# Patient Record
Sex: FEMALE | Race: WHITE | NOT HISPANIC OR LATINO | Employment: OTHER | URBAN - METROPOLITAN AREA
[De-identification: names, ages, dates, MRNs, and addresses within clinical notes are randomized per-mention and may not be internally consistent; named-entity substitution may affect disease eponyms.]

---

## 2019-07-01 RX ORDER — ALPRAZOLAM 1 MG/1
TABLET ORAL
Qty: 90 TABLET | Refills: 1 | Status: SHIPPED | OUTPATIENT
Start: 2019-07-01 | End: 2019-08-29 | Stop reason: SDUPTHER

## 2019-08-11 RX ORDER — LAMOTRIGINE 100 MG/1
TABLET ORAL
Qty: 90 TABLET | Refills: 0 | Status: SHIPPED | OUTPATIENT
Start: 2019-08-11 | End: 2019-09-03 | Stop reason: SDUPTHER

## 2019-08-29 PROBLEM — G40.909 SEIZURE DISORDER: Status: ACTIVE | Noted: 2019-08-29

## 2019-08-29 PROBLEM — F32.A DEPRESSION: Status: ACTIVE | Noted: 2019-08-29

## 2019-08-29 RX ORDER — ALPRAZOLAM 1 MG/1
TABLET ORAL
Qty: 90 TABLET | Refills: 0 | Status: SHIPPED | OUTPATIENT
Start: 2019-08-29 | End: 2019-09-03 | Stop reason: SDUPTHER

## 2019-08-29 RX ORDER — AMOXICILLIN 875 MG/1
TABLET, COATED ORAL
COMMUNITY
Start: 2014-12-11 | End: 2019-09-03

## 2019-08-29 RX ORDER — CEFUROXIME AXETIL 500 MG/1
TABLET ORAL
COMMUNITY
Start: 2015-08-13 | End: 2019-09-03

## 2019-08-29 RX ORDER — ETODOLAC 400 MG/1
TABLET, FILM COATED ORAL
COMMUNITY
Start: 2016-01-08 | End: 2019-11-15

## 2019-08-29 RX ORDER — TRAZODONE HYDROCHLORIDE 150 MG/1
TABLET ORAL EVERY 24 HOURS
COMMUNITY
Start: 2018-05-16 | End: 2019-11-15

## 2019-08-29 RX ORDER — GABAPENTIN 800 MG/1
800 TABLET ORAL 3 TIMES DAILY
COMMUNITY
Start: 2019-02-08 | End: 2021-04-07 | Stop reason: SDUPTHER

## 2019-08-29 RX ORDER — PHENOBARBITAL 100 MG/1
100 TABLET ORAL NIGHTLY
COMMUNITY
Start: 2018-07-12 | End: 2020-08-23 | Stop reason: HOSPADM

## 2019-08-29 RX ORDER — LEVETIRACETAM 500 MG/1
1500 TABLET ORAL 2 TIMES DAILY
COMMUNITY
End: 2022-06-07 | Stop reason: SDUPTHER

## 2019-08-29 RX ORDER — QUETIAPINE 400 MG/1
TABLET, FILM COATED, EXTENDED RELEASE ORAL
COMMUNITY
Start: 2016-06-16 | End: 2019-12-11 | Stop reason: SDUPTHER

## 2019-08-29 RX ORDER — ALBUTEROL SULFATE 90 UG/1
AEROSOL, METERED RESPIRATORY (INHALATION)
COMMUNITY
Start: 2015-08-13 | End: 2020-01-22

## 2019-09-03 ENCOUNTER — OFFICE VISIT (OUTPATIENT)
Dept: PSYCHIATRY | Facility: CLINIC | Age: 49
End: 2019-09-03

## 2019-09-03 DIAGNOSIS — F31.31 BIPOLAR AFFECTIVE DISORDER, CURRENTLY DEPRESSED, MILD (HCC): Primary | ICD-10-CM

## 2019-09-03 DIAGNOSIS — F40.01 PANIC DISORDER WITH AGORAPHOBIA: ICD-10-CM

## 2019-09-03 PROCEDURE — 99213 OFFICE O/P EST LOW 20 MIN: CPT | Performed by: PHYSICIAN ASSISTANT

## 2019-09-03 RX ORDER — ALPRAZOLAM 1 MG/1
1 TABLET ORAL 4 TIMES DAILY PRN
Qty: 90 TABLET | Refills: 2 | Status: SHIPPED | OUTPATIENT
Start: 2019-09-30 | End: 2019-12-11 | Stop reason: DRUGHIGH

## 2019-09-03 RX ORDER — LEVETIRACETAM 750 MG/1
TABLET ORAL
COMMUNITY
Start: 2019-07-03 | End: 2019-11-15

## 2019-09-03 RX ORDER — PRAMIPEXOLE DIHYDROCHLORIDE 1 MG/1
1 TABLET ORAL
Refills: 11 | COMMUNITY
Start: 2019-08-29 | End: 2020-02-24 | Stop reason: HOSPADM

## 2019-09-03 RX ORDER — LURASIDONE HYDROCHLORIDE 40 MG/1
TABLET, FILM COATED ORAL DAILY
COMMUNITY
End: 2019-09-03

## 2019-09-03 RX ORDER — CLINDAMYCIN PHOSPHATE 20 MG/G
CREAM VAGINAL
COMMUNITY
Start: 2019-07-29 | End: 2019-11-15

## 2019-09-03 RX ORDER — LAMOTRIGINE 100 MG/1
TABLET ORAL
Qty: 90 TABLET | Refills: 3 | Status: SHIPPED | OUTPATIENT
Start: 2019-09-03 | End: 2019-12-11 | Stop reason: SDUPTHER

## 2019-09-03 RX ORDER — SUMATRIPTAN 100 MG/1
100 TABLET, FILM COATED ORAL ONCE AS NEEDED
COMMUNITY
End: 2021-08-06 | Stop reason: SDUPTHER

## 2019-09-03 RX ORDER — MODAFINIL 200 MG/1
200 TABLET ORAL EVERY MORNING
Refills: 5 | COMMUNITY
Start: 2019-08-13 | End: 2021-10-04

## 2019-09-03 RX ORDER — NITROFURANTOIN 25; 75 MG/1; MG/1
100 CAPSULE ORAL 2 TIMES DAILY
Refills: 0 | COMMUNITY
Start: 2019-07-26 | End: 2019-09-03

## 2019-09-03 RX ORDER — DIAZEPAM 5 MG/1
5 TABLET ORAL
COMMUNITY
End: 2019-09-03

## 2019-09-03 NOTE — PROGRESS NOTES
Subjective   Milka Espinoza is a 49 y.o.white female who presents today for follow up    Chief Complaint: Depression and anxiety    History of Present Illness:   She missed her last appt  Autistic son lives with her, living in a new apartment, two window units, no central air.   Anxiety and panic attacks on occasion  No pseudoseizures recently  No pain meds  Depression 4/10  Denies SI/HI  She is doing better on the Xanax than she was with the Valium, switched in June    The following portions of the patient's history were reviewed and updated as appropriate: allergies, current medications, past family history, past medical history, past social history, past surgical history and problem list.    PAST PSYCHIATRIC HISTORY  Axis I  Affective/Bipoloar Disorder, Anxiety/Panic Disorder, Substance Abuse Disorder  Axis II  None    PAST OUTPATIENT TREATMENT  Diagnosis treated:  Affective Disorder, Anxiety/Panic Disorder, Substance/Alcohol Abuse  Treatment Type:  Individual Therapy, Medication Management  Prior Psychiatric Medications:  Abilify  Risperidone  Vraylar  Valium  Xanax  Support Groups:  None  Sequelae Of Mental Disorder:  social isolation, family disruption, emotional distress      Interval History  Improved    Side Effects  None      Past Medical History:  Past Medical History:   Diagnosis Date   • Anxiety    • Bipolar disorder (CMS/HCC)    • Depression    • Panic disorder    • Seizures (CMS/HCC)        Social History:  Social History     Socioeconomic History   • Marital status: Single     Spouse name: Not on file   • Number of children: Not on file   • Years of education: Not on file   • Highest education level: Not on file   Tobacco Use   • Smoking status: Current Every Day Smoker     Packs/day: 0.25     Types: Cigarettes   Substance and Sexual Activity   • Alcohol use: No     Frequency: Never       Family History:  History reviewed. No pertinent family history.    Past Surgical History:  Past Surgical  History:   Procedure Laterality Date   • ABDOMINAL SURGERY     • HERNIA REPAIR     • HYSTERECTOMY         Problem List:  Patient Active Problem List   Diagnosis   • Anxiety disorder   • Anxiety state   • Bipolar affective disorder (CMS/HCC)   • Depression   • Mood disorder in conditions classified elsewhere   • Convulsions (CMS/HCC)   • Seizure disorder (CMS/HCC)   • Opioid withdrawal (CMS/HCC)       Allergy:   Allergies   Allergen Reactions   • Sulfa Antibiotics Hives   • Tramadol Hcl Mental Status Change        Discontinued Medications:  Medications Discontinued During This Encounter   Medication Reason   • diazePAM (VALIUM) 5 MG tablet *Therapy completed   • lurasidone (LATUDA) 40 MG tablet tablet *Therapy completed   • amoxicillin (AMOXIL) 875 MG tablet *Therapy completed   • nitrofurantoin, macrocrystal-monohydrate, (MACROBID) 100 MG capsule *Therapy completed   • cefuroxime (CEFTIN) 500 MG tablet *Therapy completed   • lamoTRIgine (LaMICtal) 100 MG tablet Reorder   • ALPRAZolam (XANAX) 1 MG tablet Reorder       Current Medications:   Current Outpatient Medications   Medication Sig Dispense Refill   • [START ON 9/30/2019] ALPRAZolam (XANAX) 1 MG tablet Take 1 tablet by mouth 4 (Four) Times a Day As Needed for Anxiety. for anxiety 90 tablet 2   • Cariprazine HCl (VRAYLAR) 3 MG capsule 1 capsule Daily.     • gabapentin (NEURONTIN) 800 MG tablet Every 8 (Eight) Hours.     • lamoTRIgine (LaMICtal) 100 MG tablet Take 1 tablet by mouth in the morning and 2 at bedtime. 90 tablet 3   • QUEtiapine XR (SEROQUEL XR) 400 MG 24 hr tablet SEROQUEL  MG XR24H-TAB     • traZODone (DESYREL) 150 MG tablet Daily.     • albuterol sulfate HFA (PROAIR HFA) 108 (90 Base) MCG/ACT inhaler PROAIR  (90 Base) MCG/ACT AERS     • clindamycin (CLEOCIN) 2 % vaginal cream      • etodolac (LODINE) 400 MG tablet ETODOLAC 400 MG TABS     • levETIRAcetam (KEPPRA) 500 MG tablet KEPPRA 500 MG TABS     • levETIRAcetam (KEPPRA) 750 MG  tablet      • modafinil (PROVIGIL) 200 MG tablet Take 200 mg by mouth Every Morning.  5   • PHENobarbital (LUMINAL) 100 MG tablet Daily.     • pramipexole (MIRAPEX) 1 MG tablet Take 1 mg by mouth every night at bedtime.  11   • SUMAtriptan (IMITREX) 100 MG tablet        No current facility-administered medications for this visit.          Review of Symptoms:    Psychiatric/Behavioral: Negative for agitation, behavioral problems, confusion, decreased concentration, dysphoric mood, hallucinations, self-injury, sleep disturbance and suicidal ideas. The patient is not nervous/anxious and is not hyperactive.        Physical Exam:   There were no vitals taken for this visit.    Mental Status Exam:   Hygiene:   good  Cooperation:  Cooperative  Eye Contact:  Good  Psychomotor Behavior:  Appropriate  Affect:  Appropriate  Mood: normal  Hopelessness: Denies  Speech:  Normal  Thought Process:  Goal directed  Thought Content:  Normal  Suicidal:  None  Homicidal:  None  Hallucinations:  None  Delusion:  None  Memory:  Intact  Orientation:  Person, Place, Time and Situation  Reliability:  good  Insight:  Good  Judgement:  Good  Impulse Control:  Good  Physical/Medical Issues:  No        PHQ-9 Depression Screening  Little interest or pleasure in doing things? 1   Feeling down, depressed, or hopeless? 1   Trouble falling or staying asleep, or sleeping too much? 1   Feeling tired or having little energy? 1   Poor appetite or overeating? 1   Feeling bad about yourself - or that you are a failure or have let yourself or your family down? 1   Trouble concentrating on things, such as reading the newspaper or watching television? 1   Moving or speaking so slowly that other people could have noticed? Or the opposite - being so fidgety or restless that you have been moving around a lot more than usual? 0   Thoughts that you would be better off dead, or of hurting yourself in some way? 0   PHQ-9 Total Score 7   If you checked off any  problems, how difficult have these problems made it for you to do your work, take care of things at home, or get along with other people? Somewhat difficult           Former smoker    I advised Milka of the risks of tobacco use.     Lab Results:   No visits with results within 3 Month(s) from this visit.   Latest known visit with results is:   Admission on 06/12/2018, Discharged on 06/12/2018   Component Date Value Ref Range Status   • Creatinine, Urine 06/12/2018 42.9Urine creatinines <20 mg/dL may express a dilution effect which can cause false negatives for the drug screen.  mg/dL Final   • Amphetamine, Urine Qual 06/12/2018 NEGATIVE  NEGATIVE Final   • Barbiturates Screen, Urine 06/12/2018 POSITIVE* NEGATIVE Final   • Cocaine Screen, Urine 06/12/2018 NEGATIVE  NEGATIVE Final   • Opiate Screen, Urine 06/12/2018 NEGATIVE  NEGATIVE Final   • Benzodiazepine Screen, Urine 06/12/2018 NEGATIVE  NEGATIVE Final   • Phencyclidine (PCP), Urine 06/12/2018 NEGATIVE  NEGATIVE Final   • THC, Screen 06/12/2018 NEGATIVE  NEGATIVE Final   • Methadone Screen, Urine 06/12/2018 NEGATIVE  NEGATIVE Final   • WBC 06/12/2018 4.8  4.5 - 11.5 10*3/uL Final   • RBC 06/12/2018 4.03  4.00 - 5.40 10*6/uL Final   • Hemoglobin 06/12/2018 11.9* 12.0 - 15.0 g/dL Final   • Hematocrit 06/12/2018 36.6  35 - 49 % Final   • MCV 06/12/2018 90.8  80 - 94 fL Final   • MCH 06/12/2018 29.6  26 - 32 pg Final   • MCHC 06/12/2018 32.6  32 - 36 g/dL Final   • RDW 06/12/2018 13.7  11.5 - 14.5 % Final   • Platelets 06/12/2018 223  150 - 450 10*3/uL Final   • MPV 06/12/2018 7.7  7.4 - 10.4 fL Final   • Differential Type 06/12/2018 AUTO   Final   • Neutrophils Absolute 06/12/2018 2.0* 2.3 - 8.6 10*3/uL Final   • Lymphocytes Absolute 06/12/2018 2.2  0.8 - 4.8 10*3/uL Final   • Monocytes Absolute 06/12/2018 0.5  0.1 - 1.3 10*3/uL Final   • Eosinophils Absolute 06/12/2018 0.1  0.0 - 0.3 10*3/uL Final   • Basophils Absolute 06/12/2018 0.0  0 - 0.2 10*3/uL Final    • Neutrophil Rel % 06/12/2018 42* 50 - 75 % Final   • Lymphocyte Rel % 06/12/2018 46* 18 - 42 % Final   • Monocyte Rel % 06/12/2018 10  2 - 11 % Final   • Eosinophil Rel % 06/12/2018 1  0 - 3 % Final   • Basophil Rel % 06/12/2018 1  0 - 2 % Final   • nRBC 06/12/2018 0  0 /100[WBCs] Final   • Absolute nRBC 06/12/2018 0  10*3/uL Final   • Sodium 06/12/2018 139  136 - 144 mmol/L Final   • Potassium 06/12/2018 3.9  3.6 - 5.1 mmol/L Final   • Chloride 06/12/2018 103  101 - 111 mmol/L Final   • CO2 06/12/2018 26  22 - 32 mmol/L Final   • Glucose 06/12/2018 91  65 - 99 mg/dL Final   • BUN 06/12/2018 3* 8 - 20 mg/dL Final   • Creatinine 06/12/2018 0.7  0.4 - 1.0 mg/dl Final   • Anion Gap 06/12/2018 13.9  10 - 20 Final   • BUN/Creatinine Ratio 06/12/2018 4.3* 5.4 - 26.2 Final   • GFR MDRD Non  06/12/2018 >60  >60 mL/min/1.73m2 Final   • GFR MDRD  06/12/2018 >60  >60 mL/min/1.73m2 Final   • Calcium 06/12/2018 8.9  8.9 - 10.3 mg/dL Final       Assessment/Plan   Problems Addressed this Visit        Other    Anxiety disorder    Relevant Medications    Cariprazine HCl (VRAYLAR) 3 MG capsule    PHENobarbital (LUMINAL) 100 MG tablet    QUEtiapine XR (SEROQUEL XR) 400 MG 24 hr tablet    traZODone (DESYREL) 150 MG tablet    modafinil (PROVIGIL) 200 MG tablet    ALPRAZolam (XANAX) 1 MG tablet (Start on 9/30/2019)    Bipolar affective disorder (CMS/HCC) - Primary    Relevant Medications    Cariprazine HCl (VRAYLAR) 3 MG capsule    PHENobarbital (LUMINAL) 100 MG tablet    QUEtiapine XR (SEROQUEL XR) 400 MG 24 hr tablet    traZODone (DESYREL) 150 MG tablet    modafinil (PROVIGIL) 200 MG tablet    lamoTRIgine (LaMICtal) 100 MG tablet    ALPRAZolam (XANAX) 1 MG tablet (Start on 9/30/2019)          Visit Diagnoses:    ICD-10-CM ICD-9-CM   1. Bipolar affective disorder, currently depressed, mild (CMS/[facility], located within [facility]) F31.31 296.51   2. Panic disorder with agoraphobia F40.01 300.21       TREATMENT PLAN/GOALS: Continue  supportive psychotherapy efforts and medications as indicated. Treatment and medication options discussed during today's visit. Patient ackowledged and verbally consented to continue with current treatment plan and was educated on the importance of compliance with treatment and follow-up appointments.    MEDICATION ISSUES:  INSPECT reviewed as expected  Discussed medication options and treatment plan of prescribed medication as well as the risks, benefits, and side effects including potential falls, possible impaired driving and metabolic adversities among others. Patient is agreeable to call the office with any worsening of symptoms or onset of side effects. Patient is agreeable to call 911 or go to the nearest ER should he/she begin having SI/HI. No medication side effects or related complaints today.     Patient is doing well on her current medications, except increased anxiety.  Change Xanax 1 mg from 3 times daily to 4 times daily #120 starting next month with her next fill  Continue Lamictal 100 mg, 1 in the morning and 2 at bedtime  Continue trazodone and Seroquel and Vraylar at current dosages with no changes but she did not think she needed refills of them today.    MEDS ORDERED DURING VISIT:  New Medications Ordered This Visit   Medications   • lamoTRIgine (LaMICtal) 100 MG tablet     Sig: Take 1 tablet by mouth in the morning and 2 at bedtime.     Dispense:  90 tablet     Refill:  3     Please consider 90 day supplies to promote better adherence   • ALPRAZolam (XANAX) 1 MG tablet     Sig: Take 1 tablet by mouth 4 (Four) Times a Day As Needed for Anxiety. for anxiety     Dispense:  90 tablet     Refill:  2     She just picked up Rx so this is for next month with new directions       Return in about 3 months (around 12/3/2019).         This document has been electronically signed by Love Bourgeois PA-C  September 3, 2019 4:34 PM

## 2019-09-10 ENCOUNTER — TELEPHONE (OUTPATIENT)
Dept: PSYCHIATRY | Facility: CLINIC | Age: 49
End: 2019-09-10

## 2019-09-10 DIAGNOSIS — F40.01 PANIC DISORDER WITH AGORAPHOBIA: Primary | ICD-10-CM

## 2019-09-10 NOTE — TELEPHONE ENCOUNTER
Patient said her anxiety is still really bad and she wants to try Buspar, She wants your recommendation

## 2019-09-11 RX ORDER — BUSPIRONE HYDROCHLORIDE 10 MG/1
10 TABLET ORAL 3 TIMES DAILY
Qty: 90 TABLET | Refills: 2 | Status: SHIPPED | OUTPATIENT
Start: 2019-09-11 | End: 2019-12-11 | Stop reason: SDUPTHER

## 2019-09-11 NOTE — TELEPHONE ENCOUNTER
Continue the Xanax and can add Buspar 10mg TID and can increase the strength of the tablet if needed at next visit.

## 2019-11-15 ENCOUNTER — OFFICE VISIT (OUTPATIENT)
Dept: FAMILY MEDICINE CLINIC | Facility: CLINIC | Age: 49
End: 2019-11-15

## 2019-11-15 ENCOUNTER — LAB (OUTPATIENT)
Dept: FAMILY MEDICINE CLINIC | Facility: CLINIC | Age: 49
End: 2019-11-15

## 2019-11-15 ENCOUNTER — HOSPITAL ENCOUNTER (OUTPATIENT)
Dept: CT IMAGING | Facility: HOSPITAL | Age: 49
Discharge: HOME | End: 2019-11-15
Admitting: NURSE PRACTITIONER

## 2019-11-15 VITALS
SYSTOLIC BLOOD PRESSURE: 122 MMHG | TEMPERATURE: 98.2 F | OXYGEN SATURATION: 98 % | DIASTOLIC BLOOD PRESSURE: 66 MMHG | BODY MASS INDEX: 30.16 KG/M2 | WEIGHT: 181 LBS | HEART RATE: 91 BPM | HEIGHT: 65 IN

## 2019-11-15 DIAGNOSIS — Z12.31 ENCOUNTER FOR SCREENING MAMMOGRAM FOR MALIGNANT NEOPLASM OF BREAST: ICD-10-CM

## 2019-11-15 DIAGNOSIS — R79.9 ABNORMAL FINDING OF BLOOD CHEMISTRY, UNSPECIFIED: ICD-10-CM

## 2019-11-15 DIAGNOSIS — R11.2 NAUSEA AND VOMITING, INTRACTABILITY OF VOMITING NOT SPECIFIED, UNSPECIFIED VOMITING TYPE: ICD-10-CM

## 2019-11-15 DIAGNOSIS — R19.7 DIARRHEA, UNSPECIFIED TYPE: ICD-10-CM

## 2019-11-15 DIAGNOSIS — F41.9 ANXIETY DISORDER, UNSPECIFIED TYPE: ICD-10-CM

## 2019-11-15 DIAGNOSIS — R10.84 GENERALIZED ABDOMINAL PAIN: Primary | ICD-10-CM

## 2019-11-15 DIAGNOSIS — Z00.00 PREVENTATIVE HEALTH CARE: ICD-10-CM

## 2019-11-15 DIAGNOSIS — R10.84 GENERALIZED ABDOMINAL PAIN: ICD-10-CM

## 2019-11-15 LAB
ALBUMIN SERPL-MCNC: 4.5 G/DL (ref 3.5–5.2)
ALBUMIN/GLOB SERPL: 1.3 G/DL
ALP SERPL-CCNC: 118 U/L (ref 39–117)
ALT SERPL W P-5'-P-CCNC: 28 U/L (ref 1–33)
ANION GAP SERPL CALCULATED.3IONS-SCNC: 10.5 MMOL/L (ref 5–15)
AST SERPL-CCNC: 18 U/L (ref 1–32)
BASOPHILS # BLD AUTO: 0.04 10*3/MM3 (ref 0–0.2)
BASOPHILS NFR BLD AUTO: 0.6 % (ref 0–1.5)
BILIRUB BLD-MCNC: NEGATIVE MG/DL
BILIRUB SERPL-MCNC: <0.2 MG/DL (ref 0.2–1.2)
BUN BLD-MCNC: 8 MG/DL (ref 6–20)
BUN/CREAT SERPL: 10.4 (ref 7–25)
CALCIUM SPEC-SCNC: 9.5 MG/DL (ref 8.6–10.5)
CHLORIDE SERPL-SCNC: 101 MMOL/L (ref 98–107)
CHOLEST SERPL-MCNC: 280 MG/DL (ref 0–200)
CLARITY, POC: CLEAR
CO2 SERPL-SCNC: 30.5 MMOL/L (ref 22–29)
COLOR UR: YELLOW
CREAT BLD-MCNC: 0.77 MG/DL (ref 0.57–1)
CREAT BLDA-MCNC: 0.7 MG/DL (ref 0.6–1.3)
DEPRECATED RDW RBC AUTO: 41.3 FL (ref 37–54)
EOSINOPHIL # BLD AUTO: 0.05 10*3/MM3 (ref 0–0.4)
EOSINOPHIL NFR BLD AUTO: 0.8 % (ref 0.3–6.2)
ERYTHROCYTE [DISTWIDTH] IN BLOOD BY AUTOMATED COUNT: 12.5 % (ref 12.3–15.4)
EXPIRATION DATE: NORMAL
FLUAV AG NPH QL: NEGATIVE
FLUBV AG NPH QL: NEGATIVE
GFR SERPL CREATININE-BSD FRML MDRD: 79 ML/MIN/1.73
GLOBULIN UR ELPH-MCNC: 3.5 GM/DL
GLUCOSE BLD-MCNC: 102 MG/DL (ref 65–99)
GLUCOSE UR STRIP-MCNC: NEGATIVE MG/DL
HBA1C MFR BLD: 4.9 % (ref 3.5–5.6)
HCT VFR BLD AUTO: 37.1 % (ref 34–46.6)
HDLC SERPL-MCNC: 74 MG/DL (ref 40–60)
HGB BLD-MCNC: 12.4 G/DL (ref 12–15.9)
IMM GRANULOCYTES # BLD AUTO: 0.03 10*3/MM3 (ref 0–0.05)
IMM GRANULOCYTES NFR BLD AUTO: 0.5 % (ref 0–0.5)
INTERNAL CONTROL: NORMAL
KETONES UR QL: NEGATIVE
LDLC SERPL CALC-MCNC: 136 MG/DL (ref 0–100)
LDLC/HDLC SERPL: 1.83 {RATIO}
LEUKOCYTE EST, POC: NEGATIVE
LYMPHOCYTES # BLD AUTO: 2.34 10*3/MM3 (ref 0.7–3.1)
LYMPHOCYTES NFR BLD AUTO: 36.6 % (ref 19.6–45.3)
Lab: NORMAL
MCH RBC QN AUTO: 30.3 PG (ref 26.6–33)
MCHC RBC AUTO-ENTMCNC: 33.4 G/DL (ref 31.5–35.7)
MCV RBC AUTO: 90.7 FL (ref 79–97)
MONOCYTES # BLD AUTO: 0.45 10*3/MM3 (ref 0.1–0.9)
MONOCYTES NFR BLD AUTO: 7 % (ref 5–12)
NEUTROPHILS # BLD AUTO: 3.48 10*3/MM3 (ref 1.7–7)
NEUTROPHILS NFR BLD AUTO: 54.5 % (ref 42.7–76)
NITRITE UR-MCNC: NEGATIVE MG/ML
NRBC BLD AUTO-RTO: 0 /100 WBC (ref 0–0.2)
PH UR: 8.5 [PH] (ref 5–8)
PHENOBARB SERPL-MCNC: 8.6 MCG/ML (ref 10–30)
PLATELET # BLD AUTO: 258 10*3/MM3 (ref 140–450)
PMV BLD AUTO: 10.6 FL (ref 6–12)
POTASSIUM BLD-SCNC: 5.1 MMOL/L (ref 3.5–5.2)
PROT SERPL-MCNC: 8 G/DL (ref 6–8.5)
PROT UR STRIP-MCNC: NEGATIVE MG/DL
RBC # BLD AUTO: 4.09 10*6/MM3 (ref 3.77–5.28)
RBC # UR STRIP: NEGATIVE /UL
SODIUM BLD-SCNC: 142 MMOL/L (ref 136–145)
SP GR UR: 1.02 (ref 1–1.03)
TRIGL SERPL-MCNC: 352 MG/DL (ref 0–150)
TSH SERPL DL<=0.05 MIU/L-ACNC: 5.03 UIU/ML (ref 0.27–4.2)
UROBILINOGEN UR QL: NORMAL
VLDLC SERPL-MCNC: 70.4 MG/DL (ref 5–40)
WBC NRBC COR # BLD: 6.39 10*3/MM3 (ref 3.4–10.8)

## 2019-11-15 PROCEDURE — 80053 COMPREHEN METABOLIC PANEL: CPT | Performed by: NURSE PRACTITIONER

## 2019-11-15 PROCEDURE — 81003 URINALYSIS AUTO W/O SCOPE: CPT | Performed by: NURSE PRACTITIONER

## 2019-11-15 PROCEDURE — 85025 COMPLETE CBC W/AUTO DIFF WBC: CPT | Performed by: NURSE PRACTITIONER

## 2019-11-15 PROCEDURE — 83036 HEMOGLOBIN GLYCOSYLATED A1C: CPT | Performed by: NURSE PRACTITIONER

## 2019-11-15 PROCEDURE — 80177 DRUG SCRN QUAN LEVETIRACETAM: CPT | Performed by: NURSE PRACTITIONER

## 2019-11-15 PROCEDURE — 36415 COLL VENOUS BLD VENIPUNCTURE: CPT

## 2019-11-15 PROCEDURE — 87804 INFLUENZA ASSAY W/OPTIC: CPT | Performed by: NURSE PRACTITIONER

## 2019-11-15 PROCEDURE — 80175 DRUG SCREEN QUAN LAMOTRIGINE: CPT | Performed by: NURSE PRACTITIONER

## 2019-11-15 PROCEDURE — 0 IOPAMIDOL PER 1 ML: Performed by: NURSE PRACTITIONER

## 2019-11-15 PROCEDURE — 80061 LIPID PANEL: CPT | Performed by: NURSE PRACTITIONER

## 2019-11-15 PROCEDURE — 74177 CT ABD & PELVIS W/CONTRAST: CPT

## 2019-11-15 PROCEDURE — 99204 OFFICE O/P NEW MOD 45 MIN: CPT | Performed by: NURSE PRACTITIONER

## 2019-11-15 PROCEDURE — 82565 ASSAY OF CREATININE: CPT

## 2019-11-15 PROCEDURE — 84443 ASSAY THYROID STIM HORMONE: CPT | Performed by: NURSE PRACTITIONER

## 2019-11-15 PROCEDURE — 80184 ASSAY OF PHENOBARBITAL: CPT | Performed by: NURSE PRACTITIONER

## 2019-11-15 RX ORDER — ONDANSETRON 4 MG/1
4 TABLET, FILM COATED ORAL EVERY 8 HOURS PRN
Qty: 30 TABLET | Refills: 0 | Status: SHIPPED | OUTPATIENT
Start: 2019-11-15 | End: 2020-01-22

## 2019-11-15 RX ADMIN — IOPAMIDOL 100 ML: 755 INJECTION, SOLUTION INTRAVENOUS at 13:30

## 2019-11-15 NOTE — PATIENT INSTRUCTIONS
Drink plenty of fluids  Referral to GI  Obtain labs  Go to the ER for worsening symptoms  See eye doctor  Complete CT scan  zofran as needed for nausea

## 2019-11-15 NOTE — PROGRESS NOTES
Subjective   Milka Espinoza is a 49 y.o. female.     Pt is here today to establish care and with c/o diarrhea and nausea.  She is from [place].  She saw Dr. Cosby previously.  She does not work at this time.  She was a  previously.  She is single with 3 children.  Does not get much physical activity in.  Does not consume a well balanced diet.    Depression and Bipolar- Sees Dr. Bourgeois every 3 mo. Is currently on Xanax 1mg QID, buspar 10g TID, Vraylar 3mg daily, seroquel 400mg daily. Has struggled with depression for most of her life.  She was in a coma in 2012 for 10 days due to seizures.  She started having seizures in 2002.    Seizures- sees Dr. Cooley with neuro.  Currently on gabapentin, lamictal, keppra, and luminal. Started having them in 2002.  States her last seizure was a couple of days ago. She reports that she has been having jerks lately.  She has seizures every months, but sometimes can have them daily.    Takes modafinil because she is tired all the time.    Migraines- Takes imitrex.  Controlled     Nausea, vomiting, diarrhea- been occurring for 2 weeks.  Reports that she is constantly nauseous.  She has been vomiting on a regular basis.  She has been having diarrhea about once every 3 days. She has not had any formed stools in the last 2 weeks.  She has been very gassy.  She has not been able to keep much fluids or food down.  She has been trying to eat bland foods.  She has not been able to take many of her meds because of her stomach, which has made her very emotional.  Denies any abdominal pain.  She has been having some flank pain off and on for a year and strong odor in her urin.         Labs-  Pap smear- n/a hysterectomy  Mammogram- due  DEXA- n/a  Colonoscopy- due    Vaccines:  Flu- due  PNA- n/a  Shingles-n/a  Tdap- due    Dental exam- due  Eye exam- due    The following portions of the patient's history were reviewed and updated as appropriate: allergies, current medications,  "past family history, past medical history, past social history, past surgical history and problem list.    Review of Systems   Constitutional: Positive for appetite change, chills and fatigue. Negative for fever.   HENT: Negative for congestion, ear pain, hearing loss, postnasal drip, rhinorrhea, sinus pressure, sore throat, swollen glands and trouble swallowing.    Eyes: Positive for blurred vision (hasnt had an eye exam in years). Negative for double vision, pain, discharge, itching and visual disturbance.   Respiratory: Negative for cough, chest tightness, shortness of breath and wheezing.    Cardiovascular: Negative for chest pain, palpitations and leg swelling.   Gastrointestinal: Positive for constipation (from her medications), diarrhea, nausea and vomiting. Negative for abdominal pain and blood in stool.   Endocrine: Positive for polydipsia. Negative for polyphagia and polyuria.   Genitourinary: Positive for flank pain. Negative for dysuria, frequency and urgency.   Musculoskeletal: Positive for arthralgias. Negative for back pain and myalgias.   Skin: Negative for rash and skin lesions.   Neurological: Positive for dizziness and headache (hx migraines). Negative for weakness and numbness.   Psychiatric/Behavioral: Positive for depressed mood. Negative for self-injury, sleep disturbance, suicidal ideas and stress. The patient is nervous/anxious.        Objective   /66 (BP Location: Right arm, Patient Position: Sitting, Cuff Size: Large Adult)   Pulse 91   Temp 98.2 °F (36.8 °C) (Core)   Ht 163.8 cm (64.5\")   Wt 82.1 kg (181 lb)   SpO2 98%   Breastfeeding? No   BMI 30.59 kg/m²   Physical Exam   Constitutional: She is oriented to person, place, and time. She appears well-developed and well-nourished. She appears distressed (mod discomfort/ nausea).   HENT:   Head: Normocephalic and atraumatic.   Right Ear: External ear normal.   Left Ear: External ear normal.   Mouth/Throat: Oropharynx is clear " and moist.   Eyes: Conjunctivae and EOM are normal. Pupils are equal, round, and reactive to light.   Neck: Normal range of motion. Neck supple.   Cardiovascular: Normal rate and regular rhythm.   Pulmonary/Chest: Effort normal and breath sounds normal. No respiratory distress.   Abdominal: Soft. Bowel sounds are normal. She exhibits no distension and no mass. There is tenderness (extreme tenderness upon palpation generalized- primarily left side). There is rebound.   Musculoskeletal: Normal range of motion.   Neurological: She is alert and oriented to person, place, and time.   Skin: Skin is warm and dry.   Psychiatric: Her behavior is normal. Judgment and thought content normal.   Depressed affect         Assessment/Plan     Diagnoses and all orders for this visit:    1. Generalized abdominal pain (Primary)  Comments:  upon palpation  Stat CT abdomen  R/O diverticulitis or colitis  zofran  labs  Orders:  -     CT Abdomen Pelvis With Contrast; Future    2. Nausea and vomiting, intractability of vomiting not specified, unspecified vomiting type  Comments:  unknown etiology  CT scan  check labs- check anticonvulsant med levels  Orders:  -     Ambulatory Referral to Gastroenterology  -     Levetiracetam Level (Keppra); Future  -     Phenobarbital level; Future  -     Lamotrigine level; Future  -     POCT Influenza A/B  -     POC Urinalysis Dipstick, Automated  -     CT Abdomen Pelvis With Contrast; Future  -     ondansetron (ZOFRAN) 4 MG tablet; Take 1 tablet by mouth Every 8 (Eight) Hours As Needed for Nausea or Vomiting.  Dispense: 30 tablet; Refill: 0    3. Diarrhea, unspecified type  Comments:  check labs  push fluids  Orders:  -     CBC & Differential  -     Ambulatory Referral to Gastroenterology  -     CT Abdomen Pelvis With Contrast; Future  -     CBC Auto Differential    4. Preventative health care  Comments:  educ to work on diet and exercise  Orders:  -     Comprehensive Metabolic Panel; Future  -     Lipid  Panel; Future    5. Abnormal finding of blood chemistry, unspecified   -     Hemoglobin A1c; Future  -     TSH; Future    6. Anxiety disorder, unspecified type   Comments:  sees psych  Orders:  -     TSH; Future    7. Encounter for screening mammogram for malignant neoplasm of breast   -     Mammo Screening Digital Tomosynthesis Bilateral With CAD; Future      Brief Urine Lab Results  (Last result in the past 365 days)      Color   Clarity   Blood   Leuk Est   Nitrite   Protein   CREAT   Urine HCG        11/15/19 1246 Yellow Clear Negative Negative Negative Negative

## 2019-11-17 LAB — LEVETIRACETAM SERPL-MCNC: <1 UG/ML (ref 10–40)

## 2019-11-19 DIAGNOSIS — R79.9 ABNORMAL FINDING OF BLOOD CHEMISTRY, UNSPECIFIED: Primary | ICD-10-CM

## 2019-11-19 DIAGNOSIS — R94.6 ABNORMAL RESULTS OF THYROID FUNCTION STUDIES: ICD-10-CM

## 2019-11-19 LAB — LAMOTRIGINE SERPL-MCNC: NORMAL UG/ML (ref 2–20)

## 2019-11-19 RX ORDER — ATORVASTATIN CALCIUM 40 MG/1
40 TABLET, FILM COATED ORAL DAILY
Qty: 90 TABLET | Refills: 1 | Status: SHIPPED | OUTPATIENT
Start: 2019-11-19 | End: 2020-02-13

## 2019-11-20 ENCOUNTER — OFFICE (AMBULATORY)
Dept: URBAN - METROPOLITAN AREA CLINIC 64 | Facility: CLINIC | Age: 49
End: 2019-11-20

## 2019-11-20 VITALS
HEART RATE: 112 BPM | DIASTOLIC BLOOD PRESSURE: 68 MMHG | SYSTOLIC BLOOD PRESSURE: 105 MMHG | WEIGHT: 188 LBS | HEIGHT: 64 IN

## 2019-11-20 DIAGNOSIS — R10.11 RIGHT UPPER QUADRANT PAIN: ICD-10-CM

## 2019-11-20 DIAGNOSIS — Z12.11 ENCOUNTER FOR SCREENING FOR MALIGNANT NEOPLASM OF COLON: ICD-10-CM

## 2019-11-20 DIAGNOSIS — D18.03 HEMANGIOMA OF INTRA-ABDOMINAL STRUCTURES: ICD-10-CM

## 2019-11-20 DIAGNOSIS — R13.10 DYSPHAGIA, UNSPECIFIED: ICD-10-CM

## 2019-11-20 DIAGNOSIS — R11.2 NAUSEA WITH VOMITING, UNSPECIFIED: ICD-10-CM

## 2019-11-20 PROCEDURE — 99204 OFFICE O/P NEW MOD 45 MIN: CPT | Performed by: INTERNAL MEDICINE

## 2019-11-21 ENCOUNTER — TRANSCRIBE ORDERS (OUTPATIENT)
Dept: ADMINISTRATIVE | Facility: HOSPITAL | Age: 49
End: 2019-11-21

## 2019-11-21 DIAGNOSIS — D18.03 HEMANGIOMA OF LIVER: ICD-10-CM

## 2019-11-21 DIAGNOSIS — R13.10 DYSPHAGIA, UNSPECIFIED TYPE: ICD-10-CM

## 2019-11-21 DIAGNOSIS — R10.0 ACUTE ABDOMINAL PAIN SYNDROME: Primary | ICD-10-CM

## 2019-11-21 DIAGNOSIS — R11.2 NAUSEA AND VOMITING, INTRACTABILITY OF VOMITING NOT SPECIFIED, UNSPECIFIED VOMITING TYPE: ICD-10-CM

## 2019-11-21 DIAGNOSIS — R10.11 ABDOMINAL PAIN, RIGHT UPPER QUADRANT: ICD-10-CM

## 2019-11-27 ENCOUNTER — HOSPITAL ENCOUNTER (OUTPATIENT)
Dept: MAMMOGRAPHY | Facility: HOSPITAL | Age: 49
Discharge: HOME | End: 2019-11-27
Admitting: NURSE PRACTITIONER

## 2019-11-27 DIAGNOSIS — Z12.31 ENCOUNTER FOR SCREENING MAMMOGRAM FOR MALIGNANT NEOPLASM OF BREAST: ICD-10-CM

## 2019-11-27 PROCEDURE — 77067 SCR MAMMO BI INCL CAD: CPT

## 2019-11-27 PROCEDURE — 77063 BREAST TOMOSYNTHESIS BI: CPT

## 2019-12-04 DIAGNOSIS — M54.50 LOW BACK PAIN, UNSPECIFIED BACK PAIN LATERALITY, UNSPECIFIED CHRONICITY, UNSPECIFIED WHETHER SCIATICA PRESENT: Primary | ICD-10-CM

## 2019-12-05 ENCOUNTER — HOSPITAL ENCOUNTER (OUTPATIENT)
Dept: MRI IMAGING | Facility: HOSPITAL | Age: 49
Discharge: HOME | End: 2019-12-05
Admitting: INTERNAL MEDICINE

## 2019-12-05 DIAGNOSIS — R10.11 ABDOMINAL PAIN, RIGHT UPPER QUADRANT: ICD-10-CM

## 2019-12-05 DIAGNOSIS — R11.2 NAUSEA AND VOMITING, INTRACTABILITY OF VOMITING NOT SPECIFIED, UNSPECIFIED VOMITING TYPE: ICD-10-CM

## 2019-12-05 DIAGNOSIS — D18.03 HEMANGIOMA OF LIVER: ICD-10-CM

## 2019-12-05 DIAGNOSIS — R13.10 DYSPHAGIA, UNSPECIFIED TYPE: ICD-10-CM

## 2019-12-05 PROCEDURE — 25010000002 GADOTERIDOL PER 1 ML: Performed by: INTERNAL MEDICINE

## 2019-12-05 PROCEDURE — A9576 INJ PROHANCE MULTIPACK: HCPCS | Performed by: INTERNAL MEDICINE

## 2019-12-05 PROCEDURE — 74183 MRI ABD W/O CNTR FLWD CNTR: CPT

## 2019-12-05 RX ADMIN — GADOTERIDOL 20 ML: 279.3 INJECTION, SOLUTION INTRAVENOUS at 14:30

## 2019-12-11 ENCOUNTER — OFFICE VISIT (OUTPATIENT)
Dept: PSYCHIATRY | Facility: CLINIC | Age: 49
End: 2019-12-11

## 2019-12-11 DIAGNOSIS — F40.01 PANIC DISORDER WITH AGORAPHOBIA: Primary | ICD-10-CM

## 2019-12-11 DIAGNOSIS — F31.31 BIPOLAR AFFECTIVE DISORDER, CURRENTLY DEPRESSED, MILD (HCC): ICD-10-CM

## 2019-12-11 PROCEDURE — 99213 OFFICE O/P EST LOW 20 MIN: CPT | Performed by: PHYSICIAN ASSISTANT

## 2019-12-11 RX ORDER — BUSPIRONE HYDROCHLORIDE 10 MG/1
10 TABLET ORAL 3 TIMES DAILY
Qty: 90 TABLET | Refills: 5 | Status: SHIPPED | OUTPATIENT
Start: 2019-12-11 | End: 2020-07-26

## 2019-12-11 RX ORDER — QUETIAPINE 400 MG/1
400 TABLET, FILM COATED, EXTENDED RELEASE ORAL NIGHTLY
Qty: 30 TABLET | Refills: 5 | Status: SHIPPED | OUTPATIENT
Start: 2019-12-11 | End: 2020-02-24 | Stop reason: HOSPADM

## 2019-12-11 RX ORDER — LAMOTRIGINE 100 MG/1
TABLET ORAL
Qty: 90 TABLET | Refills: 5 | Status: SHIPPED | OUTPATIENT
Start: 2019-12-11 | End: 2020-01-22

## 2019-12-11 RX ORDER — ALPRAZOLAM 2 MG/1
2 TABLET ORAL 3 TIMES DAILY PRN
Qty: 90 TABLET | Refills: 2 | Status: SHIPPED | OUTPATIENT
Start: 2019-12-18 | End: 2020-02-24 | Stop reason: HOSPADM

## 2019-12-11 NOTE — PROGRESS NOTES
"Subjective   Milka Espinoza is a 49 y.o.white female who presents today for follow up    Chief Complaint: Depression and anxiety    History of Present Illness:   She missed her last appt  New PCP, Safia Reyna, did CT and found \"a spot\" on the liver, have EGD and colonscopy later this week  Autistic son still living with her, new apartment last month, still unpacking  Anxiety and panic attacks on occasion  No major pseudoseizure, two small ones, felt coming on  Depression 7/10  Denies SI/HI  She is still doing better on the Xanax than she was with the Valium, switched in June    The following portions of the patient's history were reviewed and updated as appropriate: allergies, current medications, past family history, past medical history, past social history, past surgical history and problem list.    PAST PSYCHIATRIC HISTORY  Axis I  Affective/Bipoloar Disorder, Anxiety/Panic Disorder, Substance Abuse Disorder  Axis II  None    PAST OUTPATIENT TREATMENT  Diagnosis treated:  Affective Disorder, Anxiety/Panic Disorder, Substance/Alcohol Abuse  Treatment Type:  Individual Therapy, Medication Management  Prior Psychiatric Medications:  Abilify  Risperidone  Vraylar  Valium  Xanax  Lamictal  Trazodone  Seroquel  Support Groups:  None  Sequelae Of Mental Disorder:  social isolation, family disruption, emotional distress      Interval History  Improved    Side Effects  None     Mental status exam was reviewed and compared to her 9/3/19 visit and appropriate changes were made.    Past Medical History:  Past Medical History:   Diagnosis Date   • Anxiety    • Bipolar disorder (CMS/HCC)    • Depression    • Migraines    • Panic disorder    • Seizures (CMS/HCC)        Social History:  Social History     Socioeconomic History   • Marital status: Single     Spouse name: Not on file   • Number of children: Not on file   • Years of education: Not on file   • Highest education level: Not on file   Tobacco Use   • " Smoking status: Current Every Day Smoker     Packs/day: 0.25     Types: Cigarettes   • Smokeless tobacco: Never Used   Substance and Sexual Activity   • Alcohol use: No     Frequency: Never   • Drug use: No   • Sexual activity: Never       Family History:  Family History   Problem Relation Age of Onset   • No Known Problems Mother    • No Known Problems Father    • No Known Problems Sister    • No Known Problems Brother    • No Known Problems Daughter    • No Known Problems Son    • No Known Problems Maternal Grandmother    • No Known Problems Paternal Grandmother    • No Known Problems Maternal Aunt    • No Known Problems Paternal Aunt        Past Surgical History:  Past Surgical History:   Procedure Laterality Date   • ABDOMINAL SURGERY     •  SECTION      x3   • HERNIA REPAIR     • HYSTERECTOMY         Problem List:  Patient Active Problem List   Diagnosis   • Anxiety disorder   • Anxiety state   • Bipolar affective disorder (CMS/[facility])   • Depression   • Mood disorder in conditions classified elsewhere   • Convulsions (CMS/[facility])   • Seizure disorder (CMS/[facility])   • Opioid withdrawal (CMS/[facility])   • Altered level of consciousness   • Asthma   • Bronchitis, acute   • General medical exam   • Low back pain   • Syncope   • Unspecified sprain of unspecified wrist, initial encounter   • Upper respiratory tract infection       Allergy:   Allergies   Allergen Reactions   • Sulfa Antibiotics Hives   • Tramadol Hcl Mental Status Change        Discontinued Medications:  Medications Discontinued During This Encounter   Medication Reason   • ALPRAZolam (XANAX) 1 MG tablet Dose adjustment   • QUEtiapine XR (SEROQUEL XR) 400 MG 24 hr tablet Reorder   • busPIRone (BUSPAR) 10 MG tablet Reorder   • Cariprazine HCl (VRAYLAR) 3 MG capsule Reorder   • lamoTRIgine (LaMICtal) 100 MG tablet Reorder       Current Medications:   Current Outpatient Medications   Medication Sig Dispense Refill   • albuterol sulfate HFA (PROAIR HFA) 108  (90 Base) MCG/ACT inhaler PROAIR  (90 Base) MCG/ACT AERS     • [START ON 12/18/2019] ALPRAZolam (XANAX) 2 MG tablet Take 1 tablet by mouth 3 (Three) Times a Day As Needed for Anxiety. To fill 12/18/19 or later 90 tablet 2   • atorvastatin (LIPITOR) 40 MG tablet Take 1 tablet by mouth Daily. 90 tablet 1   • busPIRone (BUSPAR) 10 MG tablet Take 1 tablet by mouth 3 (Three) Times a Day. 90 tablet 5   • Cariprazine HCl (VRAYLAR) 3 MG capsule Take 1 capsule by mouth Daily. 30 capsule 5   • gabapentin (NEURONTIN) 800 MG tablet Every 8 (Eight) Hours.     • lamoTRIgine (LaMICtal) 100 MG tablet Take 1 tablet by mouth in the morning and 2 at bedtime. 90 tablet 5   • levETIRAcetam (KEPPRA) 500 MG tablet KEPPRA 500 MG TABS     • modafinil (PROVIGIL) 200 MG tablet Take 200 mg by mouth Every Morning.  5   • ondansetron (ZOFRAN) 4 MG tablet Take 1 tablet by mouth Every 8 (Eight) Hours As Needed for Nausea or Vomiting. 30 tablet 0   • PHENobarbital (LUMINAL) 100 MG tablet Daily.     • pramipexole (MIRAPEX) 1 MG tablet Take 1 mg by mouth every night at bedtime.  11   • QUEtiapine XR (SEROQUEL XR) 400 MG 24 hr tablet Take 1 tablet by mouth Every Night. 30 tablet 5   • SUMAtriptan (IMITREX) 100 MG tablet        No current facility-administered medications for this visit.          Review of Symptoms:    Psychiatric/Behavioral: Negative for agitation, behavioral problems, confusion, decreased concentration, dysphoric mood, hallucinations, self-injury, sleep disturbance and suicidal ideas. The patient is not nervous/anxious and is not hyperactive.        Physical Exam:   not currently breastfeeding.    Mental Status Exam:   Hygiene:   good  Cooperation:  Cooperative  Eye Contact:  Good  Psychomotor Behavior:  Appropriate  Affect:  Appropriate  Mood: normal  Hopelessness: Denies  Speech:  Normal  Thought Process:  Goal directed  Thought Content:  Normal  Suicidal:  None  Homicidal:  None  Hallucinations:  None  Delusion:   None  Memory:  Intact  Orientation:  Person, Place, Time and Situation  Reliability:  good  Insight:  Good  Judgement:  Good  Impulse Control:  Good  Physical/Medical Issues:  No      Mental status exam was reviewed and compared to 9/3/19 visit and no changes were necessary.    PHQ-9 Depression Screening  Little interest or pleasure in doing things? 1   Feeling down, depressed, or hopeless? 1   Trouble falling or staying asleep, or sleeping too much? 0   Feeling tired or having little energy? 1   Poor appetite or overeating? 1   Feeling bad about yourself - or that you are a failure or have let yourself or your family down? 1   Trouble concentrating on things, such as reading the newspaper or watching television? 1   Moving or speaking so slowly that other people could have noticed? Or the opposite - being so fidgety or restless that you have been moving around a lot more than usual? 0   Thoughts that you would be better off dead, or of hurting yourself in some way? 0   PHQ-9 Total Score 6   If you checked off any problems, how difficult have these problems made it for you to do your work, take care of things at home, or get along with other people? Somewhat difficult           Former smoker    I advised Milka of the risks of tobacco use.     Lab Results:   Hospital Outpatient Visit on 11/15/2019   Component Date Value Ref Range Status   • Creatinine 11/15/2019 0.70  0.60 - 1.30 mg/dL Final    Serial Number: [id number]:  [id number]   • GFR MDRD  11/15/2019    Final    Serial Number: [id number]:  [id number]   • GFR MDRD Non  11/15/2019    Final    Serial Number: [id number]:  [id number]   Lab on 11/15/2019   Component Date Value Ref Range Status   • Glucose 11/15/2019 102* 65 - 99 mg/dL Final   • BUN 11/15/2019 8  6 - 20 mg/dL Final   • Creatinine 11/15/2019 0.77  0.57 - 1.00 mg/dL Final   • Sodium 11/15/2019 142  136 - 145 mmol/L Final   • Potassium 11/15/2019 5.1  3.5 - 5.2 mmol/L  Final   • Chloride 11/15/2019 101  98 - 107 mmol/L Final   • CO2 11/15/2019 30.5* 22.0 - 29.0 mmol/L Final   • Calcium 11/15/2019 9.5  8.6 - 10.5 mg/dL Final   • Total Protein 11/15/2019 8.0  6.0 - 8.5 g/dL Final   • Albumin 11/15/2019 4.50  3.50 - 5.20 g/dL Final   • ALT (SGPT) 11/15/2019 28  1 - 33 U/L Final   • AST (SGOT) 11/15/2019 18  1 - 32 U/L Final   • Alkaline Phosphatase 11/15/2019 118* 39 - 117 U/L Final   • Total Bilirubin 11/15/2019 <0.2* 0.2 - 1.2 mg/dL Final   • eGFR Non African Amer 11/15/2019 79  >60 mL/min/1.73 Final   • Globulin 11/15/2019 3.5  gm/dL Final   • A/G Ratio 11/15/2019 1.3  g/dL Final   • BUN/Creatinine Ratio 11/15/2019 10.4  7.0 - 25.0 Final   • Anion Gap 11/15/2019 10.5  5.0 - 15.0 mmol/L Final   • Hemoglobin A1C 11/15/2019 4.9  3.5 - 5.6 % Final   • TSH 11/15/2019 5.030* 0.270 - 4.200 uIU/mL Final   • Total Cholesterol 11/15/2019 280* 0 - 200 mg/dL Final   • Triglycerides 11/15/2019 352* 0 - 150 mg/dL Final   • HDL Cholesterol 11/15/2019 74* 40 - 60 mg/dL Final   • LDL Cholesterol  11/15/2019 136* 0 - 100 mg/dL Final   • VLDL Cholesterol 11/15/2019 70.4* 5 - 40 mg/dL Final   • LDL/HDL Ratio 11/15/2019 1.83   Final   • Levetiracetam 11/15/2019 <1.0* 10.0 - 40.0 ug/mL Final    This test was developed and its performance characteristics  determined by [facility]. It has not been cleared or approved  by the Food and Drug Administration.   • Phenobarbital 11/15/2019 8.6* 10.0 - 30.0 mcg/mL Final   • Lamotrigine 11/15/2019 None Detected  2.0 - 20.0 ug/mL Final    This test was developed and its performance characteristics  determined by [facility]. It has not been cleared or approved  by the Food and Drug Administration.                                  Detection Limit = 1.0   Office Visit on 11/15/2019   Component Date Value Ref Range Status   • Rapid Influenza A Ag 11/15/2019 Negative  Negative Final   • Rapid Influenza B Ag 11/15/2019 Negative  Negative Final   • Internal Control 11/15/2019  Passed  Passed Final   • Lot Number 11/15/2019 [id number]   Final   • Expiration Date 11/15/2019 11/17/2021   Final   • Color 11/15/2019 Yellow  Yellow, Straw, Dark Yellow, Estelle Final   • Clarity, UA 11/15/2019 Clear  Clear Final   • Specific Gravity  11/15/2019 1.020  1.005 - 1.030 Final   • pH, Urine 11/15/2019 8.5* 5.0 - 8.0 Final   • Leukocytes 11/15/2019 Negative  Negative Final   • Nitrite, UA 11/15/2019 Negative  Negative Final   • Protein, POC 11/15/2019 Negative  Negative mg/dL Final   • Glucose, UA 11/15/2019 Negative  Negative, 1000 mg/dL (3+) mg/dL Final   • Ketones, UA 11/15/2019 Negative  Negative Final   • Urobilinogen, UA 11/15/2019 Normal  Normal Final   • Bilirubin 11/15/2019 Negative  Negative Final   • Blood, UA 11/15/2019 Negative  Negative Final   • WBC 11/15/2019 6.39  3.40 - 10.80 10*3/mm3 Final   • RBC 11/15/2019 4.09  3.77 - 5.28 10*6/mm3 Final   • Hemoglobin 11/15/2019 12.4  12.0 - 15.9 g/dL Final   • Hematocrit 11/15/2019 37.1  34.0 - 46.6 % Final   • MCV 11/15/2019 90.7  79.0 - 97.0 fL Final   • MCH 11/15/2019 30.3  26.6 - 33.0 pg Final   • MCHC 11/15/2019 33.4  31.5 - 35.7 g/dL Final   • RDW 11/15/2019 12.5  12.3 - 15.4 % Final   • RDW-SD 11/15/2019 41.3  37.0 - 54.0 fl Final   • MPV 11/15/2019 10.6  6.0 - 12.0 fL Final   • Platelets 11/15/2019 258  140 - 450 10*3/mm3 Final   • Neutrophil % 11/15/2019 54.5  42.7 - 76.0 % Final   • Lymphocyte % 11/15/2019 36.6  19.6 - 45.3 % Final   • Monocyte % 11/15/2019 7.0  5.0 - 12.0 % Final   • Eosinophil % 11/15/2019 0.8  0.3 - 6.2 % Final   • Basophil % 11/15/2019 0.6  0.0 - 1.5 % Final   • Immature Grans % 11/15/2019 0.5  0.0 - 0.5 % Final   • Neutrophils, Absolute 11/15/2019 3.48  1.70 - 7.00 10*3/mm3 Final   • Lymphocytes, Absolute 11/15/2019 2.34  0.70 - 3.10 10*3/mm3 Final   • Monocytes, Absolute 11/15/2019 0.45  0.10 - 0.90 10*3/mm3 Final   • Eosinophils, Absolute 11/15/2019 0.05  0.00 - 0.40 10*3/mm3 Final   • Basophils, Absolute  11/15/2019 0.04  0.00 - 0.20 10*3/mm3 Final   • Immature Grans, Absolute 11/15/2019 0.03  0.00 - 0.05 10*3/mm3 Final   • nRBC 11/15/2019 0.0  0.0 - 0.2 /100 WBC Final       Assessment/Plan   Problems Addressed this Visit        Other    Anxiety disorder - Primary    Relevant Medications    ALPRAZolam (XANAX) 2 MG tablet (Start on 12/18/2019)    QUEtiapine XR (SEROQUEL XR) 400 MG 24 hr tablet    busPIRone (BUSPAR) 10 MG tablet    Cariprazine HCl (VRAYLAR) 3 MG capsule    Bipolar affective disorder (CMS/HCC)    Relevant Medications    ALPRAZolam (XANAX) 2 MG tablet (Start on 12/18/2019)    QUEtiapine XR (SEROQUEL XR) 400 MG 24 hr tablet    busPIRone (BUSPAR) 10 MG tablet    Cariprazine HCl (VRAYLAR) 3 MG capsule    lamoTRIgine (LaMICtal) 100 MG tablet          Visit Diagnoses:    ICD-10-CM ICD-9-CM   1. Panic disorder with agoraphobia F40.01 300.21   2. Bipolar affective disorder, currently depressed, mild (CMS/HCC) F31.31 296.51       TREATMENT PLAN/GOALS: Continue supportive psychotherapy efforts and medications as indicated. Treatment and medication options discussed during today's visit. Patient ackowledged and verbally consented to continue with current treatment plan and was educated on the importance of compliance with treatment and follow-up appointments.    MEDICATION ISSUES:  INSPECT reviewed as expected  Discussed medication options and treatment plan of prescribed medication as well as the risks, benefits, and side effects including potential falls, possible impaired driving and metabolic adversities among others. Patient is agreeable to call the office with any worsening of symptoms or onset of side effects. Patient is agreeable to call 911 or go to the nearest ER should he/she begin having SI/HI. No medication side effects or related complaints today.     Patient is doing well on her medication, has anxiety but no major pseudoseizures.  The 1 mg Xanax does not always knock down the anxiety well  enough.  Continue Vraylar, Lamictal, BuSpar and Seroquel at current dosages with no changes  Change Xanax 1 mg 4 times daily to Xanax 2 mg 3 times daily    MEDS ORDERED DURING VISIT:  New Medications Ordered This Visit   Medications   • ALPRAZolam (XANAX) 2 MG tablet     Sig: Take 1 tablet by mouth 3 (Three) Times a Day As Needed for Anxiety. To fill 12/18/19 or later     Dispense:  90 tablet     Refill:  2   • QUEtiapine XR (SEROQUEL XR) 400 MG 24 hr tablet     Sig: Take 1 tablet by mouth Every Night.     Dispense:  30 tablet     Refill:  5   • busPIRone (BUSPAR) 10 MG tablet     Sig: Take 1 tablet by mouth 3 (Three) Times a Day.     Dispense:  90 tablet     Refill:  5   • Cariprazine HCl (VRAYLAR) 3 MG capsule     Sig: Take 1 capsule by mouth Daily.     Dispense:  30 capsule     Refill:  5   • lamoTRIgine (LaMICtal) 100 MG tablet     Sig: Take 1 tablet by mouth in the morning and 2 at bedtime.     Dispense:  90 tablet     Refill:  5     Please consider 90 day supplies to promote better adherence       Return in about 3 months (around 3/11/2020).         This document has been electronically signed by Love Bourgeois PA-C  December 12, 2019 8:19 AM

## 2019-12-12 NOTE — PROGRESS NOTES
I have reviewed the notes, assessments, and/or procedures performed byLove Bourgeois, I concur with her/his documentation of Milka Espinoza.

## 2019-12-13 ENCOUNTER — ON CAMPUS - OUTPATIENT (AMBULATORY)
Dept: URBAN - METROPOLITAN AREA HOSPITAL 2 | Facility: HOSPITAL | Age: 49
End: 2019-12-13

## 2019-12-13 ENCOUNTER — OFFICE (AMBULATORY)
Dept: URBAN - METROPOLITAN AREA PATHOLOGY 4 | Facility: PATHOLOGY | Age: 49
End: 2019-12-13

## 2019-12-13 VITALS
DIASTOLIC BLOOD PRESSURE: 59 MMHG | RESPIRATION RATE: 18 BRPM | DIASTOLIC BLOOD PRESSURE: 66 MMHG | SYSTOLIC BLOOD PRESSURE: 112 MMHG | SYSTOLIC BLOOD PRESSURE: 134 MMHG | HEART RATE: 79 BPM | SYSTOLIC BLOOD PRESSURE: 109 MMHG | HEART RATE: 77 BPM | SYSTOLIC BLOOD PRESSURE: 90 MMHG | OXYGEN SATURATION: 96 % | DIASTOLIC BLOOD PRESSURE: 50 MMHG | DIASTOLIC BLOOD PRESSURE: 90 MMHG | RESPIRATION RATE: 16 BRPM | TEMPERATURE: 98.7 F | DIASTOLIC BLOOD PRESSURE: 65 MMHG | DIASTOLIC BLOOD PRESSURE: 48 MMHG | HEIGHT: 64 IN | RESPIRATION RATE: 20 BRPM | OXYGEN SATURATION: 98 % | SYSTOLIC BLOOD PRESSURE: 114 MMHG | SYSTOLIC BLOOD PRESSURE: 103 MMHG | DIASTOLIC BLOOD PRESSURE: 52 MMHG | SYSTOLIC BLOOD PRESSURE: 124 MMHG | DIASTOLIC BLOOD PRESSURE: 60 MMHG | SYSTOLIC BLOOD PRESSURE: 132 MMHG | DIASTOLIC BLOOD PRESSURE: 83 MMHG | HEART RATE: 72 BPM | HEART RATE: 89 BPM | WEIGHT: 182 LBS | HEART RATE: 92 BPM | OXYGEN SATURATION: 100 % | SYSTOLIC BLOOD PRESSURE: 111 MMHG | DIASTOLIC BLOOD PRESSURE: 101 MMHG | HEART RATE: 83 BPM

## 2019-12-13 DIAGNOSIS — K62.1 RECTAL POLYP: ICD-10-CM

## 2019-12-13 DIAGNOSIS — Z12.11 ENCOUNTER FOR SCREENING FOR MALIGNANT NEOPLASM OF COLON: ICD-10-CM

## 2019-12-13 DIAGNOSIS — K26.9 DUODENAL ULCER, UNSPECIFIED AS ACUTE OR CHRONIC, WITHOUT HEM: ICD-10-CM

## 2019-12-13 DIAGNOSIS — D12.3 BENIGN NEOPLASM OF TRANSVERSE COLON: ICD-10-CM

## 2019-12-13 DIAGNOSIS — K21.0 GASTRO-ESOPHAGEAL REFLUX DISEASE WITH ESOPHAGITIS: ICD-10-CM

## 2019-12-13 DIAGNOSIS — K29.50 UNSPECIFIED CHRONIC GASTRITIS WITHOUT BLEEDING: ICD-10-CM

## 2019-12-13 DIAGNOSIS — R10.11 RIGHT UPPER QUADRANT PAIN: ICD-10-CM

## 2019-12-13 DIAGNOSIS — R13.10 DYSPHAGIA, UNSPECIFIED: ICD-10-CM

## 2019-12-13 LAB
GI HISTOLOGY: A. SELECT: (no result)
GI HISTOLOGY: B. UNSPECIFIED: (no result)
GI HISTOLOGY: C. UNSPECIFIED: (no result)
GI HISTOLOGY: PDF REPORT: (no result)

## 2019-12-13 PROCEDURE — 43239 EGD BIOPSY SINGLE/MULTIPLE: CPT | Performed by: INTERNAL MEDICINE

## 2019-12-13 PROCEDURE — 88305 TISSUE EXAM BY PATHOLOGIST: CPT | Performed by: INTERNAL MEDICINE

## 2019-12-13 PROCEDURE — 45385 COLONOSCOPY W/LESION REMOVAL: CPT | Mod: PT | Performed by: INTERNAL MEDICINE

## 2019-12-13 PROCEDURE — 43450 DILATE ESOPHAGUS 1/MULT PASS: CPT | Performed by: INTERNAL MEDICINE

## 2019-12-13 PROCEDURE — 88305 TISSUE EXAM BY PATHOLOGIST: CPT | Mod: 26 | Performed by: INTERNAL MEDICINE

## 2019-12-16 ENCOUNTER — LAB REQUISITION (OUTPATIENT)
Dept: LAB | Facility: HOSPITAL | Age: 49
End: 2019-12-16

## 2019-12-16 DIAGNOSIS — R13.10 DYSPHAGIA, UNSPECIFIED: ICD-10-CM

## 2019-12-16 DIAGNOSIS — K62.1 RECTAL POLYP: ICD-10-CM

## 2019-12-16 DIAGNOSIS — R10.11 RIGHT UPPER QUADRANT PAIN: ICD-10-CM

## 2019-12-16 DIAGNOSIS — K26.9 DUODENAL ULCER, UNSPECIFIED AS ACUTE OR CHRONIC, WITHOUT HEMORRHAGE OR PERFORATION: ICD-10-CM

## 2019-12-16 DIAGNOSIS — Z12.11 ENCOUNTER FOR SCREENING FOR MALIGNANT NEOPLASM OF COLON: ICD-10-CM

## 2019-12-16 DIAGNOSIS — K21.00 GASTRO-ESOPHAGEAL REFLUX DISEASE WITH ESOPHAGITIS: ICD-10-CM

## 2019-12-16 DIAGNOSIS — D12.3 BENIGN NEOPLASM OF TRANSVERSE COLON: ICD-10-CM

## 2019-12-17 LAB
LAB AP CASE REPORT: NORMAL
PATH REPORT.FINAL DX SPEC: NORMAL
PATH REPORT.GROSS SPEC: NORMAL

## 2020-01-22 ENCOUNTER — APPOINTMENT (OUTPATIENT)
Dept: CT IMAGING | Facility: HOSPITAL | Age: 50
End: 2020-01-22

## 2020-01-22 ENCOUNTER — HOSPITAL ENCOUNTER (EMERGENCY)
Facility: HOSPITAL | Age: 50
Discharge: HOME | End: 2020-01-22
Admitting: EMERGENCY MEDICINE

## 2020-01-22 VITALS
HEART RATE: 84 BPM | SYSTOLIC BLOOD PRESSURE: 121 MMHG | HEIGHT: 64 IN | WEIGHT: 160 LBS | RESPIRATION RATE: 16 BRPM | OXYGEN SATURATION: 99 % | BODY MASS INDEX: 27.31 KG/M2 | DIASTOLIC BLOOD PRESSURE: 60 MMHG | TEMPERATURE: 98.2 F

## 2020-01-22 DIAGNOSIS — R19.7 DIARRHEA, UNSPECIFIED TYPE: ICD-10-CM

## 2020-01-22 DIAGNOSIS — R11.2 NON-INTRACTABLE VOMITING WITH NAUSEA, UNSPECIFIED VOMITING TYPE: ICD-10-CM

## 2020-01-22 DIAGNOSIS — R56.9 SEIZURE (HCC): ICD-10-CM

## 2020-01-22 DIAGNOSIS — R10.9 ABDOMINAL PAIN, UNSPECIFIED ABDOMINAL LOCATION: Primary | ICD-10-CM

## 2020-01-22 LAB
ALBUMIN SERPL-MCNC: 4.2 G/DL (ref 3.5–5.2)
ALBUMIN/GLOB SERPL: 1.3 G/DL
ALP SERPL-CCNC: 110 U/L (ref 39–117)
ALT SERPL W P-5'-P-CCNC: 30 U/L (ref 1–33)
AMPHET+METHAMPHET UR QL: NEGATIVE
ANION GAP SERPL CALCULATED.3IONS-SCNC: 11 MMOL/L (ref 5–15)
AST SERPL-CCNC: 25 U/L (ref 1–32)
BARBITURATES UR QL SCN: POSITIVE
BASOPHILS # BLD AUTO: 0 10*3/MM3 (ref 0–0.2)
BASOPHILS NFR BLD AUTO: 0.4 % (ref 0–1.5)
BENZODIAZ UR QL SCN: POSITIVE
BILIRUB SERPL-MCNC: 0.2 MG/DL (ref 0.2–1.2)
BILIRUB UR QL STRIP: NEGATIVE
BUN BLD-MCNC: 6 MG/DL (ref 6–20)
BUN/CREAT SERPL: 9.2 (ref 7–25)
CALCIUM SPEC-SCNC: 9.3 MG/DL (ref 8.6–10.5)
CANNABINOIDS SERPL QL: NEGATIVE
CHLORIDE SERPL-SCNC: 101 MMOL/L (ref 98–107)
CLARITY UR: CLEAR
CO2 SERPL-SCNC: 26 MMOL/L (ref 22–29)
COCAINE UR QL: NEGATIVE
COLOR UR: YELLOW
CREAT BLD-MCNC: 0.65 MG/DL (ref 0.57–1)
DEPRECATED RDW RBC AUTO: 41.6 FL (ref 37–54)
EOSINOPHIL # BLD AUTO: 0 10*3/MM3 (ref 0–0.4)
EOSINOPHIL NFR BLD AUTO: 0.6 % (ref 0.3–6.2)
ERYTHROCYTE [DISTWIDTH] IN BLOOD BY AUTOMATED COUNT: 13.3 % (ref 12.3–15.4)
GFR SERPL CREATININE-BSD FRML MDRD: 96 ML/MIN/1.73
GLOBULIN UR ELPH-MCNC: 3.3 GM/DL
GLUCOSE BLD-MCNC: 97 MG/DL (ref 65–99)
GLUCOSE BLDC GLUCOMTR-MCNC: 89 MG/DL (ref 70–105)
GLUCOSE UR STRIP-MCNC: NEGATIVE MG/DL
HCT VFR BLD AUTO: 38.6 % (ref 34–46.6)
HGB BLD-MCNC: 13.5 G/DL (ref 12–15.9)
HGB UR QL STRIP.AUTO: NEGATIVE
HOLD SPECIMEN: NORMAL
HOLD SPECIMEN: NORMAL
KETONES UR QL STRIP: NEGATIVE
LEUKOCYTE ESTERASE UR QL STRIP.AUTO: NEGATIVE
LIPASE SERPL-CCNC: 45 U/L (ref 13–60)
LYMPHOCYTES # BLD AUTO: 2.1 10*3/MM3 (ref 0.7–3.1)
LYMPHOCYTES NFR BLD AUTO: 47.8 % (ref 19.6–45.3)
MCH RBC QN AUTO: 30.8 PG (ref 26.6–33)
MCHC RBC AUTO-ENTMCNC: 35 G/DL (ref 31.5–35.7)
MCV RBC AUTO: 88.1 FL (ref 79–97)
METHADONE UR QL SCN: NEGATIVE
MONOCYTES # BLD AUTO: 0.2 10*3/MM3 (ref 0.1–0.9)
MONOCYTES NFR BLD AUTO: 5.7 % (ref 5–12)
NEUTROPHILS # BLD AUTO: 2 10*3/MM3 (ref 1.7–7)
NEUTROPHILS NFR BLD AUTO: 45.5 % (ref 42.7–76)
NITRITE UR QL STRIP: NEGATIVE
NRBC BLD AUTO-RTO: 0.1 /100 WBC (ref 0–0.2)
OPIATES UR QL: NEGATIVE
OXYCODONE UR QL SCN: NEGATIVE
PH UR STRIP.AUTO: 8.5 [PH] (ref 5–8)
PLATELET # BLD AUTO: 234 10*3/MM3 (ref 140–450)
PMV BLD AUTO: 8.4 FL (ref 6–12)
POTASSIUM BLD-SCNC: 4.5 MMOL/L (ref 3.5–5.2)
PROT SERPL-MCNC: 7.5 G/DL (ref 6–8.5)
PROT UR QL STRIP: NEGATIVE
RBC # BLD AUTO: 4.38 10*6/MM3 (ref 3.77–5.28)
SODIUM BLD-SCNC: 138 MMOL/L (ref 136–145)
SP GR UR STRIP: 1.02 (ref 1–1.03)
UROBILINOGEN UR QL STRIP: ABNORMAL
WBC NRBC COR # BLD: 4.4 10*3/MM3 (ref 3.4–10.8)
WHOLE BLOOD HOLD SPECIMEN: NORMAL
WHOLE BLOOD HOLD SPECIMEN: NORMAL

## 2020-01-22 PROCEDURE — 80307 DRUG TEST PRSMV CHEM ANLYZR: CPT | Performed by: PHYSICIAN ASSISTANT

## 2020-01-22 PROCEDURE — 83690 ASSAY OF LIPASE: CPT | Performed by: PHYSICIAN ASSISTANT

## 2020-01-22 PROCEDURE — 85025 COMPLETE CBC W/AUTO DIFF WBC: CPT | Performed by: PHYSICIAN ASSISTANT

## 2020-01-22 PROCEDURE — 25010000002 LEVETIRACETAM IN NACL 0.82% 500 MG/100ML SOLUTION: Performed by: PHYSICIAN ASSISTANT

## 2020-01-22 PROCEDURE — 96365 THER/PROPH/DIAG IV INF INIT: CPT

## 2020-01-22 PROCEDURE — 25010000002 ONDANSETRON PER 1 MG: Performed by: PHYSICIAN ASSISTANT

## 2020-01-22 PROCEDURE — 80053 COMPREHEN METABOLIC PANEL: CPT | Performed by: PHYSICIAN ASSISTANT

## 2020-01-22 PROCEDURE — 74177 CT ABD & PELVIS W/CONTRAST: CPT

## 2020-01-22 PROCEDURE — 0 IOPAMIDOL PER 1 ML: Performed by: PHYSICIAN ASSISTANT

## 2020-01-22 PROCEDURE — 81003 URINALYSIS AUTO W/O SCOPE: CPT | Performed by: PHYSICIAN ASSISTANT

## 2020-01-22 PROCEDURE — 82962 GLUCOSE BLOOD TEST: CPT

## 2020-01-22 PROCEDURE — 99284 EMERGENCY DEPT VISIT MOD MDM: CPT

## 2020-01-22 PROCEDURE — 25010000002 LORAZEPAM PER 2 MG: Performed by: PHYSICIAN ASSISTANT

## 2020-01-22 PROCEDURE — 96375 TX/PRO/DX INJ NEW DRUG ADDON: CPT

## 2020-01-22 RX ORDER — LAMOTRIGINE 100 MG/1
100 TABLET ORAL EVERY MORNING
COMMUNITY
End: 2020-02-13

## 2020-01-22 RX ORDER — LORAZEPAM 2 MG/ML
INJECTION INTRAMUSCULAR
Status: DISCONTINUED
Start: 2020-01-22 | End: 2020-01-22 | Stop reason: HOSPADM

## 2020-01-22 RX ORDER — LAMOTRIGINE 100 MG/1
200 TABLET ORAL NIGHTLY
COMMUNITY
End: 2020-03-25

## 2020-01-22 RX ORDER — SODIUM CHLORIDE 0.9 % (FLUSH) 0.9 %
10 SYRINGE (ML) INJECTION AS NEEDED
Status: DISCONTINUED | OUTPATIENT
Start: 2020-01-22 | End: 2020-01-22 | Stop reason: HOSPADM

## 2020-01-22 RX ORDER — TRAZODONE HYDROCHLORIDE 150 MG/1
150 TABLET ORAL NIGHTLY PRN
COMMUNITY
End: 2020-01-22

## 2020-01-22 RX ORDER — ONDANSETRON 2 MG/ML
4 INJECTION INTRAMUSCULAR; INTRAVENOUS ONCE
Status: COMPLETED | OUTPATIENT
Start: 2020-01-22 | End: 2020-01-22

## 2020-01-22 RX ORDER — ONDANSETRON 4 MG/1
4 TABLET, ORALLY DISINTEGRATING ORAL EVERY 8 HOURS PRN
Qty: 20 TABLET | Refills: 0 | Status: SHIPPED | OUTPATIENT
Start: 2020-01-22 | End: 2020-08-17

## 2020-01-22 RX ORDER — LEVETIRACETAM 5 MG/ML
500 INJECTION INTRAVASCULAR ONCE
Status: COMPLETED | OUTPATIENT
Start: 2020-01-22 | End: 2020-01-22

## 2020-01-22 RX ORDER — LORAZEPAM 2 MG/ML
1 INJECTION INTRAMUSCULAR ONCE
Status: COMPLETED | OUTPATIENT
Start: 2020-01-22 | End: 2020-01-22

## 2020-01-22 RX ORDER — LORAZEPAM 2 MG/ML
1 INJECTION INTRAMUSCULAR ONCE
Status: DISCONTINUED | OUTPATIENT
Start: 2020-01-22 | End: 2020-01-22 | Stop reason: HOSPADM

## 2020-01-22 RX ADMIN — LORAZEPAM 1 MG: 2 INJECTION INTRAMUSCULAR; INTRAVENOUS at 14:01

## 2020-01-22 RX ADMIN — ONDANSETRON 4 MG: 2 INJECTION INTRAMUSCULAR; INTRAVENOUS at 14:01

## 2020-01-22 RX ADMIN — SODIUM CHLORIDE 1000 ML: 900 INJECTION, SOLUTION INTRAVENOUS at 14:05

## 2020-01-22 RX ADMIN — IOPAMIDOL 100 ML: 755 INJECTION, SOLUTION INTRAVENOUS at 15:35

## 2020-01-22 RX ADMIN — LEVETIRACETAM 500 MG: 500 INJECTION, SOLUTION INTRAVENOUS at 14:03

## 2020-01-22 NOTE — ED PROVIDER NOTES
Subjective   Patient is a 49-year-old  female with history of seizures, bipolar, migraines, depression presents the ER complaining of abdominal pain, nausea vomiting and diarrhea for 2 weeks.  Patient reports one episode of vomiting today, 3 episodes of diarrhea today.  Patient denies any hematemesis, hematochezia or melena.  Patient describes the abdominal pain is mostly in her right upper quadrant, constant stabbing pain and rates it a 10/10.  Patient also reports some lightheadedness and a headache.  Patient states that she has been having seizures, states that she has been unable to take her seizure medication due to her constant vomiting.  Patient takes Keppra for her seizures.  Patient denies any chest pain, shortness of breath, sore throat, cough or fever.      History provided by:  Patient      Review of Systems   Constitutional: Negative for chills and fever.   HENT: Negative for congestion, sinus pressure, sinus pain, sore throat and trouble swallowing.    Respiratory: Negative for shortness of breath and wheezing.    Cardiovascular: Negative for chest pain.   Gastrointestinal: Positive for abdominal pain, diarrhea, nausea and vomiting.   Genitourinary: Negative for decreased urine volume, dysuria and urgency.   Musculoskeletal: Negative for myalgias.   Skin: Negative for rash.   Neurological: Positive for weakness, light-headedness and headaches.   Psychiatric/Behavioral: Negative for behavioral problems.   All other systems reviewed and are negative.      Past Medical History:   Diagnosis Date   • Anxiety    • Bipolar disorder (CMS/HCC)    • Depression    • Migraines    • Panic disorder    • Seizures (CMS/HCC)        Allergies   Allergen Reactions   • Sulfa Antibiotics Hives   • Tramadol Hcl Mental Status Change       Past Surgical History:   Procedure Laterality Date   • ABDOMINAL SURGERY     •  SECTION      x3   • HERNIA REPAIR     • HYSTERECTOMY         Family History   Problem  Relation Age of Onset   • No Known Problems Mother    • No Known Problems Father    • No Known Problems Sister    • No Known Problems Brother    • No Known Problems Daughter    • No Known Problems Son    • No Known Problems Maternal Grandmother    • No Known Problems Paternal Grandmother    • No Known Problems Maternal Aunt    • No Known Problems Paternal Aunt        Social History     Socioeconomic History   • Marital status: Single     Spouse name: Not on file   • Number of children: Not on file   • Years of education: Not on file   • Highest education level: Not on file   Tobacco Use   • Smoking status: Current Every Day Smoker     Packs/day: 0.25     Types: Cigarettes   • Smokeless tobacco: Never Used   Substance and Sexual Activity   • Alcohol use: No     Frequency: Never   • Drug use: No   • Sexual activity: Never           Objective   Physical Exam   Constitutional: She is oriented to person, place, and time. She appears well-developed and well-nourished.  Non-toxic appearance. She does not appear ill.   HENT:   Head: Normocephalic and atraumatic.   Mouth/Throat: Oropharynx is clear and moist.   Eyes: Pupils are equal, round, and reactive to light. EOM are normal.   Cardiovascular: Normal rate, regular rhythm, normal heart sounds and intact distal pulses.   No murmur heard.  Pulmonary/Chest: Effort normal and breath sounds normal. No respiratory distress. She has no wheezes.   Abdominal: Soft. Normal appearance and bowel sounds are normal. She exhibits no distension. There is tenderness. There is no guarding and no CVA tenderness.   Right upper quadrant and epigastric tenderness   Neurological: She is alert and oriented to person, place, and time. No sensory deficit. She exhibits normal muscle tone.   Skin: Skin is warm and dry. Capillary refill takes less than 2 seconds. No rash noted.   Psychiatric: She has a normal mood and affect. Her behavior is normal.   Vitals reviewed.      Procedures           ED  "Course  ED Course as of Jan 24 1822   Wed Jan 22, 2020   1610 We will give patient Yobani and ginger ale for PO challenge    [MM]      ED Course User Index  [MM] Colette Sahni PA    /60   Pulse 84   Temp 98.2 °F (36.8 °C) (Oral)   Resp 16   Ht 162.6 cm (64\")   Wt 72.6 kg (160 lb)   SpO2 99%   BMI 27.46 kg/m²   Labs Reviewed   URINE DRUG SCREEN - Abnormal; Notable for the following components:       Result Value    Barbiturates Screen, Urine Positive (*)     Benzodiazepine Screen, Urine Positive (*)     All other components within normal limits    Narrative:     Negative Thresholds For Drugs Screened:     Amphetamines               500 ng/ml   Barbiturates               200 ng/ml   Benzodiazepines            100 ng/ml   Cocaine                    300 ng/ml   Methadone                  300 ng/ml   Opiates                    300 ng/ml   Oxycodone                  100 ng/ml   THC                        50 ng/ml    The Normal Value for all drugs tested is negative. This report includes final unconfirmed screening results to be used for medical treatment purposes only. Unconfirmed results must not be used for non-medical purposes such as employment or legal testing. Clinical consideration should be applied to any drug of abuse test, particulary when unconfirmed results are used.  All urine drugs of abuse requests without chain of custody are for medical screening purposes only.  False positives are possible.     URINALYSIS W/ MICROSCOPIC IF INDICATED (NO CULTURE) - Abnormal; Notable for the following components:    pH, UA 8.5 (*)     All other components within normal limits    Narrative:     Urine microscopic not indicated.   CBC WITH AUTO DIFFERENTIAL - Abnormal; Notable for the following components:    Lymphocyte % 47.8 (*)     All other components within normal limits   LIPASE - Normal   POCT GLUCOSE FINGERSTICK - Normal   RAINBOW DRAW    Narrative:     The following orders were created for panel " order [place] Draw.  Procedure                               Abnormality         Status                     ---------                               -----------         ------                     Light Blue Top[[id number]]                                   Final result               Green Top (Gel)[[id number]]                                  Final result               Lavender Top[[id number]]                                     Final result               Gold Top - SST[[id number]]                                   Final result                 Please view results for these tests on the individual orders.   COMPREHENSIVE METABOLIC PANEL    Narrative:     GFR Normal >60  Chronic Kidney Disease <60  Kidney Failure <15     POCT GLUCOSE FINGERSTICK   CBC AND DIFFERENTIAL    Narrative:     The following orders were created for panel order CBC & Differential.  Procedure                               Abnormality         Status                     ---------                               -----------         ------                     CBC Auto Differential[[id number]]        Abnormal            Final result                 Please view results for these tests on the individual orders.   LIGHT BLUE TOP   GREEN TOP   LAVENDER TOP   GOLD TOP - SST     Medications   levETIRAcetam in NaCl 0.82% (KEPPRA) IVPB 500 mg (0 mg Intravenous Stopped 1/22/20 1420)   sodium chloride 0.9 % bolus 1,000 mL (0 mL Intravenous Stopped 1/22/20 1620)   ondansetron (ZOFRAN) injection 4 mg (4 mg Intravenous Given 1/22/20 1401)   LORazepam (ATIVAN) injection 1 mg (1 mg Intravenous Given 1/22/20 1401)   iopamidol (ISOVUE-370) 76 % injection 100 mL (100 mL Intravenous Given 1/22/20 1535)     No radiology results for the last day                                             MDM  Number of Diagnoses or Management Options  Abdominal pain, unspecified abdominal location:   Diarrhea, unspecified type:   Non-intractable vomiting with nausea, unspecified vomiting type:    Seizure (CMS/HCC):   Diagnosis management comments: MEDICAL DECISION  Epic Chart Review: Patient has no recent hospital admissions  Comorbidities: Bipolar disorder, depression, anxiety, seizures, migraines  Differentials:  ; this list is not all inclusive and does not constitute the entirety of considered causes  Radiology interpretation:  Images reviewed by me and interpreted by radiologist,   CT Abd and Pelvis  Final result by Werner Astorga MD (01/22/20 15:52:47)             Impression:        1. There is an approximately 3 cm somewhat exophytic lesion arising in  the posterior medial right hepatic lobe. There is globular peripheral  enhancement suggesting this is due to hemangioma. This appears similar  to the prior study of November. This type of lesion, while benign, can  be symptomatic and cause pain when in a peripheral/subcapsular or  exophytic location, and can be associated with rupture/hemorrhage. This  lesion could be more definitively evaluated by MRI liver mass protocol  in nonemergent fashion. No evidence of hemorrhage/rupture identified on  the current study.  2. No evidence of appendicitis.  3. No evidence of bowel obstruction, free air or free fluid.           Electronically Signed By-Werner Astorga On:1/22/2020 3:52 PM  This report was finalized on [id number] by  Werner Astorga, .        Narrative:     CT ABDOMEN PELVIS W CONTRAST-     Date of Exam: 1/22/2020 3:14 PM     Indication: RUQ abd pain.     Comparison: 11/15/2019     Technique: Contiguous axial CT images were obtained from the lung bases  to the pubic symphysis following uneventful administration of 100  intravenous and oral contrast. Sagittal and coronal reconstructions were  performed.  Automated exposure control and iterative reconstruction  methods were used.     FINDINGS:     The spleen, pancreas, adrenals, kidneys have a grossly normal  appearance. The gallbladder has a grossly normal appearance. There is an  approximately 3 cm  low-density lesion in the medial periphery of the  posterior right hepatic lobe. This again demonstrates globular  peripheral enhancement suggesting a probable hemangioma. This could be  more definitively evaluated by liver mass protocol MRI. There is no  evidence of rupture/hemorrhage. The liver is otherwise grossly  unremarkable.     The stomach and small bowel have a grossly normal caliber and  appearance. The appendix is visualized and has a normal caliber and  appearance. There is no evidence of bowel obstruction, free air or free  fluid. No pneumatosis is identified. The patient is status post  hysterectomy. The urinary bladder is grossly unremarkable but not well  visualized due to decompressed state. No gross adenopathy is seen in the  abdomen or pelvis.     There is probable minor dependent atelectasis in the lung bases. There  is mild degenerative change in the spine.    Lab interpretation:  Labs viewed by me significant for, urinanlysis negative for UTI, UDS positive for barbituates and benzos. POC glucose 89.  CMP within normal limits. Lipase normal 45. CBC within normal limits.    Patient was seen and evaluated by myself in the ER. Patient had peripheral IV placed, lab work obtained. On initial evaluation and physical exam, patient started to seize. Notified nurse to immediately push 1 mg Ativan. Patient was observed during and immediately after seizure activity, appeared to be in post-ictal state. Keppra was ordered and initiated. Seizure precautions initiated. Patient was also given 4 mg zofran and 1000 mL NS IV fluid bolus.   Patient's lab work unremarkable. CT scan of abdomen shows approximately 3 cm exophytic lesion arising in the posterior medial right hepatic lobe, findings are suggestive of hemangioma, this study is similar to study done in November 2019. Patient was informed of lab work results as well as CT findings. Patient verbalized that she was already aware of the lesion on her liver.  "Patient states that she was planning to see Dr. Saavedra today, however she was not feeling well enough to go to his office today and opted to come to the ER to be evaluated.   On reevaluation, patient reports feeling much better, she asks \"can I have more of that seizure medication or some to take home?\", when asked to clarify she mentions the Ativan. Advised her that that medication is only used to help abort seizures in the ER and that I would not be able to give her a prescription for this from the ER. She verbalized understanding.  Patient has no focal neurological deficits at this time.  Patient states that she has more of her Keppra at home, but that she hasn't been able to take it since she has been vomiting and unable to keep the medication down. Will discharge her with prescription for zofran.    Patient was advised to follow up with PCP and reschedule her appt with Dr. Saavedra for further management and evaluation of her hepatic lesion.  Discharge plan and instructions were discussed with the patient who verbalized understanding and is in agreement with the plan, all questions were answered at this time.  Patient is aware of signs symptoms that would require immediate return to the emergency room.  Patient understands importance of following up with primary care provider for further evaluation and worsening concerns as well as blood pressure recheck in the next 4 weeks.    Patient remained afebrile, nontoxic-appearing, no acute respiratory distress throughout entire emergency room stay.  Patient was discharged in improved stable condition with an upright steady gait.         Amount and/or Complexity of Data Reviewed  Clinical lab tests: reviewed and ordered  Tests in the radiology section of CPT®: reviewed and ordered    Patient Progress  Patient progress: improved      Final diagnoses:   Abdominal pain, unspecified abdominal location   Non-intractable vomiting with nausea, unspecified vomiting type "   Diarrhea, unspecified type   Seizure (CMS/[facility])            Colette Sahni PA  01/24/20 182

## 2020-02-13 ENCOUNTER — HOSPITAL ENCOUNTER (INPATIENT)
Facility: HOSPITAL | Age: 50
LOS: 11 days | Discharge: HOME | End: 2020-02-24
Attending: INTERNAL MEDICINE | Admitting: INTERNAL MEDICINE

## 2020-02-13 ENCOUNTER — OFFICE VISIT (OUTPATIENT)
Dept: FAMILY MEDICINE CLINIC | Facility: CLINIC | Age: 50
End: 2020-02-13

## 2020-02-13 ENCOUNTER — APPOINTMENT (OUTPATIENT)
Dept: CT IMAGING | Facility: HOSPITAL | Age: 50
End: 2020-02-13

## 2020-02-13 VITALS
OXYGEN SATURATION: 96 % | WEIGHT: 176.6 LBS | DIASTOLIC BLOOD PRESSURE: 75 MMHG | SYSTOLIC BLOOD PRESSURE: 110 MMHG | BODY MASS INDEX: 30.15 KG/M2 | HEIGHT: 64 IN | TEMPERATURE: 98.1 F | RESPIRATION RATE: 16 BRPM | HEART RATE: 87 BPM

## 2020-02-13 DIAGNOSIS — R56.9 SEIZURES (HCC): Primary | ICD-10-CM

## 2020-02-13 DIAGNOSIS — F40.01 PANIC DISORDER WITH AGORAPHOBIA: Chronic | ICD-10-CM

## 2020-02-13 DIAGNOSIS — R11.2 NAUSEA AND VOMITING, INTRACTABILITY OF VOMITING NOT SPECIFIED, UNSPECIFIED VOMITING TYPE: ICD-10-CM

## 2020-02-13 DIAGNOSIS — G40.909 SEIZURE DISORDER (HCC): ICD-10-CM

## 2020-02-13 DIAGNOSIS — E86.0 DEHYDRATION: ICD-10-CM

## 2020-02-13 DIAGNOSIS — R11.2 INTRACTABLE VOMITING WITH NAUSEA, UNSPECIFIED VOMITING TYPE: Primary | ICD-10-CM

## 2020-02-13 PROBLEM — R11.10 INTRACTABLE VOMITING: Status: ACTIVE | Noted: 2020-02-13

## 2020-02-13 PROBLEM — K21.9 GERD (GASTROESOPHAGEAL REFLUX DISEASE): Chronic | Status: ACTIVE | Noted: 2020-02-13

## 2020-02-13 PROBLEM — Z72.0 TOBACCO USE: Chronic | Status: ACTIVE | Noted: 2020-02-13

## 2020-02-13 PROBLEM — E66.9 OBESITY: Chronic | Status: ACTIVE | Noted: 2020-02-13

## 2020-02-13 LAB
ALBUMIN SERPL-MCNC: 4.8 G/DL (ref 3.5–5.2)
ALBUMIN/GLOB SERPL: 1.4 G/DL
ALP SERPL-CCNC: 119 U/L (ref 39–117)
ALT SERPL W P-5'-P-CCNC: 15 U/L (ref 1–33)
AMPHET+METHAMPHET UR QL: NEGATIVE
ANION GAP SERPL CALCULATED.3IONS-SCNC: 18 MMOL/L (ref 5–15)
AST SERPL-CCNC: 16 U/L (ref 1–32)
BACTERIA UR QL AUTO: ABNORMAL /HPF
BARBITURATES UR QL SCN: POSITIVE
BASOPHILS # BLD AUTO: 0 10*3/MM3 (ref 0–0.2)
BASOPHILS NFR BLD AUTO: 0.6 % (ref 0–1.5)
BENZODIAZ UR QL SCN: POSITIVE
BILIRUB SERPL-MCNC: 0.3 MG/DL (ref 0.2–1.2)
BILIRUB UR QL STRIP: ABNORMAL
BUN BLD-MCNC: 12 MG/DL (ref 6–20)
BUN/CREAT SERPL: 14.6 (ref 7–25)
CALCIUM SPEC-SCNC: 9.9 MG/DL (ref 8.6–10.5)
CANNABINOIDS SERPL QL: NEGATIVE
CHLORIDE SERPL-SCNC: 99 MMOL/L (ref 98–107)
CLARITY UR: ABNORMAL
CO2 SERPL-SCNC: 23 MMOL/L (ref 22–29)
COCAINE UR QL: NEGATIVE
COLOR UR: YELLOW
CREAT BLD-MCNC: 0.82 MG/DL (ref 0.57–1)
DEPRECATED RDW RBC AUTO: 43.3 FL (ref 37–54)
EOSINOPHIL # BLD AUTO: 0 10*3/MM3 (ref 0–0.4)
EOSINOPHIL NFR BLD AUTO: 0.1 % (ref 0.3–6.2)
ERYTHROCYTE [DISTWIDTH] IN BLOOD BY AUTOMATED COUNT: 13.8 % (ref 12.3–15.4)
GFR SERPL CREATININE-BSD FRML MDRD: 73 ML/MIN/1.73
GLOBULIN UR ELPH-MCNC: 3.5 GM/DL
GLUCOSE BLD-MCNC: 98 MG/DL (ref 65–99)
GLUCOSE UR STRIP-MCNC: NEGATIVE MG/DL
HCT VFR BLD AUTO: 39.9 % (ref 34–46.6)
HGB BLD-MCNC: 13.5 G/DL (ref 12–15.9)
HGB UR QL STRIP.AUTO: NEGATIVE
HYALINE CASTS UR QL AUTO: ABNORMAL /LPF
KETONES UR QL STRIP: ABNORMAL
LEUKOCYTE ESTERASE UR QL STRIP.AUTO: NEGATIVE
LIPASE SERPL-CCNC: 30 U/L (ref 13–60)
LYMPHOCYTES # BLD AUTO: 1.3 10*3/MM3 (ref 0.7–3.1)
LYMPHOCYTES NFR BLD AUTO: 24.9 % (ref 19.6–45.3)
MCH RBC QN AUTO: 29.8 PG (ref 26.6–33)
MCHC RBC AUTO-ENTMCNC: 33.7 G/DL (ref 31.5–35.7)
MCV RBC AUTO: 88.3 FL (ref 79–97)
METHADONE UR QL SCN: NEGATIVE
MONOCYTES # BLD AUTO: 0.3 10*3/MM3 (ref 0.1–0.9)
MONOCYTES NFR BLD AUTO: 5.1 % (ref 5–12)
MUCOUS THREADS URNS QL MICRO: ABNORMAL /HPF
NEUTROPHILS # BLD AUTO: 3.6 10*3/MM3 (ref 1.7–7)
NEUTROPHILS NFR BLD AUTO: 69.3 % (ref 42.7–76)
NITRITE UR QL STRIP: NEGATIVE
NRBC BLD AUTO-RTO: 0.1 /100 WBC (ref 0–0.2)
OPIATES UR QL: NEGATIVE
OXYCODONE UR QL SCN: NEGATIVE
PH UR STRIP.AUTO: 7.5 [PH] (ref 5–8)
PHENOBARB SERPL-MCNC: 22.9 MCG/ML (ref 10–30)
PLATELET # BLD AUTO: 252 10*3/MM3 (ref 140–450)
PMV BLD AUTO: 8.6 FL (ref 6–12)
POTASSIUM BLD-SCNC: 4.3 MMOL/L (ref 3.5–5.2)
PROT SERPL-MCNC: 8.3 G/DL (ref 6–8.5)
PROT UR QL STRIP: ABNORMAL
RBC # BLD AUTO: 4.52 10*6/MM3 (ref 3.77–5.28)
RBC # UR: ABNORMAL /HPF
REF LAB TEST METHOD: ABNORMAL
SODIUM BLD-SCNC: 140 MMOL/L (ref 136–145)
SP GR UR STRIP: 1.03 (ref 1–1.03)
SQUAMOUS #/AREA URNS HPF: ABNORMAL /HPF
UROBILINOGEN UR QL STRIP: ABNORMAL
WBC NRBC COR # BLD: 5.1 10*3/MM3 (ref 3.4–10.8)
WBC UR QL AUTO: ABNORMAL /HPF

## 2020-02-13 PROCEDURE — 83690 ASSAY OF LIPASE: CPT | Performed by: NURSE PRACTITIONER

## 2020-02-13 PROCEDURE — 99284 EMERGENCY DEPT VISIT MOD MDM: CPT

## 2020-02-13 PROCEDURE — 80307 DRUG TEST PRSMV CHEM ANLYZR: CPT | Performed by: NURSE PRACTITIONER

## 2020-02-13 PROCEDURE — 99222 1ST HOSP IP/OBS MODERATE 55: CPT | Performed by: PHYSICIAN ASSISTANT

## 2020-02-13 PROCEDURE — 81001 URINALYSIS AUTO W/SCOPE: CPT | Performed by: NURSE PRACTITIONER

## 2020-02-13 PROCEDURE — 25010000002 ONDANSETRON PER 1 MG: Performed by: NURSE PRACTITIONER

## 2020-02-13 PROCEDURE — G0378 HOSPITAL OBSERVATION PER HR: HCPCS

## 2020-02-13 PROCEDURE — 80184 ASSAY OF PHENOBARBITAL: CPT | Performed by: INTERNAL MEDICINE

## 2020-02-13 PROCEDURE — 70450 CT HEAD/BRAIN W/O DYE: CPT

## 2020-02-13 PROCEDURE — 25010000003 LEVETIRACETAM IN NACL 0.75% 1000 MG/100ML SOLUTION: Performed by: NURSE PRACTITIONER

## 2020-02-13 PROCEDURE — 84146 ASSAY OF PROLACTIN: CPT | Performed by: INTERNAL MEDICINE

## 2020-02-13 PROCEDURE — 80053 COMPREHEN METABOLIC PANEL: CPT | Performed by: NURSE PRACTITIONER

## 2020-02-13 PROCEDURE — 85025 COMPLETE CBC W/AUTO DIFF WBC: CPT | Performed by: NURSE PRACTITIONER

## 2020-02-13 PROCEDURE — 99214 OFFICE O/P EST MOD 30 MIN: CPT | Performed by: NURSE PRACTITIONER

## 2020-02-13 PROCEDURE — 25010000002 KETOROLAC TROMETHAMINE PER 15 MG: Performed by: NURSE PRACTITIONER

## 2020-02-13 RX ORDER — SODIUM CHLORIDE 9 MG/ML
100 INJECTION, SOLUTION INTRAVENOUS CONTINUOUS
Status: DISPENSED | OUTPATIENT
Start: 2020-02-13 | End: 2020-02-14

## 2020-02-13 RX ORDER — ONDANSETRON 2 MG/ML
4 INJECTION INTRAMUSCULAR; INTRAVENOUS ONCE
Status: COMPLETED | OUTPATIENT
Start: 2020-02-13 | End: 2020-02-13

## 2020-02-13 RX ORDER — ONDANSETRON 4 MG/1
4 TABLET, FILM COATED ORAL EVERY 6 HOURS PRN
Status: DISCONTINUED | OUTPATIENT
Start: 2020-02-13 | End: 2020-02-24 | Stop reason: HOSPADM

## 2020-02-13 RX ORDER — KETOROLAC TROMETHAMINE 15 MG/ML
15 INJECTION, SOLUTION INTRAMUSCULAR; INTRAVENOUS ONCE
Status: COMPLETED | OUTPATIENT
Start: 2020-02-13 | End: 2020-02-13

## 2020-02-13 RX ORDER — LEVETIRACETAM 500 MG/1
1500 TABLET ORAL 2 TIMES DAILY
Status: DISCONTINUED | OUTPATIENT
Start: 2020-02-13 | End: 2020-02-14

## 2020-02-13 RX ORDER — SODIUM CHLORIDE 0.9 % (FLUSH) 0.9 %
10 SYRINGE (ML) INJECTION AS NEEDED
Status: DISCONTINUED | OUTPATIENT
Start: 2020-02-13 | End: 2020-02-24 | Stop reason: HOSPADM

## 2020-02-13 RX ORDER — CHOLECALCIFEROL (VITAMIN D3) 125 MCG
5 CAPSULE ORAL NIGHTLY PRN
Status: DISCONTINUED | OUTPATIENT
Start: 2020-02-13 | End: 2020-02-24 | Stop reason: HOSPADM

## 2020-02-13 RX ORDER — BISMUTH SUBCITRATE POTASSIUM, METRONIDAZOLE, TETRACYCLINE HYDROCHLORIDE 140; 125; 125 MG/1; MG/1; MG/1
3 CAPSULE ORAL
COMMUNITY
Start: 2019-12-24 | End: 2020-02-13

## 2020-02-13 RX ORDER — ACETAMINOPHEN 160 MG/5ML
650 SOLUTION ORAL EVERY 4 HOURS PRN
Status: DISCONTINUED | OUTPATIENT
Start: 2020-02-13 | End: 2020-02-24 | Stop reason: HOSPADM

## 2020-02-13 RX ORDER — PHENOBARBITAL 32.4 MG/1
97.2 TABLET ORAL NIGHTLY
Status: DISCONTINUED | OUTPATIENT
Start: 2020-02-13 | End: 2020-02-24 | Stop reason: HOSPADM

## 2020-02-13 RX ORDER — CALCIUM CARBONATE 200(500)MG
2 TABLET,CHEWABLE ORAL 2 TIMES DAILY PRN
Status: DISCONTINUED | OUTPATIENT
Start: 2020-02-13 | End: 2020-02-24 | Stop reason: HOSPADM

## 2020-02-13 RX ORDER — PRAMIPEXOLE DIHYDROCHLORIDE 1 MG/1
1 TABLET ORAL NIGHTLY
Status: DISCONTINUED | OUTPATIENT
Start: 2020-02-13 | End: 2020-02-16

## 2020-02-13 RX ORDER — LAMOTRIGINE 100 MG/1
200 TABLET ORAL NIGHTLY
Status: DISCONTINUED | OUTPATIENT
Start: 2020-02-13 | End: 2020-02-24 | Stop reason: HOSPADM

## 2020-02-13 RX ORDER — ALUMINA, MAGNESIA, AND SIMETHICONE 2400; 2400; 240 MG/30ML; MG/30ML; MG/30ML
15 SUSPENSION ORAL EVERY 6 HOURS PRN
Status: DISCONTINUED | OUTPATIENT
Start: 2020-02-13 | End: 2020-02-24 | Stop reason: HOSPADM

## 2020-02-13 RX ORDER — ALPRAZOLAM 1 MG/1
2 TABLET ORAL 3 TIMES DAILY PRN
Status: DISCONTINUED | OUTPATIENT
Start: 2020-02-13 | End: 2020-02-14

## 2020-02-13 RX ORDER — BISACODYL 10 MG
10 SUPPOSITORY, RECTAL RECTAL DAILY PRN
Status: DISCONTINUED | OUTPATIENT
Start: 2020-02-13 | End: 2020-02-24 | Stop reason: HOSPADM

## 2020-02-13 RX ORDER — ACETAMINOPHEN 325 MG/1
650 TABLET ORAL EVERY 4 HOURS PRN
Status: DISCONTINUED | OUTPATIENT
Start: 2020-02-13 | End: 2020-02-24 | Stop reason: HOSPADM

## 2020-02-13 RX ORDER — ONDANSETRON 2 MG/ML
4 INJECTION INTRAMUSCULAR; INTRAVENOUS EVERY 6 HOURS PRN
Status: DISCONTINUED | OUTPATIENT
Start: 2020-02-13 | End: 2020-02-24 | Stop reason: HOSPADM

## 2020-02-13 RX ORDER — LEVETIRACETAM 10 MG/ML
1000 INJECTION INTRAVASCULAR ONCE
Status: COMPLETED | OUTPATIENT
Start: 2020-02-13 | End: 2020-02-13

## 2020-02-13 RX ORDER — SODIUM CHLORIDE 0.9 % (FLUSH) 0.9 %
10 SYRINGE (ML) INJECTION EVERY 12 HOURS SCHEDULED
Status: DISCONTINUED | OUTPATIENT
Start: 2020-02-13 | End: 2020-02-24 | Stop reason: HOSPADM

## 2020-02-13 RX ORDER — BUSPIRONE HYDROCHLORIDE 10 MG/1
10 TABLET ORAL 3 TIMES DAILY
Status: DISCONTINUED | OUTPATIENT
Start: 2020-02-13 | End: 2020-02-24 | Stop reason: HOSPADM

## 2020-02-13 RX ORDER — ACETAMINOPHEN 650 MG/1
650 SUPPOSITORY RECTAL EVERY 4 HOURS PRN
Status: DISCONTINUED | OUTPATIENT
Start: 2020-02-13 | End: 2020-02-24 | Stop reason: HOSPADM

## 2020-02-13 RX ORDER — GABAPENTIN 400 MG/1
800 CAPSULE ORAL EVERY 8 HOURS SCHEDULED
Status: DISCONTINUED | OUTPATIENT
Start: 2020-02-13 | End: 2020-02-24 | Stop reason: HOSPADM

## 2020-02-13 RX ORDER — MODAFINIL 100 MG/1
200 TABLET ORAL EVERY MORNING
Status: DISCONTINUED | OUTPATIENT
Start: 2020-02-14 | End: 2020-02-24 | Stop reason: HOSPADM

## 2020-02-13 RX ORDER — BISACODYL 5 MG/1
5 TABLET, DELAYED RELEASE ORAL DAILY PRN
Status: DISCONTINUED | OUTPATIENT
Start: 2020-02-13 | End: 2020-02-24 | Stop reason: HOSPADM

## 2020-02-13 RX ADMIN — MELATONIN TAB 5 MG 5 MG: 5 TAB at 21:17

## 2020-02-13 RX ADMIN — SODIUM CHLORIDE 1000 ML: 0.9 INJECTION, SOLUTION INTRAVENOUS at 13:40

## 2020-02-13 RX ADMIN — LEVETIRACETAM 1500 MG: 500 TABLET ORAL at 20:55

## 2020-02-13 RX ADMIN — ONDANSETRON 4 MG: 2 INJECTION INTRAMUSCULAR; INTRAVENOUS at 13:41

## 2020-02-13 RX ADMIN — SODIUM CHLORIDE 100 ML/HR: 900 INJECTION, SOLUTION INTRAVENOUS at 21:17

## 2020-02-13 RX ADMIN — PHENOBARBITAL 97.2 MG: 32.4 TABLET ORAL at 20:55

## 2020-02-13 RX ADMIN — LAMOTRIGINE 200 MG: 100 TABLET ORAL at 20:55

## 2020-02-13 RX ADMIN — ONDANSETRON 4 MG: 2 INJECTION INTRAMUSCULAR; INTRAVENOUS at 17:19

## 2020-02-13 RX ADMIN — PRAMIPEXOLE DIHYDROCHLORIDE 1 MG: 1 TABLET ORAL at 20:55

## 2020-02-13 RX ADMIN — BUSPIRONE HYDROCHLORIDE 10 MG: 10 TABLET ORAL at 20:55

## 2020-02-13 RX ADMIN — Medication 10 ML: at 21:37

## 2020-02-13 RX ADMIN — KETOROLAC TROMETHAMINE 15 MG: 15 INJECTION, SOLUTION INTRAMUSCULAR; INTRAVENOUS at 17:20

## 2020-02-13 RX ADMIN — LEVETIRACETAM 1000 MG: 10 INJECTION INTRAVENOUS at 13:40

## 2020-02-13 RX ADMIN — GABAPENTIN 800 MG: 400 CAPSULE ORAL at 20:55

## 2020-02-13 NOTE — PROGRESS NOTES
Subjective   Milka Espinoza is a 49 y.o. female.     Patient is here today with complaints of seizures and vomiting.  She was originally scheduled for a Medicare wellness exam but wanted to cancel that and change to an acute visit.    Seizures- patient has a longstanding history of seizures.  She sees Dr. Cooley.  Patient reports that she has not been able to keep medications down for the last couple of weeks due to frequent vomiting.  She reports having 3 seizures a day.  Patient reports that she had a seizure on the way to the doctor office today. She is here with her mother.  Patient is requesting a new neurologist because she is unhappy with her current neurologist.  Patient has increased anxiety.  She reports that she has been to the ER recently for abdominal pain and seizures.  At her previous visit in November her seizure med blood levels were below recommended range.    Vomiting-patient reports that she has had vomiting intermittently for the last week.  She has been having abdominal discomfort since I last saw her in November.  She has a gastroenterologist and completed an EGD and colonoscopy.  She had 2 CT scans of her abdomen and an MRI of the abdomen that were normal except for a hemangioma on her liver, which is thought to be benign.  Patient reports that she cannot even keep water down at this time.  She cannot tolerate her meds.       The following portions of the patient's history were reviewed and updated as appropriate: allergies, current medications, past family history, past medical history, past social history, past surgical history and problem list.    Review of Systems   Constitutional: Positive for fatigue. Negative for fever.   Respiratory: Negative for cough, chest tightness and shortness of breath.    Cardiovascular: Negative for chest pain.   Gastrointestinal: Positive for abdominal pain, nausea and vomiting.   Neurological: Positive for dizziness, seizures, weakness, numbness and  "headache.   Psychiatric/Behavioral: Positive for depressed mood. The patient is nervous/anxious.        Objective   /75 (BP Location: Left arm, Patient Position: Sitting, Cuff Size: Large Adult)   Pulse 87   Temp 98.1 °F (36.7 °C) (Oral)   Resp 16   Ht 162.6 cm (64\")   Wt 80.1 kg (176 lb 9.6 oz)   SpO2 96%   Breastfeeding No   BMI 30.31 kg/m²   Physical Exam   Constitutional: She appears distressed.   Patient appears very uncomfortable and tearful   Cardiovascular: Normal rate, regular rhythm and normal heart sounds.   No murmur heard.  Pulmonary/Chest: Effort normal and breath sounds normal. No respiratory distress. She has no wheezes.   Abdominal: Soft. She exhibits no distension. There is tenderness.   Neurological:   Patient was alert and oriented initially during visit.  She became very anxious and reported that she felt like she could possibly have a seizure.  Patient was placed in the laying position on the bed.  Patient began to move mouth frequently, mimicking tardive dyskinesia movement. patient began to stiffen at that time but was still able to follow commands.  For approximately 1 minute patient was unresponsive and had stiff muscle tone.  Complete episode lasted approximately 10 minutes before ambulance arrived.  HR and oxygen stayed stable during visit   Psychiatric:   Patient very tearful and anxious during visit         Assessment/Plan     Diagnoses and all orders for this visit:    1. Seizures (CMS/HCC) (Primary)  Assessment & Plan:  Patient appears to be having possible seizures in the office today.  She reports that she has been unable to take her medicine due to vomiting for the last 3 weeks.  Reported approximately 3 seizures today.  requesting new neurologist-not happy with current MD  Recently been to ER-reviewed the note  Advised patient that we needed to transport her to the emergency room via ambulance due to her seizures and unable to tolerate medication or food or water for " further evaluation  ER was notified of patient    Orders:  -     Ambulatory Referral to Neurology    2. Nausea and vomiting, intractability of vomiting not specified, unspecified vomiting type  Assessment & Plan:  Unknown etiology  Patient has been experiencing this off and on for a couple of months  Has had 2 CT scans of the abdomen all of which were normal except for a benign hepatic hemangioma  Has seen GI and completed an EGD and colonoscopy  Has had an MRI of the abdomen to evaluate liver-no acute issues  We will send to ER for further evaluation

## 2020-02-14 ENCOUNTER — APPOINTMENT (OUTPATIENT)
Dept: NEUROLOGY | Facility: HOSPITAL | Age: 50
End: 2020-02-14

## 2020-02-14 LAB
ALBUMIN SERPL-MCNC: 4 G/DL (ref 3.5–5.2)
ALBUMIN/GLOB SERPL: 1.5 G/DL
ALP SERPL-CCNC: 94 U/L (ref 39–117)
ALT SERPL W P-5'-P-CCNC: 11 U/L (ref 1–33)
ANION GAP SERPL CALCULATED.3IONS-SCNC: 12 MMOL/L (ref 5–15)
ANION GAP SERPL CALCULATED.3IONS-SCNC: 13 MMOL/L (ref 5–15)
AST SERPL-CCNC: 12 U/L (ref 1–32)
BASOPHILS # BLD AUTO: 0 10*3/MM3 (ref 0–0.2)
BASOPHILS NFR BLD AUTO: 0.5 % (ref 0–1.5)
BILIRUB SERPL-MCNC: 0.2 MG/DL (ref 0.2–1.2)
BUN BLD-MCNC: 12 MG/DL (ref 6–20)
BUN BLD-MCNC: 15 MG/DL (ref 6–20)
BUN/CREAT SERPL: 16.4 (ref 7–25)
BUN/CREAT SERPL: 20 (ref 7–25)
CALCIUM SPEC-SCNC: 8.6 MG/DL (ref 8.6–10.5)
CALCIUM SPEC-SCNC: 8.9 MG/DL (ref 8.6–10.5)
CHLORIDE SERPL-SCNC: 102 MMOL/L (ref 98–107)
CHLORIDE SERPL-SCNC: 103 MMOL/L (ref 98–107)
CK SERPL-CCNC: 48 U/L (ref 20–180)
CO2 SERPL-SCNC: 24 MMOL/L (ref 22–29)
CO2 SERPL-SCNC: 25 MMOL/L (ref 22–29)
CREAT BLD-MCNC: 0.73 MG/DL (ref 0.57–1)
CREAT BLD-MCNC: 0.75 MG/DL (ref 0.57–1)
DEPRECATED RDW RBC AUTO: 43.8 FL (ref 37–54)
EOSINOPHIL # BLD AUTO: 0 10*3/MM3 (ref 0–0.4)
EOSINOPHIL NFR BLD AUTO: 0.7 % (ref 0.3–6.2)
ERYTHROCYTE [DISTWIDTH] IN BLOOD BY AUTOMATED COUNT: 13.8 % (ref 12.3–15.4)
GFR SERPL CREATININE-BSD FRML MDRD: 81 ML/MIN/1.73
GFR SERPL CREATININE-BSD FRML MDRD: 84 ML/MIN/1.73
GLOBULIN UR ELPH-MCNC: 2.6 GM/DL
GLUCOSE BLD-MCNC: 68 MG/DL (ref 65–99)
GLUCOSE BLD-MCNC: 93 MG/DL (ref 65–99)
GLUCOSE BLDC GLUCOMTR-MCNC: 62 MG/DL (ref 70–105)
GLUCOSE BLDC GLUCOMTR-MCNC: 86 MG/DL (ref 70–105)
HCT VFR BLD AUTO: 34.3 % (ref 34–46.6)
HGB BLD-MCNC: 11.6 G/DL (ref 12–15.9)
LYMPHOCYTES # BLD AUTO: 1.9 10*3/MM3 (ref 0.7–3.1)
LYMPHOCYTES NFR BLD AUTO: 40.9 % (ref 19.6–45.3)
MAGNESIUM SERPL-MCNC: 1.6 MG/DL (ref 1.6–2.6)
MAGNESIUM SERPL-MCNC: 1.8 MG/DL (ref 1.6–2.6)
MCH RBC QN AUTO: 29.9 PG (ref 26.6–33)
MCHC RBC AUTO-ENTMCNC: 33.7 G/DL (ref 31.5–35.7)
MCV RBC AUTO: 88.8 FL (ref 79–97)
MONOCYTES # BLD AUTO: 0.4 10*3/MM3 (ref 0.1–0.9)
MONOCYTES NFR BLD AUTO: 8.4 % (ref 5–12)
NEUTROPHILS # BLD AUTO: 2.3 10*3/MM3 (ref 1.7–7)
NEUTROPHILS NFR BLD AUTO: 49.5 % (ref 42.7–76)
NRBC BLD AUTO-RTO: 0 /100 WBC (ref 0–0.2)
PHOSPHATE SERPL-MCNC: 2.8 MG/DL (ref 2.5–4.5)
PHOSPHATE SERPL-MCNC: 4 MG/DL (ref 2.5–4.5)
PLATELET # BLD AUTO: 211 10*3/MM3 (ref 140–450)
PMV BLD AUTO: 8.1 FL (ref 6–12)
POTASSIUM BLD-SCNC: 3.6 MMOL/L (ref 3.5–5.2)
POTASSIUM BLD-SCNC: 4.4 MMOL/L (ref 3.5–5.2)
PROLACTIN SERPL-MCNC: 4.72 NG/ML (ref 4.79–23.3)
PROT SERPL-MCNC: 6.6 G/DL (ref 6–8.5)
RBC # BLD AUTO: 3.86 10*6/MM3 (ref 3.77–5.28)
SODIUM BLD-SCNC: 139 MMOL/L (ref 136–145)
SODIUM BLD-SCNC: 140 MMOL/L (ref 136–145)
TSH SERPL DL<=0.05 MIU/L-ACNC: 1.56 UIU/ML (ref 0.27–4.2)
WBC NRBC COR # BLD: 4.6 10*3/MM3 (ref 3.4–10.8)

## 2020-02-14 PROCEDURE — 95816 EEG AWAKE AND DROWSY: CPT

## 2020-02-14 PROCEDURE — 25010000003 LEVETIRACETAM IN NACL 0.54% 1500 MG/100ML SOLUTION: Performed by: NURSE PRACTITIONER

## 2020-02-14 PROCEDURE — 99222 1ST HOSP IP/OBS MODERATE 55: CPT | Performed by: NURSE PRACTITIONER

## 2020-02-14 PROCEDURE — 84100 ASSAY OF PHOSPHORUS: CPT | Performed by: NURSE PRACTITIONER

## 2020-02-14 PROCEDURE — 25010000002 LORAZEPAM PER 2 MG: Performed by: NURSE PRACTITIONER

## 2020-02-14 PROCEDURE — 83735 ASSAY OF MAGNESIUM: CPT | Performed by: NURSE PRACTITIONER

## 2020-02-14 PROCEDURE — G0378 HOSPITAL OBSERVATION PER HR: HCPCS

## 2020-02-14 PROCEDURE — 80177 DRUG SCRN QUAN LEVETIRACETAM: CPT | Performed by: NURSE PRACTITIONER

## 2020-02-14 PROCEDURE — 25010000002 KETOROLAC TROMETHAMINE PER 15 MG: Performed by: NURSE PRACTITIONER

## 2020-02-14 PROCEDURE — 82550 ASSAY OF CK (CPK): CPT | Performed by: NURSE PRACTITIONER

## 2020-02-14 PROCEDURE — 84443 ASSAY THYROID STIM HORMONE: CPT | Performed by: NURSE PRACTITIONER

## 2020-02-14 PROCEDURE — 82962 GLUCOSE BLOOD TEST: CPT

## 2020-02-14 PROCEDURE — 25010000002 KETOROLAC TROMETHAMINE PER 15 MG: Performed by: PHYSICIAN ASSISTANT

## 2020-02-14 PROCEDURE — 80053 COMPREHEN METABOLIC PANEL: CPT | Performed by: INTERNAL MEDICINE

## 2020-02-14 PROCEDURE — 95816 EEG AWAKE AND DROWSY: CPT | Performed by: PSYCHIATRY & NEUROLOGY

## 2020-02-14 PROCEDURE — 84146 ASSAY OF PROLACTIN: CPT | Performed by: NURSE PRACTITIONER

## 2020-02-14 PROCEDURE — 25010000002 ONDANSETRON PER 1 MG: Performed by: INTERNAL MEDICINE

## 2020-02-14 PROCEDURE — 99232 SBSQ HOSP IP/OBS MODERATE 35: CPT | Performed by: INTERNAL MEDICINE

## 2020-02-14 PROCEDURE — 25010000002 ONDANSETRON PER 1 MG: Performed by: NURSE PRACTITIONER

## 2020-02-14 PROCEDURE — 25010000002 FOSPHENYTOIN 500 MG PE/10ML SOLUTION 10 ML VIAL: Performed by: NURSE PRACTITIONER

## 2020-02-14 PROCEDURE — 85025 COMPLETE CBC W/AUTO DIFF WBC: CPT | Performed by: INTERNAL MEDICINE

## 2020-02-14 PROCEDURE — 25010000002 LORAZEPAM PER 2 MG: Performed by: INTERNAL MEDICINE

## 2020-02-14 RX ORDER — POTASSIUM CHLORIDE 7.45 MG/ML
10 INJECTION INTRAVENOUS
Status: DISCONTINUED | OUTPATIENT
Start: 2020-02-14 | End: 2020-02-24 | Stop reason: HOSPADM

## 2020-02-14 RX ORDER — MAGNESIUM SULFATE HEPTAHYDRATE 40 MG/ML
2 INJECTION, SOLUTION INTRAVENOUS AS NEEDED
Status: DISCONTINUED | OUTPATIENT
Start: 2020-02-14 | End: 2020-02-24 | Stop reason: HOSPADM

## 2020-02-14 RX ORDER — POTASSIUM CHLORIDE 20 MEQ/1
40 TABLET, EXTENDED RELEASE ORAL AS NEEDED
Status: DISCONTINUED | OUTPATIENT
Start: 2020-02-14 | End: 2020-02-24 | Stop reason: HOSPADM

## 2020-02-14 RX ORDER — LORAZEPAM 2 MG/ML
0.5 INJECTION INTRAMUSCULAR ONCE
Status: COMPLETED | OUTPATIENT
Start: 2020-02-14 | End: 2020-02-14

## 2020-02-14 RX ORDER — LORAZEPAM 2 MG/ML
1.5 INJECTION INTRAMUSCULAR ONCE
Status: COMPLETED | OUTPATIENT
Start: 2020-02-14 | End: 2020-02-14

## 2020-02-14 RX ORDER — LEVETIRACETAM 15 MG/ML
1500 INJECTION INTRAVASCULAR EVERY 12 HOURS SCHEDULED
Status: DISCONTINUED | OUTPATIENT
Start: 2020-02-14 | End: 2020-02-24 | Stop reason: HOSPADM

## 2020-02-14 RX ORDER — ALPRAZOLAM 1 MG/1
1 TABLET ORAL 4 TIMES DAILY PRN
Status: DISCONTINUED | OUTPATIENT
Start: 2020-02-14 | End: 2020-02-24 | Stop reason: HOSPADM

## 2020-02-14 RX ORDER — LORAZEPAM 2 MG/ML
1 INJECTION INTRAMUSCULAR EVERY 4 HOURS PRN
Status: DISCONTINUED | OUTPATIENT
Start: 2020-02-14 | End: 2020-02-14

## 2020-02-14 RX ORDER — FAMOTIDINE 10 MG/ML
20 INJECTION, SOLUTION INTRAVENOUS EVERY 12 HOURS SCHEDULED
Status: DISCONTINUED | OUTPATIENT
Start: 2020-02-14 | End: 2020-02-15

## 2020-02-14 RX ORDER — TOPIRAMATE 25 MG/1
50 TABLET ORAL EVERY 12 HOURS SCHEDULED
Status: DISCONTINUED | OUTPATIENT
Start: 2020-02-28 | End: 2020-02-24 | Stop reason: HOSPADM

## 2020-02-14 RX ORDER — BUTALBITAL, ACETAMINOPHEN AND CAFFEINE 50; 325; 40 MG/1; MG/1; MG/1
2 TABLET ORAL EVERY 8 HOURS PRN
Status: DISCONTINUED | OUTPATIENT
Start: 2020-02-14 | End: 2020-02-21

## 2020-02-14 RX ORDER — TOPIRAMATE 25 MG/1
25 TABLET ORAL EVERY 12 HOURS SCHEDULED
Status: DISPENSED | OUTPATIENT
Start: 2020-02-14 | End: 2020-02-21

## 2020-02-14 RX ORDER — POTASSIUM CHLORIDE 1.5 G/1.77G
40 POWDER, FOR SOLUTION ORAL AS NEEDED
Status: DISCONTINUED | OUTPATIENT
Start: 2020-02-14 | End: 2020-02-24 | Stop reason: HOSPADM

## 2020-02-14 RX ORDER — KETOROLAC TROMETHAMINE 15 MG/ML
15 INJECTION, SOLUTION INTRAMUSCULAR; INTRAVENOUS EVERY 6 HOURS PRN
Status: DISCONTINUED | OUTPATIENT
Start: 2020-02-14 | End: 2020-02-17

## 2020-02-14 RX ORDER — MAGNESIUM SULFATE 1 G/100ML
1 INJECTION INTRAVENOUS AS NEEDED
Status: DISCONTINUED | OUTPATIENT
Start: 2020-02-14 | End: 2020-02-24 | Stop reason: HOSPADM

## 2020-02-14 RX ORDER — TOPIRAMATE 25 MG/1
25 TABLET ORAL EVERY MORNING
Status: DISCONTINUED | OUTPATIENT
Start: 2020-02-21 | End: 2020-02-24 | Stop reason: HOSPADM

## 2020-02-14 RX ORDER — LORAZEPAM 2 MG/ML
2 INJECTION INTRAMUSCULAR EVERY 4 HOURS PRN
Status: DISCONTINUED | OUTPATIENT
Start: 2020-02-14 | End: 2020-02-19

## 2020-02-14 RX ORDER — TOPIRAMATE 25 MG/1
50 TABLET ORAL NIGHTLY
Status: DISCONTINUED | OUTPATIENT
Start: 2020-02-21 | End: 2020-02-24 | Stop reason: HOSPADM

## 2020-02-14 RX ADMIN — GABAPENTIN 800 MG: 400 CAPSULE ORAL at 13:28

## 2020-02-14 RX ADMIN — GABAPENTIN 800 MG: 400 CAPSULE ORAL at 06:11

## 2020-02-14 RX ADMIN — BUSPIRONE HYDROCHLORIDE 10 MG: 10 TABLET ORAL at 17:03

## 2020-02-14 RX ADMIN — ACETAMINOPHEN 650 MG: 325 TABLET, FILM COATED ORAL at 04:24

## 2020-02-14 RX ADMIN — KETOROLAC TROMETHAMINE 15 MG: 15 INJECTION, SOLUTION INTRAMUSCULAR; INTRAVENOUS at 13:28

## 2020-02-14 RX ADMIN — Medication 10 ML: at 21:53

## 2020-02-14 RX ADMIN — LEVETIRACETAM 1500 MG: 500 TABLET ORAL at 10:18

## 2020-02-14 RX ADMIN — ALPRAZOLAM 2 MG: 1 TABLET ORAL at 10:22

## 2020-02-14 RX ADMIN — LORAZEPAM 1.5 MG: 2 INJECTION, SOLUTION INTRAMUSCULAR; INTRAVENOUS at 20:15

## 2020-02-14 RX ADMIN — BUTALBITAL, ACETAMINOPHEN AND CAFFEINE 2 TABLET: 50; 325; 40 TABLET ORAL at 17:04

## 2020-02-14 RX ADMIN — ONDANSETRON 4 MG: 2 INJECTION INTRAMUSCULAR; INTRAVENOUS at 04:24

## 2020-02-14 RX ADMIN — FOSPHENYTOIN SODIUM 1000 MG PE: 50 INJECTION, SOLUTION INTRAMUSCULAR; INTRAVENOUS at 23:31

## 2020-02-14 RX ADMIN — Medication 10 ML: at 10:22

## 2020-02-14 RX ADMIN — ONDANSETRON 4 MG: 2 INJECTION INTRAMUSCULAR; INTRAVENOUS at 13:28

## 2020-02-14 RX ADMIN — KETOROLAC TROMETHAMINE 15 MG: 15 INJECTION, SOLUTION INTRAMUSCULAR; INTRAVENOUS at 07:22

## 2020-02-14 RX ADMIN — TOPIRAMATE 25 MG: 25 TABLET, FILM COATED ORAL at 13:28

## 2020-02-14 RX ADMIN — FAMOTIDINE 20 MG: 10 INJECTION INTRAVENOUS at 23:31

## 2020-02-14 RX ADMIN — LEVETIRACETAM 1500 MG: 15 INJECTION INTRAVENOUS at 21:52

## 2020-02-14 RX ADMIN — MODAFINIL 200 MG: 100 TABLET ORAL at 06:11

## 2020-02-14 RX ADMIN — BUSPIRONE HYDROCHLORIDE 10 MG: 10 TABLET ORAL at 10:18

## 2020-02-14 RX ADMIN — LORAZEPAM 0.5 MG: 2 INJECTION INTRAMUSCULAR; INTRAVENOUS at 20:08

## 2020-02-14 NOTE — ASSESSMENT & PLAN NOTE
Unknown etiology  Patient has been experiencing this off and on for a couple of months  Has had 2 CT scans of the abdomen all of which were normal except for a benign hepatic hemangioma  Has seen GI and completed an EGD and colonoscopy  Has had an MRI of the abdomen to evaluate liver-no acute issues  We will send to ER for further evaluation

## 2020-02-14 NOTE — ASSESSMENT & PLAN NOTE
Patient appears to be having possible seizures in the office today.  She reports that she has been unable to take her medicine due to vomiting for the last 3 weeks.  Reported approximately 3 seizures today.  requesting new neurologist-not happy with current MD  Recently been to ER-reviewed the note  Advised patient that we needed to transport her to the emergency room via ambulance due to her seizures and unable to tolerate medication or food or water for further evaluation  ER was notified of patient

## 2020-02-15 ENCOUNTER — INPATIENT HOSPITAL (AMBULATORY)
Dept: URBAN - METROPOLITAN AREA HOSPITAL 84 | Facility: HOSPITAL | Age: 50
End: 2020-02-15
Payer: MEDICAID

## 2020-02-15 DIAGNOSIS — K21.9 GASTRO-ESOPHAGEAL REFLUX DISEASE WITHOUT ESOPHAGITIS: ICD-10-CM

## 2020-02-15 DIAGNOSIS — R11.2 NAUSEA WITH VOMITING, UNSPECIFIED: ICD-10-CM

## 2020-02-15 DIAGNOSIS — R56.9 UNSPECIFIED CONVULSIONS: ICD-10-CM

## 2020-02-15 DIAGNOSIS — F41.9 ANXIETY DISORDER, UNSPECIFIED: ICD-10-CM

## 2020-02-15 PROBLEM — G40.909 SEIZURE DISORDER (HCC): Status: ACTIVE | Noted: 2020-02-13

## 2020-02-15 PROBLEM — G43.909 MIGRAINE: Status: ACTIVE | Noted: 2020-02-13

## 2020-02-15 PROBLEM — E16.2 HYPOGLYCEMIA: Status: ACTIVE | Noted: 2020-02-14

## 2020-02-15 LAB
ANION GAP SERPL CALCULATED.3IONS-SCNC: 12 MMOL/L (ref 5–15)
BASOPHILS # BLD AUTO: 0 10*3/MM3 (ref 0–0.2)
BASOPHILS NFR BLD AUTO: 0.8 % (ref 0–1.5)
BUN BLD-MCNC: 11 MG/DL (ref 6–20)
BUN/CREAT SERPL: 15.7 (ref 7–25)
CALCIUM SPEC-SCNC: 8.6 MG/DL (ref 8.6–10.5)
CHLORIDE SERPL-SCNC: 102 MMOL/L (ref 98–107)
CO2 SERPL-SCNC: 26 MMOL/L (ref 22–29)
CREAT BLD-MCNC: 0.7 MG/DL (ref 0.57–1)
DEPRECATED RDW RBC AUTO: 43.8 FL (ref 37–54)
EOSINOPHIL # BLD AUTO: 0 10*3/MM3 (ref 0–0.4)
EOSINOPHIL NFR BLD AUTO: 0.9 % (ref 0.3–6.2)
ERYTHROCYTE [DISTWIDTH] IN BLOOD BY AUTOMATED COUNT: 13.8 % (ref 12.3–15.4)
FOLATE SERPL-MCNC: 14.4 NG/ML (ref 4.78–24.2)
GFR SERPL CREATININE-BSD FRML MDRD: 88 ML/MIN/1.73
GLUCOSE BLD-MCNC: 85 MG/DL (ref 65–99)
GLUCOSE BLDC GLUCOMTR-MCNC: 145 MG/DL (ref 70–105)
GLUCOSE BLDC GLUCOMTR-MCNC: 66 MG/DL (ref 70–105)
GLUCOSE BLDC GLUCOMTR-MCNC: 84 MG/DL (ref 70–105)
GLUCOSE BLDC GLUCOMTR-MCNC: 92 MG/DL (ref 70–105)
HCT VFR BLD AUTO: 35 % (ref 34–46.6)
HGB BLD-MCNC: 11.4 G/DL (ref 12–15.9)
LYMPHOCYTES # BLD AUTO: 1.8 10*3/MM3 (ref 0.7–3.1)
LYMPHOCYTES NFR BLD AUTO: 52.7 % (ref 19.6–45.3)
MCH RBC QN AUTO: 29.2 PG (ref 26.6–33)
MCHC RBC AUTO-ENTMCNC: 32.7 G/DL (ref 31.5–35.7)
MCV RBC AUTO: 89.2 FL (ref 79–97)
MONOCYTES # BLD AUTO: 0.3 10*3/MM3 (ref 0.1–0.9)
MONOCYTES NFR BLD AUTO: 8.1 % (ref 5–12)
NEUTROPHILS # BLD AUTO: 1.3 10*3/MM3 (ref 1.7–7)
NEUTROPHILS NFR BLD AUTO: 37.5 % (ref 42.7–76)
NRBC BLD AUTO-RTO: 0.1 /100 WBC (ref 0–0.2)
PLATELET # BLD AUTO: 198 10*3/MM3 (ref 140–450)
PMV BLD AUTO: 8 FL (ref 6–12)
POTASSIUM BLD-SCNC: 3.7 MMOL/L (ref 3.5–5.2)
PROLACTIN SERPL-MCNC: 6.32 NG/ML (ref 4.79–23.3)
RBC # BLD AUTO: 3.92 10*6/MM3 (ref 3.77–5.28)
SODIUM BLD-SCNC: 140 MMOL/L (ref 136–145)
VIT B12 BLD-MCNC: 269 PG/ML (ref 211–946)
WBC NRBC COR # BLD: 3.4 10*3/MM3 (ref 3.4–10.8)

## 2020-02-15 PROCEDURE — 82607 VITAMIN B-12: CPT | Performed by: NURSE PRACTITIONER

## 2020-02-15 PROCEDURE — 36410 VNPNXR 3YR/> PHY/QHP DX/THER: CPT

## 2020-02-15 PROCEDURE — 05HY33Z INSERTION OF INFUSION DEVICE INTO UPPER VEIN, PERCUTANEOUS APPROACH: ICD-10-PCS | Performed by: INTERNAL MEDICINE

## 2020-02-15 PROCEDURE — 25010000002 FOSPHENYTOIN 100 MG PE/2ML SOLUTION 2 ML VIAL: Performed by: NURSE PRACTITIONER

## 2020-02-15 PROCEDURE — 85025 COMPLETE CBC W/AUTO DIFF WBC: CPT | Performed by: NURSE PRACTITIONER

## 2020-02-15 PROCEDURE — 99222 1ST HOSP IP/OBS MODERATE 55: CPT | Performed by: PHYSICIAN ASSISTANT

## 2020-02-15 PROCEDURE — G0378 HOSPITAL OBSERVATION PER HR: HCPCS

## 2020-02-15 PROCEDURE — 25010000003 LEVETIRACETAM IN NACL 0.54% 1500 MG/100ML SOLUTION: Performed by: NURSE PRACTITIONER

## 2020-02-15 PROCEDURE — 80048 BASIC METABOLIC PNL TOTAL CA: CPT | Performed by: NURSE PRACTITIONER

## 2020-02-15 PROCEDURE — 25010000002 ONDANSETRON PER 1 MG: Performed by: NURSE PRACTITIONER

## 2020-02-15 PROCEDURE — 82962 GLUCOSE BLOOD TEST: CPT

## 2020-02-15 PROCEDURE — C1751 CATH, INF, PER/CENT/MIDLINE: HCPCS

## 2020-02-15 PROCEDURE — 99231 SBSQ HOSP IP/OBS SF/LOW 25: CPT | Performed by: PSYCHIATRY & NEUROLOGY

## 2020-02-15 PROCEDURE — 82746 ASSAY OF FOLIC ACID SERUM: CPT | Performed by: NURSE PRACTITIONER

## 2020-02-15 PROCEDURE — 99222 1ST HOSP IP/OBS MODERATE 55: CPT | Performed by: NURSE PRACTITIONER

## 2020-02-15 RX ORDER — DEXTROSE AND SODIUM CHLORIDE 5; .45 G/100ML; G/100ML
75 INJECTION, SOLUTION INTRAVENOUS CONTINUOUS
Status: DISCONTINUED | OUTPATIENT
Start: 2020-02-15 | End: 2020-02-24 | Stop reason: HOSPADM

## 2020-02-15 RX ORDER — AMOXICILLIN 250 MG/1
1000 CAPSULE ORAL EVERY 12 HOURS SCHEDULED
Status: DISCONTINUED | OUTPATIENT
Start: 2020-02-15 | End: 2020-02-19

## 2020-02-15 RX ORDER — SIMETHICONE 80 MG
120 TABLET,CHEWABLE ORAL 3 TIMES DAILY PRN
Status: DISCONTINUED | OUTPATIENT
Start: 2020-02-15 | End: 2020-02-24 | Stop reason: HOSPADM

## 2020-02-15 RX ORDER — PANTOPRAZOLE SODIUM 40 MG/1
40 TABLET, DELAYED RELEASE ORAL
Status: DISCONTINUED | OUTPATIENT
Start: 2020-02-15 | End: 2020-02-24 | Stop reason: HOSPADM

## 2020-02-15 RX ORDER — LEVOFLOXACIN 250 MG/1
500 TABLET ORAL DAILY
Status: DISCONTINUED | OUTPATIENT
Start: 2020-02-15 | End: 2020-02-19

## 2020-02-15 RX ORDER — SCOLOPAMINE TRANSDERMAL SYSTEM 1 MG/1
1 PATCH, EXTENDED RELEASE TRANSDERMAL
Status: DISCONTINUED | OUTPATIENT
Start: 2020-02-15 | End: 2020-02-24 | Stop reason: HOSPADM

## 2020-02-15 RX ADMIN — FAMOTIDINE 20 MG: 10 INJECTION INTRAVENOUS at 09:04

## 2020-02-15 RX ADMIN — MODAFINIL 200 MG: 100 TABLET ORAL at 06:31

## 2020-02-15 RX ADMIN — DEXTROSE AND SODIUM CHLORIDE 75 ML/HR: 5; 450 INJECTION, SOLUTION INTRAVENOUS at 05:30

## 2020-02-15 RX ADMIN — ONDANSETRON 4 MG: 2 INJECTION INTRAMUSCULAR; INTRAVENOUS at 03:05

## 2020-02-15 RX ADMIN — TOPIRAMATE 25 MG: 25 TABLET, FILM COATED ORAL at 21:15

## 2020-02-15 RX ADMIN — ONDANSETRON 4 MG: 2 INJECTION INTRAMUSCULAR; INTRAVENOUS at 09:05

## 2020-02-15 RX ADMIN — LEVETIRACETAM 1500 MG: 15 INJECTION INTRAVENOUS at 21:05

## 2020-02-15 RX ADMIN — BUTALBITAL, ACETAMINOPHEN AND CAFFEINE 2 TABLET: 50; 325; 40 TABLET ORAL at 01:48

## 2020-02-15 RX ADMIN — TOPIRAMATE 25 MG: 25 TABLET, FILM COATED ORAL at 09:04

## 2020-02-15 RX ADMIN — AMOXICILLIN 1000 MG: 250 CAPSULE ORAL at 21:16

## 2020-02-15 RX ADMIN — ALPRAZOLAM 1 MG: 1 TABLET ORAL at 21:14

## 2020-02-15 RX ADMIN — LEVETIRACETAM 1500 MG: 15 INJECTION INTRAVENOUS at 09:04

## 2020-02-15 RX ADMIN — BUSPIRONE HYDROCHLORIDE 10 MG: 10 TABLET ORAL at 09:03

## 2020-02-15 RX ADMIN — SCOPALAMINE 1 PATCH: 1 PATCH, EXTENDED RELEASE TRANSDERMAL at 16:01

## 2020-02-15 RX ADMIN — BUSPIRONE HYDROCHLORIDE 10 MG: 10 TABLET ORAL at 17:10

## 2020-02-15 RX ADMIN — DEXTROSE AND SODIUM CHLORIDE 75 ML/HR: 5; 450 INJECTION, SOLUTION INTRAVENOUS at 22:58

## 2020-02-15 RX ADMIN — PHENOBARBITAL 97.2 MG: 32.4 TABLET ORAL at 21:16

## 2020-02-15 RX ADMIN — FOSPHENYTOIN SODIUM 150 MG PE: 50 INJECTION, SOLUTION INTRAMUSCULAR; INTRAVENOUS at 07:12

## 2020-02-15 RX ADMIN — GABAPENTIN 800 MG: 400 CAPSULE ORAL at 17:09

## 2020-02-15 RX ADMIN — PANTOPRAZOLE SODIUM 40 MG: 40 TABLET, DELAYED RELEASE ORAL at 17:09

## 2020-02-15 RX ADMIN — LAMOTRIGINE 200 MG: 100 TABLET ORAL at 21:15

## 2020-02-15 RX ADMIN — ONDANSETRON 4 MG: 2 INJECTION INTRAMUSCULAR; INTRAVENOUS at 17:10

## 2020-02-15 RX ADMIN — PRAMIPEXOLE DIHYDROCHLORIDE 1 MG: 1 TABLET ORAL at 21:14

## 2020-02-15 RX ADMIN — Medication 10 ML: at 21:15

## 2020-02-15 RX ADMIN — BUSPIRONE HYDROCHLORIDE 10 MG: 10 TABLET ORAL at 21:14

## 2020-02-15 RX ADMIN — LEVOFLOXACIN 500 MG: 250 TABLET, FILM COATED ORAL at 17:09

## 2020-02-15 RX ADMIN — Medication 10 ML: at 09:10

## 2020-02-15 RX ADMIN — GABAPENTIN 800 MG: 400 CAPSULE ORAL at 21:14

## 2020-02-15 RX ADMIN — GABAPENTIN 800 MG: 400 CAPSULE ORAL at 06:31

## 2020-02-16 ENCOUNTER — APPOINTMENT (OUTPATIENT)
Dept: NUCLEAR MEDICINE | Facility: HOSPITAL | Age: 50
End: 2020-02-16

## 2020-02-16 PROBLEM — F44.5 PSEUDOSEIZURE: Status: ACTIVE | Noted: 2020-02-16

## 2020-02-16 LAB
ALBUMIN SERPL-MCNC: 4 G/DL (ref 3.5–5.2)
ALBUMIN/GLOB SERPL: 1.4 G/DL
ALP SERPL-CCNC: 95 U/L (ref 39–117)
ALT SERPL W P-5'-P-CCNC: 11 U/L (ref 1–33)
ANION GAP SERPL CALCULATED.3IONS-SCNC: 12 MMOL/L (ref 5–15)
AST SERPL-CCNC: 12 U/L (ref 1–32)
BASOPHILS # BLD AUTO: 0 10*3/MM3 (ref 0–0.2)
BASOPHILS NFR BLD AUTO: 0.9 % (ref 0–1.5)
BILIRUB SERPL-MCNC: 0.2 MG/DL (ref 0.2–1.2)
BUN BLD-MCNC: 6 MG/DL (ref 6–20)
BUN/CREAT SERPL: 7.6 (ref 7–25)
CALCIUM SPEC-SCNC: 8.8 MG/DL (ref 8.6–10.5)
CHLORIDE SERPL-SCNC: 103 MMOL/L (ref 98–107)
CO2 SERPL-SCNC: 24 MMOL/L (ref 22–29)
CREAT BLD-MCNC: 0.79 MG/DL (ref 0.57–1)
DEPRECATED RDW RBC AUTO: 41.6 FL (ref 37–54)
EOSINOPHIL # BLD AUTO: 0 10*3/MM3 (ref 0–0.4)
EOSINOPHIL NFR BLD AUTO: 0.6 % (ref 0.3–6.2)
ERYTHROCYTE [DISTWIDTH] IN BLOOD BY AUTOMATED COUNT: 13.3 % (ref 12.3–15.4)
GFR SERPL CREATININE-BSD FRML MDRD: 77 ML/MIN/1.73
GLOBULIN UR ELPH-MCNC: 2.8 GM/DL
GLUCOSE BLD-MCNC: 97 MG/DL (ref 65–99)
HCT VFR BLD AUTO: 33.4 % (ref 34–46.6)
HGB BLD-MCNC: 11.6 G/DL (ref 12–15.9)
LIPASE SERPL-CCNC: 29 U/L (ref 13–60)
LYMPHOCYTES # BLD AUTO: 1.7 10*3/MM3 (ref 0.7–3.1)
LYMPHOCYTES NFR BLD AUTO: 45.6 % (ref 19.6–45.3)
MCH RBC QN AUTO: 31.3 PG (ref 26.6–33)
MCHC RBC AUTO-ENTMCNC: 34.8 G/DL (ref 31.5–35.7)
MCV RBC AUTO: 89.8 FL (ref 79–97)
MONOCYTES # BLD AUTO: 0.4 10*3/MM3 (ref 0.1–0.9)
MONOCYTES NFR BLD AUTO: 9.3 % (ref 5–12)
NEUTROPHILS # BLD AUTO: 1.7 10*3/MM3 (ref 1.7–7)
NEUTROPHILS NFR BLD AUTO: 43.6 % (ref 42.7–76)
NRBC BLD AUTO-RTO: 0 /100 WBC (ref 0–0.2)
PLATELET # BLD AUTO: 210 10*3/MM3 (ref 140–450)
PMV BLD AUTO: 8.5 FL (ref 6–12)
POTASSIUM BLD-SCNC: 3.7 MMOL/L (ref 3.5–5.2)
PROT SERPL-MCNC: 6.8 G/DL (ref 6–8.5)
RBC # BLD AUTO: 3.72 10*6/MM3 (ref 3.77–5.28)
SODIUM BLD-SCNC: 139 MMOL/L (ref 136–145)
WBC NRBC COR # BLD: 3.8 10*3/MM3 (ref 3.4–10.8)

## 2020-02-16 PROCEDURE — 80053 COMPREHEN METABOLIC PANEL: CPT | Performed by: NURSE PRACTITIONER

## 2020-02-16 PROCEDURE — A9537 TC99M MEBROFENIN: HCPCS | Performed by: INTERNAL MEDICINE

## 2020-02-16 PROCEDURE — 0 TECHNETIUM TC 99M MEBROFENIN KIT: Performed by: INTERNAL MEDICINE

## 2020-02-16 PROCEDURE — 25010000003 LEVETIRACETAM IN NACL 0.54% 1500 MG/100ML SOLUTION: Performed by: NURSE PRACTITIONER

## 2020-02-16 PROCEDURE — 85025 COMPLETE CBC W/AUTO DIFF WBC: CPT | Performed by: NURSE PRACTITIONER

## 2020-02-16 PROCEDURE — 25010000002 KETOROLAC TROMETHAMINE PER 15 MG: Performed by: NURSE PRACTITIONER

## 2020-02-16 PROCEDURE — 99232 SBSQ HOSP IP/OBS MODERATE 35: CPT | Performed by: PHYSICIAN ASSISTANT

## 2020-02-16 PROCEDURE — 78227 HEPATOBIL SYST IMAGE W/DRUG: CPT

## 2020-02-16 PROCEDURE — 99231 SBSQ HOSP IP/OBS SF/LOW 25: CPT | Performed by: PSYCHIATRY & NEUROLOGY

## 2020-02-16 PROCEDURE — G0378 HOSPITAL OBSERVATION PER HR: HCPCS

## 2020-02-16 PROCEDURE — 83690 ASSAY OF LIPASE: CPT | Performed by: NURSE PRACTITIONER

## 2020-02-16 PROCEDURE — 99233 SBSQ HOSP IP/OBS HIGH 50: CPT | Performed by: INTERNAL MEDICINE

## 2020-02-16 PROCEDURE — 25010000002 ONDANSETRON PER 1 MG: Performed by: NURSE PRACTITIONER

## 2020-02-16 PROCEDURE — 25010000002 LORAZEPAM PER 2 MG: Performed by: NURSE PRACTITIONER

## 2020-02-16 RX ORDER — KIT FOR THE PREPARATION OF TECHNETIUM TC 99M MEBROFENIN 45 MG/10ML
1 INJECTION, POWDER, LYOPHILIZED, FOR SOLUTION INTRAVENOUS
Status: COMPLETED | OUTPATIENT
Start: 2020-02-16 | End: 2020-02-16

## 2020-02-16 RX ORDER — QUETIAPINE FUMARATE 100 MG/1
200 TABLET, FILM COATED ORAL NIGHTLY
Status: DISCONTINUED | OUTPATIENT
Start: 2020-02-16 | End: 2020-02-24 | Stop reason: HOSPADM

## 2020-02-16 RX ORDER — AMMONIA INHALANTS 0.04 G/.3ML
INHALANT RESPIRATORY (INHALATION)
Status: COMPLETED
Start: 2020-02-16 | End: 2020-02-16

## 2020-02-16 RX ADMIN — LEVETIRACETAM 1500 MG: 15 INJECTION INTRAVENOUS at 21:22

## 2020-02-16 RX ADMIN — Medication 10 ML: at 10:15

## 2020-02-16 RX ADMIN — ALPRAZOLAM 1 MG: 1 TABLET ORAL at 21:22

## 2020-02-16 RX ADMIN — AMOXICILLIN 1000 MG: 250 CAPSULE ORAL at 21:22

## 2020-02-16 RX ADMIN — LORAZEPAM 2 MG: 2 INJECTION INTRAMUSCULAR; INTRAVENOUS at 03:19

## 2020-02-16 RX ADMIN — GABAPENTIN 800 MG: 400 CAPSULE ORAL at 21:22

## 2020-02-16 RX ADMIN — PANTOPRAZOLE SODIUM 40 MG: 40 TABLET, DELAYED RELEASE ORAL at 10:14

## 2020-02-16 RX ADMIN — BUTALBITAL, ACETAMINOPHEN AND CAFFEINE 2 TABLET: 50; 325; 40 TABLET ORAL at 21:21

## 2020-02-16 RX ADMIN — GABAPENTIN 800 MG: 400 CAPSULE ORAL at 05:32

## 2020-02-16 RX ADMIN — LEVOFLOXACIN 500 MG: 250 TABLET, FILM COATED ORAL at 10:14

## 2020-02-16 RX ADMIN — PHENOBARBITAL 97.2 MG: 32.4 TABLET ORAL at 21:24

## 2020-02-16 RX ADMIN — KETOROLAC TROMETHAMINE 15 MG: 15 INJECTION, SOLUTION INTRAMUSCULAR; INTRAVENOUS at 10:20

## 2020-02-16 RX ADMIN — BUSPIRONE HYDROCHLORIDE 10 MG: 10 TABLET ORAL at 16:30

## 2020-02-16 RX ADMIN — TOPIRAMATE 25 MG: 25 TABLET, FILM COATED ORAL at 21:28

## 2020-02-16 RX ADMIN — BUSPIRONE HYDROCHLORIDE 10 MG: 10 TABLET ORAL at 10:14

## 2020-02-16 RX ADMIN — MODAFINIL 200 MG: 100 TABLET ORAL at 10:14

## 2020-02-16 RX ADMIN — KETOROLAC TROMETHAMINE 15 MG: 15 INJECTION, SOLUTION INTRAMUSCULAR; INTRAVENOUS at 03:19

## 2020-02-16 RX ADMIN — GABAPENTIN 800 MG: 400 CAPSULE ORAL at 16:30

## 2020-02-16 RX ADMIN — MEBROFENIN 1 DOSE: 45 INJECTION, POWDER, LYOPHILIZED, FOR SOLUTION INTRAVENOUS at 07:45

## 2020-02-16 RX ADMIN — PANTOPRAZOLE SODIUM 40 MG: 40 TABLET, DELAYED RELEASE ORAL at 16:30

## 2020-02-16 RX ADMIN — ONDANSETRON 4 MG: 2 INJECTION INTRAMUSCULAR; INTRAVENOUS at 16:30

## 2020-02-16 RX ADMIN — QUETIAPINE FUMARATE 200 MG: 100 TABLET ORAL at 21:21

## 2020-02-16 RX ADMIN — BUSPIRONE HYDROCHLORIDE 10 MG: 10 TABLET ORAL at 21:22

## 2020-02-16 RX ADMIN — TOPIRAMATE 25 MG: 25 TABLET, FILM COATED ORAL at 10:14

## 2020-02-16 RX ADMIN — Medication 10 ML: at 21:23

## 2020-02-16 RX ADMIN — AMOXICILLIN 1000 MG: 250 CAPSULE ORAL at 10:15

## 2020-02-16 RX ADMIN — AMMONIA INHALANTS 10 EACH: 0.04 INHALANT RESPIRATORY (INHALATION) at 18:32

## 2020-02-16 RX ADMIN — ONDANSETRON 4 MG: 2 INJECTION INTRAMUSCULAR; INTRAVENOUS at 10:14

## 2020-02-16 RX ADMIN — LAMOTRIGINE 200 MG: 100 TABLET ORAL at 21:28

## 2020-02-16 RX ADMIN — LEVETIRACETAM 1500 MG: 15 INJECTION INTRAVENOUS at 11:47

## 2020-02-16 RX ADMIN — ALPRAZOLAM 1 MG: 1 TABLET ORAL at 16:30

## 2020-02-17 ENCOUNTER — INPATIENT HOSPITAL (AMBULATORY)
Dept: URBAN - METROPOLITAN AREA HOSPITAL 84 | Facility: HOSPITAL | Age: 50
End: 2020-02-17

## 2020-02-17 ENCOUNTER — APPOINTMENT (OUTPATIENT)
Dept: GENERAL RADIOLOGY | Facility: HOSPITAL | Age: 50
End: 2020-02-17

## 2020-02-17 DIAGNOSIS — R11.2 NAUSEA WITH VOMITING, UNSPECIFIED: ICD-10-CM

## 2020-02-17 DIAGNOSIS — K21.9 GASTRO-ESOPHAGEAL REFLUX DISEASE WITHOUT ESOPHAGITIS: ICD-10-CM

## 2020-02-17 DIAGNOSIS — R56.9 UNSPECIFIED CONVULSIONS: ICD-10-CM

## 2020-02-17 DIAGNOSIS — F41.9 ANXIETY DISORDER, UNSPECIFIED: ICD-10-CM

## 2020-02-17 LAB
ANION GAP SERPL CALCULATED.3IONS-SCNC: 12 MMOL/L (ref 5–15)
BASOPHILS # BLD AUTO: 0 10*3/MM3 (ref 0–0.2)
BASOPHILS NFR BLD AUTO: 1.1 % (ref 0–1.5)
BUN BLD-MCNC: 7 MG/DL (ref 6–20)
BUN/CREAT SERPL: 8.5 (ref 7–25)
CALCIUM SPEC-SCNC: 8.8 MG/DL (ref 8.6–10.5)
CHLORIDE SERPL-SCNC: 105 MMOL/L (ref 98–107)
CO2 SERPL-SCNC: 20 MMOL/L (ref 22–29)
CREAT BLD-MCNC: 0.82 MG/DL (ref 0.57–1)
DEPRECATED RDW RBC AUTO: 42.4 FL (ref 37–54)
EOSINOPHIL # BLD AUTO: 0.1 10*3/MM3 (ref 0–0.4)
EOSINOPHIL NFR BLD AUTO: 1.4 % (ref 0.3–6.2)
ERYTHROCYTE [DISTWIDTH] IN BLOOD BY AUTOMATED COUNT: 13.7 % (ref 12.3–15.4)
GFR SERPL CREATININE-BSD FRML MDRD: 73 ML/MIN/1.73
GLUCOSE BLD-MCNC: 94 MG/DL (ref 65–99)
HCT VFR BLD AUTO: 34.7 % (ref 34–46.6)
HGB BLD-MCNC: 12 G/DL (ref 12–15.9)
LYMPHOCYTES # BLD AUTO: 2.3 10*3/MM3 (ref 0.7–3.1)
LYMPHOCYTES NFR BLD AUTO: 59.4 % (ref 19.6–45.3)
MCH RBC QN AUTO: 30.6 PG (ref 26.6–33)
MCHC RBC AUTO-ENTMCNC: 34.6 G/DL (ref 31.5–35.7)
MCV RBC AUTO: 88.4 FL (ref 79–97)
MONOCYTES # BLD AUTO: 0.3 10*3/MM3 (ref 0.1–0.9)
MONOCYTES NFR BLD AUTO: 8.5 % (ref 5–12)
NEUTROPHILS # BLD AUTO: 1.1 10*3/MM3 (ref 1.7–7)
NEUTROPHILS NFR BLD AUTO: 29.6 % (ref 42.7–76)
NRBC BLD AUTO-RTO: 0.1 /100 WBC (ref 0–0.2)
PLATELET # BLD AUTO: 201 10*3/MM3 (ref 140–450)
PMV BLD AUTO: 8.7 FL (ref 6–12)
POTASSIUM BLD-SCNC: 3.7 MMOL/L (ref 3.5–5.2)
RBC # BLD AUTO: 3.92 10*6/MM3 (ref 3.77–5.28)
SODIUM BLD-SCNC: 137 MMOL/L (ref 136–145)
WBC NRBC COR # BLD: 3.8 10*3/MM3 (ref 3.4–10.8)

## 2020-02-17 PROCEDURE — 99232 SBSQ HOSP IP/OBS MODERATE 35: CPT | Performed by: NURSE PRACTITIONER

## 2020-02-17 PROCEDURE — 63710000001 ONDANSETRON PER 8 MG: Performed by: NURSE PRACTITIONER

## 2020-02-17 PROCEDURE — 99232 SBSQ HOSP IP/OBS MODERATE 35: CPT | Performed by: INTERNAL MEDICINE

## 2020-02-17 PROCEDURE — 25010000002 ONDANSETRON PER 1 MG: Performed by: NURSE PRACTITIONER

## 2020-02-17 PROCEDURE — 80048 BASIC METABOLIC PNL TOTAL CA: CPT | Performed by: NURSE PRACTITIONER

## 2020-02-17 PROCEDURE — 85025 COMPLETE CBC W/AUTO DIFF WBC: CPT | Performed by: NURSE PRACTITIONER

## 2020-02-17 PROCEDURE — 25010000003 LEVETIRACETAM IN NACL 0.54% 1500 MG/100ML SOLUTION: Performed by: NURSE PRACTITIONER

## 2020-02-17 PROCEDURE — 99232 SBSQ HOSP IP/OBS MODERATE 35: CPT | Performed by: PHYSICIAN ASSISTANT

## 2020-02-17 PROCEDURE — 25010000002 LORAZEPAM PER 2 MG: Performed by: NURSE PRACTITIONER

## 2020-02-17 PROCEDURE — 74220 X-RAY XM ESOPHAGUS 1CNTRST: CPT

## 2020-02-17 RX ADMIN — TOPIRAMATE 25 MG: 25 TABLET, FILM COATED ORAL at 21:16

## 2020-02-17 RX ADMIN — LEVETIRACETAM 1500 MG: 15 INJECTION INTRAVENOUS at 21:17

## 2020-02-17 RX ADMIN — ONDANSETRON 4 MG: 2 INJECTION INTRAMUSCULAR; INTRAVENOUS at 10:03

## 2020-02-17 RX ADMIN — LAMOTRIGINE 200 MG: 100 TABLET ORAL at 21:16

## 2020-02-17 RX ADMIN — MODAFINIL 200 MG: 100 TABLET ORAL at 09:56

## 2020-02-17 RX ADMIN — LEVETIRACETAM 1500 MG: 15 INJECTION INTRAVENOUS at 09:56

## 2020-02-17 RX ADMIN — BUSPIRONE HYDROCHLORIDE 10 MG: 10 TABLET ORAL at 09:57

## 2020-02-17 RX ADMIN — AMOXICILLIN 1000 MG: 250 CAPSULE ORAL at 21:16

## 2020-02-17 RX ADMIN — BARIUM SULFATE 183 ML: 960 POWDER, FOR SUSPENSION ORAL at 17:20

## 2020-02-17 RX ADMIN — ONDANSETRON HYDROCHLORIDE 4 MG: 4 TABLET, FILM COATED ORAL at 21:20

## 2020-02-17 RX ADMIN — BUTALBITAL, ACETAMINOPHEN AND CAFFEINE 2 TABLET: 50; 325; 40 TABLET ORAL at 10:03

## 2020-02-17 RX ADMIN — GABAPENTIN 800 MG: 400 CAPSULE ORAL at 05:07

## 2020-02-17 RX ADMIN — BUSPIRONE HYDROCHLORIDE 10 MG: 10 TABLET ORAL at 17:44

## 2020-02-17 RX ADMIN — BUSPIRONE HYDROCHLORIDE 10 MG: 10 TABLET ORAL at 21:16

## 2020-02-17 RX ADMIN — Medication 10 ML: at 21:20

## 2020-02-17 RX ADMIN — TOPIRAMATE 25 MG: 25 TABLET, FILM COATED ORAL at 09:57

## 2020-02-17 RX ADMIN — BUTALBITAL, ACETAMINOPHEN AND CAFFEINE 2 TABLET: 50; 325; 40 TABLET ORAL at 21:20

## 2020-02-17 RX ADMIN — PHENOBARBITAL 97.2 MG: 32.4 TABLET ORAL at 22:00

## 2020-02-17 RX ADMIN — PANTOPRAZOLE SODIUM 40 MG: 40 TABLET, DELAYED RELEASE ORAL at 09:57

## 2020-02-17 RX ADMIN — LEVOFLOXACIN 500 MG: 250 TABLET, FILM COATED ORAL at 09:57

## 2020-02-17 RX ADMIN — ALPRAZOLAM 1 MG: 1 TABLET ORAL at 19:58

## 2020-02-17 RX ADMIN — LORAZEPAM 2 MG: 2 INJECTION INTRAMUSCULAR; INTRAVENOUS at 04:32

## 2020-02-17 RX ADMIN — BARIUM SULFATE 135 ML: 980 POWDER, FOR SUSPENSION ORAL at 17:20

## 2020-02-17 RX ADMIN — GABAPENTIN 800 MG: 400 CAPSULE ORAL at 17:43

## 2020-02-17 RX ADMIN — PANTOPRAZOLE SODIUM 40 MG: 40 TABLET, DELAYED RELEASE ORAL at 17:44

## 2020-02-17 RX ADMIN — QUETIAPINE FUMARATE 200 MG: 100 TABLET ORAL at 21:16

## 2020-02-17 RX ADMIN — AMOXICILLIN 1000 MG: 250 CAPSULE ORAL at 09:57

## 2020-02-17 RX ADMIN — GABAPENTIN 800 MG: 400 CAPSULE ORAL at 21:16

## 2020-02-17 RX ADMIN — Medication 10 ML: at 09:56

## 2020-02-18 ENCOUNTER — APPOINTMENT (OUTPATIENT)
Dept: GENERAL RADIOLOGY | Facility: HOSPITAL | Age: 50
End: 2020-02-18

## 2020-02-18 LAB
ANION GAP SERPL CALCULATED.3IONS-SCNC: 12 MMOL/L (ref 5–15)
BASOPHILS # BLD AUTO: 0 10*3/MM3 (ref 0–0.2)
BASOPHILS NFR BLD AUTO: 0.5 % (ref 0–1.5)
BUN BLD-MCNC: 7 MG/DL (ref 6–20)
BUN/CREAT SERPL: 7.3 (ref 7–25)
CALCIUM SPEC-SCNC: 9 MG/DL (ref 8.6–10.5)
CHLORIDE SERPL-SCNC: 105 MMOL/L (ref 98–107)
CO2 SERPL-SCNC: 25 MMOL/L (ref 22–29)
CREAT BLD-MCNC: 0.96 MG/DL (ref 0.57–1)
DEPRECATED RDW RBC AUTO: 44.6 FL (ref 37–54)
EOSINOPHIL # BLD AUTO: 0.1 10*3/MM3 (ref 0–0.4)
EOSINOPHIL NFR BLD AUTO: 1.8 % (ref 0.3–6.2)
ERYTHROCYTE [DISTWIDTH] IN BLOOD BY AUTOMATED COUNT: 13.9 % (ref 12.3–15.4)
GFR SERPL CREATININE-BSD FRML MDRD: 61 ML/MIN/1.73
GLUCOSE BLD-MCNC: 118 MG/DL (ref 65–99)
HCT VFR BLD AUTO: 35.1 % (ref 34–46.6)
HGB BLD-MCNC: 11.8 G/DL (ref 12–15.9)
LEVETIRACETAM SERPL-MCNC: 29.4 UG/ML (ref 10–40)
LYMPHOCYTES # BLD AUTO: 2.2 10*3/MM3 (ref 0.7–3.1)
LYMPHOCYTES NFR BLD AUTO: 58.1 % (ref 19.6–45.3)
MCH RBC QN AUTO: 30.5 PG (ref 26.6–33)
MCHC RBC AUTO-ENTMCNC: 33.5 G/DL (ref 31.5–35.7)
MCV RBC AUTO: 91.2 FL (ref 79–97)
MONOCYTES # BLD AUTO: 0.2 10*3/MM3 (ref 0.1–0.9)
MONOCYTES NFR BLD AUTO: 6.1 % (ref 5–12)
NEUTROPHILS # BLD AUTO: 1.3 10*3/MM3 (ref 1.7–7)
NEUTROPHILS NFR BLD AUTO: 33.5 % (ref 42.7–76)
NRBC BLD AUTO-RTO: 0.3 /100 WBC (ref 0–0.2)
PLAT MORPH BLD: NORMAL
PLATELET # BLD AUTO: 238 10*3/MM3 (ref 140–450)
PMV BLD AUTO: 9 FL (ref 6–12)
POTASSIUM BLD-SCNC: 3.5 MMOL/L (ref 3.5–5.2)
RBC # BLD AUTO: 3.85 10*6/MM3 (ref 3.77–5.28)
RBC MORPH BLD: NORMAL
SODIUM BLD-SCNC: 142 MMOL/L (ref 136–145)
WBC MORPH BLD: NORMAL
WBC NRBC COR # BLD: 3.8 10*3/MM3 (ref 3.4–10.8)

## 2020-02-18 PROCEDURE — 99232 SBSQ HOSP IP/OBS MODERATE 35: CPT | Performed by: NURSE PRACTITIONER

## 2020-02-18 PROCEDURE — 99232 SBSQ HOSP IP/OBS MODERATE 35: CPT | Performed by: INTERNAL MEDICINE

## 2020-02-18 PROCEDURE — 25010000002 ONDANSETRON PER 1 MG: Performed by: NURSE PRACTITIONER

## 2020-02-18 PROCEDURE — 25010000003 LEVETIRACETAM IN NACL 0.54% 1500 MG/100ML SOLUTION: Performed by: NURSE PRACTITIONER

## 2020-02-18 PROCEDURE — 85025 COMPLETE CBC W/AUTO DIFF WBC: CPT | Performed by: NURSE PRACTITIONER

## 2020-02-18 PROCEDURE — 85007 BL SMEAR W/DIFF WBC COUNT: CPT | Performed by: NURSE PRACTITIONER

## 2020-02-18 PROCEDURE — 99231 SBSQ HOSP IP/OBS SF/LOW 25: CPT | Performed by: PSYCHIATRY & NEUROLOGY

## 2020-02-18 PROCEDURE — 80048 BASIC METABOLIC PNL TOTAL CA: CPT | Performed by: NURSE PRACTITIONER

## 2020-02-18 PROCEDURE — 74018 RADEX ABDOMEN 1 VIEW: CPT

## 2020-02-18 RX ORDER — DIAZEPAM 10 MG/1
TABLET ORAL
Qty: 30 TABLET | Refills: 0 | OUTPATIENT
Start: 2020-02-18

## 2020-02-18 RX ORDER — ERYTHROMYCIN ETHYLSUCCINATE 200 MG/5ML
200 SUSPENSION ORAL
Status: DISCONTINUED | OUTPATIENT
Start: 2020-02-18 | End: 2020-02-24 | Stop reason: HOSPADM

## 2020-02-18 RX ADMIN — AMOXICILLIN 1000 MG: 250 CAPSULE ORAL at 09:59

## 2020-02-18 RX ADMIN — ALPRAZOLAM 1 MG: 1 TABLET ORAL at 23:30

## 2020-02-18 RX ADMIN — LAMOTRIGINE 200 MG: 100 TABLET ORAL at 22:11

## 2020-02-18 RX ADMIN — LEVOFLOXACIN 500 MG: 250 TABLET, FILM COATED ORAL at 10:00

## 2020-02-18 RX ADMIN — GABAPENTIN 800 MG: 400 CAPSULE ORAL at 16:57

## 2020-02-18 RX ADMIN — ONDANSETRON 4 MG: 2 INJECTION INTRAMUSCULAR; INTRAVENOUS at 09:53

## 2020-02-18 RX ADMIN — QUETIAPINE FUMARATE 200 MG: 100 TABLET ORAL at 22:11

## 2020-02-18 RX ADMIN — Medication 10 ML: at 22:12

## 2020-02-18 RX ADMIN — ERYTHROMYCIN ETHYLSUCCINATE 200 MG: 200 GRANULE, FOR SUSPENSION ORAL at 23:28

## 2020-02-18 RX ADMIN — AMOXICILLIN 1000 MG: 250 CAPSULE ORAL at 22:11

## 2020-02-18 RX ADMIN — GABAPENTIN 800 MG: 400 CAPSULE ORAL at 05:18

## 2020-02-18 RX ADMIN — SCOPALAMINE 1 PATCH: 1 PATCH, EXTENDED RELEASE TRANSDERMAL at 16:58

## 2020-02-18 RX ADMIN — BUTALBITAL, ACETAMINOPHEN AND CAFFEINE 2 TABLET: 50; 325; 40 TABLET ORAL at 23:30

## 2020-02-18 RX ADMIN — ERYTHROMYCIN ETHYLSUCCINATE 200 MG: 200 GRANULE, FOR SUSPENSION ORAL at 17:01

## 2020-02-18 RX ADMIN — TOPIRAMATE 25 MG: 25 TABLET, FILM COATED ORAL at 10:01

## 2020-02-18 RX ADMIN — BUSPIRONE HYDROCHLORIDE 10 MG: 10 TABLET ORAL at 10:00

## 2020-02-18 RX ADMIN — GABAPENTIN 800 MG: 400 CAPSULE ORAL at 22:12

## 2020-02-18 RX ADMIN — BUTALBITAL, ACETAMINOPHEN AND CAFFEINE 2 TABLET: 50; 325; 40 TABLET ORAL at 04:28

## 2020-02-18 RX ADMIN — PANTOPRAZOLE SODIUM 40 MG: 40 TABLET, DELAYED RELEASE ORAL at 16:57

## 2020-02-18 RX ADMIN — LEVETIRACETAM 1500 MG: 15 INJECTION INTRAVENOUS at 22:10

## 2020-02-18 RX ADMIN — BUSPIRONE HYDROCHLORIDE 10 MG: 10 TABLET ORAL at 22:11

## 2020-02-18 RX ADMIN — MODAFINIL 200 MG: 100 TABLET ORAL at 09:59

## 2020-02-18 RX ADMIN — ALPRAZOLAM 1 MG: 1 TABLET ORAL at 04:28

## 2020-02-18 RX ADMIN — ONDANSETRON 4 MG: 2 INJECTION INTRAMUSCULAR; INTRAVENOUS at 04:28

## 2020-02-18 RX ADMIN — TOPIRAMATE 25 MG: 25 TABLET, FILM COATED ORAL at 22:12

## 2020-02-18 RX ADMIN — PHENOBARBITAL 97.2 MG: 32.4 TABLET ORAL at 23:57

## 2020-02-18 RX ADMIN — LEVETIRACETAM 1500 MG: 15 INJECTION INTRAVENOUS at 09:59

## 2020-02-18 RX ADMIN — BUSPIRONE HYDROCHLORIDE 10 MG: 10 TABLET ORAL at 16:57

## 2020-02-18 RX ADMIN — PANTOPRAZOLE SODIUM 40 MG: 40 TABLET, DELAYED RELEASE ORAL at 10:01

## 2020-02-18 RX ADMIN — Medication 10 ML: at 10:01

## 2020-02-19 ENCOUNTER — APPOINTMENT (OUTPATIENT)
Dept: GENERAL RADIOLOGY | Facility: HOSPITAL | Age: 50
End: 2020-02-19

## 2020-02-19 LAB
GLUCOSE BLDC GLUCOMTR-MCNC: 107 MG/DL (ref 70–105)
GLUCOSE BLDC GLUCOMTR-MCNC: 131 MG/DL (ref 70–105)

## 2020-02-19 PROCEDURE — 82962 GLUCOSE BLOOD TEST: CPT

## 2020-02-19 PROCEDURE — 99232 SBSQ HOSP IP/OBS MODERATE 35: CPT | Performed by: PSYCHIATRY & NEUROLOGY

## 2020-02-19 PROCEDURE — 74018 RADEX ABDOMEN 1 VIEW: CPT

## 2020-02-19 PROCEDURE — 99232 SBSQ HOSP IP/OBS MODERATE 35: CPT | Performed by: INTERNAL MEDICINE

## 2020-02-19 PROCEDURE — 25010000003 LEVETIRACETAM IN NACL 0.54% 1500 MG/100ML SOLUTION: Performed by: NURSE PRACTITIONER

## 2020-02-19 PROCEDURE — 25010000002 ONDANSETRON PER 1 MG: Performed by: NURSE PRACTITIONER

## 2020-02-19 PROCEDURE — 99232 SBSQ HOSP IP/OBS MODERATE 35: CPT | Performed by: NURSE PRACTITIONER

## 2020-02-19 RX ORDER — LIDOCAINE HYDROCHLORIDE 40 MG/ML
SOLUTION TOPICAL EVERY 4 HOURS PRN
Status: DISCONTINUED | OUTPATIENT
Start: 2020-02-19 | End: 2020-02-19

## 2020-02-19 RX ADMIN — AMOXICILLIN 1000 MG: 250 CAPSULE ORAL at 09:06

## 2020-02-19 RX ADMIN — Medication 10 ML: at 09:07

## 2020-02-19 RX ADMIN — PHENOBARBITAL 97.2 MG: 32.4 TABLET ORAL at 22:30

## 2020-02-19 RX ADMIN — LAMOTRIGINE 200 MG: 100 TABLET ORAL at 21:37

## 2020-02-19 RX ADMIN — BUSPIRONE HYDROCHLORIDE 10 MG: 10 TABLET ORAL at 09:06

## 2020-02-19 RX ADMIN — GABAPENTIN 800 MG: 400 CAPSULE ORAL at 21:37

## 2020-02-19 RX ADMIN — ERYTHROMYCIN ETHYLSUCCINATE 200 MG: 200 GRANULE, FOR SUSPENSION ORAL at 09:05

## 2020-02-19 RX ADMIN — BENZOCAINE, BUTAMBEN, AND TETRACAINE HYDROCHLORIDE 1 SPRAY: .028; .004; .004 AEROSOL, SPRAY TOPICAL at 21:59

## 2020-02-19 RX ADMIN — GABAPENTIN 800 MG: 400 CAPSULE ORAL at 13:27

## 2020-02-19 RX ADMIN — LEVETIRACETAM 1500 MG: 15 INJECTION INTRAVENOUS at 09:07

## 2020-02-19 RX ADMIN — MODAFINIL 200 MG: 100 TABLET ORAL at 09:05

## 2020-02-19 RX ADMIN — QUETIAPINE FUMARATE 200 MG: 100 TABLET ORAL at 21:37

## 2020-02-19 RX ADMIN — Medication 10 ML: at 21:37

## 2020-02-19 RX ADMIN — BUTALBITAL, ACETAMINOPHEN AND CAFFEINE 2 TABLET: 50; 325; 40 TABLET ORAL at 17:56

## 2020-02-19 RX ADMIN — BUSPIRONE HYDROCHLORIDE 10 MG: 10 TABLET ORAL at 16:35

## 2020-02-19 RX ADMIN — ONDANSETRON 4 MG: 2 INJECTION INTRAMUSCULAR; INTRAVENOUS at 13:00

## 2020-02-19 RX ADMIN — TOPIRAMATE 25 MG: 25 TABLET, FILM COATED ORAL at 09:05

## 2020-02-19 RX ADMIN — ERYTHROMYCIN ETHYLSUCCINATE 200 MG: 200 GRANULE, FOR SUSPENSION ORAL at 13:27

## 2020-02-19 RX ADMIN — LEVETIRACETAM 1500 MG: 15 INJECTION INTRAVENOUS at 21:51

## 2020-02-19 RX ADMIN — PANTOPRAZOLE SODIUM 40 MG: 40 TABLET, DELAYED RELEASE ORAL at 09:06

## 2020-02-19 RX ADMIN — TOPIRAMATE 25 MG: 25 TABLET, FILM COATED ORAL at 21:37

## 2020-02-19 RX ADMIN — BUSPIRONE HYDROCHLORIDE 10 MG: 10 TABLET ORAL at 21:37

## 2020-02-19 RX ADMIN — SODIUM CHLORIDE 500 ML: 0.9 INJECTION, SOLUTION INTRAVENOUS at 04:00

## 2020-02-19 RX ADMIN — ALPRAZOLAM 1 MG: 1 TABLET ORAL at 11:11

## 2020-02-19 RX ADMIN — ERYTHROMYCIN ETHYLSUCCINATE 200 MG: 200 GRANULE, FOR SUSPENSION ORAL at 21:50

## 2020-02-19 RX ADMIN — LEVOFLOXACIN 500 MG: 250 TABLET, FILM COATED ORAL at 09:05

## 2020-02-19 RX ADMIN — PANTOPRAZOLE SODIUM 40 MG: 40 TABLET, DELAYED RELEASE ORAL at 16:35

## 2020-02-19 RX ADMIN — ERYTHROMYCIN ETHYLSUCCINATE 200 MG: 200 GRANULE, FOR SUSPENSION ORAL at 17:57

## 2020-02-20 LAB
ALBUMIN SERPL-MCNC: 4 G/DL (ref 3.5–5.2)
ALBUMIN/GLOB SERPL: 1.5 G/DL
ALP SERPL-CCNC: 95 U/L (ref 39–117)
ALT SERPL W P-5'-P-CCNC: 10 U/L (ref 1–33)
ANION GAP SERPL CALCULATED.3IONS-SCNC: 8 MMOL/L (ref 5–15)
AST SERPL-CCNC: 11 U/L (ref 1–32)
BASOPHILS # BLD AUTO: 0 10*3/MM3 (ref 0–0.2)
BASOPHILS NFR BLD AUTO: 0.5 % (ref 0–1.5)
BILIRUB SERPL-MCNC: 0.2 MG/DL (ref 0.2–1.2)
BUN BLD-MCNC: 9 MG/DL (ref 6–20)
BUN/CREAT SERPL: 9.7 (ref 7–25)
CALCIUM SPEC-SCNC: 9.1 MG/DL (ref 8.6–10.5)
CHLORIDE SERPL-SCNC: 108 MMOL/L (ref 98–107)
CO2 SERPL-SCNC: 27 MMOL/L (ref 22–29)
CREAT BLD-MCNC: 0.93 MG/DL (ref 0.57–1)
DEPRECATED RDW RBC AUTO: 44.2 FL (ref 37–54)
EOSINOPHIL # BLD AUTO: 0 10*3/MM3 (ref 0–0.4)
EOSINOPHIL NFR BLD AUTO: 1.3 % (ref 0.3–6.2)
ERYTHROCYTE [DISTWIDTH] IN BLOOD BY AUTOMATED COUNT: 14.3 % (ref 12.3–15.4)
GFR SERPL CREATININE-BSD FRML MDRD: 64 ML/MIN/1.73
GLOBULIN UR ELPH-MCNC: 2.7 GM/DL
GLUCOSE BLD-MCNC: 88 MG/DL (ref 65–99)
GLUCOSE BLDC GLUCOMTR-MCNC: 89 MG/DL (ref 70–105)
GLUCOSE BLDC GLUCOMTR-MCNC: 97 MG/DL (ref 70–105)
HCT VFR BLD AUTO: 34.5 % (ref 34–46.6)
HGB BLD-MCNC: 11.3 G/DL (ref 12–15.9)
LYMPHOCYTES # BLD AUTO: 1.9 10*3/MM3 (ref 0.7–3.1)
LYMPHOCYTES NFR BLD AUTO: 57.7 % (ref 19.6–45.3)
MAGNESIUM SERPL-MCNC: 2.1 MG/DL (ref 1.6–2.6)
MCH RBC QN AUTO: 29.3 PG (ref 26.6–33)
MCHC RBC AUTO-ENTMCNC: 32.7 G/DL (ref 31.5–35.7)
MCV RBC AUTO: 89.5 FL (ref 79–97)
MONOCYTES # BLD AUTO: 0.3 10*3/MM3 (ref 0.1–0.9)
MONOCYTES NFR BLD AUTO: 7.9 % (ref 5–12)
NEUTROPHILS # BLD AUTO: 1.1 10*3/MM3 (ref 1.7–7)
NEUTROPHILS NFR BLD AUTO: 32.6 % (ref 42.7–76)
NRBC BLD AUTO-RTO: 0.1 /100 WBC (ref 0–0.2)
PHOSPHATE SERPL-MCNC: 4.8 MG/DL (ref 2.5–4.5)
PLATELET # BLD AUTO: 240 10*3/MM3 (ref 140–450)
PMV BLD AUTO: 8.3 FL (ref 6–12)
POTASSIUM BLD-SCNC: 4.2 MMOL/L (ref 3.5–5.2)
PROT SERPL-MCNC: 6.7 G/DL (ref 6–8.5)
RBC # BLD AUTO: 3.86 10*6/MM3 (ref 3.77–5.28)
SODIUM BLD-SCNC: 143 MMOL/L (ref 136–145)
WBC NRBC COR # BLD: 3.3 10*3/MM3 (ref 3.4–10.8)

## 2020-02-20 PROCEDURE — 83735 ASSAY OF MAGNESIUM: CPT

## 2020-02-20 PROCEDURE — 99232 SBSQ HOSP IP/OBS MODERATE 35: CPT | Performed by: NURSE PRACTITIONER

## 2020-02-20 PROCEDURE — 80053 COMPREHEN METABOLIC PANEL: CPT | Performed by: NURSE PRACTITIONER

## 2020-02-20 PROCEDURE — 99232 SBSQ HOSP IP/OBS MODERATE 35: CPT | Performed by: INTERNAL MEDICINE

## 2020-02-20 PROCEDURE — 25010000003 LEVETIRACETAM IN NACL 0.54% 1500 MG/100ML SOLUTION: Performed by: NURSE PRACTITIONER

## 2020-02-20 PROCEDURE — 84100 ASSAY OF PHOSPHORUS: CPT

## 2020-02-20 PROCEDURE — 99231 SBSQ HOSP IP/OBS SF/LOW 25: CPT | Performed by: PSYCHIATRY & NEUROLOGY

## 2020-02-20 PROCEDURE — 85025 COMPLETE CBC W/AUTO DIFF WBC: CPT | Performed by: NURSE PRACTITIONER

## 2020-02-20 PROCEDURE — 82962 GLUCOSE BLOOD TEST: CPT

## 2020-02-20 RX ADMIN — TOPIRAMATE 25 MG: 25 TABLET, FILM COATED ORAL at 21:55

## 2020-02-20 RX ADMIN — BUSPIRONE HYDROCHLORIDE 10 MG: 10 TABLET ORAL at 21:55

## 2020-02-20 RX ADMIN — DEXTROSE AND SODIUM CHLORIDE 75 ML/HR: 5; 450 INJECTION, SOLUTION INTRAVENOUS at 05:04

## 2020-02-20 RX ADMIN — SODIUM CHLORIDE 1000 ML: 900 INJECTION, SOLUTION INTRAVENOUS at 04:59

## 2020-02-20 RX ADMIN — ERYTHROMYCIN ETHYLSUCCINATE 200 MG: 200 GRANULE, FOR SUSPENSION ORAL at 13:42

## 2020-02-20 RX ADMIN — PANTOPRAZOLE SODIUM 40 MG: 40 TABLET, DELAYED RELEASE ORAL at 09:12

## 2020-02-20 RX ADMIN — LAMOTRIGINE 200 MG: 100 TABLET ORAL at 21:54

## 2020-02-20 RX ADMIN — ERYTHROMYCIN ETHYLSUCCINATE 200 MG: 200 GRANULE, FOR SUSPENSION ORAL at 21:54

## 2020-02-20 RX ADMIN — PHENOBARBITAL 97.2 MG: 32.4 TABLET ORAL at 21:56

## 2020-02-20 RX ADMIN — ALPRAZOLAM 1 MG: 1 TABLET ORAL at 17:11

## 2020-02-20 RX ADMIN — BUSPIRONE HYDROCHLORIDE 10 MG: 10 TABLET ORAL at 17:07

## 2020-02-20 RX ADMIN — BENZOCAINE, BUTAMBEN, AND TETRACAINE HYDROCHLORIDE 1 SPRAY: .028; .004; .004 AEROSOL, SPRAY TOPICAL at 05:20

## 2020-02-20 RX ADMIN — ALPRAZOLAM 1 MG: 1 TABLET ORAL at 09:40

## 2020-02-20 RX ADMIN — LEVETIRACETAM 1500 MG: 15 INJECTION INTRAVENOUS at 09:15

## 2020-02-20 RX ADMIN — BUSPIRONE HYDROCHLORIDE 10 MG: 10 TABLET ORAL at 09:12

## 2020-02-20 RX ADMIN — BENZOCAINE, BUTAMBEN, AND TETRACAINE HYDROCHLORIDE: .028; .004; .004 AEROSOL, SPRAY TOPICAL at 13:42

## 2020-02-20 RX ADMIN — QUETIAPINE FUMARATE 200 MG: 100 TABLET ORAL at 21:55

## 2020-02-20 RX ADMIN — ERYTHROMYCIN ETHYLSUCCINATE 200 MG: 200 GRANULE, FOR SUSPENSION ORAL at 09:12

## 2020-02-20 RX ADMIN — TOPIRAMATE 25 MG: 25 TABLET, FILM COATED ORAL at 09:13

## 2020-02-20 RX ADMIN — LEVETIRACETAM 1500 MG: 15 INJECTION INTRAVENOUS at 21:54

## 2020-02-20 RX ADMIN — Medication 10 ML: at 09:17

## 2020-02-20 RX ADMIN — PANTOPRAZOLE SODIUM 40 MG: 40 TABLET, DELAYED RELEASE ORAL at 17:08

## 2020-02-20 RX ADMIN — GABAPENTIN 800 MG: 400 CAPSULE ORAL at 21:55

## 2020-02-20 RX ADMIN — GABAPENTIN 800 MG: 400 CAPSULE ORAL at 13:42

## 2020-02-20 RX ADMIN — MODAFINIL 200 MG: 100 TABLET ORAL at 09:17

## 2020-02-21 ENCOUNTER — INPATIENT HOSPITAL (AMBULATORY)
Dept: URBAN - METROPOLITAN AREA HOSPITAL 84 | Facility: HOSPITAL | Age: 50
End: 2020-02-21

## 2020-02-21 DIAGNOSIS — R56.9 UNSPECIFIED CONVULSIONS: ICD-10-CM

## 2020-02-21 DIAGNOSIS — F41.9 ANXIETY DISORDER, UNSPECIFIED: ICD-10-CM

## 2020-02-21 DIAGNOSIS — K21.0 GASTRO-ESOPHAGEAL REFLUX DISEASE WITH ESOPHAGITIS: ICD-10-CM

## 2020-02-21 DIAGNOSIS — B96.81 HELICOBACTER PYLORI [H. PYLORI] AS THE CAUSE OF DISEASES CLA: ICD-10-CM

## 2020-02-21 DIAGNOSIS — R11.2 NAUSEA WITH VOMITING, UNSPECIFIED: ICD-10-CM

## 2020-02-21 LAB
ALBUMIN SERPL-MCNC: 3.7 G/DL (ref 3.5–5.2)
ALBUMIN/GLOB SERPL: 1.6 G/DL
ALP SERPL-CCNC: 86 U/L (ref 39–117)
ALT SERPL W P-5'-P-CCNC: 11 U/L (ref 1–33)
ANION GAP SERPL CALCULATED.3IONS-SCNC: 8 MMOL/L (ref 5–15)
AST SERPL-CCNC: 13 U/L (ref 1–32)
BASOPHILS # BLD AUTO: 0 10*3/MM3 (ref 0–0.2)
BASOPHILS NFR BLD AUTO: 0.5 % (ref 0–1.5)
BILIRUB SERPL-MCNC: <0.2 MG/DL (ref 0.2–1.2)
BUN BLD-MCNC: 11 MG/DL (ref 6–20)
BUN/CREAT SERPL: 15.7 (ref 7–25)
CALCIUM SPEC-SCNC: 8.5 MG/DL (ref 8.6–10.5)
CHLORIDE SERPL-SCNC: 107 MMOL/L (ref 98–107)
CO2 SERPL-SCNC: 23 MMOL/L (ref 22–29)
CREAT BLD-MCNC: 0.7 MG/DL (ref 0.57–1)
DEPRECATED RDW RBC AUTO: 44.2 FL (ref 37–54)
EOSINOPHIL # BLD AUTO: 0.1 10*3/MM3 (ref 0–0.4)
EOSINOPHIL NFR BLD AUTO: 2.1 % (ref 0.3–6.2)
ERYTHROCYTE [DISTWIDTH] IN BLOOD BY AUTOMATED COUNT: 14 % (ref 12.3–15.4)
GFR SERPL CREATININE-BSD FRML MDRD: 88 ML/MIN/1.73
GLOBULIN UR ELPH-MCNC: 2.3 GM/DL
GLUCOSE BLD-MCNC: 105 MG/DL (ref 65–99)
GLUCOSE BLDC GLUCOMTR-MCNC: 120 MG/DL (ref 70–105)
GLUCOSE BLDC GLUCOMTR-MCNC: 92 MG/DL (ref 70–105)
GLUCOSE BLDC GLUCOMTR-MCNC: 96 MG/DL (ref 70–105)
HCT VFR BLD AUTO: 34.2 % (ref 34–46.6)
HGB BLD-MCNC: 11.5 G/DL (ref 12–15.9)
LYMPHOCYTES # BLD AUTO: 1.7 10*3/MM3 (ref 0.7–3.1)
LYMPHOCYTES NFR BLD AUTO: 42.7 % (ref 19.6–45.3)
MAGNESIUM SERPL-MCNC: 2.1 MG/DL (ref 1.6–2.6)
MCH RBC QN AUTO: 30.4 PG (ref 26.6–33)
MCHC RBC AUTO-ENTMCNC: 33.8 G/DL (ref 31.5–35.7)
MCV RBC AUTO: 90.1 FL (ref 79–97)
MONOCYTES # BLD AUTO: 0.3 10*3/MM3 (ref 0.1–0.9)
MONOCYTES NFR BLD AUTO: 8.5 % (ref 5–12)
NEUTROPHILS # BLD AUTO: 1.8 10*3/MM3 (ref 1.7–7)
NEUTROPHILS NFR BLD AUTO: 46.2 % (ref 42.7–76)
NRBC BLD AUTO-RTO: 0.1 /100 WBC (ref 0–0.2)
PHOSPHATE SERPL-MCNC: 4.2 MG/DL (ref 2.5–4.5)
PLATELET # BLD AUTO: 222 10*3/MM3 (ref 140–450)
PMV BLD AUTO: 8.6 FL (ref 6–12)
POTASSIUM BLD-SCNC: 3.8 MMOL/L (ref 3.5–5.2)
PROT SERPL-MCNC: 6 G/DL (ref 6–8.5)
RBC # BLD AUTO: 3.8 10*6/MM3 (ref 3.77–5.28)
SODIUM BLD-SCNC: 138 MMOL/L (ref 136–145)
WBC NRBC COR # BLD: 3.9 10*3/MM3 (ref 3.4–10.8)

## 2020-02-21 PROCEDURE — 80053 COMPREHEN METABOLIC PANEL: CPT | Performed by: NURSE PRACTITIONER

## 2020-02-21 PROCEDURE — 99232 SBSQ HOSP IP/OBS MODERATE 35: CPT | Performed by: NURSE PRACTITIONER

## 2020-02-21 PROCEDURE — 85025 COMPLETE CBC W/AUTO DIFF WBC: CPT | Performed by: NURSE PRACTITIONER

## 2020-02-21 PROCEDURE — 25010000003 LEVETIRACETAM IN NACL 0.54% 1500 MG/100ML SOLUTION: Performed by: NURSE PRACTITIONER

## 2020-02-21 PROCEDURE — 84100 ASSAY OF PHOSPHORUS: CPT

## 2020-02-21 PROCEDURE — 99232 SBSQ HOSP IP/OBS MODERATE 35: CPT | Performed by: INTERNAL MEDICINE

## 2020-02-21 PROCEDURE — 82962 GLUCOSE BLOOD TEST: CPT

## 2020-02-21 PROCEDURE — 83735 ASSAY OF MAGNESIUM: CPT

## 2020-02-21 RX ADMIN — LAMOTRIGINE 200 MG: 100 TABLET ORAL at 21:09

## 2020-02-21 RX ADMIN — TOPIRAMATE 50 MG: 25 TABLET, FILM COATED ORAL at 21:09

## 2020-02-21 RX ADMIN — TOPIRAMATE 25 MG: 25 TABLET, FILM COATED ORAL at 08:53

## 2020-02-21 RX ADMIN — LEVETIRACETAM 1500 MG: 15 INJECTION INTRAVENOUS at 08:52

## 2020-02-21 RX ADMIN — BUSPIRONE HYDROCHLORIDE 10 MG: 10 TABLET ORAL at 08:52

## 2020-02-21 RX ADMIN — GABAPENTIN 800 MG: 400 CAPSULE ORAL at 05:58

## 2020-02-21 RX ADMIN — PANTOPRAZOLE SODIUM 40 MG: 40 TABLET, DELAYED RELEASE ORAL at 16:59

## 2020-02-21 RX ADMIN — GABAPENTIN 800 MG: 400 CAPSULE ORAL at 15:00

## 2020-02-21 RX ADMIN — ERYTHROMYCIN ETHYLSUCCINATE 200 MG: 200 GRANULE, FOR SUSPENSION ORAL at 11:43

## 2020-02-21 RX ADMIN — ERYTHROMYCIN ETHYLSUCCINATE 200 MG: 200 GRANULE, FOR SUSPENSION ORAL at 00:00

## 2020-02-21 RX ADMIN — BENZOCAINE, BUTAMBEN, AND TETRACAINE HYDROCHLORIDE: .028; .004; .004 AEROSOL, SPRAY TOPICAL at 08:54

## 2020-02-21 RX ADMIN — Medication 10 ML: at 08:53

## 2020-02-21 RX ADMIN — BUTALBITAL, ACETAMINOPHEN AND CAFFEINE 2 TABLET: 50; 325; 40 TABLET ORAL at 11:43

## 2020-02-21 RX ADMIN — QUETIAPINE FUMARATE 200 MG: 100 TABLET ORAL at 21:09

## 2020-02-21 RX ADMIN — Medication 10 ML: at 21:10

## 2020-02-21 RX ADMIN — PANTOPRAZOLE SODIUM 40 MG: 40 TABLET, DELAYED RELEASE ORAL at 08:52

## 2020-02-21 RX ADMIN — PHENOBARBITAL 97.2 MG: 32.4 TABLET ORAL at 21:08

## 2020-02-21 RX ADMIN — DEXTROSE AND SODIUM CHLORIDE 75 ML/HR: 5; 450 INJECTION, SOLUTION INTRAVENOUS at 16:59

## 2020-02-21 RX ADMIN — GABAPENTIN 800 MG: 400 CAPSULE ORAL at 21:09

## 2020-02-21 RX ADMIN — LEVETIRACETAM 1500 MG: 15 INJECTION INTRAVENOUS at 21:09

## 2020-02-21 RX ADMIN — DEXTROSE AND SODIUM CHLORIDE 75 ML/HR: 5; 450 INJECTION, SOLUTION INTRAVENOUS at 00:32

## 2020-02-21 RX ADMIN — ALPRAZOLAM 1 MG: 1 TABLET ORAL at 21:08

## 2020-02-21 RX ADMIN — MODAFINIL 200 MG: 100 TABLET ORAL at 08:52

## 2020-02-21 RX ADMIN — ERYTHROMYCIN ETHYLSUCCINATE 200 MG: 200 GRANULE, FOR SUSPENSION ORAL at 21:09

## 2020-02-21 RX ADMIN — SCOPALAMINE 1 PATCH: 1 PATCH, EXTENDED RELEASE TRANSDERMAL at 17:01

## 2020-02-21 RX ADMIN — BUSPIRONE HYDROCHLORIDE 10 MG: 10 TABLET ORAL at 15:00

## 2020-02-21 RX ADMIN — ERYTHROMYCIN ETHYLSUCCINATE 200 MG: 200 GRANULE, FOR SUSPENSION ORAL at 17:07

## 2020-02-21 RX ADMIN — BUSPIRONE HYDROCHLORIDE 10 MG: 10 TABLET ORAL at 21:09

## 2020-02-22 ENCOUNTER — INPATIENT HOSPITAL (AMBULATORY)
Dept: URBAN - METROPOLITAN AREA HOSPITAL 84 | Facility: HOSPITAL | Age: 50
End: 2020-02-22

## 2020-02-22 DIAGNOSIS — K21.9 GASTRO-ESOPHAGEAL REFLUX DISEASE WITHOUT ESOPHAGITIS: ICD-10-CM

## 2020-02-22 DIAGNOSIS — R11.2 NAUSEA WITH VOMITING, UNSPECIFIED: ICD-10-CM

## 2020-02-22 DIAGNOSIS — R56.9 UNSPECIFIED CONVULSIONS: ICD-10-CM

## 2020-02-22 DIAGNOSIS — F41.9 ANXIETY DISORDER, UNSPECIFIED: ICD-10-CM

## 2020-02-22 LAB
ALBUMIN SERPL-MCNC: 3.9 G/DL (ref 3.5–5.2)
ALBUMIN/GLOB SERPL: 1.4 G/DL
ALP SERPL-CCNC: 93 U/L (ref 39–117)
ALT SERPL W P-5'-P-CCNC: 15 U/L (ref 1–33)
ANION GAP SERPL CALCULATED.3IONS-SCNC: 8 MMOL/L (ref 5–15)
AST SERPL-CCNC: 16 U/L (ref 1–32)
BASOPHILS # BLD AUTO: 0 10*3/MM3 (ref 0–0.2)
BASOPHILS NFR BLD AUTO: 0.5 % (ref 0–1.5)
BILIRUB SERPL-MCNC: <0.2 MG/DL (ref 0.2–1.2)
BUN BLD-MCNC: 10 MG/DL (ref 6–20)
BUN/CREAT SERPL: 12.8 (ref 7–25)
CALCIUM SPEC-SCNC: 8.7 MG/DL (ref 8.6–10.5)
CHLORIDE SERPL-SCNC: 106 MMOL/L (ref 98–107)
CO2 SERPL-SCNC: 24 MMOL/L (ref 22–29)
CREAT BLD-MCNC: 0.78 MG/DL (ref 0.57–1)
DEPRECATED RDW RBC AUTO: 43.8 FL (ref 37–54)
EOSINOPHIL # BLD AUTO: 0.1 10*3/MM3 (ref 0–0.4)
EOSINOPHIL NFR BLD AUTO: 2.3 % (ref 0.3–6.2)
ERYTHROCYTE [DISTWIDTH] IN BLOOD BY AUTOMATED COUNT: 14 % (ref 12.3–15.4)
GFR SERPL CREATININE-BSD FRML MDRD: 78 ML/MIN/1.73
GLOBULIN UR ELPH-MCNC: 2.7 GM/DL
GLUCOSE BLD-MCNC: 79 MG/DL (ref 65–99)
GLUCOSE BLDC GLUCOMTR-MCNC: 114 MG/DL (ref 70–105)
GLUCOSE BLDC GLUCOMTR-MCNC: 84 MG/DL (ref 70–105)
GLUCOSE BLDC GLUCOMTR-MCNC: 97 MG/DL (ref 70–105)
HCT VFR BLD AUTO: 32.8 % (ref 34–46.6)
HGB BLD-MCNC: 11.5 G/DL (ref 12–15.9)
LYMPHOCYTES # BLD AUTO: 1.8 10*3/MM3 (ref 0.7–3.1)
LYMPHOCYTES NFR BLD AUTO: 42.8 % (ref 19.6–45.3)
MAGNESIUM SERPL-MCNC: 2.2 MG/DL (ref 1.6–2.6)
MCH RBC QN AUTO: 31.1 PG (ref 26.6–33)
MCHC RBC AUTO-ENTMCNC: 35.2 G/DL (ref 31.5–35.7)
MCV RBC AUTO: 88.4 FL (ref 79–97)
MONOCYTES # BLD AUTO: 0.3 10*3/MM3 (ref 0.1–0.9)
MONOCYTES NFR BLD AUTO: 8.3 % (ref 5–12)
NEUTROPHILS # BLD AUTO: 1.9 10*3/MM3 (ref 1.7–7)
NEUTROPHILS NFR BLD AUTO: 46.1 % (ref 42.7–76)
NRBC BLD AUTO-RTO: 0.1 /100 WBC (ref 0–0.2)
PHOSPHATE SERPL-MCNC: 4.3 MG/DL (ref 2.5–4.5)
PLATELET # BLD AUTO: 234 10*3/MM3 (ref 140–450)
PMV BLD AUTO: 8.8 FL (ref 6–12)
POTASSIUM BLD-SCNC: 4.2 MMOL/L (ref 3.5–5.2)
PROT SERPL-MCNC: 6.6 G/DL (ref 6–8.5)
RBC # BLD AUTO: 3.71 10*6/MM3 (ref 3.77–5.28)
SODIUM BLD-SCNC: 138 MMOL/L (ref 136–145)
WBC NRBC COR # BLD: 4.2 10*3/MM3 (ref 3.4–10.8)

## 2020-02-22 PROCEDURE — 83735 ASSAY OF MAGNESIUM: CPT

## 2020-02-22 PROCEDURE — 82962 GLUCOSE BLOOD TEST: CPT

## 2020-02-22 PROCEDURE — 25010000003 LEVETIRACETAM IN NACL 0.54% 1500 MG/100ML SOLUTION: Performed by: NURSE PRACTITIONER

## 2020-02-22 PROCEDURE — 80053 COMPREHEN METABOLIC PANEL: CPT | Performed by: NURSE PRACTITIONER

## 2020-02-22 PROCEDURE — 85025 COMPLETE CBC W/AUTO DIFF WBC: CPT | Performed by: NURSE PRACTITIONER

## 2020-02-22 PROCEDURE — 99232 SBSQ HOSP IP/OBS MODERATE 35: CPT | Performed by: INTERNAL MEDICINE

## 2020-02-22 PROCEDURE — 84100 ASSAY OF PHOSPHORUS: CPT

## 2020-02-22 PROCEDURE — 25010000002 ONDANSETRON PER 1 MG: Performed by: NURSE PRACTITIONER

## 2020-02-22 RX ADMIN — ALPRAZOLAM 1 MG: 1 TABLET ORAL at 21:10

## 2020-02-22 RX ADMIN — SIMETHICONE CHEW TAB 80 MG 120 MG: 80 TABLET ORAL at 10:13

## 2020-02-22 RX ADMIN — GABAPENTIN 800 MG: 400 CAPSULE ORAL at 21:10

## 2020-02-22 RX ADMIN — DEXTROSE AND SODIUM CHLORIDE 75 ML/HR: 5; 450 INJECTION, SOLUTION INTRAVENOUS at 05:11

## 2020-02-22 RX ADMIN — BUSPIRONE HYDROCHLORIDE 10 MG: 10 TABLET ORAL at 21:11

## 2020-02-22 RX ADMIN — LEVETIRACETAM 1500 MG: 15 INJECTION INTRAVENOUS at 10:07

## 2020-02-22 RX ADMIN — QUETIAPINE FUMARATE 200 MG: 100 TABLET ORAL at 21:10

## 2020-02-22 RX ADMIN — PHENOBARBITAL 97.2 MG: 32.4 TABLET ORAL at 21:11

## 2020-02-22 RX ADMIN — MODAFINIL 200 MG: 100 TABLET ORAL at 05:11

## 2020-02-22 RX ADMIN — BUSPIRONE HYDROCHLORIDE 10 MG: 10 TABLET ORAL at 10:14

## 2020-02-22 RX ADMIN — GABAPENTIN 800 MG: 400 CAPSULE ORAL at 13:37

## 2020-02-22 RX ADMIN — ERYTHROMYCIN ETHYLSUCCINATE 200 MG: 200 GRANULE, FOR SUSPENSION ORAL at 10:31

## 2020-02-22 RX ADMIN — LAMOTRIGINE 200 MG: 100 TABLET ORAL at 21:10

## 2020-02-22 RX ADMIN — ALPRAZOLAM 1 MG: 1 TABLET ORAL at 13:43

## 2020-02-22 RX ADMIN — LEVETIRACETAM 1500 MG: 15 INJECTION INTRAVENOUS at 21:12

## 2020-02-22 RX ADMIN — ERYTHROMYCIN ETHYLSUCCINATE 200 MG: 200 GRANULE, FOR SUSPENSION ORAL at 13:36

## 2020-02-22 RX ADMIN — PANTOPRAZOLE SODIUM 40 MG: 40 TABLET, DELAYED RELEASE ORAL at 05:11

## 2020-02-22 RX ADMIN — TOPIRAMATE 25 MG: 25 TABLET, FILM COATED ORAL at 05:11

## 2020-02-22 RX ADMIN — GABAPENTIN 800 MG: 400 CAPSULE ORAL at 05:11

## 2020-02-22 RX ADMIN — ERYTHROMYCIN ETHYLSUCCINATE 200 MG: 200 GRANULE, FOR SUSPENSION ORAL at 21:11

## 2020-02-22 RX ADMIN — Medication 10 ML: at 10:14

## 2020-02-22 RX ADMIN — ONDANSETRON 4 MG: 2 INJECTION INTRAMUSCULAR; INTRAVENOUS at 10:13

## 2020-02-22 RX ADMIN — PANTOPRAZOLE SODIUM 40 MG: 40 TABLET, DELAYED RELEASE ORAL at 16:26

## 2020-02-22 RX ADMIN — Medication 10 ML: at 21:11

## 2020-02-22 RX ADMIN — BUSPIRONE HYDROCHLORIDE 10 MG: 10 TABLET ORAL at 16:25

## 2020-02-22 RX ADMIN — TOPIRAMATE 50 MG: 25 TABLET, FILM COATED ORAL at 21:11

## 2020-02-23 LAB
GLUCOSE BLDC GLUCOMTR-MCNC: 75 MG/DL (ref 70–105)
GLUCOSE BLDC GLUCOMTR-MCNC: 90 MG/DL (ref 70–105)
GLUCOSE BLDC GLUCOMTR-MCNC: 90 MG/DL (ref 70–105)
GLUCOSE BLDC GLUCOMTR-MCNC: 95 MG/DL (ref 70–105)
MAGNESIUM SERPL-MCNC: 2.2 MG/DL (ref 1.6–2.6)
PHOSPHATE SERPL-MCNC: 4.3 MG/DL (ref 2.5–4.5)

## 2020-02-23 PROCEDURE — 25010000003 LEVETIRACETAM IN NACL 0.54% 1500 MG/100ML SOLUTION: Performed by: NURSE PRACTITIONER

## 2020-02-23 PROCEDURE — 99232 SBSQ HOSP IP/OBS MODERATE 35: CPT | Performed by: INTERNAL MEDICINE

## 2020-02-23 PROCEDURE — 25010000002 ONDANSETRON PER 1 MG: Performed by: NURSE PRACTITIONER

## 2020-02-23 PROCEDURE — 82962 GLUCOSE BLOOD TEST: CPT

## 2020-02-23 PROCEDURE — 83735 ASSAY OF MAGNESIUM: CPT

## 2020-02-23 PROCEDURE — 84100 ASSAY OF PHOSPHORUS: CPT

## 2020-02-23 RX ORDER — BUTALBITAL, ACETAMINOPHEN AND CAFFEINE 50; 325; 40 MG/1; MG/1; MG/1
1 TABLET ORAL EVERY 6 HOURS PRN
Status: DISCONTINUED | OUTPATIENT
Start: 2020-02-23 | End: 2020-02-24 | Stop reason: HOSPADM

## 2020-02-23 RX ADMIN — TOPIRAMATE 50 MG: 25 TABLET, FILM COATED ORAL at 22:00

## 2020-02-23 RX ADMIN — GABAPENTIN 800 MG: 400 CAPSULE ORAL at 21:49

## 2020-02-23 RX ADMIN — ERYTHROMYCIN ETHYLSUCCINATE 200 MG: 200 GRANULE, FOR SUSPENSION ORAL at 18:20

## 2020-02-23 RX ADMIN — BUSPIRONE HYDROCHLORIDE 10 MG: 10 TABLET ORAL at 21:49

## 2020-02-23 RX ADMIN — LEVETIRACETAM 1500 MG: 15 INJECTION INTRAVENOUS at 22:46

## 2020-02-23 RX ADMIN — BUTALBITAL, ACETAMINOPHEN AND CAFFEINE 1 TABLET: 50; 325; 40 TABLET ORAL at 16:04

## 2020-02-23 RX ADMIN — BUSPIRONE HYDROCHLORIDE 10 MG: 10 TABLET ORAL at 07:52

## 2020-02-23 RX ADMIN — DEXTROSE AND SODIUM CHLORIDE 75 ML/HR: 5; 450 INJECTION, SOLUTION INTRAVENOUS at 10:30

## 2020-02-23 RX ADMIN — BUSPIRONE HYDROCHLORIDE 10 MG: 10 TABLET ORAL at 16:04

## 2020-02-23 RX ADMIN — LEVETIRACETAM 1500 MG: 15 INJECTION INTRAVENOUS at 07:52

## 2020-02-23 RX ADMIN — TOPIRAMATE 25 MG: 25 TABLET, FILM COATED ORAL at 06:29

## 2020-02-23 RX ADMIN — GABAPENTIN 800 MG: 400 CAPSULE ORAL at 06:28

## 2020-02-23 RX ADMIN — ALPRAZOLAM 1 MG: 1 TABLET ORAL at 21:48

## 2020-02-23 RX ADMIN — ACETAMINOPHEN 650 MG: 325 TABLET, FILM COATED ORAL at 06:29

## 2020-02-23 RX ADMIN — Medication 10 ML: at 07:55

## 2020-02-23 RX ADMIN — GABAPENTIN 800 MG: 400 CAPSULE ORAL at 14:10

## 2020-02-23 RX ADMIN — ERYTHROMYCIN ETHYLSUCCINATE 200 MG: 200 GRANULE, FOR SUSPENSION ORAL at 11:07

## 2020-02-23 RX ADMIN — PHENOBARBITAL 97.2 MG: 32.4 TABLET ORAL at 21:49

## 2020-02-23 RX ADMIN — Medication 10 ML: at 21:49

## 2020-02-23 RX ADMIN — ONDANSETRON 4 MG: 2 INJECTION INTRAMUSCULAR; INTRAVENOUS at 16:39

## 2020-02-23 RX ADMIN — QUETIAPINE FUMARATE 200 MG: 100 TABLET ORAL at 21:49

## 2020-02-23 RX ADMIN — ERYTHROMYCIN ETHYLSUCCINATE 200 MG: 200 GRANULE, FOR SUSPENSION ORAL at 14:10

## 2020-02-23 RX ADMIN — PANTOPRAZOLE SODIUM 40 MG: 40 TABLET, DELAYED RELEASE ORAL at 06:28

## 2020-02-23 RX ADMIN — PANTOPRAZOLE SODIUM 40 MG: 40 TABLET, DELAYED RELEASE ORAL at 16:48

## 2020-02-23 RX ADMIN — ALPRAZOLAM 1 MG: 1 TABLET ORAL at 14:24

## 2020-02-23 RX ADMIN — LAMOTRIGINE 200 MG: 100 TABLET ORAL at 21:49

## 2020-02-23 RX ADMIN — ERYTHROMYCIN ETHYLSUCCINATE 200 MG: 200 GRANULE, FOR SUSPENSION ORAL at 21:49

## 2020-02-23 RX ADMIN — MODAFINIL 200 MG: 100 TABLET ORAL at 06:29

## 2020-02-24 VITALS
TEMPERATURE: 98.4 F | BODY MASS INDEX: 31.39 KG/M2 | DIASTOLIC BLOOD PRESSURE: 62 MMHG | RESPIRATION RATE: 15 BRPM | HEIGHT: 64 IN | OXYGEN SATURATION: 97 % | HEART RATE: 76 BPM | WEIGHT: 183.86 LBS | SYSTOLIC BLOOD PRESSURE: 96 MMHG

## 2020-02-24 PROBLEM — G43.909 MIGRAINE: Status: RESOLVED | Noted: 2020-02-13 | Resolved: 2020-02-24

## 2020-02-24 PROBLEM — G40.909 SEIZURE DISORDER: Status: RESOLVED | Noted: 2020-02-13 | Resolved: 2020-02-24

## 2020-02-24 LAB
MAGNESIUM SERPL-MCNC: 2.2 MG/DL (ref 1.6–2.6)
PHOSPHATE SERPL-MCNC: 4.5 MG/DL (ref 2.5–4.5)

## 2020-02-24 PROCEDURE — 99239 HOSP IP/OBS DSCHRG MGMT >30: CPT | Performed by: INTERNAL MEDICINE

## 2020-02-24 PROCEDURE — 83735 ASSAY OF MAGNESIUM: CPT

## 2020-02-24 PROCEDURE — 84100 ASSAY OF PHOSPHORUS: CPT

## 2020-02-24 PROCEDURE — 25010000003 LEVETIRACETAM IN NACL 0.54% 1500 MG/100ML SOLUTION: Performed by: NURSE PRACTITIONER

## 2020-02-24 RX ORDER — PANTOPRAZOLE SODIUM 40 MG/1
40 TABLET, DELAYED RELEASE ORAL
Qty: 30 TABLET | Refills: 0 | Status: SHIPPED | OUTPATIENT
Start: 2020-02-24 | End: 2020-03-10

## 2020-02-24 RX ORDER — TOPIRAMATE 25 MG/1
25 TABLET ORAL EVERY MORNING
Qty: 3 TABLET | Refills: 0 | Status: SHIPPED | OUTPATIENT
Start: 2020-02-24 | End: 2020-02-27

## 2020-02-24 RX ORDER — SCOLOPAMINE TRANSDERMAL SYSTEM 1 MG/1
1 PATCH, EXTENDED RELEASE TRANSDERMAL
Qty: 10 PATCH | Refills: 0 | Status: SHIPPED | OUTPATIENT
Start: 2020-02-24 | End: 2020-03-25

## 2020-02-24 RX ORDER — SIMETHICONE 80 MG
120 TABLET,CHEWABLE ORAL 3 TIMES DAILY PRN
Qty: 30 TABLET | Refills: 3 | Status: SHIPPED | OUTPATIENT
Start: 2020-02-24 | End: 2020-03-05

## 2020-02-24 RX ORDER — TOPIRAMATE 50 MG/1
50 TABLET, FILM COATED ORAL EVERY 12 HOURS SCHEDULED
Qty: 60 TABLET | Refills: 0 | Status: SHIPPED | OUTPATIENT
Start: 2020-02-28 | End: 2020-03-29

## 2020-02-24 RX ORDER — QUETIAPINE FUMARATE 200 MG/1
200 TABLET, FILM COATED ORAL NIGHTLY
Qty: 30 TABLET | Refills: 1 | Status: SHIPPED | OUTPATIENT
Start: 2020-02-24 | End: 2020-03-25

## 2020-02-24 RX ORDER — ERYTHROMYCIN ETHYLSUCCINATE 200 MG/5ML
200 SUSPENSION ORAL
Qty: 45 ML | Refills: 0 | Status: SHIPPED | OUTPATIENT
Start: 2020-02-24 | End: 2020-02-27

## 2020-02-24 RX ORDER — TOPIRAMATE 50 MG/1
50 TABLET, FILM COATED ORAL NIGHTLY
Qty: 4 TABLET | Refills: 0 | Status: SHIPPED | OUTPATIENT
Start: 2020-02-24 | End: 2020-02-28

## 2020-02-24 RX ORDER — ALPRAZOLAM 1 MG/1
1 TABLET ORAL 4 TIMES DAILY PRN
Start: 2020-02-24 | End: 2020-03-11 | Stop reason: SDUPTHER

## 2020-02-24 RX ADMIN — MODAFINIL 200 MG: 100 TABLET ORAL at 05:54

## 2020-02-24 RX ADMIN — LEVETIRACETAM 1500 MG: 15 INJECTION INTRAVENOUS at 09:27

## 2020-02-24 RX ADMIN — TOPIRAMATE 25 MG: 25 TABLET, FILM COATED ORAL at 05:55

## 2020-02-24 RX ADMIN — GABAPENTIN 800 MG: 400 CAPSULE ORAL at 15:02

## 2020-02-24 RX ADMIN — ALPRAZOLAM 1 MG: 1 TABLET ORAL at 09:00

## 2020-02-24 RX ADMIN — PANTOPRAZOLE SODIUM 40 MG: 40 TABLET, DELAYED RELEASE ORAL at 09:00

## 2020-02-24 RX ADMIN — ERYTHROMYCIN ETHYLSUCCINATE 200 MG: 200 GRANULE, FOR SUSPENSION ORAL at 09:27

## 2020-02-24 RX ADMIN — BUTALBITAL, ACETAMINOPHEN AND CAFFEINE 1 TABLET: 50; 325; 40 TABLET ORAL at 15:02

## 2020-02-24 RX ADMIN — DEXTROSE AND SODIUM CHLORIDE 75 ML/HR: 5; 450 INJECTION, SOLUTION INTRAVENOUS at 01:05

## 2020-02-24 RX ADMIN — BUSPIRONE HYDROCHLORIDE 10 MG: 10 TABLET ORAL at 09:00

## 2020-02-24 RX ADMIN — Medication 10 ML: at 09:00

## 2020-02-24 RX ADMIN — GABAPENTIN 800 MG: 400 CAPSULE ORAL at 05:55

## 2020-02-24 RX ADMIN — BUTALBITAL, ACETAMINOPHEN AND CAFFEINE 1 TABLET: 50; 325; 40 TABLET ORAL at 09:00

## 2020-02-24 RX ADMIN — ERYTHROMYCIN ETHYLSUCCINATE 200 MG: 200 GRANULE, FOR SUSPENSION ORAL at 15:02

## 2020-02-25 ENCOUNTER — READMISSION MANAGEMENT (OUTPATIENT)
Dept: CALL CENTER | Facility: HOSPITAL | Age: 50
End: 2020-02-25

## 2020-02-25 NOTE — OUTREACH NOTE
Prep Survey      Responses   Facility patient discharged from?  [facility]   Is patient eligible?  Yes   Discharge diagnosis  Acute on chronic seizure disorder    Does the patient have one of the following disease processes/diagnoses(primary or secondary)?  Other   Does the patient have Home health ordered?  No   Is there a DME ordered?  No   Prep survey completed?  Yes          Flakita Timmons RN

## 2020-02-26 ENCOUNTER — READMISSION MANAGEMENT (OUTPATIENT)
Dept: CALL CENTER | Facility: HOSPITAL | Age: 50
End: 2020-02-26

## 2020-02-26 NOTE — OUTREACH NOTE
Medical Week 1 Survey      Responses   Facility patient discharged from?  [facility]   Does the patient have one of the following disease processes/diagnoses(primary or secondary)?  Other   Is there a successful TCM telephone encounter documented?  No   Week 1 attempt successful?  Yes   Call start time  1011   Call end time  1017   Discharge diagnosis  Acute on chronic seizure disorder    Meds reviewed with patient/caregiver?  Yes   Is the patient having any side effects they believe may be caused by any medication additions or changes?  No   Does the patient have all medications ordered at discharge?  Yes   Is the patient taking all medications as directed (includes completed medication regime)?  Yes   Does the patient have a primary care provider?   Yes   Does the patient have an appointment with their PCP within 7 days of discharge?  No   What is preventing the patient from scheduling follow up appointments within 7 days of discharge?  Haven't had time   Nursing Interventions  Educated patient on importance of making appointment, Advised patient to make appointment   Has the patient kept scheduled appointments due by today?  N/A   Comments  Patient has appt with nurse practioner at [facility] for Friday.   Has home health visited the patient within 72 hours of discharge?  N/A   What DME was ordered?  tube Feeds   Has all DME been delivered?  Yes   Did the patient receive a copy of their discharge instructions?  Yes   Nursing interventions  Reviewed instructions with patient   What is the patient's perception of their health status since discharge?  Same   Is the patient/caregiver able to teach back signs and symptoms related to disease process for when to call PCP?  Yes   Is the patient/caregiver able to teach back signs and symptoms related to disease process for when to call 911?  Yes   Is the patient/caregiver able to teach back the hierarchy of who to call/visit for symptoms/problems? PCP, Specialist, Home health nurse,  Urgent Care, ED, 911  Yes   Additional teach back comments  Patient states she is feeling the same.  Still having nausea.  Is doing her tube feeds herself.  States she doesn't understand how in the last 2 months since has went to a size 4 too a size 12 when she has not been eating.  Has an appt with the nurse practioner Friday and [facility].  She will call her PCP and get an appt.   Week 1 call completed?  Yes   Wrap up additional comments  No questions or needs at this time          Saba Alcantar LPN

## 2020-02-28 ENCOUNTER — TRANSCRIBE ORDERS (OUTPATIENT)
Dept: ADMINISTRATIVE | Facility: HOSPITAL | Age: 50
End: 2020-02-28

## 2020-02-28 ENCOUNTER — OFFICE (AMBULATORY)
Dept: URBAN - METROPOLITAN AREA CLINIC 64 | Facility: CLINIC | Age: 50
End: 2020-02-28
Payer: MEDICAID

## 2020-02-28 VITALS
WEIGHT: 176 LBS | HEIGHT: 64 IN | SYSTOLIC BLOOD PRESSURE: 126 MMHG | DIASTOLIC BLOOD PRESSURE: 82 MMHG | HEART RATE: 83 BPM

## 2020-02-28 DIAGNOSIS — R11.2 NAUSEA WITH VOMITING, UNSPECIFIED: ICD-10-CM

## 2020-02-28 DIAGNOSIS — R11.2 NAUSEA AND VOMITING, INTRACTABILITY OF VOMITING NOT SPECIFIED, UNSPECIFIED VOMITING TYPE: Primary | ICD-10-CM

## 2020-02-28 DIAGNOSIS — K29.60 REFLUX GASTRITIS: ICD-10-CM

## 2020-02-28 DIAGNOSIS — B96.81 HELICOBACTER PYLORI [H. PYLORI] AS THE CAUSE OF DISEASES CLA: ICD-10-CM

## 2020-02-28 DIAGNOSIS — K21.0 GASTRO-ESOPHAGEAL REFLUX DISEASE WITH ESOPHAGITIS: ICD-10-CM

## 2020-02-28 PROCEDURE — 99213 OFFICE O/P EST LOW 20 MIN: CPT | Performed by: NURSE PRACTITIONER

## 2020-02-28 RX ORDER — PROMETHAZINE HYDROCHLORIDE 25 MG/1
100 SUPPOSITORY RECTAL
Qty: 45 | Refills: 3 | Status: COMPLETED
Start: 2020-02-28 | End: 2021-06-02

## 2020-03-04 ENCOUNTER — READMISSION MANAGEMENT (OUTPATIENT)
Dept: CALL CENTER | Facility: HOSPITAL | Age: 50
End: 2020-03-04

## 2020-03-04 NOTE — OUTREACH NOTE
Medical Week 2 Survey      Responses   [facility] patient discharged from?  [facility]   Does the patient have one of the following disease processes/diagnoses(primary or secondary)?  Other   Week 2 attempt successful?  No   Revoke  Decline to participate          Saba Alcantar LPN

## 2020-03-11 ENCOUNTER — OFFICE VISIT (OUTPATIENT)
Dept: PSYCHIATRY | Facility: CLINIC | Age: 50
End: 2020-03-11

## 2020-03-11 DIAGNOSIS — F40.01 PANIC DISORDER WITH AGORAPHOBIA: Chronic | ICD-10-CM

## 2020-03-11 PROCEDURE — 99213 OFFICE O/P EST LOW 20 MIN: CPT | Performed by: PHYSICIAN ASSISTANT

## 2020-03-11 RX ORDER — ALPRAZOLAM 2 MG/1
2 TABLET ORAL 3 TIMES DAILY PRN
Qty: 90 TABLET | Refills: 2 | Status: SHIPPED | OUTPATIENT
Start: 2020-03-11 | End: 2020-06-07

## 2020-03-11 NOTE — PROGRESS NOTES
Subjective   Milka Espinoza is a 49 y.o.white female who presents today for follow up    Chief Complaint: Depression and anxiety    History of Present Illness:   She missed her last appt  Still throwing up a lot, ended up in the hospital for 11 days (2/13 to 2/24/2020) and sent home with NG tube, having gastric emptying study next week  Autistic son still living with her, finally settle into her new apartment and unpacked  Anxiety and panic attacks on occasion  Depression 6/10, down to long lasting stomach issues  Denies SI/HI  She is still doing better on the Xanax than she was with the Valium, switched in June    The following portions of the patient's history were reviewed and updated as appropriate: allergies, current medications, past family history, past medical history, past social history, past surgical history and problem list.    PAST PSYCHIATRIC HISTORY  Axis I  Affective/Bipoloar Disorder, Anxiety/Panic Disorder, Substance Abuse Disorder  Axis II  None    PAST OUTPATIENT TREATMENT  Diagnosis treated:  Affective Disorder, Anxiety/Panic Disorder, Substance/Alcohol Abuse  Treatment Type:  Individual Therapy, Medication Management  Prior Psychiatric Medications:  Abilify  Risperidone  Vraylar  Valium  Xanax  Lamictal  Trazodone  Seroquel  Support Groups:  None  Sequelae Of Mental Disorder:  social isolation, family disruption, emotional distress      Interval History  Improved    Side Effects  None     Mental status exam was reviewed and compared to her 12/11/19 visit and appropriate changes were made.    Past Medical History:  Past Medical History:   Diagnosis Date   • Anxiety    • Bipolar disorder (CMS/HCC)    • Depression    • Migraines    • Panic disorder    • Seizures (CMS/HCC)        Social History:  Social History     Socioeconomic History   • Marital status: Single     Spouse name: Not on file   • Number of children: Not on file   • Years of education: Not on file   • Highest education level:  Not on file   Tobacco Use   • Smoking status: Current Every Day Smoker     Packs/day: 0.25     Types: Cigarettes   • Smokeless tobacco: Never Used   Substance and Sexual Activity   • Alcohol use: No     Frequency: Never   • Drug use: No   • Sexual activity: Never       Family History:  Family History   Problem Relation Age of Onset   • No Known Problems Mother    • No Known Problems Father    • No Known Problems Sister    • No Known Problems Brother    • No Known Problems Daughter    • No Known Problems Son    • No Known Problems Maternal Grandmother    • No Known Problems Paternal Grandmother    • No Known Problems Maternal Aunt    • No Known Problems Paternal Aunt        Past Surgical History:  Past Surgical History:   Procedure Laterality Date   • ABDOMINAL SURGERY     •  SECTION      x3   • HERNIA REPAIR     • HYSTERECTOMY         Problem List:  Patient Active Problem List   Diagnosis   • Anxiety state   • Bipolar affective disorder (CMS/[facility])   • Depression   • Mood disorder in conditions classified elsewhere   • Seizures (CMS/[facility])   • Opioid withdrawal (CMS/[facility])   • Altered level of consciousness   • Asthma   • Bronchitis, acute   • General medical exam   • Low back pain   • Syncope   • Unspecified sprain of unspecified wrist, initial encounter   • Upper respiratory tract infection   • Intractable nausea with vomiting   • Obesity   • GERD (gastroesophageal reflux disease)   • Tobacco use   • Nausea and vomiting   • Borderline hypoglycemic episodes   • Pseudoseizure       Allergy:   Allergies   Allergen Reactions   • Sulfa Antibiotics Hives   • Tramadol Hcl Mental Status Change        Discontinued Medications:  Medications Discontinued During This Encounter   Medication Reason   • ALPRAZolam (XANAX) 1 MG tablet Reorder       Current Medications:   Current Outpatient Medications   Medication Sig Dispense Refill   • ALPRAZolam (XANAX) 2 MG tablet Take 1 tablet by mouth 3 (Three) Times a Day As Needed for  Anxiety. 90 tablet 2   • busPIRone (BUSPAR) 10 MG tablet Take 1 tablet by mouth 3 (Three) Times a Day. 90 tablet 5   • gabapentin (NEURONTIN) 800 MG tablet Take 800 mg by mouth 3 (Three) Times a Day.     • lamoTRIgine (LaMICtal) 100 MG tablet Take 200 mg by mouth Every Night.     • levETIRAcetam (KEPPRA) 500 MG tablet Take 1,500 mg by mouth 2 (Two) Times a Day.     • modafinil (PROVIGIL) 200 MG tablet Take 200 mg by mouth Every Morning.  5   • ondansetron ODT (ZOFRAN ODT) 4 MG disintegrating tablet Take 1 tablet by mouth Every 8 (Eight) Hours As Needed for Nausea or Vomiting. 20 tablet 0   • PHENobarbital (LUMINAL) 100 MG tablet Take 100 mg by mouth Every Night.     • QUEtiapine (SEROquel) 200 MG tablet Take 1 tablet by mouth Every Night for 30 days. 30 tablet 1   • Scopolamine (TRANSDERM-SCOP) 1.5 MG/3DAYS patch Place 1 patch on the skin as directed by provider Every 72 (Seventy-Two) Hours for 30 days. 10 patch 0   • SUMAtriptan (IMITREX) 100 MG tablet Take 50 mg by mouth 1 (One) Time As Needed for Migraine.     • topiramate (TOPAMAX) 50 MG tablet Take 1 tablet by mouth Every 12 (Twelve) Hours for 30 days. 60 tablet 0     No current facility-administered medications for this visit.          Review of Symptoms:    Psychiatric/Behavioral: Negative for agitation, behavioral problems, confusion, decreased concentration, dysphoric mood, hallucinations, self-injury, sleep disturbance and suicidal ideas. The patient is not nervous/anxious and is not hyperactive.        Physical Exam:   not currently breastfeeding.    Mental Status Exam:   Hygiene:   good  Cooperation:  Cooperative  Eye Contact:  Good  Psychomotor Behavior:  Appropriate  Affect:  Appropriate  Mood: normal  Hopelessness: Denies  Speech:  Normal  Thought Process:  Goal directed  Thought Content:  Normal  Suicidal:  None  Homicidal:  None  Hallucinations:  None  Delusion:  None  Memory:  Intact  Orientation:  Person, Place, Time and Situation  Reliability:   good  Insight:  Good  Judgement:  Good  Impulse Control:  Good  Physical/Medical Issues:  No      Mental status exam was reviewed and compared to 12/11/19 visit and no changes were necessary.    PHQ-9 Depression Screening  Little interest or pleasure in doing things? 1   Feeling down, depressed, or hopeless? 2   Trouble falling or staying asleep, or sleeping too much? 1   Feeling tired or having little energy? 2   Poor appetite or overeating? 3   Feeling bad about yourself - or that you are a failure or have let yourself or your family down? 1   Trouble concentrating on things, such as reading the newspaper or watching television? 1   Moving or speaking so slowly that other people could have noticed? Or the opposite - being so fidgety or restless that you have been moving around a lot more than usual? 0   Thoughts that you would be better off dead, or of hurting yourself in some way? 0   PHQ-9 Total Score 11   If you checked off any problems, how difficult have these problems made it for you to do your work, take care of things at home, or get along with other people? Very difficult           Former smoker    I advised Milka of the risks of tobacco use.     Lab Results:   No results displayed because visit has over 200 results.      Admission on 01/22/2020, Discharged on 01/22/2020   Component Date Value Ref Range Status   • Glucose 01/22/2020 97  65 - 99 mg/dL Final   • BUN 01/22/2020 6  6 - 20 mg/dL Final   • Creatinine 01/22/2020 0.65  0.57 - 1.00 mg/dL Final   • Sodium 01/22/2020 138  136 - 145 mmol/L Final   • Potassium 01/22/2020 4.5  3.5 - 5.2 mmol/L Final   • Chloride 01/22/2020 101  98 - 107 mmol/L Final   • CO2 01/22/2020 26.0  22.0 - 29.0 mmol/L Final   • Calcium 01/22/2020 9.3  8.6 - 10.5 mg/dL Final   • Total Protein 01/22/2020 7.5  6.0 - 8.5 g/dL Final   • Albumin 01/22/2020 4.20  3.50 - 5.20 g/dL Final   • ALT (SGPT) 01/22/2020 30  1 - 33 U/L Final   • AST (SGOT) 01/22/2020 25  1 - 32 U/L Final     Specimen hemolyzed.  Results may be affected.   • Alkaline Phosphatase 01/22/2020 110  39 - 117 U/L Final   • Total Bilirubin 01/22/2020 0.2  0.2 - 1.2 mg/dL Final   • eGFR Non African Amer 01/22/2020 96  >60 mL/min/1.73 Final   • Globulin 01/22/2020 3.3  gm/dL Final   • A/G Ratio 01/22/2020 1.3  g/dL Final   • BUN/Creatinine Ratio 01/22/2020 9.2  7.0 - 25.0 Final   • Anion Gap 01/22/2020 11.0  5.0 - 15.0 mmol/L Final   • Amphet/Methamphet, Screen 01/22/2020 Negative  Negative Final   • Barbiturates Screen, Urine 01/22/2020 Positive* Negative Final   • Benzodiazepine Screen, Urine 01/22/2020 Positive* Negative Final   • Cocaine Screen, Urine 01/22/2020 Negative  Negative Final   • Opiate Screen 01/22/2020 Negative  Negative Final   • THC, Screen, Urine 01/22/2020 Negative  Negative Final   • Methadone Screen, Urine 01/22/2020 Negative  Negative Final   • Oxycodone Screen, Urine 01/22/2020 Negative  Negative Final   • Lipase 01/22/2020 45  13 - 60 U/L Final   • Color, UA 01/22/2020 Yellow  Yellow, Straw Final   • Appearance, UA 01/22/2020 Clear  Clear Final   • pH, UA 01/22/2020 8.5* 5.0 - 8.0 Final   • Specific Gravity, UA 01/22/2020 1.020  1.005 - 1.030 Final   • Glucose, UA 01/22/2020 Negative  Negative Final   • Ketones, UA 01/22/2020 Negative  Negative Final   • Bilirubin, UA 01/22/2020 Negative  Negative Final   • Blood, UA 01/22/2020 Negative  Negative Final   • Protein, UA 01/22/2020 Negative  Negative Final   • Leuk Esterase, UA 01/22/2020 Negative  Negative Final   • Nitrite, UA 01/22/2020 Negative  Negative Final   • Urobilinogen, UA 01/22/2020 0.2 E.U./dL  0.2 - 1.0 E.U./dL Final   • Extra Tube 01/22/2020 hold for add-on   Final    Auto resulted   • Extra Tube 01/22/2020 Hold for add-ons.   Final    Auto resulted.   • Extra Tube 01/22/2020 hold for add-on   Final    Auto resulted   • Extra Tube 01/22/2020 Hold for add-ons.   Final    Auto resulted.   • WBC 01/22/2020 4.40  3.40 - 10.80 10*3/mm3 Final   •  RBC 01/22/2020 4.38  3.77 - 5.28 10*6/mm3 Final   • Hemoglobin 01/22/2020 13.5  12.0 - 15.9 g/dL Final   • Hematocrit 01/22/2020 38.6  34.0 - 46.6 % Final   • MCV 01/22/2020 88.1  79.0 - 97.0 fL Final   • MCH 01/22/2020 30.8  26.6 - 33.0 pg Final   • MCHC 01/22/2020 35.0  31.5 - 35.7 g/dL Final   • RDW 01/22/2020 13.3  12.3 - 15.4 % Final   • RDW-SD 01/22/2020 41.6  37.0 - 54.0 fl Final   • MPV 01/22/2020 8.4  6.0 - 12.0 fL Final   • Platelets 01/22/2020 234  140 - 450 10*3/mm3 Final   • Neutrophil % 01/22/2020 45.5  42.7 - 76.0 % Final   • Lymphocyte % 01/22/2020 47.8* 19.6 - 45.3 % Final   • Monocyte % 01/22/2020 5.7  5.0 - 12.0 % Final   • Eosinophil % 01/22/2020 0.6  0.3 - 6.2 % Final   • Basophil % 01/22/2020 0.4  0.0 - 1.5 % Final   • Neutrophils, Absolute 01/22/2020 2.00  1.70 - 7.00 10*3/mm3 Final   • Lymphocytes, Absolute 01/22/2020 2.10  0.70 - 3.10 10*3/mm3 Final   • Monocytes, Absolute 01/22/2020 0.20  0.10 - 0.90 10*3/mm3 Final   • Eosinophils, Absolute 01/22/2020 0.00  0.00 - 0.40 10*3/mm3 Final   • Basophils, Absolute 01/22/2020 0.00  0.00 - 0.20 10*3/mm3 Final   • nRBC 01/22/2020 0.1  0.0 - 0.2 /100 WBC Final   • Glucose 01/22/2020 89  70 - 105 mg/dL Final    Serial Number: [id number]:  [id number]   Lab Requisition on 12/13/2019   Component Date Value Ref Range Status   • Case Report 12/13/2019    Final                    Value:Surgical Pathology Report                         Case: [id number]                                  Authorizing Provider:  Lamont Saavedra MD        Collected:           12/13/2019 03:15 PM          Ordering Location:     [facility]       Received:            12/16/2019 09:26 AM                                 LABORATORY                                                                   Pathologist:           Tim Paz MD                                                            Specimens:   1) - Gastric, biopsy in the stomach antrum and stomach body                                          2) - Large Intestine, Transverse Colon, polypectomy                                                 3) - Large Intestine, Rectum, polypectomy                                                 • Final Diagnosis 12/13/2019    Final                    Value:This result contains rich text formatting which cannot be displayed here.   • Gross Description 12/13/2019    Final                    Value:This result contains rich text formatting which cannot be displayed here.       Assessment/Plan   Problems Addressed this Visit     None      Visit Diagnoses     Panic disorder with agoraphobia  (Chronic)       Relevant Medications    ALPRAZolam (XANAX) 2 MG tablet          Visit Diagnoses:    ICD-10-CM ICD-9-CM   1. Panic disorder with agoraphobia F40.01 300.21       TREATMENT PLAN/GOALS: Continue supportive psychotherapy efforts and medications as indicated. Treatment and medication options discussed during today's visit. Patient ackowledged and verbally consented to continue with current treatment plan and was educated on the importance of compliance with treatment and follow-up appointments.    MEDICATION ISSUES:  INSPECT reviewed as expected  Discussed medication options and treatment plan of prescribed medication as well as the risks, benefits, and side effects including potential falls, possible impaired driving and metabolic adversities among others. Patient is agreeable to call the office with any worsening of symptoms or onset of side effects. Patient is agreeable to call 911 or go to the nearest ER should he/she begin having SI/HI. No medication side effects or related complaints today.     Patient is doing well on her medication, has anxiety but no major pseudoseizures.    Continue Vraylar, Lamictal, BuSpar and Seroquel at current dosages with no changes, no refills needed today  Continue Xanax 2 mg 3 times daily #90 and refills given.    MEDS ORDERED DURING VISIT:  New Medications  Ordered This Visit   Medications   • ALPRAZolam (XANAX) 2 MG tablet     Sig: Take 1 tablet by mouth 3 (Three) Times a Day As Needed for Anxiety.     Dispense:  90 tablet     Refill:  2       Return in about 4 months (around 7/11/2020).         This document has been electronically signed by Love Bourgeois PA-C  March 11, 2020 15:50

## 2020-03-13 ENCOUNTER — OFFICE (AMBULATORY)
Dept: URBAN - METROPOLITAN AREA CLINIC 64 | Facility: CLINIC | Age: 50
End: 2020-03-13

## 2020-03-13 VITALS
WEIGHT: 176 LBS | HEART RATE: 114 BPM | HEIGHT: 64 IN | DIASTOLIC BLOOD PRESSURE: 92 MMHG | SYSTOLIC BLOOD PRESSURE: 153 MMHG

## 2020-03-13 DIAGNOSIS — R10.10 UPPER ABDOMINAL PAIN, UNSPECIFIED: ICD-10-CM

## 2020-03-13 DIAGNOSIS — R11.2 NAUSEA WITH VOMITING, UNSPECIFIED: ICD-10-CM

## 2020-03-13 PROCEDURE — 99213 OFFICE O/P EST LOW 20 MIN: CPT | Performed by: NURSE PRACTITIONER

## 2020-03-16 ENCOUNTER — TELEPHONE (OUTPATIENT)
Dept: FAMILY MEDICINE CLINIC | Facility: CLINIC | Age: 50
End: 2020-03-16

## 2020-03-17 ENCOUNTER — TELEPHONE (OUTPATIENT)
Dept: FAMILY MEDICINE CLINIC | Facility: CLINIC | Age: 50
End: 2020-03-17

## 2020-03-17 NOTE — TELEPHONE ENCOUNTER
Attempted to return message without answer.  Left voicemail to return call if she still has questions.

## 2020-03-18 ENCOUNTER — HOSPITAL ENCOUNTER (OUTPATIENT)
Dept: NUCLEAR MEDICINE | Facility: HOSPITAL | Age: 50
Discharge: HOME | End: 2020-03-18

## 2020-03-18 DIAGNOSIS — R11.2 NAUSEA AND VOMITING, INTRACTABILITY OF VOMITING NOT SPECIFIED, UNSPECIFIED VOMITING TYPE: ICD-10-CM

## 2020-03-18 DIAGNOSIS — K29.60 REFLUX GASTRITIS: ICD-10-CM

## 2020-03-18 PROCEDURE — 0 TECHNETIUM SULFUR COLLOID: Performed by: NURSE PRACTITIONER

## 2020-03-18 PROCEDURE — A9541 TC99M SULFUR COLLOID: HCPCS | Performed by: NURSE PRACTITIONER

## 2020-03-18 PROCEDURE — 78264 GASTRIC EMPTYING IMG STUDY: CPT

## 2020-03-18 RX ADMIN — TECHNETIUM TC 99M SULFUR COLLOID 1 DOSE: KIT at 07:25

## 2020-03-18 NOTE — TELEPHONE ENCOUNTER
Attempted to call patient again without success.  I left a voicemail stating to send me a message on my chart if she has questions

## 2020-03-23 ENCOUNTER — OFFICE (AMBULATORY)
Dept: URBAN - METROPOLITAN AREA TELEHEALTH 4 | Facility: TELEHEALTH | Age: 50
End: 2020-03-23
Payer: COMMERCIAL

## 2020-03-23 VITALS — HEIGHT: 64 IN

## 2020-03-23 DIAGNOSIS — K21.0 GASTRO-ESOPHAGEAL REFLUX DISEASE WITH ESOPHAGITIS: ICD-10-CM

## 2020-03-23 DIAGNOSIS — D18.03 HEMANGIOMA OF INTRA-ABDOMINAL STRUCTURES: ICD-10-CM

## 2020-03-23 DIAGNOSIS — R10.11 RIGHT UPPER QUADRANT PAIN: ICD-10-CM

## 2020-03-23 DIAGNOSIS — R13.10 DYSPHAGIA, UNSPECIFIED: ICD-10-CM

## 2020-03-23 DIAGNOSIS — R11.2 NAUSEA WITH VOMITING, UNSPECIFIED: ICD-10-CM

## 2020-03-23 PROCEDURE — G2012 BRIEF CHECK IN BY MD/QHP: HCPCS | Performed by: INTERNAL MEDICINE

## 2020-03-23 RX ORDER — PRUCALOPRIDE 2 MG/1
TABLET, FILM COATED ORAL
Qty: 30 | Refills: 0 | Status: ACTIVE

## 2020-03-23 RX ORDER — TRAZODONE HYDROCHLORIDE 150 MG/1
TABLET ORAL
Qty: 90 TABLET | Refills: 1 | Status: ON HOLD | OUTPATIENT
Start: 2020-03-23 | End: 2020-09-11 | Stop reason: SDUPTHER

## 2020-03-23 RX ORDER — QUETIAPINE 400 MG/1
TABLET, FILM COATED, EXTENDED RELEASE ORAL
Qty: 90 TABLET | Refills: 1 | OUTPATIENT
Start: 2020-03-23

## 2020-03-23 RX ORDER — CARIPRAZINE 1.5 MG/1
CAPSULE, GELATIN COATED ORAL
Qty: 30 CAPSULE | Refills: 5 | Status: ON HOLD | OUTPATIENT
Start: 2020-03-23 | End: 2020-08-17 | Stop reason: SINTOL

## 2020-03-25 RX ORDER — LAMOTRIGINE 100 MG/1
TABLET ORAL
Qty: 270 TABLET | Refills: 1 | Status: SHIPPED | OUTPATIENT
Start: 2020-03-25 | End: 2020-09-23

## 2020-04-13 ENCOUNTER — OFFICE (AMBULATORY)
Dept: URBAN - METROPOLITAN AREA CLINIC 64 | Facility: CLINIC | Age: 50
End: 2020-04-13
Payer: COMMERCIAL

## 2020-04-13 VITALS — HEIGHT: 64 IN

## 2020-04-13 DIAGNOSIS — K26.9 DUODENAL ULCER, UNSPECIFIED AS ACUTE OR CHRONIC, WITHOUT HEM: ICD-10-CM

## 2020-04-13 DIAGNOSIS — R11.2 NAUSEA WITH VOMITING, UNSPECIFIED: ICD-10-CM

## 2020-04-13 DIAGNOSIS — K21.0 GASTRO-ESOPHAGEAL REFLUX DISEASE WITH ESOPHAGITIS: ICD-10-CM

## 2020-04-13 PROCEDURE — 99213 OFFICE O/P EST LOW 20 MIN: CPT | Mod: 95 | Performed by: INTERNAL MEDICINE

## 2020-04-13 RX ORDER — PANTOPRAZOLE SODIUM 40 MG/1
TABLET, DELAYED RELEASE ORAL
Qty: 180 | Refills: 3 | Status: ACTIVE

## 2020-04-13 RX ORDER — ONDANSETRON 8 MG/1
24 TABLET, ORALLY DISINTEGRATING ORAL
Qty: 60 | Refills: 11 | Status: COMPLETED
Start: 2020-04-13 | End: 2022-05-04

## 2020-05-06 ENCOUNTER — OFFICE (AMBULATORY)
Dept: URBAN - METROPOLITAN AREA CLINIC 64 | Facility: CLINIC | Age: 50
End: 2020-05-06
Payer: COMMERCIAL

## 2020-05-06 VITALS — HEIGHT: 64 IN

## 2020-05-06 DIAGNOSIS — R11.2 NAUSEA WITH VOMITING, UNSPECIFIED: ICD-10-CM

## 2020-05-06 DIAGNOSIS — R10.10 UPPER ABDOMINAL PAIN, UNSPECIFIED: ICD-10-CM

## 2020-05-06 DIAGNOSIS — B96.81 HELICOBACTER PYLORI [H. PYLORI] AS THE CAUSE OF DISEASES CLA: ICD-10-CM

## 2020-05-06 DIAGNOSIS — R13.10 DYSPHAGIA, UNSPECIFIED: ICD-10-CM

## 2020-05-06 PROCEDURE — 99214 OFFICE O/P EST MOD 30 MIN: CPT | Mod: 95 | Performed by: INTERNAL MEDICINE

## 2020-05-08 ENCOUNTER — ON CAMPUS - OUTPATIENT (AMBULATORY)
Dept: URBAN - METROPOLITAN AREA HOSPITAL 2 | Facility: HOSPITAL | Age: 50
End: 2020-05-08

## 2020-05-08 VITALS
DIASTOLIC BLOOD PRESSURE: 65 MMHG | HEART RATE: 82 BPM | SYSTOLIC BLOOD PRESSURE: 117 MMHG | SYSTOLIC BLOOD PRESSURE: 109 MMHG | HEART RATE: 93 BPM | HEART RATE: 87 BPM | HEIGHT: 64 IN | SYSTOLIC BLOOD PRESSURE: 127 MMHG | DIASTOLIC BLOOD PRESSURE: 64 MMHG | RESPIRATION RATE: 16 BRPM | DIASTOLIC BLOOD PRESSURE: 57 MMHG | HEART RATE: 73 BPM | HEART RATE: 77 BPM | SYSTOLIC BLOOD PRESSURE: 105 MMHG | OXYGEN SATURATION: 98 % | SYSTOLIC BLOOD PRESSURE: 140 MMHG | WEIGHT: 173 LBS | DIASTOLIC BLOOD PRESSURE: 73 MMHG | TEMPERATURE: 97.6 F | SYSTOLIC BLOOD PRESSURE: 139 MMHG | OXYGEN SATURATION: 100 % | DIASTOLIC BLOOD PRESSURE: 66 MMHG | RESPIRATION RATE: 18 BRPM | HEART RATE: 78 BPM

## 2020-05-08 DIAGNOSIS — K22.2 ESOPHAGEAL OBSTRUCTION: ICD-10-CM

## 2020-05-08 DIAGNOSIS — R11.2 NAUSEA WITH VOMITING, UNSPECIFIED: ICD-10-CM

## 2020-05-08 DIAGNOSIS — R13.10 DYSPHAGIA, UNSPECIFIED: ICD-10-CM

## 2020-05-08 DIAGNOSIS — R10.11 RIGHT UPPER QUADRANT PAIN: ICD-10-CM

## 2020-05-08 PROCEDURE — 43235 EGD DIAGNOSTIC BRUSH WASH: CPT | Performed by: INTERNAL MEDICINE

## 2020-05-08 PROCEDURE — 43450 DILATE ESOPHAGUS 1/MULT PASS: CPT | Performed by: INTERNAL MEDICINE

## 2020-05-14 ENCOUNTER — HOSPITAL ENCOUNTER (EMERGENCY)
Facility: HOSPITAL | Age: 50
Discharge: HOME | End: 2020-05-14
Attending: EMERGENCY MEDICINE | Admitting: EMERGENCY MEDICINE

## 2020-05-14 ENCOUNTER — TELEPHONE (OUTPATIENT)
Dept: FAMILY MEDICINE CLINIC | Facility: CLINIC | Age: 50
End: 2020-05-14

## 2020-05-14 VITALS
OXYGEN SATURATION: 100 % | SYSTOLIC BLOOD PRESSURE: 107 MMHG | RESPIRATION RATE: 14 BRPM | DIASTOLIC BLOOD PRESSURE: 84 MMHG | TEMPERATURE: 98.1 F | BODY MASS INDEX: 32.44 KG/M2 | HEIGHT: 64 IN | WEIGHT: 190 LBS | HEART RATE: 86 BPM

## 2020-05-14 DIAGNOSIS — R11.2 NAUSEA AND VOMITING, INTRACTABILITY OF VOMITING NOT SPECIFIED, UNSPECIFIED VOMITING TYPE: ICD-10-CM

## 2020-05-14 DIAGNOSIS — R56.9 SEIZURE (HCC): Primary | ICD-10-CM

## 2020-05-14 LAB
ANION GAP SERPL CALCULATED.3IONS-SCNC: 12 MMOL/L (ref 5–15)
BASOPHILS # BLD AUTO: 0.1 10*3/MM3 (ref 0–0.2)
BASOPHILS NFR BLD AUTO: 0.7 % (ref 0–1.5)
BUN BLD-MCNC: 12 MG/DL (ref 6–20)
BUN/CREAT SERPL: 12.6 (ref 7–25)
CALCIUM SPEC-SCNC: 9.4 MG/DL (ref 8.6–10.5)
CHLORIDE SERPL-SCNC: 100 MMOL/L (ref 98–107)
CO2 SERPL-SCNC: 27 MMOL/L (ref 22–29)
CREAT BLD-MCNC: 0.95 MG/DL (ref 0.57–1)
DEPRECATED RDW RBC AUTO: 44.2 FL (ref 37–54)
EOSINOPHIL # BLD AUTO: 0.1 10*3/MM3 (ref 0–0.4)
EOSINOPHIL NFR BLD AUTO: 0.8 % (ref 0.3–6.2)
ERYTHROCYTE [DISTWIDTH] IN BLOOD BY AUTOMATED COUNT: 13.9 % (ref 12.3–15.4)
GFR SERPL CREATININE-BSD FRML MDRD: 62 ML/MIN/1.73
GLUCOSE BLD-MCNC: 124 MG/DL (ref 65–99)
HCT VFR BLD AUTO: 37 % (ref 34–46.6)
HGB BLD-MCNC: 12.6 G/DL (ref 12–15.9)
LYMPHOCYTES # BLD AUTO: 3.3 10*3/MM3 (ref 0.7–3.1)
LYMPHOCYTES NFR BLD AUTO: 46.8 % (ref 19.6–45.3)
MCH RBC QN AUTO: 30.2 PG (ref 26.6–33)
MCHC RBC AUTO-ENTMCNC: 33.9 G/DL (ref 31.5–35.7)
MCV RBC AUTO: 89.1 FL (ref 79–97)
MONOCYTES # BLD AUTO: 0.7 10*3/MM3 (ref 0.1–0.9)
MONOCYTES NFR BLD AUTO: 9.4 % (ref 5–12)
NEUTROPHILS # BLD AUTO: 3 10*3/MM3 (ref 1.7–7)
NEUTROPHILS NFR BLD AUTO: 42.3 % (ref 42.7–76)
NRBC BLD AUTO-RTO: 0 /100 WBC (ref 0–0.2)
PHENOBARB SERPL-MCNC: 4.9 MCG/ML (ref 10–30)
PLATELET # BLD AUTO: 237 10*3/MM3 (ref 140–450)
PMV BLD AUTO: 9.2 FL (ref 6–12)
POTASSIUM BLD-SCNC: 3.7 MMOL/L (ref 3.5–5.2)
RBC # BLD AUTO: 4.16 10*6/MM3 (ref 3.77–5.28)
SODIUM BLD-SCNC: 139 MMOL/L (ref 136–145)
WBC NRBC COR # BLD: 7.1 10*3/MM3 (ref 3.4–10.8)
WHOLE BLOOD HOLD SPECIMEN: NORMAL

## 2020-05-14 PROCEDURE — 80048 BASIC METABOLIC PNL TOTAL CA: CPT | Performed by: EMERGENCY MEDICINE

## 2020-05-14 PROCEDURE — 99283 EMERGENCY DEPT VISIT LOW MDM: CPT

## 2020-05-14 PROCEDURE — 96374 THER/PROPH/DIAG INJ IV PUSH: CPT

## 2020-05-14 PROCEDURE — 80184 ASSAY OF PHENOBARBITAL: CPT | Performed by: EMERGENCY MEDICINE

## 2020-05-14 PROCEDURE — 85025 COMPLETE CBC W/AUTO DIFF WBC: CPT | Performed by: EMERGENCY MEDICINE

## 2020-05-14 PROCEDURE — 96375 TX/PRO/DX INJ NEW DRUG ADDON: CPT

## 2020-05-14 PROCEDURE — 25010000002 LEVETIRACETAM IN NACL 0.82% 500 MG/100ML SOLUTION: Performed by: EMERGENCY MEDICINE

## 2020-05-14 PROCEDURE — 25010000002 LORAZEPAM PER 2 MG: Performed by: EMERGENCY MEDICINE

## 2020-05-14 RX ORDER — ONDANSETRON 4 MG/1
4 TABLET, ORALLY DISINTEGRATING ORAL EVERY 8 HOURS PRN
Qty: 15 TABLET | Refills: 0 | Status: ON HOLD | OUTPATIENT
Start: 2020-05-14 | End: 2020-08-17

## 2020-05-14 RX ORDER — LORAZEPAM 2 MG/ML
1 INJECTION INTRAMUSCULAR ONCE
Status: COMPLETED | OUTPATIENT
Start: 2020-05-14 | End: 2020-05-14

## 2020-05-14 RX ORDER — LEVETIRACETAM 5 MG/ML
500 INJECTION INTRAVASCULAR ONCE
Status: COMPLETED | OUTPATIENT
Start: 2020-05-14 | End: 2020-05-14

## 2020-05-14 RX ORDER — SODIUM CHLORIDE 0.9 % (FLUSH) 0.9 %
10 SYRINGE (ML) INJECTION AS NEEDED
Status: DISCONTINUED | OUTPATIENT
Start: 2020-05-14 | End: 2020-05-14 | Stop reason: HOSPADM

## 2020-05-14 RX ADMIN — LORAZEPAM 1 MG: 2 INJECTION INTRAMUSCULAR; INTRAVENOUS at 10:10

## 2020-05-14 RX ADMIN — LEVETIRACETAM 500 MG: 5 INJECTION INTRAVENOUS at 10:14

## 2020-05-14 NOTE — TELEPHONE ENCOUNTER
Pt called. Dr. Le sent pt to ER due to nausea, vomiting, and seizures.   She says that the ER recommended home health. She says that she needs referral for it and that insurance should cover it.  She is unable to hold down food or her meds and says that she has tired several nausea medications and they do not help.

## 2020-05-14 NOTE — ED PROVIDER NOTES
Subjective   Patient is a 50-year-old female who had a seizure apparently in the parking lot.  Upon arrival to the ED the patient's aunt unable offer complaints as she was actively seizing.          Review of Systems  Unable to be obtained due to the patient's condition  Past Medical History:   Diagnosis Date   • Anxiety    • Bipolar disorder (CMS/HCC)    • Depression    • Migraines    • Panic disorder    • Seizures (CMS/HCC)        Allergies   Allergen Reactions   • Sulfa Antibiotics Hives   • Tramadol Hcl Mental Status Change       Past Surgical History:   Procedure Laterality Date   • ABDOMINAL SURGERY     •  SECTION      x3   • HERNIA REPAIR     • HYSTERECTOMY         Family History   Problem Relation Age of Onset   • No Known Problems Mother    • No Known Problems Father    • No Known Problems Sister    • No Known Problems Brother    • No Known Problems Daughter    • No Known Problems Son    • No Known Problems Maternal Grandmother    • No Known Problems Paternal Grandmother    • No Known Problems Maternal Aunt    • No Known Problems Paternal Aunt        Social History     Socioeconomic History   • Marital status: Single     Spouse name: Not on file   • Number of children: Not on file   • Years of education: Not on file   • Highest education level: Not on file   Tobacco Use   • Smoking status: Current Every Day Smoker     Packs/day: 0.25     Types: Cigarettes   • Smokeless tobacco: Never Used   Substance and Sexual Activity   • Alcohol use: No     Frequency: Never   • Drug use: No   • Sexual activity: Never           Objective   Physical Exam  Neurologic exam initially showed the patient to be seizing.  After this had ceased she had what appeared to be a postictal phase.  She has no focal deficits.  HEENT exam shows TMs to be clear.  Oropharynx, spine sclerae nonicteric.  Neck has no adenopathy JVD or bruits.  Lungs are clear.  Heart is regular rate and rhythm without murmur or gallop.  Chest is  nontender.  Abdomen is soft nontender extremity exam is no cyanosis or edema.  Procedures           ED Course      Results for orders placed or performed during the hospital encounter of 05/14/20   Phenobarbital Level   Result Value Ref Range    Phenobarbital 4.9 (L) 10.0 - 30.0 mcg/mL   Basic Metabolic Panel   Result Value Ref Range    Glucose 124 (H) 65 - 99 mg/dL    BUN 12 6 - 20 mg/dL    Creatinine 0.95 0.57 - 1.00 mg/dL    Sodium 139 136 - 145 mmol/L    Potassium 3.7 3.5 - 5.2 mmol/L    Chloride 100 98 - 107 mmol/L    CO2 27.0 22.0 - 29.0 mmol/L    Calcium 9.4 8.6 - 10.5 mg/dL    eGFR Non African Amer 62 >60 mL/min/1.73    BUN/Creatinine Ratio 12.6 7.0 - 25.0    Anion Gap 12.0 5.0 - 15.0 mmol/L   CBC Auto Differential   Result Value Ref Range    WBC 7.10 3.40 - 10.80 10*3/mm3    RBC 4.16 3.77 - 5.28 10*6/mm3    Hemoglobin 12.6 12.0 - 15.9 g/dL    Hematocrit 37.0 34.0 - 46.6 %    MCV 89.1 79.0 - 97.0 fL    MCH 30.2 26.6 - 33.0 pg    MCHC 33.9 31.5 - 35.7 g/dL    RDW 13.9 12.3 - 15.4 %    RDW-SD 44.2 37.0 - 54.0 fl    MPV 9.2 6.0 - 12.0 fL    Platelets 237 140 - 450 10*3/mm3    Neutrophil % 42.3 (L) 42.7 - 76.0 %    Lymphocyte % 46.8 (H) 19.6 - 45.3 %    Monocyte % 9.4 5.0 - 12.0 %    Eosinophil % 0.8 0.3 - 6.2 %    Basophil % 0.7 0.0 - 1.5 %    Neutrophils, Absolute 3.00 1.70 - 7.00 10*3/mm3    Lymphocytes, Absolute 3.30 (H) 0.70 - 3.10 10*3/mm3    Monocytes, Absolute 0.70 0.10 - 0.90 10*3/mm3    Eosinophils, Absolute 0.10 0.00 - 0.40 10*3/mm3    Basophils, Absolute 0.10 0.00 - 0.20 10*3/mm3    nRBC 0.0 0.0 - 0.2 /100 WBC   Light Blue Top   Result Value Ref Range    Extra Tube hold for add-on                                           MDM  Number of Diagnoses or Management Options  Diagnosis management comments: Patient was given IV Keppra and Ativan.  Over the course of her ED visit the patient returned to normal baseline mental status.  She states she has had persistent vomiting over the past several weeks  related to gastroparesis.  She has a benign physical exam and no evidence of dehydration.  She has had no vomiting while being observed in the ED.  She will be discharged and will be placed on Zofran.  She will follow with MD for recheck.    Risk of Complications, Morbidity, and/or Mortality  Presenting problems: high  Diagnostic procedures: high  Management options: high    Patient Progress  Patient progress: stable      Final diagnoses:   Seizure (CMS/HCC)   Nausea and vomiting, intractability of vomiting not specified, unspecified vomiting type            Werner Lujan MD  05/14/20 [id number]

## 2020-05-14 NOTE — TELEPHONE ENCOUNTER
I do not know what she is requesting home health for.  There is nothing noted in the ER note.  Home health is for people who are unable to leave the home due to immobility, etc.  In regards to the nausea it appears that she is seeing [facility] for this.  She will need to follow up with them.  I know that they sent her home on zofran to help with the nausea. If she continues to not be able to hold anything down she will unfortunately have to return to the ER.  I know that she had previously had to have a tube placed in her nose to help give her nutrients.

## 2020-06-07 DIAGNOSIS — F40.01 PANIC DISORDER WITH AGORAPHOBIA: Chronic | ICD-10-CM

## 2020-06-07 RX ORDER — ALPRAZOLAM 2 MG/1
TABLET ORAL
Qty: 90 TABLET | Refills: 1 | Status: SHIPPED | OUTPATIENT
Start: 2020-06-07 | End: 2020-08-02

## 2020-06-25 ENCOUNTER — TRANSCRIBE ORDERS (OUTPATIENT)
Dept: ADMINISTRATIVE | Facility: HOSPITAL | Age: 50
End: 2020-06-25

## 2020-06-25 ENCOUNTER — OFFICE (AMBULATORY)
Dept: URBAN - METROPOLITAN AREA CLINIC 64 | Facility: CLINIC | Age: 50
End: 2020-06-25

## 2020-06-25 VITALS
DIASTOLIC BLOOD PRESSURE: 78 MMHG | HEIGHT: 64 IN | SYSTOLIC BLOOD PRESSURE: 110 MMHG | HEART RATE: 88 BPM | WEIGHT: 150 LBS

## 2020-06-25 DIAGNOSIS — R11.2 NAUSEA WITH VOMITING, UNSPECIFIED: ICD-10-CM

## 2020-06-25 DIAGNOSIS — K31.84 GASTROPARESIS: ICD-10-CM

## 2020-06-25 DIAGNOSIS — R11.2 NAUSEA AND VOMITING, INTRACTABILITY OF VOMITING NOT SPECIFIED, UNSPECIFIED VOMITING TYPE: Primary | ICD-10-CM

## 2020-06-25 DIAGNOSIS — R10.11 RIGHT UPPER QUADRANT PAIN: ICD-10-CM

## 2020-06-25 DIAGNOSIS — R63.4 ABNORMAL WEIGHT LOSS: ICD-10-CM

## 2020-06-25 PROCEDURE — 99214 OFFICE O/P EST MOD 30 MIN: CPT | Performed by: INTERNAL MEDICINE

## 2020-06-25 RX ORDER — GRANISETRON 3.1 MG/24H
PATCH TRANSDERMAL
Qty: 4 | Refills: 11 | Status: COMPLETED
Start: 2020-06-25 | End: 2021-07-14

## 2020-07-01 ENCOUNTER — HOSPITAL ENCOUNTER (OUTPATIENT)
Dept: CT IMAGING | Facility: HOSPITAL | Age: 50
End: 2020-07-01

## 2020-07-02 ENCOUNTER — HOSPITAL ENCOUNTER (OUTPATIENT)
Dept: CT IMAGING | Facility: HOSPITAL | Age: 50
Discharge: HOME | End: 2020-07-02
Admitting: INTERNAL MEDICINE

## 2020-07-02 DIAGNOSIS — R11.2 NAUSEA AND VOMITING, INTRACTABILITY OF VOMITING NOT SPECIFIED, UNSPECIFIED VOMITING TYPE: ICD-10-CM

## 2020-07-02 LAB — CREAT BLDA-MCNC: 0.8 MG/DL (ref 0.6–1.3)

## 2020-07-02 PROCEDURE — 82565 ASSAY OF CREATININE: CPT

## 2020-07-02 PROCEDURE — 0 IOPAMIDOL PER 1 ML: Performed by: INTERNAL MEDICINE

## 2020-07-02 PROCEDURE — 74177 CT ABD & PELVIS W/CONTRAST: CPT

## 2020-07-02 PROCEDURE — 70470 CT HEAD/BRAIN W/O & W/DYE: CPT

## 2020-07-02 RX ADMIN — IOPAMIDOL 100 ML: 755 INJECTION, SOLUTION INTRAVENOUS at 13:15

## 2020-07-09 ENCOUNTER — TRANSCRIBE ORDERS (OUTPATIENT)
Dept: ADMINISTRATIVE | Facility: HOSPITAL | Age: 50
End: 2020-07-09

## 2020-07-09 ENCOUNTER — OFFICE (AMBULATORY)
Dept: URBAN - METROPOLITAN AREA CLINIC 64 | Facility: CLINIC | Age: 50
End: 2020-07-09

## 2020-07-09 VITALS
SYSTOLIC BLOOD PRESSURE: 125 MMHG | HEIGHT: 64 IN | HEART RATE: 103 BPM | WEIGHT: 180 LBS | DIASTOLIC BLOOD PRESSURE: 90 MMHG

## 2020-07-09 DIAGNOSIS — K31.84 GASTROPARESIS: ICD-10-CM

## 2020-07-09 DIAGNOSIS — R11.0 NAUSEA: ICD-10-CM

## 2020-07-09 DIAGNOSIS — R13.10 DYSPHAGIA, UNSPECIFIED TYPE: Primary | ICD-10-CM

## 2020-07-09 DIAGNOSIS — R13.10 DYSPHAGIA, UNSPECIFIED: ICD-10-CM

## 2020-07-09 DIAGNOSIS — R11.2 NAUSEA WITH VOMITING, UNSPECIFIED: ICD-10-CM

## 2020-07-09 DIAGNOSIS — R63.4 ABNORMAL WEIGHT LOSS: ICD-10-CM

## 2020-07-09 PROCEDURE — 99214 OFFICE O/P EST MOD 30 MIN: CPT | Performed by: INTERNAL MEDICINE

## 2020-07-14 ENCOUNTER — APPOINTMENT (OUTPATIENT)
Dept: GENERAL RADIOLOGY | Facility: HOSPITAL | Age: 50
End: 2020-07-14

## 2020-07-15 ENCOUNTER — OFFICE VISIT (OUTPATIENT)
Dept: PSYCHIATRY | Facility: CLINIC | Age: 50
End: 2020-07-15

## 2020-07-15 DIAGNOSIS — F40.01 PANIC DISORDER WITH AGORAPHOBIA: Primary | ICD-10-CM

## 2020-07-15 DIAGNOSIS — F31.31 BIPOLAR AFFECTIVE DISORDER, CURRENTLY DEPRESSED, MILD (HCC): ICD-10-CM

## 2020-07-15 PROCEDURE — 99442 PR PHYS/QHP TELEPHONE EVALUATION 11-20 MIN: CPT | Performed by: PHYSICIAN ASSISTANT

## 2020-07-15 NOTE — PROGRESS NOTES
Subjective   Milka Espinoza is a 50 y.o.white female who presents today for follow up    You have chosen to receive care through a telephone visit. Do you consent to use a telephone visit for your medical care today? Yes    Chief Complaint: Depression and anxiety    History of Present Illness:   She is still having stomach issues, having swallow study tomorrow  Anxiety and panic attacks on occasion  Depression 6/10, due to long lasting stomach issues  Denies SI/HI  She is still doing better on the Xanax than she was with the Valium, switched in June    The following portions of the patient's history were reviewed and updated as appropriate: allergies, current medications, past family history, past medical history, past social history, past surgical history and problem list.    PAST PSYCHIATRIC HISTORY  Axis I  Affective/Bipoloar Disorder, Anxiety/Panic Disorder, Substance Abuse Disorder  Axis II  None    PAST OUTPATIENT TREATMENT  Diagnosis treated:  Affective Disorder, Anxiety/Panic Disorder, Substance/Alcohol Abuse  Treatment Type:  Individual Therapy, Medication Management  Prior Psychiatric Medications:  Abilify  Risperidone  Vraylar  Valium  Xanax  Lamictal  Trazodone  Seroquel  Support Groups:  None  Sequelae Of Mental Disorder:  social isolation, family disruption, emotional distress      Interval History  Improved    Side Effects  None     Mental status exam was reviewed and compared to her 3/11/20 visit and no updates were needed.    Past Medical History:  Past Medical History:   Diagnosis Date   • Anxiety    • Bipolar disorder (CMS/HCC)    • Depression    • Migraines    • Panic disorder    • Seizures (CMS/HCC)        Social History:  Social History     Socioeconomic History   • Marital status:      Spouse name: Not on file   • Number of children: Not on file   • Years of education: Not on file   • Highest education level: Not on file   Tobacco Use   • Smoking status: Current Every Day Smoker      Packs/day: 0.25     Types: Cigarettes   • Smokeless tobacco: Never Used   Substance and Sexual Activity   • Alcohol use: No     Frequency: Never   • Drug use: No   • Sexual activity: Never       Family History:  Family History   Problem Relation Age of Onset   • No Known Problems Mother    • No Known Problems Father    • No Known Problems Sister    • No Known Problems Brother    • No Known Problems Daughter    • No Known Problems Son    • No Known Problems Maternal Grandmother    • No Known Problems Paternal Grandmother    • No Known Problems Maternal Aunt    • No Known Problems Paternal Aunt        Past Surgical History:  Past Surgical History:   Procedure Laterality Date   • ABDOMINAL SURGERY     •  SECTION      x3   • HERNIA REPAIR     • HYSTERECTOMY         Problem List:  Patient Active Problem List   Diagnosis   • Anxiety state   • Bipolar affective disorder (CMS/HCC)   • Depression   • Mood disorder in conditions classified elsewhere   • Seizures (CMS/HCC)   • Opioid withdrawal (CMS/HCC)   • Altered level of consciousness   • Asthma   • Bronchitis, acute   • General medical exam   • Low back pain   • Syncope   • Unspecified sprain of unspecified wrist, initial encounter   • Upper respiratory tract infection   • Intractable nausea with vomiting   • Obesity   • GERD (gastroesophageal reflux disease)   • Tobacco use   • Nausea and vomiting   • Borderline hypoglycemic episodes   • Pseudoseizure       Allergy:   Allergies   Allergen Reactions   • Sulfa Antibiotics Hives   • Tramadol Hcl Mental Status Change        Discontinued Medications:  There are no discontinued medications.    Current Medications:   Current Outpatient Medications   Medication Sig Dispense Refill   • ALPRAZolam (XANAX) 2 MG tablet TAKE 1 TABLET BY MOUTH THREE TIMES DAILY AS NEEDED FOR ANXIETY 90 tablet 1   • busPIRone (BUSPAR) 10 MG tablet Take 1 tablet by mouth 3 (Three) Times a Day. 90 tablet 5   • gabapentin (NEURONTIN) 800 MG  tablet Take 800 mg by mouth 3 (Three) Times a Day.     • lamoTRIgine (LaMICtal) 100 MG tablet TAKE 1 TABLET BY MOUTH EVERY MORNING AND 2 TABLETS BY MOUTH EVERY NIGHT AT BEDTIME 270 tablet 1   • levETIRAcetam (KEPPRA) 500 MG tablet Take 1,500 mg by mouth 2 (Two) Times a Day.     • modafinil (PROVIGIL) 200 MG tablet Take 200 mg by mouth Every Morning.  5   • ondansetron ODT (ZOFRAN ODT) 4 MG disintegrating tablet Take 1 tablet by mouth Every 8 (Eight) Hours As Needed for Nausea or Vomiting. 20 tablet 0   • ondansetron ODT (ZOFRAN-ODT) 4 MG disintegrating tablet Place 1 tablet on the tongue Every 8 (Eight) Hours As Needed for Nausea or Vomiting. 15 tablet 0   • PHENobarbital (LUMINAL) 100 MG tablet Take 100 mg by mouth Every Night.     • SUMAtriptan (IMITREX) 100 MG tablet Take 50 mg by mouth 1 (One) Time As Needed for Migraine.     • traZODone (DESYREL) 150 MG tablet TAKE 1 TABLET BY MOUTH AT BEDTIME 90 tablet 1   • VRAYLAR 1.5 MG capsule capsule TAKE ONE CAPSULE BY MOUTH EVERY DAY 30 capsule 5     No current facility-administered medications for this visit.          Review of Symptoms:    Psychiatric/Behavioral: Negative for agitation, behavioral problems, confusion, decreased concentration, dysphoric mood, hallucinations, self-injury, sleep disturbance and suicidal ideas. The patient is not nervous/anxious and is not hyperactive.        Physical Exam:   not currently breastfeeding.    Mental Status Exam:   Hygiene:   Unable to assess via telephone  Cooperation:  Cooperative  Eye Contact:  NO eye contact via telephone  Psychomotor Behavior:  Appropriate  Affect:  Appropriate  Mood: normal  Hopelessness: Denies  Speech:  Normal  Thought Process:  Goal directed  Thought Content:  Normal  Suicidal:  None  Homicidal:  None  Hallucinations:  None  Delusion:  None  Memory:  Intact  Orientation:  Person, Place, Time and Situation  Reliability:  good  Insight:  Good  Judgement:  Good  Impulse Control:  Good  Physical/Medical  Issues:  No      Mental status exam was reviewed and compared to 3/11/20 visit and appropriate updates were made.    PHQ-9 Depression Screening  Little interest or pleasure in doing things? 1   Feeling down, depressed, or hopeless? 2   Trouble falling or staying asleep, or sleeping too much? 1   Feeling tired or having little energy? 2   Poor appetite or overeating? 3   Feeling bad about yourself - or that you are a failure or have let yourself or your family down? 1   Trouble concentrating on things, such as reading the newspaper or watching television? 1   Moving or speaking so slowly that other people could have noticed? Or the opposite - being so fidgety or restless that you have been moving around a lot more than usual? 0   Thoughts that you would be better off dead, or of hurting yourself in some way? 0   PHQ-9 Total Score 11   If you checked off any problems, how difficult have these problems made it for you to do your work, take care of things at home, or get along with other people? Very difficult           Former smoker    I advised Milka of the risks of tobacco use.     Lab Results:   Hospital Outpatient Visit on 07/02/2020   Component Date Value Ref Range Status   • Creatinine 07/02/2020 0.80  0.60 - 1.30 mg/dL Final    Serial Number: [id number]:  [id number]   • GFR MDRD  07/02/2020    Final    Serial Number: [id number]:  [id number]   • GFR MDRD Non  07/02/2020    Final    Serial Number: [id number]:  [id number]   Admission on 05/14/2020, Discharged on 05/14/2020   Component Date Value Ref Range Status   • Phenobarbital 05/14/2020 4.9* 10.0 - 30.0 mcg/mL Final   • Glucose 05/14/2020 124* 65 - 99 mg/dL Final   • BUN 05/14/2020 12  6 - 20 mg/dL Final   • Creatinine 05/14/2020 0.95  0.57 - 1.00 mg/dL Final   • Sodium 05/14/2020 139  136 - 145 mmol/L Final   • Potassium 05/14/2020 3.7  3.5 - 5.2 mmol/L Final   • Chloride 05/14/2020 100  98 - 107 mmol/L Final   • CO2  05/14/2020 27.0  22.0 - 29.0 mmol/L Final   • Calcium 05/14/2020 9.4  8.6 - 10.5 mg/dL Final   • eGFR Non African Amer 05/14/2020 62  >60 mL/min/1.73 Final   • BUN/Creatinine Ratio 05/14/2020 12.6  7.0 - 25.0 Final   • Anion Gap 05/14/2020 12.0  5.0 - 15.0 mmol/L Final   • WBC 05/14/2020 7.10  3.40 - 10.80 10*3/mm3 Final   • RBC 05/14/2020 4.16  3.77 - 5.28 10*6/mm3 Final   • Hemoglobin 05/14/2020 12.6  12.0 - 15.9 g/dL Final   • Hematocrit 05/14/2020 37.0  34.0 - 46.6 % Final   • MCV 05/14/2020 89.1  79.0 - 97.0 fL Final   • MCH 05/14/2020 30.2  26.6 - 33.0 pg Final   • MCHC 05/14/2020 33.9  31.5 - 35.7 g/dL Final   • RDW 05/14/2020 13.9  12.3 - 15.4 % Final   • RDW-SD 05/14/2020 44.2  37.0 - 54.0 fl Final   • MPV 05/14/2020 9.2  6.0 - 12.0 fL Final   • Platelets 05/14/2020 237  140 - 450 10*3/mm3 Final   • Neutrophil % 05/14/2020 42.3* 42.7 - 76.0 % Final   • Lymphocyte % 05/14/2020 46.8* 19.6 - 45.3 % Final   • Monocyte % 05/14/2020 9.4  5.0 - 12.0 % Final   • Eosinophil % 05/14/2020 0.8  0.3 - 6.2 % Final   • Basophil % 05/14/2020 0.7  0.0 - 1.5 % Final   • Neutrophils, Absolute 05/14/2020 3.00  1.70 - 7.00 10*3/mm3 Final   • Lymphocytes, Absolute 05/14/2020 3.30* 0.70 - 3.10 10*3/mm3 Final   • Monocytes, Absolute 05/14/2020 0.70  0.10 - 0.90 10*3/mm3 Final   • Eosinophils, Absolute 05/14/2020 0.10  0.00 - 0.40 10*3/mm3 Final   • Basophils, Absolute 05/14/2020 0.10  0.00 - 0.20 10*3/mm3 Final   • nRBC 05/14/2020 0.0  0.0 - 0.2 /100 WBC Final   • Extra Tube 05/14/2020 hold for add-on   Final    Auto resulted       Assessment/Plan   Problems Addressed this Visit        Other    Bipolar affective disorder (CMS/[facility]) (Chronic)      Other Visit Diagnoses     Panic disorder with agoraphobia    -  Primary          Visit Diagnoses:    ICD-10-CM ICD-9-CM   1. Panic disorder with agoraphobia F40.01 300.21   2. Bipolar affective disorder, currently depressed, mild (CMS/[facility]) F31.31 296.51       TREATMENT PLAN/GOALS: Continue  supportive psychotherapy efforts and medications as indicated. Treatment and medication options discussed during today's visit. Patient ackowledged and verbally consented to continue with current treatment plan and was educated on the importance of compliance with treatment and follow-up appointments.    MEDICATION ISSUES:  INSPECT reviewed as expected  Discussed medication options and treatment plan of prescribed medication as well as the risks, benefits, and side effects including potential falls, possible impaired driving and metabolic adversities among others. Patient is agreeable to call the office with any worsening of symptoms or onset of side effects. Patient is agreeable to call 911 or go to the nearest ER should he/she begin having SI/HI. No medication side effects or related complaints today.     Patient is doing well on her medication, has anxiety but no major pseudoseizures.    Continue Xanax, Vraylar, Lamictal, BuSpar and Seroquel at current dosages with no changes, no refills needed today      MEDS ORDERED DURING VISIT:  No orders of the defined types were placed in this encounter.      Return in about 4 months (around 11/15/2020).    This visit has been rescheduled as a phone visit to comply with patient safety concerns in accordance with CDC recommendations. Total time of discussion was 15 minutes.      This document has been electronically signed by Love Bourgeois PA-C  July 15, 2020 14:50

## 2020-07-16 ENCOUNTER — HOSPITAL ENCOUNTER (OUTPATIENT)
Dept: GENERAL RADIOLOGY | Facility: HOSPITAL | Age: 50
Discharge: HOME | End: 2020-07-16
Admitting: INTERNAL MEDICINE

## 2020-07-16 DIAGNOSIS — R11.0 NAUSEA: ICD-10-CM

## 2020-07-16 DIAGNOSIS — R13.10 DYSPHAGIA, UNSPECIFIED TYPE: ICD-10-CM

## 2020-07-16 PROCEDURE — A9270 NON-COVERED ITEM OR SERVICE: HCPCS | Performed by: INTERNAL MEDICINE

## 2020-07-16 PROCEDURE — 63710000001 BARIUM SULFATE 40 % SUSPENSION: Performed by: INTERNAL MEDICINE

## 2020-07-16 PROCEDURE — 63710000001 BARIUM SULFATE 700 MG TABLET: Performed by: INTERNAL MEDICINE

## 2020-07-16 PROCEDURE — 63710000001 BARIUM SULFATE 98 % RECONSTITUTED SUSPENSION: Performed by: INTERNAL MEDICINE

## 2020-07-16 PROCEDURE — 74220 X-RAY XM ESOPHAGUS 1CNTRST: CPT

## 2020-07-16 RX ADMIN — BARIUM SULFATE 135 ML: 980 POWDER, FOR SUSPENSION ORAL at 12:05

## 2020-07-16 RX ADMIN — BARIUM SULFATE 700 MG: 700 TABLET ORAL at 12:05

## 2020-07-16 RX ADMIN — BARIUM SULFATE 50 ML: 400 SUSPENSION ORAL at 12:05

## 2020-07-20 RX ORDER — QUETIAPINE 400 MG/1
TABLET, FILM COATED, EXTENDED RELEASE ORAL
Qty: 30 TABLET | Refills: 5 | Status: SHIPPED | OUTPATIENT
Start: 2020-07-20 | End: 2020-12-23

## 2020-07-24 DIAGNOSIS — F40.01 PANIC DISORDER WITH AGORAPHOBIA: ICD-10-CM

## 2020-07-26 RX ORDER — BUSPIRONE HYDROCHLORIDE 10 MG/1
TABLET ORAL
Qty: 90 TABLET | Refills: 5 | Status: SHIPPED | OUTPATIENT
Start: 2020-07-26 | End: 2020-11-18 | Stop reason: DRUGHIGH

## 2020-07-30 DIAGNOSIS — F40.01 PANIC DISORDER WITH AGORAPHOBIA: Chronic | ICD-10-CM

## 2020-08-02 RX ORDER — ALPRAZOLAM 2 MG/1
TABLET ORAL
Qty: 90 TABLET | Refills: 2 | Status: SHIPPED | OUTPATIENT
Start: 2020-08-02 | End: 2020-10-25

## 2020-08-13 ENCOUNTER — ON CAMPUS - OUTPATIENT (AMBULATORY)
Dept: URBAN - METROPOLITAN AREA HOSPITAL 2 | Facility: HOSPITAL | Age: 50
End: 2020-08-13
Payer: MEDICAID

## 2020-08-13 ENCOUNTER — OFFICE (AMBULATORY)
Dept: URBAN - METROPOLITAN AREA PATHOLOGY 4 | Facility: PATHOLOGY | Age: 50
End: 2020-08-13

## 2020-08-13 ENCOUNTER — OFFICE (AMBULATORY)
Dept: URBAN - METROPOLITAN AREA PATHOLOGY 4 | Facility: PATHOLOGY | Age: 50
End: 2020-08-13
Payer: MEDICAID

## 2020-08-13 VITALS
OXYGEN SATURATION: 100 % | RESPIRATION RATE: 17 BRPM | DIASTOLIC BLOOD PRESSURE: 71 MMHG | DIASTOLIC BLOOD PRESSURE: 61 MMHG | SYSTOLIC BLOOD PRESSURE: 110 MMHG | SYSTOLIC BLOOD PRESSURE: 122 MMHG | TEMPERATURE: 97.1 F | OXYGEN SATURATION: 99 % | HEIGHT: 64 IN | RESPIRATION RATE: 16 BRPM | HEART RATE: 87 BPM | DIASTOLIC BLOOD PRESSURE: 81 MMHG | HEART RATE: 78 BPM | SYSTOLIC BLOOD PRESSURE: 119 MMHG | DIASTOLIC BLOOD PRESSURE: 69 MMHG | SYSTOLIC BLOOD PRESSURE: 115 MMHG | SYSTOLIC BLOOD PRESSURE: 108 MMHG | DIASTOLIC BLOOD PRESSURE: 67 MMHG | HEART RATE: 91 BPM | RESPIRATION RATE: 19 BRPM | WEIGHT: 173 LBS | SYSTOLIC BLOOD PRESSURE: 136 MMHG | RESPIRATION RATE: 18 BRPM | HEART RATE: 76 BPM | DIASTOLIC BLOOD PRESSURE: 73 MMHG

## 2020-08-13 DIAGNOSIS — K20.0 EOSINOPHILIC ESOPHAGITIS: ICD-10-CM

## 2020-08-13 DIAGNOSIS — K31.89 OTHER DISEASES OF STOMACH AND DUODENUM: ICD-10-CM

## 2020-08-13 DIAGNOSIS — K22.2 ESOPHAGEAL OBSTRUCTION: ICD-10-CM

## 2020-08-13 DIAGNOSIS — R13.10 DYSPHAGIA, UNSPECIFIED: ICD-10-CM

## 2020-08-13 DIAGNOSIS — K29.50 UNSPECIFIED CHRONIC GASTRITIS WITHOUT BLEEDING: ICD-10-CM

## 2020-08-13 DIAGNOSIS — R11.2 NAUSEA WITH VOMITING, UNSPECIFIED: ICD-10-CM

## 2020-08-13 DIAGNOSIS — B96.81 HELICOBACTER PYLORI [H. PYLORI] AS THE CAUSE OF DISEASES CLA: ICD-10-CM

## 2020-08-13 DIAGNOSIS — R93.3 ABNORMAL FINDINGS ON DIAGNOSTIC IMAGING OF OTHER PARTS OF DI: ICD-10-CM

## 2020-08-13 PROBLEM — K22.8 OTHER SPECIFIED DISEASES OF ESOPHAGUS: Status: ACTIVE | Noted: 2020-08-13

## 2020-08-13 PROBLEM — K20.8 OTHER ESOPHAGITIS: Status: ACTIVE | Noted: 2020-08-13

## 2020-08-13 LAB
GI HISTOLOGY: A. SELECT: (no result)
GI HISTOLOGY: B. SELECT: (no result)
GI HISTOLOGY: PDF REPORT: (no result)

## 2020-08-13 PROCEDURE — 43450 DILATE ESOPHAGUS 1/MULT PASS: CPT | Performed by: INTERNAL MEDICINE

## 2020-08-13 PROCEDURE — 43239 EGD BIOPSY SINGLE/MULTIPLE: CPT | Performed by: INTERNAL MEDICINE

## 2020-08-13 PROCEDURE — 88305 TISSUE EXAM BY PATHOLOGIST: CPT | Mod: 26 | Performed by: INTERNAL MEDICINE

## 2020-08-13 NOTE — SERVICEHPINOTES
GABBY JOHNSON  is a  50  female   who presents today for a  EGD   for   the indications listed below. The updated Patient Profile was reviewed prior to the procedure, in conjunction with the Physical Exam, including medical conditions, surgical procedures, medications, allergies, family history and social history. See Physical Exam time stamp below for date and time of HPI completion.Pre-operatively, I reviewed the indication(s) for the procedure, the risks of the procedure [including but not limited to: unexpected bleeding possibly requiring hospitalization and/or unplanned repeat procedures, perforation possibly requiring surgical treatment, missed lesions and complications of sedation/MAC (also explained by anesthesia staff)]. I have evaluated the patient for risks associated with the planned anesthesia and the procedure to be performed and find the patient an acceptable candidate for IV sedation.Multiple opportunities were provided for any questions or concerns, and all questions were answered satisfactorily before any anesthesia was administered. We will proceed with the planned procedure.BR

## 2020-08-17 ENCOUNTER — HOSPITAL ENCOUNTER (INPATIENT)
Facility: HOSPITAL | Age: 50
LOS: 6 days | Discharge: HOME HEALTH | End: 2020-08-23
Attending: EMERGENCY MEDICINE | Admitting: INTERNAL MEDICINE

## 2020-08-17 DIAGNOSIS — R56.9 SEIZURE (HCC): Primary | ICD-10-CM

## 2020-08-17 DIAGNOSIS — N39.0 URINARY TRACT INFECTION WITHOUT HEMATURIA, SITE UNSPECIFIED: ICD-10-CM

## 2020-08-17 DIAGNOSIS — R11.2 INTRACTABLE VOMITING WITH NAUSEA, UNSPECIFIED VOMITING TYPE: ICD-10-CM

## 2020-08-17 DIAGNOSIS — R11.10 INTRACTABLE VOMITING, PRESENCE OF NAUSEA NOT SPECIFIED, UNSPECIFIED VOMITING TYPE: ICD-10-CM

## 2020-08-17 LAB
ALBUMIN SERPL-MCNC: 4.1 G/DL (ref 3.5–5.2)
ALBUMIN/GLOB SERPL: 1.4 G/DL
ALP SERPL-CCNC: 128 U/L (ref 39–117)
ALT SERPL W P-5'-P-CCNC: 22 U/L (ref 1–33)
AMPHET+METHAMPHET UR QL: NEGATIVE
ANION GAP SERPL CALCULATED.3IONS-SCNC: 10 MMOL/L (ref 5–15)
APAP SERPL-MCNC: <5 MCG/ML (ref 10–30)
APTT PPP: 26.4 SECONDS (ref 24–31)
AST SERPL-CCNC: 15 U/L (ref 1–32)
BACTERIA UR QL AUTO: ABNORMAL /HPF
BARBITURATES UR QL SCN: POSITIVE
BASOPHILS # BLD AUTO: 0.1 10*3/MM3 (ref 0–0.2)
BASOPHILS NFR BLD AUTO: 1.2 % (ref 0–1.5)
BENZODIAZ UR QL SCN: POSITIVE
BILIRUB SERPL-MCNC: 0.2 MG/DL (ref 0–1.2)
BILIRUB UR QL STRIP: ABNORMAL
BUN SERPL-MCNC: 6 MG/DL (ref 6–20)
BUN SERPL-MCNC: ABNORMAL MG/DL
BUN/CREAT SERPL: ABNORMAL
CALCIUM SPEC-SCNC: 9.2 MG/DL (ref 8.6–10.5)
CANNABINOIDS SERPL QL: POSITIVE
CHLORIDE SERPL-SCNC: 103 MMOL/L (ref 98–107)
CLARITY UR: CLEAR
CO2 SERPL-SCNC: 26 MMOL/L (ref 22–29)
COCAINE UR QL: NEGATIVE
COLOR UR: ABNORMAL
CREAT SERPL-MCNC: 0.69 MG/DL (ref 0.57–1)
DEPRECATED RDW RBC AUTO: 44.2 FL (ref 37–54)
EOSINOPHIL # BLD AUTO: 0 10*3/MM3 (ref 0–0.4)
EOSINOPHIL NFR BLD AUTO: 0.4 % (ref 0.3–6.2)
ERYTHROCYTE [DISTWIDTH] IN BLOOD BY AUTOMATED COUNT: 14.4 % (ref 12.3–15.4)
ETHANOL UR QL: <0.01 %
GFR SERPL CREATININE-BSD FRML MDRD: 89 ML/MIN/1.73
GLOBULIN UR ELPH-MCNC: 2.9 GM/DL
GLUCOSE SERPL-MCNC: 108 MG/DL (ref 65–99)
GLUCOSE UR STRIP-MCNC: NEGATIVE MG/DL
HCT VFR BLD AUTO: 39.7 % (ref 34–46.6)
HGB BLD-MCNC: 13.3 G/DL (ref 12–15.9)
HGB UR QL STRIP.AUTO: NEGATIVE
HOLD SPECIMEN: NORMAL
HYALINE CASTS UR QL AUTO: ABNORMAL /LPF
INR PPP: 1.06 (ref 0.93–1.1)
KETONES UR QL STRIP: ABNORMAL
LEUKOCYTE ESTERASE UR QL STRIP.AUTO: NEGATIVE
LYMPHOCYTES # BLD AUTO: 2 10*3/MM3 (ref 0.7–3.1)
LYMPHOCYTES NFR BLD AUTO: 40.5 % (ref 19.6–45.3)
MCH RBC QN AUTO: 29 PG (ref 26.6–33)
MCHC RBC AUTO-ENTMCNC: 33.4 G/DL (ref 31.5–35.7)
MCV RBC AUTO: 86.9 FL (ref 79–97)
METHADONE UR QL SCN: NEGATIVE
MONOCYTES # BLD AUTO: 0.3 10*3/MM3 (ref 0.1–0.9)
MONOCYTES NFR BLD AUTO: 6.1 % (ref 5–12)
MUCOUS THREADS URNS QL MICRO: ABNORMAL /HPF
NEUTROPHILS NFR BLD AUTO: 2.6 10*3/MM3 (ref 1.7–7)
NEUTROPHILS NFR BLD AUTO: 51.8 % (ref 42.7–76)
NITRITE UR QL STRIP: POSITIVE
NRBC BLD AUTO-RTO: 0.2 /100 WBC (ref 0–0.2)
OPIATES UR QL: NEGATIVE
OXYCODONE UR QL SCN: NEGATIVE
PH UR STRIP.AUTO: 6 [PH] (ref 5–8)
PHENOBARB SERPL-MCNC: 47.6 MCG/ML (ref 10–30)
PHENOBARB SERPL-MCNC: 52.9 MCG/ML (ref 10–30)
PLATELET # BLD AUTO: 217 10*3/MM3 (ref 140–450)
PMV BLD AUTO: 8.4 FL (ref 6–12)
POTASSIUM SERPL-SCNC: 3.7 MMOL/L (ref 3.5–5.2)
PROT SERPL-MCNC: 7 G/DL (ref 6–8.5)
PROT UR QL STRIP: ABNORMAL
PROTHROMBIN TIME: 11.4 SECONDS (ref 9.6–11.7)
RBC # BLD AUTO: 4.57 10*6/MM3 (ref 3.77–5.28)
RBC # UR: ABNORMAL /HPF
REF LAB TEST METHOD: ABNORMAL
SALICYLATES SERPL-MCNC: <0.3 MG/DL
SODIUM SERPL-SCNC: 139 MMOL/L (ref 136–145)
SP GR UR STRIP: 1.02 (ref 1–1.03)
SQUAMOUS #/AREA URNS HPF: ABNORMAL /HPF
TROPONIN T SERPL-MCNC: <0.01 NG/ML (ref 0–0.03)
UROBILINOGEN UR QL STRIP: ABNORMAL
WBC # BLD AUTO: 5 10*3/MM3 (ref 3.4–10.8)
WBC UR QL AUTO: ABNORMAL /HPF

## 2020-08-17 PROCEDURE — 81001 URINALYSIS AUTO W/SCOPE: CPT | Performed by: EMERGENCY MEDICINE

## 2020-08-17 PROCEDURE — 25010000002 HEPARIN (PORCINE) PER 1000 UNITS: Performed by: INTERNAL MEDICINE

## 2020-08-17 PROCEDURE — 80307 DRUG TEST PRSMV CHEM ANLYZR: CPT | Performed by: EMERGENCY MEDICINE

## 2020-08-17 PROCEDURE — 85025 COMPLETE CBC W/AUTO DIFF WBC: CPT | Performed by: EMERGENCY MEDICINE

## 2020-08-17 PROCEDURE — 99284 EMERGENCY DEPT VISIT MOD MDM: CPT

## 2020-08-17 PROCEDURE — G0378 HOSPITAL OBSERVATION PER HR: HCPCS

## 2020-08-17 PROCEDURE — 25010000002 ONDANSETRON PER 1 MG: Performed by: EMERGENCY MEDICINE

## 2020-08-17 PROCEDURE — 80184 ASSAY OF PHENOBARBITAL: CPT | Performed by: INTERNAL MEDICINE

## 2020-08-17 PROCEDURE — 99219 PR INITIAL OBSERVATION CARE/DAY 50 MINUTES: CPT | Performed by: INTERNAL MEDICINE

## 2020-08-17 PROCEDURE — 80053 COMPREHEN METABOLIC PANEL: CPT | Performed by: EMERGENCY MEDICINE

## 2020-08-17 PROCEDURE — 25010000002 CEFTRIAXONE PER 250 MG: Performed by: EMERGENCY MEDICINE

## 2020-08-17 PROCEDURE — 84484 ASSAY OF TROPONIN QUANT: CPT | Performed by: EMERGENCY MEDICINE

## 2020-08-17 PROCEDURE — 36415 COLL VENOUS BLD VENIPUNCTURE: CPT

## 2020-08-17 PROCEDURE — 85730 THROMBOPLASTIN TIME PARTIAL: CPT | Performed by: EMERGENCY MEDICINE

## 2020-08-17 PROCEDURE — 85610 PROTHROMBIN TIME: CPT | Performed by: EMERGENCY MEDICINE

## 2020-08-17 PROCEDURE — 25010000002 PROMETHAZINE PER 50 MG: Performed by: EMERGENCY MEDICINE

## 2020-08-17 PROCEDURE — 25010000003 LEVETIRACETAM IN NACL 0.75% 1000 MG/100ML SOLUTION: Performed by: EMERGENCY MEDICINE

## 2020-08-17 PROCEDURE — 80184 ASSAY OF PHENOBARBITAL: CPT | Performed by: EMERGENCY MEDICINE

## 2020-08-17 RX ORDER — LEVETIRACETAM 10 MG/ML
1000 INJECTION INTRAVASCULAR ONCE
Status: COMPLETED | OUTPATIENT
Start: 2020-08-17 | End: 2020-08-17

## 2020-08-17 RX ORDER — ONDANSETRON 2 MG/ML
4 INJECTION INTRAMUSCULAR; INTRAVENOUS EVERY 6 HOURS PRN
Status: DISCONTINUED | OUTPATIENT
Start: 2020-08-17 | End: 2020-08-23 | Stop reason: HOSPADM

## 2020-08-17 RX ORDER — ACETAMINOPHEN 325 MG/1
325 TABLET ORAL EVERY 4 HOURS PRN
Status: DISCONTINUED | OUTPATIENT
Start: 2020-08-17 | End: 2020-08-23 | Stop reason: HOSPADM

## 2020-08-17 RX ORDER — HEPARIN SODIUM 5000 [USP'U]/ML
5000 INJECTION, SOLUTION INTRAVENOUS; SUBCUTANEOUS EVERY 12 HOURS SCHEDULED
Status: DISCONTINUED | OUTPATIENT
Start: 2020-08-17 | End: 2020-08-23 | Stop reason: HOSPADM

## 2020-08-17 RX ORDER — PRAMIPEXOLE DIHYDROCHLORIDE 1 MG/1
1 TABLET ORAL NIGHTLY
COMMUNITY
Start: 2020-07-13 | End: 2021-08-06

## 2020-08-17 RX ORDER — PRUCALOPRIDE 2 MG/1
1 TABLET, FILM COATED ORAL DAILY
COMMUNITY
Start: 2020-07-13 | End: 2020-08-23 | Stop reason: HOSPADM

## 2020-08-17 RX ORDER — NITROGLYCERIN 0.4 MG/1
0.4 TABLET SUBLINGUAL
Status: DISCONTINUED | OUTPATIENT
Start: 2020-08-17 | End: 2020-08-23 | Stop reason: HOSPADM

## 2020-08-17 RX ORDER — ACETAMINOPHEN 160 MG/5ML
325 SOLUTION ORAL EVERY 4 HOURS PRN
Status: DISCONTINUED | OUTPATIENT
Start: 2020-08-17 | End: 2020-08-23 | Stop reason: HOSPADM

## 2020-08-17 RX ORDER — NICOTINE 21 MG/24HR
1 PATCH, TRANSDERMAL 24 HOURS TRANSDERMAL EVERY 24 HOURS
Status: DISCONTINUED | OUTPATIENT
Start: 2020-08-17 | End: 2020-08-23 | Stop reason: HOSPADM

## 2020-08-17 RX ORDER — ONDANSETRON 2 MG/ML
4 INJECTION INTRAMUSCULAR; INTRAVENOUS ONCE
Status: COMPLETED | OUTPATIENT
Start: 2020-08-17 | End: 2020-08-17

## 2020-08-17 RX ORDER — SODIUM CHLORIDE 0.9 % (FLUSH) 0.9 %
10 SYRINGE (ML) INJECTION AS NEEDED
Status: DISCONTINUED | OUTPATIENT
Start: 2020-08-17 | End: 2020-08-23 | Stop reason: HOSPADM

## 2020-08-17 RX ORDER — ACETAMINOPHEN 650 MG/1
650 SUPPOSITORY RECTAL EVERY 4 HOURS PRN
Status: DISCONTINUED | OUTPATIENT
Start: 2020-08-17 | End: 2020-08-23 | Stop reason: HOSPADM

## 2020-08-17 RX ORDER — SODIUM CHLORIDE 9 MG/ML
100 INJECTION, SOLUTION INTRAVENOUS CONTINUOUS
Status: DISCONTINUED | OUTPATIENT
Start: 2020-08-17 | End: 2020-08-23 | Stop reason: HOSPADM

## 2020-08-17 RX ORDER — ACETAMINOPHEN 500 MG
1000 TABLET ORAL ONCE
Status: COMPLETED | OUTPATIENT
Start: 2020-08-17 | End: 2020-08-17

## 2020-08-17 RX ADMIN — SODIUM CHLORIDE 500 ML: 900 INJECTION, SOLUTION INTRAVENOUS at 13:56

## 2020-08-17 RX ADMIN — ONDANSETRON 4 MG: 2 INJECTION INTRAMUSCULAR; INTRAVENOUS at 13:56

## 2020-08-17 RX ADMIN — HEPARIN SODIUM 5000 UNITS: 5000 INJECTION INTRAVENOUS; SUBCUTANEOUS at 20:50

## 2020-08-17 RX ADMIN — SODIUM CHLORIDE 12.5 MG: 900 INJECTION, SOLUTION INTRAVENOUS at 16:21

## 2020-08-17 RX ADMIN — Medication 10 ML: at 19:38

## 2020-08-17 RX ADMIN — ACETAMINOPHEN 650 MG: 650 SUPPOSITORY RECTAL at 23:29

## 2020-08-17 RX ADMIN — LEVETIRACETAM 1000 MG: 10 INJECTION INTRAVENOUS at 13:57

## 2020-08-17 RX ADMIN — NICOTINE 1 PATCH: 21 PATCH TRANSDERMAL at 19:38

## 2020-08-17 RX ADMIN — WATER 1 G: 1000 INJECTION, SOLUTION INTRAVENOUS at 15:40

## 2020-08-17 RX ADMIN — SODIUM CHLORIDE 100 ML/HR: 900 INJECTION, SOLUTION INTRAVENOUS at 19:44

## 2020-08-17 RX ADMIN — ACETAMINOPHEN 1000 MG: 500 TABLET, FILM COATED ORAL at 15:40

## 2020-08-18 LAB
ALBUMIN SERPL-MCNC: 3.6 G/DL (ref 3.5–5.2)
ALBUMIN/GLOB SERPL: 1.6 G/DL
ALP SERPL-CCNC: 97 U/L (ref 39–117)
ALT SERPL W P-5'-P-CCNC: 17 U/L (ref 1–33)
ANION GAP SERPL CALCULATED.3IONS-SCNC: 9 MMOL/L (ref 5–15)
AST SERPL-CCNC: 15 U/L (ref 1–32)
BASOPHILS # BLD AUTO: 0 10*3/MM3 (ref 0–0.2)
BASOPHILS NFR BLD AUTO: 0.8 % (ref 0–1.5)
BILIRUB SERPL-MCNC: 0.2 MG/DL (ref 0–1.2)
BUN SERPL-MCNC: 7 MG/DL (ref 6–20)
BUN SERPL-MCNC: ABNORMAL MG/DL
BUN/CREAT SERPL: ABNORMAL
CALCIUM SPEC-SCNC: 8 MG/DL (ref 8.6–10.5)
CHLORIDE SERPL-SCNC: 107 MMOL/L (ref 98–107)
CO2 SERPL-SCNC: 25 MMOL/L (ref 22–29)
CREAT SERPL-MCNC: 0.71 MG/DL (ref 0.57–1)
DEPRECATED RDW RBC AUTO: 43.3 FL (ref 37–54)
EOSINOPHIL # BLD AUTO: 0 10*3/MM3 (ref 0–0.4)
EOSINOPHIL NFR BLD AUTO: 0.8 % (ref 0.3–6.2)
ERYTHROCYTE [DISTWIDTH] IN BLOOD BY AUTOMATED COUNT: 14.1 % (ref 12.3–15.4)
GFR SERPL CREATININE-BSD FRML MDRD: 86 ML/MIN/1.73
GLOBULIN UR ELPH-MCNC: 2.2 GM/DL
GLUCOSE BLDC GLUCOMTR-MCNC: 61 MG/DL (ref 70–105)
GLUCOSE BLDC GLUCOMTR-MCNC: 78 MG/DL (ref 70–105)
GLUCOSE BLDC GLUCOMTR-MCNC: 85 MG/DL (ref 70–105)
GLUCOSE SERPL-MCNC: 74 MG/DL (ref 65–99)
HCT VFR BLD AUTO: 32.8 % (ref 34–46.6)
HGB BLD-MCNC: 10.8 G/DL (ref 12–15.9)
LYMPHOCYTES # BLD AUTO: 1.8 10*3/MM3 (ref 0.7–3.1)
LYMPHOCYTES NFR BLD AUTO: 52.9 % (ref 19.6–45.3)
MCH RBC QN AUTO: 28.7 PG (ref 26.6–33)
MCHC RBC AUTO-ENTMCNC: 33 G/DL (ref 31.5–35.7)
MCV RBC AUTO: 86.9 FL (ref 79–97)
MONOCYTES # BLD AUTO: 0.2 10*3/MM3 (ref 0.1–0.9)
MONOCYTES NFR BLD AUTO: 7 % (ref 5–12)
NEUTROPHILS NFR BLD AUTO: 1.3 10*3/MM3 (ref 1.7–7)
NEUTROPHILS NFR BLD AUTO: 38.5 % (ref 42.7–76)
NRBC BLD AUTO-RTO: 0.1 /100 WBC (ref 0–0.2)
PHENOBARB SERPL-MCNC: 42.8 MCG/ML (ref 10–30)
PLATELET # BLD AUTO: 176 10*3/MM3 (ref 140–450)
PMV BLD AUTO: 8.2 FL (ref 6–12)
POTASSIUM SERPL-SCNC: 4 MMOL/L (ref 3.5–5.2)
PROT SERPL-MCNC: 5.8 G/DL (ref 6–8.5)
RBC # BLD AUTO: 3.78 10*6/MM3 (ref 3.77–5.28)
SODIUM SERPL-SCNC: 141 MMOL/L (ref 136–145)
WBC # BLD AUTO: 3.4 10*3/MM3 (ref 3.4–10.8)

## 2020-08-18 PROCEDURE — 80053 COMPREHEN METABOLIC PANEL: CPT | Performed by: INTERNAL MEDICINE

## 2020-08-18 PROCEDURE — 85025 COMPLETE CBC W/AUTO DIFF WBC: CPT | Performed by: INTERNAL MEDICINE

## 2020-08-18 PROCEDURE — 80177 DRUG SCRN QUAN LEVETIRACETAM: CPT | Performed by: INTERNAL MEDICINE

## 2020-08-18 PROCEDURE — 80184 ASSAY OF PHENOBARBITAL: CPT | Performed by: INTERNAL MEDICINE

## 2020-08-18 PROCEDURE — 99221 1ST HOSP IP/OBS SF/LOW 40: CPT | Performed by: PSYCHIATRY & NEUROLOGY

## 2020-08-18 PROCEDURE — G0378 HOSPITAL OBSERVATION PER HR: HCPCS

## 2020-08-18 PROCEDURE — 25010000002 ONDANSETRON PER 1 MG: Performed by: INTERNAL MEDICINE

## 2020-08-18 PROCEDURE — 25010000002 HEPARIN (PORCINE) PER 1000 UNITS: Performed by: INTERNAL MEDICINE

## 2020-08-18 PROCEDURE — 99225 PR SBSQ OBSERVATION CARE/DAY 25 MINUTES: CPT | Performed by: INTERNAL MEDICINE

## 2020-08-18 PROCEDURE — 82962 GLUCOSE BLOOD TEST: CPT

## 2020-08-18 RX ORDER — LEVETIRACETAM 500 MG/1
1500 TABLET ORAL 2 TIMES DAILY
Status: DISCONTINUED | OUTPATIENT
Start: 2020-08-18 | End: 2020-08-21

## 2020-08-18 RX ORDER — METOCLOPRAMIDE 5 MG/1
5 TABLET ORAL
Status: DISCONTINUED | OUTPATIENT
Start: 2020-08-18 | End: 2020-08-23 | Stop reason: HOSPADM

## 2020-08-18 RX ORDER — MODAFINIL 100 MG/1
200 TABLET ORAL EVERY MORNING
Status: DISCONTINUED | OUTPATIENT
Start: 2020-08-18 | End: 2020-08-23 | Stop reason: HOSPADM

## 2020-08-18 RX ORDER — PRAMIPEXOLE DIHYDROCHLORIDE 1 MG/1
1 TABLET ORAL NIGHTLY
Status: DISCONTINUED | OUTPATIENT
Start: 2020-08-18 | End: 2020-08-23 | Stop reason: HOSPADM

## 2020-08-18 RX ORDER — ALPRAZOLAM 1 MG/1
2 TABLET ORAL 3 TIMES DAILY PRN
Status: DISCONTINUED | OUTPATIENT
Start: 2020-08-18 | End: 2020-08-23 | Stop reason: HOSPADM

## 2020-08-18 RX ORDER — BUSPIRONE HYDROCHLORIDE 10 MG/1
10 TABLET ORAL 3 TIMES DAILY
Status: DISCONTINUED | OUTPATIENT
Start: 2020-08-18 | End: 2020-08-23 | Stop reason: HOSPADM

## 2020-08-18 RX ORDER — LACOSAMIDE 50 MG/1
50 TABLET ORAL EVERY 12 HOURS SCHEDULED
Status: DISCONTINUED | OUTPATIENT
Start: 2020-08-18 | End: 2020-08-23 | Stop reason: HOSPADM

## 2020-08-18 RX ORDER — LAMOTRIGINE 100 MG/1
100 TABLET ORAL DAILY
Status: DISCONTINUED | OUTPATIENT
Start: 2020-08-18 | End: 2020-08-23 | Stop reason: HOSPADM

## 2020-08-18 RX ORDER — DEXTROSE MONOHYDRATE 25 G/50ML
INJECTION, SOLUTION INTRAVENOUS
Status: COMPLETED
Start: 2020-08-18 | End: 2020-08-18

## 2020-08-18 RX ORDER — MIDODRINE HYDROCHLORIDE 5 MG/1
5 TABLET ORAL ONCE
Status: COMPLETED | OUTPATIENT
Start: 2020-08-18 | End: 2020-08-18

## 2020-08-18 RX ORDER — GABAPENTIN 400 MG/1
800 CAPSULE ORAL EVERY 8 HOURS SCHEDULED
Status: DISCONTINUED | OUTPATIENT
Start: 2020-08-18 | End: 2020-08-21

## 2020-08-18 RX ORDER — LACOSAMIDE 100 MG/1
100 TABLET ORAL EVERY 12 HOURS SCHEDULED
Status: DISCONTINUED | OUTPATIENT
Start: 2020-08-25 | End: 2020-08-23 | Stop reason: HOSPADM

## 2020-08-18 RX ORDER — SUMATRIPTAN 50 MG/1
100 TABLET, FILM COATED ORAL
Status: DISCONTINUED | OUTPATIENT
Start: 2020-08-18 | End: 2020-08-23 | Stop reason: HOSPADM

## 2020-08-18 RX ORDER — QUETIAPINE 400 MG/1
400 TABLET, FILM COATED, EXTENDED RELEASE ORAL NIGHTLY
Status: DISCONTINUED | OUTPATIENT
Start: 2020-08-18 | End: 2020-08-23 | Stop reason: HOSPADM

## 2020-08-18 RX ORDER — LAMOTRIGINE 100 MG/1
100 TABLET ORAL SEE ADMIN INSTRUCTIONS
Status: DISCONTINUED | OUTPATIENT
Start: 2020-08-18 | End: 2020-08-18

## 2020-08-18 RX ORDER — LACOSAMIDE 50 MG/1
50 TABLET ORAL EVERY 12 HOURS SCHEDULED
Status: DISCONTINUED | OUTPATIENT
Start: 2020-08-18 | End: 2020-08-18

## 2020-08-18 RX ORDER — LAMOTRIGINE 100 MG/1
200 TABLET ORAL NIGHTLY
Status: DISCONTINUED | OUTPATIENT
Start: 2020-08-18 | End: 2020-08-23 | Stop reason: HOSPADM

## 2020-08-18 RX ADMIN — NICOTINE 1 PATCH: 21 PATCH TRANSDERMAL at 16:31

## 2020-08-18 RX ADMIN — ONDANSETRON 4 MG: 2 INJECTION INTRAMUSCULAR; INTRAVENOUS at 05:02

## 2020-08-18 RX ADMIN — LAMOTRIGINE 100 MG: 100 TABLET ORAL at 10:59

## 2020-08-18 RX ADMIN — QUETIAPINE FUMARATE 400 MG: 300 TABLET, EXTENDED RELEASE ORAL at 21:07

## 2020-08-18 RX ADMIN — PRAMIPEXOLE DIHYDROCHLORIDE 1 MG: 1 TABLET ORAL at 21:09

## 2020-08-18 RX ADMIN — LEVETIRACETAM 1500 MG: 500 TABLET, FILM COATED ORAL at 21:08

## 2020-08-18 RX ADMIN — LACOSAMIDE 50 MG: 50 TABLET, FILM COATED ORAL at 21:09

## 2020-08-18 RX ADMIN — DEXTROSE MONOHYDRATE 50 ML: 25 INJECTION, SOLUTION INTRAVENOUS at 23:54

## 2020-08-18 RX ADMIN — GABAPENTIN 800 MG: 400 CAPSULE ORAL at 21:08

## 2020-08-18 RX ADMIN — GABAPENTIN 800 MG: 400 CAPSULE ORAL at 16:29

## 2020-08-18 RX ADMIN — LEVETIRACETAM 1500 MG: 500 TABLET, FILM COATED ORAL at 10:59

## 2020-08-18 RX ADMIN — SODIUM CHLORIDE 100 ML/HR: 900 INJECTION, SOLUTION INTRAVENOUS at 16:35

## 2020-08-18 RX ADMIN — SODIUM CHLORIDE 1000 ML: 900 INJECTION, SOLUTION INTRAVENOUS at 03:46

## 2020-08-18 RX ADMIN — MODAFINIL 200 MG: 100 TABLET ORAL at 10:59

## 2020-08-18 RX ADMIN — BUSPIRONE HYDROCHLORIDE 10 MG: 10 TABLET ORAL at 21:09

## 2020-08-18 RX ADMIN — BUSPIRONE HYDROCHLORIDE 10 MG: 10 TABLET ORAL at 16:29

## 2020-08-18 RX ADMIN — LAMOTRIGINE 200 MG: 100 TABLET ORAL at 21:08

## 2020-08-18 RX ADMIN — HEPARIN SODIUM 5000 UNITS: 5000 INJECTION INTRAVENOUS; SUBCUTANEOUS at 21:09

## 2020-08-18 RX ADMIN — ALPRAZOLAM 2 MG: 1 TABLET ORAL at 21:23

## 2020-08-18 RX ADMIN — HEPARIN SODIUM 5000 UNITS: 5000 INJECTION INTRAVENOUS; SUBCUTANEOUS at 08:29

## 2020-08-18 RX ADMIN — BUSPIRONE HYDROCHLORIDE 10 MG: 10 TABLET ORAL at 10:59

## 2020-08-18 RX ADMIN — METOCLOPRAMIDE 5 MG: 5 TABLET ORAL at 16:29

## 2020-08-18 RX ADMIN — ONDANSETRON 4 MG: 2 INJECTION INTRAMUSCULAR; INTRAVENOUS at 12:42

## 2020-08-18 RX ADMIN — MIDODRINE HYDROCHLORIDE 5 MG: 5 TABLET ORAL at 06:07

## 2020-08-18 RX ADMIN — SODIUM CHLORIDE 100 ML/HR: 900 INJECTION, SOLUTION INTRAVENOUS at 06:51

## 2020-08-19 ENCOUNTER — INPATIENT HOSPITAL (AMBULATORY)
Dept: URBAN - METROPOLITAN AREA HOSPITAL 84 | Facility: HOSPITAL | Age: 50
End: 2020-08-19

## 2020-08-19 ENCOUNTER — TELEPHONE (OUTPATIENT)
Dept: PSYCHIATRY | Facility: CLINIC | Age: 50
End: 2020-08-19

## 2020-08-19 ENCOUNTER — TELEPHONE (OUTPATIENT)
Dept: FAMILY MEDICINE CLINIC | Facility: CLINIC | Age: 50
End: 2020-08-19

## 2020-08-19 ENCOUNTER — APPOINTMENT (OUTPATIENT)
Dept: ULTRASOUND IMAGING | Facility: HOSPITAL | Age: 50
End: 2020-08-19

## 2020-08-19 DIAGNOSIS — K21.9 GASTRO-ESOPHAGEAL REFLUX DISEASE WITHOUT ESOPHAGITIS: ICD-10-CM

## 2020-08-19 DIAGNOSIS — R13.10 DYSPHAGIA, UNSPECIFIED: ICD-10-CM

## 2020-08-19 DIAGNOSIS — R11.2 NAUSEA WITH VOMITING, UNSPECIFIED: ICD-10-CM

## 2020-08-19 DIAGNOSIS — R10.11 RIGHT UPPER QUADRANT PAIN: ICD-10-CM

## 2020-08-19 DIAGNOSIS — R41.0 DISORIENTATION, UNSPECIFIED: ICD-10-CM

## 2020-08-19 LAB
ALBUMIN SERPL-MCNC: 3.6 G/DL (ref 3.5–5.2)
ALBUMIN/GLOB SERPL: 1.4 G/DL
ALP SERPL-CCNC: 108 U/L (ref 39–117)
ALT SERPL W P-5'-P-CCNC: 16 U/L (ref 1–33)
ANION GAP SERPL CALCULATED.3IONS-SCNC: 8 MMOL/L (ref 5–15)
AST SERPL-CCNC: 12 U/L (ref 1–32)
BASOPHILS # BLD AUTO: 0 10*3/MM3 (ref 0–0.2)
BASOPHILS NFR BLD AUTO: 0.8 % (ref 0–1.5)
BILIRUB SERPL-MCNC: 0.2 MG/DL (ref 0–1.2)
BUN SERPL-MCNC: 3 MG/DL (ref 6–20)
BUN SERPL-MCNC: ABNORMAL MG/DL
BUN/CREAT SERPL: ABNORMAL
CALCIUM SPEC-SCNC: 8.6 MG/DL (ref 8.6–10.5)
CHLORIDE SERPL-SCNC: 105 MMOL/L (ref 98–107)
CO2 SERPL-SCNC: 27 MMOL/L (ref 22–29)
CREAT SERPL-MCNC: 0.76 MG/DL (ref 0.57–1)
DEPRECATED RDW RBC AUTO: 42 FL (ref 37–54)
EOSINOPHIL # BLD AUTO: 0 10*3/MM3 (ref 0–0.4)
EOSINOPHIL NFR BLD AUTO: 1.1 % (ref 0.3–6.2)
ERYTHROCYTE [DISTWIDTH] IN BLOOD BY AUTOMATED COUNT: 13.8 % (ref 12.3–15.4)
GFR SERPL CREATININE-BSD FRML MDRD: 80 ML/MIN/1.73
GLOBULIN UR ELPH-MCNC: 2.5 GM/DL
GLUCOSE BLDC GLUCOMTR-MCNC: 194 MG/DL (ref 70–105)
GLUCOSE BLDC GLUCOMTR-MCNC: 54 MG/DL (ref 70–105)
GLUCOSE BLDC GLUCOMTR-MCNC: 95 MG/DL (ref 70–105)
GLUCOSE SERPL-MCNC: 111 MG/DL (ref 65–99)
HCT VFR BLD AUTO: 32.8 % (ref 34–46.6)
HGB BLD-MCNC: 11.1 G/DL (ref 12–15.9)
LYMPHOCYTES # BLD AUTO: 1.6 10*3/MM3 (ref 0.7–3.1)
LYMPHOCYTES NFR BLD AUTO: 41.1 % (ref 19.6–45.3)
MCH RBC QN AUTO: 29.3 PG (ref 26.6–33)
MCHC RBC AUTO-ENTMCNC: 33.8 G/DL (ref 31.5–35.7)
MCV RBC AUTO: 86.8 FL (ref 79–97)
MONOCYTES # BLD AUTO: 0.3 10*3/MM3 (ref 0.1–0.9)
MONOCYTES NFR BLD AUTO: 6.3 % (ref 5–12)
NEUTROPHILS NFR BLD AUTO: 2 10*3/MM3 (ref 1.7–7)
NEUTROPHILS NFR BLD AUTO: 50.7 % (ref 42.7–76)
NRBC BLD AUTO-RTO: 0.1 /100 WBC (ref 0–0.2)
PLATELET # BLD AUTO: 184 10*3/MM3 (ref 140–450)
PMV BLD AUTO: 8.8 FL (ref 6–12)
POTASSIUM SERPL-SCNC: 3.8 MMOL/L (ref 3.5–5.2)
PROT SERPL-MCNC: 6.1 G/DL (ref 6–8.5)
RBC # BLD AUTO: 3.78 10*6/MM3 (ref 3.77–5.28)
SODIUM SERPL-SCNC: 140 MMOL/L (ref 136–145)
WBC # BLD AUTO: 4 10*3/MM3 (ref 3.4–10.8)

## 2020-08-19 PROCEDURE — 99222 1ST HOSP IP/OBS MODERATE 55: CPT | Performed by: INTERNAL MEDICINE

## 2020-08-19 PROCEDURE — 25010000002 HEPARIN (PORCINE) PER 1000 UNITS: Performed by: INTERNAL MEDICINE

## 2020-08-19 PROCEDURE — G0378 HOSPITAL OBSERVATION PER HR: HCPCS

## 2020-08-19 PROCEDURE — 99225 PR SBSQ OBSERVATION CARE/DAY 25 MINUTES: CPT | Performed by: INTERNAL MEDICINE

## 2020-08-19 PROCEDURE — 25010000002 ONDANSETRON PER 1 MG: Performed by: INTERNAL MEDICINE

## 2020-08-19 PROCEDURE — 80053 COMPREHEN METABOLIC PANEL: CPT | Performed by: INTERNAL MEDICINE

## 2020-08-19 PROCEDURE — 82962 GLUCOSE BLOOD TEST: CPT

## 2020-08-19 PROCEDURE — 85025 COMPLETE CBC W/AUTO DIFF WBC: CPT | Performed by: INTERNAL MEDICINE

## 2020-08-19 PROCEDURE — 76705 ECHO EXAM OF ABDOMEN: CPT

## 2020-08-19 RX ORDER — PANTOPRAZOLE SODIUM 40 MG/10ML
40 INJECTION, POWDER, LYOPHILIZED, FOR SOLUTION INTRAVENOUS 2 TIMES DAILY
Status: DISCONTINUED | OUTPATIENT
Start: 2020-08-19 | End: 2020-08-23 | Stop reason: HOSPADM

## 2020-08-19 RX ORDER — SODIUM CHLORIDE 0.9 % (FLUSH) 0.9 %
3 SYRINGE (ML) INJECTION EVERY 12 HOURS SCHEDULED
Status: CANCELLED | OUTPATIENT
Start: 2020-08-19

## 2020-08-19 RX ORDER — TRAZODONE HYDROCHLORIDE 50 MG/1
50 TABLET ORAL ONCE
Status: COMPLETED | OUTPATIENT
Start: 2020-08-19 | End: 2020-08-19

## 2020-08-19 RX ORDER — SODIUM CHLORIDE 0.9 % (FLUSH) 0.9 %
10 SYRINGE (ML) INJECTION AS NEEDED
Status: CANCELLED | OUTPATIENT
Start: 2020-08-19

## 2020-08-19 RX ADMIN — LACOSAMIDE 50 MG: 50 TABLET, FILM COATED ORAL at 21:22

## 2020-08-19 RX ADMIN — GABAPENTIN 800 MG: 400 CAPSULE ORAL at 21:22

## 2020-08-19 RX ADMIN — HEPARIN SODIUM 5000 UNITS: 5000 INJECTION INTRAVENOUS; SUBCUTANEOUS at 21:22

## 2020-08-19 RX ADMIN — PRAMIPEXOLE DIHYDROCHLORIDE 1 MG: 1 TABLET ORAL at 21:22

## 2020-08-19 RX ADMIN — QUETIAPINE FUMARATE 400 MG: 300 TABLET, EXTENDED RELEASE ORAL at 21:22

## 2020-08-19 RX ADMIN — BUSPIRONE HYDROCHLORIDE 10 MG: 10 TABLET ORAL at 17:53

## 2020-08-19 RX ADMIN — LEVETIRACETAM 1500 MG: 500 TABLET, FILM COATED ORAL at 21:22

## 2020-08-19 RX ADMIN — PANTOPRAZOLE SODIUM 40 MG: 40 INJECTION, POWDER, FOR SOLUTION INTRAVENOUS at 21:23

## 2020-08-19 RX ADMIN — METOCLOPRAMIDE 5 MG: 5 TABLET ORAL at 13:39

## 2020-08-19 RX ADMIN — METOCLOPRAMIDE 5 MG: 5 TABLET ORAL at 17:53

## 2020-08-19 RX ADMIN — PANTOPRAZOLE SODIUM 40 MG: 40 INJECTION, POWDER, FOR SOLUTION INTRAVENOUS at 10:09

## 2020-08-19 RX ADMIN — TRAZODONE HYDROCHLORIDE 50 MG: 50 TABLET ORAL at 19:30

## 2020-08-19 RX ADMIN — BUSPIRONE HYDROCHLORIDE 10 MG: 10 TABLET ORAL at 21:42

## 2020-08-19 RX ADMIN — ALPRAZOLAM 2 MG: 1 TABLET ORAL at 21:22

## 2020-08-19 RX ADMIN — ONDANSETRON 4 MG: 2 INJECTION INTRAMUSCULAR; INTRAVENOUS at 21:42

## 2020-08-19 RX ADMIN — METOCLOPRAMIDE 5 MG: 5 TABLET ORAL at 10:00

## 2020-08-19 RX ADMIN — ONDANSETRON 4 MG: 2 INJECTION INTRAMUSCULAR; INTRAVENOUS at 10:23

## 2020-08-19 RX ADMIN — LAMOTRIGINE 100 MG: 100 TABLET ORAL at 10:08

## 2020-08-19 RX ADMIN — GABAPENTIN 800 MG: 400 CAPSULE ORAL at 13:38

## 2020-08-19 RX ADMIN — LEVETIRACETAM 1500 MG: 500 TABLET, FILM COATED ORAL at 10:07

## 2020-08-19 RX ADMIN — MODAFINIL 200 MG: 100 TABLET ORAL at 07:50

## 2020-08-19 RX ADMIN — BUSPIRONE HYDROCHLORIDE 10 MG: 10 TABLET ORAL at 10:00

## 2020-08-19 RX ADMIN — HEPARIN SODIUM 5000 UNITS: 5000 INJECTION INTRAVENOUS; SUBCUTANEOUS at 10:01

## 2020-08-19 RX ADMIN — MODAFINIL 200 MG: 100 TABLET ORAL at 10:01

## 2020-08-19 RX ADMIN — LAMOTRIGINE 200 MG: 100 TABLET ORAL at 21:22

## 2020-08-19 RX ADMIN — Medication 10 ML: at 21:23

## 2020-08-19 RX ADMIN — NICOTINE 1 PATCH: 21 PATCH TRANSDERMAL at 19:30

## 2020-08-19 RX ADMIN — LACOSAMIDE 50 MG: 50 TABLET, FILM COATED ORAL at 10:01

## 2020-08-19 RX ADMIN — GABAPENTIN 800 MG: 400 CAPSULE ORAL at 05:48

## 2020-08-19 RX ADMIN — ACETAMINOPHEN 325 MG: 325 TABLET, FILM COATED ORAL at 05:48

## 2020-08-19 RX ADMIN — ONDANSETRON 4 MG: 2 INJECTION INTRAMUSCULAR; INTRAVENOUS at 03:00

## 2020-08-19 NOTE — TELEPHONE ENCOUNTER
Milka is currently inpatient at [facility] [place] due to having a seizure. She is stating the hospital is not giving her medications in her IV. She stated they are giving her pills that are getting stuck in her throat. When asked which medicines she claims they are not giving her - she responds with everything. Asked the same question  worded a different way and she responds with everything. Unable to determine which medication she is not getting that she is wanting via IV.  She is in Room 231 and can be reached at [phone] ( cell). Please advise

## 2020-08-19 NOTE — TELEPHONE ENCOUNTER
If she calls back she will need to follow up with the doctor on her case in the hospital.  That is not something I can do outpatient.

## 2020-08-19 NOTE — TELEPHONE ENCOUNTER
Patient called and left a vm that was transferred to me. The patient said in her vm that she is admitted at [facility] and that they are making her take her meds by mouth and not through her IV. She states she is throwing her meds up. She was very difficult to understand and said she needs to be transferred to [facility] because this has been going on since February? Tried calling the number back but did not get an answer.

## 2020-08-20 ENCOUNTER — ANESTHESIA (OUTPATIENT)
Dept: GASTROENTEROLOGY | Facility: HOSPITAL | Age: 50
End: 2020-08-20

## 2020-08-20 ENCOUNTER — ANESTHESIA EVENT (OUTPATIENT)
Dept: GASTROENTEROLOGY | Facility: HOSPITAL | Age: 50
End: 2020-08-20

## 2020-08-20 ENCOUNTER — INPATIENT HOSPITAL (AMBULATORY)
Dept: URBAN - METROPOLITAN AREA HOSPITAL 84 | Facility: HOSPITAL | Age: 50
End: 2020-08-20

## 2020-08-20 DIAGNOSIS — R13.10 DYSPHAGIA, UNSPECIFIED: ICD-10-CM

## 2020-08-20 DIAGNOSIS — R11.2 NAUSEA WITH VOMITING, UNSPECIFIED: ICD-10-CM

## 2020-08-20 LAB
ALBUMIN SERPL-MCNC: 4 G/DL (ref 3.5–5.2)
ALBUMIN/GLOB SERPL: 1.6 G/DL
ALP SERPL-CCNC: 111 U/L (ref 39–117)
ALT SERPL W P-5'-P-CCNC: 13 U/L (ref 1–33)
ANION GAP SERPL CALCULATED.3IONS-SCNC: 13 MMOL/L (ref 5–15)
AST SERPL-CCNC: 12 U/L (ref 1–32)
BASOPHILS # BLD AUTO: 0 10*3/MM3 (ref 0–0.2)
BASOPHILS NFR BLD AUTO: 0.4 % (ref 0–1.5)
BILIRUB SERPL-MCNC: 0.2 MG/DL (ref 0–1.2)
BUN SERPL-MCNC: 3 MG/DL (ref 6–20)
BUN SERPL-MCNC: ABNORMAL MG/DL
BUN/CREAT SERPL: ABNORMAL
CALCIUM SPEC-SCNC: 8.5 MG/DL (ref 8.6–10.5)
CHLORIDE SERPL-SCNC: 101 MMOL/L (ref 98–107)
CO2 SERPL-SCNC: 26 MMOL/L (ref 22–29)
CREAT SERPL-MCNC: 0.77 MG/DL (ref 0.57–1)
DEPRECATED RDW RBC AUTO: 42.9 FL (ref 37–54)
EOSINOPHIL # BLD AUTO: 0 10*3/MM3 (ref 0–0.4)
EOSINOPHIL NFR BLD AUTO: 0.8 % (ref 0.3–6.2)
ERYTHROCYTE [DISTWIDTH] IN BLOOD BY AUTOMATED COUNT: 14.2 % (ref 12.3–15.4)
GFR SERPL CREATININE-BSD FRML MDRD: 79 ML/MIN/1.73
GLOBULIN UR ELPH-MCNC: 2.5 GM/DL
GLUCOSE BLDC GLUCOMTR-MCNC: 127 MG/DL (ref 70–105)
GLUCOSE BLDC GLUCOMTR-MCNC: 74 MG/DL (ref 70–105)
GLUCOSE SERPL-MCNC: 72 MG/DL (ref 65–99)
HCT VFR BLD AUTO: 34.7 % (ref 34–46.6)
HGB BLD-MCNC: 11.6 G/DL (ref 12–15.9)
LYMPHOCYTES # BLD AUTO: 1.9 10*3/MM3 (ref 0.7–3.1)
LYMPHOCYTES NFR BLD AUTO: 44.1 % (ref 19.6–45.3)
MCH RBC QN AUTO: 28.8 PG (ref 26.6–33)
MCHC RBC AUTO-ENTMCNC: 33.5 G/DL (ref 31.5–35.7)
MCV RBC AUTO: 85.9 FL (ref 79–97)
MONOCYTES # BLD AUTO: 0.3 10*3/MM3 (ref 0.1–0.9)
MONOCYTES NFR BLD AUTO: 6 % (ref 5–12)
NEUTROPHILS NFR BLD AUTO: 2.1 10*3/MM3 (ref 1.7–7)
NEUTROPHILS NFR BLD AUTO: 48.7 % (ref 42.7–76)
NRBC BLD AUTO-RTO: 0.1 /100 WBC (ref 0–0.2)
PHENOBARB SERPL-MCNC: 39.4 MCG/ML (ref 10–30)
PLATELET # BLD AUTO: 211 10*3/MM3 (ref 140–450)
PMV BLD AUTO: 8.5 FL (ref 6–12)
POTASSIUM SERPL-SCNC: 3.3 MMOL/L (ref 3.5–5.2)
PROT SERPL-MCNC: 6.5 G/DL (ref 6–8.5)
RBC # BLD AUTO: 4.04 10*6/MM3 (ref 3.77–5.28)
SODIUM SERPL-SCNC: 140 MMOL/L (ref 136–145)
WBC # BLD AUTO: 4.2 10*3/MM3 (ref 3.4–10.8)

## 2020-08-20 PROCEDURE — 85025 COMPLETE CBC W/AUTO DIFF WBC: CPT | Performed by: INTERNAL MEDICINE

## 2020-08-20 PROCEDURE — 3E0G76Z INTRODUCTION OF NUTRITIONAL SUBSTANCE INTO UPPER GI, VIA NATURAL OR ARTIFICIAL OPENING: ICD-10-PCS | Performed by: INTERNAL MEDICINE

## 2020-08-20 PROCEDURE — 82962 GLUCOSE BLOOD TEST: CPT

## 2020-08-20 PROCEDURE — 99232 SBSQ HOSP IP/OBS MODERATE 35: CPT | Performed by: INTERNAL MEDICINE

## 2020-08-20 PROCEDURE — 80184 ASSAY OF PHENOBARBITAL: CPT | Performed by: INTERNAL MEDICINE

## 2020-08-20 PROCEDURE — 25010000002 PROPOFOL 10 MG/ML EMULSION: Performed by: ANESTHESIOLOGY

## 2020-08-20 PROCEDURE — 25010000002 HEPARIN (PORCINE) PER 1000 UNITS: Performed by: INTERNAL MEDICINE

## 2020-08-20 PROCEDURE — 25010000002 ONDANSETRON PER 1 MG: Performed by: INTERNAL MEDICINE

## 2020-08-20 PROCEDURE — C1751 CATH, INF, PER/CENT/MIDLINE: HCPCS

## 2020-08-20 PROCEDURE — 36410 VNPNXR 3YR/> PHY/QHP DX/THER: CPT

## 2020-08-20 PROCEDURE — 80053 COMPREHEN METABOLIC PANEL: CPT | Performed by: INTERNAL MEDICINE

## 2020-08-20 PROCEDURE — 0DH63UZ INSERTION OF FEEDING DEVICE INTO STOMACH, PERCUTANEOUS APPROACH: ICD-10-PCS | Performed by: INTERNAL MEDICINE

## 2020-08-20 PROCEDURE — 43246 EGD PLACE GASTROSTOMY TUBE: CPT | Performed by: INTERNAL MEDICINE

## 2020-08-20 RX ORDER — POTASSIUM CHLORIDE 1.5 G/1.77G
40 POWDER, FOR SOLUTION ORAL AS NEEDED
Status: DISCONTINUED | OUTPATIENT
Start: 2020-08-20 | End: 2020-08-23 | Stop reason: HOSPADM

## 2020-08-20 RX ORDER — MAGNESIUM SULFATE HEPTAHYDRATE 40 MG/ML
4 INJECTION, SOLUTION INTRAVENOUS AS NEEDED
Status: DISCONTINUED | OUTPATIENT
Start: 2020-08-20 | End: 2020-08-23 | Stop reason: HOSPADM

## 2020-08-20 RX ORDER — SODIUM CHLORIDE 0.9 % (FLUSH) 0.9 %
10 SYRINGE (ML) INJECTION AS NEEDED
Status: DISCONTINUED | OUTPATIENT
Start: 2020-08-20 | End: 2020-08-23 | Stop reason: HOSPADM

## 2020-08-20 RX ORDER — POTASSIUM CHLORIDE 20 MEQ/1
40 TABLET, EXTENDED RELEASE ORAL AS NEEDED
Status: DISCONTINUED | OUTPATIENT
Start: 2020-08-20 | End: 2020-08-23 | Stop reason: HOSPADM

## 2020-08-20 RX ORDER — OXYCODONE HYDROCHLORIDE 5 MG/1
5 TABLET ORAL ONCE
Status: COMPLETED | OUTPATIENT
Start: 2020-08-20 | End: 2020-08-20

## 2020-08-20 RX ORDER — POTASSIUM CHLORIDE 7.45 MG/ML
10 INJECTION INTRAVENOUS
Status: DISCONTINUED | OUTPATIENT
Start: 2020-08-20 | End: 2020-08-23 | Stop reason: HOSPADM

## 2020-08-20 RX ORDER — SODIUM CHLORIDE 0.9 % (FLUSH) 0.9 %
10 SYRINGE (ML) INJECTION EVERY 12 HOURS SCHEDULED
Status: DISCONTINUED | OUTPATIENT
Start: 2020-08-20 | End: 2020-08-23 | Stop reason: HOSPADM

## 2020-08-20 RX ORDER — CALCIUM GLUCONATE 20 MG/ML
2 INJECTION, SOLUTION INTRAVENOUS AS NEEDED
Status: DISCONTINUED | OUTPATIENT
Start: 2020-08-20 | End: 2020-08-23 | Stop reason: HOSPADM

## 2020-08-20 RX ORDER — MAGNESIUM SULFATE HEPTAHYDRATE 40 MG/ML
2 INJECTION, SOLUTION INTRAVENOUS AS NEEDED
Status: DISCONTINUED | OUTPATIENT
Start: 2020-08-20 | End: 2020-08-23 | Stop reason: HOSPADM

## 2020-08-20 RX ORDER — NEOMYCIN SULFATE, POLYMYXIN B SULFATE AND BACITRACIN ZINC 3.5; 10000; 4 MG/G; [USP'U]/G; [USP'U]/G
OINTMENT OPHTHALMIC AS NEEDED
Status: DISCONTINUED | OUTPATIENT
Start: 2020-08-20 | End: 2020-08-20 | Stop reason: HOSPADM

## 2020-08-20 RX ORDER — ONDANSETRON 2 MG/ML
4 INJECTION INTRAMUSCULAR; INTRAVENOUS EVERY 6 HOURS PRN
Status: DISCONTINUED | OUTPATIENT
Start: 2020-08-20 | End: 2020-08-23 | Stop reason: HOSPADM

## 2020-08-20 RX ORDER — PROPOFOL 10 MG/ML
VIAL (ML) INTRAVENOUS AS NEEDED
Status: DISCONTINUED | OUTPATIENT
Start: 2020-08-20 | End: 2020-08-20 | Stop reason: SURG

## 2020-08-20 RX ORDER — ONDANSETRON 4 MG/1
4 TABLET, FILM COATED ORAL EVERY 6 HOURS PRN
Status: DISCONTINUED | OUTPATIENT
Start: 2020-08-20 | End: 2020-08-23 | Stop reason: HOSPADM

## 2020-08-20 RX ORDER — CALCIUM GLUCONATE 20 MG/ML
1 INJECTION, SOLUTION INTRAVENOUS AS NEEDED
Status: DISCONTINUED | OUTPATIENT
Start: 2020-08-20 | End: 2020-08-23 | Stop reason: HOSPADM

## 2020-08-20 RX ADMIN — PROPOFOL 50 MG: 10 INJECTION, EMULSION INTRAVENOUS at 12:42

## 2020-08-20 RX ADMIN — PANTOPRAZOLE SODIUM 40 MG: 40 INJECTION, POWDER, FOR SOLUTION INTRAVENOUS at 20:43

## 2020-08-20 RX ADMIN — PROPOFOL 100 MG: 10 INJECTION, EMULSION INTRAVENOUS at 12:57

## 2020-08-20 RX ADMIN — Medication 10 ML: at 20:43

## 2020-08-20 RX ADMIN — ACETAMINOPHEN 325 MG: 325 TABLET, FILM COATED ORAL at 06:19

## 2020-08-20 RX ADMIN — ALPRAZOLAM 2 MG: 1 TABLET ORAL at 20:43

## 2020-08-20 RX ADMIN — LEVETIRACETAM 1500 MG: 500 TABLET, FILM COATED ORAL at 08:22

## 2020-08-20 RX ADMIN — PROPOFOL 50 MG: 10 INJECTION, EMULSION INTRAVENOUS at 12:39

## 2020-08-20 RX ADMIN — ONDANSETRON 4 MG: 2 INJECTION INTRAMUSCULAR; INTRAVENOUS at 21:00

## 2020-08-20 RX ADMIN — SODIUM CHLORIDE 100 ML/HR: 900 INJECTION, SOLUTION INTRAVENOUS at 00:00

## 2020-08-20 RX ADMIN — BUSPIRONE HYDROCHLORIDE 10 MG: 10 TABLET ORAL at 16:33

## 2020-08-20 RX ADMIN — PROPOFOL 150 MG: 10 INJECTION, EMULSION INTRAVENOUS at 12:33

## 2020-08-20 RX ADMIN — QUETIAPINE FUMARATE 400 MG: 300 TABLET, EXTENDED RELEASE ORAL at 20:42

## 2020-08-20 RX ADMIN — BUSPIRONE HYDROCHLORIDE 10 MG: 10 TABLET ORAL at 20:42

## 2020-08-20 RX ADMIN — LAMOTRIGINE 100 MG: 100 TABLET ORAL at 08:23

## 2020-08-20 RX ADMIN — PROPOFOL 100 MG: 10 INJECTION, EMULSION INTRAVENOUS at 12:49

## 2020-08-20 RX ADMIN — LACOSAMIDE 50 MG: 50 TABLET, FILM COATED ORAL at 20:43

## 2020-08-20 RX ADMIN — MODAFINIL 200 MG: 100 TABLET ORAL at 06:18

## 2020-08-20 RX ADMIN — GLYCOPYRROLATE 0.1 MG: 0.2 INJECTION, SOLUTION INTRAMUSCULAR; INTRAVITREAL at 12:00

## 2020-08-20 RX ADMIN — Medication 10 ML: at 08:23

## 2020-08-20 RX ADMIN — GABAPENTIN 800 MG: 400 CAPSULE ORAL at 06:18

## 2020-08-20 RX ADMIN — LACOSAMIDE 50 MG: 50 TABLET, FILM COATED ORAL at 08:23

## 2020-08-20 RX ADMIN — METOCLOPRAMIDE 5 MG: 5 TABLET ORAL at 08:22

## 2020-08-20 RX ADMIN — LEVETIRACETAM 1500 MG: 500 TABLET, FILM COATED ORAL at 20:42

## 2020-08-20 RX ADMIN — CEFAZOLIN SODIUM: 1 INJECTION, POWDER, FOR SOLUTION INTRAMUSCULAR; INTRAVENOUS at 20:40

## 2020-08-20 RX ADMIN — GABAPENTIN 800 MG: 400 CAPSULE ORAL at 21:00

## 2020-08-20 RX ADMIN — PRAMIPEXOLE DIHYDROCHLORIDE 1 MG: 1 TABLET ORAL at 20:42

## 2020-08-20 RX ADMIN — HEPARIN SODIUM 5000 UNITS: 5000 INJECTION INTRAVENOUS; SUBCUTANEOUS at 20:43

## 2020-08-20 RX ADMIN — ACETAMINOPHEN 325 MG: 325 TABLET, FILM COATED ORAL at 16:34

## 2020-08-20 RX ADMIN — PANTOPRAZOLE SODIUM 40 MG: 40 INJECTION, POWDER, FOR SOLUTION INTRAVENOUS at 08:22

## 2020-08-20 RX ADMIN — SODIUM CHLORIDE 100 ML/HR: 900 INJECTION, SOLUTION INTRAVENOUS at 08:28

## 2020-08-20 RX ADMIN — NICOTINE 1 PATCH: 21 PATCH TRANSDERMAL at 19:22

## 2020-08-20 RX ADMIN — OXYCODONE HYDROCHLORIDE 5 MG: 5 TABLET ORAL at 17:22

## 2020-08-20 RX ADMIN — BUSPIRONE HYDROCHLORIDE 10 MG: 10 TABLET ORAL at 08:22

## 2020-08-20 RX ADMIN — METOCLOPRAMIDE 5 MG: 5 TABLET ORAL at 16:34

## 2020-08-20 RX ADMIN — LAMOTRIGINE 200 MG: 100 TABLET ORAL at 21:00

## 2020-08-20 RX ADMIN — GABAPENTIN 800 MG: 400 CAPSULE ORAL at 14:11

## 2020-08-20 NOTE — TELEPHONE ENCOUNTER
Pt notified. Pt stated she is going to get a feeding tube put in today- so her medications should not be an issues anymore.

## 2020-08-20 NOTE — TELEPHONE ENCOUNTER
Please call her and let her know that I am not covering the hospital this week, not sure which medication she is talking about.  Tell her to speak to the hospitalist when he/she makes rounds and explain to her what they are doing with her medications

## 2020-08-20 NOTE — ANESTHESIA PREPROCEDURE EVALUATION
Anesthesia Evaluation     Patient summary reviewed and Nursing notes reviewed   NPO Solid Status: Waived due to emergency  NPO Liquid Status: > 2 hours           Airway   Mallampati: I  TM distance: >3 FB  Neck ROM: full  No difficulty expected  Dental - normal exam     Comment: Veneers 7,8,9,10    Pulmonary - normal exam   (+) asthma,recent URI,   Cardiovascular - normal exam        Neuro/Psych  (+) seizures well controlled, syncope, psychiatric history Bipolar and Anxiety,     GI/Hepatic/Renal/Endo    (+) obesity,  GERD,      Musculoskeletal     Abdominal   (+) obese,    Substance History      OB/GYN          Other   blood dyscrasia anemia,                   Anesthesia Plan    ASA 3     MAC     intravenous induction     Anesthetic plan, all risks, benefits, and alternatives have been provided, discussed and informed consent has been obtained with: patient.

## 2020-08-20 NOTE — ANESTHESIA POSTPROCEDURE EVALUATION
Patient: Milka Espinoza    Procedure Summary     Date:  08/20/20 Room / Location:  [facility] / [facility]    Anesthesia Start:  1208 Anesthesia Stop:  1308    Procedure:  ESOPHAGOGASTRODUODENOSCOPY WITH PERCUTANEOUS ENDOSCOPIC GASTROSTOMY TUBE INSERTION (N/A ) Diagnosis:       Intractable vomiting with nausea, unspecified vomiting type      (Intractable vomiting with nausea, unspecified vomiting type [R11.2])    Surgeon:  Rito Le MD Provider:  Patricia May DO    Anesthesia Type:  MAC ASA Status:  3          Anesthesia Type: MAC    Vitals  No vitals data found for the desired time range.          Post Anesthesia Care and Evaluation    Patient location during evaluation: PACU  Patient participation: complete - patient participated  Level of consciousness: awake  Pain score: 0  Pain management: adequate  Airway patency: patent  Anesthetic complications: No anesthetic complications  PONV Status: none  Cardiovascular status: acceptable  Respiratory status: acceptable  Hydration status: acceptable

## 2020-08-21 ENCOUNTER — INPATIENT HOSPITAL (AMBULATORY)
Dept: URBAN - METROPOLITAN AREA HOSPITAL 84 | Facility: HOSPITAL | Age: 50
End: 2020-08-21

## 2020-08-21 DIAGNOSIS — R10.11 RIGHT UPPER QUADRANT PAIN: ICD-10-CM

## 2020-08-21 DIAGNOSIS — R13.10 DYSPHAGIA, UNSPECIFIED: ICD-10-CM

## 2020-08-21 DIAGNOSIS — G40.909 EPILEPSY, UNSPECIFIED, NOT INTRACTABLE, WITHOUT STATUS EPILE: ICD-10-CM

## 2020-08-21 DIAGNOSIS — R11.2 NAUSEA WITH VOMITING, UNSPECIFIED: ICD-10-CM

## 2020-08-21 DIAGNOSIS — Z93.1 GASTROSTOMY STATUS: ICD-10-CM

## 2020-08-21 LAB
ALBUMIN SERPL-MCNC: 3.5 G/DL (ref 3.5–5.2)
ALBUMIN/GLOB SERPL: 1.5 G/DL
ALP SERPL-CCNC: 105 U/L (ref 39–117)
ALT SERPL W P-5'-P-CCNC: 11 U/L (ref 1–33)
ANION GAP SERPL CALCULATED.3IONS-SCNC: 10 MMOL/L (ref 5–15)
AST SERPL-CCNC: 13 U/L (ref 1–32)
BASOPHILS # BLD AUTO: 0 10*3/MM3 (ref 0–0.2)
BASOPHILS NFR BLD AUTO: 0.3 % (ref 0–1.5)
BILIRUB SERPL-MCNC: 0.2 MG/DL (ref 0–1.2)
BUN SERPL-MCNC: 4 MG/DL (ref 6–20)
BUN SERPL-MCNC: ABNORMAL MG/DL
BUN/CREAT SERPL: ABNORMAL
CALCIUM SPEC-SCNC: 8 MG/DL (ref 8.6–10.5)
CHLORIDE SERPL-SCNC: 104 MMOL/L (ref 98–107)
CO2 SERPL-SCNC: 25 MMOL/L (ref 22–29)
CREAT SERPL-MCNC: 0.78 MG/DL (ref 0.57–1)
DEPRECATED RDW RBC AUTO: 42 FL (ref 37–54)
EOSINOPHIL # BLD AUTO: 0 10*3/MM3 (ref 0–0.4)
EOSINOPHIL NFR BLD AUTO: 0.3 % (ref 0.3–6.2)
ERYTHROCYTE [DISTWIDTH] IN BLOOD BY AUTOMATED COUNT: 13.9 % (ref 12.3–15.4)
GFR SERPL CREATININE-BSD FRML MDRD: 78 ML/MIN/1.73
GLOBULIN UR ELPH-MCNC: 2.4 GM/DL
GLUCOSE BLDC GLUCOMTR-MCNC: 102 MG/DL (ref 70–105)
GLUCOSE BLDC GLUCOMTR-MCNC: 108 MG/DL (ref 70–105)
GLUCOSE BLDC GLUCOMTR-MCNC: 93 MG/DL (ref 70–105)
GLUCOSE BLDC GLUCOMTR-MCNC: 95 MG/DL (ref 70–105)
GLUCOSE SERPL-MCNC: 134 MG/DL (ref 65–99)
HCT VFR BLD AUTO: 32.5 % (ref 34–46.6)
HGB BLD-MCNC: 10.8 G/DL (ref 12–15.9)
LEVETIRACETAM SERPL-MCNC: 11.3 UG/ML (ref 10–40)
LEVETIRACETAM SERPL-MCNC: 48.6 UG/ML (ref 10–40)
LYMPHOCYTES # BLD AUTO: 1.2 10*3/MM3 (ref 0.7–3.1)
LYMPHOCYTES NFR BLD AUTO: 22.7 % (ref 19.6–45.3)
MCH RBC QN AUTO: 29 PG (ref 26.6–33)
MCHC RBC AUTO-ENTMCNC: 33.2 G/DL (ref 31.5–35.7)
MCV RBC AUTO: 87.2 FL (ref 79–97)
MONOCYTES # BLD AUTO: 0.4 10*3/MM3 (ref 0.1–0.9)
MONOCYTES NFR BLD AUTO: 6.8 % (ref 5–12)
NEUTROPHILS NFR BLD AUTO: 3.6 10*3/MM3 (ref 1.7–7)
NEUTROPHILS NFR BLD AUTO: 69.9 % (ref 42.7–76)
NRBC BLD AUTO-RTO: 0 /100 WBC (ref 0–0.2)
PHOSPHATE SERPL-MCNC: 3.5 MG/DL (ref 2.5–4.5)
PLATELET # BLD AUTO: 185 10*3/MM3 (ref 140–450)
PMV BLD AUTO: 9.2 FL (ref 6–12)
POTASSIUM SERPL-SCNC: 3.4 MMOL/L (ref 3.5–5.2)
PROT SERPL-MCNC: 5.9 G/DL (ref 6–8.5)
RBC # BLD AUTO: 3.73 10*6/MM3 (ref 3.77–5.28)
SODIUM SERPL-SCNC: 139 MMOL/L (ref 136–145)
WBC # BLD AUTO: 5.2 10*3/MM3 (ref 3.4–10.8)

## 2020-08-21 PROCEDURE — 99232 SBSQ HOSP IP/OBS MODERATE 35: CPT | Performed by: INTERNAL MEDICINE

## 2020-08-21 PROCEDURE — 25010000002 HEPARIN (PORCINE) PER 1000 UNITS: Performed by: INTERNAL MEDICINE

## 2020-08-21 PROCEDURE — 84100 ASSAY OF PHOSPHORUS: CPT

## 2020-08-21 PROCEDURE — 25010000002 ONDANSETRON PER 1 MG: Performed by: INTERNAL MEDICINE

## 2020-08-21 PROCEDURE — 82962 GLUCOSE BLOOD TEST: CPT

## 2020-08-21 PROCEDURE — 80053 COMPREHEN METABOLIC PANEL: CPT | Performed by: INTERNAL MEDICINE

## 2020-08-21 PROCEDURE — 85025 COMPLETE CBC W/AUTO DIFF WBC: CPT | Performed by: INTERNAL MEDICINE

## 2020-08-21 RX ORDER — GABAPENTIN 400 MG/1
800 CAPSULE ORAL EVERY 8 HOURS SCHEDULED
Status: DISCONTINUED | OUTPATIENT
Start: 2020-08-21 | End: 2020-08-23 | Stop reason: HOSPADM

## 2020-08-21 RX ORDER — HYDROCODONE BITARTRATE AND ACETAMINOPHEN 5; 325 MG/1; MG/1
1 TABLET ORAL ONCE
Status: COMPLETED | OUTPATIENT
Start: 2020-08-21 | End: 2020-08-21

## 2020-08-21 RX ORDER — LEVETIRACETAM 100 MG/ML
1500 SOLUTION ORAL EVERY 12 HOURS SCHEDULED
Status: DISCONTINUED | OUTPATIENT
Start: 2020-08-21 | End: 2020-08-23 | Stop reason: HOSPADM

## 2020-08-21 RX ADMIN — LACOSAMIDE 50 MG: 50 TABLET, FILM COATED ORAL at 08:42

## 2020-08-21 RX ADMIN — MODAFINIL 200 MG: 100 TABLET ORAL at 06:14

## 2020-08-21 RX ADMIN — ALPRAZOLAM 2 MG: 1 TABLET ORAL at 20:51

## 2020-08-21 RX ADMIN — PRAMIPEXOLE DIHYDROCHLORIDE 1 MG: 1 TABLET ORAL at 20:52

## 2020-08-21 RX ADMIN — METOCLOPRAMIDE 5 MG: 5 TABLET ORAL at 17:10

## 2020-08-21 RX ADMIN — SODIUM CHLORIDE 100 ML/HR: 900 INJECTION, SOLUTION INTRAVENOUS at 06:22

## 2020-08-21 RX ADMIN — SODIUM CHLORIDE 100 ML/HR: 900 INJECTION, SOLUTION INTRAVENOUS at 17:11

## 2020-08-21 RX ADMIN — LAMOTRIGINE 200 MG: 100 TABLET ORAL at 20:52

## 2020-08-21 RX ADMIN — HEPARIN SODIUM 5000 UNITS: 5000 INJECTION INTRAVENOUS; SUBCUTANEOUS at 08:42

## 2020-08-21 RX ADMIN — METOCLOPRAMIDE 5 MG: 5 TABLET ORAL at 11:31

## 2020-08-21 RX ADMIN — HYDROCODONE BITARTRATE AND ACETAMINOPHEN 1 TABLET: 5; 325 TABLET ORAL at 01:26

## 2020-08-21 RX ADMIN — LACOSAMIDE 50 MG: 50 TABLET, FILM COATED ORAL at 20:52

## 2020-08-21 RX ADMIN — PANTOPRAZOLE SODIUM 40 MG: 40 INJECTION, POWDER, FOR SOLUTION INTRAVENOUS at 08:42

## 2020-08-21 RX ADMIN — LEVETIRACETAM 1500 MG: 500 TABLET, FILM COATED ORAL at 08:42

## 2020-08-21 RX ADMIN — GABAPENTIN 800 MG: 400 CAPSULE ORAL at 06:14

## 2020-08-21 RX ADMIN — BUSPIRONE HYDROCHLORIDE 10 MG: 10 TABLET ORAL at 08:42

## 2020-08-21 RX ADMIN — GABAPENTIN 800 MG: 400 CAPSULE ORAL at 14:56

## 2020-08-21 RX ADMIN — LAMOTRIGINE 100 MG: 100 TABLET ORAL at 08:42

## 2020-08-21 RX ADMIN — ALPRAZOLAM 2 MG: 1 TABLET ORAL at 08:51

## 2020-08-21 RX ADMIN — ONDANSETRON 4 MG: 2 INJECTION INTRAMUSCULAR; INTRAVENOUS at 07:38

## 2020-08-21 RX ADMIN — LEVETIRACETAM 1500 MG: 500 SOLUTION ORAL at 20:53

## 2020-08-21 RX ADMIN — ONDANSETRON 4 MG: 2 INJECTION INTRAMUSCULAR; INTRAVENOUS at 14:56

## 2020-08-21 RX ADMIN — PANTOPRAZOLE SODIUM 40 MG: 40 INJECTION, POWDER, FOR SOLUTION INTRAVENOUS at 20:52

## 2020-08-21 RX ADMIN — Medication 10 ML: at 20:54

## 2020-08-21 RX ADMIN — BUSPIRONE HYDROCHLORIDE 10 MG: 10 TABLET ORAL at 15:00

## 2020-08-21 RX ADMIN — NICOTINE 1 PATCH: 21 PATCH TRANSDERMAL at 20:55

## 2020-08-21 RX ADMIN — GABAPENTIN 800 MG: 400 CAPSULE ORAL at 21:26

## 2020-08-21 RX ADMIN — HYDROCODONE BITARTRATE AND ACETAMINOPHEN 1 TABLET: 5; 325 TABLET ORAL at 11:30

## 2020-08-21 RX ADMIN — BUSPIRONE HYDROCHLORIDE 10 MG: 10 TABLET ORAL at 20:52

## 2020-08-21 RX ADMIN — METOCLOPRAMIDE 5 MG: 5 TABLET ORAL at 08:42

## 2020-08-21 RX ADMIN — ACETAMINOPHEN 325 MG: 325 TABLET, FILM COATED ORAL at 20:52

## 2020-08-21 RX ADMIN — QUETIAPINE FUMARATE 400 MG: 300 TABLET, EXTENDED RELEASE ORAL at 20:53

## 2020-08-21 RX ADMIN — Medication 10 ML: at 07:38

## 2020-08-21 RX ADMIN — HEPARIN SODIUM 5000 UNITS: 5000 INJECTION INTRAVENOUS; SUBCUTANEOUS at 20:53

## 2020-08-22 ENCOUNTER — INPATIENT HOSPITAL (AMBULATORY)
Dept: URBAN - METROPOLITAN AREA HOSPITAL 84 | Facility: HOSPITAL | Age: 50
End: 2020-08-22

## 2020-08-22 DIAGNOSIS — R56.9 UNSPECIFIED CONVULSIONS: ICD-10-CM

## 2020-08-22 DIAGNOSIS — R13.10 DYSPHAGIA, UNSPECIFIED: ICD-10-CM

## 2020-08-22 DIAGNOSIS — Z93.1 GASTROSTOMY STATUS: ICD-10-CM

## 2020-08-22 DIAGNOSIS — R10.11 RIGHT UPPER QUADRANT PAIN: ICD-10-CM

## 2020-08-22 LAB
ALBUMIN SERPL-MCNC: 3.3 G/DL (ref 3.5–5.2)
ALBUMIN/GLOB SERPL: 1.4 G/DL
ALP SERPL-CCNC: 103 U/L (ref 39–117)
ALT SERPL W P-5'-P-CCNC: 11 U/L (ref 1–33)
ANION GAP SERPL CALCULATED.3IONS-SCNC: 9 MMOL/L (ref 5–15)
AST SERPL-CCNC: 16 U/L (ref 1–32)
BASOPHILS # BLD AUTO: 0 10*3/MM3 (ref 0–0.2)
BASOPHILS NFR BLD AUTO: 1 % (ref 0–1.5)
BILIRUB SERPL-MCNC: <0.2 MG/DL (ref 0–1.2)
BUN SERPL-MCNC: 6 MG/DL (ref 6–20)
BUN SERPL-MCNC: ABNORMAL MG/DL
BUN/CREAT SERPL: ABNORMAL
CALCIUM SPEC-SCNC: 8 MG/DL (ref 8.6–10.5)
CHLORIDE SERPL-SCNC: 108 MMOL/L (ref 98–107)
CO2 SERPL-SCNC: 26 MMOL/L (ref 22–29)
CREAT SERPL-MCNC: 0.72 MG/DL (ref 0.57–1)
DEPRECATED RDW RBC AUTO: 44.6 FL (ref 37–54)
EOSINOPHIL # BLD AUTO: 0.1 10*3/MM3 (ref 0–0.4)
EOSINOPHIL NFR BLD AUTO: 2.3 % (ref 0.3–6.2)
ERYTHROCYTE [DISTWIDTH] IN BLOOD BY AUTOMATED COUNT: 14.7 % (ref 12.3–15.4)
GFR SERPL CREATININE-BSD FRML MDRD: 85 ML/MIN/1.73
GLOBULIN UR ELPH-MCNC: 2.4 GM/DL
GLUCOSE BLDC GLUCOMTR-MCNC: 111 MG/DL (ref 70–105)
GLUCOSE BLDC GLUCOMTR-MCNC: 116 MG/DL (ref 70–105)
GLUCOSE BLDC GLUCOMTR-MCNC: 137 MG/DL (ref 70–105)
GLUCOSE BLDC GLUCOMTR-MCNC: 75 MG/DL (ref 70–105)
GLUCOSE BLDC GLUCOMTR-MCNC: 91 MG/DL (ref 70–105)
GLUCOSE SERPL-MCNC: 96 MG/DL (ref 65–99)
HCT VFR BLD AUTO: 31.9 % (ref 34–46.6)
HGB BLD-MCNC: 10.5 G/DL (ref 12–15.9)
LYMPHOCYTES # BLD AUTO: 1.6 10*3/MM3 (ref 0.7–3.1)
LYMPHOCYTES NFR BLD AUTO: 45.3 % (ref 19.6–45.3)
MCH RBC QN AUTO: 29.1 PG (ref 26.6–33)
MCHC RBC AUTO-ENTMCNC: 32.9 G/DL (ref 31.5–35.7)
MCV RBC AUTO: 88.2 FL (ref 79–97)
MONOCYTES # BLD AUTO: 0.2 10*3/MM3 (ref 0.1–0.9)
MONOCYTES NFR BLD AUTO: 6.4 % (ref 5–12)
NEUTROPHILS NFR BLD AUTO: 1.6 10*3/MM3 (ref 1.7–7)
NEUTROPHILS NFR BLD AUTO: 45 % (ref 42.7–76)
NRBC BLD AUTO-RTO: 0.1 /100 WBC (ref 0–0.2)
PHENOBARB SERPL-MCNC: 28.9 MCG/ML (ref 10–30)
PLATELET # BLD AUTO: 148 10*3/MM3 (ref 140–450)
PMV BLD AUTO: 9.6 FL (ref 6–12)
POTASSIUM SERPL-SCNC: 3.9 MMOL/L (ref 3.5–5.2)
PROT SERPL-MCNC: 5.7 G/DL (ref 6–8.5)
RBC # BLD AUTO: 3.62 10*6/MM3 (ref 3.77–5.28)
SODIUM SERPL-SCNC: 143 MMOL/L (ref 136–145)
WBC # BLD AUTO: 3.5 10*3/MM3 (ref 3.4–10.8)

## 2020-08-22 PROCEDURE — 80053 COMPREHEN METABOLIC PANEL: CPT | Performed by: INTERNAL MEDICINE

## 2020-08-22 PROCEDURE — 80184 ASSAY OF PHENOBARBITAL: CPT | Performed by: INTERNAL MEDICINE

## 2020-08-22 PROCEDURE — 82962 GLUCOSE BLOOD TEST: CPT

## 2020-08-22 PROCEDURE — 63710000001 ONDANSETRON PER 8 MG: Performed by: INTERNAL MEDICINE

## 2020-08-22 PROCEDURE — 25010000002 HEPARIN (PORCINE) PER 1000 UNITS: Performed by: INTERNAL MEDICINE

## 2020-08-22 PROCEDURE — 25010000002 ONDANSETRON PER 1 MG: Performed by: INTERNAL MEDICINE

## 2020-08-22 PROCEDURE — 85025 COMPLETE CBC W/AUTO DIFF WBC: CPT | Performed by: INTERNAL MEDICINE

## 2020-08-22 PROCEDURE — 99232 SBSQ HOSP IP/OBS MODERATE 35: CPT | Performed by: NURSE PRACTITIONER

## 2020-08-22 RX ADMIN — LAMOTRIGINE 100 MG: 100 TABLET ORAL at 09:35

## 2020-08-22 RX ADMIN — MODAFINIL 200 MG: 100 TABLET ORAL at 06:00

## 2020-08-22 RX ADMIN — ONDANSETRON 4 MG: 2 INJECTION INTRAMUSCULAR; INTRAVENOUS at 09:34

## 2020-08-22 RX ADMIN — NICOTINE 1 PATCH: 21 PATCH TRANSDERMAL at 21:14

## 2020-08-22 RX ADMIN — ONDANSETRON HYDROCHLORIDE 4 MG: 4 TABLET, FILM COATED ORAL at 21:39

## 2020-08-22 RX ADMIN — QUETIAPINE FUMARATE 400 MG: 300 TABLET, EXTENDED RELEASE ORAL at 21:10

## 2020-08-22 RX ADMIN — LACOSAMIDE 50 MG: 50 TABLET, FILM COATED ORAL at 09:35

## 2020-08-22 RX ADMIN — SODIUM CHLORIDE 100 ML/HR: 900 INJECTION, SOLUTION INTRAVENOUS at 02:09

## 2020-08-22 RX ADMIN — METOCLOPRAMIDE 5 MG: 5 TABLET ORAL at 14:11

## 2020-08-22 RX ADMIN — LEVETIRACETAM 1500 MG: 500 SOLUTION ORAL at 09:35

## 2020-08-22 RX ADMIN — SODIUM CHLORIDE 100 ML/HR: 900 INJECTION, SOLUTION INTRAVENOUS at 22:42

## 2020-08-22 RX ADMIN — BUSPIRONE HYDROCHLORIDE 10 MG: 10 TABLET ORAL at 21:14

## 2020-08-22 RX ADMIN — BUSPIRONE HYDROCHLORIDE 10 MG: 10 TABLET ORAL at 09:35

## 2020-08-22 RX ADMIN — Medication 10 ML: at 09:36

## 2020-08-22 RX ADMIN — LACOSAMIDE 50 MG: 50 TABLET, FILM COATED ORAL at 21:14

## 2020-08-22 RX ADMIN — GABAPENTIN 800 MG: 400 CAPSULE ORAL at 06:00

## 2020-08-22 RX ADMIN — PANTOPRAZOLE SODIUM 40 MG: 40 INJECTION, POWDER, FOR SOLUTION INTRAVENOUS at 21:10

## 2020-08-22 RX ADMIN — ACETAMINOPHEN 325 MG: 325 TABLET, FILM COATED ORAL at 09:35

## 2020-08-22 RX ADMIN — METOCLOPRAMIDE 5 MG: 5 TABLET ORAL at 09:35

## 2020-08-22 RX ADMIN — Medication 10 ML: at 21:11

## 2020-08-22 RX ADMIN — HEPARIN SODIUM 5000 UNITS: 5000 INJECTION INTRAVENOUS; SUBCUTANEOUS at 21:10

## 2020-08-22 RX ADMIN — PRAMIPEXOLE DIHYDROCHLORIDE 1 MG: 1 TABLET ORAL at 21:09

## 2020-08-22 RX ADMIN — LAMOTRIGINE 200 MG: 100 TABLET ORAL at 21:14

## 2020-08-22 RX ADMIN — METOCLOPRAMIDE 5 MG: 5 TABLET ORAL at 17:45

## 2020-08-22 RX ADMIN — BUSPIRONE HYDROCHLORIDE 10 MG: 10 TABLET ORAL at 17:45

## 2020-08-22 RX ADMIN — HEPARIN SODIUM 5000 UNITS: 5000 INJECTION INTRAVENOUS; SUBCUTANEOUS at 09:34

## 2020-08-22 RX ADMIN — ALPRAZOLAM 2 MG: 1 TABLET ORAL at 09:42

## 2020-08-22 RX ADMIN — GABAPENTIN 800 MG: 400 CAPSULE ORAL at 21:08

## 2020-08-22 RX ADMIN — LEVETIRACETAM 1500 MG: 500 SOLUTION ORAL at 21:10

## 2020-08-22 RX ADMIN — PANTOPRAZOLE SODIUM 40 MG: 40 INJECTION, POWDER, FOR SOLUTION INTRAVENOUS at 09:35

## 2020-08-22 RX ADMIN — GABAPENTIN 800 MG: 400 CAPSULE ORAL at 14:11

## 2020-08-22 RX ADMIN — ALPRAZOLAM 2 MG: 1 TABLET ORAL at 21:39

## 2020-08-23 ENCOUNTER — READMISSION MANAGEMENT (OUTPATIENT)
Dept: CALL CENTER | Facility: HOSPITAL | Age: 50
End: 2020-08-23

## 2020-08-23 VITALS
HEIGHT: 64 IN | RESPIRATION RATE: 18 BRPM | WEIGHT: 192 LBS | BODY MASS INDEX: 32.78 KG/M2 | HEART RATE: 97 BPM | OXYGEN SATURATION: 95 % | TEMPERATURE: 98.2 F | SYSTOLIC BLOOD PRESSURE: 119 MMHG | DIASTOLIC BLOOD PRESSURE: 70 MMHG

## 2020-08-23 LAB
ALBUMIN SERPL-MCNC: 3 G/DL (ref 3.5–5.2)
ALBUMIN/GLOB SERPL: 1.4 G/DL
ALP SERPL-CCNC: 87 U/L (ref 39–117)
ALT SERPL W P-5'-P-CCNC: 9 U/L (ref 1–33)
ANION GAP SERPL CALCULATED.3IONS-SCNC: 6 MMOL/L (ref 5–15)
AST SERPL-CCNC: 15 U/L (ref 1–32)
BASOPHILS # BLD AUTO: 0 10*3/MM3 (ref 0–0.2)
BASOPHILS NFR BLD AUTO: 0.5 % (ref 0–1.5)
BILIRUB SERPL-MCNC: <0.2 MG/DL (ref 0–1.2)
BUN SERPL-MCNC: 6 MG/DL (ref 6–20)
BUN SERPL-MCNC: ABNORMAL MG/DL
BUN/CREAT SERPL: ABNORMAL
CALCIUM SPEC-SCNC: 7.6 MG/DL (ref 8.6–10.5)
CHLORIDE SERPL-SCNC: 109 MMOL/L (ref 98–107)
CO2 SERPL-SCNC: 28 MMOL/L (ref 22–29)
CREAT SERPL-MCNC: 0.72 MG/DL (ref 0.57–1)
DEPRECATED RDW RBC AUTO: 44.6 FL (ref 37–54)
EOSINOPHIL # BLD AUTO: 0.1 10*3/MM3 (ref 0–0.4)
EOSINOPHIL NFR BLD AUTO: 2.6 % (ref 0.3–6.2)
ERYTHROCYTE [DISTWIDTH] IN BLOOD BY AUTOMATED COUNT: 14.6 % (ref 12.3–15.4)
GFR SERPL CREATININE-BSD FRML MDRD: 85 ML/MIN/1.73
GLOBULIN UR ELPH-MCNC: 2.2 GM/DL
GLUCOSE BLDC GLUCOMTR-MCNC: 118 MG/DL (ref 70–105)
GLUCOSE BLDC GLUCOMTR-MCNC: 126 MG/DL (ref 70–105)
GLUCOSE SERPL-MCNC: 113 MG/DL (ref 65–99)
HCT VFR BLD AUTO: 28.9 % (ref 34–46.6)
HGB BLD-MCNC: 9.6 G/DL (ref 12–15.9)
LYMPHOCYTES # BLD AUTO: 1.6 10*3/MM3 (ref 0.7–3.1)
LYMPHOCYTES NFR BLD AUTO: 42.8 % (ref 19.6–45.3)
MCH RBC QN AUTO: 29.1 PG (ref 26.6–33)
MCHC RBC AUTO-ENTMCNC: 33.3 G/DL (ref 31.5–35.7)
MCV RBC AUTO: 87.4 FL (ref 79–97)
MONOCYTES # BLD AUTO: 0.3 10*3/MM3 (ref 0.1–0.9)
MONOCYTES NFR BLD AUTO: 8.5 % (ref 5–12)
NEUTROPHILS NFR BLD AUTO: 1.7 10*3/MM3 (ref 1.7–7)
NEUTROPHILS NFR BLD AUTO: 45.6 % (ref 42.7–76)
NRBC BLD AUTO-RTO: 0.2 /100 WBC (ref 0–0.2)
PLATELET # BLD AUTO: 170 10*3/MM3 (ref 140–450)
PMV BLD AUTO: 8.6 FL (ref 6–12)
POTASSIUM SERPL-SCNC: 3.8 MMOL/L (ref 3.5–5.2)
PROT SERPL-MCNC: 5.2 G/DL (ref 6–8.5)
RBC # BLD AUTO: 3.31 10*6/MM3 (ref 3.77–5.28)
SODIUM SERPL-SCNC: 143 MMOL/L (ref 136–145)
WBC # BLD AUTO: 3.7 10*3/MM3 (ref 3.4–10.8)

## 2020-08-23 PROCEDURE — 99239 HOSP IP/OBS DSCHRG MGMT >30: CPT | Performed by: INTERNAL MEDICINE

## 2020-08-23 PROCEDURE — 85025 COMPLETE CBC W/AUTO DIFF WBC: CPT | Performed by: INTERNAL MEDICINE

## 2020-08-23 PROCEDURE — 82962 GLUCOSE BLOOD TEST: CPT

## 2020-08-23 PROCEDURE — 25010000002 HEPARIN (PORCINE) PER 1000 UNITS: Performed by: INTERNAL MEDICINE

## 2020-08-23 PROCEDURE — 80053 COMPREHEN METABOLIC PANEL: CPT | Performed by: INTERNAL MEDICINE

## 2020-08-23 PROCEDURE — 25010000002 ONDANSETRON PER 1 MG: Performed by: INTERNAL MEDICINE

## 2020-08-23 RX ORDER — NUTRITIONAL SUPPLEMENT 0.07 G-1.5
LIQUID (ML) ORAL
Qty: 20 CAN | Refills: 0 | Status: SHIPPED | OUTPATIENT
Start: 2020-08-23 | End: 2021-10-04

## 2020-08-23 RX ORDER — METOCLOPRAMIDE 5 MG/1
5 TABLET ORAL
Qty: 90 TABLET | Refills: 0 | Status: SHIPPED | OUTPATIENT
Start: 2020-08-23 | End: 2020-09-22

## 2020-08-23 RX ORDER — LACOSAMIDE 100 MG/1
100 TABLET ORAL EVERY 12 HOURS SCHEDULED
Qty: 60 TABLET | Refills: 0 | Status: SHIPPED | OUTPATIENT
Start: 2020-08-25 | End: 2022-06-07 | Stop reason: SDUPTHER

## 2020-08-23 RX ORDER — LACOSAMIDE 50 MG/1
50 TABLET ORAL EVERY 12 HOURS SCHEDULED
Qty: 4 TABLET | Refills: 0 | Status: SHIPPED | OUTPATIENT
Start: 2020-08-23 | End: 2021-08-06

## 2020-08-23 RX ORDER — PANTOPRAZOLE SODIUM 40 MG/1
40 TABLET, DELAYED RELEASE ORAL DAILY
Qty: 30 TABLET | Refills: 0 | Status: SHIPPED | OUTPATIENT
Start: 2020-08-23 | End: 2020-09-22

## 2020-08-23 RX ADMIN — SODIUM CHLORIDE 100 ML/HR: 900 INJECTION, SOLUTION INTRAVENOUS at 07:30

## 2020-08-23 RX ADMIN — LAMOTRIGINE 100 MG: 100 TABLET ORAL at 09:47

## 2020-08-23 RX ADMIN — PANTOPRAZOLE SODIUM 40 MG: 40 INJECTION, POWDER, FOR SOLUTION INTRAVENOUS at 09:47

## 2020-08-23 RX ADMIN — Medication 10 ML: at 09:47

## 2020-08-23 RX ADMIN — GABAPENTIN 800 MG: 400 CAPSULE ORAL at 05:19

## 2020-08-23 RX ADMIN — LACOSAMIDE 50 MG: 50 TABLET, FILM COATED ORAL at 09:47

## 2020-08-23 RX ADMIN — ALPRAZOLAM 2 MG: 1 TABLET ORAL at 05:20

## 2020-08-23 RX ADMIN — BUSPIRONE HYDROCHLORIDE 10 MG: 10 TABLET ORAL at 09:47

## 2020-08-23 RX ADMIN — HEPARIN SODIUM 5000 UNITS: 5000 INJECTION INTRAVENOUS; SUBCUTANEOUS at 09:47

## 2020-08-23 RX ADMIN — ONDANSETRON 4 MG: 2 INJECTION INTRAMUSCULAR; INTRAVENOUS at 09:47

## 2020-08-23 RX ADMIN — LEVETIRACETAM 1500 MG: 500 SOLUTION ORAL at 09:47

## 2020-08-23 RX ADMIN — MODAFINIL 200 MG: 100 TABLET ORAL at 06:10

## 2020-08-23 RX ADMIN — ACETAMINOPHEN 325 MG: 325 TABLET, FILM COATED ORAL at 09:46

## 2020-08-23 RX ADMIN — METOCLOPRAMIDE 5 MG: 5 TABLET ORAL at 09:47

## 2020-08-23 NOTE — OUTREACH NOTE
Prep Survey      Responses   [facility] patient discharged from?  [facility]   Is LACE score < 7 ?  No   Eligibility  [facility]   Date of Admission  08/17/20   Date of Discharge  08/23/20   Discharge Disposition  Home or Self Care   Discharge diagnosis  Intractable nausea with vomiting,    Seizure   COVID-19 Test Status  Not tested   Does the patient have one of the following disease processes/diagnoses(primary or secondary)?  Other   Does the patient have Home health ordered?  Yes   What is the Home health agency?   [facility] for tube feedings   Is there a DME ordered?  No   General alerts for this patient  Bipolar    Prep survey completed?  Yes          Flakita Timmons RN

## 2020-08-24 ENCOUNTER — TELEPHONE (OUTPATIENT)
Dept: FAMILY MEDICINE CLINIC | Facility: CLINIC | Age: 50
End: 2020-08-24

## 2020-08-24 ENCOUNTER — TRANSITIONAL CARE MANAGEMENT TELEPHONE ENCOUNTER (OUTPATIENT)
Dept: CALL CENTER | Facility: HOSPITAL | Age: 50
End: 2020-08-24

## 2020-08-24 NOTE — OUTREACH NOTE
Call Center TCM Note      Responses   [facility] patient discharged from?  [facility]   COVID-19 Test Status  Not tested   Does the patient have one of the following disease processes/diagnoses(primary or secondary)?  Other   TCM attempt successful?  Yes   Call start time  1424   Call end time  1441   General alerts for this patient  Bipolar    Discharge diagnosis  Intractable nausea with vomiting,    Seizure   Meds reviewed with patient/caregiver?  Yes   Is the patient having any side effects they believe may be caused by any medication additions or changes?  No   Does the patient have all medications ordered at discharge?  Yes   Is the patient taking all medications as directed (includes completed medication regime)?  Yes   Does the patient have a primary care provider?   Yes   Does the patient have an appointment with their PCP within 7 days of discharge?  No   Comments regarding PCP  PCP:  Safia Reyna APRN-patient declines an appt at this time. - I encouraged patient to make an appt   Nursing Interventions  Educated patient on importance of making appointment, Advised patient to make appointment   Has the patient kept scheduled appointments due by today?  N/A   What is the Home health agency?   [facility] for tube feedings   Has home health visited the patient within 72 hours of discharge?  Yes   Psychosocial issues?  No   Comments  Patient reports she is still in alot of pain from feeding tube being placed.    Did the patient receive a copy of their discharge instructions?  Yes   Nursing interventions  Reviewed instructions with patient   What is the patient's perception of their health status since discharge?  Same   Is the patient/caregiver able to teach back signs and symptoms related to disease process for when to call PCP?  Yes   Is the patient/caregiver able to teach back signs and symptoms related to disease process for when to call 911?  Yes   Is the patient/caregiver able to  teach back the hierarchy of who to call/visit for symptoms/problems? PCP, Specialist, Home health nurse, Urgent Care, ED, 911  Yes   Additional teach back comments  Patient report she is alittle better than she was but still in pain.    TCM call completed?  Yes          Danilo Jennings RN    8/24/2020, 14:41

## 2020-08-24 NOTE — OUTREACH NOTE
Call Center TCM Note      Responses   [facility] patient discharged from?  [facility]   COVID-19 Test Status  Not tested   Does the patient have one of the following disease processes/diagnoses(primary or secondary)?  Other   TCM attempt successful?  No   Unsuccessful attempts  Attempt 1          Danilo Jennings RN    8/24/2020, 13:52

## 2020-08-24 NOTE — TELEPHONE ENCOUNTER
Mi with [facility] shara called to get the ok from you to follow orders for home health?   Mi [phone]

## 2020-08-31 ENCOUNTER — OFFICE (AMBULATORY)
Dept: URBAN - METROPOLITAN AREA CLINIC 64 | Facility: CLINIC | Age: 50
End: 2020-08-31

## 2020-08-31 VITALS
SYSTOLIC BLOOD PRESSURE: 111 MMHG | WEIGHT: 182 LBS | HEART RATE: 116 BPM | DIASTOLIC BLOOD PRESSURE: 72 MMHG | HEIGHT: 64 IN

## 2020-08-31 DIAGNOSIS — R11.2 NAUSEA WITH VOMITING, UNSPECIFIED: ICD-10-CM

## 2020-08-31 DIAGNOSIS — K29.40 CHRONIC ATROPHIC GASTRITIS WITHOUT BLEEDING: ICD-10-CM

## 2020-08-31 DIAGNOSIS — R13.10 DYSPHAGIA, UNSPECIFIED: ICD-10-CM

## 2020-08-31 DIAGNOSIS — K94.29 OTHER COMPLICATIONS OF GASTROSTOMY: ICD-10-CM

## 2020-08-31 DIAGNOSIS — B96.81 HELICOBACTER PYLORI [H. PYLORI] AS THE CAUSE OF DISEASES CLA: ICD-10-CM

## 2020-08-31 DIAGNOSIS — R63.4 ABNORMAL WEIGHT LOSS: ICD-10-CM

## 2020-08-31 PROCEDURE — 99214 OFFICE O/P EST MOD 30 MIN: CPT | Performed by: INTERNAL MEDICINE

## 2020-08-31 RX ORDER — OMEPRAZOLE MAGNESIUM, AMOXICILLIN AND RIFABUTIN 10; 250; 12.5 MG/1; MG/1; MG/1
CAPSULE, DELAYED RELEASE ORAL
Qty: 168 | Refills: 0 | Status: COMPLETED
Start: 2020-08-31 | End: 2021-07-15

## 2020-08-31 RX ORDER — AMITRIPTYLINE HYDROCHLORIDE 25 MG/1
TABLET, FILM COATED ORAL
Qty: 0 | Refills: 0 | Status: ACTIVE

## 2020-09-01 ENCOUNTER — READMISSION MANAGEMENT (OUTPATIENT)
Dept: CALL CENTER | Facility: HOSPITAL | Age: 50
End: 2020-09-01

## 2020-09-01 NOTE — OUTREACH NOTE
Medical Week 2 Survey      Responses   [facility] patient discharged from?  [facility]   COVID-19 Test Status  Not tested   Does the patient have one of the following disease processes/diagnoses(primary or secondary)?  Other   Week 2 attempt successful?  No   Unsuccessful attempts  Attempt 1          Saba Alcantar LPN

## 2020-09-03 ENCOUNTER — READMISSION MANAGEMENT (OUTPATIENT)
Dept: CALL CENTER | Facility: HOSPITAL | Age: 50
End: 2020-09-03

## 2020-09-03 NOTE — OUTREACH NOTE
Medical Week 2 Survey      Responses   [facility] patient discharged from?  [facility]   COVID-19 Test Status  Not tested   Does the patient have one of the following disease processes/diagnoses(primary or secondary)?  Other   Week 2 attempt successful?  Yes   Call start time  0948   Call end time  0951   Meds reviewed with patient/caregiver?  Yes   Is the patient having any side effects they believe may be caused by any medication additions or changes?  No   Does the patient have all medications ordered at discharge?  Yes   Is the patient taking all medications as directed (includes completed medication regime)?  Yes   Does the patient have a primary care provider?   Yes   Does the patient have an appointment with their PCP within 7 days of discharge?  No   What is preventing the patient from scheduling follow up appointments within 7 days of discharge?  Haven't had time   Nursing Interventions  Educated patient on importance of making appointment, Advised patient to make appointment   Has the patient kept scheduled appointments due by today?  Yes   Comments  PATIENT HAS HAD AN APPOINTMENT AT [facility]   What is the Home health agency?   [facility] for tube feedings   Has home health visited the patient within 72 hours of discharge?  Yes   Pulse Ox monitoring  None   Did the patient receive a copy of their discharge instructions?  Yes   Nursing interventions  Reviewed instructions with patient   What is the patient's perception of their health status since discharge?  Improving   Is the patient/caregiver able to teach back signs and symptoms related to disease process for when to call PCP?  Yes   Is the patient/caregiver able to teach back signs and symptoms related to disease process for when to call 911?  Yes   Is the patient/caregiver able to teach back the hierarchy of who to call/visit for symptoms/problems? PCP, Specialist, Home health nurse, Urgent Care, ED, 911  Yes   Week 2 Call Completed?  Yes           Melissa Rangel LPN

## 2020-09-11 ENCOUNTER — READMISSION MANAGEMENT (OUTPATIENT)
Dept: CALL CENTER | Facility: HOSPITAL | Age: 50
End: 2020-09-11

## 2020-09-11 RX ORDER — TRAZODONE HYDROCHLORIDE 150 MG/1
TABLET ORAL
Qty: 90 TABLET | Refills: 1 | Status: SHIPPED | OUTPATIENT
Start: 2020-09-11 | End: 2020-11-18

## 2020-09-11 NOTE — OUTREACH NOTE
Medical Week 3 Survey      Responses   [facility] patient discharged from?  [facility]   COVID-19 Test Status  Not tested   Does the patient have one of the following disease processes/diagnoses(primary or secondary)?  Other   Week 3 attempt successful?  No   Unsuccessful attempts  Attempt 1          Petty Peres RN

## 2020-09-17 ENCOUNTER — TELEPHONE (OUTPATIENT)
Dept: FAMILY MEDICINE CLINIC | Facility: CLINIC | Age: 50
End: 2020-09-17

## 2020-09-17 NOTE — TELEPHONE ENCOUNTER
Pt would like a new gastro referral to [facility] for a second opinion. Dr Munoz can't find anything. She can't eat or anything and is passing out. She wants to see someone else.

## 2020-09-18 DIAGNOSIS — R11.2 NAUSEA AND VOMITING, INTRACTABILITY OF VOMITING NOT SPECIFIED, UNSPECIFIED VOMITING TYPE: Primary | ICD-10-CM

## 2020-09-23 RX ORDER — CARIPRAZINE 1.5 MG/1
CAPSULE, GELATIN COATED ORAL
Qty: 30 CAPSULE | Refills: 1 | Status: SHIPPED | OUTPATIENT
Start: 2020-09-23 | End: 2021-04-07 | Stop reason: SDUPTHER

## 2020-09-23 RX ORDER — LAMOTRIGINE 100 MG/1
TABLET ORAL
Qty: 270 TABLET | Refills: 0 | Status: SHIPPED | OUTPATIENT
Start: 2020-09-23 | End: 2020-12-23 | Stop reason: SDUPTHER

## 2020-09-24 ENCOUNTER — ON CAMPUS - OUTPATIENT (AMBULATORY)
Dept: URBAN - METROPOLITAN AREA HOSPITAL 2 | Facility: HOSPITAL | Age: 50
End: 2020-09-24

## 2020-09-24 VITALS
HEART RATE: 109 BPM | DIASTOLIC BLOOD PRESSURE: 60 MMHG | WEIGHT: 185 LBS | SYSTOLIC BLOOD PRESSURE: 140 MMHG | OXYGEN SATURATION: 95 % | SYSTOLIC BLOOD PRESSURE: 147 MMHG | OXYGEN SATURATION: 97 % | HEART RATE: 101 BPM | DIASTOLIC BLOOD PRESSURE: 78 MMHG | SYSTOLIC BLOOD PRESSURE: 121 MMHG | HEIGHT: 64 IN | RESPIRATION RATE: 18 BRPM | SYSTOLIC BLOOD PRESSURE: 125 MMHG | HEART RATE: 108 BPM | OXYGEN SATURATION: 98 % | DIASTOLIC BLOOD PRESSURE: 75 MMHG | RESPIRATION RATE: 17 BRPM | TEMPERATURE: 98 F | SYSTOLIC BLOOD PRESSURE: 149 MMHG | DIASTOLIC BLOOD PRESSURE: 86 MMHG | HEART RATE: 112 BPM | DIASTOLIC BLOOD PRESSURE: 89 MMHG

## 2020-09-24 DIAGNOSIS — K22.2 ESOPHAGEAL OBSTRUCTION: ICD-10-CM

## 2020-09-24 DIAGNOSIS — R13.10 DYSPHAGIA, UNSPECIFIED: ICD-10-CM

## 2020-09-24 DIAGNOSIS — T18.2XXA FOREIGN BODY IN STOMACH, INITIAL ENCOUNTER: ICD-10-CM

## 2020-09-24 PROCEDURE — 43247 EGD REMOVE FOREIGN BODY: CPT | Performed by: INTERNAL MEDICINE

## 2020-09-24 PROCEDURE — 43450 DILATE ESOPHAGUS 1/MULT PASS: CPT | Performed by: INTERNAL MEDICINE

## 2020-10-25 DIAGNOSIS — F40.01 PANIC DISORDER WITH AGORAPHOBIA: Chronic | ICD-10-CM

## 2020-10-25 RX ORDER — ALPRAZOLAM 2 MG/1
TABLET ORAL
Qty: 90 TABLET | Refills: 0 | Status: SHIPPED | OUTPATIENT
Start: 2020-10-25 | End: 2020-11-18 | Stop reason: SDUPTHER

## 2020-11-04 ENCOUNTER — APPOINTMENT (OUTPATIENT)
Dept: CT IMAGING | Facility: HOSPITAL | Age: 50
End: 2020-11-04

## 2020-11-04 ENCOUNTER — HOSPITAL ENCOUNTER (EMERGENCY)
Facility: HOSPITAL | Age: 50
Discharge: HOME | End: 2020-11-04
Attending: EMERGENCY MEDICINE | Admitting: EMERGENCY MEDICINE

## 2020-11-04 VITALS
TEMPERATURE: 98.1 F | WEIGHT: 150 LBS | HEIGHT: 64 IN | SYSTOLIC BLOOD PRESSURE: 117 MMHG | RESPIRATION RATE: 15 BRPM | DIASTOLIC BLOOD PRESSURE: 70 MMHG | OXYGEN SATURATION: 96 % | BODY MASS INDEX: 25.61 KG/M2 | HEART RATE: 86 BPM

## 2020-11-04 DIAGNOSIS — R10.9 ABDOMINAL PAIN, UNSPECIFIED ABDOMINAL LOCATION: Primary | ICD-10-CM

## 2020-11-04 DIAGNOSIS — K59.00 CONSTIPATION, UNSPECIFIED CONSTIPATION TYPE: ICD-10-CM

## 2020-11-04 LAB
ALBUMIN SERPL-MCNC: 4.2 G/DL (ref 3.5–5.2)
ALBUMIN/GLOB SERPL: 1.8 G/DL
ALP SERPL-CCNC: 109 U/L (ref 39–117)
ALT SERPL W P-5'-P-CCNC: 12 U/L (ref 1–33)
ANION GAP SERPL CALCULATED.3IONS-SCNC: 11 MMOL/L (ref 5–15)
AST SERPL-CCNC: 12 U/L (ref 1–32)
BACTERIA UR QL AUTO: ABNORMAL /HPF
BASOPHILS # BLD AUTO: 0 10*3/MM3 (ref 0–0.2)
BASOPHILS NFR BLD AUTO: 0.4 % (ref 0–1.5)
BILIRUB SERPL-MCNC: <0.2 MG/DL (ref 0–1.2)
BILIRUB UR QL STRIP: NEGATIVE
BUN SERPL-MCNC: 12 MG/DL (ref 6–20)
BUN/CREAT SERPL: 14.6 (ref 7–25)
CALCIUM SPEC-SCNC: 8.7 MG/DL (ref 8.6–10.5)
CHLORIDE SERPL-SCNC: 103 MMOL/L (ref 98–107)
CLARITY UR: CLEAR
CO2 SERPL-SCNC: 24 MMOL/L (ref 22–29)
COLOR UR: YELLOW
CREAT SERPL-MCNC: 0.82 MG/DL (ref 0.57–1)
DEPRECATED RDW RBC AUTO: 42.4 FL (ref 37–54)
EOSINOPHIL # BLD AUTO: 0 10*3/MM3 (ref 0–0.4)
EOSINOPHIL NFR BLD AUTO: 0.8 % (ref 0.3–6.2)
ERYTHROCYTE [DISTWIDTH] IN BLOOD BY AUTOMATED COUNT: 13.8 % (ref 12.3–15.4)
GFR SERPL CREATININE-BSD FRML MDRD: 73 ML/MIN/1.73
GLOBULIN UR ELPH-MCNC: 2.3 GM/DL
GLUCOSE SERPL-MCNC: 111 MG/DL (ref 65–99)
GLUCOSE UR STRIP-MCNC: NEGATIVE MG/DL
HCT VFR BLD AUTO: 32.8 % (ref 34–46.6)
HGB BLD-MCNC: 10.9 G/DL (ref 12–15.9)
HGB UR QL STRIP.AUTO: NEGATIVE
HOLD SPECIMEN: NORMAL
HYALINE CASTS UR QL AUTO: ABNORMAL /LPF
KETONES UR QL STRIP: NEGATIVE
LEUKOCYTE ESTERASE UR QL STRIP.AUTO: ABNORMAL
LIPASE SERPL-CCNC: 48 U/L (ref 13–60)
LYMPHOCYTES # BLD AUTO: 1.6 10*3/MM3 (ref 0.7–3.1)
LYMPHOCYTES NFR BLD AUTO: 34.2 % (ref 19.6–45.3)
MCH RBC QN AUTO: 29.2 PG (ref 26.6–33)
MCHC RBC AUTO-ENTMCNC: 33.2 G/DL (ref 31.5–35.7)
MCV RBC AUTO: 87.8 FL (ref 79–97)
MONOCYTES # BLD AUTO: 0.3 10*3/MM3 (ref 0.1–0.9)
MONOCYTES NFR BLD AUTO: 7.5 % (ref 5–12)
NEUTROPHILS NFR BLD AUTO: 2.6 10*3/MM3 (ref 1.7–7)
NEUTROPHILS NFR BLD AUTO: 57.1 % (ref 42.7–76)
NITRITE UR QL STRIP: NEGATIVE
NRBC BLD AUTO-RTO: 0 /100 WBC (ref 0–0.2)
PH UR STRIP.AUTO: 6 [PH] (ref 5–8)
PLATELET # BLD AUTO: 191 10*3/MM3 (ref 140–450)
PMV BLD AUTO: 8.3 FL (ref 6–12)
POTASSIUM SERPL-SCNC: 3.7 MMOL/L (ref 3.5–5.2)
PROT SERPL-MCNC: 6.5 G/DL (ref 6–8.5)
PROT UR QL STRIP: NEGATIVE
RBC # BLD AUTO: 3.74 10*6/MM3 (ref 3.77–5.28)
RBC # UR: ABNORMAL /HPF
REF LAB TEST METHOD: ABNORMAL
SODIUM SERPL-SCNC: 138 MMOL/L (ref 136–145)
SP GR UR STRIP: >=1.03 (ref 1–1.03)
SQUAMOUS #/AREA URNS HPF: ABNORMAL /HPF
UROBILINOGEN UR QL STRIP: ABNORMAL
WBC # BLD AUTO: 4.6 10*3/MM3 (ref 3.4–10.8)
WBC UR QL AUTO: ABNORMAL /HPF
WHOLE BLOOD HOLD SPECIMEN: NORMAL

## 2020-11-04 PROCEDURE — 25010000002 ONDANSETRON PER 1 MG: Performed by: EMERGENCY MEDICINE

## 2020-11-04 PROCEDURE — 81001 URINALYSIS AUTO W/SCOPE: CPT | Performed by: EMERGENCY MEDICINE

## 2020-11-04 PROCEDURE — 85025 COMPLETE CBC W/AUTO DIFF WBC: CPT | Performed by: EMERGENCY MEDICINE

## 2020-11-04 PROCEDURE — 25010000002 HYDROMORPHONE PER 4 MG: Performed by: EMERGENCY MEDICINE

## 2020-11-04 PROCEDURE — 74176 CT ABD & PELVIS W/O CONTRAST: CPT

## 2020-11-04 PROCEDURE — 96374 THER/PROPH/DIAG INJ IV PUSH: CPT

## 2020-11-04 PROCEDURE — 83690 ASSAY OF LIPASE: CPT | Performed by: EMERGENCY MEDICINE

## 2020-11-04 PROCEDURE — 96375 TX/PRO/DX INJ NEW DRUG ADDON: CPT

## 2020-11-04 PROCEDURE — 99283 EMERGENCY DEPT VISIT LOW MDM: CPT

## 2020-11-04 PROCEDURE — 80053 COMPREHEN METABOLIC PANEL: CPT | Performed by: EMERGENCY MEDICINE

## 2020-11-04 RX ORDER — HYDROMORPHONE HCL 110MG/55ML
0.5 PATIENT CONTROLLED ANALGESIA SYRINGE INTRAVENOUS ONCE
Status: COMPLETED | OUTPATIENT
Start: 2020-11-04 | End: 2020-11-04

## 2020-11-04 RX ORDER — SODIUM CHLORIDE 0.9 % (FLUSH) 0.9 %
10 SYRINGE (ML) INJECTION AS NEEDED
Status: DISCONTINUED | OUTPATIENT
Start: 2020-11-04 | End: 2020-11-04 | Stop reason: HOSPADM

## 2020-11-04 RX ORDER — DOCUSATE SODIUM 100 MG/1
100 CAPSULE, LIQUID FILLED ORAL 2 TIMES DAILY PRN
Qty: 10 CAPSULE | Refills: 0 | Status: SHIPPED | OUTPATIENT
Start: 2020-11-04 | End: 2021-08-06

## 2020-11-04 RX ORDER — ONDANSETRON 2 MG/ML
4 INJECTION INTRAMUSCULAR; INTRAVENOUS ONCE
Status: COMPLETED | OUTPATIENT
Start: 2020-11-04 | End: 2020-11-04

## 2020-11-04 RX ADMIN — SODIUM CHLORIDE 500 ML: 9 INJECTION, SOLUTION INTRAVENOUS at 03:25

## 2020-11-04 RX ADMIN — HYDROMORPHONE HYDROCHLORIDE 0.5 MG: 2 INJECTION, SOLUTION INTRAMUSCULAR; INTRAVENOUS; SUBCUTANEOUS at 03:25

## 2020-11-04 RX ADMIN — ONDANSETRON 4 MG: 2 INJECTION INTRAMUSCULAR; INTRAVENOUS at 03:25

## 2020-11-04 NOTE — DISCHARGE INSTRUCTIONS
Return for increased pain fever vomiting, shortness of air or any other concerns.  Drink plenty of fluids.  Increase dietary fiber

## 2020-11-04 NOTE — ED NOTES
"Pt came by EMS. Patient stated she woke up having abdominal on her RUQ and RLQ. Patient stated \"I just threw up once this morning.\" Patient stated she was here at [facility] for abdominal pain \"about two months ago and they put a feeding tube in me.\" Patient stated the RLQ & RUQ pain began Monday morning. Patient describes the pain as \"feels like somebodys stabbing me.\"     Gilmar Dunham, RN  11/04/20 0306       Gilmar Dunham RN  11/04/20 0307    "

## 2020-11-04 NOTE — ED PROVIDER NOTES
Subjective   History of Present Illness  Abdominal pain  50-year-old female complains of right lower quadrant abdominal pain for last 2 days that is sharp and stabbing, moderate intensity, she reports some nausea and vomiting, no diarrhea or constipation.  Review of Systems   Constitutional: Negative.    HENT: Negative.    Eyes: Negative.    Respiratory: Negative.    Cardiovascular: Negative.    Gastrointestinal: Positive for abdominal pain and nausea.   Genitourinary: Negative.    Musculoskeletal: Negative.    Skin: Negative.    Neurological: Negative.    Psychiatric/Behavioral: Negative.        Past Medical History:   Diagnosis Date   • Anxiety    • Bipolar disorder (CMS/HCC)    • Depression    • Migraines    • Panic disorder    • Seizures (CMS/HCC)        Allergies   Allergen Reactions   • Sulfa Antibiotics Hives   • Tramadol Hcl Mental Status Change       Past Surgical History:   Procedure Laterality Date   • ABDOMINAL SURGERY     •  SECTION      x3   • ENDOSCOPY W/ PEG TUBE PLACEMENT N/A 2020    Procedure: ESOPHAGOGASTRODUODENOSCOPY WITH PERCUTANEOUS ENDOSCOPIC GASTROSTOMY TUBE INSERTION;  Surgeon: Rito Le MD;  Location: [facility] ENDOSCOPY;  Service: Gastroenterology;  Laterality: N/A;   • HERNIA REPAIR     • HYSTERECTOMY         Family History   Problem Relation Age of Onset   • No Known Problems Mother    • No Known Problems Father    • No Known Problems Sister    • No Known Problems Brother    • No Known Problems Daughter    • No Known Problems Son    • No Known Problems Maternal Grandmother    • No Known Problems Paternal Grandmother    • No Known Problems Maternal Aunt    • No Known Problems Paternal Aunt        Social History     Socioeconomic History   • Marital status:      Spouse name: Not on file   • Number of children: Not on file   • Years of education: Not on file   • Highest education level: Not on file   Tobacco Use   • Smoking status: Current Every Day Smoker      Packs/day: 0.25     Types: Cigarettes   • Smokeless tobacco: Never Used   Substance and Sexual Activity   • Alcohol use: No     Frequency: Never   • Drug use: No   • Sexual activity: Never       Prior to Admission medications    Medication Sig Start Date End Date Taking? Authorizing Provider   ALPRAZolam (XANAX) 2 MG tablet TAKE 1 TABLET BY MOUTH THREE TIMES DAILY AS NEEDED FOR ANXIETY 10/25/20   Love Bourgeois PA-C   busPIRone (BUSPAR) 10 MG tablet TAKE 1 TABLET BY MOUTH THREE TIMES DAILY 7/26/20   Love Bourgeois PA-C   gabapentin (NEURONTIN) 800 MG tablet Take 800 mg by mouth 3 (Three) Times a Day. 2/8/19   Ritesh Funes MD   lacosamide (VIMPAT) 100 MG tablet tablet Take 1 tablet by mouth Every 12 (Twelve) Hours for 30 days. 8/25/20 9/24/20  Kelley Cage MD   lacosamide (VIMPAT) 50 MG tablet tablet Take 1 tablet by mouth Every 12 (Twelve) Hours for 4 doses. 8/23/20 8/25/20  Kelley Cage MD   lamoTRIgine (LaMICtal) 100 MG tablet TAKE 1 TABLET BY MOUTH EVERY MORNING AND 2 TABLETS EVERY NIGHT AT BEDTIME 9/23/20   Love Bourgeois PA-C   levETIRAcetam (KEPPRA) 500 MG tablet Take 1,500 mg by mouth 2 (Two) Times a Day.    Ritesh Funes MD   modafinil (PROVIGIL) 200 MG tablet Take 200 mg by mouth Every Morning. 8/13/19   Ritesh Funes MD   Nutritional Supplements (NUTREN 1.5) liquid Nutren 1.5 at 55 ml/hr, flush with 20 cc/hr free water 8/23/20   Kelley Cage MD   pramipexole (MIRAPEX) 1 MG tablet Take 1 tablet by mouth Every Night. 7/13/20   Ritesh Funes MD   QUEtiapine XR (SEROquel XR) 400 MG 24 hr tablet TAKE 1 TABLET BY MOUTH EVERY NIGHT AT BEDTIME 7/20/20   Love Bourgeois PA-C   SUMAtriptan (IMITREX) 100 MG tablet Take 100 mg by mouth 1 (One) Time As Needed for Migraine.    Ritesh Funes MD   traZODone (DESYREL) 150 MG tablet TAKE 1 TABLET BY MOUTH AT BEDTIME 9/11/20   Love Bourgeois PA-C   Vraylar 1.5 MG capsule capsule TAKE 1 CAPSULE BY MOUTH EVERY  "DAY 9/23/20   Bk Lovemarissa THRASHER PA-C     /63   Pulse 73   Temp 97.9 °F (36.6 °C) (Oral)   Resp 20   Ht 162.6 cm (64\")   Wt 68 kg (150 lb)   SpO2 95%   BMI 25.75 kg/m²   I examined the patient using the appropriate personal protective equipment.        Objective   Physical Exam  General: Well-developed well-appearing, no acute distress, alert and appropriate  Eyes: Pupils round and equal, sclera nonicteric  HEENT: Mucous membranes moist, no mucosal swelling  Neck: Supple, no nuchal rigidity, no lymphadenopathy  Respirations: Respirations nonlabored, equal breath sounds bilaterally, clear lungs  Heart regular rate and rhythm, no murmurs rubs or gallops,   Abdomen soft, mildly tender ovation right lower abdomen, no rebound or guarding, nondistended, no hepatosplenomegaly, no hernia, no mass, normal bowel sounds, no CVA tenderness  Extremities no clubbing cyanosis or edema, calves are symmetric and nontender  Neuro cranial nerves grossly intact, no focal limb deficits  Psych oriented, anxious  Skin no rash, brisk cap refill  Procedures           ED Course            Results for orders placed or performed during the hospital encounter of 11/04/20   Comprehensive Metabolic Panel    Specimen: Blood   Result Value Ref Range    Glucose 111 (H) 65 - 99 mg/dL    BUN 12 6 - 20 mg/dL    Creatinine 0.82 0.57 - 1.00 mg/dL    Sodium 138 136 - 145 mmol/L    Potassium 3.7 3.5 - 5.2 mmol/L    Chloride 103 98 - 107 mmol/L    CO2 24.0 22.0 - 29.0 mmol/L    Calcium 8.7 8.6 - 10.5 mg/dL    Total Protein 6.5 6.0 - 8.5 g/dL    Albumin 4.20 3.50 - 5.20 g/dL    ALT (SGPT) 12 1 - 33 U/L    AST (SGOT) 12 1 - 32 U/L    Alkaline Phosphatase 109 39 - 117 U/L    Total Bilirubin <0.2 0.0 - 1.2 mg/dL    eGFR Non African Amer 73 >60 mL/min/1.73    Globulin 2.3 gm/dL    A/G Ratio 1.8 g/dL    BUN/Creatinine Ratio 14.6 7.0 - 25.0    Anion Gap 11.0 5.0 - 15.0 mmol/L   Lipase    Specimen: Blood   Result Value Ref Range    Lipase 48 13 - 60 U/L "   Urinalysis With Culture If Indicated - Urine, Clean Catch    Specimen: Urine, Clean Catch   Result Value Ref Range    Color, UA Yellow Yellow, Straw    Appearance, UA Clear Clear    pH, UA 6.0 5.0 - 8.0    Specific Gravity, UA >=1.030 1.005 - 1.030    Glucose, UA Negative Negative    Ketones, UA Negative Negative    Bilirubin, UA Negative Negative    Blood, UA Negative Negative    Protein, UA Negative Negative    Leuk Esterase, UA Trace (A) Negative    Nitrite, UA Negative Negative    Urobilinogen, UA 0.2 E.U./dL 0.2 - 1.0 E.U./dL   CBC Auto Differential    Specimen: Blood   Result Value Ref Range    WBC 4.60 3.40 - 10.80 10*3/mm3    RBC 3.74 (L) 3.77 - 5.28 10*6/mm3    Hemoglobin 10.9 (L) 12.0 - 15.9 g/dL    Hematocrit 32.8 (L) 34.0 - 46.6 %    MCV 87.8 79.0 - 97.0 fL    MCH 29.2 26.6 - 33.0 pg    MCHC 33.2 31.5 - 35.7 g/dL    RDW 13.8 12.3 - 15.4 %    RDW-SD 42.4 37.0 - 54.0 fl    MPV 8.3 6.0 - 12.0 fL    Platelets 191 140 - 450 10*3/mm3    Neutrophil % 57.1 42.7 - 76.0 %    Lymphocyte % 34.2 19.6 - 45.3 %    Monocyte % 7.5 5.0 - 12.0 %    Eosinophil % 0.8 0.3 - 6.2 %    Basophil % 0.4 0.0 - 1.5 %    Neutrophils, Absolute 2.60 1.70 - 7.00 10*3/mm3    Lymphocytes, Absolute 1.60 0.70 - 3.10 10*3/mm3    Monocytes, Absolute 0.30 0.10 - 0.90 10*3/mm3    Eosinophils, Absolute 0.00 0.00 - 0.40 10*3/mm3    Basophils, Absolute 0.00 0.00 - 0.20 10*3/mm3    nRBC 0.0 0.0 - 0.2 /100 WBC   Urinalysis, Microscopic Only - Urine, Clean Catch    Specimen: Urine, Clean Catch   Result Value Ref Range    RBC, UA 0-2 (A) None Seen /HPF    WBC, UA 3-5 (A) None Seen /HPF    Bacteria, UA None Seen None Seen /HPF    Squamous Epithelial Cells, UA 3-6 (A) None Seen, 0-2 /HPF    Hyaline Casts, UA 3-6 None Seen /LPF    Methodology Automated Microscopy      Ct Abdomen Pelvis Without Contrast    Result Date: 11/4/2020  1. No evidence of acute disease in the abdomen or pelvis. 2. Large colonic stool burden. 3. Status post hysterectomy. 4.  Chronic findings as above. Electronically signed by:  Adrien Chen M.D.  11/4/2020 2:01 AM                                      MDM  Patient was advised of the findings.  CT scan of the abdomen shows no acute abnormalities there is some constipation.  She has a benign abdominal examination no signs of peritonitis or acute abdomen.  She is prescribed Colace she is discharged good condition she is given warning signs for return  Final diagnoses:   Abdominal pain, unspecified abdominal location   Constipation, unspecified constipation type            Yared Klein MD  11/04/20 0427

## 2020-11-18 ENCOUNTER — OFFICE VISIT (OUTPATIENT)
Dept: PSYCHIATRY | Facility: CLINIC | Age: 50
End: 2020-11-18

## 2020-11-18 DIAGNOSIS — F31.31 BIPOLAR AFFECTIVE DISORDER, CURRENTLY DEPRESSED, MILD (HCC): Primary | ICD-10-CM

## 2020-11-18 DIAGNOSIS — F40.01 PANIC DISORDER WITH AGORAPHOBIA: ICD-10-CM

## 2020-11-18 PROCEDURE — 99213 OFFICE O/P EST LOW 20 MIN: CPT | Performed by: PHYSICIAN ASSISTANT

## 2020-11-18 RX ORDER — ALPRAZOLAM 2 MG/1
2 TABLET ORAL 3 TIMES DAILY PRN
Qty: 90 TABLET | Refills: 2 | Status: SHIPPED | OUTPATIENT
Start: 2020-11-18 | End: 2020-12-11

## 2020-11-18 RX ORDER — BUSPIRONE HYDROCHLORIDE 15 MG/1
15 TABLET ORAL 3 TIMES DAILY
Qty: 90 TABLET | Refills: 5 | Status: SHIPPED | OUTPATIENT
Start: 2020-11-18 | End: 2021-03-26 | Stop reason: SDUPTHER

## 2020-11-18 RX ORDER — HYDROXYZINE HYDROCHLORIDE 25 MG/1
25 TABLET, FILM COATED ORAL 3 TIMES DAILY PRN
Qty: 90 TABLET | Refills: 2 | Status: SHIPPED | OUTPATIENT
Start: 2020-11-18 | End: 2021-01-28 | Stop reason: SDUPTHER

## 2020-11-18 NOTE — PROGRESS NOTES
Subjective   Milka Espinoza is a 50 y.o.white female who presents today for follow up in the office x 15 minutes    Chief Complaint: Depression and anxiety    History of Present Illness:   She is still having stomach issues, having swallow study tomorrow  Has had 5 scopes this past year to evaluate persistent vomiting, admitted again in August  Taken off phenobarbital and now on Vimpat  Her 21 yr old daughter is struggling with alcohol and mental health issues due to abuse  Her 25 yr old son is autistic  Panic attacks happening more often  Depression 7/10, due to long lasting stomach issues  Denies SI/HI  Anxiety 6/10  She is still doing better on the Xanax than she was with the Valium, switched in June    The following portions of the patient's history were reviewed and updated as appropriate: allergies, current medications, past family history, past medical history, past social history, past surgical history and problem list.    PAST PSYCHIATRIC HISTORY  Axis I  Affective/Bipoloar Disorder, Anxiety/Panic Disorder, Substance Abuse Disorder  Axis II  None    PAST OUTPATIENT TREATMENT  Diagnosis treated:  Affective Disorder, Anxiety/Panic Disorder, Substance/Alcohol Abuse  Treatment Type:  Individual Therapy, Medication Management  Prior Psychiatric Medications:  Abilify  Risperidone  Vraylar  Valium  Xanax  Lamictal  Trazodone, feels 'prickly all over'  Buspar  Seroquel  Support Groups:  None  Sequelae Of Mental Disorder:  social isolation, family disruption, emotional distress      Interval History  No change    Side Effects  None     Mental status exam was reviewed and compared to her 7/15/20 visit and appropriate updates were made.    Past Medical History:  Past Medical History:   Diagnosis Date   • Anxiety    • Bipolar disorder (CMS/HCC)    • Depression    • Migraines    • Panic disorder    • Seizures (CMS/HCC)        Social History:  Social History     Socioeconomic History   • Marital status:       Spouse name: Not on file   • Number of children: Not on file   • Years of education: Not on file   • Highest education level: Not on file   Tobacco Use   • Smoking status: Former Smoker     Packs/day: 0.25     Types: Cigarettes     Quit date: 2020     Years since quittin.0   • Smokeless tobacco: Never Used   Substance and Sexual Activity   • Alcohol use: No     Frequency: Never   • Drug use: No   • Sexual activity: Never       Family History:  Family History   Problem Relation Age of Onset   • No Known Problems Mother    • No Known Problems Father    • No Known Problems Sister    • No Known Problems Brother    • Alcohol abuse Daughter    • No Known Problems Son    • No Known Problems Maternal Grandmother    • No Known Problems Paternal Grandmother    • No Known Problems Maternal Aunt    • No Known Problems Paternal Aunt        Past Surgical History:  Past Surgical History:   Procedure Laterality Date   • ABDOMINAL SURGERY     •  SECTION      x3   • ENDOSCOPY W/ PEG TUBE PLACEMENT N/A 2020    Procedure: ESOPHAGOGASTRODUODENOSCOPY WITH PERCUTANEOUS ENDOSCOPIC GASTROSTOMY TUBE INSERTION;  Surgeon: Rito Le MD;  Location: [facility] ENDOSCOPY;  Service: Gastroenterology;  Laterality: N/A;   • HERNIA REPAIR     • HYSTERECTOMY         Problem List:  Patient Active Problem List   Diagnosis   • Anxiety state   • Bipolar affective disorder (CMS/HCC)   • Depression   • Mood disorder in conditions classified elsewhere   • Seizures (CMS/HCC)   • Opioid withdrawal (CMS/HCC)   • Altered level of consciousness   • Asthma   • Bronchitis, acute   • General medical exam   • Low back pain   • Syncope   • Unspecified sprain of unspecified wrist, initial encounter   • Upper respiratory tract infection   • Intractable nausea with vomiting   • Obesity   • GERD (gastroesophageal reflux disease)   • Tobacco use   • Nausea and vomiting   • Borderline hypoglycemic episodes   • Pseudoseizure   • Seizure (CMS/HCC)        Allergy:   Allergies   Allergen Reactions   • Sulfa Antibiotics Hives   • Tramadol Hcl Mental Status Change        Discontinued Medications:  Medications Discontinued During This Encounter   Medication Reason   • traZODone (DESYREL) 150 MG tablet *Therapy completed   • ALPRAZolam (XANAX) 2 MG tablet Reorder   • busPIRone (BUSPAR) 10 MG tablet Dose adjustment       Current Medications:   Current Outpatient Medications   Medication Sig Dispense Refill   • ALPRAZolam (XANAX) 2 MG tablet Take 1 tablet by mouth 3 (Three) Times a Day As Needed for Anxiety. for anxiety 90 tablet 2   • busPIRone (BUSPAR) 15 MG tablet Take 1 tablet by mouth 3 (Three) Times a Day. 90 tablet 5   • docusate sodium (COLACE) 100 MG capsule Take 1 capsule by mouth 2 (Two) Times a Day As Needed for Constipation. 10 capsule 0   • gabapentin (NEURONTIN) 800 MG tablet Take 800 mg by mouth 3 (Three) Times a Day.     • hydrOXYzine (ATARAX) 25 MG tablet Take 1 tablet by mouth 3 (Three) Times a Day As Needed for Anxiety. 90 tablet 2   • lacosamide (VIMPAT) 100 MG tablet tablet Take 1 tablet by mouth Every 12 (Twelve) Hours for 30 days. 60 tablet 0   • lacosamide (VIMPAT) 50 MG tablet tablet Take 1 tablet by mouth Every 12 (Twelve) Hours for 4 doses. 4 tablet 0   • lamoTRIgine (LaMICtal) 100 MG tablet TAKE 1 TABLET BY MOUTH EVERY MORNING AND 2 TABLETS EVERY NIGHT AT BEDTIME 270 tablet 0   • levETIRAcetam (KEPPRA) 500 MG tablet Take 1,500 mg by mouth 2 (Two) Times a Day.     • modafinil (PROVIGIL) 200 MG tablet Take 200 mg by mouth Every Morning.  5   • Nutritional Supplements (NUTREN 1.5) liquid Nutren 1.5 at 55 ml/hr, flush with 20 cc/hr free water 20 can 0   • pramipexole (MIRAPEX) 1 MG tablet Take 1 tablet by mouth Every Night.     • QUEtiapine XR (SEROquel XR) 400 MG 24 hr tablet TAKE 1 TABLET BY MOUTH EVERY NIGHT AT BEDTIME 30 tablet 5   • SUMAtriptan (IMITREX) 100 MG tablet Take 100 mg by mouth 1 (One) Time As Needed for Migraine.     • Vraylar  1.5 MG capsule capsule TAKE 1 CAPSULE BY MOUTH EVERY DAY 30 capsule 1     No current facility-administered medications for this visit.          Review of Symptoms:    Psychiatric/Behavioral: Negative for agitation, behavioral problems, confusion, decreased concentration, dysphoric mood, hallucinations, self-injury, sleep disturbance and suicidal ideas. The patient is not nervous/anxious and is not hyperactive.        Physical Exam:   not currently breastfeeding.    Mental Status Exam:   Hygiene:   Good  Cooperation:  Cooperative  Eye Contact:  Good  Psychomotor Behavior:  Appropriate  Affect:  Appropriate  Mood: normal  Hopelessness: Denies  Speech:  Normal  Thought Process:  Goal directed  Thought Content:  Normal  Suicidal:  None  Homicidal:  None  Hallucinations:  None  Delusion:  None  Memory:  Intact  Orientation:  Person, Place, Time and Situation  Reliability:  good  Insight:  Good  Judgement:  Good  Impulse Control:  Good  Physical/Medical Issues:  No      Mental status exam was reviewed and compared to 7/15/20 visit and appropriate updates were made.    PHQ-9 Depression Screening  Little interest or pleasure in doing things? 2   Feeling down, depressed, or hopeless? 2   Trouble falling or staying asleep, or sleeping too much? 2   Feeling tired or having little energy? 2   Poor appetite or overeating? 2   Feeling bad about yourself - or that you are a failure or have let yourself or your family down? 1   Trouble concentrating on things, such as reading the newspaper or watching television? 1   Moving or speaking so slowly that other people could have noticed? Or the opposite - being so fidgety or restless that you have been moving around a lot more than usual? 0   Thoughts that you would be better off dead, or of hurting yourself in some way? 0   PHQ-9 Total Score 12   If you checked off any problems, how difficult have these problems made it for you to do your work, take care of things at home, or get along  with other people? Very difficult           Former smoker    I advised Milka of the risks of tobacco use.     Lab Results:   Admission on 11/04/2020, Discharged on 11/04/2020   Component Date Value Ref Range Status   • Glucose 11/04/2020 111* 65 - 99 mg/dL Final   • BUN 11/04/2020 12  6 - 20 mg/dL Final   • Creatinine 11/04/2020 0.82  0.57 - 1.00 mg/dL Final   • Sodium 11/04/2020 138  136 - 145 mmol/L Final   • Potassium 11/04/2020 3.7  3.5 - 5.2 mmol/L Final   • Chloride 11/04/2020 103  98 - 107 mmol/L Final   • CO2 11/04/2020 24.0  22.0 - 29.0 mmol/L Final   • Calcium 11/04/2020 8.7  8.6 - 10.5 mg/dL Final   • Total Protein 11/04/2020 6.5  6.0 - 8.5 g/dL Final   • Albumin 11/04/2020 4.20  3.50 - 5.20 g/dL Final   • ALT (SGPT) 11/04/2020 12  1 - 33 U/L Final   • AST (SGOT) 11/04/2020 12  1 - 32 U/L Final   • Alkaline Phosphatase 11/04/2020 109  39 - 117 U/L Final   • Total Bilirubin 11/04/2020 <0.2  0.0 - 1.2 mg/dL Final   • eGFR Non African Amer 11/04/2020 73  >60 mL/min/1.73 Final   • Globulin 11/04/2020 2.3  gm/dL Final   • A/G Ratio 11/04/2020 1.8  g/dL Final   • BUN/Creatinine Ratio 11/04/2020 14.6  7.0 - 25.0 Final   • Anion Gap 11/04/2020 11.0  5.0 - 15.0 mmol/L Final   • Lipase 11/04/2020 48  13 - 60 U/L Final   • Color, UA 11/04/2020 Yellow  Yellow, Straw Final   • Appearance, UA 11/04/2020 Clear  Clear Final   • pH, UA 11/04/2020 6.0  5.0 - 8.0 Final   • Specific Gravity, UA 11/04/2020 >=1.030  1.005 - 1.030 Final   • Glucose, UA 11/04/2020 Negative  Negative Final   • Ketones, UA 11/04/2020 Negative  Negative Final   • Bilirubin, UA 11/04/2020 Negative  Negative Final   • Blood, UA 11/04/2020 Negative  Negative Final   • Protein, UA 11/04/2020 Negative  Negative Final   • Leuk Esterase, UA 11/04/2020 Trace* Negative Final   • Nitrite, UA 11/04/2020 Negative  Negative Final   • Urobilinogen, UA 11/04/2020 0.2 E.U./dL  0.2 - 1.0 E.U./dL Final   • WBC 11/04/2020 4.60  3.40 - 10.80 10*3/mm3 Final   • RBC  11/04/2020 3.74* 3.77 - 5.28 10*6/mm3 Final   • Hemoglobin 11/04/2020 10.9* 12.0 - 15.9 g/dL Final   • Hematocrit 11/04/2020 32.8* 34.0 - 46.6 % Final   • MCV 11/04/2020 87.8  79.0 - 97.0 fL Final   • MCH 11/04/2020 29.2  26.6 - 33.0 pg Final   • MCHC 11/04/2020 33.2  31.5 - 35.7 g/dL Final   • RDW 11/04/2020 13.8  12.3 - 15.4 % Final   • RDW-SD 11/04/2020 42.4  37.0 - 54.0 fl Final   • MPV 11/04/2020 8.3  6.0 - 12.0 fL Final   • Platelets 11/04/2020 191  140 - 450 10*3/mm3 Final   • Neutrophil % 11/04/2020 57.1  42.7 - 76.0 % Final   • Lymphocyte % 11/04/2020 34.2  19.6 - 45.3 % Final   • Monocyte % 11/04/2020 7.5  5.0 - 12.0 % Final   • Eosinophil % 11/04/2020 0.8  0.3 - 6.2 % Final   • Basophil % 11/04/2020 0.4  0.0 - 1.5 % Final   • Neutrophils, Absolute 11/04/2020 2.60  1.70 - 7.00 10*3/mm3 Final   • Lymphocytes, Absolute 11/04/2020 1.60  0.70 - 3.10 10*3/mm3 Final   • Monocytes, Absolute 11/04/2020 0.30  0.10 - 0.90 10*3/mm3 Final   • Eosinophils, Absolute 11/04/2020 0.00  0.00 - 0.40 10*3/mm3 Final   • Basophils, Absolute 11/04/2020 0.00  0.00 - 0.20 10*3/mm3 Final   • nRBC 11/04/2020 0.0  0.0 - 0.2 /100 WBC Final   • Extra Tube 11/04/2020 hold for add-on   Final    Auto resulted   • Extra Tube 11/04/2020 Hold for add-ons.   Final    Auto resulted.   • RBC, UA 11/04/2020 0-2* None Seen /HPF Final   • WBC, UA 11/04/2020 3-5* None Seen /HPF Final   • Bacteria, UA 11/04/2020 None Seen  None Seen /HPF Final   • Squamous Epithelial Cells, UA 11/04/2020 3-6* None Seen, 0-2 /HPF Final   • Hyaline Casts, UA 11/04/2020 3-6  None Seen /LPF Final   • Methodology 11/04/2020 Automated Microscopy   Final   No results displayed because visit has over 200 results.          Assessment/Plan   Problems Addressed this Visit        Other    Bipolar affective disorder (CMS/HCC) - Primary (Chronic)    Relevant Medications    hydrOXYzine (ATARAX) 25 MG tablet    ALPRAZolam (XANAX) 2 MG tablet    busPIRone (BUSPAR) 15 MG tablet       Other Visit Diagnoses     Panic disorder with agoraphobia        Relevant Medications    hydrOXYzine (ATARAX) 25 MG tablet    ALPRAZolam (XANAX) 2 MG tablet    busPIRone (BUSPAR) 15 MG tablet      Diagnoses       Codes Comments    Bipolar affective disorder, currently depressed, mild (CMS/HCC)    -  Primary ICD-10-CM: F31.31  ICD-9-CM: 296.51     Panic disorder with agoraphobia     ICD-10-CM: F40.01  ICD-9-CM: 300.21           Visit Diagnoses:    ICD-10-CM ICD-9-CM   1. Bipolar affective disorder, currently depressed, mild (CMS/HCC)  F31.31 296.51   2. Panic disorder with agoraphobia  F40.01 300.21       TREATMENT PLAN/GOALS: Continue supportive psychotherapy efforts and medications as indicated. Treatment and medication options discussed during today's visit. Patient ackowledged and verbally consented to continue with current treatment plan and was educated on the importance of compliance with treatment and follow-up appointments.    MEDICATION ISSUES:  INSPECT reviewed as expected  Discussed medication options and treatment plan of prescribed medication as well as the risks, benefits, and side effects including potential falls, possible impaired driving and metabolic adversities among others. Patient is agreeable to call the office with any worsening of symptoms or onset of side effects. Patient is agreeable to call 911 or go to the nearest ER should he/she begin having SI/HI. No medication side effects or related complaints today.     Patient is with increased anxiety, health related issues  Continue Xanax, Vraylar, Lamictal,  and Seroquel at current dosages with no changes, refill Xanax  Urine drug screen done today  Increase Buspar to 15mg TID  Add hydroxyzine 25 mg 3 times daily as needed for anxiety, can take 2 at bedtime for sleep      MEDS ORDERED DURING VISIT:  New Medications Ordered This Visit   Medications   • hydrOXYzine (ATARAX) 25 MG tablet     Sig: Take 1 tablet by mouth 3 (Three) Times a Day As  Needed for Anxiety.     Dispense:  90 tablet     Refill:  2   • ALPRAZolam (XANAX) 2 MG tablet     Sig: Take 1 tablet by mouth 3 (Three) Times a Day As Needed for Anxiety. for anxiety     Dispense:  90 tablet     Refill:  2   • busPIRone (BUSPAR) 15 MG tablet     Sig: Take 1 tablet by mouth 3 (Three) Times a Day.     Dispense:  90 tablet     Refill:  5       Return in about 4 months (around 3/18/2021).        This document has been electronically signed by Love Bourgeois PA-C  November 18, 2020 09:19 EST

## 2020-11-18 NOTE — PATIENT INSTRUCTIONS
Panic Attack    A panic attack is when you suddenly feel very afraid, uncomfortable, or nervous (anxious). A panic attack can happen when you are scared or for no reason.  A panic attack can feel like a serious problem. It can even feel like a heart attack or stroke. See your doctor when you have a panic attack to make sure you do not have a serious problem.  Follow these instructions at home:  · Take medicines only as told by your doctor.  · If you feel worried or nervous, try not to have caffeine.  · Take good care of your health. To do this:  ? Eat healthy. Make sure to eat fresh fruits and vegetables, whole grains, lean meats, and low-fat dairy.  ? Get enough sleep. Try to sleep for 7-8 hours each night.  ? Exercise. Try to be active for 30 minutes 5 or more days a week.  ? Do not smoke. Talk to your doctor if you need help quitting.  ? Limit how much alcohol you drink:  § If you are a woman who is not pregnant: try not to have more than 1 drink a day.  § If you are a man: try not to have more than 2 drinks a day.  § One drink equals 12 oz of beer, 5 oz of wine, or 1½ oz of hard liquor.  · Keep all follow-up visits as told by your doctor. This is important.  Contact a doctor if:  · Your symptoms do not get better.  · Your symptoms get worse.  · You are not able to take your medicines as told.  Get help right away if:  · You have thoughts of hurting yourself or others.  · You have symptoms of a panic attack. Do not drive yourself to the hospital. Have someone else drive you or call an ambulance.  If you feel like you may hurt yourself or others, or have thoughts about taking your own life, get help right away. You can go to your nearest emergency department or call:  · Your local emergency services (911 in the [place]  · A suicide crisis helpline, such as the National Suicide Prevention Lifeline at 1-828.736.1301. This is open 24 hours a day.  Summary  · A panic attack is when you suddenly feel very afraid,  uncomfortable, or nervous (anxious).  · See your doctor when you have a panic attack to make sure that you do not have another serious problem.  · If you feel like you may hurt yourself or others, get help right away by calling 911.  This information is not intended to replace advice given to you by your health care provider. Make sure you discuss any questions you have with your health care provider.  Document Released: 01/20/2012 Document Revised: 11/30/2018 Document Reviewed: 01/31/2018  Elsevier Patient Education © 2020 [employer]

## 2020-12-10 DIAGNOSIS — F40.01 PANIC DISORDER WITH AGORAPHOBIA: ICD-10-CM

## 2020-12-11 RX ORDER — ALPRAZOLAM 2 MG/1
TABLET ORAL
Qty: 90 TABLET | Refills: 2 | Status: SHIPPED | OUTPATIENT
Start: 2020-12-11 | End: 2021-05-10

## 2020-12-23 RX ORDER — LAMOTRIGINE 100 MG/1
TABLET ORAL
Qty: 270 TABLET | Refills: 0 | Status: SHIPPED | OUTPATIENT
Start: 2020-12-23 | End: 2021-03-13

## 2020-12-23 RX ORDER — QUETIAPINE 400 MG/1
TABLET, FILM COATED, EXTENDED RELEASE ORAL
Qty: 30 TABLET | Refills: 5 | Status: SHIPPED | OUTPATIENT
Start: 2020-12-23 | End: 2021-05-10 | Stop reason: SDUPTHER

## 2021-01-28 DIAGNOSIS — F40.01 PANIC DISORDER WITH AGORAPHOBIA: ICD-10-CM

## 2021-01-28 RX ORDER — HYDROXYZINE HYDROCHLORIDE 25 MG/1
25 TABLET, FILM COATED ORAL 3 TIMES DAILY PRN
Qty: 90 TABLET | Refills: 2 | Status: SHIPPED | OUTPATIENT
Start: 2021-01-28 | End: 2021-04-19

## 2021-02-01 ENCOUNTER — TELEPHONE (OUTPATIENT)
Dept: FAMILY MEDICINE CLINIC | Facility: CLINIC | Age: 51
End: 2021-02-01

## 2021-02-01 DIAGNOSIS — R21 RASH: Primary | ICD-10-CM

## 2021-02-01 RX ORDER — NYSTATIN 100000 [USP'U]/G
POWDER TOPICAL 3 TIMES DAILY PRN
Qty: 30 G | Refills: 0 | Status: SHIPPED | OUTPATIENT
Start: 2021-02-01 | End: 2021-04-26
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
## 2021-03-04 RX ORDER — TRAZODONE HYDROCHLORIDE 150 MG/1
TABLET ORAL
Qty: 90 TABLET | Refills: 1 | OUTPATIENT
Start: 2021-03-04

## 2021-03-13 RX ORDER — LAMOTRIGINE 100 MG/1
TABLET ORAL
Qty: 270 TABLET | Refills: 0 | Status: SHIPPED | OUTPATIENT
Start: 2021-03-13 | End: 2021-07-05

## 2021-03-26 DIAGNOSIS — F40.01 PANIC DISORDER WITH AGORAPHOBIA: ICD-10-CM

## 2021-03-29 RX ORDER — BUSPIRONE HYDROCHLORIDE 15 MG/1
15 TABLET ORAL 3 TIMES DAILY
Qty: 90 TABLET | Refills: 0 | Status: SHIPPED | OUTPATIENT
Start: 2021-03-29 | End: 2021-08-03 | Stop reason: SDUPTHER

## 2021-04-07 ENCOUNTER — OFFICE VISIT (OUTPATIENT)
Dept: PSYCHIATRY | Facility: CLINIC | Age: 51
End: 2021-04-07

## 2021-04-07 DIAGNOSIS — F40.01 PANIC DISORDER WITH AGORAPHOBIA: Primary | Chronic | ICD-10-CM

## 2021-04-07 DIAGNOSIS — F31.31 BIPOLAR AFFECTIVE DISORDER, CURRENTLY DEPRESSED, MILD (HCC): Chronic | ICD-10-CM

## 2021-04-07 PROCEDURE — 99214 OFFICE O/P EST MOD 30 MIN: CPT | Performed by: PHYSICIAN ASSISTANT

## 2021-04-07 RX ORDER — GABAPENTIN 800 MG/1
800 TABLET ORAL 3 TIMES DAILY
Qty: 270 TABLET | Refills: 1 | Status: SHIPPED | OUTPATIENT
Start: 2021-04-07 | End: 2021-09-07
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
## 2021-04-19 DIAGNOSIS — F40.01 PANIC DISORDER WITH AGORAPHOBIA: ICD-10-CM

## 2021-04-19 RX ORDER — HYDROXYZINE HYDROCHLORIDE 25 MG/1
TABLET, FILM COATED ORAL
Qty: 90 TABLET | Refills: 2 | Status: SHIPPED | OUTPATIENT
Start: 2021-04-19 | End: 2021-07-03

## 2021-04-26 DIAGNOSIS — R21 RASH: ICD-10-CM

## 2021-04-26 RX ORDER — NYSTATIN 100000 [USP'U]/G
POWDER TOPICAL
Qty: 30 G | Refills: 0 | Status: SHIPPED | OUTPATIENT
Start: 2021-04-26 | End: 2021-08-06 | Stop reason: SDUPTHER

## 2021-05-10 DIAGNOSIS — F40.01 PANIC DISORDER WITH AGORAPHOBIA: ICD-10-CM

## 2021-05-10 RX ORDER — QUETIAPINE 400 MG/1
400 TABLET, FILM COATED, EXTENDED RELEASE ORAL
Qty: 30 TABLET | Refills: 5 | Status: SHIPPED | OUTPATIENT
Start: 2021-05-10 | End: 2021-09-07

## 2021-05-10 RX ORDER — ALPRAZOLAM 2 MG/1
TABLET ORAL
Qty: 90 TABLET | Refills: 2 | Status: SHIPPED | OUTPATIENT
Start: 2021-05-10 | End: 2021-08-02

## 2021-05-12 ENCOUNTER — OFFICE (AMBULATORY)
Dept: URBAN - METROPOLITAN AREA CLINIC 51 | Facility: CLINIC | Age: 51
End: 2021-05-12

## 2021-05-12 DIAGNOSIS — R11.2 NAUSEA WITH VOMITING, UNSPECIFIED: ICD-10-CM

## 2021-05-12 DIAGNOSIS — R13.10 DYSPHAGIA, UNSPECIFIED: ICD-10-CM

## 2021-05-12 PROCEDURE — 91037 ESOPH IMPED FUNCTION TEST: CPT | Mod: 59 | Performed by: INTERNAL MEDICINE

## 2021-05-12 PROCEDURE — 91010 ESOPHAGUS MOTILITY STUDY: CPT | Performed by: INTERNAL MEDICINE

## 2021-05-26 ENCOUNTER — OFFICE (AMBULATORY)
Dept: URBAN - METROPOLITAN AREA CLINIC 64 | Facility: CLINIC | Age: 51
End: 2021-05-26
Payer: COMMERCIAL

## 2021-05-26 VITALS — HEIGHT: 64 IN

## 2021-05-26 DIAGNOSIS — R11.2 NAUSEA WITH VOMITING, UNSPECIFIED: ICD-10-CM

## 2021-05-26 DIAGNOSIS — R13.10 DYSPHAGIA, UNSPECIFIED: ICD-10-CM

## 2021-05-26 PROCEDURE — 99212 OFFICE O/P EST SF 10 MIN: CPT | Mod: 95 | Performed by: NURSE PRACTITIONER

## 2021-06-02 ENCOUNTER — ON CAMPUS - OUTPATIENT (AMBULATORY)
Dept: URBAN - METROPOLITAN AREA HOSPITAL 2 | Facility: HOSPITAL | Age: 51
End: 2021-06-02

## 2021-06-02 VITALS
HEART RATE: 96 BPM | RESPIRATION RATE: 16 BRPM | HEIGHT: 64 IN | OXYGEN SATURATION: 100 % | RESPIRATION RATE: 12 BRPM | SYSTOLIC BLOOD PRESSURE: 137 MMHG | RESPIRATION RATE: 18 BRPM | DIASTOLIC BLOOD PRESSURE: 73 MMHG | HEART RATE: 93 BPM | DIASTOLIC BLOOD PRESSURE: 85 MMHG | SYSTOLIC BLOOD PRESSURE: 89 MMHG | SYSTOLIC BLOOD PRESSURE: 124 MMHG | DIASTOLIC BLOOD PRESSURE: 74 MMHG | SYSTOLIC BLOOD PRESSURE: 132 MMHG | OXYGEN SATURATION: 98 % | HEART RATE: 88 BPM | SYSTOLIC BLOOD PRESSURE: 139 MMHG | HEART RATE: 92 BPM | DIASTOLIC BLOOD PRESSURE: 78 MMHG | HEART RATE: 86 BPM | HEART RATE: 104 BPM | OXYGEN SATURATION: 99 % | SYSTOLIC BLOOD PRESSURE: 111 MMHG | WEIGHT: 200 LBS | TEMPERATURE: 97.3 F | DIASTOLIC BLOOD PRESSURE: 42 MMHG | DIASTOLIC BLOOD PRESSURE: 86 MMHG

## 2021-06-02 DIAGNOSIS — R13.10 DYSPHAGIA, UNSPECIFIED: ICD-10-CM

## 2021-06-02 DIAGNOSIS — K22.2 ESOPHAGEAL OBSTRUCTION: ICD-10-CM

## 2021-06-02 PROCEDURE — 43235 EGD DIAGNOSTIC BRUSH WASH: CPT | Performed by: INTERNAL MEDICINE

## 2021-06-02 PROCEDURE — 43450 DILATE ESOPHAGUS 1/MULT PASS: CPT | Performed by: INTERNAL MEDICINE

## 2021-06-02 NOTE — SERVICEHPINOTES
GABBY JOHNSON  is a  51  female   who presents today for a  EGD   for   the indications listed below. The updated Patient Profile was reviewed prior to the procedure, in conjunction with the Physical Exam, including medical conditions, surgical procedures, medications, allergies, family history and social history. See Physical Exam time stamp below for date and time of HPI completion.Pre-operatively, I reviewed the indication(s) for the procedure, the risks of the procedure [including but not limited to: unexpected bleeding possibly requiring hospitalization and/or unplanned repeat procedures, perforation possibly requiring surgical treatment, missed lesions and complications of sedation/MAC (also explained by anesthesia staff)]. I have evaluated the patient for risks associated with the planned anesthesia and the procedure to be performed and find the patient an acceptable candidate for IV sedation.Multiple opportunities were provided for any questions or concerns, and all questions were answered satisfactorily before any anesthesia was administered. We will proceed with the planned procedure.BR

## 2021-06-30 ENCOUNTER — OFFICE (AMBULATORY)
Dept: URBAN - METROPOLITAN AREA CLINIC 64 | Facility: CLINIC | Age: 51
End: 2021-06-30

## 2021-06-30 VITALS
SYSTOLIC BLOOD PRESSURE: 125 MMHG | HEIGHT: 64 IN | WEIGHT: 206 LBS | HEART RATE: 99 BPM | DIASTOLIC BLOOD PRESSURE: 82 MMHG

## 2021-06-30 DIAGNOSIS — R13.10 DYSPHAGIA, UNSPECIFIED: ICD-10-CM

## 2021-06-30 PROCEDURE — 99214 OFFICE O/P EST MOD 30 MIN: CPT | Performed by: INTERNAL MEDICINE

## 2021-06-30 NOTE — SERVICEHPINOTES
Patient is a 50 y/o female with a history of GERD, Hpylori gastritis, seizure disorder, migraines and bipolar who presents today for follow-up of dysphagia.  The patient recently underwent esophageal manometry and which did show EGJ outflow obstruction. She had egd c dilation. Botox was not approved by her insurance. She continues to have dysphagia and frequent n/v despite recent dilation.  She continues to struggle with dysphagia both to solids and liquids. Also reports frequent nausea/vomiting, but denies any hematemesis or coffee-ground emesis. She is on multiple antiemetics and reports that none of them help to control her symptoms. States that she has been having heartburn. Denies any abdominal pain. Reports regular bowel movements without bright red blood per rectum or melena.  She is having n/v immediately after eating despite sancuso patch.BRJune 2021 EGD cricopharyngeal stricture dilated 56FBRMay 2021 esophageal manometry - EGJ outflow obstructionBRAug 2020 EGD c PEG, gastritis + h pyloriBRAug 2020 US stable 2.5cm liver hemangiomaBRJuly 2020 CT head negBRJuly 2020 CT a/p 3cm liver hemangiomaBRJune 2020 cbc, lipase, tsh nl, alk phos 136BRMay 2020 EGD cricopharyngeal stricture dilated 52FBR3/2020 GET 2hr 69% 4hr 85% c/w mild gastroparesis at 4 hrsBR12/2019 EGD/colonoscopy (Dr. Rivera) - grade A esophagitis, gastric bx Hpylori positive, distal bulb ulcer, empiric esophageal dilation, polyps (TA/HP)

## 2021-06-30 NOTE — SERVICENOTES
Per [employer]:  EGJ outflow obstruction does not meet diagnostic criteria of achalasia because some peristalsis is preserved. However, impaired LES deglutitive relaxation is observed in both conditions, and the treatment can be similar (provided that EGJ outflow obstruction is confirmed with complimentary studies). Therapy is typically directed towards lowering LES pressure with botulinum toxin, dilation, surgical myotomy, or per oral endoscopic myotomy.

## 2021-07-02 DIAGNOSIS — F40.01 PANIC DISORDER WITH AGORAPHOBIA: ICD-10-CM

## 2021-07-03 RX ORDER — HYDROXYZINE HYDROCHLORIDE 25 MG/1
TABLET, FILM COATED ORAL
Qty: 90 TABLET | Refills: 2 | Status: SHIPPED | OUTPATIENT
Start: 2021-07-03 | End: 2021-09-07

## 2021-07-05 RX ORDER — LAMOTRIGINE 100 MG/1
TABLET ORAL
Qty: 270 TABLET | Refills: 0 | Status: SHIPPED | OUTPATIENT
Start: 2021-07-05 | End: 2021-10-13

## 2021-07-15 ENCOUNTER — ON CAMPUS - OUTPATIENT (AMBULATORY)
Dept: URBAN - METROPOLITAN AREA HOSPITAL 2 | Facility: HOSPITAL | Age: 51
End: 2021-07-15
Payer: MEDICAID

## 2021-07-15 VITALS
RESPIRATION RATE: 18 BRPM | RESPIRATION RATE: 20 BRPM | RESPIRATION RATE: 16 BRPM | SYSTOLIC BLOOD PRESSURE: 127 MMHG | SYSTOLIC BLOOD PRESSURE: 139 MMHG | WEIGHT: 209 LBS | SYSTOLIC BLOOD PRESSURE: 126 MMHG | HEART RATE: 91 BPM | DIASTOLIC BLOOD PRESSURE: 72 MMHG | DIASTOLIC BLOOD PRESSURE: 89 MMHG | SYSTOLIC BLOOD PRESSURE: 145 MMHG | OXYGEN SATURATION: 96 % | SYSTOLIC BLOOD PRESSURE: 141 MMHG | OXYGEN SATURATION: 99 % | DIASTOLIC BLOOD PRESSURE: 86 MMHG | SYSTOLIC BLOOD PRESSURE: 142 MMHG | DIASTOLIC BLOOD PRESSURE: 87 MMHG | HEART RATE: 103 BPM | DIASTOLIC BLOOD PRESSURE: 79 MMHG | HEIGHT: 64 IN | HEART RATE: 104 BPM | TEMPERATURE: 97.7 F | DIASTOLIC BLOOD PRESSURE: 117 MMHG | HEART RATE: 96 BPM | HEART RATE: 94 BPM | OXYGEN SATURATION: 97 % | SYSTOLIC BLOOD PRESSURE: 147 MMHG

## 2021-07-15 DIAGNOSIS — R13.10 DYSPHAGIA, UNSPECIFIED: ICD-10-CM

## 2021-07-15 DIAGNOSIS — K22.2 ESOPHAGEAL OBSTRUCTION: ICD-10-CM

## 2021-07-15 PROCEDURE — 43450 DILATE ESOPHAGUS 1/MULT PASS: CPT | Performed by: INTERNAL MEDICINE

## 2021-07-15 PROCEDURE — 43236 UPPR GI SCOPE W/SUBMUC INJ: CPT | Performed by: INTERNAL MEDICINE

## 2021-07-23 ENCOUNTER — ON CAMPUS - OUTPATIENT (AMBULATORY)
Dept: URBAN - METROPOLITAN AREA HOSPITAL 2 | Facility: HOSPITAL | Age: 51
End: 2021-07-23

## 2021-07-23 VITALS
TEMPERATURE: 96.6 F | HEART RATE: 92 BPM | SYSTOLIC BLOOD PRESSURE: 134 MMHG | OXYGEN SATURATION: 96 % | RESPIRATION RATE: 16 BRPM | SYSTOLIC BLOOD PRESSURE: 91 MMHG | HEART RATE: 85 BPM | HEIGHT: 64 IN | OXYGEN SATURATION: 100 % | SYSTOLIC BLOOD PRESSURE: 132 MMHG | HEART RATE: 86 BPM | WEIGHT: 200 LBS | DIASTOLIC BLOOD PRESSURE: 83 MMHG | SYSTOLIC BLOOD PRESSURE: 97 MMHG | DIASTOLIC BLOOD PRESSURE: 58 MMHG | DIASTOLIC BLOOD PRESSURE: 76 MMHG | DIASTOLIC BLOOD PRESSURE: 51 MMHG

## 2021-07-23 DIAGNOSIS — R13.10 DYSPHAGIA, UNSPECIFIED: ICD-10-CM

## 2021-07-23 DIAGNOSIS — K22.4 DYSKINESIA OF ESOPHAGUS: ICD-10-CM

## 2021-07-23 PROBLEM — K22.8 OTHER SPECIFIED DISEASES OF ESOPHAGUS: Status: ACTIVE | Noted: 2021-07-23

## 2021-07-23 PROCEDURE — 43236 UPPR GI SCOPE W/SUBMUC INJ: CPT | Performed by: INTERNAL MEDICINE

## 2021-08-02 DIAGNOSIS — F40.01 PANIC DISORDER WITH AGORAPHOBIA: ICD-10-CM

## 2021-08-02 RX ORDER — ALPRAZOLAM 2 MG/1
TABLET ORAL
Qty: 90 TABLET | Refills: 2 | Status: SHIPPED | OUTPATIENT
Start: 2021-08-02 | End: 2021-10-19

## 2021-08-03 ENCOUNTER — OFFICE VISIT (OUTPATIENT)
Dept: PSYCHIATRY | Facility: CLINIC | Age: 51
End: 2021-08-03

## 2021-08-03 DIAGNOSIS — F40.01 PANIC DISORDER WITH AGORAPHOBIA: Chronic | ICD-10-CM

## 2021-08-03 DIAGNOSIS — F31.32 BIPOLAR AFFECTIVE DISORDER, CURRENTLY DEPRESSED, MODERATE (HCC): Primary | Chronic | ICD-10-CM

## 2021-08-03 PROCEDURE — 99214 OFFICE O/P EST MOD 30 MIN: CPT | Performed by: PHYSICIAN ASSISTANT

## 2021-08-03 RX ORDER — BUSPIRONE HYDROCHLORIDE 15 MG/1
15 TABLET ORAL 3 TIMES DAILY
Qty: 270 TABLET | Refills: 1 | Status: SHIPPED | OUTPATIENT
Start: 2021-08-03 | End: 2022-03-07 | Stop reason: SDUPTHER
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
## 2021-08-04 NOTE — PATIENT INSTRUCTIONS
Bipolar 1 Disorder  Bipolar 1 disorder is a mental health disorder in which a person has episodes of emotional highs, or fred, and may also have episodes of lows, or depression. Bipolar 1 disorder is different from other bipolar disorders in that it involves extreme episodes of fred (manic episodes). These episodes last at least one week or involve symptoms that are so severe that hospitalization is needed to keep the person safe.  What are the causes?  The cause of this condition is not known.  What increases the risk?  The following factors may make you more likely to develop this condition:  · Having a family member with the disorder.  · Having an imbalance of certain chemicals in the brain (neurotransmitters).  · Experiencing stress, such as illness, financial problems, or a death.  · Having certain conditions that affect the brain or spinal cord (neurologic conditions).  · Having had a brain injury (trauma).  What are the signs or symptoms?  Symptoms of fred include:  · Very high self-esteem or self-confidence.  · Decreased need for sleep.  · Unusual talkativeness. Speech may be very fast.  · Racing thoughts with quick shifts between topics that may or may not be related (flight of ideas).  · Ability to concentrate either greatly improved or decreased.  · Increased purposeful activity, such as work, studies, or social activity.  · Increased agitation. This could be pacing, squirming, fidgeting, or finger and toe tapping.  · Impulsive behavior and poor judgment. This may result in high-risk activities that are sexual, financial, or physical.  Symptoms of depression include:  · Extreme degrees of sadness, uncontrollable crying, hopelessness, worthlessness, or numbness.  · Sleep problems, such as insomnia, waking early, or sleeping too much.  · No longer enjoying things you used to enjoy.  · Isolation. You may often spend time alone.  · Lack of energy or motivation, and moving more slowly than  normal.  · Trouble making decisions.  · Increased appetite or loss of appetite.  · Thoughts of death, or wanting to harm yourself.  Sometimes, you may have a mixed mood. This means having symptoms of fred and depression at the same time. Stress can often trigger these symptoms.  How is this diagnosed?  This condition may be diagnosed based on:  · Emotional episodes.  · Medical history.  · Use of alcohol, drugs, and prescription medicines. Certain medical conditions and substances can cause symptoms that seem like bipolar disorder. This is called secondary bipolar disorder.  Your health care provider may ask you to take a short test. This helps to understand your symptoms. You may also be asked to see a mental health specialist to follow up on this diagnosis and start treatment.  How is this treated?         This condition is a long-term (chronic) illness. It is often managed with ongoing treatment rather than treatment only when symptoms occur. A combination of treatments is the main approach. Treatment may include:  · Medicine. Medicine can be prescribed by a health care provider who specializes in treating mental health disorders (psychiatrist). Medicines called mood stabilizers are usually prescribed. If symptoms occur during treatment with a mood stabilizer, other medicines may be added.  · Psychotherapy. Some forms of talk therapy, such as cognitive behavioral therapy (CBT) and family therapy, can help with learning to manage bipolar disorder.  · Psychoeducation. This helps you and others understand how this disorder is managed. Include friends and family in educational sessions so they learn how best to support you.  · Methods of managing your condition, such as journaling or relaxation exercises. Relaxation exercises include:  ? Yoga.  ? Meditation.  ? Deep breathing.  · Lifestyle changes, such as:  ? Limiting alcohol and drug use.  ? Exercising regularly.  ? Structuring when you go to bed and get  up.  ? Eating a healthy diet.  · Electroconvulsive therapy (ECT). This is a procedure in which electricity is applied to the brain through the scalp. It may be used in cases of severe bipolar disorder when medicine and psychotherapy work too slowly or do not work.  Follow these instructions at home:  Activity  · Return to your normal activities as told by your health care provider.  · Find activities that you enjoy, and make time to do them.  · Exercise regularly as told by your health care provider.  Lifestyle    · Follow a set schedule for eating and sleeping.  · Eat a healthy diet that includes fresh fruits and vegetables, whole grains, low-fat dairy, and lean meat.  · Get at least 7-8 hours of sleep each night.  · Avoid using products that contain nicotine or tobacco. If you want help quitting, ask your health care provider.  · Do not use drugs.  Alcohol use  · Do not drink alcohol if:  ? Your health care provider tells you not to drink.  ? You are pregnant, may be pregnant, or are planning to become pregnant.  · If you drink alcohol:  ? Limit how much you use to:  § 0-1 drink a day for women.  § 0-2 drinks a day for men.  ? Be aware of how much alcohol is in your drink. In the [place] one drink equals one 12 oz bottle of beer (355 mL), one 5 oz glass of wine (148 mL), or one 1½ oz glass of hard liquor (44 mL).  General instructions  · Take over-the-counter and prescription medicines only as told by your health care provider. You may think about stopping your medicine, but it is very important to take all your medicine as prescribed.  · Consider joining a support group. Your health care provider may be able to recommend one.  · Talk with your family and friends about your treatment goals and how they can help.  · Keep all follow-up visits as told by your health care provider. This is important.  Where to find more information  · National [place] on Mental Illness: www.dianna.org  · National [place] of Mental  Health: www.[facility].gov  Contact a health care provider if:  · Your symptoms get worse, or your loved ones tell you that your symptoms are getting worse.  · You have uncomfortable side effects from your medicine.  · You have trouble sleeping.  · You have trouble doing daily activities.  · You feel unsafe in your surroundings.  · You are self-medicating with alcohol or drugs.  Get help right away if:  · You have new symptoms.  · You have thoughts about harming yourself or others.  · You are considering suicide.  If you ever feel like you may hurt yourself or others, or have thoughts about taking your own life, get help right away. You can go to your nearest emergency department or call:  · Your local emergency services (911 in the [place]  · A suicide crisis helpline, such as the National Suicide Prevention Lifeline at 1-481.268.9433. This is open 24 hours a day.  Summary  · Bipolar 1 disorder is a lifelong mental health disorder in which a person has episodes of fred and depression.  · This disorder is mainly treated with a combination of medicines, talk and behavioral therapies, and, often, electroconvulsive therapy (ECT).  · Include friends and family in educational sessions so they know how best to support you.  · Get help right away if you are considering suicide.  This information is not intended to replace advice given to you by your health care provider. Make sure you discuss any questions you have with your health care provider.  Document Revised: 06/02/2020 Document Reviewed: 06/02/2020  [employer] Patient Education © 2021 [employer]

## 2021-08-06 ENCOUNTER — LAB (OUTPATIENT)
Dept: FAMILY MEDICINE CLINIC | Facility: CLINIC | Age: 51
End: 2021-08-06

## 2021-08-06 ENCOUNTER — OFFICE VISIT (OUTPATIENT)
Dept: FAMILY MEDICINE CLINIC | Facility: CLINIC | Age: 51
End: 2021-08-06

## 2021-08-06 VITALS
SYSTOLIC BLOOD PRESSURE: 114 MMHG | OXYGEN SATURATION: 99 % | TEMPERATURE: 98.2 F | DIASTOLIC BLOOD PRESSURE: 79 MMHG | BODY MASS INDEX: 36.56 KG/M2 | HEART RATE: 100 BPM | WEIGHT: 213 LBS

## 2021-08-06 DIAGNOSIS — R11.2 NAUSEA AND VOMITING, INTRACTABILITY OF VOMITING NOT SPECIFIED, UNSPECIFIED VOMITING TYPE: ICD-10-CM

## 2021-08-06 DIAGNOSIS — Z00.00 PREVENTATIVE HEALTH CARE: ICD-10-CM

## 2021-08-06 DIAGNOSIS — G43.809 OTHER MIGRAINE WITHOUT STATUS MIGRAINOSUS, NOT INTRACTABLE: Primary | ICD-10-CM

## 2021-08-06 DIAGNOSIS — L91.8 SKIN TAG: ICD-10-CM

## 2021-08-06 DIAGNOSIS — R56.9 SEIZURES (HCC): ICD-10-CM

## 2021-08-06 DIAGNOSIS — R21 RASH: ICD-10-CM

## 2021-08-06 LAB
ALBUMIN SERPL-MCNC: 4.2 G/DL (ref 3.5–5.2)
ALBUMIN/GLOB SERPL: 1.2 G/DL
ALP SERPL-CCNC: 131 U/L (ref 39–117)
ALT SERPL W P-5'-P-CCNC: 21 U/L (ref 1–33)
ANION GAP SERPL CALCULATED.3IONS-SCNC: 10.9 MMOL/L (ref 5–15)
AST SERPL-CCNC: 15 U/L (ref 1–32)
BILIRUB SERPL-MCNC: <0.2 MG/DL (ref 0–1.2)
BUN SERPL-MCNC: 13 MG/DL (ref 6–20)
BUN/CREAT SERPL: 13.1 (ref 7–25)
CALCIUM SPEC-SCNC: 9.1 MG/DL (ref 8.6–10.5)
CHLORIDE SERPL-SCNC: 100 MMOL/L (ref 98–107)
CHOLEST SERPL-MCNC: 269 MG/DL (ref 0–200)
CO2 SERPL-SCNC: 28.1 MMOL/L (ref 22–29)
CREAT SERPL-MCNC: 0.99 MG/DL (ref 0.57–1)
GFR SERPL CREATININE-BSD FRML MDRD: 59 ML/MIN/1.73
GLOBULIN UR ELPH-MCNC: 3.6 GM/DL
GLUCOSE SERPL-MCNC: 95 MG/DL (ref 65–99)
HCV AB SER DONR QL: NORMAL
HDLC SERPL-MCNC: 86 MG/DL (ref 40–60)
LDLC SERPL CALC-MCNC: 165 MG/DL (ref 0–100)
LDLC/HDLC SERPL: 1.89 {RATIO}
POTASSIUM SERPL-SCNC: 4.4 MMOL/L (ref 3.5–5.2)
PROT SERPL-MCNC: 7.8 G/DL (ref 6–8.5)
SODIUM SERPL-SCNC: 139 MMOL/L (ref 136–145)
TRIGL SERPL-MCNC: 104 MG/DL (ref 0–150)
VLDLC SERPL-MCNC: 18 MG/DL (ref 5–40)

## 2021-08-06 PROCEDURE — 80061 LIPID PANEL: CPT | Performed by: NURSE PRACTITIONER

## 2021-08-06 PROCEDURE — 36415 COLL VENOUS BLD VENIPUNCTURE: CPT

## 2021-08-06 PROCEDURE — 86803 HEPATITIS C AB TEST: CPT | Performed by: NURSE PRACTITIONER

## 2021-08-06 PROCEDURE — 80053 COMPREHEN METABOLIC PANEL: CPT | Performed by: NURSE PRACTITIONER

## 2021-08-06 PROCEDURE — 99214 OFFICE O/P EST MOD 30 MIN: CPT | Performed by: NURSE PRACTITIONER

## 2021-08-06 RX ORDER — PRUCALOPRIDE 2 MG/1
1 TABLET, FILM COATED ORAL DAILY
COMMUNITY
Start: 2021-05-17

## 2021-08-06 RX ORDER — AMITRIPTYLINE HYDROCHLORIDE 25 MG/1
25 TABLET, FILM COATED ORAL
Qty: 90 TABLET | Refills: 1 | Status: SHIPPED | OUTPATIENT
Start: 2021-08-06 | End: 2022-09-09

## 2021-08-06 RX ORDER — LEVETIRACETAM 750 MG/1
1500 TABLET ORAL 2 TIMES DAILY
COMMUNITY
Start: 2021-07-05 | End: 2021-08-06 | Stop reason: SDUPTHER

## 2021-08-06 RX ORDER — NYSTATIN 100000 [USP'U]/G
POWDER TOPICAL 2 TIMES DAILY PRN
Qty: 30 G | Refills: 0 | Status: SHIPPED | OUTPATIENT
Start: 2021-08-06 | End: 2021-12-07 | Stop reason: SDUPTHER

## 2021-08-06 RX ORDER — PANTOPRAZOLE SODIUM 40 MG/1
40 TABLET, DELAYED RELEASE ORAL 2 TIMES DAILY
COMMUNITY
Start: 2021-07-24 | End: 2021-12-07 | Stop reason: SDUPTHER

## 2021-08-06 RX ORDER — SUMATRIPTAN 100 MG/1
100 TABLET, FILM COATED ORAL ONCE AS NEEDED
Qty: 8 TABLET | Refills: 5 | Status: SHIPPED | OUTPATIENT
Start: 2021-08-06 | End: 2021-12-07 | Stop reason: SDUPTHER

## 2021-08-06 RX ORDER — GRANISETRON 3.1 MG/24H
PATCH TRANSDERMAL
COMMUNITY
Start: 2021-05-18 | End: 2021-08-06

## 2021-08-06 RX ORDER — AMITRIPTYLINE HYDROCHLORIDE 25 MG/1
25 TABLET, FILM COATED ORAL
COMMUNITY
Start: 2021-07-24 | End: 2021-08-06 | Stop reason: SDUPTHER

## 2021-08-06 NOTE — PATIENT INSTRUCTIONS
Resent HA medication  Bring list of medicine  Make appt with neuro, gastro, and dermatology  Call for issues or ocncerns  Follow up 1 mo

## 2021-08-06 NOTE — PROGRESS NOTES
Subjective   Milka Espinoza is a 51 y.o. female.     Patient is here today with complaints of nausea and vomiting and headaches.    Nausea/vomiting- this is a chronic issue for the pt. It has been occurring for 2 yrs. She sees GI for this- Dr. Le. She has underwent numerous EGD and colonoscopies. She has had her esophagus stretched. She has slow peristalsis- mild gastroparesis.  Patient often experiences dysphagia.  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  She has had this stretched numerous times.  They are treating her with Botox and surgical myotomy. Botox was 2 weeks ago.  She called the office and was given a suppository and it is not helping with the vomiting. She would like a second opinion. Denies hematemesis or coffee grounds. Has occasional RUQ pain. Reports indigestion. Denies diarrhea or constipation.     HA- pt is current with neuro- Dr. Cooley- but she was discharged due to missing too many appointments. She hasnt seen them for 6 months.  She has been out of her migraines meds for 1 mo. Elavil and imitrex.  She has been having migraines weekly since being out- 1 weekly. She is currently on Keppra 750mg TID, but she is not sure on dosing. Last seizure was on Tuesday. She is having 2-3 a month.     Skin tag- on her upper right thigh. Progressively getting bigger. Wants to get it removed.            The following portions of the patient's history were reviewed and updated as appropriate: allergies, current medications, past family history, past medical history, past social history, past surgical history and problem list.    Review of Systems   Constitutional: Positive for unexpected weight gain. Negative for chills, fatigue and fever.   Eyes: Negative for blurred vision and double vision.   Respiratory: Negative for chest tightness and shortness of breath.    Cardiovascular: Positive for chest pain (occasionally). Negative for palpitations.   Gastrointestinal: Positive for  abdominal pain, nausea and vomiting. Negative for blood in stool, constipation and diarrhea.   Musculoskeletal: Negative for arthralgias.   Neurological: Positive for seizures and headache. Negative for dizziness.   Psychiatric/Behavioral: Positive for depressed mood. Negative for self-injury, suicidal ideas and stress. The patient is nervous/anxious.        Objective   /79 (BP Location: Left arm, Patient Position: Sitting, Cuff Size: Adult)   Pulse 100   Temp 98.2 °F (36.8 °C) (Tympanic)   Wt 96.6 kg (213 lb)   SpO2 99%   BMI 36.56 kg/m²   Physical Exam  Constitutional:       Appearance: Normal appearance. She is not ill-appearing.   HENT:      Head: Normocephalic and atraumatic.   Cardiovascular:      Rate and Rhythm: Normal rate and regular rhythm.      Heart sounds: No murmur heard.     Pulmonary:      Effort: Pulmonary effort is normal. No respiratory distress.      Breath sounds: Normal breath sounds.   Abdominal:      Tenderness: There is abdominal tenderness (RUQ- mild).   Musculoskeletal:         General: Normal range of motion.   Skin:     General: Skin is warm and dry.      Comments: Large skin tag right thigh   Neurological:      General: No focal deficit present.      Mental Status: She is alert and oriented to person, place, and time.   Psychiatric:         Mood and Affect: Mood normal.         Behavior: Behavior normal.         Thought Content: Thought content normal.         Judgment: Judgment normal.           Assessment/Plan     Diagnoses and all orders for this visit:    1. Other migraine without status migrainosus, not intractable (Primary)  Comments:  out of meds  renew elavil and imitrex  establish with neuro  Orders:  -     Cancel: Ambulatory Referral to Neurology  -     Ambulatory Referral to Neurology    2. Seizures (CMS/[facility])  Comments:  needs a new neurologist- was dismissed from last  stat referral  has some meds left but is not sure what she is on  bring list of  meds  Orders:  -     Cancel: Ambulatory Referral to Neurology  -     Ambulatory Referral to Neurology    3. Nausea and vomiting, intractability of vomiting not specified, unspecified vomiting type  Comments:  chronic issue  sees GI  wants second opinion  refer to GI [place]  Orders:  -     Ambulatory Referral to Gastroenterology    4. Skin tag  Comments:  appears benign  large skin tag on right thigh  refer to derm for possible removal  Orders:  -     Ambulatory Referral to Dermatology    5. Rash  Comments:  wants nystatin referred for prn  Orders:  -     nystatin (nystatin) 379161 UNIT/GM powder; Apply  topically to the appropriate area as directed 2 (Two) Times a Day As Needed (rash).  Dispense: 30 g; Refill: 0    6. Preventative health care  Comments:  check labs    Orders:  -     Comprehensive Metabolic Panel; Future  -     Lipid Panel; Future  -     Hepatitis C Antibody; Future    Other orders  -     amitriptyline (ELAVIL) 25 MG tablet; Take 1 tablet by mouth every night at bedtime.  Dispense: 90 tablet; Refill: 1  -     SUMAtriptan (IMITREX) 100 MG tablet; Take 1 tablet by mouth 1 (One) Time As Needed for Migraine.  Dispense: 8 tablet; Refill: 5

## 2021-08-08 DIAGNOSIS — E78.5 HYPERLIPIDEMIA, UNSPECIFIED HYPERLIPIDEMIA TYPE: Primary | ICD-10-CM

## 2021-08-08 RX ORDER — ATORVASTATIN CALCIUM 10 MG/1
10 TABLET, FILM COATED ORAL DAILY
Qty: 30 TABLET | Refills: 2 | Status: SHIPPED | OUTPATIENT
Start: 2021-08-08 | End: 2021-12-07 | Stop reason: SDUPTHER

## 2021-08-23 ENCOUNTER — TELEPHONE (OUTPATIENT)
Dept: FAMILY MEDICINE CLINIC | Facility: CLINIC | Age: 51
End: 2021-08-23

## 2021-08-23 RX ORDER — BENZONATATE 100 MG/1
100 CAPSULE ORAL 3 TIMES DAILY PRN
Qty: 20 CAPSULE | Refills: 0 | Status: SHIPPED | OUTPATIENT
Start: 2021-08-23 | End: 2021-09-13 | Stop reason: SDUPTHER

## 2021-08-23 NOTE — TELEPHONE ENCOUNTER
Caller: Milka Espinoza    Relationship: Self    Best call back number: [phone]     What medication are you requesting: COUGH MEDICINE    What are your current symptoms: IN THE HOSPITAL RECENTLY FOR PNEUMONIA    How long have you been experiencing symptoms: SEVERAL DAYS    Have you had these symptoms before:    [x] Yes  [] No    Have you been treated for these symptoms before:   [x] Yes  [] No    If a prescription is needed, what is your preferred pharmacy and phone number:    [id number] - [address] AT [facility] [address] & [facility] [address] - [phone]  - [phone] FX

## 2021-08-30 ENCOUNTER — TELEPHONE (OUTPATIENT)
Dept: FAMILY MEDICINE CLINIC | Facility: CLINIC | Age: 51
End: 2021-08-30

## 2021-08-30 NOTE — TELEPHONE ENCOUNTER
Caller: Milka Espinoza    Relationship: Self    Best call back number: [phone]    What medication are you requesting: SOMETHING TO HELP LOSE WEIGHT    What are your current symptoms: NEEDING TO LOSE WEIGHT      Have you had these symptoms before:    [x] Yes  [] No    Have you been treated for these symptoms before:   [] Yes  [x] No    If a prescription is needed, what is your preferred pharmacy and phone number:      Additional notes:PATIENT STATES THAT SHE SPOKE TO MALLORY PACE BEFORE REGARDING A STIMULANT TO HELP HER LOSE WEIGHT AND WOULD LIKE TO START IT. PLEASE CALL AND ADVISE.    CONFIRMED PHARMACY:  [facility] #[id number] - [address] AT [facility] [address] & [facility] - [phone] [facility] [phone]   [phone]

## 2021-08-31 ENCOUNTER — TELEPHONE (OUTPATIENT)
Dept: PSYCHIATRY | Facility: CLINIC | Age: 51
End: 2021-08-31

## 2021-08-31 NOTE — TELEPHONE ENCOUNTER
I am not comfortable starting a stimulant until her seizures are under control. I explained to her at this visit that once we get her established with neuro then we can discuss it or our other options. I see that she has an appt in November.

## 2021-09-06 DIAGNOSIS — F40.01 PANIC DISORDER WITH AGORAPHOBIA: ICD-10-CM

## 2021-09-07 RX ORDER — CARIPRAZINE 1.5 MG/1
CAPSULE, GELATIN COATED ORAL
Qty: 30 CAPSULE | Refills: 5 | Status: SHIPPED | OUTPATIENT
Start: 2021-09-07 | End: 2022-03-07 | Stop reason: SDUPTHER

## 2021-09-07 RX ORDER — HYDROXYZINE HYDROCHLORIDE 25 MG/1
TABLET, FILM COATED ORAL
Qty: 270 TABLET | Refills: 1 | Status: SHIPPED | OUTPATIENT
Start: 2021-09-07 | End: 2021-12-07 | Stop reason: SDUPTHER

## 2021-09-07 RX ORDER — QUETIAPINE 400 MG/1
400 TABLET, FILM COATED, EXTENDED RELEASE ORAL
Qty: 90 TABLET | Refills: 1 | Status: SHIPPED | OUTPATIENT
Start: 2021-09-07 | End: 2022-02-14

## 2021-09-07 RX ORDER — GABAPENTIN 800 MG/1
TABLET ORAL
Qty: 270 TABLET | Refills: 1 | Status: SHIPPED | OUTPATIENT
Start: 2021-09-07 | End: 2021-12-07 | Stop reason: SDUPTHER

## 2021-09-13 ENCOUNTER — TELEPHONE (OUTPATIENT)
Dept: FAMILY MEDICINE CLINIC | Facility: CLINIC | Age: 51
End: 2021-09-13

## 2021-09-13 RX ORDER — BENZONATATE 100 MG/1
100 CAPSULE ORAL 3 TIMES DAILY PRN
Qty: 20 CAPSULE | Refills: 0 | Status: SHIPPED | OUTPATIENT
Start: 2021-09-13 | End: 2021-10-04

## 2021-09-13 NOTE — TELEPHONE ENCOUNTER
Caller: Milka Espinoza    Relationship: Self    Best call back number: [phone]    Medication needed:   Requested Prescriptions     Pending Prescriptions Disp Refills   • benzonatate (Tessalon Perles) 100 MG capsule 20 capsule 0     Sig: Take 1 capsule by mouth 3 (Three) Times a Day As Needed for Cough.       When do you need the refill by: 09/13    What additional details did the patient provide when requesting the medication: PATIENT COMPLETELY OUT    Does the patient have less than a 3 day supply:  [x] Yes  [] No    What is the patient's preferred pharmacy: [facility] #[id number] - [address] AT [facility] [address] & [address] [phone] [place] [phone] FX

## 2021-09-13 NOTE — TELEPHONE ENCOUNTER
Caller: Milka Espinoza    Relationship to patient: Self    Best call back number: [phone]    Chief complaint: PNEUMONIA    Type of visit: OFFICE    Requested date:     If rescheduling, when is the original appointment:     Additional notes:PATIENT CALLED STATED SHE DOES NOT KNOW IF SHE NEEDS AN APPT OR NOT.  PATIENT STATED THAT SHE HAS NEVER HAD PNEUMONIA AND DID KNOW HOW LONG IT WOULD TAKE TO GET OVER IT.  PATIENT STATED THAT THE COUGH AT NIGHT IS REALLY BAD.  PATIENT REQUESTING MED REFILL.  WILL SEND IN SEPARATE REQUEST.    PATIENT STATED THAT SHE WAS UNABLE TO FIGURE OUT MY CHART VISIT AND GOT FRUSTRATED.

## 2021-09-14 NOTE — TELEPHONE ENCOUNTER
Patient said she is over the pneumonia part. She didn't realize it could take a little before pneumonia clears up.

## 2021-09-14 NOTE — TELEPHONE ENCOUNTER
This was filled yesterday. Pt has a video visit today. If she thinks she has pneumonia she needs to be seen in office so I can listen to her lungs

## 2021-09-17 RX ORDER — MODAFINIL 200 MG/1
200 TABLET ORAL EVERY MORNING
Refills: 5 | OUTPATIENT
Start: 2021-09-17

## 2021-09-17 NOTE — TELEPHONE ENCOUNTER
Caller: Milka Espinoza    Relationship: Self    Best call back number: [phone]    Medication needed:   Requested Prescriptions     Pending Prescriptions Disp Refills   • modafinil (PROVIGIL) 200 MG tablet  5     Sig: Take 1 tablet by mouth Every Morning.       When do you need the refill by: 09/17/2021    What additional details did the patient provide when requesting the medication: THE PATIENT IS CURRENTLY OUT OF THIS MEDICATION.    Does the patient have less than a 3 day supply:  [x] Yes  [] No    What is the patient's preferred pharmacy: [facility] #[id number] - [address] AT [facility] OF [facility] [address] & [address] [phone] [place] [phone] FX

## 2021-10-04 ENCOUNTER — OFFICE VISIT (OUTPATIENT)
Dept: FAMILY MEDICINE CLINIC | Facility: CLINIC | Age: 51
End: 2021-10-04

## 2021-10-04 VITALS
DIASTOLIC BLOOD PRESSURE: 77 MMHG | SYSTOLIC BLOOD PRESSURE: 117 MMHG | HEART RATE: 98 BPM | WEIGHT: 213 LBS | OXYGEN SATURATION: 96 % | TEMPERATURE: 96 F | BODY MASS INDEX: 36.56 KG/M2

## 2021-10-04 DIAGNOSIS — J18.9 PNEUMONIA DUE TO INFECTIOUS ORGANISM, UNSPECIFIED LATERALITY, UNSPECIFIED PART OF LUNG: Primary | ICD-10-CM

## 2021-10-04 DIAGNOSIS — R63.5 WEIGHT GAIN: ICD-10-CM

## 2021-10-04 PROCEDURE — 99213 OFFICE O/P EST LOW 20 MIN: CPT | Performed by: NURSE PRACTITIONER

## 2021-10-04 RX ORDER — IPRATROPIUM BROMIDE AND ALBUTEROL SULFATE 2.5; .5 MG/3ML; MG/3ML
SOLUTION RESPIRATORY (INHALATION)
COMMUNITY
Start: 2021-08-18 | End: 2021-12-07 | Stop reason: SDUPTHER

## 2021-10-04 NOTE — PROGRESS NOTES
Subjective     Milka Espinoza is a 51 y.o. female.     Pt is here today for a [facility] follow up.  She was admitted from 8/15-8/18.  She was admitted with sepsis and SOA due to PNA.  She was covid negative.  She was sent home with 2 antibiotics which she has completed.  She still coughs some at bedtime.  She has been taking mucinex DM at night.  She has been using the nebulizer at night,  She denies SOA.  Denies fever or chills.   She received a botox injection in the esophagus which has fixed the vomiting issue.  She has been trying to walk every day.  She is trying to not eat after 6pm.  She has been trying to eat salad, vegetables, and fruit.  She is drinking plenty of water.          The following portions of the patient's history were reviewed and updated as appropriate: allergies, current medications, past family history, past medical history, past social history, past surgical history and problem list.    Review of Systems   Constitutional: Negative for chills, fatigue and fever.   Respiratory: Positive for cough. Negative for chest tightness and shortness of breath.    Cardiovascular: Negative for chest pain and palpitations.   Gastrointestinal: Negative for abdominal pain, constipation, diarrhea, nausea and vomiting.   Neurological: Negative for dizziness, seizures (seizures have improved) and headache.   Psychiatric/Behavioral: Negative for depressed mood and stress. The patient is not nervous/anxious.        Objective     /77 (BP Location: Right arm, Patient Position: Sitting, Cuff Size: Large Adult)   Pulse 98   Temp 96 °F (35.6 °C) (Tympanic)   Wt 96.6 kg (213 lb)   SpO2 96%   BMI 36.56 kg/m²     Current Outpatient Medications on File Prior to Visit   Medication Sig Dispense Refill   • ipratropium-albuterol (DUO-NEB) 0.5-2.5 mg/3 ml nebulizer USE 1 VIAL PER NEBULIZER FOUR TIMES DAILY     • ALPRAZolam (XANAX) 2 MG tablet TAKE 1 TABLET BY MOUTH THREE TIMES DAILY AS NEEDED FOR  ANXIETY 90 tablet 2   • amitriptyline (ELAVIL) 25 MG tablet Take 1 tablet by mouth every night at bedtime. 90 tablet 1   • atorvastatin (Lipitor) 10 MG tablet Take 1 tablet by mouth Daily. 30 tablet 2   • busPIRone (BUSPAR) 15 MG tablet Take 1 tablet by mouth 3 (Three) Times a Day. 270 tablet 1   • gabapentin (NEURONTIN) 800 MG tablet TAKE 1 TABLET BY MOUTH THREE TIMES DAILY 270 tablet 1   • hydrOXYzine (ATARAX) 25 MG tablet TAKE 1 TABLET BY MOUTH THREE TIMES DAILY AS NEEDED FOR ANXIETY 270 tablet 1   • lacosamide (VIMPAT) 100 MG tablet tablet Take 1 tablet by mouth Every 12 (Twelve) Hours for 30 days. 60 tablet 0   • lamoTRIgine (LaMICtal) 100 MG tablet TAKE 1 TABLET BY MOUTH EVERY MORNING AND 2 AT BEDTIME 270 tablet 0   • levETIRAcetam (KEPPRA) 500 MG tablet Take 1,500 mg by mouth 2 (Two) Times a Day.     • Motegrity 2 MG tablet Take 1 tablet by mouth Daily.     • nystatin (nystatin) 692004 UNIT/GM powder Apply  topically to the appropriate area as directed 2 (Two) Times a Day As Needed (rash). 30 g 0   • pantoprazole (PROTONIX) 40 MG EC tablet Take 40 mg by mouth 2 (Two) Times a Day.     • QUEtiapine XR (SEROquel XR) 400 MG 24 hr tablet TAKE 1 TABLET BY MOUTH EVERY NIGHT AT BEDTIME 90 tablet 1   • SUMAtriptan (IMITREX) 100 MG tablet Take 1 tablet by mouth 1 (One) Time As Needed for Migraine. 8 tablet 5   • Vraylar 1.5 MG capsule capsule TAKE 1 CAPSULE BY MOUTH DAILY 30 capsule 5   • [DISCONTINUED] benzonatate (Tessalon Perles) 100 MG capsule Take 1 capsule by mouth 3 (Three) Times a Day As Needed for Cough. 20 capsule 0   • [DISCONTINUED] modafinil (PROVIGIL) 200 MG tablet Take 200 mg by mouth Every Morning.  5   • [DISCONTINUED] Nutritional Supplements (NUTREN 1.5) liquid Nutren 1.5 at 55 ml/hr, flush with 20 cc/hr free water 20 can 0     No current facility-administered medications on file prior to visit.        Physical Exam  Constitutional:       Appearance: Normal appearance. She is obese. She is not  ill-appearing.   HENT:      Head: Normocephalic and atraumatic.   Cardiovascular:      Rate and Rhythm: Normal rate and regular rhythm.      Heart sounds: No murmur heard.     Pulmonary:      Effort: Pulmonary effort is normal. No respiratory distress.      Breath sounds: Normal breath sounds.   Skin:     General: Skin is warm and dry.   Neurological:      General: No focal deficit present.      Mental Status: She is alert and oriented to person, place, and time.   Psychiatric:         Mood and Affect: Mood normal.         Behavior: Behavior normal.         Thought Content: Thought content normal.         Judgment: Judgment normal.           Assessment/Plan     Diagnoses and all orders for this visit:    1. Pneumonia due to infectious organism, unspecified laterality, unspecified part of lung (Primary)  Comments:  resolved  has completed abx  symptoms improved  exam normal    2. Weight gain  Comments:  possibly due to psych meds  inc exercise  push water intake  check on saxenda with insurance  follow up prn

## 2021-10-05 ENCOUNTER — TELEPHONE (OUTPATIENT)
Dept: FAMILY MEDICINE CLINIC | Facility: CLINIC | Age: 51
End: 2021-10-05

## 2021-10-05 DIAGNOSIS — E66.01 MORBID (SEVERE) OBESITY DUE TO EXCESS CALORIES (HCC): Primary | ICD-10-CM

## 2021-10-05 NOTE — TELEPHONE ENCOUNTER
Caller: Milka Espinoza    Relationship: Self    Best call back number:[phone]    What is the best time to reach you: ANY TIME    Who are you requesting to speak with (clinical staff, provider,  specific staff member): CLINICAL STAFF    What was the call regarding: PATIENT CALLED STATING MALLORY SETHI HAD HER CHECK WITH INSURANCE AND SEE IF THEY WOULD HELP WITH THE COPAY ON SAXENDA. PATIENT'S INSURANCE REQUIRES A PRIOR AUTHORIZATION OR AN ALTERNATIVE DRUG THAT THEY WOULD COVER.    PLEASE ADVISE PATIENT OF NEXT STEPS    Do you require a callback: YES

## 2021-10-13 ENCOUNTER — TELEPHONE (OUTPATIENT)
Dept: FAMILY MEDICINE CLINIC | Facility: CLINIC | Age: 51
End: 2021-10-13

## 2021-10-13 RX ORDER — LAMOTRIGINE 100 MG/1
TABLET ORAL
Qty: 270 TABLET | Refills: 0 | Status: SHIPPED | OUTPATIENT
Start: 2021-10-13 | End: 2022-01-17

## 2021-10-19 DIAGNOSIS — F40.01 PANIC DISORDER WITH AGORAPHOBIA: ICD-10-CM

## 2021-10-19 RX ORDER — ALPRAZOLAM 2 MG/1
TABLET ORAL
Qty: 90 TABLET | Refills: 0 | Status: SHIPPED | OUTPATIENT
Start: 2021-10-19 | End: 2021-11-16

## 2021-10-20 ENCOUNTER — TRANSITIONAL CARE MANAGEMENT TELEPHONE ENCOUNTER (OUTPATIENT)
Dept: CALL CENTER | Facility: HOSPITAL | Age: 51
End: 2021-10-20

## 2021-10-20 ENCOUNTER — READMISSION MANAGEMENT (OUTPATIENT)
Dept: CALL CENTER | Facility: HOSPITAL | Age: 51
End: 2021-10-20

## 2021-10-20 ENCOUNTER — TELEPHONE (OUTPATIENT)
Dept: FAMILY MEDICINE CLINIC | Facility: CLINIC | Age: 51
End: 2021-10-20

## 2021-10-20 NOTE — TELEPHONE ENCOUNTER
Caller: Milka Espinoza    Relationship to patient: Self    Best call back number: [phone]    New or established patient?  [] New  [x] Established    Date of discharge: 10/18/21    Facility discharged from: [facility]    Diagnosis/Symptoms: PNEUMONIA

## 2021-10-20 NOTE — OUTREACH NOTE
Call Center TCM Note      Responses   [facility] patient discharged from? Non-  [[facility] ]   Does the patient have one of the following disease processes/diagnoses(primary or secondary)? Non- Discharge   TCM attempt successful? Yes   Call start time 1355   Call end time 1407   Discharge diagnosis Pneumonia   Is patient permission given to speak with other caregiver? No   Meds reviewed with patient/caregiver? Yes  [prednisone, tessalon perles, patient unsure if she is on antibiotic]   Does the patient have all medications ordered at discharge? Yes   Is the patient taking all medications as directed (includes completed medication regime)? Yes   Does the patient have a primary care provider?  Yes   Does the patient have an appointment with their PCP within 7 days of discharge? Yes   Comments regarding PCP PCP Safia PACE. Hospital follow up scheduled for Friday 10/22/21  9am   Has the patient kept scheduled appointments due by today? N/A   Has home health visited the patient within 72 hours of discharge? N/A   What DME was ordered? Home O23L   Has all DME been delivered? Yes   DME comments O2 3L continous, nebulizer as needed.    Psychosocial issues? No   Notified Case Management Psychosocial (Non Urgent)  [Patient afraid to use home O2 concentrator because she is on fixed income and afraid her electric bill will go up. She is using portable tanks at this time. Patient asking if any assistance available to help. ]   Did the patient receive a copy of their discharge instructions? Yes   Nursing interventions Reviewed instructions with patient   What is the patient's perception of their health status since discharge? Improving   Is the patient/caregiver able to teach back signs and symptoms related to disease process for when to call PCP? Yes   Is the patient/caregiver able to teach back signs and symptoms related to disease process for when to call 911? Yes   Is the patient/caregiver able  to teach back the hierarchy of who to call/visit for symptoms/problems? PCP, Specialist, Home health nurse, Urgent Care, ED, 911 Yes   If the patient is a current smoker, are they able to teach back resources for cessation? Not a smoker   TCM call completed? Yes          Colette Coleman RN    10/20/2021, 14:08 EDT

## 2021-10-20 NOTE — OUTREACH NOTE
Prep Survey      Responses   [facility] facility patient discharged from? Non-BH   Is LACE score < 7 ? Non-BH Discharge   Emergency Room discharge w/ pulse ox? No   Eligibility [facility]   Date of Discharge 10/18/21   Discharge diagnosis Pneumonia   Does the patient have one of the following disease processes/diagnoses(primary or secondary)? Non-BH Discharge   Prep survey completed? Yes          Nishi Chisholm RN

## 2021-10-22 ENCOUNTER — OFFICE VISIT (OUTPATIENT)
Dept: FAMILY MEDICINE CLINIC | Facility: CLINIC | Age: 51
End: 2021-10-22

## 2021-10-22 ENCOUNTER — LAB (OUTPATIENT)
Dept: FAMILY MEDICINE CLINIC | Facility: CLINIC | Age: 51
End: 2021-10-22

## 2021-10-22 VITALS
BODY MASS INDEX: 38.45 KG/M2 | HEART RATE: 93 BPM | SYSTOLIC BLOOD PRESSURE: 119 MMHG | WEIGHT: 224 LBS | DIASTOLIC BLOOD PRESSURE: 83 MMHG | TEMPERATURE: 97.3 F | OXYGEN SATURATION: 99 %

## 2021-10-22 DIAGNOSIS — R63.5 WEIGHT GAIN: ICD-10-CM

## 2021-10-22 DIAGNOSIS — Z09 HOSPITAL DISCHARGE FOLLOW-UP: Primary | ICD-10-CM

## 2021-10-22 LAB — TSH SERPL DL<=0.05 MIU/L-ACNC: 8.14 UIU/ML (ref 0.27–4.2)

## 2021-10-22 PROCEDURE — 84443 ASSAY THYROID STIM HORMONE: CPT | Performed by: NURSE PRACTITIONER

## 2021-10-22 PROCEDURE — 99214 OFFICE O/P EST MOD 30 MIN: CPT | Performed by: NURSE PRACTITIONER

## 2021-10-22 PROCEDURE — 36415 COLL VENOUS BLD VENIPUNCTURE: CPT

## 2021-10-22 RX ORDER — BENZONATATE 100 MG/1
100 CAPSULE ORAL 3 TIMES DAILY PRN
COMMUNITY
Start: 2021-10-16 | End: 2022-11-03 | Stop reason: SDUPTHER

## 2021-10-22 NOTE — PROGRESS NOTES
Subjective     Milka Espinoza is a 51 y.o. female.     Pt is here today for a hospital follow up from [facility].  She was admitted from 10/12-10/16.  She was hospitalized the month before for PNA.  CXR showed some possible recurrent PNA/bronchitis.  She was given rocephin and azith in the hospital.   Lactic acid was mildly elevated.   She was sent home with steroids, antibiotics, and home O2.  She states that she has not had to use the home O2 much.   Denies SOA or fever.  She had one episode of chest pain at home that was similar to one she had in the hospital.  She had an EKG at that time and it was normal.   She states that she is doing well at this time.        The following portions of the patient's history were reviewed and updated as appropriate: allergies, current medications, past family history, past medical history, past social history, past surgical history and problem list.    Review of Systems   Constitutional: Negative for chills, fatigue and fever.   Respiratory: Negative for cough, chest tightness and shortness of breath.    Cardiovascular: Negative for chest pain and palpitations.   Gastrointestinal: Negative for abdominal pain, diarrhea, nausea and vomiting.   Neurological: Negative for dizziness and headache.       Objective     /83 (BP Location: Right arm, Patient Position: Sitting, Cuff Size: Large Adult)   Pulse 93   Temp 97.3 °F (36.3 °C) (Tympanic)   Wt 102 kg (224 lb)   SpO2 99%   BMI 38.45 kg/m²     Current Outpatient Medications on File Prior to Visit   Medication Sig Dispense Refill   • benzonatate (TESSALON) 100 MG capsule Take 100 mg by mouth 3 (Three) Times a Day As Needed.     • ALPRAZolam (XANAX) 2 MG tablet TAKE 1 TABLET BY MOUTH THREE TIMES DAILY AS NEEDED FOR ANXIETY 90 tablet 0   • amitriptyline (ELAVIL) 25 MG tablet Take 1 tablet by mouth every night at bedtime. 90 tablet 1   • atorvastatin (Lipitor) 10 MG tablet Take 1 tablet by mouth Daily. 30  tablet 2   • busPIRone (BUSPAR) 15 MG tablet Take 1 tablet by mouth 3 (Three) Times a Day. 270 tablet 1   • gabapentin (NEURONTIN) 800 MG tablet TAKE 1 TABLET BY MOUTH THREE TIMES DAILY 270 tablet 1   • hydrOXYzine (ATARAX) 25 MG tablet TAKE 1 TABLET BY MOUTH THREE TIMES DAILY AS NEEDED FOR ANXIETY 270 tablet 1   • ipratropium-albuterol (DUO-NEB) 0.5-2.5 mg/3 ml nebulizer USE 1 VIAL PER NEBULIZER FOUR TIMES DAILY     • lacosamide (VIMPAT) 100 MG tablet tablet Take 1 tablet by mouth Every 12 (Twelve) Hours for 30 days. 60 tablet 0   • lamoTRIgine (LaMICtal) 100 MG tablet TAKE 1 TABLET BY MOUTH EVERY MORNING AND 2 AT BEDTIME 270 tablet 0   • levETIRAcetam (KEPPRA) 500 MG tablet Take 1,500 mg by mouth 2 (Two) Times a Day.     • Liraglutide (SAXENDA) 18 MG/3ML injection pen Inject 0.6mg under skin daily for week one, THEN 1.2mg daily for week two, THEN 1.8mg daily for week three, then 2.4mg daily for week four. 3 pen 0   • Motegrity 2 MG tablet Take 1 tablet by mouth Daily.     • nystatin (nystatin) 485508 UNIT/GM powder Apply  topically to the appropriate area as directed 2 (Two) Times a Day As Needed (rash). 30 g 0   • pantoprazole (PROTONIX) 40 MG EC tablet Take 40 mg by mouth 2 (Two) Times a Day.     • QUEtiapine XR (SEROquel XR) 400 MG 24 hr tablet TAKE 1 TABLET BY MOUTH EVERY NIGHT AT BEDTIME 90 tablet 1   • SUMAtriptan (IMITREX) 100 MG tablet Take 1 tablet by mouth 1 (One) Time As Needed for Migraine. 8 tablet 5   • Vraylar 1.5 MG capsule capsule TAKE 1 CAPSULE BY MOUTH DAILY 30 capsule 5     No current facility-administered medications on file prior to visit.        Physical Exam  Constitutional:       Appearance: Normal appearance. She is obese. She is not ill-appearing.   HENT:      Head: Normocephalic and atraumatic.   Eyes:      General:         Right eye: No discharge.         Left eye: No discharge.      Conjunctiva/sclera: Conjunctivae normal.   Cardiovascular:      Rate and Rhythm: Normal rate and  regular rhythm.      Heart sounds: No murmur heard.      Pulmonary:      Effort: Pulmonary effort is normal. No respiratory distress.      Breath sounds: Normal breath sounds.   Skin:     General: Skin is warm and dry.   Neurological:      General: No focal deficit present.      Mental Status: She is alert and oriented to person, place, and time.   Psychiatric:         Mood and Affect: Mood normal.         Behavior: Behavior normal.         Thought Content: Thought content normal.         Judgment: Judgment normal.           Assessment/Plan     Diagnoses and all orders for this visit:    1. Hospital discharge follow-up (Primary)  Comments:  Tino Co- reviewed notes  completed abx and steroids  PNA vs bronchitis  pt improving  f/u prn    2. Weight gain  Comments:  needs to work on diet and exercise  possibly due to psych meds  pt to discuss with provider  check TSH  Orders:  -     TSH; Future

## 2021-10-22 NOTE — PATIENT INSTRUCTIONS
Work on diet and exercise  Discuss weight gain with psych  Check thyroid  Call for worsening symptoms

## 2021-10-24 DIAGNOSIS — E03.9 HYPOTHYROIDISM, UNSPECIFIED TYPE: Primary | ICD-10-CM

## 2021-10-24 RX ORDER — LEVOTHYROXINE SODIUM 0.03 MG/1
25 TABLET ORAL DAILY
Qty: 30 TABLET | Refills: 0 | Status: SHIPPED | OUTPATIENT
Start: 2021-10-24 | End: 2021-12-07 | Stop reason: SDUPTHER

## 2021-11-12 ENCOUNTER — TELEPHONE (OUTPATIENT)
Dept: FAMILY MEDICINE CLINIC | Facility: CLINIC | Age: 51
End: 2021-11-12

## 2021-11-12 NOTE — TELEPHONE ENCOUNTER
Caller: Milka Espinoza    Relationship: Self    Best call back number: [phone] (H)    What is the best time to reach you: ANY TIME     Who are you requesting to speak with (clinical staff, provider,  specific staff member): CLINICAL STAFF     What was the call regarding: PATIENT WAS RETURNING A MISSED CALL FROM OUR OFFICE.   PLEASE REACH BACK OUT TO THE PATIENT WHEN WE GET A FREE CHANCE.    Do you require a callback: YES PLEASE

## 2021-11-16 DIAGNOSIS — F40.01 PANIC DISORDER WITH AGORAPHOBIA: ICD-10-CM

## 2021-11-16 RX ORDER — ALPRAZOLAM 2 MG/1
TABLET ORAL
Qty: 90 TABLET | Refills: 0 | Status: SHIPPED | OUTPATIENT
Start: 2021-11-16 | End: 2021-12-06 | Stop reason: SDUPTHER

## 2021-11-22 ENCOUNTER — TELEPHONE (OUTPATIENT)
Dept: FAMILY MEDICINE CLINIC | Facility: CLINIC | Age: 51
End: 2021-11-22

## 2021-11-22 NOTE — TELEPHONE ENCOUNTER
THE PATIENT STATES THAT [facility] DOES NOT HAVE THE MEDICATION AVAILABLE IN GENERIC FORM.    MEDICATION: Liraglutide (SAXENDA) 18 MG/3ML injection pen    THE MEDICATION COST $1,000    IS THERE ANOTHER MEDICATION?    PHARMACY [id number] - [address] [facility] [address] [facility] [address] - [phone]  - [phone] FX  [phone]    PATIENT CALL BACK: [phone]

## 2021-11-22 NOTE — TELEPHONE ENCOUNTER
PATIENT STATES:THAT SHE NEEDS THE GENERIC OF Liraglutide (SAXENDA) 18 MG/3ML injection pen   SO IT CAN BE COVED BY HER INS PLEASE ADVISE     PATIENT CAN BE REACHED ON: [phone]    PHARMACY [facility][id number] - [place], [place] - [address] [facility] [address] [facility] [address] - [phone]  - [phone] FX  [phone]

## 2021-11-30 ENCOUNTER — TELEPHONE (OUTPATIENT)
Dept: FAMILY MEDICINE CLINIC | Facility: CLINIC | Age: 51
End: 2021-11-30

## 2021-11-30 NOTE — TELEPHONE ENCOUNTER
Caller: Milka Espinoza    Relationship to patient: Self    Best call back number: [phone]    Patient is needing: PT STATED SHE WAS RETURNING A MISSED CALL BUT SHE WAS UNSURE AS TO WHO CONTACTED HER. SHE STATED IT WAS ABOUT HOW SHE WAS WANTING TO TRY A NEW PHARMACY TO FILL HER SAXENDA SINCE HER LOCAL [facility] COULDN'T     [id number] [place], [place] - [address] - [phone]  - [phone] FX

## 2021-11-30 NOTE — TELEPHONE ENCOUNTER
THE PATIENT STATES THAT THE [id number] - [place], [place] - [address] - [phone]  - [phone] FX HAS ONE PRESCRIPTION OF SAXENDA LEFT . THE PATIENT WILL NEED HER PCP TO CALL THIS IN FOR HER ASAP. PLEASE ADVISE.

## 2021-11-30 NOTE — TELEPHONE ENCOUNTER
I will resend script but did this every get approved? I thought you said insurance didn't cover it?

## 2021-11-30 NOTE — TELEPHONE ENCOUNTER
Caller: Milka Espinoza    Relationship: Self    Best call back number: [phone]  What is the best time to reach you: ANYTIME    Who are you requesting to speak with (clinical staff, provider,  specific staff member): CLINICAL STAFF    Do you know the name of the person who called: N/A    What was the call regarding: MEDICATION    Do you require a callback: YES

## 2021-12-01 ENCOUNTER — TELEPHONE (OUTPATIENT)
Dept: FAMILY MEDICINE CLINIC | Facility: CLINIC | Age: 51
End: 2021-12-01

## 2021-12-01 NOTE — TELEPHONE ENCOUNTER
Caller: Milka Espinoza    Relationship: Self    Best call back number: [phone]    What is the best time to reach you: ANYTIME    Who are you requesting to speak with (clinical staff, provider,  specific staff member): CLINICAL STAFF    Do you know the name of the person who called: N/A    What was the call regarding: PATIENT IS CALLING IN REGARDING THE REFILL ON Liraglutide (SAXENDA) 18 MG/3ML injection pen. [facility] [place] [facility] BUT THEY CANNOT OPEN THE PACKAGE TO JUST TAKE OUT 3 INJECTION PENS. THEY COME IN A BOX OF 15 AND THE PRESCRIPTION NEEDS TO BE FOR THE WHOLE BOX.     PLEASE CALL TO ADVISE PATIENT ON NEXT STEPS.

## 2021-12-06 ENCOUNTER — OFFICE VISIT (OUTPATIENT)
Dept: PSYCHIATRY | Facility: CLINIC | Age: 51
End: 2021-12-06

## 2021-12-06 VITALS — BODY MASS INDEX: 37.76 KG/M2 | WEIGHT: 220 LBS

## 2021-12-06 DIAGNOSIS — G43.009 MIGRAINE WITHOUT AURA AND WITHOUT STATUS MIGRAINOSUS, NOT INTRACTABLE: ICD-10-CM

## 2021-12-06 DIAGNOSIS — R56.9 SEIZURES (HCC): ICD-10-CM

## 2021-12-06 DIAGNOSIS — F31.32 BIPOLAR AFFECTIVE DISORDER, CURRENTLY DEPRESSED, MODERATE (HCC): Chronic | ICD-10-CM

## 2021-12-06 DIAGNOSIS — F40.01 PANIC DISORDER WITH AGORAPHOBIA: Primary | Chronic | ICD-10-CM

## 2021-12-06 PROCEDURE — 99214 OFFICE O/P EST MOD 30 MIN: CPT | Performed by: PHYSICIAN ASSISTANT

## 2021-12-06 RX ORDER — ALPRAZOLAM 2 MG/1
2 TABLET ORAL 3 TIMES DAILY PRN
Qty: 90 TABLET | Refills: 2 | Status: SHIPPED | OUTPATIENT
Start: 2021-12-06 | End: 2022-03-07 | Stop reason: SDUPTHER
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
## 2021-12-07 DIAGNOSIS — F40.01 PANIC DISORDER WITH AGORAPHOBIA: ICD-10-CM

## 2021-12-07 DIAGNOSIS — R21 RASH: ICD-10-CM

## 2021-12-07 DIAGNOSIS — E78.5 HYPERLIPIDEMIA, UNSPECIFIED HYPERLIPIDEMIA TYPE: ICD-10-CM

## 2021-12-07 DIAGNOSIS — R56.9 SEIZURE (HCC): ICD-10-CM

## 2021-12-07 RX ORDER — GABAPENTIN 800 MG/1
800 TABLET ORAL 3 TIMES DAILY
Qty: 270 TABLET | Refills: 1 | Status: SHIPPED | OUTPATIENT
Start: 2021-12-07 | End: 2022-05-16

## 2021-12-07 RX ORDER — ATORVASTATIN CALCIUM 10 MG/1
10 TABLET, FILM COATED ORAL DAILY
Qty: 30 TABLET | Refills: 2 | Status: SHIPPED | OUTPATIENT
Start: 2021-12-07 | End: 2022-03-22

## 2021-12-07 RX ORDER — QUETIAPINE 400 MG/1
400 TABLET, FILM COATED, EXTENDED RELEASE ORAL
Qty: 90 TABLET | Refills: 1 | Status: CANCELLED | OUTPATIENT
Start: 2021-12-07

## 2021-12-07 RX ORDER — NYSTATIN 100000 [USP'U]/G
POWDER TOPICAL 2 TIMES DAILY PRN
Qty: 30 G | Refills: 0 | Status: SHIPPED | OUTPATIENT
Start: 2021-12-07 | End: 2022-01-13

## 2021-12-07 RX ORDER — BENZONATATE 100 MG/1
100 CAPSULE ORAL 3 TIMES DAILY PRN
Status: CANCELLED | OUTPATIENT
Start: 2021-12-07

## 2021-12-07 RX ORDER — LACOSAMIDE 100 MG/1
100 TABLET ORAL EVERY 12 HOURS SCHEDULED
Qty: 60 TABLET | Refills: 0 | Status: CANCELLED | OUTPATIENT
Start: 2021-12-07 | End: 2022-01-06

## 2021-12-07 RX ORDER — IPRATROPIUM BROMIDE AND ALBUTEROL SULFATE 2.5; .5 MG/3ML; MG/3ML
3 SOLUTION RESPIRATORY (INHALATION) 4 TIMES DAILY PRN
Qty: 360 ML | Refills: 1 | Status: SHIPPED | OUTPATIENT
Start: 2021-12-07

## 2021-12-07 RX ORDER — LAMOTRIGINE 100 MG/1
TABLET ORAL
Qty: 270 TABLET | Refills: 0 | Status: CANCELLED | OUTPATIENT
Start: 2021-12-07

## 2021-12-07 RX ORDER — HYDROXYZINE HYDROCHLORIDE 25 MG/1
25 TABLET, FILM COATED ORAL 3 TIMES DAILY PRN
Qty: 270 TABLET | Refills: 1 | Status: SHIPPED | OUTPATIENT
Start: 2021-12-07 | End: 2022-02-25 | Stop reason: SDUPTHER

## 2021-12-07 RX ORDER — LEVOTHYROXINE SODIUM 0.03 MG/1
25 TABLET ORAL DAILY
Qty: 30 TABLET | Refills: 0 | Status: SHIPPED | OUTPATIENT
Start: 2021-12-07 | End: 2022-01-13

## 2021-12-07 RX ORDER — PANTOPRAZOLE SODIUM 40 MG/1
40 TABLET, DELAYED RELEASE ORAL DAILY
Qty: 90 TABLET | Refills: 1 | Status: SHIPPED | OUTPATIENT
Start: 2021-12-07 | End: 2022-10-19

## 2021-12-07 RX ORDER — ALPRAZOLAM 2 MG/1
2 TABLET ORAL 3 TIMES DAILY PRN
Qty: 90 TABLET | Refills: 2 | Status: CANCELLED | OUTPATIENT
Start: 2021-12-07

## 2021-12-07 RX ORDER — PRUCALOPRIDE 2 MG/1
1 TABLET, FILM COATED ORAL DAILY
Qty: 30 TABLET | Status: CANCELLED | OUTPATIENT
Start: 2021-12-07

## 2021-12-07 RX ORDER — SUMATRIPTAN 100 MG/1
100 TABLET, FILM COATED ORAL ONCE AS NEEDED
Qty: 8 TABLET | Refills: 5 | Status: SHIPPED | OUTPATIENT
Start: 2021-12-07 | End: 2022-04-21

## 2021-12-07 RX ORDER — AMITRIPTYLINE HYDROCHLORIDE 25 MG/1
25 TABLET, FILM COATED ORAL
Qty: 90 TABLET | Refills: 1 | Status: CANCELLED | OUTPATIENT
Start: 2021-12-07

## 2021-12-07 RX ORDER — BUSPIRONE HYDROCHLORIDE 15 MG/1
15 TABLET ORAL 3 TIMES DAILY
Qty: 270 TABLET | Refills: 1 | Status: CANCELLED | OUTPATIENT
Start: 2021-12-07

## 2021-12-07 RX ORDER — LEVETIRACETAM 500 MG/1
1500 TABLET ORAL 2 TIMES DAILY
Status: CANCELLED | OUTPATIENT
Start: 2021-12-07

## 2021-12-07 NOTE — TELEPHONE ENCOUNTER
Caller: Olga Milka SANDRA    Relationship: Self    Best call back number: [phone]     Requested Prescriptions:   Requested Prescriptions     Pending Prescriptions Disp Refills   • nystatin (nystatin) 585083 UNIT/GM powder 30 g 0     Sig: Apply  topically to the appropriate area as directed 2 (Two) Times a Day As Needed (rash).   • levothyroxine (SYNTHROID, LEVOTHROID) 25 MCG tablet 30 tablet 0     Sig: Take 1 tablet by mouth Daily.   • lamoTRIgine (LaMICtal) 100 MG tablet 270 tablet 0   • lacosamide (VIMPAT) 100 MG tablet tablet 60 tablet 0     Sig: Take 1 tablet by mouth Every 12 (Twelve) Hours for 30 days.   • QUEtiapine XR (SEROquel XR) 400 MG 24 hr tablet 90 tablet 1     Sig: Take 1 tablet by mouth every night at bedtime.   • hydrOXYzine (ATARAX) 25 MG tablet 270 tablet 1     Sig: Take 1 tablet by mouth 3 (Three) Times a Day As Needed. for anxiety   • gabapentin (NEURONTIN) 800 MG tablet 270 tablet 1     Sig: Take 1 tablet by mouth 3 (Three) Times a Day.   • busPIRone (BUSPAR) 15 MG tablet 270 tablet 1     Sig: Take 1 tablet by mouth 3 (Three) Times a Day.   • amitriptyline (ELAVIL) 25 MG tablet 90 tablet 1     Sig: Take 1 tablet by mouth every night at bedtime.   • atorvastatin (Lipitor) 10 MG tablet 30 tablet 2     Sig: Take 1 tablet by mouth Daily.   • ALPRAZolam (XANAX) 2 MG tablet 90 tablet 2     Sig: Take 1 tablet by mouth 3 (Three) Times a Day As Needed for Anxiety. for anxiety   • Cariprazine HCl (Vraylar) 1.5 MG capsule capsule 30 capsule 5     Sig: Take 1 capsule by mouth Daily.   • SUMAtriptan (IMITREX) 100 MG tablet 8 tablet 5     Sig: Take 1 tablet by mouth 1 (One) Time As Needed for Migraine.   • pantoprazole (PROTONIX) 40 MG EC tablet       Sig: Take 1 tablet by mouth 2 (Two) Times a Day.   • Motegrity 2 MG tablet 30 tablet      Sig: Take 1 tablet by mouth Daily.   • levETIRAcetam (Keppra) 500 MG tablet       Sig: Take 3 tablets by mouth 2 (Two) Times a Day.   • ipratropium-albuterol  (DUO-NEB) 0.5-2.5 mg/3 ml nebulizer 360 mL    • benzonatate (TESSALON) 100 MG capsule       Sig: Take 1 capsule by mouth 3 (Three) Times a Day As Needed.        Pharmacy where request should be sent: [facility]. - [address] - [phone]  - [phone]      Additional details provided by patient: CHANGE OF PHARMACY ALL HER MEDICATIONS SENT TO [facility].    Does the patient have less than a 3 day supply:  [x] Yes  [] No    Wesley Cruz   12/07/21 10:51 EST

## 2021-12-09 ENCOUNTER — OFFICE (AMBULATORY)
Dept: URBAN - METROPOLITAN AREA CLINIC 64 | Facility: CLINIC | Age: 51
End: 2021-12-09

## 2021-12-09 VITALS
DIASTOLIC BLOOD PRESSURE: 82 MMHG | SYSTOLIC BLOOD PRESSURE: 131 MMHG | HEART RATE: 91 BPM | HEIGHT: 64 IN | WEIGHT: 217 LBS

## 2021-12-09 DIAGNOSIS — R11.2 NAUSEA WITH VOMITING, UNSPECIFIED: ICD-10-CM

## 2021-12-09 DIAGNOSIS — K21.9 GASTRO-ESOPHAGEAL REFLUX DISEASE WITHOUT ESOPHAGITIS: ICD-10-CM

## 2021-12-09 PROBLEM — K21.0 GASTRO-ESOPHAGEAL REFLUX DISEASE WITH ESOPHAGITIS: Status: ACTIVE | Noted: 2019-12-13

## 2021-12-09 PROBLEM — K22.2 ESOPHAGEAL OBSTRUCTION: Status: ACTIVE | Noted: 2020-05-08

## 2021-12-09 PROCEDURE — 99214 OFFICE O/P EST MOD 30 MIN: CPT | Performed by: NURSE PRACTITIONER

## 2022-01-13 DIAGNOSIS — R21 RASH: ICD-10-CM

## 2022-01-13 RX ORDER — LEVOTHYROXINE SODIUM 0.03 MG/1
TABLET ORAL
Qty: 30 TABLET | Refills: 0 | Status: SHIPPED | OUTPATIENT
Start: 2022-01-13 | End: 2022-03-22

## 2022-01-13 RX ORDER — NYSTATIN 100000 [USP'U]/G
POWDER TOPICAL
Qty: 30 G | Refills: 0 | Status: SHIPPED | OUTPATIENT
Start: 2022-01-13 | End: 2022-06-08 | Stop reason: SDUPTHER

## 2022-01-17 RX ORDER — LAMOTRIGINE 100 MG/1
TABLET ORAL
Qty: 270 TABLET | Refills: 0 | Status: SHIPPED | OUTPATIENT
Start: 2022-01-17 | End: 2022-04-11

## 2022-01-17 RX ORDER — LAMOTRIGINE 100 MG/1
TABLET ORAL
Qty: 270 TABLET | Refills: 0 | OUTPATIENT
Start: 2022-01-17

## 2022-01-27 RX ORDER — SUCRALFATE 1 G/1
1 TABLET ORAL 3 TIMES DAILY
COMMUNITY
Start: 2021-11-02

## 2022-02-05 ENCOUNTER — LAB (OUTPATIENT)
Dept: LAB | Facility: HOSPITAL | Age: 52
End: 2022-02-05

## 2022-02-05 LAB — SARS-COV-2 ORF1AB RESP QL NAA+PROBE: NOT DETECTED

## 2022-02-05 PROCEDURE — C9803 HOPD COVID-19 SPEC COLLECT: HCPCS

## 2022-02-05 PROCEDURE — U0004 COV-19 TEST NON-CDC HGH THRU: HCPCS

## 2022-02-07 ENCOUNTER — ANESTHESIA EVENT (OUTPATIENT)
Dept: GASTROENTEROLOGY | Facility: HOSPITAL | Age: 52
End: 2022-02-07

## 2022-02-08 ENCOUNTER — ON CAMPUS - OUTPATIENT (AMBULATORY)
Dept: URBAN - METROPOLITAN AREA HOSPITAL 85 | Facility: HOSPITAL | Age: 52
End: 2022-02-08

## 2022-02-08 ENCOUNTER — HOSPITAL ENCOUNTER (OUTPATIENT)
Facility: HOSPITAL | Age: 52
Setting detail: HOSPITAL OUTPATIENT SURGERY
Discharge: HOME | End: 2022-02-08
Attending: INTERNAL MEDICINE | Admitting: INTERNAL MEDICINE

## 2022-02-08 ENCOUNTER — ANESTHESIA (OUTPATIENT)
Dept: GASTROENTEROLOGY | Facility: HOSPITAL | Age: 52
End: 2022-02-08

## 2022-02-08 VITALS
TEMPERATURE: 97.7 F | HEART RATE: 90 BPM | BODY MASS INDEX: 37.83 KG/M2 | SYSTOLIC BLOOD PRESSURE: 106 MMHG | OXYGEN SATURATION: 97 % | DIASTOLIC BLOOD PRESSURE: 69 MMHG | HEIGHT: 64 IN | RESPIRATION RATE: 12 BRPM | WEIGHT: 221.56 LBS

## 2022-02-08 DIAGNOSIS — K21.00 GASTRO-ESOPHAGEAL REFLUX DISEASE WITH ESOPHAGITIS, WITHOUT B: ICD-10-CM

## 2022-02-08 DIAGNOSIS — R13.10 DYSPHAGIA, UNSPECIFIED: ICD-10-CM

## 2022-02-08 DIAGNOSIS — K22.2 ESOPHAGEAL OBSTRUCTION: ICD-10-CM

## 2022-02-08 DIAGNOSIS — R11.2 NAUSEA WITH VOMITING, UNSPECIFIED: ICD-10-CM

## 2022-02-08 DIAGNOSIS — K22.4 DYSKINESIA OF ESOPHAGUS: ICD-10-CM

## 2022-02-08 PROCEDURE — 25010000002 PROPOFOL 10 MG/ML EMULSION: Performed by: ANESTHESIOLOGY

## 2022-02-08 PROCEDURE — 43450 DILATE ESOPHAGUS 1/MULT PASS: CPT | Performed by: INTERNAL MEDICINE

## 2022-02-08 PROCEDURE — 43236 UPPR GI SCOPE W/SUBMUC INJ: CPT | Performed by: INTERNAL MEDICINE

## 2022-02-08 PROCEDURE — 25010000002 ONABOTULINUMTOXINA 100 UNITS RECONSTITUTED SOLUTION 1 EACH VIAL: Performed by: INTERNAL MEDICINE

## 2022-02-08 RX ORDER — SODIUM CHLORIDE 0.9 % (FLUSH) 0.9 %
3 SYRINGE (ML) INJECTION EVERY 12 HOURS SCHEDULED
Status: DISCONTINUED | OUTPATIENT
Start: 2022-02-08 | End: 2022-02-08 | Stop reason: HOSPADM

## 2022-02-08 RX ORDER — SODIUM CHLORIDE 9 MG/ML
9 INJECTION, SOLUTION INTRAVENOUS CONTINUOUS PRN
Status: DISCONTINUED | OUTPATIENT
Start: 2022-02-08 | End: 2022-02-08 | Stop reason: HOSPADM

## 2022-02-08 RX ORDER — LIDOCAINE HYDROCHLORIDE 20 MG/ML
INJECTION, SOLUTION EPIDURAL; INFILTRATION; INTRACAUDAL; PERINEURAL AS NEEDED
Status: DISCONTINUED | OUTPATIENT
Start: 2022-02-08 | End: 2022-02-08 | Stop reason: SURG

## 2022-02-08 RX ORDER — SODIUM CHLORIDE 0.9 % (FLUSH) 0.9 %
10 SYRINGE (ML) INJECTION EVERY 12 HOURS SCHEDULED
Status: DISCONTINUED | OUTPATIENT
Start: 2022-02-08 | End: 2022-02-08 | Stop reason: HOSPADM

## 2022-02-08 RX ORDER — SODIUM CHLORIDE 0.9 % (FLUSH) 0.9 %
10 SYRINGE (ML) INJECTION AS NEEDED
Status: DISCONTINUED | OUTPATIENT
Start: 2022-02-08 | End: 2022-02-08 | Stop reason: HOSPADM

## 2022-02-08 RX ORDER — LIDOCAINE HYDROCHLORIDE 10 MG/ML
0.5 INJECTION, SOLUTION EPIDURAL; INFILTRATION; INTRACAUDAL; PERINEURAL ONCE AS NEEDED
Status: DISCONTINUED | OUTPATIENT
Start: 2022-02-08 | End: 2022-02-08 | Stop reason: HOSPADM

## 2022-02-08 RX ORDER — SODIUM CHLORIDE 0.9 % (FLUSH) 0.9 %
3-10 SYRINGE (ML) INJECTION AS NEEDED
Status: DISCONTINUED | OUTPATIENT
Start: 2022-02-08 | End: 2022-02-08 | Stop reason: HOSPADM

## 2022-02-08 RX ORDER — ONDANSETRON 2 MG/ML
4 INJECTION INTRAMUSCULAR; INTRAVENOUS ONCE AS NEEDED
Status: DISCONTINUED | OUTPATIENT
Start: 2022-02-08 | End: 2022-02-08 | Stop reason: HOSPADM

## 2022-02-08 RX ADMIN — PROPOFOL 100 MCG/KG/MIN: 10 INJECTION, EMULSION INTRAVENOUS at 10:44

## 2022-02-08 RX ADMIN — SODIUM CHLORIDE 9 ML/HR: 9 INJECTION, SOLUTION INTRAVENOUS at 09:25

## 2022-02-08 RX ADMIN — LIDOCAINE HYDROCHLORIDE 30 MG: 20 INJECTION, SOLUTION EPIDURAL; INFILTRATION; INTRACAUDAL; PERINEURAL at 10:46

## 2022-02-08 NOTE — ANESTHESIA POSTPROCEDURE EVALUATION
Patient: Milka Espinoza    Procedure Summary     Date: 02/08/22 Room / Location: [facility] ENDOSCOPY 4 / [facility] ENDOSCOPY    Anesthesia Start: 1041 Anesthesia Stop: 1058    Procedure: EGD WITH DILATATION (BOUGIE #56) AND INJECTION OF BOTOX (N/A ) Diagnosis:       Nausea with vomiting      Dysphagia      GERD (gastroesophageal reflux disease)      Esophagitis      (Nausea with vomiting [R11.2])      (Dysphagia [R13.10])      (GERD (gastroesophageal reflux disease) [K21.9])      (Esophagitis [K20.90])    Surgeons: Rito Le MD Provider: Gallo Herrera MD    Anesthesia Type: MAC ASA Status: 3          Anesthesia Type: MAC    Vitals  Vitals Value Taken Time   /69 02/08/22 1119   Temp     Pulse 90 02/08/22 1119   Resp 12 02/08/22 1119   SpO2 97 % 02/08/22 1119           Post Anesthesia Care and Evaluation    Patient location during evaluation: PACU  Patient participation: complete - patient participated  Level of consciousness: awake  Pain scale: See nurse's notes for pain score.  Pain management: adequate  Airway patency: patent  Anesthetic complications: No anesthetic complications  PONV Status: none  Cardiovascular status: acceptable  Respiratory status: acceptable  Hydration status: acceptable    Comments: Patient seen and examined postoperatively; vital signs stable; SpO2 greater than or equal to 90%; cardiopulmonary status stable; nausea/vomiting adequately controlled; pain adequately controlled; no apparent anesthesia complications; patient discharged from anesthesia care when discharge criteria were met

## 2022-02-08 NOTE — H&P
GI CONSULT  NOTE:    Referring Provider:    Safia Reyna APRN  [unfilled]    Chief complaint: <principal problem not specified>  Dysphagia  Subjective .     History of present illness:      Milka Espinoza is a 51 y.o. female who presents today for Procedure(s):  EGD WITH BOTOX for the indications listed below.     The updated Patient Profile was reviewed prior to the procedure, in conjunction with the Physical Exam, including medical conditions, surgical procedures, medications, allergies, family history and social history.     Pre-operatively, I reviewed the indication(s) for the procedure, the risks of the procedure [including but not limited to: unexpected bleeding possibly requiring hospitalization and/or unplanned repeat procedures, perforation possibly requiring surgical treatment, missed lesions and complications of sedation/MAC (also explained by anesthesia staff)].     I have evaluated the patient for risks associated with the planned anesthesia and the procedure to be performed and find the patient an acceptable candidate for IV sedation.    Multiple opportunities were provided for any questions or concerns, and all questions were answered satisfactorily before any anesthesia was administered. We will proceed with the planned procedure.    Past Medical History:  Past Medical History:   Diagnosis Date   • Depression    • Disease of thyroid gland    • Hyperlipidemia    • Migraines    • On home O2 2021    3 lmp   • Seizures (HCC)        Past Surgical History:  Past Surgical History:   Procedure Laterality Date   • ABDOMINAL SURGERY     •  SECTION      x3   • ENDOSCOPY W/ PEG TUBE PLACEMENT N/A 2020    Procedure: ESOPHAGOGASTRODUODENOSCOPY WITH PERCUTANEOUS ENDOSCOPIC GASTROSTOMY TUBE INSERTION;  Surgeon: Rito Le MD;  Location: [facility] ENDOSCOPY;  Service: Gastroenterology;  Laterality: N/A;   • HERNIA REPAIR     • HYSTERECTOMY     • PEG TUBE REMOVAL         Social  History:  Social History     Tobacco Use   • Smoking status: Former Smoker     Packs/day: 0.25     Types: Cigarettes     Quit date: 2020     Years since quittin.2   • Smokeless tobacco: Never Used   Vaping Use   • Vaping Use: Never used   Substance Use Topics   • Alcohol use: No   • Drug use: No       Family History:  Family History   Problem Relation Age of Onset   • No Known Problems Mother    • No Known Problems Father    • No Known Problems Sister    • No Known Problems Brother    • Alcohol abuse Daughter    • No Known Problems Son    • No Known Problems Maternal Grandmother    • No Known Problems Paternal Grandmother    • No Known Problems Maternal Aunt    • No Known Problems Paternal Aunt        Medications:  Medications Prior to Admission   Medication Sig Dispense Refill Last Dose   • ALPRAZolam (XANAX) 2 MG tablet Take 1 tablet by mouth 3 (Three) Times a Day As Needed for Anxiety. for anxiety 90 tablet 2 2022 at Unknown time   • amitriptyline (ELAVIL) 25 MG tablet Take 1 tablet by mouth every night at bedtime. 90 tablet 1 2022 at Unknown time   • atorvastatin (Lipitor) 10 MG tablet Take 1 tablet by mouth Daily. 30 tablet 2 2022 at Unknown time   • busPIRone (BUSPAR) 15 MG tablet Take 1 tablet by mouth 3 (Three) Times a Day. 270 tablet 1 2022 at Unknown time   • diclofenac (VOLTAREN) 50 MG EC tablet Take 50 mg by mouth 3 (Three) Times a Day As Needed. for pain   2022 at Unknown time   • gabapentin (NEURONTIN) 800 MG tablet Take 1 tablet by mouth 3 (Three) Times a Day. 270 tablet 1 2022 at Unknown time   • hydrOXYzine (ATARAX) 25 MG tablet Take 1 tablet by mouth 3 (Three) Times a Day As Needed for Anxiety. for anxiety 270 tablet 1 2022 at Unknown time   • ipratropium-albuterol (DUO-NEB) 0.5-2.5 mg/3 ml nebulizer Take 3 mL by nebulization 4 (Four) Times a Day As Needed for Wheezing. 360 mL 1 2022 at Unknown time   • lamoTRIgine (LaMICtal) 100 MG tablet TAKE 1 TABLET BY  MOUTH EVERY MORNING AND 2 TABLETS AT BEDTIME 270 tablet 0 2/7/2022 at Unknown time   • levETIRAcetam (KEPPRA) 500 MG tablet Take 1,500 mg by mouth 2 (Two) Times a Day.   2/7/2022 at Unknown time   • levothyroxine (SYNTHROID, LEVOTHROID) 25 MCG tablet TAKE ONE TABLET BY MOUTH ONCE DAILY 30 tablet 0 2/7/2022 at Unknown time   • Liraglutide (SAXENDA) 18 MG/3ML injection pen Inject 0.6mg under skin daily for week one, THEN 1.2mg daily for week two, THEN 1.8mg daily for week three, then 2.4mg daily for week four. 15 pen 0 2/7/2022 at Unknown time   • Motegrity 2 MG tablet Take 1 tablet by mouth Daily.   2/7/2022 at Unknown time   • pantoprazole (PROTONIX) 40 MG EC tablet Take 1 tablet by mouth Daily. 90 tablet 1 2/7/2022 at Unknown time   • QUEtiapine XR (SEROquel XR) 400 MG 24 hr tablet TAKE 1 TABLET BY MOUTH EVERY NIGHT AT BEDTIME 90 tablet 1 2/7/2022 at Unknown time   • sucralfate (CARAFATE) 1 g tablet Take 1 g by mouth 3 (Three) Times a Day.   2/7/2022 at Unknown time   • SUMAtriptan (IMITREX) 100 MG tablet Take 1 tablet by mouth 1 (One) Time As Needed for Migraine. 8 tablet 5 2/7/2022 at Unknown time   • Vraylar 1.5 MG capsule capsule TAKE 1 CAPSULE BY MOUTH DAILY 30 capsule 5 2/7/2022 at Unknown time   • benzonatate (TESSALON) 100 MG capsule Take 100 mg by mouth 3 (Three) Times a Day As Needed.      • lacosamide (VIMPAT) 100 MG tablet tablet Take 1 tablet by mouth Every 12 (Twelve) Hours for 30 days. 60 tablet 0    • nystatin (MYCOSTATIN) 790244 UNIT/GM powder APPLY TO AFFECTED AREA(S) TWO TIMES A DAY AS NEEDED FOR RASH 30 g 0        Scheduled Meds:onabotulinumtoxina, , ,   sodium chloride, 10 mL, Intravenous, Q12H      Continuous Infusions:sodium chloride, 9 mL/hr, Last Rate: 9 mL/hr (02/08/22 0925)      PRN Meds:.•  lidocaine PF 1%  •  sodium chloride  •  sodium chloride    ALLERGIES:  Sulfa antibiotics and Tramadol hcl    ROS:  The following systems were reviewed and negative;   Constitution:  No fevers, chills,  "no unintentional weight loss  Skin: no rash, no jaundice  Eyes:  No blurry vision, no eye pain  HENT:  No change in hearing or smell  Resp:  No dyspnea or cough  CV:  No chest pain or palpitations  :  No dysuria, hematuria  Musculoskeletal:  No leg cramps or arthralgias  Neuro:  No tremor, no numbness  Psych:  No depression or confsuion    Objective     Vital Signs:   Vitals:    01/27/22 1413 02/08/22 0920   BP:  122/66   BP Location:  Left arm   Patient Position:  Lying   Pulse:  94   Resp:  18   Temp:  97.7 °F (36.5 °C)   TempSrc:  Oral   SpO2:  98%   Weight: 95.3 kg (210 lb) 101 kg (221 lb 9 oz)   Height: 162.6 cm (64\") 162.6 cm (64\")       Physical Exam:       General Appearance:    Awake and alert, in no acute distress   Head:    Normocephalic, without obvious abnormality, atraumatic   Throat:   No oral lesions, no thrush, oral mucosa moist   Lungs:     respirations regular, even and unlabored   Skin:   No rash, no jaundice       Results Review:  Lab Results (last 24 hours)     ** No results found for the last 24 hours. **          Imaging Results (Last 24 Hours)     ** No results found for the last 24 hours. **           I reviewed the patient's labs and imaging.    ASSESSMENT AND PLAN:  Dysphagia history of EGJ outflow obstruction    Active Problems:    * No active hospital problems. *       Procedure(s):  EGD WITH BOTOX      I discussed the patients findings and my recommendations with the patient.    Rito Le MD  02/08/22  10:02 EST  "

## 2022-02-08 NOTE — ANESTHESIA PREPROCEDURE EVALUATION
Anesthesia Evaluation     Patient summary reviewed   NPO Solid Status: > 8 hours  NPO Liquid Status: > 8 hours           Airway   Mallampati: II  TM distance: >3 FB  Neck ROM: full  No difficulty expected  Dental - normal exam     Pulmonary - normal exam   (+) asthma,recent URI,   Cardiovascular - normal exam    ECG reviewed    (+) hyperlipidemia,       Neuro/Psych  (+) seizures, headaches, syncope,    GI/Hepatic/Renal/Endo    (+) obesity,  GERD,  thyroid problem     Musculoskeletal     Abdominal  - normal exam    Bowel sounds: normal.   Substance History      OB/GYN          Other                        Anesthesia Plan    ASA 3     MAC     intravenous induction     Anesthetic plan, all risks, benefits, and alternatives have been provided, discussed and informed consent has been obtained with: patient.        CODE STATUS:

## 2022-02-08 NOTE — OP NOTE
ESOPHAGOGASTRODUODENOSCOPY Procedure Report    Patient Name:  Milka Espinoza  YOB: 1970    Date of Surgery:  2/8/2022     Pre-Op Diagnosis:  Nausea with vomiting [R11.2]  Dysphagia [R13.10]  GERD (gastroesophageal reflux disease) [K21.9]  Esophagitis [K20.90]       Post-Op Diagnosis Codes:     * Nausea with vomiting [R11.2]     * Dysphagia [R13.10]     * GERD (gastroesophageal reflux disease) [K21.9]     * Esophagitis [K20.90]  Cricopharyngeal stricture dilated 56 [place] Dowd  Hypertonic LES injected with Botox    Procedure/CPT® Codes:      Procedure(s):  EGD WITH DILATATION (BOUGIE #56) AND INJECTION OF BOTOX    Staff:  Surgeon(s):  Rito Le MD      Anesthesia: Monitored Anesthesia Care    Description of Procedure:  A description of the procedure as well as risks, benefits and alternative methods were explained to the patient who voiced understanding and signed the corresponding consent form. A physical exam was performed and vital signs were monitored throughout the procedure.    An upper GI endoscope was placed into the mouth and proceeded through the esophagus, stomach and second portion of the duodenum without difficulty. The scope was then retroflexed and the fundus was visualized. The procedure was not difficult and there were no immediate complications.    Specimen:        See Below    Estimated blood loss: none    Complications:  None    Findings:  Cricopharyngeal stricture dilated 56 [place] Dowd  Hypertonic LES injected with Botox in 4 quadrants 1 cm above the GE junction    Impression:  Dysphagia likely combination of a GJ outflow obstruction as well as cricopharyngeal stricture status post Botox injection and dilation    Recommendations:  Monitor for symptom improvement following dilation      Rito Le MD     Date: 2/8/2022    Time: 10:57 EST

## 2022-02-08 NOTE — DISCHARGE INSTRUCTIONS
A responsible adult should stay with you and you should rest quietly for the rest of the day.    Do not drink alcohol, drive operate any heavy machinery or power tools or make any legal/important decisions for the next 24 hours.    Progress your diet as tolerated.  If you begin to experience severe pain, increased shortness of breath, racing heartbeat or a fever above 101F, seek immediate medical attention.     Follow up with MD as instructed.  Call office for results in 3 to 5 days if needed.    Recommendations:  Monitor for symptom improvement following dilation

## 2022-02-14 RX ORDER — QUETIAPINE 400 MG/1
400 TABLET, FILM COATED, EXTENDED RELEASE ORAL
Qty: 90 TABLET | Refills: 1 | Status: SHIPPED | OUTPATIENT
Start: 2022-02-14 | End: 2022-05-23

## 2022-02-25 DIAGNOSIS — F40.01 PANIC DISORDER WITH AGORAPHOBIA: ICD-10-CM

## 2022-02-25 RX ORDER — HYDROXYZINE HYDROCHLORIDE 25 MG/1
TABLET, FILM COATED ORAL
Qty: 270 TABLET | Refills: 1 | Status: SHIPPED | OUTPATIENT
Start: 2022-02-25 | End: 2022-08-17

## 2022-03-01 NOTE — PROGRESS NOTES
Subjective     Milka Espinoza is a 51 y.o. female.     Patient is here today to discuss weight loss.  She cannot tell me what meds she is on.  She was having memory issues she states.  She has had numerous bouts of PNA.  She sees [facility] with psych.  She reports her mood is stable on her meds.  She is lacking energy.  Still has some vomiting but its getting better- sees GI- Dr. Le.  She will be seeing Dr. Seipels office for seizures.  She is not aware of when her last seizure.  She is taking her meds.  She is concerned about weight gain.  She wanted to get saxenda but insurance wouldn't cover it.  She states that she is walking a mile a day.  She is unable to go to the gym bc she doesn't drive.  She has been trying to drink more water.  She has been eating more salad with minimal dressing.  She states that sometimes she doesn't eat very much.  Her weight gain is worsening depression- it causes anxiety.        The following portions of the patient's history were reviewed and updated as appropriate: allergies, current medications, past family history, past medical history, past social history, past surgical history and problem list.    Review of Systems   Constitutional: Positive for unexpected weight gain. Negative for chills, fatigue and fever.   Respiratory: Negative for chest tightness and shortness of breath.    Cardiovascular: Negative for chest pain and palpitations.   Gastrointestinal: Positive for nausea.   Neurological: Negative for dizziness.       Objective     /84 (BP Location: Right arm, Patient Position: Sitting, Cuff Size: Large Adult)   Pulse 96   Temp 98.2 °F (36.8 °C) (Tympanic)   Wt 101 kg (223 lb)   SpO2 99%   BMI 38.28 kg/m²     Current Outpatient Medications on File Prior to Visit   Medication Sig Dispense Refill   • ALPRAZolam (XANAX) 2 MG tablet Take 1 tablet by mouth 3 (Three) Times a Day As Needed for Anxiety. for anxiety 90 tablet 2   • amitriptyline (ELAVIL) 25  MG tablet Take 1 tablet by mouth every night at bedtime. 90 tablet 1   • atorvastatin (Lipitor) 10 MG tablet Take 1 tablet by mouth Daily. 30 tablet 2   • benzonatate (TESSALON) 100 MG capsule Take 100 mg by mouth 3 (Three) Times a Day As Needed.     • busPIRone (BUSPAR) 15 MG tablet Take 1 tablet by mouth 3 (Three) Times a Day. 270 tablet 1   • diclofenac (VOLTAREN) 50 MG EC tablet Take 50 mg by mouth 3 (Three) Times a Day As Needed. for pain     • gabapentin (NEURONTIN) 800 MG tablet Take 1 tablet by mouth 3 (Three) Times a Day. 270 tablet 1   • hydrOXYzine (ATARAX) 25 MG tablet TAKE 1 TABLET BY MOUTH THREE TIMES DAILY AS NEEDED FOR ANXIETY 270 tablet 1   • ipratropium-albuterol (DUO-NEB) 0.5-2.5 mg/3 ml nebulizer Take 3 mL by nebulization 4 (Four) Times a Day As Needed for Wheezing. 360 mL 1   • lacosamide (VIMPAT) 100 MG tablet tablet Take 1 tablet by mouth Every 12 (Twelve) Hours for 30 days. 60 tablet 0   • lamoTRIgine (LaMICtal) 100 MG tablet TAKE 1 TABLET BY MOUTH EVERY MORNING AND 2 TABLETS AT BEDTIME 270 tablet 0   • levETIRAcetam (KEPPRA) 500 MG tablet Take 1,500 mg by mouth 2 (Two) Times a Day.     • levothyroxine (SYNTHROID, LEVOTHROID) 25 MCG tablet TAKE ONE TABLET BY MOUTH ONCE DAILY 30 tablet 0   • Motegrity 2 MG tablet Take 1 tablet by mouth Daily.     • nystatin (MYCOSTATIN) 536536 UNIT/GM powder APPLY TO AFFECTED AREA(S) TWO TIMES A DAY AS NEEDED FOR RASH 30 g 0   • pantoprazole (PROTONIX) 40 MG EC tablet Take 1 tablet by mouth Daily. 90 tablet 1   • QUEtiapine XR (SEROquel XR) 400 MG 24 hr tablet TAKE 1 TABLET BY MOUTH EVERY NIGHT AT BEDTIME 90 tablet 1   • sucralfate (CARAFATE) 1 g tablet Take 1 g by mouth 3 (Three) Times a Day.     • SUMAtriptan (IMITREX) 100 MG tablet Take 1 tablet by mouth 1 (One) Time As Needed for Migraine. 8 tablet 5   • Vraylar 1.5 MG capsule capsule TAKE 1 CAPSULE BY MOUTH DAILY 30 capsule 5   • [DISCONTINUED] Liraglutide (SAXENDA) 18 MG/3ML injection pen Inject 0.6mg  under skin daily for week one, THEN 1.2mg daily for week two, THEN 1.8mg daily for week three, then 2.4mg daily for week four. 15 pen 0     No current facility-administered medications on file prior to visit.        Physical Exam  Vitals reviewed.   Constitutional:       General: She is not in acute distress.     Appearance: Normal appearance. She is well-developed. She is obese. She is not diaphoretic.   HENT:      Head: Normocephalic and atraumatic.   Eyes:      General:         Right eye: No discharge.         Left eye: No discharge.      Conjunctiva/sclera: Conjunctivae normal.   Cardiovascular:      Rate and Rhythm: Normal rate and regular rhythm.   Pulmonary:      Effort: Pulmonary effort is normal. No respiratory distress.      Breath sounds: Normal breath sounds. No wheezing or rales.   Abdominal:      General: Bowel sounds are normal.      Palpations: Abdomen is soft.   Musculoskeletal:         General: Normal range of motion.      Cervical back: Normal range of motion.   Skin:     General: Skin is warm and dry.   Neurological:      General: No focal deficit present.      Mental Status: She is alert and oriented to person, place, and time.   Psychiatric:         Mood and Affect: Mood normal.         Behavior: Behavior normal.         Thought Content: Thought content normal.         Judgment: Judgment normal.           Assessment/Plan     Diagnoses and all orders for this visit:    1. Weight gain (Primary)  Comments:  discussed importance of drink water  eat small frequent meals  increase activity to 30mins 4-5 days/wk  discussed positive self talk  Orders:  -     Ambulatory Referral to Nutrition Services

## 2022-03-02 ENCOUNTER — TELEPHONE (OUTPATIENT)
Dept: FAMILY MEDICINE CLINIC | Facility: CLINIC | Age: 52
End: 2022-03-02

## 2022-03-02 ENCOUNTER — OFFICE VISIT (OUTPATIENT)
Dept: FAMILY MEDICINE CLINIC | Facility: CLINIC | Age: 52
End: 2022-03-02

## 2022-03-02 VITALS
WEIGHT: 223 LBS | TEMPERATURE: 98.2 F | HEART RATE: 96 BPM | SYSTOLIC BLOOD PRESSURE: 131 MMHG | OXYGEN SATURATION: 99 % | BODY MASS INDEX: 38.28 KG/M2 | DIASTOLIC BLOOD PRESSURE: 84 MMHG

## 2022-03-02 DIAGNOSIS — R63.5 WEIGHT GAIN: Primary | ICD-10-CM

## 2022-03-02 PROCEDURE — 99213 OFFICE O/P EST LOW 20 MIN: CPT | Performed by: NURSE PRACTITIONER

## 2022-03-02 NOTE — TELEPHONE ENCOUNTER
CC Pt presents today for a comprehensive medical ocular evaluation. Pt does not want dilation. Pt states no concerns    PHARMACY VERIFIED    POH myopia    PMH NEG    FOH paternal hypertension    DROPS NONE    Medications and Allergies reviewed  Denies known Latex allergy or symptoms of latex sensitivity  Tobacco use reviewed    Leslee Da Silva MD   [place]

## 2022-03-02 NOTE — TELEPHONE ENCOUNTER
PATIENT CALLED NEEDING THE NAME OF THE DIET PILL/SHOT THAT WAS DISCUSSED THIS MORNING. SHE IS WANTING TO TALK TO HER INSURANCE ABOUT THIS MEDICATION    CALL BACK NUMBER [phone]

## 2022-03-07 ENCOUNTER — OFFICE VISIT (OUTPATIENT)
Dept: PSYCHIATRY | Facility: CLINIC | Age: 52
End: 2022-03-07

## 2022-03-07 DIAGNOSIS — F31.32 BIPOLAR AFFECTIVE DISORDER, CURRENTLY DEPRESSED, MODERATE: Primary | Chronic | ICD-10-CM

## 2022-03-07 DIAGNOSIS — F40.01 PANIC DISORDER WITH AGORAPHOBIA: Chronic | ICD-10-CM

## 2022-03-07 DIAGNOSIS — F44.5 PSEUDOSEIZURE: ICD-10-CM

## 2022-03-07 PROCEDURE — 99214 OFFICE O/P EST MOD 30 MIN: CPT | Performed by: PHYSICIAN ASSISTANT

## 2022-03-07 RX ORDER — ALPRAZOLAM 2 MG/1
2 TABLET ORAL 3 TIMES DAILY PRN
Qty: 90 TABLET | Refills: 2 | Status: SHIPPED | OUTPATIENT
Start: 2022-03-07 | End: 2022-05-24

## 2022-03-07 RX ORDER — BUSPIRONE HYDROCHLORIDE 15 MG/1
15 TABLET ORAL 3 TIMES DAILY
Qty: 270 TABLET | Refills: 1 | Status: SHIPPED | OUTPATIENT
Start: 2022-03-07 | End: 2022-08-17
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
## 2022-03-16 ENCOUNTER — TELEPHONE (OUTPATIENT)
Dept: FAMILY MEDICINE CLINIC | Facility: CLINIC | Age: 52
End: 2022-03-16

## 2022-03-21 ENCOUNTER — TELEPHONE (OUTPATIENT)
Dept: FAMILY MEDICINE CLINIC | Facility: CLINIC | Age: 52
End: 2022-03-21

## 2022-03-21 NOTE — TELEPHONE ENCOUNTER
Caller: [facility]    Best call back number: [phone]    What was the call regarding: [facility]  STATED THAT THE PATIENT HAS INITIATED HER ON PHYSICIANS AUTHORIZATION ON THE MEDICATION SAXENDA 6MG PEN.  [facility] SAYS THAT THE LAST PHARMACY CLAIM WAS ON 12/2/21 WITH [facility].  THIS IS A NON FORMULARY  MEDICATION.  PLEASE ADVISE    Do you require a callback: YES

## 2022-03-22 DIAGNOSIS — E78.5 HYPERLIPIDEMIA, UNSPECIFIED HYPERLIPIDEMIA TYPE: ICD-10-CM

## 2022-03-22 RX ORDER — ATORVASTATIN CALCIUM 10 MG/1
10 TABLET, FILM COATED ORAL DAILY
Qty: 30 TABLET | Refills: 2 | Status: SHIPPED | OUTPATIENT
Start: 2022-03-22 | End: 2022-06-09

## 2022-03-22 RX ORDER — LEVOTHYROXINE SODIUM 0.03 MG/1
TABLET ORAL
Qty: 30 TABLET | Refills: 0 | Status: SHIPPED | OUTPATIENT
Start: 2022-03-22 | End: 2022-04-21

## 2022-03-22 NOTE — TELEPHONE ENCOUNTER
Do you know anything about this? I believe you have tried numerous times to get this approved. If you have been unable to please advise the patient again. We discussed this in office at her last appointment

## 2022-03-31 ENCOUNTER — TELEPHONE (OUTPATIENT)
Dept: FAMILY MEDICINE CLINIC | Facility: CLINIC | Age: 52
End: 2022-03-31

## 2022-03-31 NOTE — TELEPHONE ENCOUNTER
1 a day for 7 days, pt finished the antibiotic a few days ago, she's unsure of the name.  She's going to find someone to bring her in and then will call to make the appt.  She said she didn't want to go back to the ER.

## 2022-03-31 NOTE — TELEPHONE ENCOUNTER
I am very concerned if she is still having symptoms.  What antibiotic did the ER put her on and when did she complete it? She really needs to be seen here or the ER if she is not getting better

## 2022-03-31 NOTE — TELEPHONE ENCOUNTER
DELETE AFTER REVIEWING: Telephone encounter to be sent to the clinical pool     Caller: AmihelioMilka    Relationship: Self    Best call back number: [phone]    What medication are you requesting: ANTIBIOTICS    What are your current symptoms: COUGHING/CHEST PAIN/CONGESTION    How long have you been experiencing symptoms: COUPLE OF WEEKS    Have you had these symptoms before:    [x] Yes  [] No    Have you been treated for these symptoms before:   [x] Yes  [] No    If a prescription is needed, what is your preferred pharmacy and phone number: [id number] - [place] [address] [facility] [address] [facility] [address] [facility] [address] - [phone] [facility] [phone]      Additional notes: PATIENT STATED SHE IS STILL SUFFERING FROM SEVERE SYMPTOMS OF PNEUMONIA. SHE IS REQUESTING AN ANTIBIOTIC. SHE HAS A LOT OF COUGHING AND TIGHTNESS IN HER CHEST.

## 2022-04-11 RX ORDER — LAMOTRIGINE 100 MG/1
TABLET ORAL
Qty: 270 TABLET | Refills: 1 | Status: SHIPPED | OUTPATIENT
Start: 2022-04-11 | End: 2022-06-07 | Stop reason: SDUPTHER

## 2022-04-19 ENCOUNTER — TELEPHONE (OUTPATIENT)
Dept: FAMILY MEDICINE CLINIC | Facility: CLINIC | Age: 52
End: 2022-04-19

## 2022-04-19 NOTE — TELEPHONE ENCOUNTER
Caller: [facility]    Relationship to patient: Other    Best call back number: [phone]     Patient is needing: [facility] STATES THAT THE PATIENT HAS NOT PICKED UP HER   atorvastatin (LIPITOR) 10 MG tablet SINCE 2/8/2022.

## 2022-04-21 RX ORDER — SUMATRIPTAN 100 MG/1
TABLET, FILM COATED ORAL
Qty: 8 TABLET | Refills: 5 | Status: SHIPPED | OUTPATIENT
Start: 2022-04-21 | End: 2022-10-01

## 2022-04-21 RX ORDER — LEVOTHYROXINE SODIUM 0.03 MG/1
TABLET ORAL
Qty: 30 TABLET | Refills: 0 | Status: SHIPPED | OUTPATIENT
Start: 2022-04-21 | End: 2022-05-22

## 2022-04-22 ENCOUNTER — TELEPHONE (OUTPATIENT)
Dept: FAMILY MEDICINE CLINIC | Facility: CLINIC | Age: 52
End: 2022-04-22

## 2022-04-22 NOTE — TELEPHONE ENCOUNTER
Caller: Milka Espinoza    Relationship: Self    Best call back number: [phone] (H)    PATIENT CALLED IN STATING THAT HER SAXENDA, 18 MG/ 3ML PEN NEEDS A PRIOR AUTHORIZATION.      THIS IS A NEW PRESCRIPTION SHE HAS NOT STARTED YET    [facility] #[id number] - [facility] [address] [place] [address] [address] [facility] [address] - [phone]  - [phone]   [phone]    PLEASE ADVISE

## 2022-05-04 ENCOUNTER — OFFICE (AMBULATORY)
Dept: URBAN - METROPOLITAN AREA CLINIC 64 | Facility: CLINIC | Age: 52
End: 2022-05-04

## 2022-05-04 VITALS
HEART RATE: 115 BPM | DIASTOLIC BLOOD PRESSURE: 78 MMHG | WEIGHT: 215 LBS | SYSTOLIC BLOOD PRESSURE: 124 MMHG | HEIGHT: 64 IN

## 2022-05-04 DIAGNOSIS — K21.9 GASTRO-ESOPHAGEAL REFLUX DISEASE WITHOUT ESOPHAGITIS: ICD-10-CM

## 2022-05-04 DIAGNOSIS — R11.2 NAUSEA WITH VOMITING, UNSPECIFIED: ICD-10-CM

## 2022-05-04 DIAGNOSIS — R13.10 DYSPHAGIA, UNSPECIFIED: ICD-10-CM

## 2022-05-04 PROCEDURE — 99214 OFFICE O/P EST MOD 30 MIN: CPT | Performed by: INTERNAL MEDICINE

## 2022-05-16 RX ORDER — GABAPENTIN 800 MG/1
TABLET ORAL
Qty: 270 TABLET | Refills: 1 | Status: SHIPPED | OUTPATIENT
Start: 2022-05-16 | End: 2022-10-20

## 2022-05-20 ENCOUNTER — TELEPHONE (OUTPATIENT)
Dept: FAMILY MEDICINE CLINIC | Facility: CLINIC | Age: 52
End: 2022-05-20

## 2022-05-20 NOTE — TELEPHONE ENCOUNTER
Caller: [facility] [employer] ON BEHALF OF [facility]ANI    Best call back number: [phone]    What was the call regarding: REBECCA ALMANZA [employer] ON BEHALF OF [facility] IS REGARDING PATIENT'S MEDICATION NONADHERENCE. PATIENT HAS BEEN IDENTIFIED AS A PATIENT WHOSE HEALTH MAY BE AT RISK DUE TO THEIR NONADHERENCE.     REFERENCE NUMBER: [id number]    Do you require a callback: YES

## 2022-05-22 RX ORDER — LEVOTHYROXINE SODIUM 0.03 MG/1
TABLET ORAL
Qty: 30 TABLET | Refills: 0 | Status: SHIPPED | OUTPATIENT
Start: 2022-05-22 | End: 2022-06-20

## 2022-05-23 RX ORDER — QUETIAPINE 400 MG/1
400 TABLET, FILM COATED, EXTENDED RELEASE ORAL
Qty: 90 TABLET | Refills: 1 | Status: SHIPPED | OUTPATIENT
Start: 2022-05-23 | End: 2022-07-14

## 2022-05-24 DIAGNOSIS — F40.01 PANIC DISORDER WITH AGORAPHOBIA: Chronic | ICD-10-CM

## 2022-05-24 RX ORDER — ALPRAZOLAM 2 MG/1
TABLET ORAL
Qty: 90 TABLET | Refills: 2 | Status: SHIPPED | OUTPATIENT
Start: 2022-05-24 | End: 2022-08-09

## 2022-06-01 ENCOUNTER — OFFICE (AMBULATORY)
Dept: URBAN - METROPOLITAN AREA CLINIC 51 | Facility: CLINIC | Age: 52
End: 2022-06-01

## 2022-06-01 DIAGNOSIS — R13.10 DYSPHAGIA, UNSPECIFIED: ICD-10-CM

## 2022-06-01 DIAGNOSIS — K22.4 DYSKINESIA OF ESOPHAGUS: ICD-10-CM

## 2022-06-01 DIAGNOSIS — R11.2 NAUSEA WITH VOMITING, UNSPECIFIED: ICD-10-CM

## 2022-06-01 PROCEDURE — 91010 ESOPHAGUS MOTILITY STUDY: CPT | Performed by: INTERNAL MEDICINE

## 2022-06-01 PROCEDURE — 91037 ESOPH IMPED FUNCTION TEST: CPT | Mod: 59 | Performed by: INTERNAL MEDICINE

## 2022-06-07 ENCOUNTER — OFFICE VISIT (OUTPATIENT)
Dept: NEUROLOGY | Facility: CLINIC | Age: 52
End: 2022-06-07

## 2022-06-07 VITALS — BODY MASS INDEX: 38.07 KG/M2 | HEIGHT: 64 IN | TEMPERATURE: 98.2 F | WEIGHT: 223 LBS

## 2022-06-07 DIAGNOSIS — R56.9 SEIZURE: Primary | ICD-10-CM

## 2022-06-07 PROCEDURE — 99215 OFFICE O/P EST HI 40 MIN: CPT | Performed by: NURSE PRACTITIONER

## 2022-06-07 RX ORDER — LEVOFLOXACIN 750 MG/1
TABLET ORAL
COMMUNITY
Start: 2022-03-17 | End: 2022-07-14

## 2022-06-07 RX ORDER — LACOSAMIDE 100 MG/1
100 TABLET ORAL EVERY 12 HOURS SCHEDULED
Qty: 60 TABLET | Refills: 0 | Status: SHIPPED | OUTPATIENT
Start: 2022-06-07 | End: 2022-07-07

## 2022-06-07 RX ORDER — LAMOTRIGINE 200 MG/1
TABLET ORAL
Qty: 60 TABLET | Refills: 12 | Status: SHIPPED | OUTPATIENT
Start: 2022-06-07 | End: 2022-11-03 | Stop reason: SDUPTHER

## 2022-06-07 RX ORDER — LEVETIRACETAM 500 MG/1
1500 TABLET ORAL 2 TIMES DAILY
Qty: 180 TABLET | Refills: 3 | Status: SHIPPED | OUTPATIENT
Start: 2022-06-07 | End: 2022-11-03 | Stop reason: SDUPTHER

## 2022-06-07 NOTE — PROGRESS NOTES
Subjective: headache    Patient ID: Mlika Espinoza is a 52 y.o. female.    CHIEF COMPLAINT:headache    History of Present Illness Ms. Espinoza is a 52-year-old  female who was referred for migraine and seizures by Safia AGUILAR as a new patient.  The patient reports she has had migraines almost all her life that started in elementary school.  She states they are controlled very well with Imitrex and needs no further treatment.  She reports that she started having seizures after being in a coma.  She reports in the past she was followed by Dr. Cherri Helms but was released due to too many missed appointments.  The patient was notified that she will need to be seen at least once per year for us to continue writing seizure medications and she agreed.  She reports she has approximately 2-3 seizures per month.  She states she is aware of [place] seizure law and states she does not drive or operate any heavy machinery.  She is currently on: Lamictal, Keppra and Vimpat and is still having breakthrough seizures.  The patient states she has not had an MRI or an EEG for many years.        Chronic Migriane  Onset:since school  Aura:no  Location:behind right eye  Quality:throbbing/sharp  Severity: severe  Duration:if she takes imitrex gone in minutes/ with out few days  Frequency: couple times a week  Symptoms: vomiting/nausea/ sensitive to light and sound  Worsening factors: light  Alleviating factors: Rest/cold rag  Radiation: Right frontal  Current Meds:imitrex    Epilepsy  Triggers: stress  Duration: 3 to 5 minutes  Frequency: Usually during sleep  Timing (awake/asleep/both): Evening  Aura: none  Unresponsiveness (none/partial/complete): partial  Loss of awareness (none/partial/complete): partial  Eyes (closed/staring/rolled back/left/right): all  head deviation (straight/left/right): unknown  Automatisms (none/lip smacking/fumbling): lip smacking/stuttering  Stiffening/posturing  (none/gen/left/right/upper/lower): all  jerking (none/gen/left/right/upper/lower/asynchronous/myoclonic): all  Tongue biting: yes  Speech (retained/arrested): arrested  Incontinence (none/bowel/bladder): urinary incontinence  Postictal duration/symptoms: minutes to hours, confusion  Injuries: fallen    Lamictal 100 mg 1 am 2 pm  Keppra 500 mg take 1,500 2 tablets a day  Vimpat         CT HEAD  IMPRESSION:  1.  No acute intracranial abnormality.  2.  No pathologic intracranial contrast enhancement.  3.  Normal appearance of the globes and orbits.   Electronically Signed ByTiffanie Beach On:2020 1:22 PM    The following portions of the patient's history were reviewed and updated as appropriate: allergies, current medications, past family history, past medical history, past social history, past surgical history and problem list.      Family History   Problem Relation Age of Onset   • No Known Problems Mother    • No Known Problems Father    • No Known Problems Sister    • No Known Problems Brother    • Alcohol abuse Daughter    • No Known Problems Son    • No Known Problems Maternal Grandmother    • No Known Problems Paternal Grandmother    • No Known Problems Maternal Aunt    • No Known Problems Paternal Aunt        Past Medical History:   Diagnosis Date   • Depression    • Disease of thyroid gland    • Hyperlipidemia    • Migraines    • On home O2 2021    3 lmp   • Seizures (HCC)        Social History     Socioeconomic History   • Marital status:    Tobacco Use   • Smoking status: Former Smoker     Packs/day: 0.25     Types: Cigarettes     Quit date: 2020     Years since quittin.5   • Smokeless tobacco: Never Used   Vaping Use   • Vaping Use: Never used   Substance and Sexual Activity   • Alcohol use: No   • Drug use: No   • Sexual activity: Never         Current Outpatient Medications:   •  ALPRAZolam (XANAX) 2 MG tablet, TAKE 1 TABLET BY MOUTH THREE TIMES DAILY AS NEEDED FOR ANXIETY, Disp:  90 tablet, Rfl: 2  •  amitriptyline (ELAVIL) 25 MG tablet, Take 1 tablet by mouth every night at bedtime., Disp: 90 tablet, Rfl: 1  •  atorvastatin (LIPITOR) 10 MG tablet, TAKE 1 TABLET BY MOUTH DAILY, Disp: 30 tablet, Rfl: 2  •  benzonatate (TESSALON) 100 MG capsule, Take 100 mg by mouth 3 (Three) Times a Day As Needed., Disp: , Rfl:   •  busPIRone (BUSPAR) 15 MG tablet, Take 1 tablet by mouth 3 (Three) Times a Day., Disp: 270 tablet, Rfl: 1  •  Cariprazine HCl (Vraylar) 1.5 MG capsule capsule, Take 1 capsule by mouth Daily., Disp: 30 capsule, Rfl: 5  •  diclofenac (VOLTAREN) 50 MG EC tablet, Take 50 mg by mouth 3 (Three) Times a Day As Needed. for pain, Disp: , Rfl:   •  gabapentin (NEURONTIN) 800 MG tablet, TAKE 1 TABLET BY MOUTH THREE TIMES DAILY, Disp: 270 tablet, Rfl: 1  •  hydrOXYzine (ATARAX) 25 MG tablet, TAKE 1 TABLET BY MOUTH THREE TIMES DAILY AS NEEDED FOR ANXIETY, Disp: 270 tablet, Rfl: 1  •  ipratropium-albuterol (DUO-NEB) 0.5-2.5 mg/3 ml nebulizer, Take 3 mL by nebulization 4 (Four) Times a Day As Needed for Wheezing., Disp: 360 mL, Rfl: 1  •  lacosamide (VIMPAT) 100 MG tablet tablet, Take 1 tablet by mouth Every 12 (Twelve) Hours for 30 days., Disp: 60 tablet, Rfl: 0  •  lamoTRIgine (LaMICtal) 200 MG tablet, Take 1 pill twice a day, Disp: 60 tablet, Rfl: 12  •  levETIRAcetam (KEPPRA) 500 MG tablet, Take 3 tablets by mouth 2 (Two) Times a Day., Disp: 180 tablet, Rfl: 3  •  levoFLOXacin (LEVAQUIN) 750 MG tablet, TAKE 1 TABLET BY MOUTH EVERY 24 HOURS FOR 7 DAYS, Disp: , Rfl:   •  levothyroxine (SYNTHROID, LEVOTHROID) 25 MCG tablet, TAKE 1 TABLET BY MOUTH DAILY FOR THYROID, Disp: 30 tablet, Rfl: 0  •  Motegrity 2 MG tablet, Take 1 tablet by mouth Daily., Disp: , Rfl:   •  nystatin (MYCOSTATIN) 363014 UNIT/GM powder, APPLY TO AFFECTED AREA(S) TWO TIMES A DAY AS NEEDED FOR RASH, Disp: 30 g, Rfl: 0  •  pantoprazole (PROTONIX) 40 MG EC tablet, Take 1 tablet by mouth Daily., Disp: 90 tablet, Rfl: 1  •   QUEtiapine XR (SEROquel XR) 400 MG 24 hr tablet, TAKE 1 TABLET BY MOUTH EVERY NIGHT AT BEDTIME., Disp: 90 tablet, Rfl: 1  •  sucralfate (CARAFATE) 1 g tablet, Take 1 g by mouth 3 (Three) Times a Day., Disp: , Rfl:   •  SUMAtriptan (IMITREX) 100 MG tablet, TAKE 1 TABLET BY MOUTH 1 TIME AS NEEDED FOR MIGRAINE, Disp: 8 tablet, Rfl: 5    Review of Systems   Constitutional: Positive for unexpected weight change.   HENT: Positive for drooling, trouble swallowing and voice change.    Eyes: Negative.    Respiratory: Positive for apnea, shortness of breath and wheezing.    Gastrointestinal: Positive for nausea and vomiting.   Endocrine: Negative.    Genitourinary: Negative.    Musculoskeletal: Negative.    Neurological: Positive for tremors, seizures, speech difficulty and headaches.   Psychiatric/Behavioral: Positive for confusion.        I have reviewed ROS completed by medical assistant.     Objective:    Neurologic Exam     Mental Status   Oriented to person, place, and time.   Speech: speech is normal     Cranial Nerves     CN III, IV, VI   Pupils are equal, round, and reactive to light.    Gait, Coordination, and Reflexes     Gait  Gait: normal    Coordination   Finger to nose coordination: normal  Tandem walking coordination: normal    Reflexes   Right brachioradialis: 2+  Left brachioradialis: 2+  Right biceps: 2+  Left biceps: 2+  Right triceps: 2+  Left triceps: 2+  Right patellar: 1+  Left patellar: 1+  Right achilles: 2+  Left achilles: 2+      Physical Exam  Vitals and nursing note reviewed.   Constitutional:       Appearance: Normal appearance. She is well-developed and well-groomed.   HENT:      Head: Normocephalic and atraumatic.      Right Ear: Hearing normal.      Left Ear: Hearing normal.      Nose: Nose normal.      Mouth/Throat:      Lips: Pink.      Mouth: Mucous membranes are moist.   Eyes:      General: Lids are normal. No visual field deficit.     Extraocular Movements: Extraocular movements intact.       Pupils: Pupils are equal, round, and reactive to light.   Cardiovascular:      Rate and Rhythm: Normal rate and regular rhythm.      Heart sounds: Normal heart sounds, S1 normal and S2 normal.   Pulmonary:      Effort: Pulmonary effort is normal.      Breath sounds: Normal breath sounds.   Musculoskeletal:         General: Normal range of motion.      Cervical back: Full passive range of motion without pain, normal range of motion and neck supple.      Comments: 5/5 all extreities including bilateral , plamtar and dorsiflexion of bilateral feet    Skin:     General: Skin is warm and dry.   Neurological:      General: No focal deficit present.      Mental Status: She is alert and oriented to person, place, and time.      Cranial Nerves: Cranial nerves are intact. No cranial nerve deficit, dysarthria or facial asymmetry.      Sensory: Sensation is intact. No sensory deficit.      Motor: Motor function is intact. No weakness, tremor, atrophy, abnormal muscle tone, seizure activity or pronator drift.      Coordination: Coordination is intact. Romberg sign negative. Coordination normal. Finger-Nose-Finger Test and Heel to Shin Test normal. Rapid alternating movements normal.      Gait: Gait is intact. Gait and tandem walk normal.      Deep Tendon Reflexes: Babinski sign absent on the right side. Babinski sign absent on the left side.      Reflex Scores:       Tricep reflexes are 2+ on the right side and 2+ on the left side.       Bicep reflexes are 2+ on the right side and 2+ on the left side.       Brachioradialis reflexes are 2+ on the right side and 2+ on the left side.       Patellar reflexes are 1+ on the right side and 1+ on the left side.       Achilles reflexes are 2+ on the right side and 2+ on the left side.  Psychiatric:         Attention and Perception: Attention normal.         Mood and Affect: Mood normal. Affect is flat.         Speech: Speech normal.         Behavior: Behavior normal. Behavior is  cooperative.         Assessment/Plan:    Discussion: The patient is still having 2-3 breakthrough seizures per month.  She will be increased on Lamictal to 200 mg twice daily in addition to Keppra 1500 mg twice daily and Vimpat 100 mg twice daily.  She also be sent for an MRI of the brain with and without contrast and EEG to be established with our practice.  She reports she has refills on sumatriptan and states it does control her migraine.    She was notified of [place] seizure Precautions:   -The patient was told to observe all seizure precautions, including, but not limited to:   -No driving until seizure free for more than 3 months- as per State driving regulation / law;   -Avoid all high-risk activity, Take showers instead of baths, avoid swimming without observation, Avoid open heat sources, Avoid working at heights, and Avoid engaging in all potentially hazardous activities.   -Patient expressed clear understanding.    Diagnoses and all orders for this visit:    1. Seizure (HCC) (Primary)  -     lamoTRIgine (LaMICtal) 200 MG tablet; Take 1 pill twice a day  Dispense: 60 tablet; Refill: 12  -     levETIRAcetam (KEPPRA) 500 MG tablet; Take 3 tablets by mouth 2 (Two) Times a Day.  Dispense: 180 tablet; Refill: 3  -     lacosamide (VIMPAT) 100 MG tablet tablet; Take 1 tablet by mouth Every 12 (Twelve) Hours for 30 days.  Dispense: 60 tablet; Refill: 0  -     MRI Brain With & Without Contrast; Future  -     EEG Awake or Asleep Routine; Future        Return in about 6 months (around 12/7/2022), or Dr Seipel.    I spent 40 minutes caring for Milka on this date of service. This time includes time spent by me in the following activities: reviewing tests, obtaining and/or reviewing a separately obtained history, performing a medically appropriate examination and/or evaluation, counseling and educating the patient/family/caregiver, ordering medications, tests, or procedures and documenting information in the  medical record.      This document has been electronically signed by Ana Rosa AGUILAR on June 7, 2022 10:47 EDT

## 2022-06-08 ENCOUNTER — TELEPHONE (OUTPATIENT)
Dept: FAMILY MEDICINE CLINIC | Facility: CLINIC | Age: 52
End: 2022-06-08

## 2022-06-08 ENCOUNTER — OFFICE VISIT (OUTPATIENT)
Dept: FAMILY MEDICINE CLINIC | Facility: CLINIC | Age: 52
End: 2022-06-08

## 2022-06-08 ENCOUNTER — LAB (OUTPATIENT)
Dept: FAMILY MEDICINE CLINIC | Facility: CLINIC | Age: 52
End: 2022-06-08

## 2022-06-08 ENCOUNTER — TELEPHONE (OUTPATIENT)
Dept: PSYCHIATRY | Facility: CLINIC | Age: 52
End: 2022-06-08

## 2022-06-08 VITALS
HEART RATE: 106 BPM | DIASTOLIC BLOOD PRESSURE: 87 MMHG | OXYGEN SATURATION: 99 % | WEIGHT: 221 LBS | BODY MASS INDEX: 37.73 KG/M2 | RESPIRATION RATE: 16 BRPM | HEIGHT: 64 IN | TEMPERATURE: 97.8 F | SYSTOLIC BLOOD PRESSURE: 126 MMHG

## 2022-06-08 DIAGNOSIS — Z00.00 MEDICARE ANNUAL WELLNESS VISIT, SUBSEQUENT: ICD-10-CM

## 2022-06-08 DIAGNOSIS — E03.9 HYPOTHYROIDISM, UNSPECIFIED TYPE: ICD-10-CM

## 2022-06-08 DIAGNOSIS — F31.32 BIPOLAR AFFECTIVE DISORDER, CURRENTLY DEPRESSED, MODERATE: ICD-10-CM

## 2022-06-08 DIAGNOSIS — R21 RASH: ICD-10-CM

## 2022-06-08 DIAGNOSIS — R56.9 SEIZURES: ICD-10-CM

## 2022-06-08 DIAGNOSIS — E78.5 HYPERLIPIDEMIA, UNSPECIFIED HYPERLIPIDEMIA TYPE: ICD-10-CM

## 2022-06-08 DIAGNOSIS — Z12.31 BREAST CANCER SCREENING BY MAMMOGRAM: ICD-10-CM

## 2022-06-08 DIAGNOSIS — Z00.00 MEDICARE ANNUAL WELLNESS VISIT, SUBSEQUENT: Primary | ICD-10-CM

## 2022-06-08 LAB
ALBUMIN SERPL-MCNC: 4.4 G/DL (ref 3.5–5.2)
ALBUMIN/GLOB SERPL: 1.2 G/DL
ALP SERPL-CCNC: 130 U/L (ref 39–117)
ALT SERPL W P-5'-P-CCNC: 18 U/L (ref 1–33)
ANION GAP SERPL CALCULATED.3IONS-SCNC: 9.2 MMOL/L (ref 5–15)
AST SERPL-CCNC: 14 U/L (ref 1–32)
BILIRUB SERPL-MCNC: 0.2 MG/DL (ref 0–1.2)
BUN SERPL-MCNC: 9 MG/DL (ref 6–20)
BUN/CREAT SERPL: 8.3 (ref 7–25)
CALCIUM SPEC-SCNC: 9.6 MG/DL (ref 8.6–10.5)
CHLORIDE SERPL-SCNC: 106 MMOL/L (ref 98–107)
CHOLEST SERPL-MCNC: 243 MG/DL (ref 0–200)
CO2 SERPL-SCNC: 25.8 MMOL/L (ref 22–29)
CREAT SERPL-MCNC: 1.08 MG/DL (ref 0.57–1)
EGFRCR SERPLBLD CKD-EPI 2021: 61.2 ML/MIN/1.73
GLOBULIN UR ELPH-MCNC: 3.7 GM/DL
GLUCOSE SERPL-MCNC: 94 MG/DL (ref 65–99)
HDLC SERPL-MCNC: 83 MG/DL (ref 40–60)
LDLC SERPL CALC-MCNC: 138 MG/DL (ref 0–100)
LDLC/HDLC SERPL: 1.62 {RATIO}
POTASSIUM SERPL-SCNC: 4.7 MMOL/L (ref 3.5–5.2)
PROT SERPL-MCNC: 8.1 G/DL (ref 6–8.5)
SODIUM SERPL-SCNC: 141 MMOL/L (ref 136–145)
TRIGL SERPL-MCNC: 129 MG/DL (ref 0–150)
TSH SERPL DL<=0.05 MIU/L-ACNC: 1.99 UIU/ML (ref 0.27–4.2)
VLDLC SERPL-MCNC: 22 MG/DL (ref 5–40)

## 2022-06-08 PROCEDURE — 80053 COMPREHEN METABOLIC PANEL: CPT | Performed by: NURSE PRACTITIONER

## 2022-06-08 PROCEDURE — 1170F FXNL STATUS ASSESSED: CPT | Performed by: NURSE PRACTITIONER

## 2022-06-08 PROCEDURE — 80061 LIPID PANEL: CPT | Performed by: NURSE PRACTITIONER

## 2022-06-08 PROCEDURE — 36415 COLL VENOUS BLD VENIPUNCTURE: CPT

## 2022-06-08 PROCEDURE — 84443 ASSAY THYROID STIM HORMONE: CPT | Performed by: NURSE PRACTITIONER

## 2022-06-08 PROCEDURE — 1160F RVW MEDS BY RX/DR IN RCRD: CPT | Performed by: NURSE PRACTITIONER

## 2022-06-08 PROCEDURE — G0439 PPPS, SUBSEQ VISIT: HCPCS | Performed by: NURSE PRACTITIONER

## 2022-06-08 RX ORDER — NYSTATIN 100000 [USP'U]/G
POWDER TOPICAL 3 TIMES DAILY PRN
Qty: 30 G | Refills: 1 | Status: SHIPPED | OUTPATIENT
Start: 2022-06-08 | End: 2022-12-05

## 2022-06-08 NOTE — TELEPHONE ENCOUNTER
Patient was at check out saying she needed refill for nystatin ( mycostatin) 119147 unit/ GM powder sent to her pharmacy.

## 2022-06-08 NOTE — PROGRESS NOTES
The ABCs of the Annual Wellness Visit  Subsequent Medicare Wellness Visit    Chief Complaint   Patient presents with   • Medicare Wellness-subsequent      Subjective    History of Present Illness:  Milka Espinoza is a 52 y.o. female who presents for a Subsequent Medicare Wellness Visit.    Seizures-patient sees neurology.  She is currently on Vimpat 100 mg twice daily, Keppra 1500 mg twice daily, and Lamictal 200 mg twice daily.  She had been having breakthrough seizures so her Lamictal was increased yesterday.  She is to get an MRI and EEG with neurology.    Hyperlipidemia-patient is currently on Lipitor 10 mg daily.    Hypothyroid-patient is currently on levothyroxine 25 mcg daily    Mood disorder-patient sees psychiatry for this.  She is on Vraylar 1.5 mg daily, BuSpar 15 mg 3 times daily, Xanax 2 mg 3 times daily as needed, gabapentin 800 mg 3 times daily    Insomnia-patient is currently on Seroquel  mg nightly.  This is prescribed through psych.    Constipation-patient sees GI for this.  She is on Motegrity 2 mg daily.  She is also on Protonix for reflux.      Migraines-patient sees neurology for this.  She takes Imitrex as needed.    Labs- due  Pap smear- had hysterectomy  Mammogram-  due  DEXA-  Colonoscopy- see GI    Vaccines:  Flu- N/A  PNA- due  Shingles-  Tdap-  Covid-19- due booster    Dental exam-  Eye exam-      The following portions of the patient's history were reviewed and   updated as appropriate: allergies, current medications, past family history, past medical history, past social history, past surgical history and problem list.    Compared to one year ago, the patient feels her physical   health is the same.    Compared to one year ago, the patient feels her mental   health is the same.    Recent Hospitalizations:  She was admitted within the past 365 days at [facility] and [facility].       Current Medical Providers:  Patient Care Team:  Safia Reyna APRN as PCP -  General (Nurse Practitioner)    Outpatient Medications Prior to Visit   Medication Sig Dispense Refill   • ALPRAZolam (XANAX) 2 MG tablet TAKE 1 TABLET BY MOUTH THREE TIMES DAILY AS NEEDED FOR ANXIETY 90 tablet 2   • amitriptyline (ELAVIL) 25 MG tablet Take 1 tablet by mouth every night at bedtime. 90 tablet 1   • atorvastatin (LIPITOR) 10 MG tablet TAKE 1 TABLET BY MOUTH DAILY 30 tablet 2   • busPIRone (BUSPAR) 15 MG tablet Take 1 tablet by mouth 3 (Three) Times a Day. 270 tablet 1   • Cariprazine HCl (Vraylar) 1.5 MG capsule capsule Take 1 capsule by mouth Daily. 30 capsule 5   • gabapentin (NEURONTIN) 800 MG tablet TAKE 1 TABLET BY MOUTH THREE TIMES DAILY 270 tablet 1   • hydrOXYzine (ATARAX) 25 MG tablet TAKE 1 TABLET BY MOUTH THREE TIMES DAILY AS NEEDED FOR ANXIETY 270 tablet 1   • ipratropium-albuterol (DUO-NEB) 0.5-2.5 mg/3 ml nebulizer Take 3 mL by nebulization 4 (Four) Times a Day As Needed for Wheezing. 360 mL 1   • lamoTRIgine (LaMICtal) 200 MG tablet Take 1 pill twice a day 60 tablet 12   • levETIRAcetam (KEPPRA) 500 MG tablet Take 3 tablets by mouth 2 (Two) Times a Day. 180 tablet 3   • levoFLOXacin (LEVAQUIN) 750 MG tablet TAKE 1 TABLET BY MOUTH EVERY 24 HOURS FOR 7 DAYS     • levothyroxine (SYNTHROID, LEVOTHROID) 25 MCG tablet TAKE 1 TABLET BY MOUTH DAILY FOR THYROID 30 tablet 0   • nystatin (MYCOSTATIN) 042040 UNIT/GM powder APPLY TO AFFECTED AREA(S) TWO TIMES A DAY AS NEEDED FOR RASH 30 g 0   • pantoprazole (PROTONIX) 40 MG EC tablet Take 1 tablet by mouth Daily. 90 tablet 1   • QUEtiapine XR (SEROquel XR) 400 MG 24 hr tablet TAKE 1 TABLET BY MOUTH EVERY NIGHT AT BEDTIME. 90 tablet 1   • sucralfate (CARAFATE) 1 g tablet Take 1 g by mouth 3 (Three) Times a Day.     • SUMAtriptan (IMITREX) 100 MG tablet TAKE 1 TABLET BY MOUTH 1 TIME AS NEEDED FOR MIGRAINE 8 tablet 5   • benzonatate (TESSALON) 100 MG capsule Take 100 mg by mouth 3 (Three) Times a Day As Needed.     • diclofenac (VOLTAREN) 50 MG EC  tablet Take 50 mg by mouth 3 (Three) Times a Day As Needed. for pain     • lacosamide (VIMPAT) 100 MG tablet tablet Take 1 tablet by mouth Every 12 (Twelve) Hours for 30 days. 60 tablet 0   • Motegrity 2 MG tablet Take 1 tablet by mouth Daily.       No facility-administered medications prior to visit.       No opioid medication identified on active medication list. I have reviewed chart for other potential  high risk medication/s and harmful drug interactions in the elderly.          Aspirin is not on active medication list.  Aspirin use is not indicated based on review of current medical condition/s. Risk of harm outweighs potential benefits.  .    Patient Active Problem List   Diagnosis   • Bipolar affective disorder (HCC)   • Depression   • Mood disorder in conditions classified elsewhere   • Seizures (HCC)   • Opioid withdrawal (HCC)   • Altered level of consciousness   • Asthma   • Bronchitis, acute   • General medical exam   • Low back pain   • Syncope   • Unspecified sprain of unspecified wrist, initial encounter   • Upper respiratory tract infection   • Intractable nausea with vomiting   • Obesity   • GERD (gastroesophageal reflux disease)   • Tobacco use   • Nausea and vomiting   • Borderline hypoglycemic episodes   • Pseudoseizure   • Seizure (HCC)   • Panic disorder with agoraphobia     Advance Care Planning  Advance Directive is not on file.  ACP discussion was declined by the patient. Patient does not have an advance directive, declines further assistance.    Review of Systems   Constitutional: Negative for chills, fatigue and fever.   Respiratory: Positive for shortness of breath (uses nebulizer). Negative for chest tightness.    Cardiovascular: Negative for chest pain and palpitations.   Gastrointestinal: Positive for nausea and vomiting. Negative for abdominal pain.   Genitourinary: Negative for frequency and urgency.   Neurological: Negative for dizziness.   Psychiatric/Behavioral: Negative for  "behavioral problems and suicidal ideas. The patient is not nervous/anxious.         Objective    Vitals:    06/08/22 1354   BP: 126/87   BP Location: Left arm   Patient Position: Sitting   Cuff Size: Large Adult   Pulse: 106   Resp: 16   Temp: 97.8 °F (36.6 °C)   TempSrc: Temporal   SpO2: 99%   Weight: 100 kg (221 lb)   Height: 162.6 cm (64\")     Estimated body mass index is 37.93 kg/m² as calculated from the following:    Height as of this encounter: 162.6 cm (64\").    Weight as of this encounter: 100 kg (221 lb).    Class 2 Severe Obesity (BMI >=35 and <=39.9). Obesity-related health conditions include the following: GERD. Obesity is improving with lifestyle modifications. BMI is is above average; BMI management plan is completed. We discussed portion control and increasing exercise.      Does the patient have evidence of cognitive impairment? No    Physical Exam  Constitutional:       Appearance: Normal appearance. She is not ill-appearing.   HENT:      Head: Normocephalic and atraumatic.   Eyes:      General:         Right eye: No discharge.         Left eye: No discharge.      Extraocular Movements: Extraocular movements intact.   Cardiovascular:      Rate and Rhythm: Normal rate and regular rhythm.      Heart sounds: No murmur heard.  Pulmonary:      Effort: Pulmonary effort is normal. No respiratory distress.      Breath sounds: Normal breath sounds.   Musculoskeletal:         General: Normal range of motion.   Skin:     General: Skin is warm and dry.   Neurological:      General: No focal deficit present.      Mental Status: She is alert.   Psychiatric:         Mood and Affect: Mood normal.         Behavior: Behavior normal.         Thought Content: Thought content normal.         Judgment: Judgment normal.                 HEALTH RISK ASSESSMENT    Smoking Status:  Social History     Tobacco Use   Smoking Status Former Smoker   • Packs/day: 0.25   • Types: Cigarettes   • Quit date: 11/1/2020   • Years since " quittin.6   Smokeless Tobacco Never Used     Alcohol Consumption:  Social History     Substance and Sexual Activity   Alcohol Use No     Fall Risk Screen:    PERLA Fall Risk Assessment has not been completed.    Depression Screening:  PHQ-2/PHQ-9 Depression Screening 2022   Retired PHQ-9 Total Score -   Retired Total Score -   Little Interest or Pleasure in Doing Things 1-->several days   Feeling Down, Depressed or Hopeless 0-->not at all   Trouble Falling or Staying Asleep, or Sleeping Too Much -   Feeling Tired or Having Little Energy -   Poor Appetite or Overeating -   Feeling Bad about Yourself - or that You are a Failure or Have Let Yourself or Your Family Down -   Trouble Concentrating on Things, Such as Reading the Newspaper or Watching Television -   Moving or Speaking So Slowly that Other People Could Have Noticed? Or the Opposite - Being So Fidgety -   Thoughts that You Would be Better Off Dead or of Hurting Yourself in Some Way -   PHQ-9: Brief Depression Severity Measure Score 1   If You Checked Off Any Problems, How Difficult Have These Problems Made It For You to Do Your Work, Take Care of Things at Home, or Get Along with Other People? -       Health Habits and Functional and Cognitive Screening:  Functional & Cognitive Status 2022   Do you have difficulty preparing food and eating? No   Do you have difficulty bathing yourself, getting dressed or grooming yourself? No   Do you have difficulty using the toilet? No   Do you have difficulty moving around from place to place? No   Do you have trouble with steps or getting out of a bed or a chair? No   Current Diet Well Balanced Diet   Dental Exam Up to date   Eye Exam Up to date   Exercise (times per week) 3 times per week   Current Exercises Include Walking   Do you need help using the phone?  No   Are you deaf or do you have serious difficulty hearing?  No   Do you need help with transportation? No   Do you need help shopping? No   Do you  need help preparing meals?  No   Do you need help with housework?  No   Do you need help with laundry? No   Do you need help taking your medications? No   Do you need help managing money? No   Do you ever drive or ride in a car without wearing a seat belt? No       Age-appropriate Screening Schedule:  Refer to the list below for future screening recommendations based on patient's age, sex and/or medical conditions. Orders for these recommended tests are listed in the plan section. The patient has been provided with a written plan.    Health Maintenance   Topic Date Due   • MAMMOGRAM  11/27/2021   • INFLUENZA VACCINE  08/01/2022   • LIPID PANEL  08/06/2022   • TDAP/TD VACCINES (3 - Td or Tdap) 04/14/2032   • ZOSTER VACCINE  Completed   • PAP SMEAR  Discontinued              Assessment & Plan   CMS Preventative Services Quick Reference  Risk Factors Identified During Encounter  Immunizations Discussed/Encouraged (specific Immunizations; Prevnar 20 (Pneumococcal 20-valent conjugate)  Obesity/Overweight   Polypharmacy  The above risks/problems have been discussed with the patient.  Follow up actions/plans if indicated are seen below in the Assessment/Plan Section.  Pertinent information has been shared with the patient in the After Visit Summary.    Diagnoses and all orders for this visit:    1. Medicare annual wellness visit, subsequent (Primary)  Comments:  doing well  declined AD  work on diet and exercise  check labs  mammo ordered  Orders:  -     Comprehensive Metabolic Panel; Future  -     Lipid Panel; Future  -     Mammo Screening Digital Tomosynthesis Bilateral With CAD; Future    2. Breast cancer screening by mammogram  -     Mammo Screening Digital Tomosynthesis Bilateral With CAD; Future    3. Seizures (HCC)  Comments:  stable  sees neuro    4. Hypothyroidism, unspecified type  Comments:  stable  check level  cont current meds  Orders:  -     TSH; Future    5. Hyperlipidemia, unspecified hyperlipidemia  type  Comments:  stable  work on diet and exercise  check labs  Orders:  -     Comprehensive Metabolic Panel; Future  -     Lipid Panel; Future    6. Bipolar affective disorder, currently depressed, moderate (HCC)  Comments:  stable  sees psych  denies SI or HI        Follow Up:   Return in about 6 months (around 12/8/2022).     An After Visit Summary and PPPS were made available to the patient.

## 2022-06-08 NOTE — TELEPHONE ENCOUNTER
Patient called left message stating she cannot take the Trazodone any more, it makes her too tingly and she cannot sleep.  She is asking if she can increase her Seroquel.

## 2022-06-09 RX ORDER — ATORVASTATIN CALCIUM 20 MG/1
20 TABLET, FILM COATED ORAL DAILY
Qty: 90 TABLET | Refills: 1 | Status: SHIPPED | OUTPATIENT
Start: 2022-06-09 | End: 2022-12-05

## 2022-06-20 DIAGNOSIS — E78.5 HYPERLIPIDEMIA, UNSPECIFIED HYPERLIPIDEMIA TYPE: ICD-10-CM

## 2022-06-20 RX ORDER — ATORVASTATIN CALCIUM 10 MG/1
10 TABLET, FILM COATED ORAL DAILY
Qty: 30 TABLET | Refills: 2 | OUTPATIENT
Start: 2022-06-20

## 2022-06-20 RX ORDER — LEVOTHYROXINE SODIUM 0.03 MG/1
TABLET ORAL
Qty: 30 TABLET | Refills: 0 | Status: SHIPPED | OUTPATIENT
Start: 2022-06-20 | End: 2022-07-20

## 2022-07-12 ENCOUNTER — APPOINTMENT (OUTPATIENT)
Dept: MAMMOGRAPHY | Facility: HOSPITAL | Age: 52
End: 2022-07-12

## 2022-07-12 ENCOUNTER — APPOINTMENT (OUTPATIENT)
Dept: MRI IMAGING | Facility: HOSPITAL | Age: 52
End: 2022-07-12

## 2022-07-12 ENCOUNTER — APPOINTMENT (OUTPATIENT)
Dept: NEUROLOGY | Facility: HOSPITAL | Age: 52
End: 2022-07-12

## 2022-07-14 ENCOUNTER — TELEMEDICINE (OUTPATIENT)
Dept: PSYCHIATRY | Facility: CLINIC | Age: 52
End: 2022-07-14

## 2022-07-14 DIAGNOSIS — F40.01 PANIC DISORDER WITH AGORAPHOBIA: Primary | Chronic | ICD-10-CM

## 2022-07-14 DIAGNOSIS — F44.5 PSEUDOSEIZURE: Chronic | ICD-10-CM

## 2022-07-14 DIAGNOSIS — F31.76 BIPOLAR DISORDER, IN FULL REMISSION, MOST RECENT EPISODE DEPRESSED: Chronic | ICD-10-CM

## 2022-07-14 PROCEDURE — 99214 OFFICE O/P EST MOD 30 MIN: CPT | Performed by: PHYSICIAN ASSISTANT

## 2022-07-14 RX ORDER — QUETIAPINE 400 MG/1
800 TABLET, FILM COATED, EXTENDED RELEASE ORAL
Qty: 180 TABLET | Refills: 1 | Status: SHIPPED | OUTPATIENT
Start: 2022-07-14 | End: 2022-11-27

## 2022-07-14 NOTE — PROGRESS NOTES
Subjective   Milka Espinoza is a 52 y.o.white female who presents today for follow up     This provider is located at [facility], [address] [place] [address] [place],using a secure EGThart Video Visit through [employer]. Patient is being seen remotely via telehealth at their home address in [place] , and stated they are in a secure environment for this session. The patient's condition being diagnosed/treated is appropriate for telemedicine. The provider identified herself as well as her credentials. The patient, and/or patients guardian, consent to be seen remotely, and when consent is given they understand that the consent allows for patient identifiable information to be sent to a third party as needed. They may refuse to be seen remotely at any time. The electronic data is encrypted and password protected, and the patient and/or guardian has been advised of the potential risks to privacy not withstanding such measures.    Chief Complaint: Depression and anxiety, pseudoseizures    History of Present Illness:    Feb 2022 had another EGD with botox   Her first granddaughter Rubina Skinner born in April, one month early, and when Mom goes back to work next week, Milka and the ZOË will alternate days watching her.   Everything is going well except for difficulty sleeping.  She reports that she took 2 Seroquel 1 night and slept great, would like an increase.  She has been walking daily and trying to lose weight.  She has been crocheting a lot, making clothes for the baby and dog collars  Taken off phenobarbital and now on Vimpat  Her 21 yr old daughter is struggling with alcohol and mental health issues due to abuse  Her 25 yr old son is autistic  Panic attacks have improved   Depression 0/10, doing well  Denies SI/HI  Anxiety 8/10 at its worst  She is still doing better on the Xanax than she was with the Valium, switched in June  She was discharged from her neurologist office  due to missing appts while hospitalized, needs a new provider  She has 3 kids and they all have May 22 birthdays    The following portions of the patient's history were reviewed and updated as appropriate: allergies, current medications, past family history, past medical history, past social history, past surgical history and problem list.    PAST PSYCHIATRIC HISTORY  Axis I  Affective/Bipoloar Disorder, Anxiety/Panic Disorder, Substance Abuse Disorder  Axis II  None    PAST OUTPATIENT TREATMENT  Diagnosis treated:  Affective Disorder, Anxiety/Panic Disorder, Substance/Alcohol Abuse  Treatment Type:  Individual Therapy, Medication Management  Prior Psychiatric Medications:  Abilify  Risperidone  Vraylar  Valium  Xanax  Lamictal  Trazodone, feels 'prickly all over'  Buspar  Seroquel  Support Groups:  None  Sequelae Of Mental Disorder:  social isolation, family disruption, emotional distress      Interval History  No change    Side Effects  None     Mental status exam was reviewed and compared to her 3/7/22 visit and appropriate updates were made.    Past Medical History:  Past Medical History:   Diagnosis Date   • Depression    • Disease of thyroid gland    • Hyperlipidemia    • Migraines    • On home O2 2021    3 lmp   • Seizures (HCC)        Social History:  Social History     Socioeconomic History   • Marital status:    Tobacco Use   • Smoking status: Former Smoker     Packs/day: 0.25     Types: Cigarettes     Quit date: 2020     Years since quittin.6   • Smokeless tobacco: Never Used   Vaping Use   • Vaping Use: Never used   Substance and Sexual Activity   • Alcohol use: No   • Drug use: No   • Sexual activity: Never       Family History:  Family History   Problem Relation Age of Onset   • No Known Problems Mother    • No Known Problems Father    • No Known Problems Sister    • No Known Problems Brother    • Alcohol abuse Daughter    • No Known Problems Son    • No Known Problems Maternal  Grandmother    • No Known Problems Paternal Grandmother    • No Known Problems Maternal Aunt    • No Known Problems Paternal Aunt        Past Surgical History:  Past Surgical History:   Procedure Laterality Date   • ABDOMINAL SURGERY     •  SECTION      x3   • ENDOSCOPY N/A 2022    Procedure: EGD WITH DILATATION (BOUGIE #56) AND INJECTION OF BOTOX;  Surgeon: Rito Le MD;  Location: [facility] END[facility]COPY;  Service: Gastroenterology;  Laterality: N/A;  POST: LOWER ESOPHAGEAL STRICTURE AND CRICOPHARYNGEAL STRICUTRE   • ENDOSCOPY W/ PEG TUBE PLACEMENT N/A 2020    Procedure: ESOPHAGOGASTRODUODENOSCOPY WITH PERCUTANEOUS ENDOSCOPIC GASTROSTOMY TUBE INSERTION;  Surgeon: Rito Le MD;  Location: [facility] ENDOSCOPY;  Service: Gastroenterology;  Laterality: N/A;   • HERNIA REPAIR     • HYSTERECTOMY     • PEG TUBE REMOVAL         Problem List:  Patient Active Problem List   Diagnosis   • Bipolar disorder, in full remission, most recent episode depressed (HCC)   • Depression   • Mood disorder in conditions classified elsewhere   • Seizures ([facility])   • Opioid withdrawal ([facility])   • Altered level of consciousness   • Asthma   • Bronchitis, acute   • General medical exam   • Low back pain   • Syncope   • Unspecified sprain of unspecified wrist, initial encounter   • Upper respiratory tract infection   • Intractable nausea with vomiting   • Obesity   • GERD (gastroesophageal reflux disease)   • Tobacco use   • Nausea and vomiting   • Borderline hypoglycemic episodes   • Pseudoseizure   • Seizure (HCC)   • Panic disorder with agoraphobia       Allergy:   Allergies   Allergen Reactions   • Sulfa Antibiotics Hives   • Tramadol Hcl Mental Status Change        Discontinued Medications:  Medications Discontinued During This Encounter   Medication Reason   • levoFLOXacin (LEVAQUIN) 750 MG tablet *Therapy completed   • QUEtiapine XR (SEROquel XR) 400 MG 24 hr tablet        Current Medications:   Current Outpatient  Medications   Medication Sig Dispense Refill   • QUEtiapine XR (SEROquel XR) 400 MG 24 hr tablet Take 2 tablets by mouth every night at bedtime. 180 tablet 1   • ALPRAZolam (XANAX) 2 MG tablet TAKE 1 TABLET BY MOUTH THREE TIMES DAILY AS NEEDED FOR ANXIETY 90 tablet 2   • amitriptyline (ELAVIL) 25 MG tablet Take 1 tablet by mouth every night at bedtime. 90 tablet 1   • atorvastatin (Lipitor) 20 MG tablet Take 1 tablet by mouth Daily. 90 tablet 1   • benzonatate (TESSALON) 100 MG capsule Take 100 mg by mouth 3 (Three) Times a Day As Needed.     • busPIRone (BUSPAR) 15 MG tablet Take 1 tablet by mouth 3 (Three) Times a Day. 270 tablet 1   • Cariprazine HCl (Vraylar) 1.5 MG capsule capsule Take 1 capsule by mouth Daily. 30 capsule 5   • diclofenac (VOLTAREN) 50 MG EC tablet Take 50 mg by mouth 3 (Three) Times a Day As Needed. for pain     • gabapentin (NEURONTIN) 800 MG tablet TAKE 1 TABLET BY MOUTH THREE TIMES DAILY 270 tablet 1   • hydrOXYzine (ATARAX) 25 MG tablet TAKE 1 TABLET BY MOUTH THREE TIMES DAILY AS NEEDED FOR ANXIETY 270 tablet 1   • ipratropium-albuterol (DUO-NEB) 0.5-2.5 mg/3 ml nebulizer Take 3 mL by nebulization 4 (Four) Times a Day As Needed for Wheezing. 360 mL 1   • lacosamide (VIMPAT) 100 MG tablet tablet Take 1 tablet by mouth Every 12 (Twelve) Hours for 30 days. 60 tablet 0   • lamoTRIgine (LaMICtal) 200 MG tablet Take 1 pill twice a day 60 tablet 12   • levETIRAcetam (KEPPRA) 500 MG tablet Take 3 tablets by mouth 2 (Two) Times a Day. 180 tablet 3   • levothyroxine (SYNTHROID, LEVOTHROID) 25 MCG tablet TAKE 1 TABLET BY MOUTH DAILY FOR THYROID 30 tablet 0   • Motegrity 2 MG tablet Take 1 tablet by mouth Daily.     • nystatin (MYCOSTATIN) 072190 UNIT/GM powder Apply  topically to the appropriate area as directed 3 (Three) Times a Day As Needed (rash). 30 g 1   • pantoprazole (PROTONIX) 40 MG EC tablet Take 1 tablet by mouth Daily. 90 tablet 1   • sucralfate (CARAFATE) 1 g tablet Take 1 g by mouth 3  (Three) Times a Day.     • SUMAtriptan (IMITREX) 100 MG tablet TAKE 1 TABLET BY MOUTH 1 TIME AS NEEDED FOR MIGRAINE 8 tablet 5     No current facility-administered medications for this visit.         Review of Symptoms:    Psychiatric/Behavioral: Negative for agitation, behavioral problems, confusion, decreased concentration, dysphoric mood, hallucinations, self-injury, sleep disturbance and suicidal ideas. The patient is not nervous/anxious and is not hyperactive.        Physical Exam:   not currently breastfeeding.    Mental Status Exam:   Hygiene:   Good  Cooperation:  Cooperative  Eye Contact:  Good  Psychomotor Behavior:  Appropriate  Affect:  Appropriate  Mood: Melancholy  Hopelessness: Denies  Speech:  Normal  Thought Process:  Goal directed  Thought Content:  Normal  Suicidal:  None  Homicidal:  None  Hallucinations:  None  Delusion:  None  Memory:  Intact  Orientation:  Person, Place, Time and Situation  Reliability:  good  Insight:  Good  Judgement:  Good  Impulse Control:  Good  Physical/Medical Issues:  No      Mental status exam was reviewed and compared to 3/7/22 visit and appropriate updates were made.    PHQ-9 Depression Screening  Little interest or pleasure in doing things? 0-->not at all   Feeling down, depressed, or hopeless? 0-->not at all   Trouble falling or staying asleep, or sleeping too much?     Feeling tired or having little energy?     Poor appetite or overeating?     Feeling bad about yourself - or that you are a failure or have let yourself or your family down?     Trouble concentrating on things, such as reading the newspaper or watching television?     Moving or speaking so slowly that other people could have noticed? Or the opposite - being so fidgety or restless that you have been moving around a lot more than usual?     Thoughts that you would be better off dead, or of hurting yourself in some way?     PHQ-9 Total Score 0   If you checked off any problems, how difficult have these  problems made it for you to do your work, take care of things at home, or get along with other people?             Former smoker    I advised Milka of the risks of tobacco use.     Lab Results:   Lab on 06/08/2022   Component Date Value Ref Range Status   • Glucose 06/08/2022 94  65 - 99 mg/dL Final   • BUN 06/08/2022 9  6 - 20 mg/dL Final   • Creatinine 06/08/2022 1.08 (A) 0.57 - 1.00 mg/dL Final   • Sodium 06/08/2022 141  136 - 145 mmol/L Final   • Potassium 06/08/2022 4.7  3.5 - 5.2 mmol/L Final   • Chloride 06/08/2022 106  98 - 107 mmol/L Final   • CO2 06/08/2022 25.8  22.0 - 29.0 mmol/L Final   • Calcium 06/08/2022 9.6  8.6 - 10.5 mg/dL Final   • Total Protein 06/08/2022 8.1  6.0 - 8.5 g/dL Final   • Albumin 06/08/2022 4.40  3.50 - 5.20 g/dL Final   • ALT (SGPT) 06/08/2022 18  1 - 33 U/L Final   • AST (SGOT) 06/08/2022 14  1 - 32 U/L Final   • Alkaline Phosphatase 06/08/2022 130 (A) 39 - 117 U/L Final   • Total Bilirubin 06/08/2022 0.2  0.0 - 1.2 mg/dL Final   • Globulin 06/08/2022 3.7  gm/dL Final   • A/G Ratio 06/08/2022 1.2  g/dL Final   • BUN/Creatinine Ratio 06/08/2022 8.3  7.0 - 25.0 Final   • Anion Gap 06/08/2022 9.2  5.0 - 15.0 mmol/L Final   • eGFR 06/08/2022 61.2  >60.0 mL/min/1.73 Final    National Kidney Foundation and American Society of Nephrology (ASN) Task Force recommended calculation based on the Chronic Kidney Disease Epidemiology Collaboration (CKD-EPI) equation refit without adjustment for race.   • Total Cholesterol 06/08/2022 243 (A) 0 - 200 mg/dL Final   • Triglycerides 06/08/2022 129  0 - 150 mg/dL Final   • HDL Cholesterol 06/08/2022 83 (A) 40 - 60 mg/dL Final   • LDL Cholesterol  06/08/2022 138 (A) 0 - 100 mg/dL Final   • VLDL Cholesterol 06/08/2022 22  5 - 40 mg/dL Final   • LDL/HDL Ratio 06/08/2022 1.62   Final   • TSH 06/08/2022 1.990  0.270 - 4.200 uIU/mL Final       Assessment & Plan   Problems Addressed this Visit        Mental Health    Bipolar disorder, in full remission,  most recent episode depressed (HCC) (Chronic)    Relevant Medications    QUEtiapine XR (SEROquel XR) 400 MG 24 hr tablet    Panic disorder with agoraphobia - Primary (Chronic)    Relevant Medications    QUEtiapine XR (SEROquel XR) 400 MG 24 hr tablet    Pseudoseizure    Relevant Medications    QUEtiapine XR (SEROquel XR) 400 MG 24 hr tablet      Diagnoses       Codes Comments    Panic disorder with agoraphobia    -  Primary ICD-10-CM: F40.01  ICD-9-CM: 300.21     Bipolar disorder, in full remission, most recent episode depressed (HCC)     ICD-10-CM: F31.76  ICD-9-CM: 296.56     Pseudoseizure     ICD-10-CM: F44.5  ICD-9-CM: 300.11           Visit Diagnoses:    ICD-10-CM ICD-9-CM   1. Panic disorder with agoraphobia  F40.01 300.21   2. Bipolar disorder, in full remission, most recent episode depressed (HCC)  F31.76 296.56   3. Pseudoseizure  F44.5 300.11       TREATMENT PLAN/GOALS: Continue supportive psychotherapy efforts and medications as indicated. Treatment and medication options discussed during today's visit. Patient ackowledged and verbally consented to continue with current treatment plan and was educated on the importance of compliance with treatment and follow-up appointments.    MEDICATION ISSUES:  INSPECT reviewed as expected  Discussed medication options and treatment plan of prescribed medication as well as the risks, benefits, and side effects including potential falls, possible impaired driving and metabolic adversities among others. Patient is agreeable to call the office with any worsening of symptoms or onset of side effects. Patient is agreeable to call 911 or go to the nearest ER should he/she begin having SI/HI. No medication side effects or related complaints today.     Patient is working on her weight with diet and exercise.  Continue Xanax 2mg TID prn, still struggling with a lot of anxiety so left dosage the same for now.   Continue Vraylar 1.5mg daily for bipolar  Continue Lamictal 100 mg  in the AM and two at bedtime for mood stabilizer  Continue Neurontin 800 mg TID for mood stabilizer  Increase Seroquel XR 400mg to two tabs at bedtime for sleep  Continue Buspar 15mg Tid for anxiety  Urine drug screen done March 2022, consistent.    Continue hydroxyzine 25 mg 3 times daily as needed for anxiety, can take 2 at bedtime for sleep    MEDS ORDERED DURING VISIT:  New Medications Ordered This Visit   Medications   • QUEtiapine XR (SEROquel XR) 400 MG 24 hr tablet     Sig: Take 2 tablets by mouth every night at bedtime.     Dispense:  180 tablet     Refill:  1       Return in about 4 months (around 11/14/2022) for video visit.        This document has been electronically signed by Love Bourgeois PA-C  July 14, 2022 09:20 EDT  Subjective

## 2022-07-20 RX ORDER — LEVOTHYROXINE SODIUM 0.03 MG/1
TABLET ORAL
Qty: 30 TABLET | Refills: 0 | Status: SHIPPED | OUTPATIENT
Start: 2022-07-20 | End: 2022-08-17

## 2022-08-01 ENCOUNTER — HOSPITAL ENCOUNTER (OUTPATIENT)
Dept: MAMMOGRAPHY | Facility: HOSPITAL | Age: 52
Discharge: HOME | End: 2022-08-01

## 2022-08-01 ENCOUNTER — HOSPITAL ENCOUNTER (OUTPATIENT)
Dept: MRI IMAGING | Facility: HOSPITAL | Age: 52
Discharge: HOME | End: 2022-08-01

## 2022-08-01 ENCOUNTER — HOSPITAL ENCOUNTER (OUTPATIENT)
Dept: NEUROLOGY | Facility: HOSPITAL | Age: 52
Discharge: HOME | End: 2022-08-01

## 2022-08-01 DIAGNOSIS — R56.9 SEIZURE: ICD-10-CM

## 2022-08-01 DIAGNOSIS — Z12.31 BREAST CANCER SCREENING BY MAMMOGRAM: ICD-10-CM

## 2022-08-01 DIAGNOSIS — Z00.00 MEDICARE ANNUAL WELLNESS VISIT, SUBSEQUENT: ICD-10-CM

## 2022-08-01 PROCEDURE — 95819 EEG AWAKE AND ASLEEP: CPT | Performed by: PSYCHIATRY & NEUROLOGY

## 2022-08-01 PROCEDURE — A9579 GAD-BASE MR CONTRAST NOS,1ML: HCPCS | Performed by: NURSE PRACTITIONER

## 2022-08-01 PROCEDURE — 95819 EEG AWAKE AND ASLEEP: CPT

## 2022-08-01 PROCEDURE — 77067 SCR MAMMO BI INCL CAD: CPT

## 2022-08-01 PROCEDURE — 70553 MRI BRAIN STEM W/O & W/DYE: CPT

## 2022-08-01 PROCEDURE — 25010000002 GADOTERIDOL PER 1 ML: Performed by: NURSE PRACTITIONER

## 2022-08-01 PROCEDURE — 77063 BREAST TOMOSYNTHESIS BI: CPT

## 2022-08-01 RX ADMIN — GADOTERIDOL 20 ML: 279.3 INJECTION, SOLUTION INTRAVENOUS at 15:35

## 2022-08-09 DIAGNOSIS — F40.01 PANIC DISORDER WITH AGORAPHOBIA: Chronic | ICD-10-CM

## 2022-08-09 RX ORDER — ALPRAZOLAM 2 MG/1
TABLET ORAL
Qty: 90 TABLET | Refills: 2 | Status: SHIPPED | OUTPATIENT
Start: 2022-08-09 | End: 2022-10-31

## 2022-08-12 ENCOUNTER — OFFICE (AMBULATORY)
Dept: URBAN - METROPOLITAN AREA CLINIC 64 | Facility: CLINIC | Age: 52
End: 2022-08-12

## 2022-08-12 VITALS
HEIGHT: 64 IN | HEART RATE: 110 BPM | WEIGHT: 224 LBS | DIASTOLIC BLOOD PRESSURE: 74 MMHG | SYSTOLIC BLOOD PRESSURE: 112 MMHG

## 2022-08-12 DIAGNOSIS — R13.10 DYSPHAGIA, UNSPECIFIED: ICD-10-CM

## 2022-08-12 PROCEDURE — 99214 OFFICE O/P EST MOD 30 MIN: CPT | Performed by: INTERNAL MEDICINE

## 2022-08-17 DIAGNOSIS — F40.01 PANIC DISORDER WITH AGORAPHOBIA: Chronic | ICD-10-CM

## 2022-08-17 RX ORDER — LEVOTHYROXINE SODIUM 0.03 MG/1
TABLET ORAL
Qty: 30 TABLET | Refills: 0 | Status: SHIPPED | OUTPATIENT
Start: 2022-08-17 | End: 2022-11-03 | Stop reason: SDUPTHER

## 2022-08-17 RX ORDER — BUSPIRONE HYDROCHLORIDE 15 MG/1
TABLET ORAL
Qty: 270 TABLET | Refills: 1 | Status: SHIPPED | OUTPATIENT
Start: 2022-08-17 | End: 2023-01-23

## 2022-08-17 RX ORDER — HYDROXYZINE HYDROCHLORIDE 25 MG/1
TABLET, FILM COATED ORAL
Qty: 270 TABLET | Refills: 1 | OUTPATIENT
Start: 2022-08-17

## 2022-08-17 RX ORDER — BUSPIRONE HYDROCHLORIDE 15 MG/1
TABLET ORAL
Qty: 270 TABLET | Refills: 1 | OUTPATIENT
Start: 2022-08-17

## 2022-08-17 RX ORDER — HYDROXYZINE HYDROCHLORIDE 25 MG/1
TABLET, FILM COATED ORAL
Qty: 270 TABLET | Refills: 1 | Status: SHIPPED | OUTPATIENT
Start: 2022-08-17 | End: 2023-01-23

## 2022-08-17 RX ORDER — LEVOTHYROXINE SODIUM 0.03 MG/1
TABLET ORAL
Qty: 30 TABLET | Refills: 0 | Status: SHIPPED | OUTPATIENT
Start: 2022-08-17 | End: 2022-10-02

## 2022-08-25 ENCOUNTER — TELEPHONE (OUTPATIENT)
Dept: FAMILY MEDICINE CLINIC | Facility: CLINIC | Age: 52
End: 2022-08-25

## 2022-08-25 DIAGNOSIS — R04.2 BLOODY SPUTUM: Primary | ICD-10-CM

## 2022-08-25 NOTE — TELEPHONE ENCOUNTER
Caller: Olga Milka FLORES    Relationship: Self    Best call back number: [phone]    What is the medical concern/diagnosis: SPITTING UP BRIGHT RED BLOOD    What specialty or service is being requested: PULMONOLOGY     What is the provider, practice or medical service name: DR. SHEYLA WALKER AT [facility]    What is the office location: [address] [place] [address], [address]     What is the office phone number: [phone]    Any additional details: DR. CONTE RECOMMENDED DR. SHEYLA WALKER TO THE PATIENT. PATIENT HAD A CHEST X-RAY YESTERDAY AT [facility]. RESULTS HAVE NOT COME BACK YET.

## 2022-09-09 RX ORDER — AMITRIPTYLINE HYDROCHLORIDE 25 MG/1
25 TABLET, FILM COATED ORAL
Qty: 90 TABLET | Refills: 1 | Status: SHIPPED | OUTPATIENT
Start: 2022-09-09 | End: 2023-03-06

## 2022-09-15 ENCOUNTER — ON CAMPUS - OUTPATIENT (AMBULATORY)
Dept: URBAN - METROPOLITAN AREA HOSPITAL 2 | Facility: HOSPITAL | Age: 52
End: 2022-09-15

## 2022-09-15 VITALS
OXYGEN SATURATION: 100 % | DIASTOLIC BLOOD PRESSURE: 56 MMHG | WEIGHT: 222 LBS | SYSTOLIC BLOOD PRESSURE: 135 MMHG | OXYGEN SATURATION: 96 % | HEIGHT: 64 IN | SYSTOLIC BLOOD PRESSURE: 104 MMHG | RESPIRATION RATE: 12 BRPM | HEART RATE: 90 BPM | HEART RATE: 89 BPM | TEMPERATURE: 97.3 F | DIASTOLIC BLOOD PRESSURE: 54 MMHG | HEART RATE: 109 BPM | OXYGEN SATURATION: 97 % | OXYGEN SATURATION: 95 % | DIASTOLIC BLOOD PRESSURE: 53 MMHG | HEART RATE: 99 BPM | SYSTOLIC BLOOD PRESSURE: 105 MMHG | SYSTOLIC BLOOD PRESSURE: 112 MMHG | RESPIRATION RATE: 16 BRPM | RESPIRATION RATE: 15 BRPM | SYSTOLIC BLOOD PRESSURE: 106 MMHG | DIASTOLIC BLOOD PRESSURE: 57 MMHG

## 2022-09-15 DIAGNOSIS — K22.2 ESOPHAGEAL OBSTRUCTION: ICD-10-CM

## 2022-09-15 DIAGNOSIS — R13.10 DYSPHAGIA, UNSPECIFIED: ICD-10-CM

## 2022-09-15 PROCEDURE — 43450 DILATE ESOPHAGUS 1/MULT PASS: CPT | Performed by: INTERNAL MEDICINE

## 2022-09-15 PROCEDURE — 43235 EGD DIAGNOSTIC BRUSH WASH: CPT | Performed by: INTERNAL MEDICINE

## 2022-09-29 ENCOUNTER — TELEPHONE (OUTPATIENT)
Dept: FAMILY MEDICINE CLINIC | Facility: CLINIC | Age: 52
End: 2022-09-29

## 2022-09-29 NOTE — TELEPHONE ENCOUNTER
She has to be seen by me in order to get one. I cant just order it. Her Gyn should be able to order it if they wanted it,

## 2022-09-29 NOTE — TELEPHONE ENCOUNTER
Caller: Milka Espinoza    Relationship: Self    Best call back number: [phone]    What orders are you requesting (i.e. lab or imaging): CT SCAN    In what timeframe would the patient need to come in: ASAP    Where will you receive your lab/imaging services: [facility]     Additional notes: SHE IS HAVING ABDOMINAL PAIN AND HER OBGYN ADVISED SHE GET ONE

## 2022-09-30 ENCOUNTER — TELEPHONE (OUTPATIENT)
Dept: FAMILY MEDICINE CLINIC | Facility: CLINIC | Age: 52
End: 2022-09-30

## 2022-09-30 ENCOUNTER — OFFICE VISIT (OUTPATIENT)
Dept: FAMILY MEDICINE CLINIC | Facility: CLINIC | Age: 52
End: 2022-09-30

## 2022-09-30 VITALS
SYSTOLIC BLOOD PRESSURE: 106 MMHG | BODY MASS INDEX: 38.58 KG/M2 | TEMPERATURE: 97.3 F | OXYGEN SATURATION: 100 % | HEART RATE: 99 BPM | DIASTOLIC BLOOD PRESSURE: 70 MMHG | WEIGHT: 226 LBS | HEIGHT: 64 IN

## 2022-09-30 DIAGNOSIS — R50.9 LOW GRADE FEVER: ICD-10-CM

## 2022-09-30 DIAGNOSIS — R10.31 RLQ ABDOMINAL PAIN: Primary | ICD-10-CM

## 2022-09-30 PROBLEM — E03.9 HYPOTHYROIDISM: Status: ACTIVE | Noted: 2022-09-30

## 2022-09-30 PROBLEM — K21.9 GASTROESOPHAGEAL REFLUX DISEASE: Status: ACTIVE | Noted: 2020-02-13

## 2022-09-30 PROBLEM — F44.5 DISSOCIATIVE CONVULSIONS: Status: ACTIVE | Noted: 2020-02-16

## 2022-09-30 PROBLEM — S93.409A SPRAIN OF ANKLE: Status: ACTIVE | Noted: 2022-09-30

## 2022-09-30 PROBLEM — E78.5 HYPERLIPIDEMIA: Status: ACTIVE | Noted: 2022-09-30

## 2022-09-30 PROBLEM — K31.84 GASTROPARESIS: Status: ACTIVE | Noted: 2022-08-08

## 2022-09-30 PROCEDURE — 99213 OFFICE O/P EST LOW 20 MIN: CPT | Performed by: FAMILY MEDICINE

## 2022-09-30 RX ORDER — AMOXICILLIN AND CLAVULANATE POTASSIUM 875; 125 MG/1; MG/1
TABLET, FILM COATED ORAL
COMMUNITY
Start: 2022-09-25 | End: 2022-10-05

## 2022-09-30 RX ORDER — PREDNISONE 20 MG/1
TABLET ORAL
COMMUNITY
Start: 2022-09-25 | End: 2022-10-02

## 2022-09-30 NOTE — PROGRESS NOTES
Subjective   Milka Espinoza is a 52 y.o. female.     History of Present Illness  Here with complaints of abdominal pain  Pain started yesterday.  There has been some associated nausea and vomiting.  Last BM was   LG fever  Has been taking tylenol  Last dose 10pm  Saw gyn yesterday as she has a h/o multiple abd and pelvic surgeries  Her GYN was very concerned and wanted the patient to go to the ER but patient refused  Patient says the pain is a 10 out of 10.  She has not eaten today and had very little yesterday         The following portions of the patient's history were reviewed and updated as appropriate: allergies, current medications, past family history, past medical history, past social history, past surgical history, and problem list.  Past Medical History:   Diagnosis Date   • Depression    • Disease of thyroid gland    • Hyperlipidemia    • Migraines    • On home O2 2021    3 lmp   • Seizures (HCC)      Past Surgical History:   Procedure Laterality Date   • ABDOMINAL SURGERY     •  SECTION      x3   • ENDOSCOPY N/A 2022    Procedure: EGD WITH DILATATION (BOUGIE #56) AND INJECTION OF BOTOX;  Surgeon: Rito Le MD;  Location: [facility] ENDOSCOPY;  Service: Gastroenterology;  Laterality: N/A;  POST: LOWER ESOPHAGEAL STRICTURE AND CRICOPHARYNGEAL STRICUTRE   • ENDOSCOPY W/ PEG TUBE PLACEMENT N/A 2020    Procedure: ESOPHAGOGASTRODUODENOSCOPY WITH PERCUTANEOUS ENDOSCOPIC GASTROSTOMY TUBE INSERTION;  Surgeon: Rito Le MD;  Location: [facility] ENDOSCOPY;  Service: Gastroenterology;  Laterality: N/A;   • HERNIA REPAIR     • HYSTERECTOMY     • PEG TUBE REMOVAL       Family History   Problem Relation Age of Onset   • No Known Problems Mother    • No Known Problems Father    • No Known Problems Sister    • No Known Problems Brother    • Alcohol abuse Daughter    • No Known Problems Son    • No Known Problems Maternal Grandmother    • No Known Problems Paternal Grandmother    • No  Known Problems Maternal Aunt    • No Known Problems Paternal Aunt      Social History     Socioeconomic History   • Marital status:    Tobacco Use   • Smoking status: Former Smoker     Packs/day: 0.25     Types: Cigarettes     Quit date: 2020     Years since quittin.9   • Smokeless tobacco: Never Used   Vaping Use   • Vaping Use: Never used   Substance and Sexual Activity   • Alcohol use: No   • Drug use: No   • Sexual activity: Never         Current Outpatient Medications:   •  ALPRAZolam (XANAX) 2 MG tablet, TAKE 1 TABLET BY MOUTH THREE TIMES DAILY AS NEEDED FOR ANXIETY, Disp: 90 tablet, Rfl: 2  •  amitriptyline (ELAVIL) 25 MG tablet, TAKE 1 TABLET BY MOUTH EVERY NIGHT AT BEDTIME, Disp: 90 tablet, Rfl: 1  •  amoxicillin-clavulanate (AUGMENTIN) 875-125 MG per tablet,  = 1 tab(s), Oral, q12hr, # 20 tab(s), 0 Refill(s), Pharmacy: [facility]/pharmacy #[id number], 1 tab(s) Oral q12hr,x10 day(s), 162, cm, 05/10/22 9:52:00 EDT, Height/Length Dosing, 100, kg, 05/10/22 9:52:00 EDT, Weight Dosing, Disp: , Rfl:   •  atorvastatin (Lipitor) 20 MG tablet, Take 1 tablet by mouth Daily., Disp: 90 tablet, Rfl: 1  •  benzonatate (TESSALON) 100 MG capsule, Take 100 mg by mouth 3 (Three) Times a Day As Needed., Disp: , Rfl:   •  busPIRone (BUSPAR) 15 MG tablet, TAKE 1 TABLET BY MOUTH THREE TIMES DAILY, Disp: 270 tablet, Rfl: 1  •  Cariprazine HCl (Vraylar) 1.5 MG capsule capsule, Take 1 capsule by mouth Daily., Disp: 30 capsule, Rfl: 5  •  diclofenac (VOLTAREN) 50 MG EC tablet, Take 50 mg by mouth 3 (Three) Times a Day As Needed. for pain, Disp: , Rfl:   •  gabapentin (NEURONTIN) 800 MG tablet, TAKE 1 TABLET BY MOUTH THREE TIMES DAILY, Disp: 270 tablet, Rfl: 1  •  hydrOXYzine (ATARAX) 25 MG tablet, TAKE 1 TABLET BY MOUTH THREE TIMES DAILY AS NEEDED FOR ANXIETY, Disp: 270 tablet, Rfl: 1  •  ipratropium-albuterol (DUO-NEB) 0.5-2.5 mg/3 ml nebulizer, Take 3 mL by nebulization 4 (Four) Times a Day As Needed for Wheezing., Disp: 360  mL, Rfl: 1  •  lamoTRIgine (LaMICtal) 200 MG tablet, Take 1 pill twice a day, Disp: 60 tablet, Rfl: 12  •  levETIRAcetam (KEPPRA) 500 MG tablet, Take 3 tablets by mouth 2 (Two) Times a Day., Disp: 180 tablet, Rfl: 3  •  levothyroxine (SYNTHROID, LEVOTHROID) 25 MCG tablet, TAKE 1 TABLET BY MOUTH DAILY FOR THYROID, Disp: 30 tablet, Rfl: 0  •  levothyroxine (SYNTHROID, LEVOTHROID) 25 MCG tablet, TAKE 1 TABLET BY MOUTH DAILY FOR THYROID, Disp: 30 tablet, Rfl: 0  •  Motegrity 2 MG tablet, Take 1 tablet by mouth Daily., Disp: , Rfl:   •  nystatin (MYCOSTATIN) 999141 UNIT/GM powder, Apply  topically to the appropriate area as directed 3 (Three) Times a Day As Needed (rash)., Disp: 30 g, Rfl: 1  •  pantoprazole (PROTONIX) 40 MG EC tablet, Take 1 tablet by mouth Daily., Disp: 90 tablet, Rfl: 1  •  predniSONE (DELTASONE) 20 MG tablet,  40 mg = 2 tab(s), Oral, Daily, # 14 tab(s), 0 Refill(s), Pharmacy: [facility]/pharmacy #[id number], 2 tab(s) Oral Daily,x7 day(s), 162, cm, 05/10/22 9:52:00 EDT, Height/Length Dosing, 100, kg, 05/10/22 9:52:00 EDT, Weight Dosing, Disp: , Rfl:   •  QUEtiapine XR (SEROquel XR) 400 MG 24 hr tablet, Take 2 tablets by mouth every night at bedtime., Disp: 180 tablet, Rfl: 1  •  sucralfate (CARAFATE) 1 g tablet, Take 1 g by mouth 3 (Three) Times a Day., Disp: , Rfl:   •  SUMAtriptan (IMITREX) 100 MG tablet, TAKE 1 TABLET BY MOUTH 1 TIME AS NEEDED FOR MIGRAINE, Disp: 8 tablet, Rfl: 5  •  lacosamide (VIMPAT) 100 MG tablet tablet, Take 1 tablet by mouth Every 12 (Twelve) Hours for 30 days., Disp: 60 tablet, Rfl: 0    Review of Systems   Constitutional: Positive for fatigue and fever.   Cardiovascular: Negative.    Gastrointestinal: Positive for abdominal pain, nausea and vomiting. Negative for constipation and diarrhea.   Genitourinary: Positive for pelvic pain. Negative for difficulty urinating, dysuria, frequency, hematuria and urgency. Menstrual problem: right pelvic.   Neurological: Negative for dizziness and  "syncope.     /70 (BP Location: Left arm, Patient Position: Sitting, Cuff Size: Large Adult)   Pulse 99   Temp 97.3 °F (36.3 °C) (Temporal)   Ht 162.6 cm (64\")   Wt 103 kg (226 lb)   SpO2 100%   Breastfeeding No   BMI 38.79 kg/m²       Objective   Physical Exam  Vitals and nursing note reviewed.   Constitutional:       General: She is not in acute distress.     Appearance: She is well-developed. She is obese.      Comments: She appears ill and is bent over favoring her right side.  She moves very slowly   HENT:      Head: Normocephalic and atraumatic.   Neck:      Thyroid: No thyromegaly.   Cardiovascular:      Rate and Rhythm: Normal rate and regular rhythm.      Heart sounds: Normal heart sounds. No murmur heard.    No friction rub. No gallop.   Pulmonary:      Effort: Pulmonary effort is normal. No respiratory distress.      Breath sounds: Normal breath sounds. No wheezing or rales.   Abdominal:      General: Abdomen is flat. Bowel sounds are decreased.      Palpations: Abdomen is soft.      Tenderness: There is abdominal tenderness in the right lower quadrant. There is no guarding or rebound. Positive signs include McBurney's sign. Negative signs include Mora's sign.      Hernia: No hernia is present.   Musculoskeletal:      Cervical back: Neck supple.   Lymphadenopathy:      Cervical: No cervical adenopathy.   Skin:     General: Skin is warm and dry.      Findings: No rash.   Neurological:      Mental Status: She is alert.   Psychiatric:         Behavior: Behavior is cooperative.           Assessment & Plan   Problems Addressed this Visit    None     Visit Diagnoses     RLQ abdominal pain    -  Primary    Relevant Orders    CBC & Differential    Comprehensive Metabolic Panel    CT Abdomen Pelvis With & Without Contrast    Low grade fever        Relevant Orders    CBC & Differential    Comprehensive Metabolic Panel    CT Abdomen Pelvis With & Without Contrast      Diagnoses       Codes Comments    " RLQ abdominal pain    -  Primary ICD-10-CM: R10.31  ICD-9-CM: 789.03     Low grade fever     ICD-10-CM: R50.9  ICD-9-CM: 780.60         I am very concerned that she has appendicitis.  I am sending her for a stat CBC and CMP as well as a stat CT scan of her abdomen and pelvis

## 2022-10-01 DIAGNOSIS — R10.31 RLQ ABDOMINAL PAIN: Primary | ICD-10-CM

## 2022-10-01 RX ORDER — SUMATRIPTAN 100 MG/1
TABLET, FILM COATED ORAL
Qty: 8 TABLET | Refills: 5 | Status: SHIPPED | OUTPATIENT
Start: 2022-10-01 | End: 2023-03-04

## 2022-10-01 NOTE — TELEPHONE ENCOUNTER
Pt will need to be seen. I am unsure without assessing her. I know she is current with GI so she really needs to follow up with them.

## 2022-10-02 RX ORDER — LEVOTHYROXINE SODIUM 0.03 MG/1
TABLET ORAL
Qty: 30 TABLET | Refills: 0 | Status: SHIPPED | OUTPATIENT
Start: 2022-10-02 | End: 2022-10-31

## 2022-10-02 RX ORDER — LAMOTRIGINE 100 MG/1
TABLET ORAL
Qty: 270 TABLET | Refills: 1 | OUTPATIENT
Start: 2022-10-02

## 2022-10-17 ENCOUNTER — TRANSCRIBE ORDERS (OUTPATIENT)
Dept: ADMINISTRATIVE | Facility: HOSPITAL | Age: 52
End: 2022-10-17

## 2022-10-17 DIAGNOSIS — K31.84 GASTROPARESIS: Primary | ICD-10-CM

## 2022-10-18 ENCOUNTER — TELEPHONE (OUTPATIENT)
Dept: FAMILY MEDICINE CLINIC | Facility: CLINIC | Age: 52
End: 2022-10-18

## 2022-10-18 NOTE — TELEPHONE ENCOUNTER
Spoke with patient about the oxygen and she has been on this for a few months and seen Dr Centeno not long ago. She is on it 24-7 per patient. [employer] is the oxygen company. She has been sick a lot and was having issues with breathing from the ER. Also wanted to let you know that Dr. Le her GI doctor referred her to a GI specialist at [facility]. They are talking about putting in an implant to help with vomiting.

## 2022-10-19 RX ORDER — PANTOPRAZOLE SODIUM 40 MG/1
40 TABLET, DELAYED RELEASE ORAL DAILY
Qty: 90 TABLET | Refills: 1 | Status: SHIPPED | OUTPATIENT
Start: 2022-10-19

## 2022-10-20 RX ORDER — GABAPENTIN 800 MG/1
TABLET ORAL
Qty: 270 TABLET | Refills: 1 | Status: SHIPPED | OUTPATIENT
Start: 2022-10-20 | End: 2023-01-23

## 2022-10-31 ENCOUNTER — APPOINTMENT (OUTPATIENT)
Dept: NUCLEAR MEDICINE | Facility: HOSPITAL | Age: 52
End: 2022-10-31

## 2022-10-31 DIAGNOSIS — F40.01 PANIC DISORDER WITH AGORAPHOBIA: Chronic | ICD-10-CM

## 2022-10-31 RX ORDER — CARIPRAZINE 1.5 MG/1
CAPSULE, GELATIN COATED ORAL
Qty: 90 CAPSULE | Refills: 0 | Status: SHIPPED | OUTPATIENT
Start: 2022-10-31 | End: 2023-01-23

## 2022-10-31 RX ORDER — LEVOTHYROXINE SODIUM 0.03 MG/1
TABLET ORAL
Qty: 30 TABLET | Refills: 0 | Status: SHIPPED | OUTPATIENT
Start: 2022-10-31 | End: 2022-11-30

## 2022-10-31 RX ORDER — ALPRAZOLAM 2 MG/1
TABLET ORAL
Qty: 90 TABLET | Refills: 0 | Status: SHIPPED | OUTPATIENT
Start: 2022-10-31 | End: 2022-11-28

## 2022-11-03 ENCOUNTER — OFFICE VISIT (OUTPATIENT)
Dept: FAMILY MEDICINE CLINIC | Facility: CLINIC | Age: 52
End: 2022-11-03

## 2022-11-03 VITALS
HEART RATE: 102 BPM | WEIGHT: 225 LBS | DIASTOLIC BLOOD PRESSURE: 85 MMHG | SYSTOLIC BLOOD PRESSURE: 122 MMHG | TEMPERATURE: 97.7 F | BODY MASS INDEX: 38.62 KG/M2 | OXYGEN SATURATION: 95 %

## 2022-11-03 DIAGNOSIS — R06.02 SHORTNESS OF BREATH: Primary | ICD-10-CM

## 2022-11-03 DIAGNOSIS — L60.9 NAIL ABNORMALITY: ICD-10-CM

## 2022-11-03 PROCEDURE — 99214 OFFICE O/P EST MOD 30 MIN: CPT | Performed by: NURSE PRACTITIONER

## 2022-11-03 RX ORDER — BUDESONIDE AND FORMOTEROL FUMARATE DIHYDRATE 160; 4.5 UG/1; UG/1
2 AEROSOL RESPIRATORY (INHALATION)
Qty: 6 G | Refills: 12 | Status: SHIPPED | OUTPATIENT
Start: 2022-11-03

## 2022-11-03 RX ORDER — LAMOTRIGINE 100 MG/1
TABLET ORAL 3 TIMES DAILY
COMMUNITY
Start: 2022-07-20 | End: 2023-02-06 | Stop reason: SDUPTHER

## 2022-11-03 RX ORDER — LEVETIRACETAM 750 MG/1
750 TABLET ORAL 3 TIMES DAILY
COMMUNITY

## 2022-11-03 NOTE — PROGRESS NOTES
Subjective     Milka Espinoza is a 52 y.o. female.     History of Present Illness  Patient is here today to follow-up on oxygen use.  She was needing a face-to-face completed in order to resume having home oxygen.  She does not know how many liters of oxygen she is on.   She states she is forgetful because of her seizures.   Patient has had recurrent bronchitis or pneumonia in the past few years.  She has a history of seizures and gastroparesis.  Patient quit smoking cigarettes 1.5 years ago.   Patient denies vaping.   She states that she wheezes.  She gets SOA.    Pt states her right great toenail is ingrown and thick.   Wants to see a podiatrist       The following portions of the patient's history were reviewed and updated as appropriate: allergies, current medications, past family history, past medical history, past social history, past surgical history and problem list.    Review of Systems   Constitutional: Negative for appetite change, chills, fatigue and fever.   HENT: Negative for congestion, ear pain, hearing loss, postnasal drip, rhinorrhea, sinus pressure, sore throat, swollen glands and trouble swallowing.    Eyes: Negative for blurred vision, double vision, pain, discharge, itching and visual disturbance.   Respiratory: Positive for shortness of breath and wheezing. Negative for cough and chest tightness.    Cardiovascular: Negative for chest pain and palpitations.   Gastrointestinal: Negative for abdominal pain, blood in stool, constipation, diarrhea, nausea and vomiting.   Endocrine: Negative for polydipsia, polyphagia and polyuria.   Genitourinary: Negative for dysuria, flank pain, frequency and urgency.   Musculoskeletal: Negative for arthralgias, back pain and myalgias.   Skin: Negative for rash and skin lesions.   Neurological: Positive for seizures, headache and confusion. Negative for dizziness, weakness and numbness.   Psychiatric/Behavioral: Negative for self-injury and stress. The  patient is nervous/anxious.        Objective     /85   Pulse 102   Temp 97.7 °F (36.5 °C) (Tympanic)   Wt 102 kg (225 lb)   SpO2 95%   BMI 38.62 kg/m²     Current Outpatient Medications on File Prior to Visit   Medication Sig Dispense Refill   • lamoTRIgine (LaMICtal) 100 MG tablet 3 (Three) Times a Day.     • levETIRAcetam (KEPPRA) 750 MG tablet Take 1 tablet by mouth 3 (Three) Times a Day.     • ALPRAZolam (XANAX) 2 MG tablet TAKE 1 TABLET BY MOUTH THREE TIMES DAILY AS NEEDED FOR ANXIETY 90 tablet 0   • amitriptyline (ELAVIL) 25 MG tablet TAKE 1 TABLET BY MOUTH EVERY NIGHT AT BEDTIME 90 tablet 1   • atorvastatin (Lipitor) 20 MG tablet Take 1 tablet by mouth Daily. 90 tablet 1   • busPIRone (BUSPAR) 15 MG tablet TAKE 1 TABLET BY MOUTH THREE TIMES DAILY 270 tablet 1   • gabapentin (NEURONTIN) 800 MG tablet TAKE 1 TABLET BY MOUTH THREE TIMES DAILY 270 tablet 1   • hydrOXYzine (ATARAX) 25 MG tablet TAKE 1 TABLET BY MOUTH THREE TIMES DAILY AS NEEDED FOR ANXIETY 270 tablet 1   • ipratropium-albuterol (DUO-NEB) 0.5-2.5 mg/3 ml nebulizer Take 3 mL by nebulization 4 (Four) Times a Day As Needed for Wheezing. 360 mL 1   • lacosamide (VIMPAT) 100 MG tablet tablet Take 1 tablet by mouth Every 12 (Twelve) Hours for 30 days. 60 tablet 0   • levothyroxine (SYNTHROID, LEVOTHROID) 25 MCG tablet TAKE 1 TABLET BY MOUTH DAILY FOR THYROID 30 tablet 0   • Motegrity 2 MG tablet Take 1 tablet by mouth Daily.     • nystatin (MYCOSTATIN) 748389 UNIT/GM powder Apply  topically to the appropriate area as directed 3 (Three) Times a Day As Needed (rash). 30 g 1   • pantoprazole (PROTONIX) 40 MG EC tablet TAKE 1 TABLET BY MOUTH DAILY 90 tablet 1   • QUEtiapine XR (SEROquel XR) 400 MG 24 hr tablet Take 2 tablets by mouth every night at bedtime. 180 tablet 1   • sucralfate (CARAFATE) 1 g tablet Take 1 g by mouth 3 (Three) Times a Day.     • SUMAtriptan (IMITREX) 100 MG tablet TAKE 1 TABLET BY MOUTH 1 TIME AS NEEDED FOR MIGRAINE 8  tablet 5   • Vraylar 1.5 MG capsule capsule TAKE 1 CAPSULE BY MOUTH DAILY 90 capsule 0   • [DISCONTINUED] benzonatate (TESSALON) 100 MG capsule Take 100 mg by mouth 3 (Three) Times a Day As Needed.     • [DISCONTINUED] diclofenac (VOLTAREN) 50 MG EC tablet Take 50 mg by mouth 3 (Three) Times a Day As Needed. for pain     • [DISCONTINUED] lamoTRIgine (LaMICtal) 200 MG tablet Take 1 pill twice a day 60 tablet 12   • [DISCONTINUED] levETIRAcetam (KEPPRA) 500 MG tablet Take 3 tablets by mouth 2 (Two) Times a Day. 180 tablet 3   • [DISCONTINUED] levothyroxine (SYNTHROID, LEVOTHROID) 25 MCG tablet TAKE 1 TABLET BY MOUTH DAILY FOR THYROID 30 tablet 0     No current facility-administered medications on file prior to visit.        Physical Exam  Constitutional:       Appearance: Normal appearance.   HENT:      Head: Normocephalic and atraumatic.   Cardiovascular:      Rate and Rhythm: Normal rate and regular rhythm.      Pulses: Normal pulses.      Heart sounds: Normal heart sounds.   Pulmonary:      Effort: Pulmonary effort is normal.      Breath sounds: Normal breath sounds. No wheezing.   Musculoskeletal:      Cervical back: Normal range of motion.   Skin:     Comments: Right great toenail thick and abnormally shaped   Neurological:      General: No focal deficit present.      Mental Status: She is alert and oriented to person, place, and time.   Psychiatric:         Mood and Affect: Mood normal.         Behavior: Behavior normal.         Thought Content: Thought content normal.           Assessment & Plan     Diagnoses and all orders for this visit:    1. Shortness of breath (Primary)  Comments:  improved  6 min walk does not indicate O2 use  ok to discontinue  referral to pulm  start symbicort- likely COPD  f/u 2 mo  Orders:  -     Ambulatory Referral to Pulmonology  -     budesonide-formoterol (Symbicort) 160-4.5 MCG/ACT inhaler; Inhale 2 puffs 2 (Two) Times a Day.  Dispense: 6 g; Refill: 12  -     Walking Oximetry;  Future    2. Nail abnormality  Comments:  great toenail abnormality  referral to podiatry  Orders:  -     Ambulatory Referral to Podiatry

## 2022-11-18 ENCOUNTER — ON CAMPUS - OUTPATIENT (AMBULATORY)
Dept: URBAN - METROPOLITAN AREA HOSPITAL 2 | Facility: HOSPITAL | Age: 52
End: 2022-11-18

## 2022-11-18 VITALS
SYSTOLIC BLOOD PRESSURE: 140 MMHG | DIASTOLIC BLOOD PRESSURE: 87 MMHG | DIASTOLIC BLOOD PRESSURE: 89 MMHG | SYSTOLIC BLOOD PRESSURE: 127 MMHG | HEART RATE: 88 BPM | DIASTOLIC BLOOD PRESSURE: 83 MMHG | HEART RATE: 96 BPM | OXYGEN SATURATION: 100 % | HEIGHT: 64 IN | OXYGEN SATURATION: 98 % | DIASTOLIC BLOOD PRESSURE: 80 MMHG | HEART RATE: 92 BPM | WEIGHT: 221 LBS | TEMPERATURE: 97.3 F | HEART RATE: 89 BPM | OXYGEN SATURATION: 99 % | RESPIRATION RATE: 18 BRPM | SYSTOLIC BLOOD PRESSURE: 132 MMHG | SYSTOLIC BLOOD PRESSURE: 136 MMHG | DIASTOLIC BLOOD PRESSURE: 88 MMHG | RESPIRATION RATE: 13 BRPM | SYSTOLIC BLOOD PRESSURE: 146 MMHG

## 2022-11-18 DIAGNOSIS — K22.2 ESOPHAGEAL OBSTRUCTION: ICD-10-CM

## 2022-11-18 DIAGNOSIS — K44.9 DIAPHRAGMATIC HERNIA WITHOUT OBSTRUCTION OR GANGRENE: ICD-10-CM

## 2022-11-18 DIAGNOSIS — R13.10 DYSPHAGIA, UNSPECIFIED: ICD-10-CM

## 2022-11-18 PROCEDURE — 43450 DILATE ESOPHAGUS 1/MULT PASS: CPT | Performed by: INTERNAL MEDICINE

## 2022-11-18 PROCEDURE — 43235 EGD DIAGNOSTIC BRUSH WASH: CPT | Performed by: INTERNAL MEDICINE

## 2022-11-22 ENCOUNTER — TELEMEDICINE (OUTPATIENT)
Dept: PSYCHIATRY | Facility: CLINIC | Age: 52
End: 2022-11-22

## 2022-11-22 DIAGNOSIS — F40.01 PANIC DISORDER WITH AGORAPHOBIA: Chronic | ICD-10-CM

## 2022-11-22 DIAGNOSIS — F31.76 BIPOLAR DISORDER, IN FULL REMISSION, MOST RECENT EPISODE DEPRESSED: Primary | Chronic | ICD-10-CM

## 2022-11-22 PROCEDURE — 99214 OFFICE O/P EST MOD 30 MIN: CPT | Performed by: PHYSICIAN ASSISTANT

## 2022-11-22 RX ORDER — ALPRAZOLAM 2 MG/1
2 TABLET ORAL 3 TIMES DAILY PRN
Qty: 90 TABLET | Refills: 2 | Status: CANCELLED | OUTPATIENT
Start: 2022-11-22

## 2022-11-22 NOTE — PROGRESS NOTES
Subjective   Milka Espinoza is a 52 y.o.white female who presents today for follow up via telehealth.     This provider is located in [place], [place] using a secure [employer] Video Visit through [employer]. Patient is being seen remotely via telehealth at their home address in [place], and stated they are in a secure environment for this session. The patient's condition being diagnosed/treated is appropriate for telemedicine. The provider identified herself as well as her credentials.   The patient, and/or patients guardian, consent to be seen remotely, and when consent is given they understand that the consent allows for patient identifiable information to be sent to a third party as needed.   They may refuse to be seen remotely at any time. The electronic data is encrypted and password protected, and the patient and/or guardian has been advised of the potential risks to privacy not withstanding such measures.   PT Identifiers used: Name and .    You have chosen to receive care through a telehealth visit.  Do you consent to use a video/audio connection for your medical care today? Yes    Chief Complaint: Depression and anxiety, pseudoseizures    History of Present Illness:    2022 had another EGD with botox, still seeing GI at [facility]  Patient reports doing well on her current psych meds, need for changes.   Her first granddaughter Rubina Skinner born in April, one month early, and when Mom goes back to work next week, Milka and the ZOË will alternate days watching her.   Everything is going well except for difficulty sleeping.  She reports that she took 2 Seroquel 1 night and slept great, would like an increase.  Dr. Gross, GI at [facility], going to put a stimulator in her stomach to help her N/V  She has been walking daily and trying to lose weight.  She has been crocheting a lot, making clothes for the baby and dog collars  Taken off phenobarbital and now on Vimpat  Her 21 yr old daughter is  struggling with alcohol and mental health issues due to abuse  Her 25 yr old son is autistic  Panic attacks have improved   Depression 0/10, doing well  Denies SI/HI  Anxiety 8/10 at its worst  She is still doing better on the Xanax than she was with the Valium, switched in   She was discharged from her neurologist office due to missing appts while hospitalized, needs a new provider  She has 3 kids and they all have May 22 birthdays    The following portions of the patient's history were reviewed and updated as appropriate: allergies, current medications, past family history, past medical history, past social history, past surgical history and problem list.    PAST PSYCHIATRIC HISTORY  Axis I  Affective/Bipoloar Disorder, Anxiety/Panic Disorder, Substance Abuse Disorder  Axis II  None    PAST OUTPATIENT TREATMENT  Diagnosis treated:  Affective Disorder, Anxiety/Panic Disorder, Substance/Alcohol Abuse  Treatment Type:  Individual Therapy, Medication Management  Prior Psychiatric Medications:  Abilify  Risperidone  Vraylar  Valium  Xanax  Lamictal  Gabapentin  Trazodone, feels 'prickly all over'  Buspar  Seroquel  Support Groups:  None  Sequelae Of Mental Disorder:  social isolation, family disruption, emotional distress      Interval History  No change    Side Effects  None     Mental status exam was reviewed and compared to her 20 visit and appropriate updates were made.    Past Medical History:  Past Medical History:   Diagnosis Date   • Depression    • Disease of thyroid gland    • Hyperlipidemia    • Migraines    • On home O2 2021    3 lmp   • Seizures (HCC)        Social History:  Social History     Socioeconomic History   • Marital status:    Tobacco Use   • Smoking status: Former     Packs/day: 0.25     Types: Cigarettes     Quit date: 2020     Years since quittin.0   • Smokeless tobacco: Never   Vaping Use   • Vaping Use: Never used   Substance and Sexual Activity   • Alcohol use:  No   • Drug use: No   • Sexual activity: Never       Family History:  Family History   Problem Relation Age of Onset   • No Known Problems Mother    • No Known Problems Father    • No Known Problems Sister    • No Known Problems Brother    • Alcohol abuse Daughter    • No Known Problems Son    • No Known Problems Maternal Grandmother    • No Known Problems Paternal Grandmother    • No Known Problems Maternal Aunt    • No Known Problems Paternal Aunt        Past Surgical History:  Past Surgical History:   Procedure Laterality Date   • ABDOMINAL SURGERY     •  SECTION      x3   • ENDOSCOPY N/A 2022    Procedure: EGD WITH DILATATION (BOUGIE #56) AND INJECTION OF BOTOX;  Surgeon: Rito Le MD;  Location: [facility] ENDOSCOPY;  Service: Gastroenterology;  Laterality: N/A;  POST: LOWER ESOPHAGEAL STRICTURE AND CRICOPHARYNGEAL STRICUTRE   • ENDOSCOPY W/ PEG TUBE PLACEMENT N/A 2020    Procedure: ESOPHAGOGASTRODUODENOSCOPY WITH PERCUTANEOUS ENDOSCOPIC GASTROSTOMY TUBE INSERTION;  Surgeon: Rito Le MD;  Location: [facility] ENDOSCOPY;  Service: Gastroenterology;  Laterality: N/A;   • HERNIA REPAIR     • HYSTERECTOMY     • PEG TUBE REMOVAL         Problem List:  Patient Active Problem List   Diagnosis   • Bipolar disorder, in full remission, most recent episode depressed ([facility])   • Depression   • Mood disorder in conditions classified elsewhere   • Seizures ([facility])   • Opioid withdrawal ([facility])   • Altered level of consciousness   • Asthma   • Bronchitis, acute   • General medical exam   • Low back pain   • Syncope   • Unspecified sprain of unspecified wrist, initial encounter   • Upper respiratory tract infection   • Intractable nausea with vomiting   • Obesity   • Gastroesophageal reflux disease   • Tobacco use   • Nausea and vomiting   • Borderline hypoglycemic episodes   • Migraine headache   • Pseudoseizure   • Seizure ([facility])   • Panic disorder with agoraphobia   • Gastroparesis   • Hyperlipidemia   •  Hypothyroidism   • Sprain of ankle   • Bipolar disorder (HCC)   • Dissociative convulsions       Allergy:   Allergies   Allergen Reactions   • Sulfa Antibiotics Hives   • Tramadol Hcl Mental Status Change        Discontinued Medications:  There are no discontinued medications.    Current Medications:   Current Outpatient Medications   Medication Sig Dispense Refill   • ALPRAZolam (XANAX) 2 MG tablet TAKE 1 TABLET BY MOUTH THREE TIMES DAILY AS NEEDED FOR ANXIETY 90 tablet 2   • amitriptyline (ELAVIL) 25 MG tablet TAKE 1 TABLET BY MOUTH EVERY NIGHT AT BEDTIME 90 tablet 1   • atorvastatin (Lipitor) 20 MG tablet Take 1 tablet by mouth Daily. 90 tablet 1   • budesonide-formoterol (Symbicort) 160-4.5 MCG/ACT inhaler Inhale 2 puffs 2 (Two) Times a Day. 6 g 12   • busPIRone (BUSPAR) 15 MG tablet TAKE 1 TABLET BY MOUTH THREE TIMES DAILY 270 tablet 1   • gabapentin (NEURONTIN) 800 MG tablet TAKE 1 TABLET BY MOUTH THREE TIMES DAILY 270 tablet 1   • hydrOXYzine (ATARAX) 25 MG tablet TAKE 1 TABLET BY MOUTH THREE TIMES DAILY AS NEEDED FOR ANXIETY 270 tablet 1   • ipratropium-albuterol (DUO-NEB) 0.5-2.5 mg/3 ml nebulizer Take 3 mL by nebulization 4 (Four) Times a Day As Needed for Wheezing. 360 mL 1   • lacosamide (VIMPAT) 100 MG tablet tablet Take 1 tablet by mouth Every 12 (Twelve) Hours for 30 days. 60 tablet 0   • lamoTRIgine (LaMICtal) 100 MG tablet 3 (Three) Times a Day.     • levETIRAcetam (KEPPRA) 750 MG tablet Take 1 tablet by mouth 3 (Three) Times a Day.     • levothyroxine (SYNTHROID, LEVOTHROID) 25 MCG tablet TAKE 1 TABLET BY MOUTH DAILY FOR THYROID 30 tablet 0   • Motegrity 2 MG tablet Take 1 tablet by mouth Daily.     • nystatin (MYCOSTATIN) 960139 UNIT/GM powder Apply  topically to the appropriate area as directed 3 (Three) Times a Day As Needed (rash). 30 g 1   • pantoprazole (PROTONIX) 40 MG EC tablet TAKE 1 TABLET BY MOUTH DAILY 90 tablet 1   • QUEtiapine XR (SEROquel XR) 400 MG 24 hr tablet TAKE 2 TABLETS BY  MOUTH EVERY NIGHT AT BEDTIME 180 tablet 1   • sucralfate (CARAFATE) 1 g tablet Take 1 g by mouth 3 (Three) Times a Day.     • SUMAtriptan (IMITREX) 100 MG tablet TAKE 1 TABLET BY MOUTH 1 TIME AS NEEDED FOR MIGRAINE 8 tablet 5   • Vraylar 1.5 MG capsule capsule TAKE 1 CAPSULE BY MOUTH DAILY 90 capsule 0     No current facility-administered medications for this visit.         Review of Symptoms:    Psychiatric/Behavioral: Negative for agitation, behavioral problems, confusion, decreased concentration, dysphoric mood, hallucinations, self-injury, sleep disturbance and suicidal ideas. The patient is not nervous/anxious and is not hyperactive.        Physical Exam:   not currently breastfeeding.    Mental Status Exam:   Hygiene:   Good  Cooperation:  Cooperative  Eye Contact:  Good  Psychomotor Behavior:  Appropriate  Affect:  Appropriate  Mood: Normal   Hopelessness: Denies  Speech:  Normal  Thought Process:  Goal directed  Thought Content:  Normal  Suicidal:  None  Homicidal:  None  Hallucinations:  None  Delusion:  None  Memory:  Intact  Orientation:  Person, Place, Time and Situation  Reliability:  good  Insight:  Good  Judgement:  Good  Impulse Control:  Good  Physical/Medical Issues:  No      Mental status exam was reviewed and compared to 7/14/22 visit and appropriate updates were made.    PHQ-9 Depression Screening  Little interest or pleasure in doing things? 1-->several days   Feeling down, depressed, or hopeless? 2-->more than half the days   Trouble falling or staying asleep, or sleeping too much? 3-->nearly every day   Feeling tired or having little energy? 3-->nearly every day   Poor appetite or overeating? 3-->nearly every day   Feeling bad about yourself - or that you are a failure or have let yourself or your family down? 3-->nearly every day   Trouble concentrating on things, such as reading the newspaper or watching television? 3-->nearly every day   Moving or speaking so slowly that other people could  have noticed? Or the opposite - being so fidgety or restless that you have been moving around a lot more than usual? 2-->more than half the days   Thoughts that you would be better off dead, or of hurting yourself in some way? 0-->not at all   PHQ-9 Total Score 20   If you checked off any problems, how difficult have these problems made it for you to do your work, take care of things at home, or get along with other people? somewhat difficult           Former smoker    I advised Milka of the risks of tobacco use.     Lab Results:   No visits with results within 3 Month(s) from this visit.   Latest known visit with results is:   Lab on 06/08/2022   Component Date Value Ref Range Status   • Glucose 06/08/2022 94  65 - 99 mg/dL Final   • BUN 06/08/2022 9  6 - 20 mg/dL Final   • Creatinine 06/08/2022 1.08 (H)  0.57 - 1.00 mg/dL Final   • Sodium 06/08/2022 141  136 - 145 mmol/L Final   • Potassium 06/08/2022 4.7  3.5 - 5.2 mmol/L Final   • Chloride 06/08/2022 106  98 - 107 mmol/L Final   • CO2 06/08/2022 25.8  22.0 - 29.0 mmol/L Final   • Calcium 06/08/2022 9.6  8.6 - 10.5 mg/dL Final   • Total Protein 06/08/2022 8.1  6.0 - 8.5 g/dL Final   • Albumin 06/08/2022 4.40  3.50 - 5.20 g/dL Final   • ALT (SGPT) 06/08/2022 18  1 - 33 U/L Final   • AST (SGOT) 06/08/2022 14  1 - 32 U/L Final   • Alkaline Phosphatase 06/08/2022 130 (H)  39 - 117 U/L Final   • Total Bilirubin 06/08/2022 0.2  0.0 - 1.2 mg/dL Final   • Globulin 06/08/2022 3.7  gm/dL Final   • A/G Ratio 06/08/2022 1.2  g/dL Final   • BUN/Creatinine Ratio 06/08/2022 8.3  7.0 - 25.0 Final   • Anion Gap 06/08/2022 9.2  5.0 - 15.0 mmol/L Final   • eGFR 06/08/2022 61.2  >60.0 mL/min/1.73 Final    National Kidney Foundation and American Society of Nephrology (ASN) Task Force recommended calculation based on the Chronic Kidney Disease Epidemiology Collaboration (CKD-EPI) equation refit without adjustment for race.   • Total Cholesterol 06/08/2022 243 (H)  0 - 200 mg/dL  Final   • Triglycerides 06/08/2022 129  0 - 150 mg/dL Final   • HDL Cholesterol 06/08/2022 83 (H)  40 - 60 mg/dL Final   • LDL Cholesterol  06/08/2022 138 (H)  0 - 100 mg/dL Final   • VLDL Cholesterol 06/08/2022 22  5 - 40 mg/dL Final   • LDL/HDL Ratio 06/08/2022 1.62   Final   • TSH 06/08/2022 1.990  0.270 - 4.200 uIU/mL Final       Assessment & Plan   Problems Addressed this Visit        Mental Health    Bipolar disorder, in full remission, most recent episode depressed (HCC) - Primary (Chronic)    Panic disorder with agoraphobia (Chronic)   Diagnoses       Codes Comments    Bipolar disorder, in full remission, most recent episode depressed (HCC)    -  Primary ICD-10-CM: F31.76  ICD-9-CM: 296.56     Panic disorder with agoraphobia     ICD-10-CM: F40.01  ICD-9-CM: 300.21           Visit Diagnoses:    ICD-10-CM ICD-9-CM   1. Bipolar disorder, in full remission, most recent episode depressed (HCC)  F31.76 296.56   2. Panic disorder with agoraphobia  F40.01 300.21       TREATMENT PLAN/GOALS: Continue supportive psychotherapy efforts and medications as indicated. Treatment and medication options discussed during today's visit. Patient ackowledged and verbally consented to continue with current treatment plan and was educated on the importance of compliance with treatment and follow-up appointments.    MEDICATION ISSUES:  INSPECT reviewed as expected  Discussed medication options and treatment plan of prescribed medication as well as the risks, benefits, and side effects including potential falls, possible impaired driving and metabolic adversities among others. Patient is agreeable to call the office with any worsening of symptoms or onset of side effects. Patient is agreeable to call 911 or go to the nearest ER should he/she begin having SI/HI. No medication side effects or related complaints today.     Patient is working on her weight with diet and exercise.  Continue Xanax 2mg TID prn, still struggling with a lot of  anxiety so left dosage the same for now.   Continue Vraylar 1.5mg daily for bipolar  Continue Lamictal 100 mg in the AM and two at bedtime for mood stabilizer  Continue Neurontin 800 mg TID for mood stabilizer  Increase Seroquel XR 400mg to two tabs at bedtime for sleep  Continue Buspar 15mg Tid for anxiety  Urine drug screen done March 2022, consistent.    Continue hydroxyzine 25 mg 3 times daily as needed for anxiety, can take 2 at bedtime for sleep    MEDS ORDERED DURING VISIT:  No orders of the defined types were placed in this encounter.      Return in about 4 months (around 3/22/2023) for video visit.        This document has been electronically signed by Love Bourgeois PA-C  November 28, 2022 22:00 EST  Subjective

## 2022-11-26 DIAGNOSIS — F40.01 PANIC DISORDER WITH AGORAPHOBIA: Chronic | ICD-10-CM

## 2022-11-27 RX ORDER — QUETIAPINE 400 MG/1
800 TABLET, FILM COATED, EXTENDED RELEASE ORAL
Qty: 180 TABLET | Refills: 1 | Status: SHIPPED | OUTPATIENT
Start: 2022-11-27

## 2022-11-28 DIAGNOSIS — F40.01 PANIC DISORDER WITH AGORAPHOBIA: Chronic | ICD-10-CM

## 2022-11-28 RX ORDER — ALPRAZOLAM 2 MG/1
TABLET ORAL
Qty: 90 TABLET | Refills: 2 | Status: SHIPPED | OUTPATIENT
Start: 2022-11-28 | End: 2023-02-13

## 2022-11-28 RX ORDER — ALPRAZOLAM 2 MG/1
2 TABLET ORAL 3 TIMES DAILY PRN
Qty: 90 TABLET | Refills: 0 | OUTPATIENT
Start: 2022-11-28

## 2022-11-30 RX ORDER — LEVOTHYROXINE SODIUM 0.03 MG/1
TABLET ORAL
Qty: 30 TABLET | Refills: 0 | Status: SHIPPED | OUTPATIENT
Start: 2022-11-30 | End: 2022-12-23

## 2022-12-02 DIAGNOSIS — R21 RASH: ICD-10-CM

## 2022-12-04 DIAGNOSIS — R56.9 SEIZURE: ICD-10-CM

## 2022-12-05 RX ORDER — NYSTATIN 100000 [USP'U]/G
POWDER TOPICAL
Qty: 30 G | Refills: 1 | Status: SHIPPED | OUTPATIENT
Start: 2022-12-05

## 2022-12-05 RX ORDER — LEVETIRACETAM 500 MG/1
TABLET ORAL
Qty: 540 TABLET | Refills: 3 | Status: SHIPPED | OUTPATIENT
Start: 2022-12-05

## 2022-12-05 RX ORDER — ATORVASTATIN CALCIUM 20 MG/1
20 TABLET, FILM COATED ORAL DAILY
Qty: 90 TABLET | Refills: 1 | Status: SHIPPED | OUTPATIENT
Start: 2022-12-05

## 2022-12-23 RX ORDER — LEVOTHYROXINE SODIUM 0.03 MG/1
TABLET ORAL
Qty: 30 TABLET | Refills: 0 | Status: SHIPPED | OUTPATIENT
Start: 2022-12-23 | End: 2023-01-23

## 2023-01-09 ENCOUNTER — PATIENT OUTREACH (OUTPATIENT)
Dept: CASE MANAGEMENT | Facility: OTHER | Age: 53
End: 2023-01-09
Payer: MEDICARE

## 2023-01-09 ENCOUNTER — TELEPHONE (OUTPATIENT)
Dept: CASE MANAGEMENT | Facility: OTHER | Age: 53
End: 2023-01-09
Payer: MEDICARE

## 2023-01-09 DIAGNOSIS — E78.2 MIXED HYPERLIPIDEMIA: Primary | ICD-10-CM

## 2023-01-09 DIAGNOSIS — K31.84 GASTROPARESIS: ICD-10-CM

## 2023-01-09 PROCEDURE — 99490 CHRNC CARE MGMT STAFF 1ST 20: CPT | Performed by: NURSE PRACTITIONER

## 2023-01-09 PROCEDURE — 99439 CHRNC CARE MGMT STAF EA ADDL: CPT | Performed by: NURSE PRACTITIONER

## 2023-01-09 NOTE — OUTREACH NOTE
AMBULATORY CASE MANAGEMENT NOTE    Name and Relationship of Patient/Support Person: Milka Espinoza - Self    Adult Patient Profile  Questions/Answers    Flowsheet Row Most Recent Value   Symptoms/Conditions Managed at Home genitourinary, cardiovascular   Barriers to Managing Health lack of transportation, stress of chronic illness   Cardiovascular Symptoms/Conditions high blood cholesterol   Cardiovascular Management Strategies medication therapy, exercise, activity, diet modification, routine screening   Genitourinary Symptoms/Conditions chronic kidney disease   Genitourinary Management Strategies activity, exercise, fluid modification   Identifying Health Goals keep illness under control, having a ride to appointments          CCM Interim Update    Patient enrolled in [facility] as of this date.  Patient has CKD and hyperlipdemia without labs.  Will assist in scheduling lab work     Patient has dx of gastroparesis, has scheduled upcoming infusion with specialist.  Also has f/u with gi on 1/11.  Will follow up related to results.          Education Documentation  No documentation found.        KUSUM THRASHER  Ambulatory Case Management    1/9/2023, 11:28 EST

## 2023-01-09 NOTE — TELEPHONE ENCOUNTER
Patient returned call at 1302.  Stated that she is going to try and come to the office this week to complete ordered lab work.  She stated that she will call back whenever she talks to her friend that provides her with transportation.    Left message with patient to call back regarding lab work needed.

## 2023-01-10 ENCOUNTER — LAB (OUTPATIENT)
Dept: FAMILY MEDICINE CLINIC | Facility: CLINIC | Age: 53
End: 2023-01-10
Payer: MEDICARE

## 2023-01-10 DIAGNOSIS — R50.9 LOW GRADE FEVER: ICD-10-CM

## 2023-01-10 DIAGNOSIS — Z12.11 SCREENING FOR COLON CANCER: Primary | ICD-10-CM

## 2023-01-10 DIAGNOSIS — R10.31 RLQ ABDOMINAL PAIN: ICD-10-CM

## 2023-01-10 DIAGNOSIS — D50.9 IRON DEFICIENCY ANEMIA, UNSPECIFIED IRON DEFICIENCY ANEMIA TYPE: ICD-10-CM

## 2023-01-10 LAB
ALBUMIN SERPL-MCNC: 4.1 G/DL (ref 3.5–5.2)
ALBUMIN/GLOB SERPL: 0.9 G/DL
ALP SERPL-CCNC: 132 U/L (ref 39–117)
ALT SERPL W P-5'-P-CCNC: 23 U/L (ref 1–33)
ANION GAP SERPL CALCULATED.3IONS-SCNC: 10 MMOL/L (ref 5–15)
AST SERPL-CCNC: 27 U/L (ref 1–32)
BASOPHILS # BLD AUTO: 0.1 10*3/MM3 (ref 0–0.2)
BASOPHILS NFR BLD AUTO: 0.9 % (ref 0–1.5)
BILIRUB SERPL-MCNC: 0.3 MG/DL (ref 0–1.2)
BILIRUB UR QL STRIP: NEGATIVE
BUN SERPL-MCNC: 9 MG/DL (ref 6–20)
BUN/CREAT SERPL: 7.4 (ref 7–25)
CALCIUM SPEC-SCNC: 9.3 MG/DL (ref 8.6–10.5)
CHLORIDE SERPL-SCNC: 104 MMOL/L (ref 98–107)
CLARITY UR: CLEAR
CO2 SERPL-SCNC: 26 MMOL/L (ref 22–29)
COLOR UR: YELLOW
CREAT SERPL-MCNC: 1.22 MG/DL (ref 0.57–1)
DEPRECATED RDW RBC AUTO: 52.5 FL (ref 37–54)
EGFRCR SERPLBLD CKD-EPI 2021: 52.8 ML/MIN/1.73
EOSINOPHIL # BLD AUTO: 0.3 10*3/MM3 (ref 0–0.4)
EOSINOPHIL NFR BLD AUTO: 3.6 % (ref 0.3–6.2)
ERYTHROCYTE [DISTWIDTH] IN BLOOD BY AUTOMATED COUNT: 19.3 % (ref 12.3–15.4)
GLOBULIN UR ELPH-MCNC: 4.6 GM/DL
GLUCOSE SERPL-MCNC: 93 MG/DL (ref 65–99)
GLUCOSE UR STRIP-MCNC: NEGATIVE MG/DL
HCT VFR BLD AUTO: 34.3 % (ref 34–46.6)
HGB BLD-MCNC: 10.3 G/DL (ref 12–15.9)
HGB UR QL STRIP.AUTO: NEGATIVE
KETONES UR QL STRIP: NEGATIVE
LEUKOCYTE ESTERASE UR QL STRIP.AUTO: NEGATIVE
LYMPHOCYTES # BLD AUTO: 2 10*3/MM3 (ref 0.7–3.1)
LYMPHOCYTES NFR BLD AUTO: 23.8 % (ref 19.6–45.3)
MCH RBC QN AUTO: 23.1 PG (ref 26.6–33)
MCHC RBC AUTO-ENTMCNC: 30.1 G/DL (ref 31.5–35.7)
MCV RBC AUTO: 76.9 FL (ref 79–97)
MONOCYTES # BLD AUTO: 0.5 10*3/MM3 (ref 0.1–0.9)
MONOCYTES NFR BLD AUTO: 5.5 % (ref 5–12)
NEUTROPHILS NFR BLD AUTO: 5.6 10*3/MM3 (ref 1.7–7)
NEUTROPHILS NFR BLD AUTO: 66.2 % (ref 42.7–76)
NITRITE UR QL STRIP: NEGATIVE
NRBC BLD AUTO-RTO: 0.1 /100 WBC (ref 0–0.2)
PH UR STRIP.AUTO: 6 [PH] (ref 5–8)
PLATELET # BLD AUTO: 87 10*3/MM3 (ref 140–450)
PMV BLD AUTO: 9.5 FL (ref 6–12)
POTASSIUM SERPL-SCNC: 4.3 MMOL/L (ref 3.5–5.2)
PROT SERPL-MCNC: 8.7 G/DL (ref 6–8.5)
PROT UR QL STRIP: NEGATIVE
RBC # BLD AUTO: 4.46 10*6/MM3 (ref 3.77–5.28)
SODIUM SERPL-SCNC: 140 MMOL/L (ref 136–145)
SP GR UR STRIP: 1.02 (ref 1–1.03)
UROBILINOGEN UR QL STRIP: NORMAL
WBC NRBC COR # BLD: 8.4 10*3/MM3 (ref 3.4–10.8)

## 2023-01-10 PROCEDURE — 80053 COMPREHEN METABOLIC PANEL: CPT | Performed by: FAMILY MEDICINE

## 2023-01-10 PROCEDURE — 81003 URINALYSIS AUTO W/O SCOPE: CPT | Performed by: FAMILY MEDICINE

## 2023-01-10 PROCEDURE — 36415 COLL VENOUS BLD VENIPUNCTURE: CPT | Performed by: FAMILY MEDICINE

## 2023-01-10 PROCEDURE — 85025 COMPLETE CBC W/AUTO DIFF WBC: CPT | Performed by: FAMILY MEDICINE

## 2023-01-10 RX ORDER — DOXYCYCLINE HYCLATE 50 MG/1
324 CAPSULE, GELATIN COATED ORAL
Qty: 90 TABLET | Refills: 1 | Status: SHIPPED | OUTPATIENT
Start: 2023-01-10

## 2023-01-12 ENCOUNTER — PATIENT OUTREACH (OUTPATIENT)
Dept: CASE MANAGEMENT | Facility: OTHER | Age: 53
End: 2023-01-12
Payer: MEDICARE

## 2023-01-12 DIAGNOSIS — E78.2 MIXED HYPERLIPIDEMIA: Primary | ICD-10-CM

## 2023-01-12 DIAGNOSIS — K31.84 GASTROPARESIS: ICD-10-CM

## 2023-01-12 NOTE — OUTREACH NOTE
AMBULATORY CASE MANAGEMENT NOTE    Name and Relationship of Patient/Support Person: Milka Espinoza M - Self    CCM Interim Update    Contacted patient to f/u on recent lab work.  Scheduled her a visit in the office for Thursday 1/19/23.        Education Documentation  No documentation found.        KUSUM THRASHER  Ambulatory Case Management    1/12/2023, 11:49 EST

## 2023-01-18 ENCOUNTER — PATIENT OUTREACH (OUTPATIENT)
Dept: CASE MANAGEMENT | Facility: OTHER | Age: 53
End: 2023-01-18
Payer: MEDICARE

## 2023-01-18 DIAGNOSIS — E78.2 MIXED HYPERLIPIDEMIA: Primary | ICD-10-CM

## 2023-01-18 DIAGNOSIS — K31.84 GASTROPARESIS: ICD-10-CM

## 2023-01-18 NOTE — OUTREACH NOTE
AMBULATORY CASE MANAGEMENT NOTE    Name and Relationship of Patient/Support Person:  -     CCM Interim Update    Spoke to the patient to remind her of scheduled appointment tomorrow here in the office.  She verbalized understanding.        Education Documentation  No documentation found.        KUSUM THRASHER  Ambulatory Case Management    1/18/2023, 10:29 EST

## 2023-01-19 ENCOUNTER — TELEPHONE (OUTPATIENT)
Dept: FAMILY MEDICINE CLINIC | Facility: CLINIC | Age: 53
End: 2023-01-19
Payer: MEDICARE

## 2023-01-19 DIAGNOSIS — S82.401A CLOSED FRACTURE OF RIGHT TIBIA AND FIBULA, INITIAL ENCOUNTER: Primary | ICD-10-CM

## 2023-01-19 DIAGNOSIS — S82.201A CLOSED FRACTURE OF RIGHT TIBIA AND FIBULA, INITIAL ENCOUNTER: Primary | ICD-10-CM

## 2023-01-19 DIAGNOSIS — S82.891A CLOSED FRACTURE OF RIGHT ANKLE, INITIAL ENCOUNTER: ICD-10-CM

## 2023-01-19 NOTE — TELEPHONE ENCOUNTER
Arielle called from Ortho surg [facility], patient was seen last night at [facility] and has some bad fx's. Right tibia and fibula and right foot and right ankle. With how her insurance is she needs a referral faxed. Fax # [phone]

## 2023-01-22 DIAGNOSIS — F40.01 PANIC DISORDER WITH AGORAPHOBIA: Chronic | ICD-10-CM

## 2023-01-23 RX ORDER — GABAPENTIN 800 MG/1
TABLET ORAL
Qty: 270 TABLET | Refills: 1 | Status: SHIPPED | OUTPATIENT
Start: 2023-01-23

## 2023-01-23 RX ORDER — BUSPIRONE HYDROCHLORIDE 15 MG/1
TABLET ORAL
Qty: 270 TABLET | Refills: 1 | Status: SHIPPED | OUTPATIENT
Start: 2023-01-23

## 2023-01-23 RX ORDER — HYDROXYZINE HYDROCHLORIDE 25 MG/1
TABLET, FILM COATED ORAL
Qty: 270 TABLET | Refills: 1 | Status: SHIPPED | OUTPATIENT
Start: 2023-01-23

## 2023-01-23 RX ORDER — LEVOTHYROXINE SODIUM 0.03 MG/1
TABLET ORAL
Qty: 30 TABLET | Refills: 0 | Status: SHIPPED | OUTPATIENT
Start: 2023-01-23 | End: 2023-02-20

## 2023-01-23 RX ORDER — CARIPRAZINE 1.5 MG/1
CAPSULE, GELATIN COATED ORAL
Qty: 90 CAPSULE | Refills: 0 | OUTPATIENT
Start: 2023-01-23

## 2023-01-23 RX ORDER — CARIPRAZINE 1.5 MG/1
CAPSULE, GELATIN COATED ORAL
Qty: 90 CAPSULE | Refills: 1 | Status: SHIPPED | OUTPATIENT
Start: 2023-01-23

## 2023-01-24 ENCOUNTER — TELEPHONE (OUTPATIENT)
Dept: CASE MANAGEMENT | Facility: OTHER | Age: 53
End: 2023-01-24
Payer: MEDICARE

## 2023-01-25 ENCOUNTER — PATIENT OUTREACH (OUTPATIENT)
Dept: CASE MANAGEMENT | Facility: OTHER | Age: 53
End: 2023-01-25
Payer: MEDICARE

## 2023-01-25 DIAGNOSIS — K31.84 GASTROPARESIS: ICD-10-CM

## 2023-01-25 DIAGNOSIS — F32.A DEPRESSION, UNSPECIFIED DEPRESSION TYPE: ICD-10-CM

## 2023-01-25 DIAGNOSIS — E78.2 MIXED HYPERLIPIDEMIA: Primary | ICD-10-CM

## 2023-01-25 NOTE — OUTREACH NOTE
"AMBULATORY CASE MANAGEMENT NOTE    Name and Relationship of Patient/Support Person: Olga Milka M - Self    CCM Interim Update    Outreach successful with patient.  SDOH and Advance Directives addressed.  Will put in Social Work consult based off of the answers to the questions asked today.      Reminded patient of appointment in PCP office on Wednesday 2/2/23.    Patient stated that she had a \"really bad seizure last Thursday and I broke the tibia and fibula in my right leg\".  Patient has a documented history of seizures.  She is taking Keppra as prescribed.  The plan is to have surgery \"tomorrow\" as she states she has seen Dr. Osborne at [facility] in [place], [place].      She is staying with her girlfriend and  during this situation as her apartment is on the second floor of the building. She states that both of these individuals are of great support to her \"all the time\".    She also has a nurse coming out to the house for her infusions related to her gastroparesis.        SDOH updated and reviewed with the patient during this program:    Financial Resource Strain: High Risk   • Difficulty of Paying Living Expenses: Hard      Physical Activity: Inactive   • Days of Exercise per Week: 0 days   • Minutes of Exercise per Session: 0 min      Food Insecurity: Food Insecurity Present   • Worried About Running Out of Food in the Last Year: Often true   • Ran Out of Food in the Last Year: Often true      Social Connections: Socially Isolated   • Frequency of Communication with Friends and Family: Once a week   • Frequency of Social Gatherings with Friends and Family: Three times a week   • Attends Oriental orthodox Services: Never   • Active Member of Clubs or Organizations: No   • Attends Club or Organization Meetings: Never   • Marital Status:       Transportation Needs: No Transportation Needs   • Lack of Transportation (Medical): No   • Lack of Transportation (Non-Medical): No      Housing " Stability: High Risk   • Unable to Pay for Housing in the Last Year: Yes   • Number of Places Lived in the Last Year: 1   • Unstable Housing in the Last Year: No      Stress: Stress Concern Present   • Feeling of Stress : Very much         Education Documentation  No documentation found.        KUSUM THRASHER  Ambulatory Case Management    1/25/2023, 09:46 EST

## 2023-01-30 ENCOUNTER — PATIENT OUTREACH (OUTPATIENT)
Dept: CASE MANAGEMENT | Facility: OTHER | Age: 53
End: 2023-01-30
Payer: MEDICARE

## 2023-01-30 DIAGNOSIS — E78.2 MIXED HYPERLIPIDEMIA: Primary | ICD-10-CM

## 2023-01-30 DIAGNOSIS — K31.84 GASTROPARESIS: ICD-10-CM

## 2023-01-30 NOTE — OUTREACH NOTE
[facility] End of Month Documentation    This Chronic Medical Management Care Plan for Milka Espinoza, 52 y.o. female, has been a new plan of care implemented; established; monitored and managed and a new plan of care implemented for the month of January.  A cumulative time of 82 minutes was spent on this patient record this month, including phone call with patient; chart review; electronic communication with primary care provider.    Regarding the patient's problems: has Bipolar disorder, in full remission, most recent episode depressed (HCC); Depression; Mood disorder in conditions classified elsewhere; Seizures (HCC); Opioid withdrawal (HCC); Altered level of consciousness; Asthma; Bronchitis, acute; General medical exam; Low back pain; Syncope; Unspecified sprain of unspecified wrist, initial encounter; Upper respiratory tract infection; Intractable nausea with vomiting; Obesity; Gastroesophageal reflux disease; Tobacco use; Nausea and vomiting; Borderline hypoglycemic episodes; Migraine headache; Pseudoseizure; Seizure (HCC); Panic disorder with agoraphobia; Gastroparesis; Hyperlipidemia; Hypothyroidism; Sprain of ankle; Bipolar disorder (HCC); and Dissociative convulsions on their problem list., the following items were addressed: medical records; medications and any changes can be found within the plan section of the note.  A detailed listing of time spent for chronic care management is tracked within each outreach encounter.  Current medications include:  has a current medication list which includes the following prescription(s): atorvastatin, levothyroxine, nystatin, alprazolam, amitriptyline, budesonide-formoterol, buspirone, ferrous gluconate, gabapentin, hydroxyzine, ipratropium-albuterol, lacosamide, lamotrigine, levetiracetam, levetiracetam, motegrity, pantoprazole, quetiapine xr, sucralfate, sumatriptan, and vraylar. and the patient is reported to be patient is compliant with medication protocol,   Medications are reported to be effective.  Regarding these diagnoses, referrals were made to the following provider(s):  n/a.  All notes on chart for PCP to review.    The patient was monitored remotely for activity level; mood & behavior; medications; weight.    The patient's physical needs include:  physical healthcare.     The patient's mental support needs include:  continued support    The patient's cognitive support needs include:  continued support    The patient's psychosocial support needs include:  continued support    The patient's functional needs include: physical healthcare    The patient's environmental needs include:  no access to transportation    Care Plan overall comments:  n/a    Refer to previous outreach notes for more information on the areas listed above.    Monthly Billing Diagnoses  (E78.2) Mixed hyperlipidemia    (K31.84) Gastroparesis    Medications   · Medications have been reconciled    Care Plan progress this month:      Recently Modified Care Plans Updates made since 12/30/2022 12:00 AM     Depression (Adult)         Problem Priority Last Modified     Depression Identification (Depression) --  1/9/2023 11:06 AM by Roverto Velazquez RN              Goal Recent Progress Last Modified     Depressive Symptoms Identified --  1/9/2023 11:06 AM by Roverto Velazquez RN     Evidence-based guidance:   Identify risk for depression by reviewing presenting symptoms and risk factors.   Review use of medications that contribute to depression such as steroid, narcotic, sedative, antihypertensive, beta blocker, cytoxic agent.   Review related metabolic processes, including infection, anemia, thyroid dysfunction, kidney failure, heart failure, alcohol or substance use.   Perform depression screening using standardized tools to obtain baseline intensity of depressive symptoms.   Perform or refer for a full diagnostic interview when positive screening results are noted; use DSM-5 criteria to determine  appropriate diagnosis (e.g., major depression, persistent depressive disorder, unspecified depressive    disorder).    Notes:            Task Due Date Last Modified     Identify Depressive Symptoms and Facilitate Treatment --  1/9/2023 11:06 AM by Roverto Velazquez RN     Care Management Activities:      - not discussed during this outreach      Notes:            Problem Priority Last Modified     Symptoms (Depression) --  1/9/2023 11:06 AM by Roverto Velazquez RN              Goal Recent Progress Last Modified     Symptoms Monitored and Managed --  1/9/2023 11:06 AM by Roverto Velazquez RN     Evidence-based guidance:   Promote use of complementary and alternative medicine therapy including exercise, relaxation, music, bright light or dance therapy, acupuncture, aromatherapy.   Monitor and promote nutrition, pain control and sleep/rest; provide guidance regarding sleep hygiene techniques when patient presents with insomnia.   Explore treatment options in an atmosphere of hope and optimism; support the belief that recovery is possible.   Facilitate and advocate for mental health treatment that may include depression-focused psychotherapy, cognitive-behavioral therapy, social-skills training, relaxation training, problem-solving therapy.   Prepare for use of pharmacotherapy, such as selective serotonin-reuptake inhibitor, tricyclic antidepressant, serotonin norepinephrine-reuptake inhibitor, amino ketone, monoamine-oxidase inhibitor based on presentation and risk factors.   Caution patients who choose over-the-counter S-adenosyl methionine (GAMALIEL-e) or Whiting's Wort regarding potential drug interactions; engage pharmacist in consultation.   Monitor and manage response to medication and therapy regularly; consider weekly for the first month, then monthly after 4 to 8 weeks of treatment until full remission or for 6 to 24 months; anticipate adjustments to plan.   Prepare patient for potential long-term pharmacologic  treatment that may prevent a relapse.   Facilitate shared decision-making regarding treatment, particularly for major depression, that may include electroconvulsive therapy or transcranial magnetic stimulation.   Promote development of physical activity plan to increase the secretion of neurobiological markers, promote social interaction, distraction and confidence-building.   Explore means to support work reintegration, such as modified working hours, peer support or coaching and community job programs.   Consider referral to community-based health program for support and group activities, such as exercise classes.    Notes:            Task Due Date Last Modified     Alleviate Barriers to Depression Treatment --  1/9/2023 11:06 AM by Roverto Velazquez RN     Care Management Activities:      - not discussed during this outreach      Notes:            Problem Priority Last Modified     Harm or Injury (Depression) --  1/9/2023 11:06 AM by Roverto Velazquez RN              Goal Recent Progress Last Modified     Harm or Injury Prevented --  1/9/2023 11:06 AM by Roverto Velazquez RN     Evidence-based guidance:   Explore self-harm or suicidal tendencies compassionately, yet directly, by asking about suicidal ideation, attempt history, family history and history of self-harming behaviors.   Make immediate arrangements for evaluation at a local emergency department, community mental health agency or other psychiatric service when patient expresses positive suicidal ideation with a plan and access to lethal means.   Provide anticipatory guidance to remove lethal medication and firearms from home.   Ensure adequate supervision is provided to monitor for increasing risk factors; provide emergency contact information and instructions.   Assess for injurious side effects of pharmacologic therapy, such as drug interactions, sedation and insomnia, as well as cardiovascular, anticholinergic or sexual effects.   Engage family in  providing a safe home environment and closely monitoring changes in the patient's emotional state that may include negativity, hopelessness and suicidal ideation.   Maintain frequent, structured and supportive contact by phone, office or home visit; collaborate closely with behavioral health specialists and psychiatry.    Notes:            Task Due Date Last Modified     Identify and Reduce Risk to Safety --  1/9/2023 11:06 AM by Roverto Velazquez RN     Care Management Activities:      - not discussed during this outreach      Notes:            Problem Priority Last Modified     Response to Treatment (Depression) --  1/9/2023 11:06 AM by Roverto Velazquez RN              Goal Recent Progress Last Modified     Response to Treatment Maximized --  1/9/2023 11:06 AM by Roverto Velazquez RN     Evidence-based guidance:   Engage patient in conversation about the perceived benefits of mental health treatment and quality of therapeutic alliance with his/her mental health professional.   Assess for barriers to attending appointments, such as transportation, financial, sense of slow or little improvement and forgetfulness.   Consider patient resistance to treatment based on stigma related to mental health diagnosis.   Maintain a documented system of ongoing contacts with patient during the first 6 to 12 months of treatment, as missed appointments and disengagement may signal deteriorating condition.   Provide anticipatory guidance about the risk of increased symptoms and potential psychiatric hospitalization for those who have a pattern of nonattendance at mental health appointments.   Re-screen for depressive symptoms at mutually identified intervals.    Notes:            Task Due Date Last Modified     Facilitate Engagement in Mental Health Services --  1/9/2023 11:06 AM by Roverto Velazquez RN     Care Management Activities:      - not discussed during this outreach      Notes:               Chronic Kidney (Adult)      "    Problem Priority Last Modified     Adjustment to Chronic Kidney Disease --  1/9/2023 11:06 AM by Roverto Velazquez, RN              Goal Recent Progress Last Modified     Optimal Coping --  1/9/2023 11:06 AM by Roverto Velazquez, [place]     Evidence-based guidance:   Explore the impact of chronic kidney disease on daily life and mental health; acknowledge and normalize feelings of disempowerment, fear, frustration, diminished self-worth and withdrawal.   Acknowledge the importance and difficulty for both the patient and caregiver of coming to terms with living with chronic kidney disease.   Support adjustment to  €œnew normal\" with focus on maintaining daily life as closely as possible to life before diagnosis.   Assist patient and family with life transitions including adherence to medical regimen, change in family dynamics or roles, end-of-life care.   Support positive body image and self-esteem.   Engage patient in early, proactive and ongoing discussion about goals of care and what matters most to them.   Support coping and stress management by recognizing current strategies and assist in developing new strategies such as mindfulness, journaling, relaxation techniques, problem-solving.   Assess and monitor for signs and symptoms of anxiety and depression; provide counseling or refer for outpatient treatment.   Explore the patient and family's understanding of the disease at intervals; reinforce information as often as needed.   Include family or caregiver in counseling and education sessions; focus on emotional support and preparation for the medical and psychosocial challenges ahead.    Notes:            Task Due Date Last Modified     Support Psychological Response to Chronic Kidney Disease --  1/9/2023 11:06 AM by Roverto Velazquez, RN     Care Management Activities:      - not discussed during this outreach      Notes:            Problem Priority Last Modified     Comfort --  1/9/2023 11:06 AM by Caroline" [place] Layne              Goal Recent Progress Last Modified     Comfort Maintained --  1/9/2023 11:06 AM by Roverto Velazquez RN     Evidence-based guidance:   Screen for presence of fatigue using a quantitative (1 to 10) or a word scale (mild, moderate, severe); include onset, pattern, duration, any change over time, alleviating and contributing factors including reversible    causes, and degree of interference with daily function.   Acknowledge and validate presence of fatigue and impact on self-care, activities, and relationships.   Promote fatigue management techniques that may include use of a fatigue diary to help determine peak energy levels, balancing activity with rest, healthy diet, and managing anemia.   Promote sleep hygiene techniques that include cool room temperature, darkened bedroom, use of white noise, use of active (with mediation or relaxation) or passive listening to soft, soothing music, cognitive behavior therapy;    provide strategies to reduce itching from pruritus.   In presence of pruritus, encourage use of gentle soap and emollient, warm (not hot) shower or bath, careful application of ice/cool pack; discourage scratching.   Assess, acknowledge, validate effects of disease on relationship(s) including intimacy, sexuality.   Discuss pregnancy, family planning, contraception with women of childbearing age; consider referrals as appropriate to perinatology, gynecologist.    Notes:            Task Due Date Last Modified     Facilitate Activities to Optimize Comfort and Well-Being --  1/9/2023 11:06 AM by Roverto Velazquez RN     Care Management Activities:      - not discussed during this outreach      Notes:            Problem Priority Last Modified     Disease Progression --  1/9/2023 11:06 AM by Roverto Velazquez RN              Goal Recent Progress Last Modified     Disease Progression Prevented or Minimized --  1/9/2023 11:06 AM by Roverto Velazquez RN     Evidence-based guidance:    Discuss potential for disease progression based on clinical diagnosis and/or causation and other risk factors including race, ethnicity, age, comorbidities, lifestyle.   Encourage lifestyle changes including maintaining a healthy weight, exercise and activity, limiting alcohol consumption, smoking cessation to improve long-term outcomes.   Acknowledge difficulty in making life-long behavior change; provide guidance to incorporate lifestyle change into daily life and social activities; provide emotional support, coaching and praise for success.   Anticipate the use of pharmacologic therapy to prevent disease progression and manage complications or comorbidities, such as hypertension, cardiovascular disease, diabetes, anemia, bone disorders, peripheral vascular disease.   Monitor efficacy of pharmacologic therapy; monitor and manage side effects.   Monitor for nephrotoxic medication or supplement use including NSAIDs (nonsteroidal anti-inflammatory drugs), radio-contrast media, oral phosphate-containing bowel preparations, herbal supplements, protein supplements.   Encourage consultation and counseling with pharmacist.   Anticipate and prepare patient for early nephrology referral if significant increase of ACR (albumin to creatinine ratio), hard-to-manage complications of decreased GFR (glomerular filtration rate) arise.   Anticipate and prepare patient for laboratory studies to stage and/or monitor CKD and to identify and manage complications and comorbidities.   Address barriers to treatment adherence, such as cognitive function, illness, drug interaction, medication cost, presence of depression or anxiety.   Facilitate coordination between nephrologist and primary care provider to ensure continuation of care for co-morbidities and routine preventive care.    Notes:            Task Due Date Last Modified     Alleviate Barriers to Chronic Kidney Disease Treatment --  1/9/2023 11:06 AM by Roverto Velazquez RN      Care Management Activities:      - not discussed during this outreach      Notes:            Problem Priority Last Modified     Functional Decline --  1/9/2023 11:06 AM by Roverto Velazquez RN              Goal Recent Progress Last Modified     Mobility and Function Maintained --  1/9/2023 11:06 AM by Roverto Velazquez RN     Evidence-based guidance:   Encourage self-care to promote maximum independence in activities of daily living and to minimize decline associated with disease process and inactivity.   Screen for fall risk, especially as kidney function worsens; encourage modifications in home or work environment to facilitate safe activity.   Prepare patient for referral to physical therapy or occupational therapy for functional mobility, safety assessment, individualized program to retrain mobility, gait or transfers using assistive device.   Evaluate and address body systems and performance deficits, such as strength, range of motion, balance and activity tolerance impairment.   Consider a muscle strengthening and exercise program, such as progressive resistive exercise and cardiovascular exercise.   Promote incremental change to achieve regular, long-term exercise that is structured and individualized to avoid adverse events.    Encourage use of energy conservation techniques, such as preplanning and pacing activity, balancing activity with periods of rest and positioning for optimal function.   Assess cognition and address impairments with interventions, such as orientation cues in the environment, memory aids or compensatory techniques; individualize patient education to cognitive ability.    Notes:            Task Due Date Last Modified     Maintain or Improve Strength or Functional Ability --  1/9/2023 11:06 AM by Roverto Velazquez RN     Care Management Activities:      - not discussed during this outreach      Notes:            Problem Priority Last Modified     Malnutrition (Undernutrition) --   1/9/2023 11:06 AM by Roverto Velazquez RN              Goal Recent Progress Last Modified     Malnutrition (Undernutrition) Prevented or Managed --  1/9/2023 11:06 AM by Roverto Velazquez RN     Evidence-based guidance:   Perform, or refer for, a nutritional assessment, including a nutrition-focused physical exam to determine calorie, protein, vitamin, mineral and fluid requirements, and micronutrient deficiencies.   Evaluate need for diet modification, including weight loss if applicable, sodium and/or protein reduction; minimize unnecessary dietary restriction to increase oral intake.   Encourage adjustment of diet or meal schedule based on preferences and tolerance.   Encourage patient adherence to nutritional plan.   Consider implementation of bowel elimination program to increase comfort and appetite.   Encourage optimal oral hygiene to enhance desire to eat.   Monitor and track weight over time; watch for unintended weight loss.   Correlate presence of depression or anxiety symptoms with ability or desire to follow nutritional guidance; provide or refer for mental health services.    Notes:            Task Due Date Last Modified     Alleviate Barriers to Optimal Nutrition Status --  1/9/2023 11:06 AM by Roverto Velazquez RN     Care Management Activities:      - not discussed during this outreach      Notes:            Problem Priority Last Modified     Pain --  1/9/2023 11:06 AM by Roverto Velazquez RN              Goal Recent Progress Last Modified     Pain Managed --  1/9/2023 11:06 AM by Roverto Velazquez RN     Evidence-based guidance:   Use a consistent, validated tool for pain assessment, include function and quality of life.   Differentiate acute from chronic pain; set goals for pain management accordingly.   Individualize treatment plan to meet pain management goals; include family/caregiver in planning and decision making.   Review efficacy, tolerability, and adherence to analgesic therapy;  reassess pain frequently; anticipate treatment modification to provide relief and optimal function.   Match pharmacologic analgesia to severity and type of pain mechanism; evaluate risk for opioid use and dependence.   Manage medication-induced effects, such as constipation, pruritus, nausea, urinary retention, somnolence and dizziness.   Encourage use of nonpharmacologic measures that may include physical rehabilitation, massage, distraction and relaxation, yoga, transcutaneous electrical nerve stimulation (TENS), meditation, music.   Initiate, or refer for, cognitive behavioral therapy to change perception of pain as less debilitating, to teach progressive muscle relaxation, biofeedback, guided imagery and problem-solving.   Consider and address emotional response to pain.    Notes:            Task Due Date Last Modified     Facilitate Multi-Modal Pain Management --  1/9/2023 11:06 AM by Roverto Velazquez RN     Care Management Activities:      - not discussed during this outreach      Notes:            Problem Priority Last Modified     Palliative Care --  1/9/2023 11:06 AM by Roverto Velazquez RN                        Instructions   · Patient was provided an electronic copy of care plan  · CCM services were explained and offered and patient has accepted these services.  · Patient has given their written consent to receive CCM services and understands that this includes the authorization of electronic communication of medical information with the other treating providers.  · Patient understands that they may stop CCM services at any time and these changes will be effective at the end of the calendar month and will effectively revocate the agreement of CCM services.  · Patient understands that only one practitioner can furnish and be paid for CCM services during one calendar month.  Patient also understands that there may be co-payment or deductible fees in association with CCM services.  · Patient will  continue with at least monthly follow-up calls with the Ambulatory .    KUSUM THRASHER  Ambulatory Case Management    1/30/2023, 09:07 EST

## 2023-02-01 ENCOUNTER — TELEPHONE (OUTPATIENT)
Dept: CASE MANAGEMENT | Facility: OTHER | Age: 53
End: 2023-02-01
Payer: MEDICARE

## 2023-02-01 DIAGNOSIS — K31.84 GASTROPARESIS: ICD-10-CM

## 2023-02-01 DIAGNOSIS — E78.2 MIXED HYPERLIPIDEMIA: Primary | ICD-10-CM

## 2023-02-01 NOTE — TELEPHONE ENCOUNTER
Outreach attempted.  VM left to call back and reminded patient of appointment tomorrow here at the PCP office.

## 2023-02-06 ENCOUNTER — TELEPHONE (OUTPATIENT)
Dept: NEUROLOGY | Facility: CLINIC | Age: 53
End: 2023-02-06
Payer: MEDICARE

## 2023-02-06 DIAGNOSIS — R56.9 SEIZURE: Primary | ICD-10-CM

## 2023-02-06 RX ORDER — LAMOTRIGINE 100 MG/1
TABLET ORAL
Qty: 120 TABLET | Refills: 5 | Status: SHIPPED | OUTPATIENT
Start: 2023-02-06

## 2023-02-06 NOTE — TELEPHONE ENCOUNTER
PATIENT CALLING TO ADVISE SHE HAS BEEN HAVING MORE SEIZURES; HAD A SEIZURE AND FELL IN BATH, BROKE 3 BONES IN LEG AND WILL BE LAYED UP 2-3 MONTHS.    NO APPT SCHEDULED    PATIENT JUST UPDATING PROVIDER.

## 2023-02-06 NOTE — TELEPHONE ENCOUNTER
Please notify the patient's family that I increased her Lamictal to 100 mg in the morning, 100 mg in the afternoon and 200 mg at bedtime with a total dose of 400 mg/day.  Vimpat will stay as currently dosed.  Should the patient have any other seizures please contact the clinic.  She is to continue following seizure precautions as follows: The patient was told to observe all seizure precautions, including, but not limited to:   No driving until seizure free for more than 3 months- as per State driving regulation / law;   Avoid all high-risk activity, Take showers instead of baths, avoid swimming without observation, Avoid open heat sources, Avoid working at heights, and Avoid engaging in all potentially hazardous activities.   Follow-up as scheduled.

## 2023-02-09 ENCOUNTER — PATIENT OUTREACH (OUTPATIENT)
Dept: CASE MANAGEMENT | Facility: CLINIC | Age: 53
End: 2023-02-09
Payer: MEDICARE

## 2023-02-09 NOTE — OUTREACH NOTE
Social Work Assessment  Questions/Answers    Flowsheet Row Most Recent Value   Referral Source outpatient staff, outpatient clinic, nursing   Reason for Consult community resources   Preferred Language English   Advance Care Planning Reviewed no concerns identified   People in Home child(starla), adult   Current Living Arrangements home   Primary Care Provided by self, child(starla)   Provides Primary Care For no one, unable/limited ability to care for self   Family Caregiver if Needed child(starla), adult   Quality of Family Relationships helpful, involved   Able to Return to Prior Arrangements yes   Employment Status disabled   Current or Previous Occupation not applicable   Source of Income disability, social security   Application for Public Assistance applied  [SNAP]   Usual Activity Tolerance good   Current Activity Tolerance fair   Medications independent   Meal Preparation assistive person   Housekeeping assistive person   Laundry assistive person   Shopping assistive equipment and person        Patient Outreach    SW received referral via RN CM re: need for community resources. SW called and spoke to patient. Patient lives in a two story home with her adult son (27 yrs old). Reports son assist with meals and housekeeping. Reports using a WC in the home, at this time, due to recent injury to LE. SW discussed SDOH needs (financial, food, mental health, etc). Patient reports she received SSDI and SNAP benefits. Patient reports no mental health concerns, at this time. Patient reports no immediate need for social intervention, at this time. SHERRI to discharge. Please re consult SHERRI if additional needs arise.     Jessica THRASHER -   Ambulatory Case Management    2/9/2023, 11:38 EST

## 2023-02-13 ENCOUNTER — PATIENT OUTREACH (OUTPATIENT)
Dept: CASE MANAGEMENT | Facility: OTHER | Age: 53
End: 2023-02-13
Payer: MEDICARE

## 2023-02-13 DIAGNOSIS — F40.01 PANIC DISORDER WITH AGORAPHOBIA: Chronic | ICD-10-CM

## 2023-02-13 DIAGNOSIS — E78.2 MIXED HYPERLIPIDEMIA: Primary | ICD-10-CM

## 2023-02-13 DIAGNOSIS — K31.84 GASTROPARESIS: ICD-10-CM

## 2023-02-13 RX ORDER — ALPRAZOLAM 2 MG/1
TABLET ORAL
Qty: 90 TABLET | Refills: 2 | Status: SHIPPED | OUTPATIENT
Start: 2023-02-13

## 2023-02-13 NOTE — OUTREACH NOTE
"AMBULATORY CASE MANAGEMENT NOTE    Name and Relationship of Patient/Support Person: AmihelioMilka M - Self    CCM Interim Update    Outreach completed with patient.  She stated that she is in \"lots of pain\" due to her recent orthopedic surgery with Dr. Osborne in [facility].  When asked what was prescribed to her for pain management, the patient stated \"hydrocodone 10 and I can take one every six hours\".  Advised patient to take as prescribed and as necessary in order to best facilitate recovery and manage pain.  She verbalized understanding.    No timeline established to reschedule prior appointment with provider at PCP office.  Will follow up at a later date.          Education Documentation  No documentation found.        Roverto THRASHER  Ambulatory Case Management    2/13/2023, 14:46 EST  "

## 2023-02-20 RX ORDER — LEVOTHYROXINE SODIUM 0.03 MG/1
TABLET ORAL
Qty: 30 TABLET | Refills: 0 | Status: SHIPPED | OUTPATIENT
Start: 2023-02-20 | End: 2023-02-20

## 2023-02-20 RX ORDER — LEVOTHYROXINE SODIUM 0.03 MG/1
TABLET ORAL
Qty: 90 TABLET | Refills: 0 | Status: SHIPPED | OUTPATIENT
Start: 2023-02-20

## 2023-02-21 ENCOUNTER — PRIOR AUTHORIZATION (OUTPATIENT)
Dept: PSYCHIATRY | Facility: CLINIC | Age: 53
End: 2023-02-21
Payer: MEDICARE

## 2023-02-27 ENCOUNTER — TELEPHONE (OUTPATIENT)
Dept: CASE MANAGEMENT | Facility: OTHER | Age: 53
End: 2023-02-27
Payer: MEDICARE

## 2023-02-28 ENCOUNTER — PATIENT OUTREACH (OUTPATIENT)
Dept: CASE MANAGEMENT | Facility: OTHER | Age: 53
End: 2023-02-28
Payer: MEDICARE

## 2023-02-28 DIAGNOSIS — K31.84 GASTROPARESIS: ICD-10-CM

## 2023-02-28 DIAGNOSIS — E78.2 MIXED HYPERLIPIDEMIA: Primary | ICD-10-CM

## 2023-02-28 NOTE — OUTREACH NOTE
[facility] End of Month Documentation    This Chronic Medical Management Care Plan for Milka Espinoza, 52 y.o. female, has been monitored and managed and a new plan of care implemented for the month of February.  A cumulative time of 20 minutes was spent on this patient record this month, including phone call with patient; chart review.    Regarding the patient's problems: has Bipolar disorder, in full remission, most recent episode depressed (HCC); Depression; Mood disorder in conditions classified elsewhere; Seizures (HCC); Opioid withdrawal (HCC); Altered level of consciousness; Asthma; Bronchitis, acute; General medical exam; Low back pain; Syncope; Unspecified sprain of unspecified wrist, initial encounter; Upper respiratory tract infection; Intractable nausea with vomiting; Obesity; Gastroesophageal reflux disease; Tobacco use; Nausea and vomiting; Borderline hypoglycemic episodes; Migraine headache; Pseudoseizure; Seizure (HCC); Panic disorder with agoraphobia; Gastroparesis; Hyperlipidemia; Hypothyroidism; Sprain of ankle; Bipolar disorder (HCC); and Dissociative convulsions on their problem list., the following items were addressed: medical records; medications and any changes can be found within the plan section of the note.  A detailed listing of time spent for chronic care management is tracked within each outreach encounter.  Current medications include:  has a current medication list which includes the following prescription(s): atorvastatin, nystatin, alprazolam, amitriptyline, budesonide-formoterol, buspirone, ferrous gluconate, gabapentin, hydroxyzine, ipratropium-albuterol, lacosamide, lamotrigine, levetiracetam, levetiracetam, levothyroxine, motegrity, pantoprazole, quetiapine xr, sucralfate, sumatriptan, and vraylar. and the patient is reported to be patient is compliant with medication protocol,  Medications are reported to be effective.  All notes on chart for PCP to review.    The patient was  monitored remotely for activity level; mood & behavior; medications; weight.    The patient's physical needs include:  physical healthcare.     The patient's mental support needs include:  continued support    The patient's cognitive support needs include:  continued support    The patient's psychosocial support needs include:  continued support    The patient's functional needs include: physical healthcare    The patient's environmental needs include:  not applicable    Care Plan overall comments:  No data recorded    Refer to previous outreach notes for more information on the areas listed above.    Monthly Billing Diagnoses  (E78.2) Mixed hyperlipidemia    (K31.84) Gastroparesis    Medications   · Medications have been reconciled    Care Plan progress this month:      Recently Modified Care Plans Updates made since 1/28/2023 12:00 AM    No recently modified care plans.        Instructions   · Patient was provided an electronic copy of care plan  · CCM services were explained and offered and patient has accepted these services.  · Patient has given their written consent to receive CCM services and understands that this includes the authorization of electronic communication of medical information with the other treating providers.  · Patient understands that they may stop CCM services at any time and these changes will be effective at the end of the calendar month and will effectively revocate the agreement of CCM services.  · Patient understands that only one practitioner can furnish and be paid for CCM services during one calendar month.  Patient also understands that there may be co-payment or deductible fees in association with CCM services.  · Patient will continue with at least monthly follow-up calls with the Ambulatory .    Roverto THRASHER  Ambulatory Case Management    2/28/2023, 09:59 EST

## 2023-03-04 RX ORDER — SUMATRIPTAN 100 MG/1
TABLET, FILM COATED ORAL
Qty: 8 TABLET | Refills: 5 | Status: SHIPPED | OUTPATIENT
Start: 2023-03-04

## 2023-03-06 ENCOUNTER — PATIENT OUTREACH (OUTPATIENT)
Dept: CASE MANAGEMENT | Facility: OTHER | Age: 53
End: 2023-03-06
Payer: MEDICARE

## 2023-03-06 DIAGNOSIS — K31.84 GASTROPARESIS: ICD-10-CM

## 2023-03-06 DIAGNOSIS — E78.2 MIXED HYPERLIPIDEMIA: Primary | ICD-10-CM

## 2023-03-06 RX ORDER — AMITRIPTYLINE HYDROCHLORIDE 25 MG/1
25 TABLET, FILM COATED ORAL
Qty: 90 TABLET | Refills: 1 | Status: SHIPPED | OUTPATIENT
Start: 2023-03-06

## 2023-03-06 NOTE — OUTREACH NOTE
"Outreach attempted.  VM left.      Patient called back.  She stated that the cast is now \"off my leg and I'm in a boot\".  States that she has a BSC and wheelchair to use as directed from orthopedic surgeon.    She was frustrated that her dentist had an insurance mix up as she is scheduled to have dental work done this week.  She stated that she got that \"cleared up\".      Checked to see if she picked up her prescriptions at the pharmacy (Elavil and Imitrex) and she said that she has to have someone drive her so she hasn't yet but plans to this week.  Says she is now back at her residence as she had to live temporarily with a friend as her home has steps and she couldn't get up and down them safely with her leg injury.    Patient says she has a \"stent\" in her right arm for infusions (gastroparesis).  She says a nurse comes once a week and flushes.  Clarifying question asked by this RN if this was a PICC line, she said \"yes!  That's it\".      "

## 2023-03-14 ENCOUNTER — TELEPHONE (OUTPATIENT)
Dept: CASE MANAGEMENT | Facility: OTHER | Age: 53
End: 2023-03-14
Payer: MEDICARE

## 2023-03-15 ENCOUNTER — PATIENT OUTREACH (OUTPATIENT)
Dept: CASE MANAGEMENT | Facility: OTHER | Age: 53
End: 2023-03-15
Payer: MEDICARE

## 2023-03-15 DIAGNOSIS — F32.A DEPRESSION, UNSPECIFIED DEPRESSION TYPE: Primary | ICD-10-CM

## 2023-03-15 DIAGNOSIS — G43.919 INTRACTABLE MIGRAINE WITHOUT STATUS MIGRAINOSUS, UNSPECIFIED MIGRAINE TYPE: ICD-10-CM

## 2023-03-15 NOTE — OUTREACH NOTE
"AMBULATORY CASE MANAGEMENT NOTE    Name and Relationship of Patient/Support Person: Milka Espinoza M - Self    CCM Interim Update    Outreach successful.    Patient was in the waiting room at Dr. Osborne's office (orthopedic surgeon) for scheduled follow up at the time of outreach.  Conversation was brief but she did verbalize that she has yet to p/u her refills on her Elavil and Imitrex.  When asked when she would be able to do so, patient stated that \"I got a ride today with my girlfriend to Dr. Osborne's office so we are going to pick them up on our way home\".    Reviewed patient's upcoming appointment dates and times with Lori Patrick (gastroenterology) on 3/16 and Love Bourgeois (PA-C) on 3/22.  Patient verbalized understanding.           Education Documentation  No documentation found.        Roverto THRASHER  Ambulatory Case Management    3/15/2023, 13:08 EDT  "

## 2023-03-22 ENCOUNTER — PATIENT OUTREACH (OUTPATIENT)
Dept: CASE MANAGEMENT | Facility: OTHER | Age: 53
End: 2023-03-22
Payer: MEDICARE

## 2023-03-22 DIAGNOSIS — K31.84 GASTROPARESIS: ICD-10-CM

## 2023-03-22 DIAGNOSIS — E78.2 MIXED HYPERLIPIDEMIA: Primary | ICD-10-CM

## 2023-03-22 NOTE — OUTREACH NOTE
"AMBULATORY CASE MANAGEMENT NOTE    Name and Relationship of Patient/Support Person: Milka Espinoza M - Self    CCM Interim Update    Outreach successful.    F/u on her appointment last week (3/15/) with Dr. Osborne (orthopedic surgeon) to see how that went.  She said that they said that \"things are getting better\" but \"I'm still hobbling along\".  Asked if the patient had any questions or concerns about the POC related to her surgery/recovery and she said \"no.  I think I got everything\".    When asked generally how she was doing, she said \"I got this thing up my nose that is supposed to keep me from throwing up all the time.\"  Patient sees Gastroenterology through [facility].  Asked what this device was, the patient was unsure.      Patient confirmed that she did pickup her refill of Elavil and Imitrex.    Was able to schedule the patient for her Annual Medicare Wellness visit during this outreach.  She is due for her annual lipid panel at that time as well.          Education Documentation  No documentation found.        Roverto THRASHER  Ambulatory Case Management    3/22/2023, 11:05 EDT  "

## 2023-03-30 ENCOUNTER — PATIENT OUTREACH (OUTPATIENT)
Dept: CASE MANAGEMENT | Facility: OTHER | Age: 53
End: 2023-03-30
Payer: MEDICARE

## 2023-03-30 DIAGNOSIS — E78.2 MIXED HYPERLIPIDEMIA: Primary | ICD-10-CM

## 2023-03-30 DIAGNOSIS — K31.84 GASTROPARESIS: ICD-10-CM

## 2023-03-30 NOTE — OUTREACH NOTE
[facility] End of Month Documentation    This Chronic Medical Management Care Plan for Milka Espinoza, 52 y.o. female, has been monitored and managed; reviewed and a new plan of care implemented for the month of March.  A cumulative time of 63 minutes was spent on this patient record this month, including phone call with patient; chart review.    Regarding the patient's problems: has Bipolar disorder, in full remission, most recent episode depressed (HCC); Depression; Mood disorder in conditions classified elsewhere; Seizures (HCC); Opioid withdrawal (HCC); Altered level of consciousness; Asthma; Bronchitis, acute; General medical exam; Low back pain; Syncope; Unspecified sprain of unspecified wrist, initial encounter; Upper respiratory tract infection; Intractable nausea with vomiting; Obesity; Gastroesophageal reflux disease; Tobacco use; Nausea and vomiting; Borderline hypoglycemic episodes; Migraine headache; Pseudoseizure; Seizure (HCC); Panic disorder with agoraphobia; Gastroparesis; Hyperlipidemia; Hypothyroidism; Sprain of ankle; Bipolar disorder (HCC); and Dissociative convulsions on their problem list., the following items were addressed: medical records; medications and any changes can be found within the plan section of the note.  A detailed listing of time spent for chronic care management is tracked within each outreach encounter.  Current medications include:  has a current medication list which includes the following prescription(s): atorvastatin, nystatin, alprazolam, amitriptyline, budesonide-formoterol, buspirone, ferrous gluconate, gabapentin, hydroxyzine, ipratropium-albuterol, lacosamide, lamotrigine, levetiracetam, levetiracetam, levothyroxine, motegrity, pantoprazole, quetiapine xr, sucralfate, sumatriptan, and vraylar. and the patient is reported to be patient is compliant with medication protocol,  Medications are reported to be effective.   All notes on chart for PCP to review.    The  patient was monitored remotely for activity level; mood & behavior; medications; weight.    The patient's physical needs include:  physical healthcare.     The patient's mental support needs include:  continued support    The patient's cognitive support needs include:  continued support    The patient's psychosocial support needs include:  continued support    The patient's functional needs include: physical healthcare; DME    The patient's environmental needs include:  not applicable    Care Plan overall comments:  No data recorded    Refer to previous outreach notes for more information on the areas listed above.    Monthly Billing Diagnoses  (E78.2) Mixed hyperlipidemia    (K31.84) Gastroparesis    Medications   · Medications have been reconciled    Care Plan progress this month:      Recently Modified Care Plans Updates made since 2/27/2023 12:00 AM    No recently modified care plans.            Instructions   · Patient was provided an electronic copy of care plan  · CCM services were explained and offered and patient has accepted these services.  · Patient has given their written consent to receive CCM services and understands that this includes the authorization of electronic communication of medical information with the other treating providers.  · Patient understands that they may stop CCM services at any time and these changes will be effective at the end of the calendar month and will effectively revocate the agreement of CCM services.  · Patient understands that only one practitioner can furnish and be paid for CCM services during one calendar month.  Patient also understands that there may be co-payment or deductible fees in association with CCM services.  · Patient will continue with at least monthly follow-up calls with the Ambulatory .    Roverto THRASHER  Ambulatory Case Management    3/30/2023, 11:22 EDT

## 2023-03-31 ENCOUNTER — TELEPHONE (OUTPATIENT)
Dept: FAMILY MEDICINE CLINIC | Facility: CLINIC | Age: 53
End: 2023-03-31
Payer: MEDICARE

## 2023-03-31 NOTE — TELEPHONE ENCOUNTER
Caller: AMEYA    Relationship: Other    Best call back number: [phone]    What is the best time to reach you: ANYTIME    Who are you requesting to speak with (clinical staff, provider,  specific staff member): CLINICAL STAFF    Do you know the name of the person who called: AMEYA     What was the call regarding: AMEYA CALLED AND SAID THEY HAD RECEIVED AN IN HOME OXYGEN ORDER FOR THE PATIENT AND NEEDED A FACE TO FACE ON IT, THEY SAID THEY WOULD FAX OVER THE NECESSARY INFORMATION     HUB HAD A HARD TIME UNDERSTANDING THE CALLER AND COULD NOT UNDERSTAND THE NAME OF THE COMPANY    Do you require a callback: YES

## 2023-03-31 NOTE — TELEPHONE ENCOUNTER
Tried calling back and voicemail is full. The last office visit we have was in 11/22 and then she did not qualify for oxygen. Not sure who ordered this unless she seen  Pulmonology.

## 2023-04-11 ENCOUNTER — PATIENT OUTREACH (OUTPATIENT)
Dept: CASE MANAGEMENT | Facility: OTHER | Age: 53
End: 2023-04-11
Payer: MEDICARE

## 2023-04-11 DIAGNOSIS — Z00.00 GENERAL MEDICAL EXAM: Primary | ICD-10-CM

## 2023-04-11 DIAGNOSIS — R56.9 SEIZURE: ICD-10-CM

## 2023-04-11 DIAGNOSIS — K31.84 GASTROPARESIS: ICD-10-CM

## 2023-04-11 NOTE — OUTREACH NOTE
"AMBULATORY CASE MANAGEMENT NOTE    Name and Relationship of Patient/Support Person: Milka Espinoza M - Self    CCM Interim Update    Outreach complete.    Patient stated that her \"nurse comes to the house once a week to give me my medication\" (infusions) and that the nurse noticed her \"bags and bags of medicines set to \".  Patient expresses concern with her kidney function related to lab work completed 1/10/23 and the medications she is on.      Patient was asked about her O2 needs and if it is something she needs in the home.  The patient stated that \"they came and got it about a month ago\" and \"I think I still need it\".      Appointment made with PCP on 23 to review aforementioned concerns.      Education Documentation  No documentation found.        Roverto THRASHER  Ambulatory Case Management    2023, 11:14 EDT  "

## 2023-04-18 ENCOUNTER — TELEPHONE (OUTPATIENT)
Dept: CASE MANAGEMENT | Facility: OTHER | Age: 53
End: 2023-04-18
Payer: MEDICARE

## 2023-04-18 NOTE — TELEPHONE ENCOUNTER
Outreach attempted.    VM left which included a reminder of appointment with her PCP tomorrow (4/18/23) at 1115.

## 2023-04-20 RX ORDER — QUETIAPINE 400 MG/1
800 TABLET, FILM COATED, EXTENDED RELEASE ORAL
Qty: 180 TABLET | Refills: 1 | Status: SHIPPED | OUTPATIENT
Start: 2023-04-20

## 2023-04-24 ENCOUNTER — TELEPHONE (OUTPATIENT)
Dept: FAMILY MEDICINE CLINIC | Facility: CLINIC | Age: 53
End: 2023-04-24
Payer: MEDICARE

## 2023-04-24 NOTE — TELEPHONE ENCOUNTER
I received a call from patients IgG infusion nurse today about the status of the patient.  She was in office today for an infusion and nurse states she was very groggy and out of it.  She states she is often forgetful.   Her son was with her.   They called EMS due to her status,  She states she took 2 xanax (4mg total) instead of 1 tab at once.  She had also had some sleeping medicine the night before  Nurse saw an almost empty bottle of hydrocodone that pt was prescribed about 1 week ago for a broken foot.   We are concerned that she is mixing meds and the potential for accidental overdose  Pt refused to go to the ER after being with the paramedics for some time.  Pt no showed her last appt with me.     Love I am forwarding this to you as well for an FYI since she is getting the Xanax from you.

## 2023-04-27 ENCOUNTER — PATIENT OUTREACH (OUTPATIENT)
Dept: CASE MANAGEMENT | Facility: OTHER | Age: 53
End: 2023-04-27
Payer: MEDICARE

## 2023-04-27 DIAGNOSIS — R56.9 SEIZURE: ICD-10-CM

## 2023-04-27 DIAGNOSIS — K31.84 GASTROPARESIS: Primary | ICD-10-CM

## 2023-04-27 NOTE — OUTREACH NOTE
[facility] End of Month Documentation    This Chronic Medical Management Care Plan for Milka Espinoza, 53 y.o. female, has been No data recorded and a new plan of care implemented for the month of No data recorded.  A cumulative time of 34 minutes was spent on this patient record this month, including No data recorded.    Regarding the patient's problems: has Bipolar disorder, in full remission, most recent episode depressed; Depression; Mood disorder in conditions classified elsewhere; Seizures; Opioid withdrawal; Altered level of consciousness; Asthma; Bronchitis, acute; General medical exam; Low back pain; Syncope; Unspecified sprain of unspecified wrist, initial encounter; Upper respiratory tract infection; Intractable nausea with vomiting; Obesity; Gastroesophageal reflux disease; Tobacco use; Nausea and vomiting; Borderline hypoglycemic episodes; Migraine headache; Pseudoseizure; Seizure (HCC); Panic disorder with agoraphobia; Gastroparesis; Hyperlipidemia; Hypothyroidism; Sprain of ankle; Bipolar disorder; and Dissociative convulsions on their problem list., the following items were addressed: No data recorded and any changes can be found within the plan section of the note.  A detailed listing of time spent for chronic care management is tracked within each outreach encounter.  Current medications include:  has a current medication list which includes the following prescription(s): atorvastatin, nystatin, alprazolam, amitriptyline, budesonide-formoterol, buspirone, ferrous gluconate, gabapentin, hydroxyzine, ipratropium-albuterol, lacosamide, lamotrigine, levetiracetam, levetiracetam, levothyroxine, motegrity, pantoprazole, quetiapine xr, sucralfate, sumatriptan, and vraylar. and the patient is reported to be No data recorded,  Medications are reported to be No data recorded.  All notes on chart for PCP to review.    The patient was monitored remotely for No data recorded.    The patient's physical needs  include:  No data recorded.     The patient's mental support needs include:  No data recorded    The patient's cognitive support needs include:  No data recorded    The patient's psychosocial support needs include:  No data recorded    The patient's functional needs include: No data recorded    The patient's environmental needs include:  No data recorded    Care Plan overall comments:  No data recorded    Refer to previous outreach notes for more information on the areas listed above.    Monthly Billing Diagnoses  (K31.84) Gastroparesis    (R56.9) Seizure (HCC)    Medications   · Medications have been reconciled    Care Plan progress this month:      Recently Modified Care Plans Updates made since 3/27/2023 12:00 AM    No recently modified care plans.          Instructions   · Patient was provided an electronic copy of care plan  · CCM services were explained and offered and patient has accepted these services.  · Patient has given their written consent to receive CCM services and understands that this includes the authorization of electronic communication of medical information with the other treating providers.  · Patient understands that they may stop CCM services at any time and these changes will be effective at the end of the calendar month and will effectively revocate the agreement of CCM services.  · Patient understands that only one practitioner can furnish and be paid for CCM services during one calendar month.  Patient also understands that there may be co-payment or deductible fees in association with CCM services.  · Patient will continue with at least monthly follow-up calls with the Ambulatory .    Roverto THRASHER  Ambulatory Case Management    4/27/2023, 11:00 EDT

## 2023-05-05 RX ORDER — IMMUNE GLOBULIN 100 MG/ML
SOLUTION INTRAVENOUS
COMMUNITY
Start: 2023-04-13

## 2023-05-05 RX ORDER — ALTEPLASE 2.2 MG/2ML
INJECTION, POWDER, LYOPHILIZED, FOR SOLUTION INTRAVENOUS
COMMUNITY
Start: 2023-02-09 | End: 2023-05-09

## 2023-05-05 RX ORDER — OXYCODONE AND ACETAMINOPHEN 10; 325 MG/1; MG/1
1 TABLET ORAL EVERY 6 HOURS
COMMUNITY
Start: 2023-02-21 | End: 2023-05-09

## 2023-05-05 RX ORDER — HEPARIN SODIUM (PORCINE) LOCK FLUSH IV SOLN 100 UNIT/ML 100 UNIT/ML
SOLUTION INTRAVENOUS
COMMUNITY
Start: 2023-01-19

## 2023-05-05 RX ORDER — HYDROCODONE BITARTRATE AND ACETAMINOPHEN 10; 325 MG/1; MG/1
1 TABLET ORAL
COMMUNITY
Start: 2023-03-15 | End: 2023-05-09

## 2023-05-05 RX ORDER — DIPHENHYDRAMINE HYDROCHLORIDE 50 MG/ML
INJECTION INTRAMUSCULAR; INTRAVENOUS
COMMUNITY
Start: 2023-03-22 | End: 2023-05-09

## 2023-05-07 NOTE — PROGRESS NOTES
Subjective     Milka Espinoza is a 53 y.o. female who presents today for follow-up for psychiatric medication management. Patient is new to this provider, but previously saw HAL Garcia.     Chief Complaint:  Bipolar disorder, anxiety     History of Present Illness:   At today's visit, patient states she gets infusion every Monday, she has gastroparesis. She is still throwing everything up and states she has lost about 25 pounds.   She reports she is not currently on Norco. She was on pain medicine for ankle surgery that she had done in February 2023. She is now off that medication, for about 2 weeks.   She feels like all she wants to do is sleep. She feels like she doesn't want to get up and do anything.   She feels like her memory is not good. She feels like she has a hard time keeping track of what she is trying to say.   She is only taking gabapentin only at night time. She feels like she doesn't like the way it makes her feel.   She is currently taking alprazolam 2mg three times a day.     I had a long discussion with her about polypharmacy, with several of the medications she is on interacting together. She is taking several sedating medications, alprazolam and gabapentin being two of them. I let her know that ideally she would taper off both of those. The alprazolam is at an extremely high dose, so it will be difficult to wean her down off that. Both the alprazolam and the gabapentin could be contributing to memory problems as well.     I had a report from another provider that the patient was in their clinic and was very groggy at that time, and EMS had to be called. The patient had reported taking two alprazolam instead of one. I asked the patient about this incident and she denies this happening. This incident makes me hesitant to continue alprazolam though due to risk for overdose being high.   I will wean the gabapentin this visit, and plan to start weaning the alprazolam next visit.      Patient still feels like she is having a lot of depression, despite being on the Vraylar.   She was agreeable to trying sertraline to help with both anxiety and depression.   No SI/HI/AVH    Patient presents with symptoms and behaviors that are consistent with the following DSM-5 diagnoses:  1. Bipolar disorder  2. Panic disorder  3. Generalized anxiety     The following portions of the patient's history were reviewed and updated as appropriate: allergies, current medications, past family history, past medical history, past social history, past surgical history and problem list.    PAST OUTPATIENT TREATMENT  Diagnosis treated:  Affective Disorder, Anxiety/Panic Disorder, substance use   Treatment Type:  Medication Management  Prior Psychiatric Medications:  Abilify  Risperidone  Vraylar  Valium  Xanax  Lamictal  Gabapentin  Trazodone, feels 'prickly all over'  Buspar  Seroquel  Support Groups:  None  Sequelae Of Mental Disorder:  social isolation, family disruption, emotional distress    Interval History  No Change    Side Effects  None      Past Medical History:  Past Medical History:   Diagnosis Date   • Depression    • Disease of thyroid gland    • Hyperlipidemia    • Migraines    • On home O2 2021    3 lmp   • Seizures        Social History:  Social History     Socioeconomic History   • Marital status:    Tobacco Use   • Smoking status: Former     Packs/day: 0.25     Types: Cigarettes     Quit date: 2020     Years since quittin.5   • Smokeless tobacco: Never   Vaping Use   • Vaping Use: Never used   Substance and Sexual Activity   • Alcohol use: No   • Drug use: No   • Sexual activity: Never       Family History:  Family History   Problem Relation Age of Onset   • No Known Problems Mother    • No Known Problems Father    • No Known Problems Sister    • No Known Problems Brother    • Alcohol abuse Daughter    • No Known Problems Son    • No Known Problems Maternal Grandmother    • No Known  Problems Paternal Grandmother    • No Known Problems Maternal Aunt    • No Known Problems Paternal Aunt        Past Surgical History:  Past Surgical History:   Procedure Laterality Date   • ABDOMINAL SURGERY     •  SECTION      x3   • ENDOSCOPY N/A 2022    Procedure: EGD WITH DILATATION (BOUGIE #56) AND INJECTION OF BOTOX;  Surgeon: Rito Le MD;  Location: [facility] ENDOSCOPY;  Service: Gastroenterology;  Laterality: N/A;  POST: LOWER ESOPHAGEAL STRICTURE AND CRICOPHARYNGEAL STRICUTRE   • ENDOSCOPY W/ PEG TUBE PLACEMENT N/A 2020    Procedure: ESOPHAGOGASTRODUODENOSCOPY WITH PERCUTANEOUS ENDOSCOPIC GASTROSTOMY TUBE INSERTION;  Surgeon: Rito Le MD;  Location: [facility] ENDOSCOPY;  Service: Gastroenterology;  Laterality: N/A;   • HERNIA REPAIR     • HYSTERECTOMY     • PEG TUBE REMOVAL         Problem List:  Patient Active Problem List   Diagnosis   • Bipolar disorder, in full remission, most recent episode depressed   • Depression   • Mood disorder in conditions classified elsewhere   • Seizures   • Opioid withdrawal   • Altered level of consciousness   • Asthma   • Bronchitis, acute   • General medical exam   • Low back pain   • Syncope   • Unspecified sprain of unspecified wrist, initial encounter   • Upper respiratory tract infection   • Intractable nausea with vomiting   • Obesity   • Gastroesophageal reflux disease   • Tobacco use   • Nausea and vomiting   • Borderline hypoglycemic episodes   • Migraine headache   • Pseudoseizure   • Seizure (HCC)   • Panic disorder with agoraphobia   • Gastroparesis   • Hyperlipidemia   • Hypothyroidism   • Sprain of ankle   • Bipolar disorder   • Dissociative convulsions       Allergy:   Allergies   Allergen Reactions   • Sulfa Antibiotics Hives   • Tramadol Hcl Mental Status Change        Discontinued Medications:  There are no discontinued medications.    Current Medications:   Current Outpatient Medications   Medication Sig Dispense Refill   •  ALPRAZolam (XANAX) 2 MG tablet TAKE 1 TABLET BY MOUTH THREE TIMES DAILY AS NEEDED FOR ANXIETY 90 tablet 2   • amitriptyline (ELAVIL) 25 MG tablet TAKE 1 TABLET BY MOUTH EVERY NIGHT AT BEDTIME 90 tablet 1   • atorvastatin (LIPITOR) 20 MG tablet TAKE 1 TABLET BY MOUTH DAILY 90 tablet 1   • busPIRone (BUSPAR) 15 MG tablet TAKE 1 TABLET BY MOUTH THREE TIMES DAILY 270 tablet 1   • ferrous gluconate (FERGON) 324 MG tablet Take 1 tablet by mouth Daily With Breakfast. 90 tablet 1   • gabapentin (NEURONTIN) 800 MG tablet TAKE 1 TABLET BY MOUTH THREE TIMES DAILY (Patient taking differently: 1 tablet Every Night.) 270 tablet 1   • heparin 100 UNIT/ML solution injection      • hydrOXYzine (ATARAX) 25 MG tablet TAKE 1 TABLET BY MOUTH THREE TIMES DAILY AS NEEDED FOR ANXIETY 270 tablet 1   • lamoTRIgine (LaMICtal) 100 MG tablet Take 1 tab in am, 1 tab at noon and 2 tabs at bedtime  (400mg qd) 120 tablet 5   • levETIRAcetam (KEPPRA) 500 MG tablet TAKE 3 TABLETS BY MOUTH TWICE DAILY 540 tablet 3   • levothyroxine (SYNTHROID, LEVOTHROID) 25 MCG tablet TAKE 1 TABLET BY MOUTH DAILY FOR THYROID 90 tablet 0   • Motegrity 2 MG tablet Take 1 tablet by mouth Daily.     • nystatin 916680 UNIT/GM powder APPLY TOPICALLY TO THE APPROPRIATE AREA THREE TIMES DAILY AS NEEDED FOR RASH AS DIRECTED. 30 g 1   • Octagam 10 GM/100ML solution infusion      • pantoprazole (PROTONIX) 40 MG EC tablet TAKE 1 TABLET BY MOUTH DAILY 90 tablet 1   • QUEtiapine XR (SEROquel XR) 400 MG 24 hr tablet TAKE 2 TABLETS BY MOUTH EVERY NIGHT AT BEDTIME 180 tablet 1   • sucralfate (CARAFATE) 1 g tablet Take 1 tablet by mouth 3 (Three) Times a Day.     • SUMAtriptan (IMITREX) 100 MG tablet TAKE 1 TABLET BY MOUTH 1 TIME AS NEEDED FOR MIGRAINE 8 tablet 5   • Vraylar 1.5 MG capsule capsule TAKE 1 CAPSULE BY MOUTH EVERY DAY 90 capsule 1   • budesonide-formoterol (Symbicort) 160-4.5 MCG/ACT inhaler Inhale 2 puffs 2 (Two) Times a Day. (Patient not taking: Reported on 5/9/2023) 6  g 12   • Cathflo Activase 2 MG injection  (Patient not taking: Reported on 5/9/2023)     • diphenhydrAMINE (BENADRYL) 50 MG/ML injection  (Patient not taking: Reported on 5/9/2023)     • gabapentin (Neurontin) 100 MG capsule Take 1 capsule daily for 14 days, then STOP medication. 14 capsule 0   • gabapentin (Neurontin) 300 MG capsule Take 1 capsule by mouth daily for 14 days, then decrease to 100mg. 14 capsule 0   • HYDROcodone-acetaminophen (NORCO)  MG per tablet Take 1 tablet by mouth Every 4 (Four) to 6 (Six) Hours As Needed. (Patient not taking: Reported on 5/9/2023)     • ipratropium-albuterol (DUO-NEB) 0.5-2.5 mg/3 ml nebulizer Take 3 mL by nebulization 4 (Four) Times a Day As Needed for Wheezing. (Patient not taking: Reported on 5/9/2023) 360 mL 1   • lacosamide (VIMPAT) 100 MG tablet tablet Take 1 tablet by mouth Every 12 (Twelve) Hours for 30 days. 60 tablet 0   • levETIRAcetam (KEPPRA) 750 MG tablet Take 1 tablet by mouth 3 (Three) Times a Day. (Patient not taking: Reported on 5/9/2023)     • oxyCODONE-acetaminophen (PERCOCET)  MG per tablet Take 1 tablet by mouth Every 6 (Six) Hours. (Patient not taking: Reported on 5/9/2023)     • sertraline (Zoloft) 50 MG tablet Take 1 tablet by mouth Daily. 30 tablet 2     No current facility-administered medications for this visit.         Psychological ROS: positive for - anxiety and depression  negative for - behavioral disorder, concentration difficulties, decreased libido, disorientation, hallucinations, hostility, irritability, memory difficulties, mood swings, obsessive thoughts, physical abuse or sexual abuse      Physical Exam:   not currently breastfeeding.    Mental Status Exam:   Hygiene:   good  Cooperation:  Cooperative  Eye Contact:  Good  Psychomotor Behavior:  Appropriate  Affect:  Appropriate  Mood: normal  Hopelessness: Denies  Speech:  Normal  Thought Process:  Linear  Thought Content:  Mood congruent  Suicidal:  None  Homicidal:   None  Hallucinations:  None  Delusion:  None  Memory:  Intact  Orientation:  Person, Place, Time and Situation  Reliability:  fair  Insight:  Fair  Judgement:  Fair  Impulse Control:  Fair  Physical/Medical Issues:  Yes       Mental status exam was reviewed and compared to 11/22/23 visit and appropriate updates were made.     PHQ-9 Depression Screening    Little interest or pleasure in doing things? 0-->not at all   Feeling down, depressed, or hopeless? 1-->several days   Trouble falling or staying asleep, or sleeping too much? 3-->nearly every day   Feeling tired or having little energy? 0-->not at all   Poor appetite or overeating? 3-->nearly every day   Feeling bad about yourself - or that you are a failure or have let yourself or your family down? 3-->nearly every day   Trouble concentrating on things, such as reading the newspaper or watching television? 3-->nearly every day   Moving or speaking so slowly that other people could have noticed? Or the opposite - being so fidgety or restless that you have been moving around a lot more than usual? 3-->nearly every day   Thoughts that you would be better off dead, or of hurting yourself in some way? 0-->not at all   PHQ-9 Total Score 16   If you checked off any problems, how difficult have these problems made it for you to do your work, take care of things at home, or get along with other people? somewhat difficult        Former smoker    I advised Milka of the risks of tobacco use.     Result Review:    Labs:  No visits with results within 3 Month(s) from this visit.   Latest known visit with results is:   Lab on 01/10/2023   Component Date Value Ref Range Status   • Color, UA 01/10/2023 Yellow  Yellow, Straw Final   • Appearance, UA 01/10/2023 Clear  Clear Final   • pH, UA 01/10/2023 6.0  5.0 - 8.0 Final   • Specific Gravity, UA 01/10/2023 1.018  1.005 - 1.030 Final   • Glucose, UA 01/10/2023 Negative  Negative Final   • Ketones, UA 01/10/2023 Negative   Negative Final   • Bilirubin, UA 01/10/2023 Negative  Negative Final   • Blood, UA 01/10/2023 Negative  Negative Final   • Protein, UA 01/10/2023 Negative  Negative Final   • Leuk Esterase, UA 01/10/2023 Negative  Negative Final   • Nitrite, UA 01/10/2023 Negative  Negative Final   • Urobilinogen, UA 01/10/2023 0.2 E.U./dL  0.2 - 1.0 E.U./dL Final   • WBC 01/10/2023 8.40  3.40 - 10.80 10*3/mm3 Final   • RBC 01/10/2023 4.46  3.77 - 5.28 10*6/mm3 Final   • Hemoglobin 01/10/2023 10.3 (L)  12.0 - 15.9 g/dL Final   • Hematocrit 01/10/2023 34.3  34.0 - 46.6 % Final   • MCV 01/10/2023 76.9 (L)  79.0 - 97.0 fL Final   • MCH 01/10/2023 23.1 (L)  26.6 - 33.0 pg Final   • MCHC 01/10/2023 30.1 (L)  31.5 - 35.7 g/dL Final   • RDW 01/10/2023 19.3 (H)  12.3 - 15.4 % Final   • RDW-SD 01/10/2023 52.5  37.0 - 54.0 fl Final   • MPV 01/10/2023 9.5  6.0 - 12.0 fL Final   • Platelets 01/10/2023 87 (L)  140 - 450 10*3/mm3 Final   • Neutrophil % 01/10/2023 66.2  42.7 - 76.0 % Final   • Lymphocyte % 01/10/2023 23.8  19.6 - 45.3 % Final   • Monocyte % 01/10/2023 5.5  5.0 - 12.0 % Final   • Eosinophil % 01/10/2023 3.6  0.3 - 6.2 % Final   • Basophil % 01/10/2023 0.9  0.0 - 1.5 % Final   • Neutrophils, Absolute 01/10/2023 5.60  1.70 - 7.00 10*3/mm3 Final   • Lymphocytes, Absolute 01/10/2023 2.00  0.70 - 3.10 10*3/mm3 Final   • Monocytes, Absolute 01/10/2023 0.50  0.10 - 0.90 10*3/mm3 Final   • Eosinophils, Absolute 01/10/2023 0.30  0.00 - 0.40 10*3/mm3 Final   • Basophils, Absolute 01/10/2023 0.10  0.00 - 0.20 10*3/mm3 Final   • nRBC 01/10/2023 0.1  0.0 - 0.2 /100 WBC Final       Assessment & Plan   Diagnoses and all orders for this visit:    1. Generalized anxiety disorder (Primary)  -     gabapentin (Neurontin) 300 MG capsule; Take 1 capsule by mouth daily for 14 days, then decrease to 100mg.  Dispense: 14 capsule; Refill: 0  -     gabapentin (Neurontin) 100 MG capsule; Take 1 capsule daily for 14 days, then STOP medication.  Dispense: 14  capsule; Refill: 0  -     sertraline (Zoloft) 50 MG tablet; Take 1 tablet by mouth Daily.  Dispense: 30 tablet; Refill: 2  -     [place] Full Urine Drug Screen -; Future    2. Panic disorder  -     sertraline (Zoloft) 50 MG tablet; Take 1 tablet by mouth Daily.  Dispense: 30 tablet; Refill: 2  -     [facility] Full Urine Drug Screen -; Future    3. Bipolar affective disorder, currently depressed, moderate      Start sertraline 50mg at night.   Continue Vraylar 1.5mg.   Wean off gabapentin. Stop the 800mg capsules. Take 300mg nightly for 2 weeks. Then take 100mg nightly for 2 weeks. Then STOP medication.    Continue Quetiapine XR 400mg daily--will plan to decrease this at future visit as well, as there is no need for both this medication and Vraylar.   Continue buspirone 15mg, three times daily.     At next fill, change alprazolam to clonazepam. Take clonazepam 2mg twice daily as needed. We will try to taper dose over time.     We will actively work to decrease and minimize medications to reduce polypharmacy.     Visit Diagnoses:    ICD-10-CM ICD-9-CM   1. Generalized anxiety disorder  F41.1 300.02   2. Panic disorder  F41.0 300.01   3. Bipolar affective disorder, currently depressed, moderate  F31.32 296.52       TREATMENT PLAN/GOALS: Continue supportive psychotherapy efforts and medications as indicated. Treatment and medication options discussed during today's visit. Patient ackowledged and verbally consented to continue with current treatment plan and was educated on the importance of compliance with treatment and follow-up appointments.    CRISIS RESOURCES:    In the event you have personal crisis, there are several resources to reach someone to talk with:    • 988 Suicide and Crisis Lifeline  Call or text 988 or chat 988Glanceline.org  • [place] National Helpline  1-609-478-HELP (0239)  Text your zip code to 917883 (HELP4U)  • 's Crisis Line  Dial 988, then press 1  Text 019063    MEDICATION ISSUES:  INSPECT  reviewed as expected. Gabapentin filled 4/27/23 for quantity 270. Alprazolam filled 4/17/23.     UDS ordered today.     Discussed medication options and treatment plan of prescribed medication as well as the risks, benefits, and side effects including potential falls, possible impaired driving and metabolic adversities among others. Patient is agreeable to call the office with any worsening of symptoms or onset of side effects. Patient is agreeable to call 911 or go to the nearest ER should he/she begin having SI/HI. No medication side effects or related complaints today.     MEDS ORDERED DURING VISIT:  New Medications Ordered This Visit   Medications   • gabapentin (Neurontin) 300 MG capsule     Sig: Take 1 capsule by mouth daily for 14 days, then decrease to 100mg.     Dispense:  14 capsule     Refill:  0   • gabapentin (Neurontin) 100 MG capsule     Sig: Take 1 capsule daily for 14 days, then STOP medication.     Dispense:  14 capsule     Refill:  0     Fill after 2 weeks on 300mg..   • sertraline (Zoloft) 50 MG tablet     Sig: Take 1 tablet by mouth Daily.     Dispense:  30 tablet     Refill:  2       No follow-ups on file.         This document has been electronically signed by Milka Pavon, [email or web address]  May 9, 2023 17:43 EDT    Part of this note may be an electronic transcription/translation of spoken language to printed text using the Dragon Dictation System.

## 2023-05-08 ENCOUNTER — PATIENT OUTREACH (OUTPATIENT)
Dept: CASE MANAGEMENT | Facility: OTHER | Age: 53
End: 2023-05-08
Payer: MEDICAID

## 2023-05-08 DIAGNOSIS — E78.2 MIXED HYPERLIPIDEMIA: ICD-10-CM

## 2023-05-08 DIAGNOSIS — K31.84 GASTROPARESIS: Primary | ICD-10-CM

## 2023-05-08 NOTE — OUTREACH NOTE
"AMBULATORY CASE MANAGEMENT NOTE    Name and Relationship of Patient/Support Person: Milka Espinoza M - Self    CCM Interim Update    Outreach complete.    Patient stated that her \"nurse is here right now finishing up with my medication\"  Patient is getting in home treatment for her gastroparesis.  The patient was very appropriate in our conversation.  She stated that she has her an appointment \"with my psychiatrist tomorrow\", which is accurate.  Patient proceeded to confirm the appointment time.    Patient apologized for missing scheduled appointment with PCP on 4/19/23.  \"I had that foot surgery and it's hard for me to get around.  I have to get a ride right now\".  Reviewed the contact number (office) for this RN-ACM and the patient stated that she would \"call later and get that appointment rescheduled\".         Education Documentation  No documentation found.        Roverto THRASHER  Ambulatory Case Management    5/8/2023, 15:26 EDT  "

## 2023-05-09 ENCOUNTER — TELEPHONE (OUTPATIENT)
Dept: NEUROLOGY | Facility: CLINIC | Age: 53
End: 2023-05-09

## 2023-05-09 ENCOUNTER — OFFICE VISIT (OUTPATIENT)
Dept: PSYCHIATRY | Facility: CLINIC | Age: 53
End: 2023-05-09
Payer: MEDICARE

## 2023-05-09 DIAGNOSIS — F41.0 PANIC DISORDER: Chronic | ICD-10-CM

## 2023-05-09 DIAGNOSIS — F31.32 BIPOLAR AFFECTIVE DISORDER, CURRENTLY DEPRESSED, MODERATE: Chronic | ICD-10-CM

## 2023-05-09 DIAGNOSIS — F41.1 GENERALIZED ANXIETY DISORDER: Primary | Chronic | ICD-10-CM

## 2023-05-09 RX ORDER — GABAPENTIN 100 MG/1
CAPSULE ORAL
Qty: 14 CAPSULE | Refills: 0 | Status: SHIPPED | OUTPATIENT
Start: 2023-05-09

## 2023-05-09 RX ORDER — GABAPENTIN 300 MG/1
CAPSULE ORAL
Qty: 14 CAPSULE | Refills: 0 | Status: SHIPPED | OUTPATIENT
Start: 2023-05-09

## 2023-05-09 NOTE — TELEPHONE ENCOUNTER
Caller: ROBYN Martini call back number:  [phone] CAN LEAVE DETAILED MESS ON PHONE HAS DR LYRIC 2:45 WILL BE THERE ABOUT HOUR       What was the call regarding: PT STATES THE RX SHE IS ON IS MAKING HER TO TIRED. HAS NO ENERGY, WANTS TO SLEEP ALL THE TIME. PT WAS UP AT 2AM 05.08.23  FEEL BACK SLEEP AT 4 SLEEP TILL 10AM Tuesday 05.09.23 BASICALLY SLEEPS A ENTIRE DAY AT LEAST TWICE A WEEK. SAID HAS BEEN DOING FOR LAST 6 MONTHS. STATED  CAN'T KEEP DOING THIS? NEEDS DIFFERENT RX OR SOMETHING.     Do you require a callback:YES ASAP NEEDS TO FIND OUT ABOUT RX    PLEASE ADVISE

## 2023-05-10 ENCOUNTER — TELEPHONE (OUTPATIENT)
Dept: PSYCHIATRY | Facility: CLINIC | Age: 53
End: 2023-05-10
Payer: MEDICAID

## 2023-05-10 RX ORDER — CLONAZEPAM 2 MG/1
2 TABLET ORAL 2 TIMES DAILY PRN
Qty: 60 TABLET | Refills: 0 | Status: SHIPPED | OUTPATIENT
Start: 2023-05-10

## 2023-05-10 NOTE — TELEPHONE ENCOUNTER
Milka Espinoza that I saw yesterday, do you care to call her and tell her that I switched her alprazolam 2mg 3x/day to clonazepam 2mg twice a day PRN. She can switch when it is time to  the med again. I told her one of us would call her with the dose change after I talked to Dr. Cummings about it. So she is expecting the change.    If she asks why twice a day vs. 3 times a day, the clonazepam is longer lasting. So She won't need it three times a day

## 2023-05-10 NOTE — TELEPHONE ENCOUNTER
Tell her that she can decrease the middle the Vimpat to 100 mg at bedtime for a week and see if that helps.  Later can add the second 1 back in after a week or so.

## 2023-05-19 DIAGNOSIS — R41.840 ATTENTION DEFICIT: Primary | ICD-10-CM

## 2023-05-19 RX ORDER — LEVOTHYROXINE SODIUM 0.03 MG/1
TABLET ORAL
Qty: 30 TABLET | Refills: 0 | Status: SHIPPED | OUTPATIENT
Start: 2023-05-19

## 2023-05-19 RX ORDER — BUPROPION HYDROCHLORIDE 150 MG/1
150 TABLET ORAL DAILY
Qty: 30 TABLET | Refills: 0 | Status: SHIPPED | OUTPATIENT
Start: 2023-05-19 | End: 2023-05-19

## 2023-05-24 ENCOUNTER — TELEPHONE (OUTPATIENT)
Dept: PSYCHIATRY | Facility: CLINIC | Age: 53
End: 2023-05-24
Payer: MEDICAID

## 2023-05-24 RX ORDER — HYDROXYZINE HYDROCHLORIDE 25 MG/1
25 TABLET, FILM COATED ORAL 3 TIMES DAILY PRN
Qty: 90 TABLET | Refills: 2 | Status: SHIPPED | OUTPATIENT
Start: 2023-05-24

## 2023-05-24 NOTE — TELEPHONE ENCOUNTER
Patient called and left a message that since we started her on the Clonazepam that her anxiety is worse, she is having more panic attacks and she can not sleep.    She picked up the clonazepam 5/10/23 inspect fill

## 2023-05-24 NOTE — TELEPHONE ENCOUNTER
Spoke with patient. Love gave her some of the Hydroxyzine, but she never picked it up.  So if you want to send her in more then she will try it this time.

## 2023-05-25 ENCOUNTER — PATIENT OUTREACH (OUTPATIENT)
Dept: CASE MANAGEMENT | Facility: OTHER | Age: 53
End: 2023-05-25
Payer: MEDICAID

## 2023-05-25 DIAGNOSIS — K31.84 GASTROPARESIS: ICD-10-CM

## 2023-05-25 DIAGNOSIS — E78.2 MIXED HYPERLIPIDEMIA: Primary | ICD-10-CM

## 2023-05-25 NOTE — OUTREACH NOTE
AMBULATORY CASE MANAGEMENT NOTE    Name and Relationship of Patient/Support Person: Milka Espinoza M - Self    CCM Interim Update    Outreach complete.    Patient did not identify any acute needs on this outreach.  Reminded patient of upcoming [facility] visit on 6/12/23 at 1345.  Patient verbalized understanding.        Education Documentation  No documentation found.        Roverto THRASHER  Ambulatory Case Management    5/25/2023, 15:05 EDT

## 2023-06-08 ENCOUNTER — TELEPHONE (OUTPATIENT)
Dept: CASE MANAGEMENT | Facility: OTHER | Age: 53
End: 2023-06-08
Payer: MEDICAID

## 2023-06-15 NOTE — PROGRESS NOTES
The ABCs of the Annual Wellness Visit  Subsequent Medicare Wellness Visit    Subjective      Milka Espinoza is a 53 y.o. female who presents for a Subsequent Medicare Wellness Visit.    The following portions of the patient's history were reviewed and   updated as appropriate: allergies, current medications, past family history, past medical history, past social history, past surgical history, and problem list.    Compared to one year ago, the patient feels her physical   health is worse.    Compared to one year ago, the patient feels her mental   health is the same.    Pt will get Prevnar 20 today, she is agreeable to a referral for colonoscopy. MMSE 30/30.    Recent Hospitalizations:  She was admitted within the past 365 days at [facility].       Current Medical Providers:  Patient Care Team:  Safia Reyna APRN as PCP - General (Nurse Practitioner)  Roverto Velazquez RN as Ambulatory  ([facility])    Outpatient Medications Prior to Visit   Medication Sig Dispense Refill    amitriptyline (ELAVIL) 25 MG tablet TAKE 1 TABLET BY MOUTH EVERY NIGHT AT BEDTIME 90 tablet 1    atorvastatin (LIPITOR) 20 MG tablet TAKE 1 TABLET BY MOUTH DAILY 90 tablet 1    busPIRone (BUSPAR) 15 MG tablet TAKE 1 TABLET BY MOUTH THREE TIMES DAILY 270 tablet 1    clonazePAM (KlonoPIN) 2 MG tablet Take 1 tablet by mouth 2 (Two) Times a Day As Needed for Anxiety. 60 tablet 0    ferrous gluconate (FERGON) 324 MG tablet Take 1 tablet by mouth Daily With Breakfast. 90 tablet 1    gabapentin (Neurontin) 100 MG capsule Take 1 capsule daily for 14 days, then STOP medication. 14 capsule 0    gabapentin (Neurontin) 300 MG capsule Take 1 capsule by mouth daily for 14 days, then decrease to 100mg. 14 capsule 0    gabapentin (NEURONTIN) 800 MG tablet TAKE 1 TABLET BY MOUTH THREE TIMES DAILY (Patient taking differently: 1 tablet Every Night.) 270 tablet 1    heparin 100 UNIT/ML solution injection        hydrOXYzine (ATARAX) 25 MG tablet TAKE 1 TABLET BY MOUTH THREE TIMES DAILY AS NEEDED FOR ANXIETY 270 tablet 1    hydrOXYzine (ATARAX) 25 MG tablet Take 1 tablet by mouth 3 (Three) Times a Day As Needed for Anxiety. 90 tablet 2    lamoTRIgine (LaMICtal) 100 MG tablet Take 1 tab in am, 1 tab at noon and 2 tabs at bedtime  (400mg qd) 120 tablet 5    levETIRAcetam (KEPPRA) 500 MG tablet TAKE 3 TABLETS BY MOUTH TWICE DAILY 540 tablet 3    levothyroxine (SYNTHROID, LEVOTHROID) 25 MCG tablet TAKE 1 TABLET BY MOUTH DAILY FOR THYROID 30 tablet 0    Motegrity 2 MG tablet Take 1 tablet by mouth Daily.      nystatin 250432 UNIT/GM powder APPLY TOPICALLY TO THE APPROPRIATE AREA THREE TIMES DAILY AS NEEDED FOR RASH AS DIRECTED. 30 g 1    Octagam 10 GM/100ML solution infusion       pantoprazole (PROTONIX) 40 MG EC tablet TAKE 1 TABLET BY MOUTH DAILY 90 tablet 1    QUEtiapine XR (SEROquel XR) 400 MG 24 hr tablet TAKE 2 TABLETS BY MOUTH EVERY NIGHT AT BEDTIME 180 tablet 1    sertraline (Zoloft) 50 MG tablet Take 1 tablet by mouth Daily. 30 tablet 2    sucralfate (CARAFATE) 1 g tablet Take 1 tablet by mouth 3 (Three) Times a Day.      SUMAtriptan (IMITREX) 100 MG tablet TAKE 1 TABLET BY MOUTH 1 TIME AS NEEDED FOR MIGRAINE 8 tablet 5    Vraylar 1.5 MG capsule capsule TAKE 1 CAPSULE BY MOUTH EVERY DAY 90 capsule 1    ALPRAZolam (XANAX) 2 MG tablet TAKE 1 TABLET BY MOUTH THREE TIMES DAILY AS NEEDED FOR ANXIETY 90 tablet 2     No facility-administered medications prior to visit.       No opioid medication identified on active medication list. I have reviewed chart for other potential  high risk medication/s and harmful drug interactions in the elderly.        Aspirin is not on active medication list.  Aspirin use is not indicated based on review of current medical condition/s. Risk of harm outweighs potential benefits.  .    Patient Active Problem List   Diagnosis    Bipolar disorder, in full remission, most recent episode depressed     "Depression    Mood disorder in conditions classified elsewhere    Seizures    Opioid withdrawal    Altered level of consciousness    Asthma    Bronchitis, acute    General medical exam    Low back pain    Syncope    Unspecified sprain of unspecified wrist, initial encounter    Upper respiratory tract infection    Intractable nausea with vomiting    Obesity    Gastroesophageal reflux disease    Tobacco use    Nausea and vomiting    Borderline hypoglycemic episodes    Migraine headache    Pseudoseizure    Seizure    Panic disorder with agoraphobia    Gastroparesis    Hyperlipidemia    Hypothyroidism    Sprain of ankle    Bipolar disorder    Dissociative convulsions     Advance Care Planning   Advance Care Planning     Advance Directive is not on file.  ACP discussion was declined by the patient. Patient does not have an advance directive, declines further assistance.     Objective    Vitals:    06/16/23 1247 06/16/23 1319   BP: 132/84    BP Location: Left arm    Patient Position: Sitting    Cuff Size: Large Adult    Pulse: 88    Temp: 98.1 °F (36.7 °C)    TempSrc: Oral    SpO2: 97%    Weight: 94.3 kg (208 lb)    Height: 163.2 cm (64.25\")    PainSc:    9   PainLoc:  Ankle     Estimated body mass index is 35.43 kg/m² as calculated from the following:    Height as of this encounter: 163.2 cm (64.25\").    Weight as of this encounter: 94.3 kg (208 lb).    Class 2 Severe Obesity (BMI >=35 and <=39.9). Obesity-related health conditions include the following: dyslipidemias, GERD, and osteoarthritis. Obesity is improving with lifestyle modifications. BMI is is above average; BMI management plan is completed. We discussed low calorie, low carb based diet program, portion control, and increasing exercise.      Does the patient have evidence of cognitive impairment?   No            HEALTH RISK ASSESSMENT    Smoking Status:  Social History     Tobacco Use   Smoking Status Former    Packs/day: 0.25    Types: Cigarettes    Quit " date: 2020    Years since quittin.6   Smokeless Tobacco Never     Alcohol Consumption:  Social History     Substance and Sexual Activity   Alcohol Use No     Fall Risk Screen:    CHARITYADI Fall Risk Assessment was completed, and patient is at HIGH risk for falls. Assessment completed on:2023    Depression Screenin/16/2023    12:54 PM   PHQ-2/PHQ-9 Depression Screening   Little Interest or Pleasure in Doing Things 3-->nearly every day   Feeling Down, Depressed or Hopeless 3-->nearly every day   Trouble Falling or Staying Asleep, or Sleeping Too Much 3-->nearly every day   Feeling Tired or Having Little Energy 3-->nearly every day   Poor Appetite or Overeating 3-->nearly every day   Feeling Bad about Yourself - or that You are a Failure or Have Let Yourself or Your Family Down 0-->not at all   Trouble Concentrating on Things, Such as Reading the Newspaper or Watching Television 3-->nearly every day   Moving or Speaking So Slowly that Other People Could Have Noticed? Or the Opposite - Being So Fidgety 3-->nearly every day   Thoughts that You Would be Better Off Dead or of Hurting Yourself in Some Way 0-->not at all   PHQ-9: Brief Depression Severity Measure Score 21   If You Checked Off Any Problems, How Difficult Have These Problems Made It For You to Do Your Work, Take Care of Things at Home, or Get Along with Other People? extremely difficult       Health Habits and Functional and Cognitive Screenin/16/2023    12:55 PM   Functional & Cognitive Status   Do you have difficulty preparing food and eating? Yes   Do you have difficulty bathing yourself, getting dressed or grooming yourself? No   Do you have difficulty using the toilet? No   Do you have difficulty moving around from place to place? No   Do you have trouble with steps or getting out of a bed or a chair? Yes   Current Diet Unhealthy Diet   Dental Exam Up to date   Eye Exam Up to date   Exercise (times per week) 0 times per week    Current Exercises Include No Regular Exercise   Do you need help using the phone?  No   Are you deaf or do you have serious difficulty hearing?  No   Do you need help with transportation? No   Do you need help shopping? Yes   Do you need help preparing meals?  Yes   Do you need help with housework?  Yes   Do you need help with laundry? No   Do you need help taking your medications? No   Do you need help managing money? No   Do you ever drive or ride in a car without wearing a seat belt? No   Have you felt unusual stress, anger or loneliness in the last month? Yes   Who do you live with? Child   If you need help, do you have trouble finding someone available to you? No   Do you have difficulty concentrating, remembering or making decisions? Yes       Age-appropriate Screening Schedule:  Refer to the list below for future screening recommendations based on patient's age, sex and/or medical conditions. Orders for these recommended tests are listed in the plan section. The patient has been provided with a written plan.    Health Maintenance   Topic Date Due    COLORECTAL CANCER SCREENING  Never done    LIPID PANEL  06/08/2023    COVID-19 Vaccine (3 - Pfizer series) 01/01/2027 (Originally 7/21/2021)    INFLUENZA VACCINE  10/01/2023    ANNUAL WELLNESS VISIT  06/16/2024    MAMMOGRAM  08/01/2024    TDAP/TD VACCINES (3 - Td or Tdap) 04/14/2032    HEPATITIS C SCREENING  Completed    Orthopox/Monkeypox  Completed    Pneumococcal Vaccine 0-64  Completed    ZOSTER VACCINE  Completed    PAP SMEAR  Discontinued                  CMS Preventative Services Quick Reference  Risk Factors Identified During Encounter:    Immunizations Discussed/Encouraged: Prevnar 20 (Pneumococcal 20-valent conjugate)    The above risks/problems have been discussed with the patient.  Pertinent information has been shared with the patient in the After Visit Summary.    Diagnoses and all orders for this visit:    1. Medicare annual wellness visit,  subsequent (Primary)    2. Fatigue, unspecified type  -     TSH; Future  -     Vitamin D 25 hydroxy; Future  -     CBC w AUTO Differential; Future  -     Vitamin B12; Future  -     Iron and TIBC; Future  -     Ferritin; Future    3. Mixed hyperlipidemia  -     Lipid panel; Future  -     Comprehensive metabolic panel; Future    4. Hypothyroidism, unspecified type  -     TSH; Future    5. Screening for colorectal cancer  -     Ambulatory Referral For Screening Colonoscopy    6. Vaccine counseling    7. Need for pneumococcal 20-valent conjugate vaccination  -     Pneumococcal Conjugate Vaccine 20-Valent (PCV20)        Follow Up:   Next Medicare Wellness visit to be scheduled in 1 year.      An After Visit Summary and PPPS were made available to the patient.

## 2023-06-16 ENCOUNTER — OFFICE VISIT (OUTPATIENT)
Dept: FAMILY MEDICINE CLINIC | Facility: CLINIC | Age: 53
End: 2023-06-16
Payer: MEDICAID

## 2023-06-16 VITALS
OXYGEN SATURATION: 97 % | WEIGHT: 208 LBS | SYSTOLIC BLOOD PRESSURE: 132 MMHG | HEART RATE: 88 BPM | TEMPERATURE: 98.1 F | DIASTOLIC BLOOD PRESSURE: 84 MMHG | HEIGHT: 64 IN | BODY MASS INDEX: 35.51 KG/M2

## 2023-06-16 DIAGNOSIS — E78.2 MIXED HYPERLIPIDEMIA: ICD-10-CM

## 2023-06-16 DIAGNOSIS — Z12.11 SCREENING FOR COLORECTAL CANCER: ICD-10-CM

## 2023-06-16 DIAGNOSIS — Z12.12 SCREENING FOR COLORECTAL CANCER: ICD-10-CM

## 2023-06-16 DIAGNOSIS — Z00.00 MEDICARE ANNUAL WELLNESS VISIT, SUBSEQUENT: Primary | ICD-10-CM

## 2023-06-16 DIAGNOSIS — R53.83 FATIGUE, UNSPECIFIED TYPE: ICD-10-CM

## 2023-06-16 DIAGNOSIS — E03.9 HYPOTHYROIDISM, UNSPECIFIED TYPE: ICD-10-CM

## 2023-06-16 DIAGNOSIS — Z23 NEED FOR PNEUMOCOCCAL 20-VALENT CONJUGATE VACCINATION: ICD-10-CM

## 2023-06-16 DIAGNOSIS — Z71.85 VACCINE COUNSELING: ICD-10-CM

## 2023-06-16 PROCEDURE — 80050 GENERAL HEALTH PANEL: CPT

## 2023-06-16 PROCEDURE — 83540 ASSAY OF IRON: CPT

## 2023-06-16 PROCEDURE — 82728 ASSAY OF FERRITIN: CPT

## 2023-06-16 PROCEDURE — 84466 ASSAY OF TRANSFERRIN: CPT

## 2023-06-16 PROCEDURE — 82306 VITAMIN D 25 HYDROXY: CPT

## 2023-06-16 PROCEDURE — 80061 LIPID PANEL: CPT

## 2023-06-16 PROCEDURE — 82607 VITAMIN B-12: CPT

## 2023-06-19 DIAGNOSIS — R77.8 ELEVATED TOTAL PROTEIN: ICD-10-CM

## 2023-06-19 DIAGNOSIS — E55.9 VITAMIN D DEFICIENCY: Primary | ICD-10-CM

## 2023-06-19 DIAGNOSIS — E78.2 MIXED HYPERLIPIDEMIA: Primary | ICD-10-CM

## 2023-06-19 RX ORDER — LEVOTHYROXINE SODIUM 0.03 MG/1
TABLET ORAL
Qty: 30 TABLET | Refills: 0 | Status: SHIPPED | OUTPATIENT
Start: 2023-06-19

## 2023-06-19 RX ORDER — ERGOCALCIFEROL 1.25 MG/1
50000 CAPSULE ORAL WEEKLY
Qty: 8 CAPSULE | Refills: 0 | Status: SHIPPED | OUTPATIENT
Start: 2023-06-19

## 2023-06-19 RX ORDER — ATORVASTATIN CALCIUM 20 MG/1
20 TABLET, FILM COATED ORAL NIGHTLY
Qty: 90 TABLET | Refills: 1 | Status: SHIPPED | OUTPATIENT
Start: 2023-06-19

## 2023-07-24 ENCOUNTER — TELEPHONE (OUTPATIENT)
Dept: CASE MANAGEMENT | Facility: OTHER | Age: 53
End: 2023-07-24
Payer: MEDICAID

## 2023-07-31 ENCOUNTER — TELEPHONE (OUTPATIENT)
Dept: FAMILY MEDICINE CLINIC | Facility: CLINIC | Age: 53
End: 2023-07-31
Payer: MEDICAID

## 2023-08-07 RX ORDER — SUMATRIPTAN 100 MG/1
TABLET, FILM COATED ORAL
Qty: 8 TABLET | Refills: 5 | Status: SHIPPED | OUTPATIENT
Start: 2023-08-07

## 2023-08-10 DIAGNOSIS — E55.9 VITAMIN D DEFICIENCY: ICD-10-CM

## 2023-08-10 RX ORDER — ERGOCALCIFEROL 1.25 MG/1
CAPSULE ORAL
Qty: 8 CAPSULE | Refills: 0 | Status: SHIPPED | OUTPATIENT
Start: 2023-08-10

## 2023-08-11 DIAGNOSIS — F41.0 PANIC DISORDER: Chronic | ICD-10-CM

## 2023-08-11 DIAGNOSIS — F41.1 GENERALIZED ANXIETY DISORDER: Chronic | ICD-10-CM

## 2023-08-17 RX ORDER — LEVOTHYROXINE SODIUM 0.03 MG/1
TABLET ORAL
Qty: 90 TABLET | Refills: 0 | Status: SHIPPED | OUTPATIENT
Start: 2023-08-17

## 2023-08-21 DIAGNOSIS — F41.0 PANIC DISORDER: Chronic | ICD-10-CM

## 2023-08-21 DIAGNOSIS — F41.1 GENERALIZED ANXIETY DISORDER: Chronic | ICD-10-CM

## 2023-08-21 RX ORDER — CLONAZEPAM 2 MG/1
TABLET ORAL
Qty: 60 TABLET | Refills: 0 | Status: SHIPPED | OUTPATIENT
Start: 2023-08-21

## 2023-08-24 ENCOUNTER — TELEPHONE (OUTPATIENT)
Dept: CASE MANAGEMENT | Facility: OTHER | Age: 53
End: 2023-08-24
Payer: MEDICAID

## 2023-08-28 ENCOUNTER — TELEPHONE (OUTPATIENT)
Dept: FAMILY MEDICINE CLINIC | Facility: CLINIC | Age: 53
End: 2023-08-28

## 2023-08-28 NOTE — TELEPHONE ENCOUNTER
Pt was discharged from our practice in April due to no shows. Not sure how we should handle this as it has me listed still even though the letter shows in the system?

## 2023-08-28 NOTE — TELEPHONE ENCOUNTER
Caller: Milka Espinoza    Relationship: Self    Best call back number: [phone]     What is the medical concern/diagnosis: PYLOROPLASTY    What specialty or service is being requested: SURGERY    What is the provider, practice or medical service name: DR MUKUND THOMPSON    Any additional details: PATIENT STATES THAT SHE HAS BEEN SEEING A SPECIALIST, WHO RECOMMENDED SURGERY WITH DR THOMPSON. PATIENT WAS TOLD THAT SHE WOULD NEED A REFERRAL FROM HER PCP BEFORE SHE COULD SCHEDULE SURGERY. PLEASE CALL WITH ANY ADDITIONAL FOLLOW UP

## 2023-09-07 ENCOUNTER — TELEPHONE (OUTPATIENT)
Dept: PSYCHIATRY | Facility: CLINIC | Age: 53
End: 2023-09-07
Payer: MEDICAID

## 2023-09-07 DIAGNOSIS — F40.01 PANIC DISORDER WITH AGORAPHOBIA: Chronic | ICD-10-CM

## 2023-09-07 RX ORDER — BUSPIRONE HYDROCHLORIDE 30 MG/1
30 TABLET ORAL 2 TIMES DAILY
Qty: 60 TABLET | Refills: 1 | Status: SHIPPED | OUTPATIENT
Start: 2023-09-07

## 2023-09-07 NOTE — TELEPHONE ENCOUNTER
Received call from patient stating her anxiety is increased and she cannot sleep.  She states her mother is on her death bed and the patient is having a really hard time.

## 2023-09-13 RX ORDER — GABAPENTIN 800 MG/1
TABLET ORAL
Qty: 270 TABLET | Refills: 1 | OUTPATIENT
Start: 2023-09-13

## 2023-09-18 DIAGNOSIS — F41.1 GENERALIZED ANXIETY DISORDER: Chronic | ICD-10-CM

## 2023-09-18 DIAGNOSIS — F41.0 PANIC DISORDER: Chronic | ICD-10-CM

## 2023-09-18 RX ORDER — CLONAZEPAM 2 MG/1
TABLET ORAL
Qty: 60 TABLET | Refills: 0 | Status: SHIPPED | OUTPATIENT
Start: 2023-09-18 | End: 2023-09-25

## 2023-09-20 NOTE — PROGRESS NOTES
Subjective     Milka Espinoza is a 53 y.o. female who presents today for follow-up for psychiatric medication management.     Chief Complaint:  Bipolar disorder, anxiety     History of Present Illness:     Medication adjustments last visit:   Start sertraline 50mg at night.   Continue Vraylar 1.5mg.   Wean off gabapentin. Stop the 800mg capsules. Take 300mg nightly for 2 weeks. Then take 100mg nightly for 2 weeks. Then STOP medication.    Continue Quetiapine XR 400mg daily--will plan to decrease this at future visit as well, as there is no need for both this medication and Vraylar.   Continue buspirone 15mg, three times daily.     She stopped the infusions, but she is going to continue to get saline infusions.   She feels like she can't tell any difference since starting the sertraline. We discussed increasing to 100mg.   She feels like the clonazepam wasn't working as well as the alprazolam was and she would like to go back on the alprazolam.    No suicidal thoughts.     Previous visit:   At today's visit, patient states she gets infusion every Monday, she has gastroparesis. She is still throwing everything up and states she has lost about 25 pounds.   She reports she is not currently on Norco. She was on pain medicine for ankle surgery that she had done in February 2023. She is now off that medication, for about 2 weeks.   She feels like all she wants to do is sleep. She feels like she doesn't want to get up and do anything.   She feels like her memory is not good. She feels like she has a hard time keeping track of what she is trying to say.   She is only taking gabapentin only at night time. She feels like she doesn't like the way it makes her feel.   She is currently taking alprazolam 2mg three times a day.     I had a long discussion with her about polypharmacy, with several of the medications she is on interacting together. She is taking several sedating medications, alprazolam and gabapentin  being two of them. I let her know that ideally she would taper off both of those. The alprazolam is at an extremely high dose, so it will be difficult to wean her down off that. Both the alprazolam and the gabapentin could be contributing to memory problems as well.     I had a report from another provider that the patient was in their clinic and was very groggy at that time, and EMS had to be called. The patient had reported taking two alprazolam instead of one. I asked the patient about this incident and she denies this happening. This incident makes me hesitant to continue alprazolam though due to risk for overdose being high.   I will wean the gabapentin this visit, and plan to start weaning the alprazolam next visit.     Patient still feels like she is having a lot of depression, despite being on the Vraylar.   She was agreeable to trying sertraline to help with both anxiety and depression.   No SI/HI/AVH    Patient presents with symptoms and behaviors that are consistent with the following DSM-5 diagnoses:  Bipolar disorder  2. Panic disorder  3. Generalized anxiety     The following portions of the patient's history were reviewed and updated as appropriate: allergies, current medications, past family history, past medical history, past social history, past surgical history and problem list.    PAST OUTPATIENT TREATMENT  Diagnosis treated:  Affective Disorder, Anxiety/Panic Disorder, substance use   Treatment Type:  Medication Management  Prior Psychiatric Medications:  Abilify  Risperidone  Vraylar  Valium  Xanax  Lamictal  Gabapentin  Trazodone, feels 'prickly all over'  Buspar  Seroquel  Support Groups:  None  Sequelae Of Mental Disorder:  social isolation, family disruption, emotional distress    Interval History  No Change    Side Effects  None      Past Medical History:  Past Medical History:   Diagnosis Date    Depression     Disease of thyroid gland     Hyperlipidemia     Migraines     On home O2  2021    3 lmp    Seizures        Social History:  Social History     Socioeconomic History    Marital status:    Tobacco Use    Smoking status: Former     Packs/day: 0.25     Types: Cigarettes     Quit date: 2020     Years since quittin.8    Smokeless tobacco: Never   Vaping Use    Vaping Use: Never used   Substance and Sexual Activity    Alcohol use: No    Drug use: No    Sexual activity: Never       Family History:  Family History   Problem Relation Age of Onset    No Known Problems Mother     No Known Problems Father     No Known Problems Sister     No Known Problems Brother     Alcohol abuse Daughter     No Known Problems Son     No Known Problems Maternal Grandmother     No Known Problems Paternal Grandmother     No Known Problems Maternal Aunt     No Known Problems Paternal Aunt        Past Surgical History:  Past Surgical History:   Procedure Laterality Date    ABDOMINAL SURGERY       SECTION      x3    ENDOSCOPY N/A 2022    Procedure: EGD WITH DILATATION (BOUGIE #56) AND INJECTION OF BOTOX;  Surgeon: Rito Le MD;  Location: [facility] ENDOSCOPY;  Service: Gastroenterology;  Laterality: N/A;  POST: LOWER ESOPHAGEAL STRICTURE AND CRICOPHARYNGEAL STRICUTRE    ENDOSCOPY W/ PEG TUBE PLACEMENT N/A 2020    Procedure: ESOPHAGOGASTRODUODENOSCOPY WITH PERCUTANEOUS ENDOSCOPIC GASTROSTOMY TUBE INSERTION;  Surgeon: Rito Le MD;  Location: [facility] ENDOSCOPY;  Service: Gastroenterology;  Laterality: N/A;    HERNIA REPAIR      HYSTERECTOMY      PEG TUBE REMOVAL         Problem List:  Patient Active Problem List   Diagnosis    Bipolar disorder, in full remission, most recent episode depressed    Depression    Mood disorder in conditions classified elsewhere    Seizures    Opioid withdrawal    Altered level of consciousness    Asthma    Bronchitis, acute    General medical exam    Low back pain    Syncope    Unspecified sprain of unspecified wrist, initial encounter    Upper  respiratory tract infection    Intractable nausea with vomiting    Obesity    Gastroesophageal reflux disease    Tobacco use    Nausea and vomiting    Borderline hypoglycemic episodes    Migraine headache    Pseudoseizure    Seizure    Panic disorder with agoraphobia    Gastroparesis    Hyperlipidemia    Hypothyroidism    Sprain of ankle    Bipolar disorder    Dissociative convulsions       Allergy:   Allergies   Allergen Reactions    Sulfa Antibiotics Hives    Tramadol Hcl Mental Status Change        Discontinued Medications:  Medications Discontinued During This Encounter   Medication Reason    Octagam 10 GM/100ML solution infusion     hydrOXYzine (ATARAX) 25 MG tablet     clonazePAM (KlonoPIN) 2 MG tablet     QUEtiapine XR (SEROquel XR) 400 MG 24 hr tablet Reorder    sertraline (ZOLOFT) 50 MG tablet Reorder       Current Medications:   Current Outpatient Medications   Medication Sig Dispense Refill    ondansetron (ZOFRAN) 2 mg/mL injection Infuse 2 mL into a venous catheter Every 6 (Six) Hours As Needed.      promethazine (PHENERGAN) 25 MG/ML injection Infuse 0.5 mL into a venous catheter Every 6 (Six) Hours As Needed.      QUEtiapine XR (SEROquel XR) 400 MG 24 hr tablet Take 1 tablet by mouth every night at bedtime. 90 tablet 0    sertraline (ZOLOFT) 100 MG tablet Take 1 tablet by mouth Daily. 90 tablet 0    ALPRAZolam (XANAX) 2 MG tablet Take 1 tablet by mouth 3 (Three) Times a Day As Needed for Anxiety. 90 tablet 1    amitriptyline (ELAVIL) 25 MG tablet TAKE 1 TABLET BY MOUTH EVERY NIGHT AT BEDTIME 90 tablet 1    atorvastatin (LIPITOR) 20 MG tablet Take 1 tablet by mouth Every Night. 90 tablet 1    busPIRone (BUSPAR) 30 MG tablet Take 1 tablet by mouth 2 (Two) Times a Day. 60 tablet 1    ferrous gluconate (FERGON) 324 MG tablet Take 1 tablet by mouth Daily With Breakfast. 90 tablet 1    heparin 100 UNIT/ML solution injection       hydrOXYzine (ATARAX) 25 MG tablet TAKE 1 TABLET BY MOUTH THREE TIMES DAILY AS  NEEDED FOR ANXIETY 270 tablet 1    lamoTRIgine (LaMICtal) 100 MG tablet TAKE 1 TABLET BY MOUTH EVERY MORNING, 1 AT NOON AND 2 TABLETS AT BEDTIME 120 tablet 5    levETIRAcetam (KEPPRA) 500 MG tablet TAKE 3 TABLETS BY MOUTH TWICE DAILY 540 tablet 3    levothyroxine (SYNTHROID, LEVOTHROID) 25 MCG tablet TAKE 1 TABLET BY MOUTH DAILY FOR THYROID 90 tablet 0    Motegrity 2 MG tablet Take 1 tablet by mouth Daily.      nystatin 452252 UNIT/GM powder APPLY TOPICALLY TO THE APPROPRIATE AREA THREE TIMES DAILY AS NEEDED FOR RASH AS DIRECTED. 30 g 1    pantoprazole (PROTONIX) 40 MG EC tablet TAKE 1 TABLET BY MOUTH DAILY 90 tablet 1    sucralfate (CARAFATE) 1 g tablet Take 1 tablet by mouth 3 (Three) Times a Day.      SUMAtriptan (IMITREX) 100 MG tablet TAKE 1 TABLET BY MOUTH 1 TIME AS NEEDED FOR MIGRAINE 8 tablet 5    vitamin D (ERGOCALCIFEROL) 1.25 MG (28718 UT) capsule capsule TAKE 1 CAPSULE BY MOUTH 1 TIME EVERY WEEK 8 capsule 0    Vraylar 1.5 MG capsule capsule TAKE 1 CAPSULE BY MOUTH EVERY DAY 90 capsule 1     No current facility-administered medications for this visit.         Psychological ROS: positive for - anxiety and depression  negative for - behavioral disorder, concentration difficulties, decreased libido, disorientation, hallucinations, hostility, irritability, memory difficulties, mood swings, obsessive thoughts, physical abuse or sexual abuse      Physical Exam:   not currently breastfeeding.    Mental Status Exam:   Hygiene:   good  Cooperation:  Cooperative  Eye Contact:  Good  Psychomotor Behavior:  Appropriate  Affect:  Appropriate  Mood: normal  Hopelessness: Denies  Speech:  Normal  Thought Process:  Linear  Thought Content:  Mood congruent  Suicidal:  None  Homicidal:  None  Hallucinations:  None  Delusion:  None  Memory:  Intact  Orientation:  Person, Place, Time and Situation  Reliability:  fair  Insight:  Fair  Judgement:  Fair  Impulse Control:  Fair  Physical/Medical Issues:  Yes        Mental status  exam was reviewed and compared to 5/9/23 visit and appropriate updates were made.     PHQ-9 Depression Screening    Little interest or pleasure in doing things? 3-->nearly every day   Feeling down, depressed, or hopeless? 3-->nearly every day   Trouble falling or staying asleep, or sleeping too much? 3-->nearly every day   Feeling tired or having little energy? 3-->nearly every day   Poor appetite or overeating? 3-->nearly every day   Feeling bad about yourself - or that you are a failure or have let yourself or your family down? 3-->nearly every day   Trouble concentrating on things, such as reading the newspaper or watching television? 3-->nearly every day   Moving or speaking so slowly that other people could have noticed? Or the opposite - being so fidgety or restless that you have been moving around a lot more than usual? 3-->nearly every day   Thoughts that you would be better off dead, or of hurting yourself in some way? 0-->not at all   PHQ-9 Total Score 24   If you checked off any problems, how difficult have these problems made it for you to do your work, take care of things at home, or get along with other people? very difficult        Former smoker    I advised Milka of the risks of tobacco use.     Result Review:    Labs:  No visits with results within 3 Month(s) from this visit.   Latest known visit with results is:   Lab on 06/16/2023   Component Date Value Ref Range Status    Total Cholesterol 06/16/2023 250 (H)  0 - 200 mg/dL Final    Triglycerides 06/16/2023 202 (H)  0 - 150 mg/dL Final    HDL Cholesterol 06/16/2023 65 (H)  40 - 60 mg/dL Final    LDL Cholesterol  06/16/2023 149 (H)  0 - 100 mg/dL Final    VLDL Cholesterol 06/16/2023 36  5 - 40 mg/dL Final    LDL/HDL Ratio 06/16/2023 2.22   Final    Glucose 06/16/2023 75  65 - 99 mg/dL Final    BUN 06/16/2023 13  6 - 20 mg/dL Final    Creatinine 06/16/2023 0.93  0.57 - 1.00 mg/dL Final    Sodium 06/16/2023 136  136 - 145 mmol/L Final     Potassium 06/16/2023 4.3  3.5 - 5.2 mmol/L Final    Chloride 06/16/2023 99  98 - 107 mmol/L Final    CO2 06/16/2023 26.0  22.0 - 29.0 mmol/L Final    Calcium 06/16/2023 9.0  8.6 - 10.5 mg/dL Final    Total Protein 06/16/2023 9.1 (H)  6.0 - 8.5 g/dL Final    Albumin 06/16/2023 4.5  3.5 - 5.2 g/dL Final    ALT (SGPT) 06/16/2023 18  1 - 33 U/L Final    AST (SGOT) 06/16/2023 21  1 - 32 U/L Final    Alkaline Phosphatase 06/16/2023 125 (H)  39 - 117 U/L Final    Total Bilirubin 06/16/2023 0.2  0.0 - 1.2 mg/dL Final    Globulin 06/16/2023 4.6  gm/dL Final    A/G Ratio 06/16/2023 1.0  g/dL Final    BUN/Creatinine Ratio 06/16/2023 14.0  7.0 - 25.0 Final    Anion Gap 06/16/2023 11.0  5.0 - 15.0 mmol/L Final    eGFR 06/16/2023 72.7  >60.0 mL/min/1.73 Final    TSH 06/16/2023 3.370  0.270 - 4.200 uIU/mL Final    25 Hydroxy, Vitamin D 06/16/2023 15.6 (L)  30.0 - 100.0 ng/ml Final    Vitamin B-12 06/16/2023 318  211 - 946 pg/mL Final    Iron 06/16/2023 51  37 - 145 mcg/dL Final    Iron Saturation (TSAT) 06/16/2023 11 (L)  20 - 50 % Final    Transferrin 06/16/2023 319  200 - 360 mg/dL Final    TIBC 06/16/2023 475  298 - 536 mcg/dL Final    Ferritin 06/16/2023 34.10  13.00 - 150.00 ng/mL Final    WBC 06/16/2023 5.47  3.40 - 10.80 10*3/mm3 Final    RBC 06/16/2023 4.26  3.77 - 5.28 10*6/mm3 Final    Hemoglobin 06/16/2023 11.7 (L)  12.0 - 15.9 g/dL Final    Hematocrit 06/16/2023 37.0  34.0 - 46.6 % Final    MCV 06/16/2023 86.9  79.0 - 97.0 fL Final    MCH 06/16/2023 27.5  26.6 - 33.0 pg Final    MCHC 06/16/2023 31.6  31.5 - 35.7 g/dL Final    RDW 06/16/2023 15.7 (H)  12.3 - 15.4 % Final    RDW-SD 06/16/2023 49.5  37.0 - 54.0 fl Final    MPV 06/16/2023 11.2  6.0 - 12.0 fL Final    Platelets 06/16/2023 246  140 - 450 10*3/mm3 Final    Neutrophil % 06/16/2023 52.5  42.7 - 76.0 % Final    Lymphocyte % 06/16/2023 39.1  19.6 - 45.3 % Final    Monocyte % 06/16/2023 6.6  5.0 - 12.0 % Final    Eosinophil % 06/16/2023 0.9  0.3 - 6.2 % Final     Basophil % 06/16/2023 0.5  0.0 - 1.5 % Final    Immature Grans % 06/16/2023 0.4  0.0 - 0.5 % Final    Neutrophils, Absolute 06/16/2023 2.87  1.70 - 7.00 10*3/mm3 Final    Lymphocytes, Absolute 06/16/2023 2.14  0.70 - 3.10 10*3/mm3 Final    Monocytes, Absolute 06/16/2023 0.36  0.10 - 0.90 10*3/mm3 Final    Eosinophils, Absolute 06/16/2023 0.05  0.00 - 0.40 10*3/mm3 Final    Basophils, Absolute 06/16/2023 0.03  0.00 - 0.20 10*3/mm3 Final    Immature Grans, Absolute 06/16/2023 0.02  0.00 - 0.05 10*3/mm3 Final    nRBC 06/16/2023 0.0  0.0 - 0.2 /100 WBC Final       Assessment & Plan   Diagnoses and all orders for this visit:    1. Bipolar affective disorder, currently depressed, moderate (Primary)  -     sertraline (ZOLOFT) 100 MG tablet; Take 1 tablet by mouth Daily.  Dispense: 90 tablet; Refill: 0  -     QUEtiapine XR (SEROquel XR) 400 MG 24 hr tablet; Take 1 tablet by mouth every night at bedtime.  Dispense: 90 tablet; Refill: 0    2. Generalized anxiety disorder  -     sertraline (ZOLOFT) 100 MG tablet; Take 1 tablet by mouth Daily.  Dispense: 90 tablet; Refill: 0  -     ALPRAZolam (XANAX) 2 MG tablet; Take 1 tablet by mouth 3 (Three) Times a Day As Needed for Anxiety.  Dispense: 90 tablet; Refill: 1    3. Panic disorder  -     sertraline (ZOLOFT) 100 MG tablet; Take 1 tablet by mouth Daily.  Dispense: 90 tablet; Refill: 0  -     ALPRAZolam (XANAX) 2 MG tablet; Take 1 tablet by mouth 3 (Three) Times a Day As Needed for Anxiety.  Dispense: 90 tablet; Refill: 1        Continue sertraline, increase to 100mg.   Continue Vraylar 1.5mg.   Continue Quetiapine XR, decrease to 400mg daily.   Continue buspirone 30mg twice daily.   Stop clonazepam, start back on alprazolam 2mg three times daily. We will try to reduce this dose over time.     Visit Diagnoses:    ICD-10-CM ICD-9-CM   1. Bipolar affective disorder, currently depressed, moderate  F31.32 296.52   2. Generalized anxiety disorder  F41.1 300.02   3. Panic disorder   F41.0 300.01         TREATMENT PLAN/GOALS: Continue supportive psychotherapy efforts and medications as indicated. Treatment and medication options discussed during today's visit. Patient ackowledged and verbally consented to continue with current treatment plan and was educated on the importance of compliance with treatment and follow-up appointments.    CRISIS RESOURCES:    In the event you have personal crisis, there are several resources to reach someone to talk with:    988 Suicide and Crisis Lifeline  Call or text 980 or chat [employer]  [facility]'s Go[employer] Helpline  1-586-309-HELP (4357)  Text your zip code to [id number] ([email or web address])  's Crisis Line  Dial 988, then press 1  Text [phone]    MEDICATION ISSUES:  INSPECT reviewed as expected.     UDS on 5/9/23 consistent with prescribed medications.    Discussed medication options and treatment plan of prescribed medication as well as the risks, benefits, and side effects including potential falls, possible impaired driving and metabolic adversities among others. Patient is agreeable to call the office with any worsening of symptoms or onset of side effects. Patient is agreeable to call 911 or go to the nearest ER should he/she begin having SI/HI. No medication side effects or related complaints today.     MEDS ORDERED DURING VISIT:  New Medications Ordered This Visit   Medications    sertraline (ZOLOFT) 100 MG tablet     Sig: Take 1 tablet by mouth Daily.     Dispense:  90 tablet     Refill:  0    QUEtiapine XR (SEROquel XR) 400 MG 24 hr tablet     Sig: Take 1 tablet by mouth every night at bedtime.     Dispense:  90 tablet     Refill:  0    ALPRAZolam (XANAX) 2 MG tablet     Sig: Take 1 tablet by mouth 3 (Three) Times a Day As Needed for Anxiety.     Dispense:  90 tablet     Refill:  1     Please cancel patient's current clonazepam refills.       Return in about 8 weeks (around 11/20/2023).         This document has been electronically signed by Milka PUGA  Librado, [email or web address]  September 25, 2023 10:07 EDT    Part of this note may be an electronic transcription/translation of spoken language to printed text using the Dragon Dictation System.

## 2023-09-25 ENCOUNTER — PATIENT OUTREACH (OUTPATIENT)
Dept: CASE MANAGEMENT | Facility: OTHER | Age: 53
End: 2023-09-25

## 2023-09-25 ENCOUNTER — OFFICE VISIT (OUTPATIENT)
Dept: PSYCHIATRY | Facility: CLINIC | Age: 53
End: 2023-09-25

## 2023-09-25 DIAGNOSIS — F41.0 PANIC DISORDER: Chronic | ICD-10-CM

## 2023-09-25 DIAGNOSIS — F41.1 GENERALIZED ANXIETY DISORDER: Chronic | ICD-10-CM

## 2023-09-25 DIAGNOSIS — E78.2 MIXED HYPERLIPIDEMIA: Primary | ICD-10-CM

## 2023-09-25 DIAGNOSIS — F31.32 BIPOLAR AFFECTIVE DISORDER, CURRENTLY DEPRESSED, MODERATE: Primary | Chronic | ICD-10-CM

## 2023-09-25 DIAGNOSIS — K31.84 GASTROPARESIS: ICD-10-CM

## 2023-09-25 RX ORDER — PROMETHAZINE HYDROCHLORIDE 25 MG/ML
12.5 INJECTION, SOLUTION INTRAMUSCULAR; INTRAVENOUS EVERY 6 HOURS PRN
COMMUNITY
Start: 2023-09-21

## 2023-09-25 RX ORDER — ONDANSETRON 2 MG/ML
4 INJECTION INTRAMUSCULAR; INTRAVENOUS EVERY 6 HOURS PRN
COMMUNITY
Start: 2023-09-21

## 2023-09-25 RX ORDER — ALPRAZOLAM 2 MG/1
2 TABLET ORAL 3 TIMES DAILY PRN
Qty: 90 TABLET | Refills: 1 | Status: SHIPPED | OUTPATIENT
Start: 2023-09-25

## 2023-09-25 RX ORDER — SERTRALINE HYDROCHLORIDE 100 MG/1
100 TABLET, FILM COATED ORAL DAILY
Qty: 90 TABLET | Refills: 0 | Status: SHIPPED | OUTPATIENT
Start: 2023-09-25 | End: 2024-09-24

## 2023-09-25 RX ORDER — QUETIAPINE 400 MG/1
400 TABLET, FILM COATED, EXTENDED RELEASE ORAL
Qty: 90 TABLET | Refills: 0 | Status: SHIPPED | OUTPATIENT
Start: 2023-09-25

## 2023-09-25 NOTE — OUTREACH NOTE
AMBULATORY CASE MANAGEMENT NOTE    Name and Relationship of Patient/Support Person: Milka Espinoza M - Self    CCM Interim Update    90 day Monitoring outreach attempted.  VM left.      Patient's active CCM program will be closed on this date due to being unable to contact the patient.        Education Documentation  No documentation found.        Roverto THRASHER  Ambulatory Case Management    9/25/2023, 13:03 EDT

## 2023-10-01 DIAGNOSIS — E55.9 VITAMIN D DEFICIENCY: ICD-10-CM

## 2023-10-01 RX ORDER — ERGOCALCIFEROL 1.25 MG/1
CAPSULE ORAL
Qty: 8 CAPSULE | Refills: 0 | OUTPATIENT
Start: 2023-10-01

## 2023-10-09 DIAGNOSIS — F40.01 PANIC DISORDER WITH AGORAPHOBIA: Chronic | ICD-10-CM

## 2023-10-09 RX ORDER — HYDROXYZINE HYDROCHLORIDE 25 MG/1
TABLET, FILM COATED ORAL
Qty: 90 TABLET | Refills: 1 | Status: SHIPPED | OUTPATIENT
Start: 2023-10-09

## 2023-10-20 DIAGNOSIS — E55.9 VITAMIN D DEFICIENCY: ICD-10-CM

## 2023-10-20 DIAGNOSIS — R21 RASH: ICD-10-CM

## 2023-10-20 RX ORDER — NYSTATIN 100000 [USP'U]/G
POWDER TOPICAL
Qty: 30 G | Refills: 1 | OUTPATIENT
Start: 2023-10-20

## 2023-10-20 RX ORDER — ERGOCALCIFEROL 1.25 MG/1
CAPSULE ORAL
Qty: 8 CAPSULE | Refills: 0 | OUTPATIENT
Start: 2023-10-20

## 2023-11-01 ENCOUNTER — TELEPHONE (OUTPATIENT)
Dept: PSYCHIATRY | Facility: CLINIC | Age: 53
End: 2023-11-01
Payer: MEDICAID

## 2023-11-01 DIAGNOSIS — F31.32 BIPOLAR AFFECTIVE DISORDER, CURRENTLY DEPRESSED, MODERATE: Chronic | ICD-10-CM

## 2023-11-01 NOTE — TELEPHONE ENCOUNTER
Received call from patient stating that she is wanting to go back to taking two seroquel at night instead of one as the one is not helping.

## 2023-11-02 RX ORDER — QUETIAPINE 400 MG/1
800 TABLET, FILM COATED, EXTENDED RELEASE ORAL
Qty: 60 TABLET | Refills: 1 | Status: SHIPPED | OUTPATIENT
Start: 2023-11-02

## 2023-11-11 DIAGNOSIS — F40.01 PANIC DISORDER WITH AGORAPHOBIA: Chronic | ICD-10-CM

## 2023-11-11 RX ORDER — LEVOTHYROXINE SODIUM 0.03 MG/1
TABLET ORAL
Qty: 90 TABLET | Refills: 0 | OUTPATIENT
Start: 2023-11-11

## 2023-11-11 RX ORDER — BUSPIRONE HYDROCHLORIDE 30 MG/1
30 TABLET ORAL 2 TIMES DAILY
Qty: 60 TABLET | Refills: 1 | Status: SHIPPED | OUTPATIENT
Start: 2023-11-11

## 2023-11-14 NOTE — PROGRESS NOTES
[facility]   [address], [address]  [place] [address] [phone]  Milka Pavon, MSN, APRN, PMHNP-BC      Subjective     Milka Espinoza is a 53 y.o. female who presents today for follow-up for psychiatric medication management.     Chief Complaint:  Bipolar disorder, anxiety     History of Present Illness:     Medication adjustments last visit:   Continue sertraline, increase to 100mg.   Continue Vraylar 1.5mg.   Continue Quetiapine XR, decrease to 400mg daily.   Continue buspirone 30mg twice daily.   Stop clonazepam, start back on alprazolam 2mg three times daily. We will try to reduce this dose over time.     She feels like medications are working well.   She feels like the changes have been going well.   Denies any suicidal thoughts.   She is still getting fluid infusions. She is still having nausea.   She is concerned about her son. He is having a lot of anxiety. He is seeing someone in our practice on Monday.   Overall she feels like medications are working well and she'd like to continue them.     Previous visit:   At today's visit, patient states she gets infusion every Monday, she has gastroparesis. She is still throwing everything up and states she has lost about 25 pounds.   She reports she is not currently on Norco. She was on pain medicine for ankle surgery that she had done in February 2023. She is now off that medication, for about 2 weeks.   She feels like all she wants to do is sleep. She feels like she doesn't want to get up and do anything.   She feels like her memory is not good. She feels like she has a hard time keeping track of what she is trying to say.   She is only taking gabapentin only at night time. She feels like she doesn't like the way it makes her feel.   She is currently taking alprazolam 2mg three times a day.     I had a long discussion with her about polypharmacy, with several of the medications she is on interacting  together. She is taking several sedating medications, alprazolam and gabapentin being two of them. I let her know that ideally she would taper off both of those. The alprazolam is at an extremely high dose, so it will be difficult to wean her down off that. Both the alprazolam and the gabapentin could be contributing to memory problems as well.     I had a report from another provider that the patient was in their clinic and was very groggy at that time, and EMS had to be called. The patient had reported taking two alprazolam instead of one. I asked the patient about this incident and she denies this happening. This incident makes me hesitant to continue alprazolam though due to risk for overdose being high.   I will wean the gabapentin this visit, and plan to start weaning the alprazolam next visit.     Patient still feels like she is having a lot of depression, despite being on the Vraylar.   She was agreeable to trying sertraline to help with both anxiety and depression.   No SI/HI/AVH    Patient presents with symptoms and behaviors that are consistent with the following DSM-5 diagnoses:  Bipolar disorder  2. Panic disorder  3. Generalized anxiety     The following portions of the patient's history were reviewed and updated as appropriate: allergies, current medications, past family history, past medical history, past social history, past surgical history and problem list.    PAST OUTPATIENT TREATMENT  Diagnosis treated:  Affective Disorder, Anxiety/Panic Disorder, substance use   Treatment Type:  Medication Management  Prior Psychiatric Medications:  Abilify  Risperidone  Vraylar  Valium  Xanax  Lamictal  Gabapentin  Trazodone, feels 'prickly all over'  Buspar  Seroquel  Support Groups:  None  Sequelae Of Mental Disorder:  social isolation, family disruption, emotional distress    Interval History  No Change    Side Effects  None      Past Medical History:  Past Medical History:   Diagnosis Date    Depression      Disease of thyroid gland     Hyperlipidemia     Migraines     On home O2 2021    3 lmp    Seizures        Social History:  Social History     Socioeconomic History    Marital status:    Tobacco Use    Smoking status: Former     Packs/day: .25     Types: Cigarettes     Quit date: 2020     Years since quitting: 3.0    Smokeless tobacco: Never   Vaping Use    Vaping Use: Never used   Substance and Sexual Activity    Alcohol use: No    Drug use: No    Sexual activity: Never       Family History:  Family History   Problem Relation Age of Onset    No Known Problems Mother     No Known Problems Father     No Known Problems Sister     No Known Problems Brother     Alcohol abuse Daughter     No Known Problems Son     No Known Problems Maternal Grandmother     No Known Problems Paternal Grandmother     No Known Problems Maternal Aunt     No Known Problems Paternal Aunt        Past Surgical History:  Past Surgical History:   Procedure Laterality Date    ABDOMINAL SURGERY       SECTION      x3    ENDOSCOPY N/A 2022    Procedure: EGD WITH DILATATION (BOUGIE #56) AND INJECTION OF BOTOX;  Surgeon: Rito Le MD;  Location: [facility] END[facility];  Service: Gastroenterology;  Laterality: N/A;  POST: LOWER ESOPHAGEAL STRICTURE AND CRICOPHARYNGEAL STRICUTRE    ENDOSCOPY W/ PEG TUBE PLACEMENT N/A 2020    Procedure: ESOPHAGOGASTRODUODENOSCOPY WITH PERCUTANEOUS ENDOSCOPIC GASTROSTOMY TUBE INSERTION;  Surgeon: Rito Le MD;  Location: [facility] ENDOSCOPY;  Service: Gastroenterology;  Laterality: N/A;    HERNIA REPAIR      HYSTERECTOMY      PEG TUBE REMOVAL         Problem List:  Patient Active Problem List   Diagnosis    Bipolar disorder, in full remission, most recent episode depressed    Depression    Mood disorder in conditions classified elsewhere    Seizures    Opioid withdrawal    Altered level of consciousness    Asthma    Bronchitis, acute    General medical exam    Low back pain    Syncope     Unspecified sprain of unspecified wrist, initial encounter    Upper respiratory tract infection    Intractable nausea with vomiting    Obesity    Gastroesophageal reflux disease    Tobacco use    Nausea and vomiting    Borderline hypoglycemic episodes    Migraine headache    Pseudoseizure    Seizure    Panic disorder with agoraphobia    Gastroparesis    Hyperlipidemia    Hypothyroidism    Sprain of ankle    Bipolar disorder    Dissociative convulsions       Allergy:   Allergies   Allergen Reactions    Sulfa Antibiotics Hives    Tramadol Hcl Mental Status Change        Discontinued Medications:  Medications Discontinued During This Encounter   Medication Reason    sertraline (ZOLOFT) 100 MG tablet Reorder    ALPRAZolam (XANAX) 2 MG tablet Reorder         Current Medications:   Current Outpatient Medications   Medication Sig Dispense Refill    ALPRAZolam (XANAX) 2 MG tablet Take 1 tablet by mouth 3 (Three) Times a Day As Needed for Anxiety. 90 tablet 2    amitriptyline (ELAVIL) 25 MG tablet TAKE 1 TABLET BY MOUTH EVERY NIGHT AT BEDTIME 90 tablet 1    atorvastatin (LIPITOR) 20 MG tablet Take 1 tablet by mouth Every Night. 90 tablet 1    BD PosiFlush 0.9 % flush       busPIRone (BUSPAR) 30 MG tablet TAKE 1 TABLET BY MOUTH TWICE DAILY 60 tablet 1    ferrous gluconate (FERGON) 324 MG tablet Take 1 tablet by mouth Daily With Breakfast. 90 tablet 1    heparin 100 UNIT/ML solution injection       hydrOXYzine (ATARAX) 25 MG tablet TAKE 1 TABLET BY MOUTH THREE TIMES DAILY AS NEEDED FOR ANXIETY 90 tablet 1    lamoTRIgine (LaMICtal) 100 MG tablet TAKE 1 TABLET BY MOUTH EVERY MORNING, 1 AT NOON AND 2 TABLETS AT BEDTIME 120 tablet 5    levETIRAcetam (KEPPRA) 500 MG tablet TAKE 3 TABLETS BY MOUTH TWICE DAILY 540 tablet 3    levothyroxine (SYNTHROID, LEVOTHROID) 25 MCG tablet TAKE 1 TABLET BY MOUTH DAILY FOR THYROID 90 tablet 0    Motegrity 2 MG tablet Take 1 tablet by mouth Daily.      nystatin 837755 UNIT/GM powder APPLY  TOPICALLY TO THE APPROPRIATE AREA THREE TIMES DAILY AS NEEDED FOR RASH AS DIRECTED. 30 g 1    ondansetron (ZOFRAN) 2 mg/mL injection Infuse 2 mL into a venous catheter Every 6 (Six) Hours As Needed.      pantoprazole (PROTONIX) 40 MG EC tablet TAKE 1 TABLET BY MOUTH DAILY 90 tablet 1    promethazine (PHENERGAN) 25 MG/ML injection Infuse 0.5 mL into a venous catheter Every 6 (Six) Hours As Needed.      QUEtiapine XR (SEROquel XR) 400 MG 24 hr tablet Take 2 tablets by mouth every night at bedtime. 60 tablet 1    sertraline (ZOLOFT) 100 MG tablet Take 1 tablet by mouth Daily. 90 tablet 0    sodium chloride 0.9 % solution       sucralfate (CARAFATE) 1 g tablet Take 1 tablet by mouth 3 (Three) Times a Day.      SUMAtriptan (IMITREX) 100 MG tablet TAKE 1 TABLET BY MOUTH 1 TIME AS NEEDED FOR MIGRAINE 8 tablet 5    vitamin D (ERGOCALCIFEROL) 1.25 MG (61721 UT) capsule capsule TAKE 1 CAPSULE BY MOUTH 1 TIME EVERY WEEK 8 capsule 0    Vraylar 1.5 MG capsule capsule TAKE 1 CAPSULE BY MOUTH EVERY DAY 90 capsule 1    heparin 100 UNIT/ML solution injection  (Patient not taking: Reported on 11/15/2023)       No current facility-administered medications for this visit.         Psychological ROS: positive for - anxiety and depression  negative for - behavioral disorder, concentration difficulties, decreased libido, disorientation, hallucinations, hostility, irritability, memory difficulties, mood swings, obsessive thoughts, physical abuse or sexual abuse      Physical Exam:   Blood pressure 122/87, pulse 104, SpO2 96%, not currently breastfeeding.    Mental Status Exam:   Hygiene:   good  Cooperation:  Cooperative  Eye Contact:  Good  Psychomotor Behavior:  Appropriate  Affect:  Appropriate  Mood: normal  Hopelessness: Denies  Speech:  Normal  Thought Process:  Linear  Thought Content:  Mood congruent  Suicidal:  None  Homicidal:  None  Hallucinations:  None  Delusion:  None  Memory:  Intact  Orientation:  Person, Place, Time and  Situation  Reliability:  fair  Insight:  Fair  Judgement:  Fair  Impulse Control:  Fair  Physical/Medical Issues:  Yes        Mental status exam was reviewed and compared to 9/25/23 visit and appropriate updates were made.     PHQ-9 Depression Screening    Little interest or pleasure in doing things? 3-->nearly every day   Feeling down, depressed, or hopeless? 3-->nearly every day   Trouble falling or staying asleep, or sleeping too much? 2-->more than half the days   Feeling tired or having little energy? 3-->nearly every day   Poor appetite or overeating? 3-->nearly every day   Feeling bad about yourself - or that you are a failure or have let yourself or your family down? 2-->more than half the days   Trouble concentrating on things, such as reading the newspaper or watching television? 3-->nearly every day   Moving or speaking so slowly that other people could have noticed? Or the opposite - being so fidgety or restless that you have been moving around a lot more than usual? 3-->nearly every day   Thoughts that you would be better off dead, or of hurting yourself in some way? 0-->not at all   PHQ-9 Total Score 22   If you checked off any problems, how difficult have these problems made it for you to do your work, take care of things at home, or get along with other people? very difficult        Former smoker    I advised Milka of the risks of tobacco use.     Result Review:    Labs:  No visits with results within 3 Month(s) from this visit.   Latest known visit with results is:   Lab on 06/16/2023   Component Date Value Ref Range Status    Total Cholesterol 06/16/2023 250 (H)  0 - 200 mg/dL Final    Triglycerides 06/16/2023 202 (H)  0 - 150 mg/dL Final    HDL Cholesterol 06/16/2023 65 (H)  40 - 60 mg/dL Final    LDL Cholesterol  06/16/2023 149 (H)  0 - 100 mg/dL Final    VLDL Cholesterol 06/16/2023 36  5 - 40 mg/dL Final    LDL/HDL Ratio 06/16/2023 2.22   Final    Glucose 06/16/2023 75  65 - 99 mg/dL Final     BUN 06/16/2023 13  6 - 20 mg/dL Final    Creatinine 06/16/2023 0.93  0.57 - 1.00 mg/dL Final    Sodium 06/16/2023 136  136 - 145 mmol/L Final    Potassium 06/16/2023 4.3  3.5 - 5.2 mmol/L Final    Chloride 06/16/2023 99  98 - 107 mmol/L Final    CO2 06/16/2023 26.0  22.0 - 29.0 mmol/L Final    Calcium 06/16/2023 9.0  8.6 - 10.5 mg/dL Final    Total Protein 06/16/2023 9.1 (H)  6.0 - 8.5 g/dL Final    Albumin 06/16/2023 4.5  3.5 - 5.2 g/dL Final    ALT (SGPT) 06/16/2023 18  1 - 33 U/L Final    AST (SGOT) 06/16/2023 21  1 - 32 U/L Final    Alkaline Phosphatase 06/16/2023 125 (H)  39 - 117 U/L Final    Total Bilirubin 06/16/2023 0.2  0.0 - 1.2 mg/dL Final    Globulin 06/16/2023 4.6  gm/dL Final    A/G Ratio 06/16/2023 1.0  g/dL Final    BUN/Creatinine Ratio 06/16/2023 14.0  7.0 - 25.0 Final    Anion Gap 06/16/2023 11.0  5.0 - 15.0 mmol/L Final    eGFR 06/16/2023 72.7  >60.0 mL/min/1.73 Final    TSH 06/16/2023 3.370  0.270 - 4.200 uIU/mL Final    25 Hydroxy, Vitamin D 06/16/2023 15.6 (L)  30.0 - 100.0 ng/ml Final    Vitamin B-12 06/16/2023 318  211 - 946 pg/mL Final    Iron 06/16/2023 51  37 - 145 mcg/dL Final    Iron Saturation (TSAT) 06/16/2023 11 (L)  20 - 50 % Final    Transferrin 06/16/2023 319  200 - 360 mg/dL Final    TIBC 06/16/2023 475  298 - 536 mcg/dL Final    Ferritin 06/16/2023 34.10  13.00 - 150.00 ng/mL Final    WBC 06/16/2023 5.47  3.40 - 10.80 10*3/mm3 Final    RBC 06/16/2023 4.26  3.77 - 5.28 10*6/mm3 Final    Hemoglobin 06/16/2023 11.7 (L)  12.0 - 15.9 g/dL Final    Hematocrit 06/16/2023 37.0  34.0 - 46.6 % Final    MCV 06/16/2023 86.9  79.0 - 97.0 fL Final    MCH 06/16/2023 27.5  26.6 - 33.0 pg Final    MCHC 06/16/2023 31.6  31.5 - 35.7 g/dL Final    RDW 06/16/2023 15.7 (H)  12.3 - 15.4 % Final    RDW-SD 06/16/2023 49.5  37.0 - 54.0 fl Final    MPV 06/16/2023 11.2  6.0 - 12.0 fL Final    Platelets 06/16/2023 246  140 - 450 10*3/mm3 Final    Neutrophil % 06/16/2023 52.5  42.7 - 76.0 % Final     Lymphocyte % 06/16/2023 39.1  19.6 - 45.3 % Final    Monocyte % 06/16/2023 6.6  5.0 - 12.0 % Final    Eosinophil % 06/16/2023 0.9  0.3 - 6.2 % Final    Basophil % 06/16/2023 0.5  0.0 - 1.5 % Final    Immature Grans % 06/16/2023 0.4  0.0 - 0.5 % Final    Neutrophils, Absolute 06/16/2023 2.87  1.70 - 7.00 10*3/mm3 Final    Lymphocytes, Absolute 06/16/2023 2.14  0.70 - 3.10 10*3/mm3 Final    Monocytes, Absolute 06/16/2023 0.36  0.10 - 0.90 10*3/mm3 Final    Eosinophils, Absolute 06/16/2023 0.05  0.00 - 0.40 10*3/mm3 Final    Basophils, Absolute 06/16/2023 0.03  0.00 - 0.20 10*3/mm3 Final    Immature Grans, Absolute 06/16/2023 0.02  0.00 - 0.05 10*3/mm3 Final    nRBC 06/16/2023 0.0  0.0 - 0.2 /100 WBC Final       Assessment & Plan   Diagnoses and all orders for this visit:    1. Bipolar affective disorder, currently depressed, moderate (Primary)  -     sertraline (ZOLOFT) 100 MG tablet; Take 1 tablet by mouth Daily.  Dispense: 90 tablet; Refill: 0    2. Generalized anxiety disorder  -     sertraline (ZOLOFT) 100 MG tablet; Take 1 tablet by mouth Daily.  Dispense: 90 tablet; Refill: 0  -     ALPRAZolam (XANAX) 2 MG tablet; Take 1 tablet by mouth 3 (Three) Times a Day As Needed for Anxiety.  Dispense: 90 tablet; Refill: 2    3. Panic disorder  -     sertraline (ZOLOFT) 100 MG tablet; Take 1 tablet by mouth Daily.  Dispense: 90 tablet; Refill: 0  -     ALPRAZolam (XANAX) 2 MG tablet; Take 1 tablet by mouth 3 (Three) Times a Day As Needed for Anxiety.  Dispense: 90 tablet; Refill: 2          Continue sertraline 100mg.   Continue Vraylar 1.5mg.   Continue Quetiapine XR 800mg daily.   Continue buspirone 30mg twice daily.   Continue alprazolam 2mg three times daily. We will try to reduce this dose over time.     Visit Diagnoses:    ICD-10-CM ICD-9-CM   1. Bipolar affective disorder, currently depressed, moderate  F31.32 296.52   2. Generalized anxiety disorder  F41.1 300.02   3. Panic disorder  F41.0 300.01           TREATMENT  PLAN/GOALS: Continue supportive psychotherapy efforts and medications as indicated. Treatment and medication options discussed during today's visit. Patient ackowledged and verbally consented to continue with current treatment plan and was educated on the importance of compliance with treatment and follow-up appointments.    CRISIS RESOURCES:    In the event you have personal crisis, there are several resources to reach someone to talk with:    988 Suicide and Crisis Lifeline  Call or text 982 or chat 984Viveraeline.org  [facility]'s National Helpline  5-602-594-HELP (4357)  Text your zip code to [address]84887 (HELP4U)  's Crisis Line  Dial 988, then press 1  Text 281128    MEDICATION ISSUES:  INSPECT reviewed as expected.     UDS on 5/9/23 consistent with prescribed medications.    Discussed medication options and treatment plan of prescribed medication as well as the risks, benefits, and side effects including potential falls, possible impaired driving and metabolic adversities among others. Patient is agreeable to call the office with any worsening of symptoms or onset of side effects. Patient is agreeable to call 911 or go to the nearest ER should he/she begin having SI/HI. No medication side effects or related complaints today.     MEDS ORDERED DURING VISIT:  New Medications Ordered This Visit   Medications    sertraline (ZOLOFT) 100 MG tablet     Sig: Take 1 tablet by mouth Daily.     Dispense:  90 tablet     Refill:  0    ALPRAZolam (XANAX) 2 MG tablet     Sig: Take 1 tablet by mouth 3 (Three) Times a Day As Needed for Anxiety.     Dispense:  90 tablet     Refill:  2     Dispense on or after 11/22/23.       Return in about 3 months (around 2/15/2024).         This document has been electronically signed by SANJEEV Jacobs  November 15, 2023 13:55 EST    Part of this note may be an electronic transcription/translation of spoken language to printed text using the Dragon Dictation System.

## 2023-11-15 ENCOUNTER — OFFICE VISIT (OUTPATIENT)
Dept: PSYCHIATRY | Facility: CLINIC | Age: 53
End: 2023-11-15
Payer: MEDICARE

## 2023-11-15 VITALS — SYSTOLIC BLOOD PRESSURE: 122 MMHG | OXYGEN SATURATION: 96 % | HEART RATE: 104 BPM | DIASTOLIC BLOOD PRESSURE: 87 MMHG

## 2023-11-15 DIAGNOSIS — F41.1 GENERALIZED ANXIETY DISORDER: Chronic | ICD-10-CM

## 2023-11-15 DIAGNOSIS — F41.0 PANIC DISORDER: Chronic | ICD-10-CM

## 2023-11-15 DIAGNOSIS — F31.32 BIPOLAR AFFECTIVE DISORDER, CURRENTLY DEPRESSED, MODERATE: Primary | Chronic | ICD-10-CM

## 2023-11-15 RX ORDER — SODIUM CHLORIDE 9 MG/ML
INJECTION, SOLUTION INTRAVENOUS
COMMUNITY
Start: 2023-11-01

## 2023-11-15 RX ORDER — SERTRALINE HYDROCHLORIDE 100 MG/1
100 TABLET, FILM COATED ORAL DAILY
Qty: 90 TABLET | Refills: 0 | Status: SHIPPED | OUTPATIENT
Start: 2023-11-15 | End: 2024-11-14

## 2023-11-15 RX ORDER — ALPRAZOLAM 2 MG/1
2 TABLET ORAL 3 TIMES DAILY PRN
Qty: 90 TABLET | Refills: 2 | Status: SHIPPED | OUTPATIENT
Start: 2023-11-15

## 2023-11-15 RX ORDER — HEPARIN SODIUM (PORCINE) LOCK FLUSH IV SOLN 100 UNIT/ML 100 UNIT/ML
SOLUTION INTRAVENOUS
COMMUNITY
Start: 2023-11-01

## 2023-12-06 DIAGNOSIS — F40.01 PANIC DISORDER WITH AGORAPHOBIA: Chronic | ICD-10-CM

## 2023-12-06 DIAGNOSIS — E78.2 MIXED HYPERLIPIDEMIA: ICD-10-CM

## 2023-12-06 RX ORDER — ATORVASTATIN CALCIUM 20 MG/1
20 TABLET, FILM COATED ORAL NIGHTLY
Qty: 90 TABLET | Refills: 1 | OUTPATIENT
Start: 2023-12-06

## 2023-12-06 RX ORDER — PANTOPRAZOLE SODIUM 40 MG/1
40 TABLET, DELAYED RELEASE ORAL DAILY
Qty: 90 TABLET | Refills: 1 | OUTPATIENT
Start: 2023-12-06

## 2023-12-06 RX ORDER — HYDROXYZINE HYDROCHLORIDE 25 MG/1
TABLET, FILM COATED ORAL
Qty: 90 TABLET | Refills: 1 | Status: SHIPPED | OUTPATIENT
Start: 2023-12-06

## 2023-12-27 NOTE — PROGRESS NOTES
Subjective:  Seizures    Patient ID: Milka Espinoza is a 53 y.o. female.    History of Present Illness Ms. Espinoza is a 53-year-old  female with a BMI of 36.44 who presented today for follow-up on epilepsy.  She has been seen in the past by Dr. Hickey at [facility] and was to go for seizure monitoring but would never present.  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    The patient has a prior history of severe gastroparesis and states that she has trouble swallowing and keeping down her seizure medications.  She states she has had 3 seizures since her last visit and on 1 occasion she fell in the bathtub and broke her foot.  She states she takes her medication and then tries to lie still for them to absorb.  She reports she has a follow-up with gastroenterology  this week.  In the past she has had a NG tube or GI tube for feedings and medications.      Current medications  lamoTRIgine (LaMICtal) 200 MG tablet; Take 1 pill twice a day    levETIRAcetam (KEPPRA) 500 MG tablet; Take 3 tablets by mouth 2 (Two) Times a Day.                      lacosamide (VIMPAT) 100 MG tablet tablet; Take 1 tablet by mouth Every 12 (Twelve) Hours    She was sent for:  MRI Brain With & Without Contrast; Future  EEG Awake or Asleep Routine; Fut    EEG   IMPRESSION:   This is a normal EEG recorded during the awake and drowsy state. The absence of epileptiform abnormalities however does not rule out the presence of a seizure disorder.  Electronically signed by:  Joseph Seipel, MD  August 1, 2022      MRI BRAIN W WO CONTRAST-  Date of Exam: 8/1/2022 3:08 PM  Indication: Seizures, migraine headaches.  Comparison: CT of the head performed on 07/02/2020.  IMPRESSION:  Normal study.  Electronically Signed By-Anatoliy Cadet MD On:8/1/2022 4:23 PM    The patient states that she has had migraines for several years.  She states her primary care was given her sumatriptan hand for rescue.      The following portions of the  patient's history were reviewed and updated as appropriate: allergies, current medications, past family history, past medical history, past social history, past surgical history and problem list.    Family History   Problem Relation Age of Onset    No Known Problems Mother     No Known Problems Father     No Known Problems Sister     No Known Problems Brother     Alcohol abuse Daughter     No Known Problems Son     No Known Problems Maternal Grandmother     No Known Problems Paternal Grandmother     No Known Problems Maternal Aunt     No Known Problems Paternal Aunt        Past Medical History:   Diagnosis Date    Depression     Disease of thyroid gland     Hyperlipidemia     Migraines     On home O2 12/2021    3 lmp    Seizures        Social History     Socioeconomic History    Marital status:    Tobacco Use    Smoking status: Former     Packs/day: .25     Types: Cigarettes     Quit date: 11/1/2020     Years since quitting: 3.1    Smokeless tobacco: Never   Vaping Use    Vaping Use: Never used   Substance and Sexual Activity    Alcohol use: No    Drug use: No    Sexual activity: Never         Current Outpatient Medications:     ALPRAZolam (XANAX) 2 MG tablet, Take 1 tablet by mouth 3 (Three) Times a Day As Needed for Anxiety., Disp: 90 tablet, Rfl: 2    amitriptyline (ELAVIL) 25 MG tablet, TAKE 1 TABLET BY MOUTH EVERY NIGHT AT BEDTIME, Disp: 90 tablet, Rfl: 1    atorvastatin (LIPITOR) 20 MG tablet, Take 1 tablet by mouth Every Night., Disp: 90 tablet, Rfl: 1    BD PosiFlush 0.9 % flush, , Disp: , Rfl:     busPIRone (BUSPAR) 30 MG tablet, TAKE 1 TABLET BY MOUTH TWICE DAILY, Disp: 60 tablet, Rfl: 1    busPIRone (BUSPAR) 30 MG tablet, Take 1 tablet by mouth 2 (Two) Times a Day., Disp: 180 tablet, Rfl: 1    ferrous gluconate (FERGON) 324 MG tablet, Take 1 tablet by mouth Daily With Breakfast., Disp: 90 tablet, Rfl: 1    heparin 100 UNIT/ML solution injection, , Disp: , Rfl:     heparin 100 UNIT/ML solution  injection, , Disp: , Rfl:     hydrOXYzine (ATARAX) 25 MG tablet, TAKE 1 TABLET BY MOUTH THREE TIMES DAILY AS NEEDED FOR ANXIETY, Disp: 90 tablet, Rfl: 1    lamoTRIgine (LaMICtal) 100 MG tablet, TAKE 1 TABLET BY MOUTH EVERY MORNING,1 AT NOON AND 2 TABLETS EVERY NIGHT AT BEDTIME, Disp: 120 tablet, Rfl: 5    levETIRAcetam (KEPPRA) 500 MG tablet, TAKE 3 TABLETS BY MOUTH TWICE DAILY, Disp: 540 tablet, Rfl: 3    levothyroxine (SYNTHROID, LEVOTHROID) 25 MCG tablet, TAKE 1 TABLET BY MOUTH DAILY FOR THYROID, Disp: 90 tablet, Rfl: 0    Motegrity 2 MG tablet, Take 1 tablet by mouth Daily., Disp: , Rfl:     nystatin 342609 UNIT/GM powder, APPLY TOPICALLY TO THE APPROPRIATE AREA THREE TIMES DAILY AS NEEDED FOR RASH AS DIRECTED., Disp: 30 g, Rfl: 1    ondansetron (ZOFRAN) 2 mg/mL injection, Infuse 2 mL into a venous catheter Every 6 (Six) Hours As Needed., Disp: , Rfl:     pantoprazole (PROTONIX) 40 MG EC tablet, TAKE 1 TABLET BY MOUTH DAILY, Disp: 90 tablet, Rfl: 1    promethazine (PHENERGAN) 25 MG/ML injection, Infuse 0.5 mL into a venous catheter Every 6 (Six) Hours As Needed., Disp: , Rfl:     QUEtiapine XR (SEROquel XR) 400 MG 24 hr tablet, TAKE 2 TABLETS BY MOUTH EVERY NIGHT AT BEDTIME, Disp: 60 tablet, Rfl: 1    sertraline (ZOLOFT) 100 MG tablet, Take 1 tablet by mouth Daily., Disp: 90 tablet, Rfl: 0    sodium chloride 0.9 % solution, , Disp: , Rfl:     sucralfate (CARAFATE) 1 g tablet, Take 1 tablet by mouth 3 (Three) Times a Day., Disp: , Rfl:     vitamin D (ERGOCALCIFEROL) 1.25 MG (44362 UT) capsule capsule, TAKE 1 CAPSULE BY MOUTH 1 TIME EVERY WEEK, Disp: 8 capsule, Rfl: 0    Vraylar 1.5 MG capsule capsule, TAKE 1 CAPSULE BY MOUTH EVERY DAY, Disp: 90 capsule, Rfl: 1    lacosamide 150 MG tablet, Take 150 mg by mouth Every 12 (Twelve) Hours., Disp: 60 tablet, Rfl: 5    SUMAtriptan (IMITREX) 100 MG tablet, Take 1 tablet by mouth 1 (One) Time As Needed for Migraine. Take 1 tablet at onset of Migraine. May repeat after 2  hours if needed.    Max 2 tab in 24 hrs., Disp: 9 tablet, Rfl: 5    Review of Systems   Constitutional:  Positive for activity change and appetite change.   Gastrointestinal:  Positive for nausea and vomiting.   Neurological:  Positive for dizziness, tremors, seizures, speech difficulty, weakness, light-headedness and headaches.   Psychiatric/Behavioral:  Positive for agitation, confusion and sleep disturbance. The patient is nervous/anxious and is hyperactive.    All other systems reviewed and are negative.         I have reviewed ROS completed by medical assistant.     Objective:    Neurologic Exam     Mental Status   Oriented to person, place, and time.   Speech: speech is normal     Cranial Nerves     CN III, IV, VI   Pupils are equal, round, and reactive to light.      Physical Exam  Vitals reviewed.   Constitutional:       Appearance: Normal appearance. She is well-groomed. She is morbidly obese.   HENT:      Head: Normocephalic and atraumatic.      Right Ear: Hearing normal.      Left Ear: Hearing normal.      Nose: Nose normal.      Mouth/Throat:      Lips: Pink.      Mouth: Mucous membranes are moist.   Eyes:      General: Lids are normal.      Pupils: Pupils are equal, round, and reactive to light.   Cardiovascular:      Rate and Rhythm: Normal rate.   Pulmonary:      Effort: Pulmonary effort is normal.   Musculoskeletal:         General: Normal range of motion.      Cervical back: Normal range of motion.   Skin:     General: Skin is warm and dry.   Neurological:      Mental Status: She is alert and oriented to person, place, and time.      Cranial Nerves: No dysarthria or facial asymmetry.      Motor: No weakness or tremor.      Gait: Gait abnormal (Recent fracture of right foot, limping when walking).   Psychiatric:         Attention and Perception: Attention and perception normal.         Mood and Affect: Mood normal.         Speech: Speech normal.         Behavior: Behavior is cooperative.          Thought Content: Thought content normal.       Assessment/Plan:   Discussion: The patient will be increased to 150 mg of lacosamide twice daily.  She was also notified if she vomits the medication up that she will have breakthrough seizures.  She states she will follow-up with her GI doc.  She states in the past she has had feeding tubes.  She may need seizure monitoring as well.    The patient reports a past history of migraine and has been taking Excedrin Migraine every day several times.  She was told to discontinue Excedrin Migraine and a prescription was written for sumatriptan hand which she has used in the past and states worked very well for her.  She was notified that Excedrin Migraine can cause a rebound headache that does not ever respond.      Diagnoses and all orders for this visit:    1. Chronic migraine with aura without status migrainosus, not intractable (Primary)  -     SUMAtriptan (IMITREX) 100 MG tablet; Take 1 tablet by mouth 1 (One) Time As Needed for Migraine. Take 1 tablet at onset of Migraine. May repeat after 2 hours if needed.    Max 2 tab in 24 hrs.  Dispense: 9 tablet; Refill: 5    2. Generalized convulsive seizures  -     lacosamide 150 MG tablet; Take 150 mg by mouth Every 12 (Twelve) Hours.  Dispense: 60 tablet; Refill: 5    Possible pin PNES she is in psychotherapy.    Return in about 6 months (around 7/8/2024) for Next scheduled follow up Dr Seipel .       The patient was told to observe all seizure precautions, including, but not limited to:   If a Seizures occurs: No driving until seizure free for more than 3 months- as per State driving regulation / law;   Avoid all high-risk activity, Take showers instead of baths, avoid swimming without observation, Avoid open heat sources, Avoid working at heights, and Avoid engaging in all potentially hazardous activities.   Patient expressed clear understanding     I spent 46 minutes caring for Milka on this date of service. This time  includes time spent by me in the following activities: reviewing tests, obtaining and/or reviewing a separately obtained history, performing a medically appropriate examination and/or evaluation, counseling and educating the patient/family/caregiver, ordering medications, tests, or procedures, and documenting information in the medical record.      This document has been electronically signed by LAUREN Mojica on January 8, 2024 16:25 EST

## 2023-12-31 DIAGNOSIS — F40.01 PANIC DISORDER WITH AGORAPHOBIA: Chronic | ICD-10-CM

## 2023-12-31 DIAGNOSIS — R56.9 SEIZURE: ICD-10-CM

## 2024-01-02 DIAGNOSIS — F31.32 BIPOLAR AFFECTIVE DISORDER, CURRENTLY DEPRESSED, MODERATE: Chronic | ICD-10-CM

## 2024-01-02 RX ORDER — QUETIAPINE 400 MG/1
800 TABLET, FILM COATED, EXTENDED RELEASE ORAL
Qty: 60 TABLET | Refills: 1 | Status: SHIPPED | OUTPATIENT
Start: 2024-01-02

## 2024-01-02 RX ORDER — LAMOTRIGINE 100 MG/1
TABLET ORAL
Qty: 120 TABLET | Refills: 5 | Status: SHIPPED | OUTPATIENT
Start: 2024-01-02

## 2024-01-02 RX ORDER — BUSPIRONE HYDROCHLORIDE 30 MG/1
30 TABLET ORAL 2 TIMES DAILY
Qty: 60 TABLET | Refills: 1 | OUTPATIENT
Start: 2024-01-02

## 2024-01-02 RX ORDER — CARIPRAZINE 1.5 MG/1
CAPSULE, GELATIN COATED ORAL
Qty: 90 CAPSULE | Refills: 1 | Status: SHIPPED | OUTPATIENT
Start: 2024-01-02

## 2024-01-02 RX ORDER — BUSPIRONE HYDROCHLORIDE 30 MG/1
30 TABLET ORAL 2 TIMES DAILY
Qty: 180 TABLET | Refills: 1 | Status: SHIPPED | OUTPATIENT
Start: 2024-01-02 | End: 2025-01-01

## 2024-01-04 RX ORDER — SUMATRIPTAN 100 MG/1
TABLET, FILM COATED ORAL
Qty: 8 TABLET | Refills: 5 | OUTPATIENT
Start: 2024-01-04

## 2024-01-08 ENCOUNTER — OFFICE VISIT (OUTPATIENT)
Dept: NEUROLOGY | Facility: CLINIC | Age: 54
End: 2024-01-08
Payer: MEDICARE

## 2024-01-08 VITALS
WEIGHT: 214 LBS | HEIGHT: 64 IN | SYSTOLIC BLOOD PRESSURE: 102 MMHG | HEART RATE: 111 BPM | BODY MASS INDEX: 36.54 KG/M2 | DIASTOLIC BLOOD PRESSURE: 68 MMHG

## 2024-01-08 DIAGNOSIS — G43.E09 CHRONIC MIGRAINE WITH AURA WITHOUT STATUS MIGRAINOSUS, NOT INTRACTABLE: Primary | ICD-10-CM

## 2024-01-08 DIAGNOSIS — R56.9 GENERALIZED CONVULSIVE SEIZURES: ICD-10-CM

## 2024-01-08 PROCEDURE — 1160F RVW MEDS BY RX/DR IN RCRD: CPT | Performed by: NURSE PRACTITIONER

## 2024-01-08 PROCEDURE — 1159F MED LIST DOCD IN RCRD: CPT | Performed by: NURSE PRACTITIONER

## 2024-01-08 PROCEDURE — 99215 OFFICE O/P EST HI 40 MIN: CPT | Performed by: NURSE PRACTITIONER

## 2024-01-08 RX ORDER — SUMATRIPTAN 100 MG/1
100 TABLET, FILM COATED ORAL ONCE AS NEEDED
Qty: 9 TABLET | Refills: 5 | Status: SHIPPED | OUTPATIENT
Start: 2024-01-08 | End: 2025-01-07

## 2024-01-08 RX ORDER — LACOSAMIDE 150 MG/1
150 TABLET ORAL EVERY 12 HOURS SCHEDULED
Qty: 60 TABLET | Refills: 5 | Status: SHIPPED | OUTPATIENT
Start: 2024-01-08

## 2024-01-18 ENCOUNTER — TELEPHONE (OUTPATIENT)
Dept: NEUROLOGY | Facility: CLINIC | Age: 54
End: 2024-01-18

## 2024-01-22 RX ORDER — SUMATRIPTAN 100 MG/1
100 TABLET, FILM COATED ORAL ONCE AS NEEDED
Qty: 9 TABLET | Refills: 11 | Status: SHIPPED | OUTPATIENT
Start: 2024-01-22 | End: 2024-02-21

## 2024-01-30 DIAGNOSIS — F40.01 PANIC DISORDER WITH AGORAPHOBIA: Chronic | ICD-10-CM

## 2024-01-30 RX ORDER — HYDROXYZINE HYDROCHLORIDE 25 MG/1
TABLET, FILM COATED ORAL
Qty: 90 TABLET | Refills: 0 | Status: SHIPPED | OUTPATIENT
Start: 2024-01-30

## 2024-02-15 ENCOUNTER — OFFICE VISIT (OUTPATIENT)
Dept: PSYCHIATRY | Facility: CLINIC | Age: 54
End: 2024-02-15
Payer: MEDICARE

## 2024-02-15 DIAGNOSIS — F40.01 PANIC DISORDER WITH AGORAPHOBIA: Chronic | ICD-10-CM

## 2024-02-15 DIAGNOSIS — F41.1 GENERALIZED ANXIETY DISORDER: Chronic | ICD-10-CM

## 2024-02-15 DIAGNOSIS — F31.32 BIPOLAR AFFECTIVE DISORDER, CURRENTLY DEPRESSED, MODERATE: Primary | Chronic | ICD-10-CM

## 2024-02-15 DIAGNOSIS — Z75.8 DOES NOT HAVE PRIMARY CARE PROVIDER: ICD-10-CM

## 2024-02-15 DIAGNOSIS — F41.0 PANIC DISORDER: Chronic | ICD-10-CM

## 2024-02-15 RX ORDER — ALPRAZOLAM 2 MG/1
2 TABLET ORAL 3 TIMES DAILY PRN
Qty: 90 TABLET | Refills: 2 | Status: SHIPPED | OUTPATIENT
Start: 2024-02-15

## 2024-02-15 RX ORDER — HYDROXYZINE HYDROCHLORIDE 25 MG/1
25 TABLET, FILM COATED ORAL 3 TIMES DAILY PRN
Qty: 90 TABLET | Refills: 2 | Status: SHIPPED | OUTPATIENT
Start: 2024-02-15

## 2024-02-15 RX ORDER — SERTRALINE HYDROCHLORIDE 100 MG/1
100 TABLET, FILM COATED ORAL DAILY
Qty: 90 TABLET | Refills: 1 | Status: SHIPPED | OUTPATIENT
Start: 2024-02-15 | End: 2025-02-14

## 2024-02-20 NOTE — TELEPHONE ENCOUNTER
"Chief Complaint  Hypertension, Hyperlipidemia, and Hypothyroidism    Subjective        Nestor Nye presents to [facility]  History of Present Illness  HPI: Patient is here to follow up on the blood pressure  The patient is taking the blood pressure medications as prescribed and has had no side effects. The patient is also here to follow up on the cholesterol and   on thyroid and  had  lab work done .  The patient also needs refills on medications .   Hyperlipidemia   Pertinent negatives include no chest pain or shortness of breath.   Hypertension   Pertinent negatives include no chest pain, palpitations or shortness of breath.   Objective   Vital Signs:  /82   Pulse 88   Temp 97.5 °F (36.4 °C)   Resp 18   Ht 174 cm (68.5\")   Wt 74.8 kg (165 lb)   SpO2 96%   BMI 24.72 kg/m²   Estimated body mass index is 24.72 kg/m² as calculated from the following:    Height as of this encounter: 174 cm (68.5\").    Weight as of this encounter: 74.8 kg (165 lb).       BMI is within normal parameters. No other follow-up for BMI required.      Physical Exam  Vitals and nursing note reviewed.   Constitutional:       General: He is not in acute distress.     Appearance: Normal appearance. He is not diaphoretic.   HENT:      Head: Normocephalic and atraumatic.      Right Ear: External ear normal.      Left Ear: External ear normal.      Nose: Nose normal.   Eyes:      Comments: Eye lid dysfunction bilateral  Lower Eye lid eversion and conjuctiva    Neck:      Trachea: Trachea normal.   Cardiovascular:      Rate and Rhythm: Normal rate and regular rhythm.      Heart sounds: Normal heart sounds.   Pulmonary:      Effort: Pulmonary effort is normal. No respiratory distress.   Abdominal:      General: Abdomen is flat.   Musculoskeletal:      Cervical back: Neck supple.      Comments: Moves all limbs   Skin:     General: Skin is warm and dry.      Findings: No erythema.   Neurological:      " PT returned call - she is no longer taking trazodone.    Mental Status: He is alert and oriented to person, place, and time.      Comments: No gross motor or sensory deficits        Result Review :    The following data was reviewed by: Roxanne Slater MD on 02/20/2024:  Common labs          5/23/2023    10:02 8/31/2023    10:03 11/17/2023    10:25   Common Labs   Glucose 90  87  85    BUN 27  19  22    Creatinine 1.63  1.63  1.46    Sodium 140  140  138    Potassium 4.5  4.6  4.8    Chloride 104  103  106    Calcium 9.3  9.2  9.1    Albumin 4.5  4.4  4.2    Total Bilirubin 0.8  0.8  0.7    Alkaline Phosphatase 80  87  79    AST (SGOT) 15  14  17    ALT (SGPT) 9  9  18    WBC 6.88  6.35  7.29    Hemoglobin 14.9  14.6  14.5    Hematocrit 43.5  42.9  42.5    Platelets 237  243  242    Total Cholesterol 149  175  167    Triglycerides 72  91  135    HDL Cholesterol 47  46  43    LDL Cholesterol  88  112  100                   Assessment and Plan     Diagnoses and all orders for this visit:    1. Benign essential hypertension (Primary)  -     amLODIPine (Norvasc) 2.5 MG tablet; Take 1 tablet by mouth Daily.  Dispense: 90 tablet; Refill: 1    2. Mixed hyperlipidemia  -     CBC & Differential  -     Comprehensive Metabolic Panel  -     Lipid Panel    3. Adult onset hypothyroidism  -     levothyroxine (SYNTHROID, LEVOTHROID) 88 MCG tablet; Take 1 tablet by mouth Daily.  Dispense: 90 tablet; Refill: 2  -     TSH    4. Stage 3b chronic kidney disease    Plan:  1.  Benign essential hypertension: Will continue current medication, low-sodium diet advised, Counseled to regularly check BP at home with goal averaging <130/80.   2.mixed hyperlipidemia:  reviewed  fasting CMP and lipid panel.  Diet and exercise counseled,  Will continue current medications  3.   hypothyroidism:  reviewed  tsh , and continue levothyroxine  4.  CKD stage IIIb: Oral hydration, monitor BMP, follow-up with nephrology         Follow Up     Return in about 8 weeks (around 4/18/2024).  Patient was given instructions  and counseling regarding his condition or for health maintenance advice. Please see specific information pulled into the AVS if appropriate.

## 2024-02-21 ENCOUNTER — HOSPITAL ENCOUNTER (EMERGENCY)
Facility: HOSPITAL | Age: 54
Discharge: HOME | End: 2024-02-21
Attending: EMERGENCY MEDICINE | Admitting: EMERGENCY MEDICINE
Payer: MEDICARE

## 2024-02-21 VITALS
SYSTOLIC BLOOD PRESSURE: 115 MMHG | DIASTOLIC BLOOD PRESSURE: 65 MMHG | BODY MASS INDEX: 36.45 KG/M2 | OXYGEN SATURATION: 95 % | HEART RATE: 101 BPM | TEMPERATURE: 97.7 F | HEIGHT: 64 IN | RESPIRATION RATE: 17 BRPM

## 2024-02-21 DIAGNOSIS — R56.9 SEIZURE: Primary | ICD-10-CM

## 2024-02-21 LAB
ANION GAP SERPL CALCULATED.3IONS-SCNC: 13 MMOL/L (ref 5–15)
ANION GAP SERPL CALCULATED.3IONS-SCNC: 18 MMOL/L (ref 10–20)
BASOPHILS # BLD AUTO: 0 10*3/MM3 (ref 0–0.2)
BASOPHILS NFR BLD AUTO: 0.4 % (ref 0–1.5)
BUN BLDA-MCNC: 7 MG/DL (ref 8–26)
BUN SERPL-MCNC: 9 MG/DL (ref 6–20)
BUN/CREAT SERPL: 10.7 (ref 7–25)
CA-I BLDA-SCNC: 1.2 MMOL/L (ref 1.12–1.32)
CALCIUM SPEC-SCNC: 9.4 MG/DL (ref 8.6–10.5)
CHLORIDE BLDA-SCNC: 104 MMOL/L (ref 98–109)
CHLORIDE SERPL-SCNC: 103 MMOL/L (ref 98–107)
CK SERPL-CCNC: 21 U/L (ref 20–180)
CO2 BLDA-SCNC: 21 MMOL/L (ref 24–29)
CO2 SERPL-SCNC: 22 MMOL/L (ref 22–29)
CREAT BLDA-MCNC: 0.7 MG/DL (ref 0.6–1.3)
CREAT SERPL-MCNC: 0.84 MG/DL (ref 0.57–1)
DEPRECATED RDW RBC AUTO: 48.6 FL (ref 37–54)
EGFRCR SERPLBLD CKD-EPI 2021: 102.3 ML/MIN/1.73
EGFRCR SERPLBLD CKD-EPI 2021: 82.2 ML/MIN/1.73
EOSINOPHIL # BLD AUTO: 0 10*3/MM3 (ref 0–0.4)
EOSINOPHIL NFR BLD AUTO: 0.1 % (ref 0.3–6.2)
ERYTHROCYTE [DISTWIDTH] IN BLOOD BY AUTOMATED COUNT: 16.1 % (ref 12.3–15.4)
GLUCOSE BLDC GLUCOMTR-MCNC: 204 MG/DL (ref 70–105)
GLUCOSE SERPL-MCNC: 192 MG/DL (ref 65–99)
HCT VFR BLD AUTO: 35 % (ref 34–46.6)
HCT VFR BLDA CALC: 36 % (ref 38–51)
HGB BLD-MCNC: 11.2 G/DL (ref 12–15.9)
HGB BLDA-MCNC: 12.2 G/DL (ref 12–17)
HOLD SPECIMEN: NORMAL
LYMPHOCYTES # BLD AUTO: 1.1 10*3/MM3 (ref 0.7–3.1)
LYMPHOCYTES NFR BLD AUTO: 13 % (ref 19.6–45.3)
MAGNESIUM SERPL-MCNC: 2 MG/DL (ref 1.6–2.6)
MCH RBC QN AUTO: 26.3 PG (ref 26.6–33)
MCHC RBC AUTO-ENTMCNC: 32.1 G/DL (ref 31.5–35.7)
MCV RBC AUTO: 82.1 FL (ref 79–97)
MONOCYTES # BLD AUTO: 0.4 10*3/MM3 (ref 0.1–0.9)
MONOCYTES NFR BLD AUTO: 4.5 % (ref 5–12)
NEUTROPHILS NFR BLD AUTO: 7.1 10*3/MM3 (ref 1.7–7)
NEUTROPHILS NFR BLD AUTO: 82 % (ref 42.7–76)
NRBC BLD AUTO-RTO: 0.1 /100 WBC (ref 0–0.2)
PLATELET # BLD AUTO: 317 10*3/MM3 (ref 140–450)
PMV BLD AUTO: 7.6 FL (ref 6–12)
POTASSIUM BLDA-SCNC: 3.8 MMOL/L (ref 3.5–4.9)
POTASSIUM SERPL-SCNC: 3.9 MMOL/L (ref 3.5–5.2)
RBC # BLD AUTO: 4.26 10*6/MM3 (ref 3.77–5.28)
SODIUM BLD-SCNC: 138 MMOL/L (ref 138–146)
SODIUM SERPL-SCNC: 138 MMOL/L (ref 136–145)
WBC NRBC COR # BLD AUTO: 8.7 10*3/MM3 (ref 3.4–10.8)

## 2024-02-21 PROCEDURE — 80047 BASIC METABLC PNL IONIZED CA: CPT

## 2024-02-21 PROCEDURE — 25010000002 LEVETRIRACETAM PER 10 MG: Performed by: EMERGENCY MEDICINE

## 2024-02-21 PROCEDURE — 80048 BASIC METABOLIC PNL TOTAL CA: CPT | Performed by: EMERGENCY MEDICINE

## 2024-02-21 PROCEDURE — 96374 THER/PROPH/DIAG INJ IV PUSH: CPT

## 2024-02-21 PROCEDURE — 99283 EMERGENCY DEPT VISIT LOW MDM: CPT

## 2024-02-21 PROCEDURE — 85025 COMPLETE CBC W/AUTO DIFF WBC: CPT | Performed by: EMERGENCY MEDICINE

## 2024-02-21 PROCEDURE — 85014 HEMATOCRIT: CPT

## 2024-02-21 PROCEDURE — 82550 ASSAY OF CK (CPK): CPT | Performed by: EMERGENCY MEDICINE

## 2024-02-21 PROCEDURE — 83735 ASSAY OF MAGNESIUM: CPT | Performed by: EMERGENCY MEDICINE

## 2024-02-21 RX ORDER — LEVETIRACETAM 500 MG/5ML
1000 INJECTION, SOLUTION, CONCENTRATE INTRAVENOUS ONCE
Status: COMPLETED | OUTPATIENT
Start: 2024-02-21 | End: 2024-02-21

## 2024-02-21 RX ORDER — SUMATRIPTAN 50 MG/1
100 TABLET, FILM COATED ORAL ONCE
Status: COMPLETED | OUTPATIENT
Start: 2024-02-21 | End: 2024-02-21

## 2024-02-21 RX ADMIN — SUMATRIPTAN SUCCINATE 100 MG: 50 TABLET ORAL at 13:41

## 2024-02-21 RX ADMIN — LEVETIRACETAM 1000 MG: 100 INJECTION INTRAVENOUS at 13:29

## 2024-02-21 NOTE — DISCHARGE INSTRUCTIONS
Follow-up with your neurologist call today.  Continue your Keppra as prescribed.  Return for increasing seizures recurrent seizures altered mental status vomiting fevers or any other new or worse problems or concerns return immediately to the ER.

## 2024-02-21 NOTE — ED PROVIDER NOTES
"Subjective   History of Present Illness  Chief complaint seizure    History of present illness this is a 53-year-old female following history of seizure takes Keppra states that she had a very stressful event this morning it triggered a generalized tonic-clonic seizure.  She was brought to the hospital and she has no complaints now and feels fine.  The patient states she did not get her Keppra this morning.  She denies any head injury face difficulty or paralysis recent illness flus viruses or vaccinations or change in medications.  No other complaints or associated symptoms.  States is normal typical seizure for her.      Review of Systems   Constitutional:  Negative for chills and fever.   Eyes:  Negative for photophobia and visual disturbance.   Respiratory:  Negative for chest tightness and shortness of breath.    Cardiovascular:  Negative for chest pain and palpitations.   Gastrointestinal:  Negative for abdominal pain and vomiting.   Genitourinary:  Negative for difficulty urinating and dysuria.   Musculoskeletal:  Negative for back pain and neck pain.   Skin:  Negative for rash and wound.   Neurological:  Positive for seizures. Negative for facial asymmetry and speech difficulty.   Psychiatric/Behavioral:  Negative for confusion.        Past Medical History:   Diagnosis Date    Depression     Disease of thyroid gland     Hyperlipidemia     Migraines     On home O2 2021    3 lmp    Seizures        Allergies   Allergen Reactions    Sulfa Antibiotics Hives    Tramadol Hcl Mental Status Change    Ketorolac Other (See Comments)     pt states \"this medicine sends me into seizures\"       Past Surgical History:   Procedure Laterality Date    ABDOMINAL SURGERY       SECTION      x3    ENDOSCOPY N/A 2022    Procedure: EGD WITH DILATATION (BOUGIE #56) AND INJECTION OF BOTOX;  Surgeon: Rito Le MD;  Location: [facility] ENDOSCOPY;  Service: Gastroenterology;  Laterality: N/A;  POST: LOWER ESOPHAGEAL " STRICTURE AND CRICOPHARYNGEAL STRICUTRE    ENDOSCOPY W/ PEG TUBE PLACEMENT N/A 8/20/2020    Procedure: ESOPHAGOGASTRODUODENOSCOPY WITH PERCUTANEOUS ENDOSCOPIC GASTROSTOMY TUBE INSERTION;  Surgeon: Rito Le MD;  Location: [facility] ENDOSCOPY;  Service: Gastroenterology;  Laterality: N/A;    HERNIA REPAIR      HYSTERECTOMY      PEG TUBE REMOVAL         Family History   Problem Relation Age of Onset    No Known Problems Mother     No Known Problems Father     No Known Problems Sister     No Known Problems Brother     Alcohol abuse Daughter     No Known Problems Son     No Known Problems Maternal Grandmother     No Known Problems Paternal Grandmother     No Known Problems Maternal Aunt     No Known Problems Paternal Aunt        Social History     Socioeconomic History    Marital status:    Tobacco Use    Smoking status: Former     Packs/day: .25     Types: Cigarettes     Quit date: 11/1/2020     Years since quitting: 3.3    Smokeless tobacco: Never   Vaping Use    Vaping Use: Never used   Substance and Sexual Activity    Alcohol use: No    Drug use: No    Sexual activity: Never     Prior to Admission medications    Medication Sig Start Date End Date Taking? Authorizing Provider   ALPRAZolam (XANAX) 2 MG tablet Take 1 tablet by mouth 3 (Three) Times a Day As Needed for Anxiety. 2/15/24   Milka Pavon APRN   amitriptyline (ELAVIL) 25 MG tablet TAKE 1 TABLET BY MOUTH EVERY NIGHT AT BEDTIME 3/6/23   Safia Reyna APRN   atorvastatin (LIPITOR) 20 MG tablet Take 1 tablet by mouth Every Night. 6/19/23   Halie Funes APRN   BD PosiFlush 0.9 % flush  11/1/23   Provider, MD Ritesh   busPIRone (BUSPAR) 30 MG tablet Take 1 tablet by mouth 2 (Two) Times a Day. 1/2/24 1/1/25  Milka Pavon APRN   Cariprazine HCl (Vraylar) 1.5 MG capsule capsule Take 1 capsule by mouth Daily. 2/15/24   Milka Pavon APRN   ferrous gluconate (FERGON) 324 MG tablet Take 1 tablet by  mouth Daily With Breakfast. 1/10/23   Neelima Montoya MD   heparin 100 UNIT/ML solution injection  1/19/23   Ritesh Funes MD   heparin 100 UNIT/ML solution injection  11/1/23   Ritesh Funes MD   hydrOXYzine (ATARAX) 25 MG tablet Take 1 tablet by mouth 3 (Three) Times a Day As Needed for Anxiety. 2/15/24   Milka Pavon APRN   lacosamide 150 MG tablet Take 150 mg by mouth Every 12 (Twelve) Hours. 1/8/24   Ana Rosa Weber APRN   lamoTRIgine (LaMICtal) 100 MG tablet TAKE 1 TABLET BY MOUTH EVERY MORNING,1 AT NOON AND 2 TABLETS EVERY NIGHT AT BEDTIME 1/2/24   Seipel, Joseph F, MD   levETIRAcetam (KEPPRA) 500 MG tablet TAKE 3 TABLETS BY MOUTH TWICE DAILY 12/5/22   Ana Rosa Weber APRN   levothyroxine (SYNTHROID, LEVOTHROID) 25 MCG tablet TAKE 1 TABLET BY MOUTH DAILY FOR THYROID 8/17/23   Safia Reyna APRN   Motegrity 2 MG tablet Take 1 tablet by mouth Daily. 5/17/21   Ritesh Funes MD   nystatin 114219 UNIT/GM powder APPLY TOPICALLY TO THE APPROPRIATE AREA THREE TIMES DAILY AS NEEDED FOR RASH AS DIRECTED. 12/5/22   Safia Reyna APRN   ondansetron (ZOFRAN) 2 mg/mL injection Infuse 2 mL into a venous catheter Every 6 (Six) Hours As Needed. 9/21/23   Ritesh Funes MD   pantoprazole (PROTONIX) 40 MG EC tablet TAKE 1 TABLET BY MOUTH DAILY 6/26/23   Safia Reyna APRN   promethazine (PHENERGAN) 25 MG/ML injection Infuse 0.5 mL into a venous catheter Every 6 (Six) Hours As Needed. 9/21/23   Ritesh Funes MD   QUEtiapine XR (SEROquel XR) 400 MG 24 hr tablet TAKE 2 TABLETS BY MOUTH EVERY NIGHT AT BEDTIME 1/2/24   Milka Pavon APRN   sertraline (ZOLOFT) 100 MG tablet Take 1 tablet by mouth Daily. 2/15/24 2/14/25  Milka Pavon APRN   sodium chloride 0.9 % solution  11/1/23   Provider, MD Ritesh   sucralfate (CARAFATE) 1 g tablet Take 1 tablet by mouth 3 (Three) Times a Day. 11/2/21   Provider, MD Ritesh    SUMAtriptan (IMITREX) 100 MG tablet Take 1 tablet by mouth 1 (One) Time As Needed for Migraine for up to 30 days. Take 1 tab at onset of Migraine. May repeat dose 1 time after 2 hrs if Migraine not resolved. 1/22/24 2/21/24  Ana Rosa Weber APRN   vitamin D (ERGOCALCIFEROL) 1.25 MG (66827 UT) capsule capsule TAKE 1 CAPSULE BY MOUTH 1 TIME EVERY WEEK 8/10/23   Halie Funes APRN          Objective   Physical Exam  Constitutional is a 53-year-old female awake alert no acute distress triage vital signs reviewed HEENT extraocular muscles are intact pupils equal round react there is no photophobia no raccoon or Silver sign.  No papilledema.  Mouth is clear neck supple no adenopathy no meningeal signs no cervical thoracic lumbar spine tenderness trachea midline no JVD no bruits lungs clear no retraction heart regular without murmur abdomen soft nontender good bowel sounds no peritoneal findings or other masses extremities no edema cords or Homans' sign no evidence of DVT no pain or fractures or obvious deformities neurologic awake alert orientated x 4 no facial asymmetry speech normal no focal weakness Steffen Coma Scale 15  Procedures           ED Course      Results for orders placed or performed during the hospital encounter of 02/21/24   Basic Metabolic Panel    Specimen: Blood   Result Value Ref Range    Glucose 192 (H) 65 - 99 mg/dL    BUN 9 6 - 20 mg/dL    Creatinine 0.84 0.57 - 1.00 mg/dL    Sodium 138 136 - 145 mmol/L    Potassium 3.9 3.5 - 5.2 mmol/L    Chloride 103 98 - 107 mmol/L    CO2 22.0 22.0 - 29.0 mmol/L    Calcium 9.4 8.6 - 10.5 mg/dL    BUN/Creatinine Ratio 10.7 7.0 - 25.0    Anion Gap 13.0 5.0 - 15.0 mmol/L    eGFR 82.2 >60.0 mL/min/1.73   CK    Specimen: Blood   Result Value Ref Range    Creatine Kinase 21 20 - 180 U/L   Magnesium    Specimen: Blood   Result Value Ref Range    Magnesium 2.0 1.6 - 2.6 mg/dL   CBC Auto Differential    Specimen: Blood   Result Value Ref Range     WBC 8.70 3.40 - 10.80 10*3/mm3    RBC 4.26 3.77 - 5.28 10*6/mm3    Hemoglobin 11.2 (L) 12.0 - 15.9 g/dL    Hematocrit 35.0 34.0 - 46.6 %    MCV 82.1 79.0 - 97.0 fL    MCH 26.3 (L) 26.6 - 33.0 pg    MCHC 32.1 31.5 - 35.7 g/dL    RDW 16.1 (H) 12.3 - 15.4 %    RDW-SD 48.6 37.0 - 54.0 fl    MPV 7.6 6.0 - 12.0 fL    Platelets 317 140 - 450 10*3/mm3    Neutrophil % 82.0 (H) 42.7 - 76.0 %    Lymphocyte % 13.0 (L) 19.6 - 45.3 %    Monocyte % 4.5 (L) 5.0 - 12.0 %    Eosinophil % 0.1 (L) 0.3 - 6.2 %    Basophil % 0.4 0.0 - 1.5 %    Neutrophils, Absolute 7.10 (H) 1.70 - 7.00 10*3/mm3    Lymphocytes, Absolute 1.10 0.70 - 3.10 10*3/mm3    Monocytes, Absolute 0.40 0.10 - 0.90 10*3/mm3    Eosinophils, Absolute 0.00 0.00 - 0.40 10*3/mm3    Basophils, Absolute 0.00 0.00 - 0.20 10*3/mm3    nRBC 0.1 0.0 - 0.2 /100 WBC   POC CHEM 8    Specimen: Venous Blood   Result Value Ref Range    Glucose 204 (H) 70 - 105 mg/dL    BUN 7 (L) 8 - 26 mg/dL    Creatinine 0.70 0.60 - 1.30 mg/dL    Sodium 138 138 - 146 mmol/L    POC Potassium 3.8 3.5 - 4.9 mmol/L    Chloride 104 98 - 109 mmol/L    Total CO2 21 (L) 24 - 29 mmol/L    Anion Gap 18.0 10.0 - 20.0 mmol/L    Hemoglobin 12.2 12.0 - 17.0 g/dL    Hematocrit 36 (L) 38 - 51 %    Ionized Calcium 1.20 1.12 - 1.32 mmol/L    eGFR 102.3 >60.0 mL/min/1.73     No radiology results for the last day  Medications   levETIRAcetam (KEPPRA) injection 1,000 mg (1,000 mg Intravenous Given 2/21/24 1329)   SUMAtriptan (IMITREX) tablet 100 mg (100 mg Oral Given 2/21/24 1341)                                              Medical Decision Making  Medical decision making monitor placed IV established my review of normal sinus rhythm.  Patient was loaded on her Keppra given 1000 mg IV.  Labs obtained independent reviewed by me basic metabolic profile unremarkable other than blood sugar 192 CK was normal magnesium normal CBC unremarkable and hemoglobin 11.2 the patient had no further seizure activity patient does have  migraine headaches and wanted to know if she could take her Imitrex and that was provided p.o.  The patient was resting comfortably she was observed for couple hours.  Repeat neurologic and was normal she was resting comfortably with no focal findings.  I do not see evidence of meningitis encephalitis acute stroke CVT status epilepticus trauma or other acute neurological issue based on the history and physical and clinical findings although not a complete list of all possibilities.  Patient feels well she does not drive and automobile she will follow-up with her neurologist about her Keppra dosing was discharged home for outpatient management.    Problems Addressed:  Seizure: complicated acute illness or injury    Amount and/or Complexity of Data Reviewed  Labs: ordered. Decision-making details documented in ED Course.    Risk  Prescription drug management.        Final diagnoses:   Seizure       ED Disposition  ED Disposition       ED Disposition   Discharge    Condition   Stable    Comment   --               PATIENT CONNECTION - [facility] [address]  [phone]  In 1 day           Medication List      No changes were made to your prescriptions during this visit.            Wesley Cleaning MD  02/22/24 1297

## 2024-02-24 DIAGNOSIS — F31.32 BIPOLAR AFFECTIVE DISORDER, CURRENTLY DEPRESSED, MODERATE: Chronic | ICD-10-CM

## 2024-02-26 DIAGNOSIS — F31.32 BIPOLAR AFFECTIVE DISORDER, CURRENTLY DEPRESSED, MODERATE: Chronic | ICD-10-CM

## 2024-02-26 RX ORDER — QUETIAPINE 400 MG/1
800 TABLET, FILM COATED, EXTENDED RELEASE ORAL
Qty: 60 TABLET | Refills: 2 | OUTPATIENT
Start: 2024-02-26

## 2024-02-26 RX ORDER — QUETIAPINE 400 MG/1
800 TABLET, FILM COATED, EXTENDED RELEASE ORAL
Qty: 60 TABLET | Refills: 2 | Status: SHIPPED | OUTPATIENT
Start: 2024-02-26

## 2024-05-06 ENCOUNTER — TELEPHONE (OUTPATIENT)
Dept: NEUROLOGY | Facility: CLINIC | Age: 54
End: 2024-05-06
Payer: MEDICAID

## 2024-05-06 DIAGNOSIS — R56.9 GENERALIZED CONVULSIVE SEIZURES: Primary | ICD-10-CM

## 2024-05-06 RX ORDER — LACOSAMIDE 200 MG/1
200 TABLET ORAL EVERY 12 HOURS SCHEDULED
Qty: 60 TABLET | Refills: 5 | Status: SHIPPED | OUTPATIENT
Start: 2024-05-06

## 2024-05-07 DIAGNOSIS — R56.9 GENERALIZED CONVULSIVE SEIZURES: Primary | ICD-10-CM

## 2024-05-07 DIAGNOSIS — R56.9 SEIZURE: ICD-10-CM

## 2024-05-07 RX ORDER — LEVETIRACETAM 500 MG/1
1500 TABLET ORAL 2 TIMES DAILY
Qty: 540 TABLET | Refills: 3 | Status: SHIPPED | OUTPATIENT
Start: 2024-05-07

## 2024-05-17 DIAGNOSIS — F31.32 BIPOLAR AFFECTIVE DISORDER, CURRENTLY DEPRESSED, MODERATE: Chronic | ICD-10-CM

## 2024-05-17 DIAGNOSIS — F41.0 PANIC DISORDER: Chronic | ICD-10-CM

## 2024-05-17 DIAGNOSIS — F41.1 GENERALIZED ANXIETY DISORDER: Chronic | ICD-10-CM

## 2024-05-20 RX ORDER — ALPRAZOLAM 2 MG/1
2 TABLET ORAL 3 TIMES DAILY PRN
Qty: 90 TABLET | Refills: 0 | Status: SHIPPED | OUTPATIENT
Start: 2024-05-20

## 2024-05-20 RX ORDER — QUETIAPINE 400 MG/1
800 TABLET, FILM COATED, EXTENDED RELEASE ORAL
Qty: 60 TABLET | Refills: 0 | Status: SHIPPED | OUTPATIENT
Start: 2024-05-20

## 2024-05-21 PROBLEM — B96.81 HELICOBACTER PYLORI GASTRITIS: Status: ACTIVE | Noted: 2023-03-16

## 2024-05-21 PROBLEM — D12.3 BENIGN NEOPLASM OF TRANSVERSE COLON: Status: ACTIVE | Noted: 2019-12-13

## 2024-05-21 PROBLEM — R63.4 WEIGHT LOSS: Status: ACTIVE | Noted: 2023-03-16

## 2024-05-21 PROBLEM — K26.9 DUODENAL ULCER, UNSPECIFIED AS ACUTE OR CHRONIC, WITHOUT HEMORRHAGE OR PERFORATION: Status: ACTIVE | Noted: 2019-12-13

## 2024-05-21 PROBLEM — G47.30 SLEEP APNEA: Status: ACTIVE | Noted: 2023-03-16

## 2024-05-21 PROBLEM — G40.409: Status: ACTIVE | Noted: 2023-03-16

## 2024-05-21 PROBLEM — F41.9 ANXIETY: Status: ACTIVE | Noted: 2023-03-16

## 2024-05-21 PROBLEM — R04.2 HEMOPTYSIS: Status: ACTIVE | Noted: 2023-03-16

## 2024-05-21 PROBLEM — K29.70 HELICOBACTER PYLORI GASTRITIS: Status: ACTIVE | Noted: 2023-03-16

## 2024-05-21 PROBLEM — R11.2 NAUSEA AND VOMITING: Status: ACTIVE | Noted: 2023-03-16

## 2024-05-21 PROBLEM — J44.9 CHRONIC OBSTRUCTIVE PULMONARY DISEASE: Status: ACTIVE | Noted: 2023-03-16

## 2024-05-21 PROBLEM — Z87.891 EX-CIGARETTE SMOKER: Status: ACTIVE | Noted: 2022-10-11

## 2024-05-21 PROBLEM — K22.89 OTHER SPECIFIED DISEASE OF ESOPHAGUS: Status: ACTIVE | Noted: 2020-08-13

## 2024-05-21 PROBLEM — K52.9 NONINFECTIOUS GASTROENTERITIS: Status: ACTIVE | Noted: 2022-11-13

## 2024-05-21 PROBLEM — G25.81 RESTLESS LEG SYNDROME: Status: ACTIVE | Noted: 2023-03-16

## 2024-05-21 PROBLEM — K64.9 HEMORRHOID: Status: ACTIVE | Noted: 2023-03-16

## 2024-05-21 PROBLEM — E78.00 PURE HYPERCHOLESTEROLEMIA: Status: ACTIVE | Noted: 2023-03-16

## 2024-05-21 PROBLEM — M54.9 BACK PAIN: Status: ACTIVE | Noted: 2023-03-16

## 2024-05-21 PROBLEM — K22.4 ESOPHAGEAL DYSMOTILITY: Status: ACTIVE | Noted: 2023-03-16

## 2024-05-21 PROBLEM — G90.8 INTESTINAL AUTONOMIC NEUROPATHY: Status: ACTIVE | Noted: 2022-11-13

## 2024-05-21 PROBLEM — Z86.59 H/O: DEPRESSION: Status: ACTIVE | Noted: 2023-03-16

## 2024-05-21 PROBLEM — K22.2 BENIGN ESOPHAGEAL STRICTURE: Status: ACTIVE | Noted: 2020-05-08

## 2024-05-21 PROBLEM — K58.9 IRRITABLE BOWEL SYNDROME: Status: ACTIVE | Noted: 2023-03-16

## 2024-05-21 PROBLEM — R13.10 DYSPHAGIA: Status: ACTIVE | Noted: 2023-03-16

## 2024-05-21 PROBLEM — R11.10 UNCONTROLLABLE VOMITING: Status: ACTIVE | Noted: 2020-02-13

## 2024-05-21 PROBLEM — Z12.11 SPECIAL SCREENING FOR MALIGNANT NEOPLASMS, COLON: Status: ACTIVE | Noted: 2023-03-16

## 2024-05-21 PROBLEM — K25.3 ACUTE GASTRIC ULCER WITHOUT HEMORRHAGE OR PERFORATION: Status: ACTIVE | Noted: 2023-03-16

## 2024-05-21 PROBLEM — K63.5 COLON POLYPS: Status: ACTIVE | Noted: 2019-12-13

## 2024-05-21 PROBLEM — R41.840 ATTENTION DISTURBANCE: Status: ACTIVE | Noted: 2021-01-10

## 2024-05-21 PROBLEM — K21.00 GASTRO-ESOPHAGEAL REFLUX DISEASE WITH ESOPHAGITIS: Status: ACTIVE | Noted: 2019-12-13

## 2024-05-21 PROBLEM — F41.0 PANIC ATTACK: Status: ACTIVE | Noted: 2023-03-16

## 2024-05-21 PROBLEM — D18.03 HEMANGIOMA OF LIVER: Status: ACTIVE | Noted: 2023-03-16

## 2024-05-21 PROBLEM — K44.9 HIATAL HERNIA: Status: ACTIVE | Noted: 2022-11-18

## 2024-05-21 PROBLEM — R42 DIZZINESS: Status: ACTIVE | Noted: 2023-03-16

## 2024-05-21 PROBLEM — K31.89 OTHER DISEASES OF STOMACH AND DUODENUM: Status: ACTIVE | Noted: 2020-08-13

## 2024-05-21 PROBLEM — G90.89 INTESTINAL AUTONOMIC NEUROPATHY: Status: ACTIVE | Noted: 2022-11-13

## 2024-05-21 PROBLEM — R76.8 AUTOANTIBODY TITER POSITIVE: Status: ACTIVE | Noted: 2022-11-13

## 2024-05-21 PROBLEM — K92.9 DISEASE OF DIGESTIVE SYSTEM, UNSPECIFIED: Status: ACTIVE | Noted: 2022-11-13

## 2024-05-21 PROBLEM — N18.9 CHRONIC KIDNEY DISEASE: Status: ACTIVE | Noted: 2022-11-12

## 2024-05-28 ENCOUNTER — TELEPHONE (OUTPATIENT)
Dept: NEUROLOGY | Facility: CLINIC | Age: 54
End: 2024-05-28
Payer: MEDICAID

## 2024-05-28 NOTE — TELEPHONE ENCOUNTER
Provider: ÁNGEL MATHEWS    Caller:  PATIENT    Relationship to Patient: SELF    Pharmacy: [facility]    Phone Number: [phone]    Reason for Call: PT STATES SHE HAD A SEIZURE ABOUT TWO WEEKS.  PT DID NOT GO TO ED.  PT NOT SURE WHAT HAPPENED.      PLEASE CALL TO ADVISE     THANK YOU

## 2024-05-29 NOTE — PROGRESS NOTES
[facility]   [address], [address]  [place] [address]  [phone]  Milka Pavon, MSN, APRN, PMHNP-BC      Subjective     Milka Espinoza is a 54 y.o. female who presents today for follow-up for psychiatric medication management.     Chief Complaint:  Bipolar disorder, anxiety     History of Present Illness:     Medication adjustments last visit:   Continue sertraline 100mg.   Continue Vraylar 1.5mg.   Continue Quetiapine XR 800mg daily.   Continue buspirone 30mg twice daily.   Continue alprazolam 2mg three times daily. We will try to reduce this dose over time.     Patient here for follow up.  She states is having seizures back to back. She has been out  of her lacosamide. She has a call out to her neurologist, but has not heard back.   I let her know that if she had been out of the lacosamide, that is likely why she is having more seizures. She is going to call again today to neurology.   I did advise her that she already has 3 same day cancels, and generally after the 3rd one, you are dismissed. Advised patient that she couldn't miss again, or she would be dismissed. I stressed the importance of keeping follow up appointments especially with her being on a controlled medication.  Denies any suicidal thoughts.     Patient presents with symptoms and behaviors that are consistent with the following DSM-5 diagnoses:  Bipolar disorder  2. Panic disorder  3. Generalized anxiety     The following portions of the patient's history were reviewed and updated as appropriate: allergies, current medications, past family history, past medical history, past social history, past surgical history and problem list.    PAST OUTPATIENT TREATMENT  Diagnosis treated:  Affective Disorder, Anxiety/Panic Disorder, substance use   Treatment Type:  Medication Management  Prior Psychiatric Medications:  Abilify  Risperidone  Vraylar  Valium  Xanax  Lamictal  Gabapentin  Trazodone,  feels 'prickly all over'  Buspar  Seroquel  Support Groups:  None  Sequelae Of Mental Disorder:  social isolation, family disruption, emotional distress    Interval History  No Change    Side Effects  None      Past Medical History:  Past Medical History:   Diagnosis Date    Depression     Disease of thyroid gland     Hyperlipidemia     Migraines     On home O2 2021    3 lmp    Seizures        Social History:  Social History     Socioeconomic History    Marital status:    Tobacco Use    Smoking status: Former     Current packs/day: 0.00     Types: Cigarettes     Quit date: 2020     Years since quitting: 3.5    Smokeless tobacco: Never   Vaping Use    Vaping status: Never Used   Substance and Sexual Activity    Alcohol use: No    Drug use: No    Sexual activity: Never       Family History:  Family History   Problem Relation Age of Onset    No Known Problems Mother     No Known Problems Father     No Known Problems Sister     No Known Problems Brother     Alcohol abuse Daughter     No Known Problems Son     No Known Problems Maternal Grandmother     No Known Problems Paternal Grandmother     No Known Problems Maternal Aunt     No Known Problems Paternal Aunt        Past Surgical History:  Past Surgical History:   Procedure Laterality Date    ABDOMINAL SURGERY       SECTION      x3    ENDOSCOPY N/A 2022    Procedure: EGD WITH DILATATION (BOUGIE #56) AND INJECTION OF BOTOX;  Surgeon: Rito Le MD;  Location: [facility] ENDOSCOPY;  Service: Gastroenterology;  Laterality: N/A;  POST: LOWER ESOPHAGEAL STRICTURE AND CRICOPHARYNGEAL STRICUTRE    ENDOSCOPY W/ PEG TUBE PLACEMENT N/A 2020    Procedure: ESOPHAGOGASTRODUODENOSCOPY WITH PERCUTANEOUS ENDOSCOPIC GASTROSTOMY TUBE INSERTION;  Surgeon: Rito Le MD;  Location: [facility] ENDOSCOPY;  Service: Gastroenterology;  Laterality: N/A;    HERNIA REPAIR      HYSTERECTOMY      PEG TUBE REMOVAL         Problem List:  Patient Active Problem  List   Diagnosis    Bipolar disorder, in full remission, most recent episode depressed    Depressive disorder    Mood disorder in conditions classified elsewhere    Seizures    Opioid withdrawal    Altered level of consciousness    Asthma    Acute bronchitis    General medical exam    Upper abdominal pain, unspecified    Syncope    Unspecified sprain of unspecified wrist, initial encounter    Upper respiratory infection    Intractable nausea with vomiting    Obesity    Gastroesophageal reflux disease    Tobacco use    Nausea and vomiting    Borderline hypoglycemic episodes    Migraine    Pseudoseizure    Seizure    Panic disorder with agoraphobia    Gastroparesis    Hyperlipidemia    Hypothyroid    Ankle sprain    Bipolar affective disorder    Dissociative convulsions    Anxiety    Back pain    Other diseases of stomach and duodenum    Nausea and vomiting    Esophageal dysmotility    Dysphagia    Uncontrollable vomiting    Other specified disease of esophagus    Hiatal hernia    Special screening for malignant neoplasms, colon    Sleep apnea    Weight loss    Restless leg syndrome    Colon polyps    Pure hypercholesterolemia    Panic attack    Noninfectious gastroenteritis    Irritable bowel syndrome    Intestinal autonomic neuropathy    Hemorrhoid    Hemoptysis    Hemangioma of liver    Helicobacter pylori gastritis    H/O: depression    Gastro-esophageal reflux disease with esophagitis    Ex-cigarette smoker    Epileptic seizure, tonic    Duodenal ulcer, unspecified as acute or chronic, without hemorrhage or perforation    Dizziness    Disease of digestive system, unspecified    Chronic obstructive pulmonary disease    Chronic kidney disease    Benign neoplasm of transverse colon    Benign esophageal stricture    Autoantibody titer positive    Attention disturbance    Acute gastric ulcer without hemorrhage or perforation       Allergy:   Allergies   Allergen Reactions    Ketorolac Other (See Comments)     pt  "states \"this medicine sends me into seizures\"    Sulfa Antibiotics Hives    Tramadol Hcl Mental Status Change        Discontinued Medications:  Medications Discontinued During This Encounter   Medication Reason    lacosamide (VIMPAT) 200 MG tablet     QUEtiapine XR (SEROquel XR) 400 MG 24 hr tablet Reorder             Current Medications:   Current Outpatient Medications   Medication Sig Dispense Refill    QUEtiapine XR (SEROquel XR) 400 MG 24 hr tablet Take 2 tablets by mouth every night at bedtime. 180 tablet 0    ALPRAZolam (XANAX) 2 MG tablet TAKE 1 TABLET BY MOUTH THREE TIMES DAILY AS NEEDED FOR ANXIETY 90 tablet 0    amitriptyline (ELAVIL) 25 MG tablet TAKE 1 TABLET BY MOUTH EVERY NIGHT AT BEDTIME 90 tablet 1    atorvastatin (LIPITOR) 20 MG tablet Take 1 tablet by mouth Every Night. 90 tablet 1    BD PosiFlush 0.9 % flush       busPIRone (BUSPAR) 30 MG tablet Take 1 tablet by mouth 2 (Two) Times a Day. 180 tablet 1    Cariprazine HCl (Vraylar) 1.5 MG capsule capsule Take 1 capsule by mouth Daily. 90 capsule 1    ferrous gluconate (FERGON) 324 MG tablet Take 1 tablet by mouth Daily With Breakfast. 90 tablet 1    heparin 100 UNIT/ML solution injection       heparin 100 UNIT/ML solution injection       hydrOXYzine (ATARAX) 25 MG tablet Take 1 tablet by mouth 3 (Three) Times a Day As Needed for Anxiety. 90 tablet 2    lamoTRIgine (LaMICtal) 100 MG tablet TAKE 1 TABLET BY MOUTH EVERY MORNING,1 AT NOON AND 2 TABLETS EVERY NIGHT AT BEDTIME 120 tablet 5    levETIRAcetam (KEPPRA) 500 MG tablet Take 3 tablets by mouth 2 (Two) Times a Day. 540 tablet 3    levothyroxine (SYNTHROID, LEVOTHROID) 25 MCG tablet TAKE 1 TABLET BY MOUTH DAILY FOR THYROID 90 tablet 0    Motegrity 2 MG tablet Take 1 tablet by mouth Daily.      nystatin 464830 UNIT/GM powder APPLY TOPICALLY TO THE APPROPRIATE AREA THREE TIMES DAILY AS NEEDED FOR RASH AS DIRECTED. 30 g 1    ondansetron (ZOFRAN) 2 mg/mL injection Infuse 2 mL into a venous catheter " Every 6 (Six) Hours As Needed.      pantoprazole (PROTONIX) 40 MG EC tablet TAKE 1 TABLET BY MOUTH DAILY 90 tablet 1    promethazine (PHENERGAN) 25 MG/ML injection Infuse 0.5 mL into a venous catheter Every 6 (Six) Hours As Needed.      promethazine (PHENERGAN) 6.25 MG/5ML syrup       sertraline (ZOLOFT) 100 MG tablet Take 1 tablet by mouth Daily. 90 tablet 1    sodium chloride 0.9 % solution       sucralfate (CARAFATE) 1 g tablet Take 1 tablet by mouth 3 (Three) Times a Day.      SUMAtriptan (IMITREX) 100 MG tablet Take 1 tablet by mouth 1 (One) Time As Needed for Migraine for up to 30 days. Take 1 tab at onset of Migraine. May repeat dose 1 time after 2 hrs if Migraine not resolved. 9 tablet 11    vitamin D (ERGOCALCIFEROL) 1.25 MG (52896 UT) capsule capsule TAKE 1 CAPSULE BY MOUTH 1 TIME EVERY WEEK 8 capsule 0     No current facility-administered medications for this visit.         Psychological ROS: positive for - anxiety and depression  negative for - behavioral disorder, concentration difficulties, decreased libido, disorientation, hallucinations, hostility, irritability, memory difficulties, mood swings, obsessive thoughts, physical abuse or sexual abuse      Physical Exam:   not currently breastfeeding.    MENTAL STATUS EXAM   General Appearance:  Cleanly groomed and dressed  Eye Contact:  Fair  Attitude:  Cooperative  Motor Activity:  Normal gait, posture  Muscle Strength:  Normal  Speech:  Normal rate, tone, volume  Language:  Spontaneous  Mood and affect:  Depressed  Hopelessness:  Denies  Loneliness: Denies  Thought Process:  Logical and goal-directed  Associations/ Thought Content:  No delusions  Hallucinations:  None  Suicidal Ideations:  Not present  Homicidal Ideation:  Not present  Sensorium:  Alert  Orientation:  Person, place, time and situation  Immediate Recall, Recent, and Remote Memory:  Intact  Attention Span/ Concentration:  Good  Fund of Knowledge:  Appropriate for age and educational  level  Intellectual Functioning:  Average range  Insight:  Fair  Judgement:  Fair  Reliability:  Fair  Impulse Control:  Fair       PHQ-9 Depression Screening    Little interest or pleasure in doing things? 3-->nearly every day   Feeling down, depressed, or hopeless? 1-->several days   Trouble falling or staying asleep, or sleeping too much? 3-->nearly every day   Feeling tired or having little energy? 0-->not at all   Poor appetite or overeating? 1-->several days   Feeling bad about yourself - or that you are a failure or have let yourself or your family down? 1-->several days   Trouble concentrating on things, such as reading the newspaper or watching television? 3-->nearly every day   Moving or speaking so slowly that other people could have noticed? Or the opposite - being so fidgety or restless that you have been moving around a lot more than usual? 3-->nearly every day   Thoughts that you would be better off dead, or of hurting yourself in some way? 0-->not at all   PHQ-9 Total Score 15   If you checked off any problems, how difficult have these problems made it for you to do your work, take care of things at home, or get along with other people? very difficult        Former smoker    I advised Milka of the risks of tobacco use.     Result Review:    Labs:  No visits with results within 3 Month(s) from this visit.   Latest known visit with results is:   Admission on 02/21/2024, Discharged on 02/21/2024   Component Date Value Ref Range Status    Glucose 02/21/2024 192 (H)  65 - 99 mg/dL Final    BUN 02/21/2024 9  6 - 20 mg/dL Final    Creatinine 02/21/2024 0.84  0.57 - 1.00 mg/dL Final    Sodium 02/21/2024 138  136 - 145 mmol/L Final    Potassium 02/21/2024 3.9  3.5 - 5.2 mmol/L Final    Chloride 02/21/2024 103  98 - 107 mmol/L Final    CO2 02/21/2024 22.0  22.0 - 29.0 mmol/L Final    Calcium 02/21/2024 9.4  8.6 - 10.5 mg/dL Final    BUN/Creatinine Ratio 02/21/2024 10.7  7.0 - 25.0 Final    Anion Gap  02/21/2024 13.0  5.0 - 15.0 mmol/L Final    eGFR 02/21/2024 82.2  >60.0 mL/min/1.73 Final    Creatine Kinase 02/21/2024 21  20 - 180 U/L Final    Magnesium 02/21/2024 2.0  1.6 - 2.6 mg/dL Final    WBC 02/21/2024 8.70  3.40 - 10.80 10*3/mm3 Final    RBC 02/21/2024 4.26  3.77 - 5.28 10*6/mm3 Final    Hemoglobin 02/21/2024 11.2 (L)  12.0 - 15.9 g/dL Final    Hematocrit 02/21/2024 35.0  34.0 - 46.6 % Final    MCV 02/21/2024 82.1  79.0 - 97.0 fL Final    MCH 02/21/2024 26.3 (L)  26.6 - 33.0 pg Final    MCHC 02/21/2024 32.1  31.5 - 35.7 g/dL Final    RDW 02/21/2024 16.1 (H)  12.3 - 15.4 % Final    RDW-SD 02/21/2024 48.6  37.0 - 54.0 fl Final    MPV 02/21/2024 7.6  6.0 - 12.0 fL Final    Platelets 02/21/2024 317  140 - 450 10*3/mm3 Final    Neutrophil % 02/21/2024 82.0 (H)  42.7 - 76.0 % Final    Lymphocyte % 02/21/2024 13.0 (L)  19.6 - 45.3 % Final    Monocyte % 02/21/2024 4.5 (L)  5.0 - 12.0 % Final    Eosinophil % 02/21/2024 0.1 (L)  0.3 - 6.2 % Final    Basophil % 02/21/2024 0.4  0.0 - 1.5 % Final    Neutrophils, Absolute 02/21/2024 7.10 (H)  1.70 - 7.00 10*3/mm3 Final    Lymphocytes, Absolute 02/21/2024 1.10  0.70 - 3.10 10*3/mm3 Final    Monocytes, Absolute 02/21/2024 0.40  0.10 - 0.90 10*3/mm3 Final    Eosinophils, Absolute 02/21/2024 0.00  0.00 - 0.40 10*3/mm3 Final    Basophils, Absolute 02/21/2024 0.00  0.00 - 0.20 10*3/mm3 Final    nRBC 02/21/2024 0.1  0.0 - 0.2 /100 WBC Final    Extra Tube 02/21/2024 Hold for add-ons.   Final    Auto resulted.    Glucose 02/21/2024 204 (H)  70 - 105 mg/dL Final    BUN 02/21/2024 7 (L)  8 - 26 mg/dL Final    Creatinine 02/21/2024 0.70  0.60 - 1.30 mg/dL Final    Sodium 02/21/2024 138  138 - 146 mmol/L Final    POC Potassium 02/21/2024 3.8  3.5 - 4.9 mmol/L Final    Chloride 02/21/2024 104  98 - 109 mmol/L Final    Total CO2 02/21/2024 21 (L)  24 - 29 mmol/L Final    Anion Gap 02/21/2024 18.0  10.0 - 20.0 mmol/L Final    Serial Number: [id number]:  [id number]    Hemoglobin  02/21/2024 12.2  12.0 - 17.0 g/dL Final    Hematocrit 02/21/2024 36 (L)  38 - 51 % Final    Ionized Calcium 02/21/2024 1.20  1.12 - 1.32 mmol/L Final    eGFR 02/21/2024 102.3  >60.0 mL/min/1.73 Final       Assessment & Plan   Diagnoses and all orders for this visit:    1. Bipolar affective disorder, currently depressed, moderate (Primary)  -     QUEtiapine XR (SEROquel XR) 400 MG 24 hr tablet; Take 2 tablets by mouth every night at bedtime.  Dispense: 180 tablet; Refill: 0    2. Encounter for long-term (current) use of other medications  -     ToxAssure Flex 22, Ur w/DL - Urine, Random Void    3. Generalized anxiety disorder  -     ToxAssure Flex 22, Ur w/DL - Urine, Random Void    4. Panic disorder  -     ToxAssure Flex 22, Ur w/DL - Urine, Random Void        Continue sertraline 100mg.   Continue Vraylar 1.5mg.   Continue Quetiapine XR 800mg daily.   Continue buspirone 30mg twice daily.   Continue alprazolam 2mg three times daily. We will try to reduce this dose over time.     Visit Diagnoses:    ICD-10-CM ICD-9-CM   1. Bipolar affective disorder, currently depressed, moderate  F31.32 296.52   2. Encounter for long-term (current) use of other medications  Z79.899 V58.69   3. Generalized anxiety disorder  F41.1 300.02   4. Panic disorder  F41.0 300.01         TREATMENT PLAN/GOALS: Continue supportive psychotherapy efforts and medications as indicated. Treatment and medication options discussed during today's visit. Patient ackowledged and verbally consented to continue with current treatment plan and was educated on the importance of compliance with treatment and follow-up appointments.    CRISIS RESOURCES:    In the event you have personal crisis, there are several resources to reach someone to talk with:    988 Suicide and Crisis Lifeline  Call or text 984 or chat 988Graphiclyline.org  [facility]'s National Helpline  6-869-015-HELP (1629)  Text your zip code to 415520 (HELP4U)  [place]'s Crisis Line  Dial 988, then press  1  Text [phone]    MEDICATION ISSUES:  INSPECT reviewed as expected.     UDS on 5/9/23 consistent with prescribed medications.    Discussed medication options and treatment plan of prescribed medication as well as the risks, benefits, and side effects including potential falls, possible impaired driving and metabolic adversities among others. Patient is agreeable to call the office with any worsening of symptoms or onset of side effects. Patient is agreeable to call 911 or go to the nearest ER should he/she begin having SI/HI. No medication side effects or related complaints today.     MEDS ORDERED DURING VISIT:  New Medications Ordered This Visit   Medications    QUEtiapine XR (SEROquel XR) 400 MG 24 hr tablet     Sig: Take 2 tablets by mouth every night at bedtime.     Dispense:  180 tablet     Refill:  0       Return in about 3 months (around 8/30/2024).         This document has been electronically signed by SANJEEV Jacobs  May 30, 2024 15:35 EDT    Part of this note may be an electronic transcription/translation of spoken language to printed text using the Dragon Dictation System.

## 2024-05-30 ENCOUNTER — OFFICE VISIT (OUTPATIENT)
Dept: PSYCHIATRY | Facility: CLINIC | Age: 54
End: 2024-05-30
Payer: MEDICAID

## 2024-05-30 DIAGNOSIS — F31.32 BIPOLAR AFFECTIVE DISORDER, CURRENTLY DEPRESSED, MODERATE: Primary | Chronic | ICD-10-CM

## 2024-05-30 DIAGNOSIS — Z79.899 ENCOUNTER FOR LONG-TERM (CURRENT) USE OF OTHER MEDICATIONS: ICD-10-CM

## 2024-05-30 DIAGNOSIS — F41.0 PANIC DISORDER: Chronic | ICD-10-CM

## 2024-05-30 DIAGNOSIS — F41.1 GENERALIZED ANXIETY DISORDER: Chronic | ICD-10-CM

## 2024-05-30 RX ORDER — QUETIAPINE 400 MG/1
800 TABLET, FILM COATED, EXTENDED RELEASE ORAL
Qty: 180 TABLET | Refills: 0 | Status: SHIPPED | OUTPATIENT
Start: 2024-05-30

## 2024-05-30 NOTE — TELEPHONE ENCOUNTER
Spoke to patient and she states she is still having seizures 3-4 times a week. You increased her Lacosamide the last time she called.    She was to be referred to [facility] for monitoring and a epileptologist, but was not called to schedule this.

## 2024-05-31 DIAGNOSIS — R56.9 GENERALIZED CONVULSIVE SEIZURES: Primary | ICD-10-CM

## 2024-05-31 RX ORDER — LAMOTRIGINE 150 MG/1
150 TABLET ORAL 2 TIMES DAILY
Qty: 60 TABLET | Refills: 5 | Status: SHIPPED | OUTPATIENT
Start: 2024-05-31

## 2024-05-31 NOTE — TELEPHONE ENCOUNTER
Please set that up!!  I increased her Lamictal to 150 mg twice daily also, please notify the patient.

## 2024-06-03 NOTE — TELEPHONE ENCOUNTER
Patient notified. She is wanting to know if you could take over her prescription for Mirapex for restless legs. She currently regalado not have any

## 2024-06-04 LAB
1OH-MIDAZOLAM UR QL SCN: NOT DETECTED NG/MG CREAT
6MAM UR QL SCN: NEGATIVE NG/ML
7AMINOCLONAZEPAM/CREAT UR: NOT DETECTED NG/MG CREAT
8OH-AMOXAPINE UR QL: NOT DETECTED
8OH-LOXAPINE UR QL SCN: NOT DETECTED
A-OH ALPRAZ/CREAT UR: 1576 NG/MG CREAT
A-OH-TRIAZOLAM/CREAT UR CFM: NOT DETECTED NG/MG CREAT
ALPRAZ/CREAT UR CFM: 384 NG/MG CREAT
AMITRIP UR-MCNC: NOT DETECTED NG/ML
AMOXAPINE UR QL: NOT DETECTED
AMPHETAMINES UR QL SCN: NEGATIVE NG/ML
ANTICONVULSANTS UR: NEGATIVE
ANTIPSYCHOTICS UR: NORMAL
ARIPIPRAZOLE UR QL SCN: NOT DETECTED
ASENAPINE UR QL CFM: NOT DETECTED
BARBITURATES UR QL SCN: NEGATIVE NG/ML
BENZODIAZ SCN METH UR: NORMAL
BUPRENORPHINE UR QL SCN: NEGATIVE NG/ML
BUPROPION UR QL: NOT DETECTED
CANNABINOIDS UR QL SCN: NEGATIVE NG/ML
CARISOPRODOL UR QL: NEGATIVE NG/ML
CHLORPROMAZINE UR QL: NOT DETECTED
CITALOPRAM, UR: NOT DETECTED
CLOMIPRAMINE UR-MCNC: NOT DETECTED NG/ML
CLONAZEPAM/CREAT UR CFM: NOT DETECTED NG/MG CREAT
CLOZAPINE UR QL: NOT DETECTED
COCAINE+BZE UR QL SCN: NEGATIVE NG/ML
CREAT UR-MCNC: 98 MG/DL
DESALKYLFLURAZ/CREAT UR: NOT DETECTED NG/MG CREAT
DESIPRAMINE UR-MCNC: NOT DETECTED NG/ML
DIAZEPAM/CREAT UR: NOT DETECTED NG/MG CREAT
DOXEPIN UR-MCNC: NOT DETECTED NG/ML
DULOXETINE UR QL: NOT DETECTED
ETHANOL UR QL SCN: NEGATIVE NG/ML
FENTANYL UR QL SCN: NEGATIVE NG/ML
FLUNITRAZEPAM UR QL SCN: NOT DETECTED NG/MG CREAT
FLUOXETINE UR QL SCN: NOT DETECTED
FLUPHENAZINE UR-MCNC: NOT DETECTED NG/ML
FLUVOXAMINE UR QL: NOT DETECTED
GABAPENTIN UR-MCNC: NEGATIVE UG/ML
HALOPERIDOL UR QL: NOT DETECTED
ILOPERIDONE UR QL CFM: NOT DETECTED
IMIPRAMINE UR-MCNC: NOT DETECTED NG/ML
KRATOM IA, UR: NEGATIVE NG/ML
LORAZEPAM/CREAT UR: NOT DETECTED NG/MG CREAT
LOXAPINE UR QL: NOT DETECTED
LURASIDONE UR QL CFM: NOT DETECTED
MAPROTILINE UR QL: NOT DETECTED
ME-PHENIDATE UR QL SCN: NEGATIVE NG/ML
MESORIDAZINE UR QL: NOT DETECTED
METHADONE UR QL SCN: NEGATIVE NG/ML
METHADONE+METAB UR QL SCN: NEGATIVE NG/ML
MIDAZOLAM/CREAT UR CFM: NOT DETECTED NG/MG CREAT
MIRTAZAPINE UR-MCNC: NOT DETECTED UG/ML
MOLINDONE UR QL SCN: NOT DETECTED
NEFAZODONE UR QL: NOT DETECTED
NORCITALOPRAM UR QL: NOT DETECTED
NORCLOMIPRAMINE UR QL: NOT DETECTED
NORCLOZAPINE UR QL: NOT DETECTED
NORDIAZEPAM/CREAT UR: NOT DETECTED NG/MG CREAT
NORDOXEPIN UR QL: NOT DETECTED
NORFLUNITRAZEPAM UR-MCNC: NOT DETECTED NG/MG CREAT
NORFLUOXETINE UR-MCNC: NOT DETECTED NG/ML
NORSERTRALINE UR QL: NOT DETECTED
NORTRIP UR-MCNC: NOT DETECTED NG/ML
ODV UR-MCNC: NOT DETECTED NG/ML
OH-BUPROPION UR-MCNC: NOT DETECTED NG/ML
OLANZAPINE UR CFM-MCNC: NOT DETECTED NG/ML
OPIATES UR SCN-MCNC: NEGATIVE NG/ML
OXAZEPAM/CREAT UR: NOT DETECTED NG/MG CREAT
OXYCODONE CTO UR SCN-MCNC: NEGATIVE NG/ML
PAROXETINE UR-MCNC: NOT DETECTED NG/L
PCP UR QL SCN: NEGATIVE NG/ML
PERPHENAZINE UR QL: NOT DETECTED
PIMOZIDE, UR: NOT DETECTED
PREGABALIN UR QL SCN: NOT DETECTED
PRESCRIBED MEDICATIONS: NORMAL
PROCHLORPERAZINE UR QL: NOT DETECTED
PROPOXYPH UR QL SCN: NEGATIVE NG/ML
PROTRIP UR QL: NOT DETECTED
QUETIAPINE CTO UR CFM-MCNC: PRESENT NG/ML
RISPERIDONE UR QL: NOT DETECTED
SERTRALINE UR-MCNC: NOT DETECTED NG/ML
TAPENTADOL CTO UR SCN-MCNC: NEGATIVE NG/ML
TEMAZEPAM/CREAT UR: NOT DETECTED NG/MG CREAT
THIORIDAZINE UR-MCNC: NOT DETECTED UG/ML
THIOTHIXENE UR QL: NOT DETECTED
TRAMADOL UR QL SCN: NEGATIVE NG/ML
TRAZODONE UR QL: NOT DETECTED
TRAZODONE UR-MCNC: NOT DETECTED UG/ML
TRICYCLICS TESTED UR SCN: NEGATIVE
TRIFPERAZINE UR QL: NOT DETECTED
TRIMIPRAMINE UR QL: NOT DETECTED
VENLAFAXINE UR QL: NOT DETECTED
VILAZ UR QL SCN: NOT DETECTED
ZIPRASIDONE UR QL SCN: NOT DETECTED

## 2024-06-06 NOTE — TELEPHONE ENCOUNTER
LVM for patient letting her know her PCP would need to manage this medication since have not seen her for this issue before.

## 2024-06-06 NOTE — TELEPHONE ENCOUNTER
Alvina DENT is listed as PCP, I have never seen or treated her for RLS!  Dont feel comfortable guessing at dosage,  Tell her to Call Alvina Arzate APRN     PCP - General, Nurse Practitioner    Since 5/28/2024    [phone]

## 2024-06-11 ENCOUNTER — TELEPHONE (OUTPATIENT)
Dept: PSYCHIATRY | Facility: CLINIC | Age: 54
End: 2024-06-11
Payer: MEDICARE

## 2024-06-11 NOTE — TELEPHONE ENCOUNTER
----- Message from Milka Pavon sent at 6/10/2024  3:42 PM EDT -----  Do you care to reach out to the patient and see if she is still taking her sertraline? It was not found on her UDS. That is her medication she is supposed to be taking to control her anxiety.  ----- Message -----  From: Jaime, Reflab Results In  Sent: 6/4/2024   7:09 PM EDT  To: SANJEEV Stapleton

## 2024-06-11 NOTE — TELEPHONE ENCOUNTER
Spoke with patient. She has not given that from her pharmacy so she thought she was not suppose to be taking it. I informed her that she was suppose to be taking it. For her to call the pharmacy and have them fill it and get back on it.

## 2024-06-20 DIAGNOSIS — F40.01 PANIC DISORDER WITH AGORAPHOBIA: Chronic | ICD-10-CM

## 2024-06-20 RX ORDER — HYDROXYZINE HYDROCHLORIDE 25 MG/1
25 TABLET, FILM COATED ORAL 3 TIMES DAILY PRN
Qty: 90 TABLET | Refills: 2 | Status: SHIPPED | OUTPATIENT
Start: 2024-06-20

## 2024-06-24 DIAGNOSIS — F41.0 PANIC DISORDER: Chronic | ICD-10-CM

## 2024-06-24 DIAGNOSIS — F41.1 GENERALIZED ANXIETY DISORDER: Chronic | ICD-10-CM

## 2024-06-24 DIAGNOSIS — F31.32 BIPOLAR AFFECTIVE DISORDER, CURRENTLY DEPRESSED, MODERATE: Chronic | ICD-10-CM

## 2024-06-24 DIAGNOSIS — F40.01 PANIC DISORDER WITH AGORAPHOBIA: Chronic | ICD-10-CM

## 2024-06-24 RX ORDER — ALPRAZOLAM 2 MG/1
2 TABLET ORAL 3 TIMES DAILY PRN
Qty: 90 TABLET | OUTPATIENT
Start: 2024-06-24

## 2024-06-24 RX ORDER — ALPRAZOLAM 2 MG/1
2 TABLET ORAL 3 TIMES DAILY PRN
Qty: 90 TABLET | Refills: 1 | Status: SHIPPED | OUTPATIENT
Start: 2024-06-24

## 2024-06-24 NOTE — TELEPHONE ENCOUNTER
Rx Refill Note  Requested Prescriptions     Pending Prescriptions Disp Refills    ALPRAZolam (XANAX) 2 MG tablet [Pharmacy Med Name: ALPRAZOLAM 2MG TABLETS] 90 tablet      Sig: TAKE 1 TABLET BY MOUTH THREE TIMES DAILY AS NEEDED FOR ANXIETY      Last office visit with prescribing clinician: 5/30/2024   Last telemedicine visit with prescribing clinician: Visit date not found   Next office visit with prescribing clinician: 6/24/2024   Office Visit with Milka Pavon APRN (05/30/2024)   ToxAssure Flex 22, Ur w/DL - Urine, Random Void (05/30/2024 10:26)                     Would you like a call back once the refill request has been completed: [] Yes [] No    If the office needs to give you a call back, can they leave a voicemail: [] Yes [] No    Mi Lane MA  06/24/24, 15:05 EDT

## 2024-06-25 RX ORDER — ALPRAZOLAM 2 MG/1
2 TABLET ORAL 3 TIMES DAILY PRN
Qty: 90 TABLET | OUTPATIENT
Start: 2024-06-25

## 2024-06-25 RX ORDER — QUETIAPINE 400 MG/1
800 TABLET, FILM COATED, EXTENDED RELEASE ORAL
Qty: 60 TABLET | OUTPATIENT
Start: 2024-06-25

## 2024-06-25 RX ORDER — SERTRALINE HYDROCHLORIDE 100 MG/1
100 TABLET, FILM COATED ORAL DAILY
Qty: 90 TABLET | Refills: 1 | Status: SHIPPED | OUTPATIENT
Start: 2024-06-25 | End: 2025-06-25

## 2024-06-25 RX ORDER — BUSPIRONE HYDROCHLORIDE 30 MG/1
30 TABLET ORAL 2 TIMES DAILY
Qty: 180 TABLET | Refills: 1 | Status: SHIPPED | OUTPATIENT
Start: 2024-06-25

## 2024-06-25 RX ORDER — HYDROXYZINE HYDROCHLORIDE 25 MG/1
25 TABLET, FILM COATED ORAL 3 TIMES DAILY PRN
Qty: 90 TABLET | Refills: 2 | OUTPATIENT
Start: 2024-06-25

## 2024-06-25 NOTE — TELEPHONE ENCOUNTER
Refill request from pharmacy for the medication listed below.  Pharmacy: [facility]   Last written as:  sumatriptan (IMITREX) 100 MG tablet  Take 1 tablet by mouth as needed for Migraine. Max 2 tabs per day. 8/16/2021 GERMAN GOMES     Patient was last seen 10/26/21 for a CPE.  Pending appointment: 11/07/22    Refilled per protocol 1x until next appt.  Orders placed for pending appt in Nov.    Call when complete?  no     Rx Refill Note  Requested Prescriptions     Pending Prescriptions Disp Refills    busPIRone (BUSPAR) 30 MG tablet [Pharmacy Med Name: BUSPIRONE 30MG TABLETS] 180 tablet 1     Sig: TAKE 1 TABLET BY MOUTH TWICE DAILY    hydrOXYzine (ATARAX) 25 MG tablet [Pharmacy Med Name: HYDROXYZINE HCL 25MG TABS (WHITE)] 90 tablet 2     Sig: TAKE 1 TABLET BY MOUTH THREE TIMES DAILY AS NEEDED FOR ANXIETY    sertraline (ZOLOFT) 50 MG tablet [Pharmacy Med Name: SERTRALINE 50MG TABLETS] 14 tablet 0     Sig: TAKE 1 TABLET BY MOUTH DAILY FOR 14 DAYS    ALPRAZolam (XANAX) 2 MG tablet [Pharmacy Med Name: ALPRAZOLAM 2MG TABLETS] 90 tablet      Sig: TAKE 1 TABLET BY MOUTH THREE TIMES DAILY AS NEEDED FOR ANXIETY    QUEtiapine XR (SEROquel XR) 400 MG 24 hr tablet [Pharmacy Med Name: QUETIAPINE ER 400MG TABLETS] 60 tablet      Sig: TAKE 2 TABLETS BY MOUTH EVERY NIGHT AT BEDTIME      Last office visit with prescribing clinician: 5/30/2024   Last telemedicine visit with prescribing clinician: Visit date not found   Next office visit with prescribing clinician: 8/27/2024   Office Visit with Milka Pavon APRN (05/30/2024)                       Would you like a call back once the refill request has been completed: [] Yes [] No    If the office needs to give you a call back, can they leave a voicemail: [] Yes [] No    Mi Lane MA  06/25/24, 09:29 EDT

## 2024-07-23 NOTE — PROGRESS NOTES
Subjective: Migraine and epilepsy    Patient ID: Milka Espinoza is a 54 y.o. female.  History of Present Illness   Ms. Espinoza is a 54-year-old  female with a BMI of ---- who presented today for follow-up on epilepsy  and migraine. She has been seen in the past by Dr. Hickey at [facility] and was to go for seizure monitoring but would never present.  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  Today the patient states she has a seizure every day.  She also reports she did not  the Vimpat.  She states she will  the Vimpat.  I notified the patient that she is on 3 different medications at high doses to try to control the seizures and she should not be having 1 every day.  I notified her that Dr. Hickey had felt these were related to stress or anxiety.  The patient was in agreement with going to see an epileptologist.  She states her migraines are controlled by sumatriptan.    A. Follow up on Migriane  Medication:  sumatriptan 100 mg  Frequency-every other day  Duration- days  Change in migraine- no        B. Seizure Type: Grand mal/ Pseudo  Onset:   Frequency:mostly every day  Last Seizure: Monday  doesn't know how long it lasted  Semiology change:  Medication:    1.  Keppra 1500 twice daily   2.  Lamictal 150 twice daily  She is not taking Vimpat pharmacy not filling it  Any adverse effects of meds- no  Failed medications?       Last Visit  She has been seen in the past by Dr. Hickey at [facility] and was to go for seizure monitoring but would never present.  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   The patient has a prior history of severe gastroparesis and states that she has trouble swallowing and keeping down her seizure medications.  She states she has had 3 seizures since her last visit and on 1 occasion she fell in the bathtub and broke her foot.  She states she takes her medication and then tries to lie still for them to absorb.  She reports she has  a follow-up with gastroenterology  this week.  In the past she has had a NG tube or GI tube for feedings and medications.          EEG   IMPRESSION:   This is a normal EEG recorded during the awake and drowsy state. The absence of epileptiform abnormalities however does not rule out the presence of a seizure disorder.  Electronically signed by:  Joseph Seipel, MD  August 1, 2022        MRI BRAIN W WO CONTRAST-  Date of Exam: 8/1/2022 3:08 PM  Indication: Seizures, migraine headaches.  Comparison: CT of the head performed on 07/02/2020.  IMPRESSION:  Normal study.  Electronically Signed By-Anatoliy Cadet MD On:8/1/2022 4:23 PM    CT HEAD  IMPRESSION:  1.  No acute intracranial abnormality.  2.  No pathologic intracranial contrast enhancement.  3.  Normal appearance of the globes and orbits.   Electronically Signed By-Benja Beach On:7/2/2020 1:22 PM    Dr Hickey   12/9/2020:   Impression and Plan  1. Spells  High risk for PNES due to prior traumas  Concern for this episode of possible status epilepticus-   -obtain of records 2012  -[facility] admission as soon as possible  - no change to AEDS at this time  Orders Placed This Encounter    EEG Long Term Monitoring- Continuous Video   2- depression/prior trauma- refer to [facility] for therapy and psychiatry  -discussed that must get medication list improved  3- polypharmacy-cont to recommend that she see a pscyhaitrist as she is on multiple sedatives and modafanil with no clear reason. Advised that it is not generally our medical practice to provide sedative hypnotics and other sedatives and then a stimulant medication.   4. Advised goal will be to minimize medications, improve function and determine types of events.   She had one while on Tramadol, she knows not to take that.   She also is not to take wellbutrin   seroquel at high doses can also reduce the seizure threshold.   She understands the role of therapy, she alos understands not to stop any medication until or  unless directed to do so by a physician.   Safety and Counseling  I have counseled the patient on potential side effects of their current antiseizure drug regimen and on epilepsy specific safety issues. If events last longer than 5 minutes, if the patient has multiple events without return to baseline, or if the patient has a serious injury from seizure, I advised them to call 911 immediately.   I have also explained that until her events/spells/seizures are under good control and she has received medical clearance, it is my medical recommendation that she not operate a motor vehicle or place themselves in any situation wherein loss of consciousness could cause harm to themselves or others. This includes, but is not limited to, working at heights, working around flame and heat (ie cooking, campfire, welding), working with or around machinery, swimming without supervision, working with firearms and hunting equipment, and bathing without supervision. The patient voiced an understanding.   Epilepsy quality metrics were addressed in today's visit as documented. Further recommendations/treatments will be based upon forthcoming evaluation/results.  Dr Hickey.           The following portions of the patient's history were reviewed and updated as appropriate: allergies, current medications, past family history, past medical history, past social history, past surgical history and problem list.    Family History   Problem Relation Age of Onset    No Known Problems Mother     No Known Problems Father     No Known Problems Sister     No Known Problems Brother     Alcohol abuse Daughter     No Known Problems Son     No Known Problems Maternal Grandmother     No Known Problems Paternal Grandmother     No Known Problems Maternal Aunt     No Known Problems Paternal Aunt        Past Medical History:   Diagnosis Date    Depression     Disease of thyroid gland     Hyperlipidemia     Migraines     On home O2 12/2021    3 lmp    Seizures         Social History     Socioeconomic History    Marital status:    Tobacco Use    Smoking status: Former     Current packs/day: 0.00     Types: Cigarettes     Quit date: 11/1/2020     Years since quitting: 3.7    Smokeless tobacco: Never   Vaping Use    Vaping status: Never Used   Substance and Sexual Activity    Alcohol use: No    Drug use: No    Sexual activity: Never         Current Outpatient Medications:     ALPRAZolam (XANAX) 2 MG tablet, TAKE 1 TABLET BY MOUTH THREE TIMES DAILY AS NEEDED FOR ANXIETY, Disp: 90 tablet, Rfl: 1    amitriptyline (ELAVIL) 25 MG tablet, TAKE 1 TABLET BY MOUTH EVERY NIGHT AT BEDTIME, Disp: 90 tablet, Rfl: 1    atorvastatin (LIPITOR) 20 MG tablet, Take 1 tablet by mouth Every Night., Disp: 90 tablet, Rfl: 1    BD PosiFlush 0.9 % flush, , Disp: , Rfl:     busPIRone (BUSPAR) 30 MG tablet, TAKE 1 TABLET BY MOUTH TWICE DAILY, Disp: 180 tablet, Rfl: 1    Cariprazine HCl (Vraylar) 1.5 MG capsule capsule, Take 1 capsule by mouth Daily., Disp: 90 capsule, Rfl: 1    heparin 100 UNIT/ML solution injection, , Disp: , Rfl:     hydrOXYzine (ATARAX) 25 MG tablet, TAKE 1 TABLET BY MOUTH THREE TIMES DAILY AS NEEDED FOR ANXIETY, Disp: 90 tablet, Rfl: 2    lacosamide (VIMPAT) 200 MG tablet, Take 1 tablet by mouth Every 12 (Twelve) Hours., Disp: 60 tablet, Rfl: 5    lamoTRIgine (LaMICtal) 150 MG tablet, Take 1 tablet by mouth 2 (Two) Times a Day., Disp: 60 tablet, Rfl: 5    levETIRAcetam (KEPPRA) 500 MG tablet, Take 3 tablets by mouth 2 (Two) Times a Day., Disp: 540 tablet, Rfl: 3    levothyroxine (SYNTHROID, LEVOTHROID) 25 MCG tablet, TAKE 1 TABLET BY MOUTH DAILY FOR THYROID, Disp: 90 tablet, Rfl: 0    Motegrity 2 MG tablet, Take 1 tablet by mouth Daily., Disp: , Rfl:     nystatin 445832 UNIT/GM powder, APPLY TOPICALLY TO THE APPROPRIATE AREA THREE TIMES DAILY AS NEEDED FOR RASH AS DIRECTED., Disp: 30 g, Rfl: 1    ondansetron (ZOFRAN) 2 mg/mL injection, Infuse 2 mL into a venous catheter  Every 6 (Six) Hours As Needed., Disp: , Rfl:     pantoprazole (PROTONIX) 40 MG EC tablet, TAKE 1 TABLET BY MOUTH DAILY, Disp: 90 tablet, Rfl: 1    promethazine (PHENERGAN) 25 MG/ML injection, Infuse 0.5 mL into a venous catheter Every 6 (Six) Hours As Needed., Disp: , Rfl:     QUEtiapine XR (SEROquel XR) 400 MG 24 hr tablet, Take 2 tablets by mouth every night at bedtime., Disp: 180 tablet, Rfl: 0    sertraline (ZOLOFT) 100 MG tablet, Take 1 tablet by mouth Daily., Disp: 90 tablet, Rfl: 1    sodium chloride 0.9 % solution, , Disp: , Rfl:     sucralfate (CARAFATE) 1 g tablet, Take 1 tablet by mouth 3 (Three) Times a Day., Disp: , Rfl:     SUMAtriptan (IMITREX) 100 MG tablet, Take 1 tablet by mouth 1 (One) Time As Needed for Migraine for up to 30 days. Take 1 tab at onset of Migraine. May repeat dose 1 time after 2 hrs if Migraine not resolved.  MAX 3 TABS PER WEEK, Disp: 9 tablet, Rfl: 5    vitamin D (ERGOCALCIFEROL) 1.25 MG (08626 UT) capsule capsule, TAKE 1 CAPSULE BY MOUTH 1 TIME EVERY WEEK, Disp: 8 capsule, Rfl: 0    ferrous gluconate (FERGON) 324 MG tablet, Take 1 tablet by mouth Daily With Breakfast. (Patient not taking: Reported on 7/24/2024), Disp: 90 tablet, Rfl: 1    heparin 100 UNIT/ML solution injection, , Disp: , Rfl:     promethazine (PHENERGAN) 6.25 MG/5ML syrup, , Disp: , Rfl:     sertraline (Zoloft) 100 MG tablet, Take 1 tablet by mouth Daily. (Patient not taking: Reported on 7/24/2024), Disp: 90 tablet, Rfl: 1    Review of Systems   Gastrointestinal:  Positive for nausea and vomiting.   Neurological:  Positive for dizziness, seizures, weakness, light-headedness and headaches.   Hematological:  Bruises/bleeds easily.   Psychiatric/Behavioral:  Positive for confusion, hallucinations and sleep disturbance. The patient is nervous/anxious.           I have reviewed ROS completed by medical assistant.     Objective:    Neurologic Exam     Mental Status   Oriented to person, place, and time.   Speech:  speech is normal     Cranial Nerves     CN III, IV, VI   Pupils are equal, round, and reactive to light.      Physical Exam  Vitals reviewed.   Constitutional:       Appearance: Normal appearance. She is well-developed, well-groomed and overweight.   HENT:      Head: Normocephalic and atraumatic.      Right Ear: Hearing normal.      Left Ear: Hearing normal.      Nose: Nose normal.      Mouth/Throat:      Lips: Pink.      Mouth: Mucous membranes are moist.   Eyes:      General: Lids are normal.      Pupils: Pupils are equal, round, and reactive to light.   Cardiovascular:      Rate and Rhythm: Normal rate.   Pulmonary:      Effort: Pulmonary effort is normal.   Musculoskeletal:         General: Normal range of motion.      Cervical back: Normal range of motion.   Skin:     General: Skin is warm and dry.   Neurological:      Mental Status: She is alert and oriented to person, place, and time.      Cranial Nerves: No dysarthria or facial asymmetry.      Motor: No weakness, tremor or seizure activity.      Gait: Gait normal.   Psychiatric:         Attention and Perception: Attention and perception normal.         Mood and Affect: Mood is anxious.         Speech: Speech normal.         Behavior: Behavior is cooperative.         Thought Content: Thought content normal.       Assessment/Plan:  The patient was to have additional seizure monitoring and appears to have PNES.  I will refer her to an epileptologist to follow her long-term.  The patient states that her seizures occur every day.  She is currently on lacosamide Lamictal and Keppra.  She states her migraines are fairly well-controlled with sumatriptan hand with 1 tablet.  The patient can return to our neurology clinic on a as needed basis.      Diagnoses and all orders for this visit:    1. Other intractable epilepsy without status epilepticus (Primary)  -     Ambulatory Referral to Neurology  -     lacosamide (VIMPAT) 200 MG tablet; Take 1 tablet by mouth Every  12 (Twelve) Hours.  Dispense: 60 tablet; Refill: 5  -     lamoTRIgine (LaMICtal) 150 MG tablet; Take 1 tablet by mouth 2 (Two) Times a Day.  Dispense: 60 tablet; Refill: 5  -     levETIRAcetam (KEPPRA) 500 MG tablet; Take 3 tablets by mouth 2 (Two) Times a Day.  Dispense: 540 tablet; Refill: 3    2. Migraine without aura and without status migrainosus, not intractable  -     SUMAtriptan (IMITREX) 100 MG tablet; Take 1 tablet by mouth 1 (One) Time As Needed for Migraine for up to 30 days. Take 1 tab at onset of Migraine. May repeat dose 1 time after 2 hrs if Migraine not resolved.  MAX 3 TABS PER WEEK  Dispense: 9 tablet; Refill: 5          Return if symptoms worsen or fail to improve.     I spent 40 minutes caring for Milka on this date of service. This time includes time spent by me in the following activities: preparing for the visit, reviewing tests, obtaining and/or reviewing a separately obtained history, performing a medically appropriate examination and/or evaluation, counseling and educating the patient/family/caregiver, ordering medications, tests, or procedures, referring and communicating with other health care professionals, and independently interpreting results and communicating that information with the patient/family/caregiver.      This document has been electronically signed by LAUREN Mojica on July 24, 2024 13:22 EDT

## 2024-07-24 ENCOUNTER — OFFICE VISIT (OUTPATIENT)
Dept: NEUROLOGY | Facility: CLINIC | Age: 54
End: 2024-07-24
Payer: MEDICARE

## 2024-07-24 VITALS
SYSTOLIC BLOOD PRESSURE: 111 MMHG | BODY MASS INDEX: 32.61 KG/M2 | DIASTOLIC BLOOD PRESSURE: 83 MMHG | HEART RATE: 105 BPM | HEIGHT: 64 IN | WEIGHT: 191 LBS

## 2024-07-24 DIAGNOSIS — G43.009 MIGRAINE WITHOUT AURA AND WITHOUT STATUS MIGRAINOSUS, NOT INTRACTABLE: ICD-10-CM

## 2024-07-24 DIAGNOSIS — G40.804 OTHER INTRACTABLE EPILEPSY WITHOUT STATUS EPILEPTICUS: Primary | ICD-10-CM

## 2024-07-24 RX ORDER — LACOSAMIDE 200 MG/1
1 TABLET ORAL EVERY 12 HOURS SCHEDULED
Qty: 60 TABLET | Refills: 5 | Status: SHIPPED | OUTPATIENT
Start: 2024-07-24

## 2024-07-24 RX ORDER — LAMOTRIGINE 100 MG/1
TABLET ORAL
COMMUNITY
Start: 2024-06-25 | End: 2024-07-24 | Stop reason: DRUGHIGH

## 2024-07-24 RX ORDER — LACOSAMIDE 200 MG/1
1 TABLET ORAL EVERY 12 HOURS SCHEDULED
COMMUNITY
Start: 2024-06-25 | End: 2024-07-24 | Stop reason: SDUPTHER

## 2024-07-24 RX ORDER — LAMOTRIGINE 150 MG/1
150 TABLET ORAL 2 TIMES DAILY
Qty: 60 TABLET | Refills: 5 | Status: SHIPPED | OUTPATIENT
Start: 2024-07-24

## 2024-07-24 RX ORDER — LEVETIRACETAM 500 MG/1
1500 TABLET ORAL 2 TIMES DAILY
Qty: 540 TABLET | Refills: 3 | Status: SHIPPED | OUTPATIENT
Start: 2024-07-24

## 2024-07-24 RX ORDER — SUMATRIPTAN 100 MG/1
100 TABLET, FILM COATED ORAL ONCE AS NEEDED
Qty: 9 TABLET | Refills: 5 | Status: SHIPPED | OUTPATIENT
Start: 2024-07-24 | End: 2024-08-23

## 2024-07-30 DIAGNOSIS — F31.32 BIPOLAR AFFECTIVE DISORDER, CURRENTLY DEPRESSED, MODERATE: Chronic | ICD-10-CM

## 2024-07-30 RX ORDER — QUETIAPINE 400 MG/1
800 TABLET, FILM COATED, EXTENDED RELEASE ORAL
Qty: 180 TABLET | Refills: 1 | Status: SHIPPED | OUTPATIENT
Start: 2024-07-30

## 2024-08-01 ENCOUNTER — TELEPHONE (OUTPATIENT)
Dept: NEUROLOGY | Facility: CLINIC | Age: 54
End: 2024-08-01
Payer: MEDICARE

## 2024-08-01 NOTE — TELEPHONE ENCOUNTER
Milka Espinoza  Telephone Information:   Mobile [phone]     PT STATES SHE WAS DISCHARGED FROM HER PRIMARY CARE PROVIDER HER AWHILE BACK, SHE IS WONDERING IF PROVIDER CAN SEND THE FOLLOWING MEDICATIONS:    atorvastatin (LIPITOR) 20 MG tablet     levothyroxine (SYNTHROID, LEVOTHROID) 25 MCG tablet       pantoprazole (PROTONIX) 40 MG EC tablet

## 2024-08-12 RX ORDER — PANTOPRAZOLE SODIUM 40 MG/1
40 TABLET, DELAYED RELEASE ORAL DAILY
Qty: 90 TABLET | Refills: 1 | OUTPATIENT
Start: 2024-08-12

## 2024-08-20 DIAGNOSIS — E78.2 MIXED HYPERLIPIDEMIA: ICD-10-CM

## 2024-08-20 RX ORDER — LEVOTHYROXINE SODIUM 0.03 MG/1
25 TABLET ORAL DAILY
Qty: 90 TABLET | Refills: 0 | OUTPATIENT
Start: 2024-08-20

## 2024-08-20 RX ORDER — ATORVASTATIN CALCIUM 20 MG/1
20 TABLET, FILM COATED ORAL NIGHTLY
Qty: 90 TABLET | Refills: 1 | OUTPATIENT
Start: 2024-08-20

## 2024-08-20 RX ORDER — PANTOPRAZOLE SODIUM 40 MG/1
40 TABLET, DELAYED RELEASE ORAL DAILY
Qty: 90 TABLET | Refills: 1 | OUTPATIENT
Start: 2024-08-20

## 2024-08-20 NOTE — TELEPHONE ENCOUNTER
Caller: Olga Milka SANDRA    Relationship: Self    Best call back number: [phone]    Requested Prescriptions:   Requested Prescriptions     Pending Prescriptions Disp Refills    levothyroxine (SYNTHROID, LEVOTHROID) 25 MCG tablet 90 tablet 0     Sig: Take 1 tablet by mouth Daily.    atorvastatin (LIPITOR) 20 MG tablet 90 tablet 1     Sig: Take 1 tablet by mouth Every Night.    pantoprazole (PROTONIX) 40 MG EC tablet 90 tablet 1     Sig: Take 1 tablet by mouth Daily.        Pharmacy where request should be sent:  [id number] - [place] [id number] [facility] [address] [facility] [address] [facility] [address] - [phone]  - [phone]     Last office visit with prescribing clinician: Visit date not found   Last telemedicine visit with prescribing clinician: Visit date not found   Next office visit with prescribing clinician: 9/9/2024     Additional details provided by patient: PATIENT STATES THAT ON 09/19 SHE WILL BE ADMITTED TO [facility] TO FIGURE OUT WHY SHE HAS BEEN HAVING SO MANY SEIZURES. PLEASE ADVISE PATIENT IF THESE MEDICATIONS CAN BE CALLED IN.     Does the patient have less than a 3 day supply:  [x] Yes  [] No    Would you like a call back once the refill request has been completed: [] Yes [x] No    If the office needs to give you a call back, can they leave a voicemail: [] Yes [x] No    Yanira Ybarra   08/20/24 10:12 EDT

## 2024-08-21 DIAGNOSIS — F41.0 PANIC DISORDER: Chronic | ICD-10-CM

## 2024-08-21 DIAGNOSIS — F41.1 GENERALIZED ANXIETY DISORDER: Chronic | ICD-10-CM

## 2024-08-21 RX ORDER — ALPRAZOLAM 2 MG/1
2 TABLET ORAL 3 TIMES DAILY PRN
Qty: 90 TABLET | Refills: 0 | Status: SHIPPED | OUTPATIENT
Start: 2024-08-21

## 2024-09-09 ENCOUNTER — OFFICE VISIT (OUTPATIENT)
Dept: FAMILY MEDICINE CLINIC | Facility: CLINIC | Age: 54
End: 2024-09-09
Payer: MEDICARE

## 2024-09-09 ENCOUNTER — PATIENT ROUNDING (BHMG ONLY) (OUTPATIENT)
Dept: FAMILY MEDICINE CLINIC | Facility: CLINIC | Age: 54
End: 2024-09-09
Payer: MEDICARE

## 2024-09-09 VITALS
WEIGHT: 202.2 LBS | TEMPERATURE: 97 F | OXYGEN SATURATION: 98 % | SYSTOLIC BLOOD PRESSURE: 112 MMHG | BODY MASS INDEX: 34.52 KG/M2 | HEART RATE: 88 BPM | HEIGHT: 64 IN | RESPIRATION RATE: 18 BRPM | DIASTOLIC BLOOD PRESSURE: 60 MMHG

## 2024-09-09 DIAGNOSIS — E55.9 VITAMIN D DEFICIENCY: ICD-10-CM

## 2024-09-09 DIAGNOSIS — E78.00 PURE HYPERCHOLESTEROLEMIA: ICD-10-CM

## 2024-09-09 DIAGNOSIS — E78.2 MIXED HYPERLIPIDEMIA: ICD-10-CM

## 2024-09-09 DIAGNOSIS — Z76.89 ENCOUNTER TO ESTABLISH CARE: ICD-10-CM

## 2024-09-09 DIAGNOSIS — R21 RASH: ICD-10-CM

## 2024-09-09 DIAGNOSIS — K31.84 GASTROPARESIS: Primary | ICD-10-CM

## 2024-09-09 DIAGNOSIS — R56.9 SEIZURE: ICD-10-CM

## 2024-09-09 DIAGNOSIS — F31.76 BIPOLAR DISORDER, IN FULL REMISSION, MOST RECENT EPISODE DEPRESSED: Chronic | ICD-10-CM

## 2024-09-09 DIAGNOSIS — E66.9 CLASS 1 OBESITY WITH SERIOUS COMORBIDITY AND BODY MASS INDEX (BMI) OF 34.0 TO 34.9 IN ADULT, UNSPECIFIED OBESITY TYPE: Chronic | ICD-10-CM

## 2024-09-09 DIAGNOSIS — E03.9 HYPOTHYROIDISM, UNSPECIFIED TYPE: ICD-10-CM

## 2024-09-09 DIAGNOSIS — Z98.890 H/O COLONOSCOPY: ICD-10-CM

## 2024-09-09 DIAGNOSIS — Z12.31 SCREENING MAMMOGRAM FOR BREAST CANCER: ICD-10-CM

## 2024-09-09 DIAGNOSIS — R56.9 SEIZURES: ICD-10-CM

## 2024-09-09 PROBLEM — Z86.0100 PERSONAL HISTORY OF COLONIC POLYPS: Status: ACTIVE | Noted: 2023-03-16

## 2024-09-09 PROBLEM — R11.2 NAUSEA AND VOMITING: Status: RESOLVED | Noted: 2023-03-16 | Resolved: 2024-09-09

## 2024-09-09 PROBLEM — R11.10 INTRACTABLE VOMITING: Status: RESOLVED | Noted: 2020-02-13 | Resolved: 2024-09-09

## 2024-09-09 PROBLEM — T18.9XXA FOREIGN BODY IN DIGESTIVE SYSTEM: Status: ACTIVE | Noted: 2020-09-24

## 2024-09-09 PROBLEM — R93.3 ABNORMAL FINDINGS ON DIAGNOSTIC IMAGING OF OTHER PARTS OF DIGESTIVE TRACT: Status: ACTIVE | Noted: 2020-08-13

## 2024-09-09 PROBLEM — R13.10 DYSPHAGIA: Status: RESOLVED | Noted: 2023-03-16 | Resolved: 2024-09-09

## 2024-09-09 PROBLEM — Z86.010 PERSONAL HISTORY OF COLONIC POLYPS: Status: ACTIVE | Noted: 2023-03-16

## 2024-09-09 PROBLEM — R11.10 UNCONTROLLABLE VOMITING: Status: RESOLVED | Noted: 2020-02-13 | Resolved: 2024-09-09

## 2024-09-09 PROBLEM — S93.409A ANKLE SPRAIN: Status: RESOLVED | Noted: 2022-09-30 | Resolved: 2024-09-09

## 2024-09-09 PROCEDURE — 99214 OFFICE O/P EST MOD 30 MIN: CPT

## 2024-09-09 PROCEDURE — 1125F AMNT PAIN NOTED PAIN PRSNT: CPT

## 2024-09-09 RX ORDER — NYSTATIN 100000 [USP'U]/G
POWDER TOPICAL 2 TIMES DAILY
Qty: 30 G | Refills: 1 | Status: SHIPPED | OUTPATIENT
Start: 2024-09-09

## 2024-09-09 RX ORDER — LEVOTHYROXINE SODIUM 25 UG/1
25 TABLET ORAL DAILY
Qty: 90 TABLET | Refills: 0 | Status: SHIPPED | OUTPATIENT
Start: 2024-09-09

## 2024-09-09 RX ORDER — ATORVASTATIN CALCIUM 20 MG/1
20 TABLET, FILM COATED ORAL NIGHTLY
Qty: 90 TABLET | Refills: 3 | Status: SHIPPED | OUTPATIENT
Start: 2024-09-09

## 2024-09-09 NOTE — ASSESSMENT & PLAN NOTE
Continue plan of care with neurology.   She has a history of seizures for over 15 years and recently experienced a seizure about 2 weeks ago. She is currently taking Vimpat (lacosamide), lamotrigine, and Keppra for seizure management. She is advised to continue these medications and  the Vimpat prescription from the pharmacy.

## 2024-09-09 NOTE — PROGRESS NOTES
Office Note     Name: Milka Espinoza    : 1970     MRN: [id number]     Chief Complaint  Establish Care    Subjective     History of Present Illness:  Milka Espinoza is a 54 y.o. female who presents today for to [facility].  She was under the care of Safia Reyna in [place].      History of Present Illness    Preventive Care:  Dentist: Maricruz Small  Ophthalmologist: Neal  Specialty care providers:   Psychiatry - Milka Pavon  Neurology - Ana Rosa OLIVAREZ of [facility]  Gastroenterology - [facility] - [facility].   Colonoscopy/EGD at Dr. [facility].      She was previously under the care of SANJEEV Quijano but was discharged from the practice on 23 due to significant missed appointments.     Obstetrics Gynecologic History: Dr. Gonzalez  Obstetric and/or gynecological care completed by   Total hysterectomy. -   Sexually active: No   History of STD's: No  Last Pap smear: NA  History of abnormal pap smears: No    Monthly Breast Self Exam:Performs monthly self breast exam    Lifestyle Health Habits:  Diet: Generally healthy.  Exercise: She does not exercise regularly. Encouraged regular exercise including weight/resistance.   Hydration/Water: She does not drink enough water. Encouraged increasing water intake.     Self Skin Exam: occasionally  She wears her seatlbelt regularly. Yes  She sleeps approximately 6 hours per night.  She does No snore.  She does No have sleep apnea.     Orders placed today for appropriate screenings.     Controlled substances:  Alprazolam 2mg  24 - qty 90/30d Milka Librado  Lancosamide 24 qty 60/30 Ana Rosa Figueroa     History of Present Illness  The patient is a 54-year-old female who presents to [facility].    She has been dealing with gastroparesis for approximately 3 years. Despite receiving nutritional infusions for about a year, she did not find them helpful as  she would vomit them back up. She has tried three different nausea medications without success. She attempts to eat, but often vomits it back up. She continues to take Motegrity for constipation, which results in loose stools. Without Motegrity, she experiences constipation and notices blood in her stool. She was previously under the care of Dr. Le for her gastroparesis but was referred to [facility].     She has a central venous catheter in place.  A nurse visits her home weekly to manage her flushing.  She was discharged from [facility] practice due to missed appointments and has not been seen since 04/2023.    She has been experiencing seizures for over 15 years. She has been under the care of Ana Rosa Figueroa and [facility]. Her last seizure occurred about 2 weeks ago. She has been in a coma due to seizures and feels her health is rapidly declining.    She continues to take buspirone, Vraylar, hydroxyzine, Vimpat, Lamotrigine, Keppra, levothyroxine 25 mcg, Motegrity, Zofran, pantoprazole, Seroquel, Zoloft 100 mg, Imitrex, and sucralfate three times a day. She has stopped taking iron tablets and vitamin D. She requires a refill of nystatin for breast candida.       She has noticed increased facial hair growth since her hysterectomy in 2002. She has not seen Dr. Gonzalez in over a year.     She has not had a recent mammogram, but has never had an abnormal result.     Her last colonoscopy was over a year ago.    She reports no chest pain, palpitations, leg or foot swelling, coughing, wheezing, shortness of breath, diarrhea, constipation, abdominal pain, skin spots, moles, rashes, vaginal bleeding, discharge, pain, pelvic pain or pressure, urination problems, breast lumps, bumps, discharge, masses, muscle aches or pains, congestion, ear, nose, throat problems, vision problems, chills, fatigue, fever, abnormal Pap smears, STDs, snoring, or sleep apnea. She does experience vomiting and  "nausea.    She struggles with stress and anxiety. She enjoys crocheting to distract herself, but it tires her out. She is trying to resume exercise. She weighed 120 pounds before her illness. She tries to stay hydrated and maintains a healthy diet. She sleeps about 6 hours a night. She does not endorse any thoughts of self-harm or harm to others.    She has an upcoming psychiatry appointment with Milka Pavon on 10/14/2024.    ALLERGIES  She is allergic to SULFA DRUGS.    Allergies   Allergen Reactions    Ketorolac Other (See Comments)     pt states \"this medicine sends me into seizures\"    Sulfa Antibiotics Hives    Tramadol Hcl Mental Status Change    Adhesive Tape Rash     Paper tape         Current Outpatient Medications:     ALPRAZolam (XANAX) 2 MG tablet, TAKE 1 TABLET BY MOUTH THREE TIMES DAILY AS NEEDED FOR ANXIETY, Disp: 90 tablet, Rfl: 0    amitriptyline (ELAVIL) 25 MG tablet, TAKE 1 TABLET BY MOUTH EVERY NIGHT AT BEDTIME, Disp: 90 tablet, Rfl: 1    atorvastatin (LIPITOR) 20 MG tablet, Take 1 tablet by mouth Every Night., Disp: 90 tablet, Rfl: 3    BD PosiFlush 0.9 % flush, , Disp: , Rfl:     busPIRone (BUSPAR) 30 MG tablet, TAKE 1 TABLET BY MOUTH TWICE DAILY, Disp: 180 tablet, Rfl: 1    Cariprazine HCl (Vraylar) 1.5 MG capsule capsule, Take 1 capsule by mouth Daily., Disp: 90 capsule, Rfl: 1    heparin 100 UNIT/ML solution injection, , Disp: , Rfl:     heparin 100 UNIT/ML solution injection, , Disp: , Rfl:     hydrOXYzine (ATARAX) 25 MG tablet, TAKE 1 TABLET BY MOUTH THREE TIMES DAILY AS NEEDED FOR ANXIETY, Disp: 90 tablet, Rfl: 2    lacosamide (VIMPAT) 200 MG tablet, Take 1 tablet by mouth Every 12 (Twelve) Hours., Disp: 60 tablet, Rfl: 5    lamoTRIgine (LaMICtal) 150 MG tablet, Take 1 tablet by mouth 2 (Two) Times a Day., Disp: 60 tablet, Rfl: 5    levETIRAcetam (KEPPRA) 500 MG tablet, Take 3 tablets by mouth 2 (Two) Times a Day., Disp: 540 tablet, Rfl: 3    levothyroxine (SYNTHROID, LEVOTHROID) 25 " MCG tablet, Take 1 tablet by mouth Daily., Disp: 90 tablet, Rfl: 0    Motegrity 2 MG tablet, Take 1 tablet by mouth Daily., Disp: , Rfl:     nystatin 060300 UNIT/GM powder, Apply  topically to the appropriate area as directed 2 (Two) Times a Day., Disp: 30 g, Rfl: 1    ondansetron (ZOFRAN) 2 mg/mL injection, Infuse 2 mL into a venous catheter Every 6 (Six) Hours As Needed., Disp: , Rfl:     pantoprazole (PROTONIX) 40 MG EC tablet, TAKE 1 TABLET BY MOUTH DAILY, Disp: 90 tablet, Rfl: 1    promethazine (PHENERGAN) 25 MG/ML injection, Infuse 0.5 mL into a venous catheter Every 6 (Six) Hours As Needed., Disp: , Rfl:     QUEtiapine XR (SEROquel XR) 400 MG 24 hr tablet, TAKE 2 TABLETS BY MOUTH EVERY NIGHT AT BEDTIME, Disp: 180 tablet, Rfl: 1    sertraline (ZOLOFT) 100 MG tablet, Take 1 tablet by mouth Daily., Disp: 90 tablet, Rfl: 1    sodium chloride 0.9 % solution, , Disp: , Rfl:     sucralfate (CARAFATE) 1 g tablet, Take 1 tablet by mouth 3 (Three) Times a Day., Disp: , Rfl:     vitamin D (ERGOCALCIFEROL) 1.25 MG (79562 UT) capsule capsule, TAKE 1 CAPSULE BY MOUTH 1 TIME EVERY WEEK, Disp: 8 capsule, Rfl: 0    ferrous gluconate (FERGON) 324 MG tablet, Take 1 tablet by mouth Daily With Breakfast. (Patient not taking: Reported on 7/24/2024), Disp: 90 tablet, Rfl: 1    SUMAtriptan (IMITREX) 100 MG tablet, Take 1 tablet by mouth 1 (One) Time As Needed for Migraine for up to 30 days. Take 1 tab at onset of Migraine. May repeat dose 1 time after 2 hrs if Migraine not resolved.  MAX 3 TABS PER WEEK, Disp: 9 tablet, Rfl: 5    Review of Systems   HENT: Negative.     Eyes: Negative.    Respiratory:  Negative for cough, shortness of breath and wheezing.    Cardiovascular:  Negative for chest pain, palpitations and leg swelling.   Gastrointestinal:  Positive for anal bleeding, constipation, nausea, vomiting and GERD. Negative for abdominal distention, abdominal pain and diarrhea.   Endocrine: Negative.    Genitourinary: Negative.     Musculoskeletal: Negative.    Skin: Negative.    Allergic/Immunologic: Positive for environmental allergies. Negative for food allergies.   Neurological:  Positive for seizures (last one 2 weeks ago.).   Hematological:  Does not bruise/bleed easily.   Psychiatric/Behavioral:  Positive for depressed mood and stress. Negative for self-injury, sleep disturbance and suicidal ideas. The patient is nervous/anxious.    All other systems reviewed and are negative.      Social History     Socioeconomic History    Marital status:    Tobacco Use    Smoking status: Former     Current packs/day: 0.00     Average packs/day: 0.3 packs/day for 3.0 years (0.8 ttl pk-yrs)     Types: Cigarettes     Start date:      Quit date:      Years since quittin.6    Smokeless tobacco: Never   Vaping Use    Vaping status: Never Used   Substance and Sexual Activity    Alcohol use: No    Drug use: No    Sexual activity: Never       Family History   Problem Relation Age of Onset    No Known Problems Mother     No Known Problems Father     No Known Problems Sister     No Known Problems Brother     Alcohol abuse Daughter     No Known Problems Son     No Known Problems Maternal Grandmother     No Known Problems Paternal Grandmother     No Known Problems Maternal Aunt     No Known Problems Paternal Aunt            2024     1:18 PM   PHQ-2/PHQ-9 Depression Screening   Little Interest or Pleasure in Doing Things 3-->nearly every day   Feeling Down, Depressed or Hopeless 1-->several days   Trouble Falling or Staying Asleep, or Sleeping Too Much 3-->nearly every day   Feeling Tired or Having Little Energy 0-->not at all   Poor Appetite or Overeating 1-->several days   Feeling Bad about Yourself - or that You are a Failure or Have Let Yourself or Your Family Down 1-->several days   Trouble Concentrating on Things, Such as Reading the Newspaper or Watching Television 3-->nearly every day   Moving or Speaking So Slowly that Other  "People Could Have Noticed? Or the Opposite - Being So Fidgety 3-->nearly every day   Thoughts that You Would be Better Off Dead or of Hurting Yourself in Some Way 0-->not at all   PHQ-9: Brief Depression Severity Measure Score 15   If You Checked Off Any Problems, How Difficult Have These Problems Made It For You to Do Your Work, Take Care of Things at Home, or Get Along with Other People? very difficult       Fall Risk Screen:  [facility] Fall Risk Assessment has not been completed.      Objective     /60 (BP Location: Left arm, Patient Position: Sitting, Cuff Size: Large Adult)   Pulse 88   Temp 97 °F (36.1 °C) (Temporal)   Resp 18   Ht 163.2 cm (64.25\")   Wt 91.7 kg (202 lb 3.2 oz)   SpO2 98%   BMI 34.44 kg/m²     BP Readings from Last 2 Encounters:   09/09/24 112/60   07/24/24 111/83       Wt Readings from Last 2 Encounters:   09/09/24 91.7 kg (202 lb 3.2 oz)   07/24/24 86.6 kg (191 lb)       BMI is >= 30 and <35. (Class 1 Obesity). The following options were offered after discussion;: weight loss educational material (shared in after visit summary)         Physical Exam  Vitals and nursing note reviewed.   Constitutional:       General: She is not in acute distress.     Appearance: Normal appearance. She is well-groomed. She is not ill-appearing.   HENT:      Head: Normocephalic and atraumatic.   Eyes:      General: Lids are normal. Vision grossly intact.   Neck:      Vascular: No carotid bruit.   Cardiovascular:      Rate and Rhythm: Normal rate and regular rhythm.      Pulses: Normal pulses.      Heart sounds: Normal heart sounds.   Pulmonary:      Effort: Pulmonary effort is normal.      Breath sounds: Normal breath sounds and air entry.   Chest:      Comments: Venous access device in place right subclavian with tegaderm style dressing and two lumen iv access device in place.  No erythem or redness noted.   Musculoskeletal:      Right lower leg: No edema.      Left lower leg: No edema.   Skin:     " General: Skin is warm and dry.   Neurological:      Mental Status: She is alert and oriented to person, place, and time. Mental status is at baseline.   Psychiatric:         Mood and Affect: Mood normal.         Behavior: Behavior normal. Behavior is cooperative.       Physical Exam   Chest   Chest comments: Venous access device in place right subclavian with tegaderm style dressing and two lumen iv access device in place.  No erythem or redness noted.         Physical Exam      Result Review :{ Labs  Result Review  Imaging  Med Tab  Media       Results      Assessment and Plan     Plan    Diagnoses and all orders for this visit:    1. Gastroparesis (Primary)  -     CBC & Differential; Future  -     Comprehensive Metabolic Panel; Future  -     Vitamin D 25 hydroxy; Future  -     Iron and TIBC; Future  -     Ferritin; Future  -     Magnesium; Future  -     Vitamin B12; Future  -     Folate; Future    2. Encounter to establish care    3. Seizures  Assessment & Plan:  Continue plan of care with neurology.   She has a history of seizures for over 15 years and recently experienced a seizure about 2 weeks ago. She is currently taking Vimpat (lacosamide), lamotrigine, and Keppra for seizure management. She is advised to continue these medications and  the Vimpat prescription from the pharmacy.         4. Mixed hyperlipidemia  -     Lipid Panel With LDL / HDL Ratio; Future  -     atorvastatin (LIPITOR) 20 MG tablet; Take 1 tablet by mouth Every Night.  Dispense: 90 tablet; Refill: 3    5. Pure hypercholesterolemia  -     POC Urinalysis Dipstick, Multipro; Future  -     TSH Rfx On Abnormal To Free T4; Future    6. Bipolar disorder, in full remission, most recent episode depressed    7. Rash  Comments:  wants nystatin referred for prn  Orders:  -     nystatin 494635 UNIT/GM powder; Apply  topically to the appropriate area as directed 2 (Two) Times a Day.  Dispense: 30 g; Refill: 1    8. Screening mammogram for  breast cancer  -     Mammo Screening Digital Tomosynthesis Bilateral With CAD    9. Vitamin D deficiency    10. Hypothyroidism, unspecified type    11. Seizure    12. Class 1 obesity with serious comorbidity and body mass index (BMI) of 34.0 to 34.9 in adult, unspecified obesity type    13. H/O colonoscopy    Other orders  -     levothyroxine (SYNTHROID, LEVOTHROID) 25 MCG tablet; Take 1 tablet by mouth Daily.  Dispense: 90 tablet; Refill: 0       Assessment & Plan  1. Establishment of care.  A comprehensive set of labs including cholesterol panel, B12, folate, CBC, CMP, urinalysis, thyroid, vitamin D, iron, TIBC, ferritin, and magnesium will be ordered. Prescriptions for Lipitor, Synthroid, and nystatin will be renewed.     2. Seizure disorder.  She has a history of seizures for over 15 years and recently experienced a seizure about 2 weeks ago. She is currently taking Vimpat (lacosamide), lamotrigine, and Keppra for seizure management. She is advised to continue these medications and  the Vimpat prescription from the pharmacy.     3. Mood Disorder   She is currently taking Xanax and buspirone for anxiety management. She has an upcoming appointment with Milka Pavon on October 14 to review her anxiety medications.     4. Gastroparesis.  She reports ongoing issues with gastroparesis, including vomiting and difficulty maintaining nutrition. She is currently receiving saline infusions and taking multiple nausea medications, which she reports are not effective. She is advised to continue her current regimen and follow up with her GI specialist for further management.    5. Hypertension.  She is currently taking Benicar for hypertension management. She is advised to continue her current medication regimen.    6. Hypothyroidism.  She is currently taking levothyroxine 25 mcg. She is advised to continue this medication.    7. Hyperlipidemia.  A prescription for Lipitor will be renewed. She is advised to continue  this medication.    8. Vitamin D deficiency.  Her vitamin D levels will be checked as part of the comprehensive lab work. She is not currently taking vitamin D supplements.    9. Yeast infection.  A prescription for nystatin will be renewed to manage her recurrent yeast infections.    10. Acid reflux.  She is currently taking pantoprazole (Protonix) for acid reflux. She is advised to continue this medication as prescribed by her GI provider.    11. Migraines.  She is currently taking Imitrex for migraine management. She is advised to continue this medication.    12. Health Maintenance.  A mammogram will be ordered. She is advised to schedule an appointment with Dr. Gonzalez for a follow-up on her hysterectomy and to reestablish care with her OBGYN. She is also advised to get a colonoscopy, as it has been over a year since her last one.         Follow Up   Wrapup Tab  Return for Medicare Wellness.     Patient was given instructions and counseling regarding her condition or for health maintenance advice. Please see specific information pulled into the AVS if appropriate.  Hand hygiene was performed during entrance to exam room and following assessment of patient. This document is intended for medical expert use only.       SANJEEV Domingo, FNP-C  [facility]   [facility]  [address] [place] [address] [address] TINO NAS[address] [place] [address] [place] [id number] [phone]    Patient or patient representative verbalized consent for the use of Ambient Listening during the visit with  SANJEEV Domingo for chart documentation. 9/9/2024  08:58 EDT

## 2024-09-09 NOTE — PROGRESS NOTES
September 9, 2024    Hello, may I speak with Milka Espinoza?    My name is nhan      I am  with [facility]   [facility]  [address] [place] [address] [address]  [place] [id number] [phone].    Before we get started may I verify your date of birth? 1970    I am calling to officially welcome you to our practice and ask about your recent visit. Is this a good time to talk? yes    Tell me about your visit with us. What things went well?  all went good        We're always looking for ways to make our patients' experiences even better. Do you have recommendations on ways we may improve?  no    Overall were you satisfied with your first visit to our practice? yes       I appreciate you taking the time to speak with me today. Is there anything else I can do for you? no      Thank you, and have a great day.

## 2024-09-17 DIAGNOSIS — F40.01 PANIC DISORDER WITH AGORAPHOBIA: Chronic | ICD-10-CM

## 2024-09-17 RX ORDER — HYDROXYZINE HYDROCHLORIDE 25 MG/1
25 TABLET, FILM COATED ORAL 3 TIMES DAILY PRN
Qty: 90 TABLET | Refills: 2 | Status: SHIPPED | OUTPATIENT
Start: 2024-09-17

## 2024-09-18 ENCOUNTER — TELEPHONE (OUTPATIENT)
Dept: FAMILY MEDICINE CLINIC | Facility: CLINIC | Age: 54
End: 2024-09-18
Payer: MEDICARE

## 2024-09-24 ENCOUNTER — APPOINTMENT (OUTPATIENT)
Dept: ULTRASOUND IMAGING | Facility: HOSPITAL | Age: 54
End: 2024-09-24
Payer: MEDICARE

## 2024-09-24 ENCOUNTER — APPOINTMENT (OUTPATIENT)
Dept: CT IMAGING | Facility: HOSPITAL | Age: 54
End: 2024-09-24
Payer: MEDICARE

## 2024-09-24 ENCOUNTER — HOSPITAL ENCOUNTER (OUTPATIENT)
Facility: HOSPITAL | Age: 54
Setting detail: OBSERVATION
Discharge: HOME | End: 2024-09-25
Attending: EMERGENCY MEDICINE | Admitting: EMERGENCY MEDICINE
Payer: MEDICARE

## 2024-09-24 DIAGNOSIS — R10.11 RUQ ABDOMINAL PAIN: ICD-10-CM

## 2024-09-24 DIAGNOSIS — N39.0 URINARY TRACT INFECTION WITHOUT HEMATURIA, SITE UNSPECIFIED: Primary | ICD-10-CM

## 2024-09-24 DIAGNOSIS — K82.8 THICKENING OF WALL OF GALLBLADDER: ICD-10-CM

## 2024-09-24 DIAGNOSIS — M54.6 ACUTE BILATERAL THORACIC BACK PAIN: ICD-10-CM

## 2024-09-24 LAB
ALBUMIN SERPL-MCNC: 4.2 G/DL (ref 3.5–5.2)
ALBUMIN/GLOB SERPL: 1.4 G/DL
ALP SERPL-CCNC: 97 U/L (ref 39–117)
ALT SERPL W P-5'-P-CCNC: 9 U/L (ref 1–33)
ANION GAP SERPL CALCULATED.3IONS-SCNC: 10.3 MMOL/L (ref 5–15)
AST SERPL-CCNC: 15 U/L (ref 1–32)
BACTERIA UR QL AUTO: ABNORMAL /HPF
BASOPHILS # BLD AUTO: 0.04 10*3/MM3 (ref 0–0.2)
BASOPHILS NFR BLD AUTO: 0.7 % (ref 0–1.5)
BILIRUB SERPL-MCNC: 0.2 MG/DL (ref 0–1.2)
BILIRUB UR QL STRIP: NEGATIVE
BUN SERPL-MCNC: 7 MG/DL (ref 6–20)
BUN/CREAT SERPL: 7.4 (ref 7–25)
CALCIUM SPEC-SCNC: 8.9 MG/DL (ref 8.6–10.5)
CHLORIDE SERPL-SCNC: 105 MMOL/L (ref 98–107)
CLARITY UR: ABNORMAL
CO2 SERPL-SCNC: 24.7 MMOL/L (ref 22–29)
COLOR UR: ABNORMAL
CREAT SERPL-MCNC: 0.94 MG/DL (ref 0.57–1)
DEPRECATED RDW RBC AUTO: 47.4 FL (ref 37–54)
EGFRCR SERPLBLD CKD-EPI 2021: 71.4 ML/MIN/1.73
EOSINOPHIL # BLD AUTO: 0.16 10*3/MM3 (ref 0–0.4)
EOSINOPHIL NFR BLD AUTO: 2.6 % (ref 0.3–6.2)
ERYTHROCYTE [DISTWIDTH] IN BLOOD BY AUTOMATED COUNT: 14.5 % (ref 12.3–15.4)
GLOBULIN UR ELPH-MCNC: 3.1 GM/DL
GLUCOSE SERPL-MCNC: 81 MG/DL (ref 65–99)
GLUCOSE UR STRIP-MCNC: NEGATIVE MG/DL
HCT VFR BLD AUTO: 35.9 % (ref 34–46.6)
HGB BLD-MCNC: 11.4 G/DL (ref 12–15.9)
HGB UR QL STRIP.AUTO: NEGATIVE
HYALINE CASTS UR QL AUTO: ABNORMAL /LPF
IMM GRANULOCYTES # BLD AUTO: 0.02 10*3/MM3 (ref 0–0.05)
IMM GRANULOCYTES NFR BLD AUTO: 0.3 % (ref 0–0.5)
KETONES UR QL STRIP: ABNORMAL
LEUKOCYTE ESTERASE UR QL STRIP.AUTO: ABNORMAL
LIPASE SERPL-CCNC: 17 U/L (ref 13–60)
LYMPHOCYTES # BLD AUTO: 2.1 10*3/MM3 (ref 0.7–3.1)
LYMPHOCYTES NFR BLD AUTO: 34.1 % (ref 19.6–45.3)
MCH RBC QN AUTO: 28.4 PG (ref 26.6–33)
MCHC RBC AUTO-ENTMCNC: 31.8 G/DL (ref 31.5–35.7)
MCV RBC AUTO: 89.5 FL (ref 79–97)
MONOCYTES # BLD AUTO: 0.41 10*3/MM3 (ref 0.1–0.9)
MONOCYTES NFR BLD AUTO: 6.7 % (ref 5–12)
NEUTROPHILS NFR BLD AUTO: 3.42 10*3/MM3 (ref 1.7–7)
NEUTROPHILS NFR BLD AUTO: 55.6 % (ref 42.7–76)
NITRITE UR QL STRIP: NEGATIVE
NRBC BLD AUTO-RTO: 0 /100 WBC (ref 0–0.2)
PH UR STRIP.AUTO: 6 [PH] (ref 5–8)
PLATELET # BLD AUTO: 236 10*3/MM3 (ref 140–450)
PMV BLD AUTO: 10.5 FL (ref 6–12)
POTASSIUM SERPL-SCNC: 4.1 MMOL/L (ref 3.5–5.2)
PROT SERPL-MCNC: 7.3 G/DL (ref 6–8.5)
PROT UR QL STRIP: ABNORMAL
RBC # BLD AUTO: 4.01 10*6/MM3 (ref 3.77–5.28)
RBC # UR STRIP: ABNORMAL /HPF
REF LAB TEST METHOD: ABNORMAL
SODIUM SERPL-SCNC: 140 MMOL/L (ref 136–145)
SP GR UR STRIP: 1.03 (ref 1–1.03)
SQUAMOUS #/AREA URNS HPF: ABNORMAL /HPF
UROBILINOGEN UR QL STRIP: ABNORMAL
WBC # UR STRIP: ABNORMAL /HPF
WBC NRBC COR # BLD AUTO: 6.15 10*3/MM3 (ref 3.4–10.8)

## 2024-09-24 PROCEDURE — 87086 URINE CULTURE/COLONY COUNT: CPT

## 2024-09-24 PROCEDURE — 96365 THER/PROPH/DIAG IV INF INIT: CPT

## 2024-09-24 PROCEDURE — G0378 HOSPITAL OBSERVATION PER HR: HCPCS

## 2024-09-24 PROCEDURE — 99285 EMERGENCY DEPT VISIT HI MDM: CPT

## 2024-09-24 PROCEDURE — 85025 COMPLETE CBC W/AUTO DIFF WBC: CPT

## 2024-09-24 PROCEDURE — 25510000001 IOPAMIDOL PER 1 ML

## 2024-09-24 PROCEDURE — 25010000002 CEFTRIAXONE PER 250 MG

## 2024-09-24 PROCEDURE — 25010000002 ONDANSETRON PER 1 MG

## 2024-09-24 PROCEDURE — 96375 TX/PRO/DX INJ NEW DRUG ADDON: CPT

## 2024-09-24 PROCEDURE — 74177 CT ABD & PELVIS W/CONTRAST: CPT

## 2024-09-24 PROCEDURE — 25010000002 MORPHINE PER 10 MG

## 2024-09-24 PROCEDURE — 81001 URINALYSIS AUTO W/SCOPE: CPT

## 2024-09-24 PROCEDURE — 87186 SC STD MICRODIL/AGAR DIL: CPT

## 2024-09-24 PROCEDURE — 25010000002 MORPHINE PER 10 MG: Performed by: EMERGENCY MEDICINE

## 2024-09-24 PROCEDURE — 83690 ASSAY OF LIPASE: CPT

## 2024-09-24 PROCEDURE — 87077 CULTURE AEROBIC IDENTIFY: CPT

## 2024-09-24 PROCEDURE — 76705 ECHO EXAM OF ABDOMEN: CPT

## 2024-09-24 PROCEDURE — 96376 TX/PRO/DX INJ SAME DRUG ADON: CPT

## 2024-09-24 PROCEDURE — 80053 COMPREHEN METABOLIC PANEL: CPT

## 2024-09-24 PROCEDURE — 25010000002 HYDROMORPHONE 1 MG/ML SOLUTION

## 2024-09-24 PROCEDURE — 25810000003 SODIUM CHLORIDE 0.9 % SOLUTION

## 2024-09-24 RX ORDER — SODIUM CHLORIDE 0.9 % (FLUSH) 0.9 %
10 SYRINGE (ML) INJECTION AS NEEDED
Status: DISCONTINUED | OUTPATIENT
Start: 2024-09-24 | End: 2024-09-25 | Stop reason: HOSPADM

## 2024-09-24 RX ORDER — LIDOCAINE 4 G/G
2 PATCH TOPICAL ONCE
Status: COMPLETED | OUTPATIENT
Start: 2024-09-24 | End: 2024-09-25

## 2024-09-24 RX ORDER — METHOCARBAMOL 500 MG/1
500 TABLET, FILM COATED ORAL ONCE
Status: COMPLETED | OUTPATIENT
Start: 2024-09-24 | End: 2024-09-24

## 2024-09-24 RX ORDER — IOPAMIDOL 755 MG/ML
100 INJECTION, SOLUTION INTRAVASCULAR
Status: COMPLETED | OUTPATIENT
Start: 2024-09-24 | End: 2024-09-24

## 2024-09-24 RX ORDER — ONDANSETRON 2 MG/ML
4 INJECTION INTRAMUSCULAR; INTRAVENOUS ONCE
Status: COMPLETED | OUTPATIENT
Start: 2024-09-24 | End: 2024-09-24

## 2024-09-24 RX ORDER — MORPHINE SULFATE 2 MG/ML
2 INJECTION, SOLUTION INTRAMUSCULAR; INTRAVENOUS ONCE
Status: COMPLETED | OUTPATIENT
Start: 2024-09-24 | End: 2024-09-24

## 2024-09-24 RX ORDER — ONDANSETRON 2 MG/ML
4 INJECTION INTRAMUSCULAR; INTRAVENOUS EVERY 6 HOURS PRN
Status: DISCONTINUED | OUTPATIENT
Start: 2024-09-24 | End: 2024-09-25 | Stop reason: HOSPADM

## 2024-09-24 RX ORDER — SODIUM CHLORIDE 0.9 % (FLUSH) 0.9 %
10 SYRINGE (ML) INJECTION EVERY 12 HOURS SCHEDULED
Status: DISCONTINUED | OUTPATIENT
Start: 2024-09-24 | End: 2024-09-25 | Stop reason: HOSPADM

## 2024-09-24 RX ORDER — BISACODYL 10 MG
10 SUPPOSITORY, RECTAL RECTAL DAILY PRN
Status: DISCONTINUED | OUTPATIENT
Start: 2024-09-24 | End: 2024-09-25 | Stop reason: HOSPADM

## 2024-09-24 RX ORDER — MORPHINE SULFATE 2 MG/ML
2 INJECTION, SOLUTION INTRAMUSCULAR; INTRAVENOUS EVERY 4 HOURS PRN
Status: DISCONTINUED | OUTPATIENT
Start: 2024-09-24 | End: 2024-09-25

## 2024-09-24 RX ORDER — SODIUM CHLORIDE 9 MG/ML
40 INJECTION, SOLUTION INTRAVENOUS AS NEEDED
Status: DISCONTINUED | OUTPATIENT
Start: 2024-09-24 | End: 2024-09-25 | Stop reason: HOSPADM

## 2024-09-24 RX ORDER — BISACODYL 5 MG/1
5 TABLET, DELAYED RELEASE ORAL DAILY PRN
Status: DISCONTINUED | OUTPATIENT
Start: 2024-09-24 | End: 2024-09-25 | Stop reason: HOSPADM

## 2024-09-24 RX ORDER — AMOXICILLIN 250 MG
2 CAPSULE ORAL 2 TIMES DAILY PRN
Status: DISCONTINUED | OUTPATIENT
Start: 2024-09-24 | End: 2024-09-25 | Stop reason: HOSPADM

## 2024-09-24 RX ORDER — POLYETHYLENE GLYCOL 3350 17 G/17G
17 POWDER, FOR SOLUTION ORAL DAILY PRN
Status: DISCONTINUED | OUTPATIENT
Start: 2024-09-24 | End: 2024-09-25 | Stop reason: HOSPADM

## 2024-09-24 RX ORDER — LACOSAMIDE 50 MG/1
200 TABLET ORAL ONCE
Status: COMPLETED | OUTPATIENT
Start: 2024-09-24 | End: 2024-09-24

## 2024-09-24 RX ORDER — LEVETIRACETAM 500 MG/1
1500 TABLET ORAL ONCE
Status: COMPLETED | OUTPATIENT
Start: 2024-09-24 | End: 2024-09-24

## 2024-09-24 RX ADMIN — HYDROMORPHONE HYDROCHLORIDE 0.5 MG: 1 INJECTION, SOLUTION INTRAMUSCULAR; INTRAVENOUS; SUBCUTANEOUS at 21:50

## 2024-09-24 RX ADMIN — SODIUM CHLORIDE 500 ML: 9 INJECTION, SOLUTION INTRAVENOUS at 17:07

## 2024-09-24 RX ADMIN — METHOCARBAMOL 500 MG: 500 TABLET ORAL at 21:50

## 2024-09-24 RX ADMIN — LEVETIRACETAM 1500 MG: 500 TABLET, FILM COATED ORAL at 21:50

## 2024-09-24 RX ADMIN — Medication 10 ML: at 21:50

## 2024-09-24 RX ADMIN — IOPAMIDOL 100 ML: 755 INJECTION, SOLUTION INTRAVENOUS at 16:54

## 2024-09-24 RX ADMIN — LIDOCAINE 2 PATCH: 4 PATCH TOPICAL at 17:04

## 2024-09-24 RX ADMIN — CEFTRIAXONE 2000 MG: 2 INJECTION, POWDER, FOR SOLUTION INTRAMUSCULAR; INTRAVENOUS at 21:50

## 2024-09-24 RX ADMIN — MORPHINE SULFATE 2 MG: 2 INJECTION, SOLUTION INTRAMUSCULAR; INTRAVENOUS at 17:04

## 2024-09-24 RX ADMIN — LACOSAMIDE 200 MG: 50 TABLET, FILM COATED ORAL at 21:50

## 2024-09-24 RX ADMIN — MORPHINE SULFATE 2 MG: 2 INJECTION, SOLUTION INTRAMUSCULAR; INTRAVENOUS at 23:47

## 2024-09-24 RX ADMIN — ONDANSETRON 4 MG: 2 INJECTION, SOLUTION INTRAMUSCULAR; INTRAVENOUS at 17:04

## 2024-09-24 NOTE — ED PROVIDER NOTES
"Subjective   History of Present Illness  Due to significant overcrowding in the emergency department patient was evaluated by myself in a hallway bed. This exam may be limited by privacy, noise level and the patient not wearing a hospital gown.  Explained to the patient our limitations and our overcrowding.  They were in agreement to continue the exam and treatment at this time.     54-year-old female holding with history of hyperlipidemia and seizures presents the ED with complaints of right upper abdominal pain but has prior to the thoracic back is an ongoing for last 4 days.  Patient denies nausea or vomiting.  That she was seen twice [facility] for this however the pain has not gone away.  She denies any recent trauma to her back, numbness, tingling, loss of bowel or bladder function, saddle anesthesia, nausea, vomiting, diarrhea, chest pain, shortness of breath, fever, weakness.  States she has a bad odor to her urine however does not have any dysuria urinary frequency.  Patient reports that she has had 3 C-sections, hysterectomy, and hernia repair in the past.  Denies history of IV.  Reports she has a right chest venous access that has been there for approximately a year and is there due to gastroparesis for IV hydration at home.  States her last bowel movement was approximately 2 to 3 days ago    PCP: Carito        Review of Systems   Constitutional:  Negative for fever.   Respiratory:  Negative for shortness of breath.    Cardiovascular:  Negative for chest pain.   Gastrointestinal:  Positive for abdominal pain. Negative for diarrhea, nausea and vomiting.   Musculoskeletal:  Positive for back pain.       Past Medical History:   Diagnosis Date    Depression     Disease of thyroid gland     Hyperlipidemia     Migraines     On home O2 12/2021    3 lmp    Seizures        Allergies   Allergen Reactions    Ketorolac Other (See Comments)     pt states \"this medicine sends me into seizures\"    Sulfa " Antibiotics Hives    Tramadol Hcl Mental Status Change    Adhesive Tape Rash     Paper tape       Past Surgical History:   Procedure Laterality Date    ABDOMINAL SURGERY       SECTION      x3    ENDOSCOPY N/A 2022    Procedure: EGD WITH DILATATION (BOUGIE #56) AND INJECTION OF BOTOX;  Surgeon: Rito Le MD;  Location: [facility] ENDOSCOPY;  Service: Gastroenterology;  Laterality: N/A;  POST: LOWER ESOPHAGEAL STRICTURE AND CRICOPHARYNGEAL STRICUTRE    ENDOSCOPY W/ PEG TUBE PLACEMENT N/A 2020    Procedure: ESOPHAGOGASTRODUODENOSCOPY WITH PERCUTANEOUS ENDOSCOPIC GASTROSTOMY TUBE INSERTION;  Surgeon: Rito Le MD;  Location: [facility] ENDOSCOPY;  Service: Gastroenterology;  Laterality: N/A;    HERNIA REPAIR      HYSTERECTOMY      PEG TUBE REMOVAL         Family History   Problem Relation Age of Onset    No Known Problems Mother     No Known Problems Father     No Known Problems Sister     No Known Problems Brother     Alcohol abuse Daughter     No Known Problems Son     No Known Problems Maternal Grandmother     No Known Problems Paternal Grandmother     No Known Problems Maternal Aunt     No Known Problems Paternal Aunt        Social History     Socioeconomic History    Marital status:    Tobacco Use    Smoking status: Former     Current packs/day: 0.00     Average packs/day: 0.3 packs/day for 3.0 years (0.8 ttl pk-yrs)     Types: Cigarettes     Start date:      Quit date:      Years since quittin.7    Smokeless tobacco: Never   Vaping Use    Vaping status: Never Used   Substance and Sexual Activity    Alcohol use: No    Drug use: No    Sexual activity: Never           Objective   Physical Exam  Vitals reviewed.   HENT:      Head: Normocephalic and atraumatic.   Eyes:      Extraocular Movements: Extraocular movements intact.      Conjunctiva/sclera: Conjunctivae normal.   Cardiovascular:      Rate and Rhythm: Normal rate and regular rhythm.      Pulses: Normal pulses.       "Heart sounds: Normal heart sounds.      Comments: Right chest venous access present.  No erythema or area of cellulitis  Pulmonary:      Effort: Pulmonary effort is normal.      Breath sounds: Normal breath sounds.   Abdominal:      General: Bowel sounds are normal.      Palpations: Abdomen is soft.      Tenderness: There is abdominal tenderness. There is right CVA tenderness (The pain is also reproducible with palpation). There is no left CVA tenderness.      Comments: Tenderness noted to the right side of the abdomen, worsening to the right upper quadrant   Musculoskeletal:         General: Tenderness present. Normal range of motion.      Comments: Tenderness noted to thoracic back on palpitation, no midline tenderness, worse on the right side than the left.  No step-off or obvious deformity felt of the spine.  Patient able to move all extremities on assessment   Skin:     General: Skin is warm and dry.      Capillary Refill: Capillary refill takes less than 2 seconds.      Findings: No erythema or rash.   Neurological:      General: No focal deficit present.      Mental Status: She is alert and oriented to person, place, and time.   Psychiatric:         Mood and Affect: Mood normal.         Behavior: Behavior normal.         Procedures           ED Course  ED Course as of 09/24/24 2337   Tue Sep 24, 2024   1758 Marked ready for ultrasound [KB]      ED Course User Index  [KB] AdalbertoTatianarodolfo, SANJEEV      /85   Pulse 85   Temp 98.1 °F (36.7 °C) (Oral)   Resp 15   Ht 162.6 cm (64\")   Wt 91.6 kg (202 lb)   SpO2 97%   BMI 34.67 kg/m²   Labs Reviewed   URINALYSIS W/ CULTURE IF INDICATED - Abnormal; Notable for the following components:       Result Value    Color, UA Dark Yellow (*)     Appearance, UA Cloudy (*)     Ketones, UA Trace (*)     Protein, UA 30 mg/dL (1+) (*)     Leuk Esterase, UA Moderate (2+) (*)     All other components within normal limits    Narrative:     In absence of clinical symptoms, the " presence of pyuria, bacteria, and/or nitrites on the urinalysis result does not correlate with infection.   CBC WITH AUTO DIFFERENTIAL - Abnormal; Notable for the following components:    Hemoglobin 11.4 (*)     All other components within normal limits   URINALYSIS, MICROSCOPIC ONLY - Abnormal; Notable for the following components:    WBC, UA 6-10 (*)     Bacteria, UA 2+ (*)     All other components within normal limits   LIPASE - Normal   URINE CULTURE   COMPREHENSIVE METABOLIC PANEL    Narrative:     GFR Normal >60  Chronic Kidney Disease <60  Kidney Failure <15     BASIC METABOLIC PANEL   CBC WITH AUTO DIFFERENTIAL   CBC AND DIFFERENTIAL    Narrative:     The following orders were created for panel order CBC & Differential.  Procedure                               Abnormality         Status                     ---------                               -----------         ------                     CBC Auto Differential[[id number]]        Abnormal            Final result                 Please view results for these tests on the individual orders.     Medications   sodium chloride 0.9 % flush 10 mL (has no administration in time range)   Lidocaine 4 % 2 patch (2 patches Transdermal Medication Applied 9/24/24 1704)   sodium chloride 0.9 % flush 10 mL (10 mL Intravenous Given 9/24/24 2150)   sodium chloride 0.9 % flush 10 mL (has no administration in time range)   sodium chloride 0.9 % infusion 40 mL (has no administration in time range)   ondansetron (ZOFRAN) injection 4 mg (has no administration in time range)   melatonin tablet 5 mg (has no administration in time range)   sennosides-docusate (PERICOLACE) 8.6-50 MG per tablet 2 tablet (has no administration in time range)     And   polyethylene glycol (MIRALAX) packet 17 g (has no administration in time range)     And   bisacodyl (DULCOLAX) EC tablet 5 mg (has no administration in time range)     And   bisacodyl (DULCOLAX) suppository 10 mg (has no administration in  time range)   sodium chloride 0.9 % bolus 500 mL (0 mL Intravenous Stopped 9/24/24 1800)   morphine injection 2 mg (2 mg Intravenous Given 9/24/24 1704)   ondansetron (ZOFRAN) injection 4 mg (4 mg Intravenous Given 9/24/24 1704)   iopamidol (ISOVUE-370) 76 % injection 100 mL (100 mL Intravenous Given 9/24/24 1654)   HYDROmorphone (DILAUDID) injection 0.5 mg (0.5 mg Intravenous Given 9/24/24 2150)   methocarbamol (ROBAXIN) tablet 500 mg (500 mg Oral Given 9/24/24 2150)   cefTRIAXone (ROCEPHIN) 2,000 mg in sodium chloride 0.9 % 100 mL MBP (0 mg Intravenous Stopped 9/24/24 2253)   lacosamide (VIMPAT) tablet 200 mg (200 mg Oral Given 9/24/24 2150)   levETIRAcetam (KEPPRA) tablet 1,500 mg (1,500 mg Oral Given 9/24/24 2150)     US Abdomen Limited    Result Date: 9/24/2024  Impression: Limited study due to overlying bowel gas. Mild nonspecific gallbladder distention without evidence of sludge or calculi. No sonographic evidence of acute cholecystitis. Finding may be secondary to prolonged fasting state. Gallbladder dyskinesis or less likely acalculous cholecystitis is a consideration. Electronically Signed: Vasquez Schuler MD  9/24/2024 7:11 PM EDT  Workstation ID: [id number]    CT Abdomen Pelvis With Contrast    Result Date: 9/24/2024  Impression: Nonspecific mild gallbladder distention. Correlate for symptoms of cholecystitis. This can be further evaluated with ultrasound as clinically warranted. Stable 3.5 cm low-density lesion in the inferior right hepatic lobe likely representing hemangioma. Findings compatible with mild constipation. Electronically Signed: Vasquez Schuler MD  9/24/2024 5:07 PM EDT  Workstation ID: [id number]                                          Medical Decision Making  Patient was seen for the above complaints.  On chart review, patient was seen at [facility] on 9/19/2024 and 9/23/2024, patient states that she had the same complaints when she was seen there however has had ongoing pain without  relief.  IV was established and blood work was obtained to assess for electrolyte abnormalities, infection, lipase value.  White blood cell count 6.15, hemoglobin 11.4 which is along patient's baseline, lipase 17, electrolytes within normal limits UA was obtained to assess for dehydration or UTI, this does show 2+ bacteria, urine culture currently pending at this time.  Patient denies urinary frequency or dysuria however states she has a foul odor to her urine think she might have a UTI.  Abdominal CT was obtained and independently interpreted by the radiologist as:  -Nonspecific mild gallbladder distention. Correlate for symptoms of cholecystitis. This can be further evaluated with ultrasound as clinically warranted.   -Stable 3.5 cm low-density lesion in the inferior right hepatic lobe likely representing hemangioma.   -Findings compatible with mild constipation.       Due to the above CT impression and right upper quadrant abdominal pain, ultrasound of the gallbladder was obtained and independently interpreted by the radiologist as:  -Limited study due to overlying bowel gas.   -Mild nonspecific gallbladder distention without evidence of sludge or calculi. No sonographic evidence of acute cholecystitis. Finding may be secondary to prolonged fasting state. Gallbladder dyskinesis or less likely acalculous cholecystitis is a consideration.     Patient was initially given morphine Zofran lidocaine patches and 500 cc of normal saline.  On reassessment states she is still having right upper quadrant abdominal pain and right thoracic back pain.  No signs or symptoms of cauda equina syndrome, no trauma to the back.  Due to the above workup, patient to be placed in observation unit for HIDA scan in the morning and possible general surgery or GI consult.  Given Dilaudid and Robaxin prior to observation placement.  Will be n.p.o. after midnight for the HIDA scan.  Treated for possible cholecystitis along with a UTI with IV  Rocephin.  Patient verbalized understanding was agreeable with disposition.  Discussed with Dr. Calderón who is agreeable plan of care    Problems Addressed:  Acute bilateral thoracic back pain: acute illness or injury  RUQ abdominal pain: acute illness or injury  Thickening of wall of gallbladder: acute illness or injury  Urinary tract infection without hematuria, site unspecified: acute illness or injury    Amount and/or Complexity of Data Reviewed  Labs: ordered. Decision-making details documented in ED Course.  Radiology: ordered and independent interpretation performed. Decision-making details documented in ED Course.  ECG/medicine tests: ordered.    Risk  OTC drugs.  Prescription drug management.  Decision regarding hospitalization.        Final diagnoses:   Urinary tract infection without hematuria, site unspecified   RUQ abdominal pain   Thickening of wall of gallbladder   Acute bilateral thoracic back pain       ED Disposition  ED Disposition       ED Disposition   Decision to Admit    Condition   --    Comment   --               No follow-up provider specified.       Medication List      No changes were made to your prescriptions during this visit.            Olman Glover, APRN  09/24/24 2998

## 2024-09-25 ENCOUNTER — READMISSION MANAGEMENT (OUTPATIENT)
Dept: CALL CENTER | Facility: HOSPITAL | Age: 54
End: 2024-09-25
Payer: MEDICARE

## 2024-09-25 ENCOUNTER — APPOINTMENT (OUTPATIENT)
Dept: NUCLEAR MEDICINE | Facility: HOSPITAL | Age: 54
End: 2024-09-25
Payer: MEDICARE

## 2024-09-25 VITALS
TEMPERATURE: 97.7 F | SYSTOLIC BLOOD PRESSURE: 112 MMHG | HEIGHT: 64 IN | RESPIRATION RATE: 18 BRPM | DIASTOLIC BLOOD PRESSURE: 68 MMHG | HEART RATE: 91 BPM | WEIGHT: 202.16 LBS | BODY MASS INDEX: 34.51 KG/M2 | OXYGEN SATURATION: 96 %

## 2024-09-25 DIAGNOSIS — F41.1 GENERALIZED ANXIETY DISORDER: Chronic | ICD-10-CM

## 2024-09-25 DIAGNOSIS — F41.0 PANIC DISORDER: Chronic | ICD-10-CM

## 2024-09-25 LAB
ANION GAP SERPL CALCULATED.3IONS-SCNC: 11 MMOL/L (ref 5–15)
BASOPHILS # BLD AUTO: 0.04 10*3/MM3 (ref 0–0.2)
BASOPHILS NFR BLD AUTO: 0.7 % (ref 0–1.5)
BUN SERPL-MCNC: 8 MG/DL (ref 6–20)
BUN/CREAT SERPL: 8.5 (ref 7–25)
CALCIUM SPEC-SCNC: 8.4 MG/DL (ref 8.6–10.5)
CHLORIDE SERPL-SCNC: 106 MMOL/L (ref 98–107)
CO2 SERPL-SCNC: 23 MMOL/L (ref 22–29)
CREAT SERPL-MCNC: 0.94 MG/DL (ref 0.57–1)
DEPRECATED RDW RBC AUTO: 47.8 FL (ref 37–54)
EGFRCR SERPLBLD CKD-EPI 2021: 71.4 ML/MIN/1.73
EOSINOPHIL # BLD AUTO: 0.24 10*3/MM3 (ref 0–0.4)
EOSINOPHIL NFR BLD AUTO: 3.9 % (ref 0.3–6.2)
ERYTHROCYTE [DISTWIDTH] IN BLOOD BY AUTOMATED COUNT: 14.5 % (ref 12.3–15.4)
GLUCOSE SERPL-MCNC: 82 MG/DL (ref 65–99)
HCT VFR BLD AUTO: 33.4 % (ref 34–46.6)
HGB BLD-MCNC: 10.6 G/DL (ref 12–15.9)
IMM GRANULOCYTES # BLD AUTO: 0.02 10*3/MM3 (ref 0–0.05)
IMM GRANULOCYTES NFR BLD AUTO: 0.3 % (ref 0–0.5)
LYMPHOCYTES # BLD AUTO: 2.89 10*3/MM3 (ref 0.7–3.1)
LYMPHOCYTES NFR BLD AUTO: 47.4 % (ref 19.6–45.3)
MCH RBC QN AUTO: 28.6 PG (ref 26.6–33)
MCHC RBC AUTO-ENTMCNC: 31.7 G/DL (ref 31.5–35.7)
MCV RBC AUTO: 90.3 FL (ref 79–97)
MONOCYTES # BLD AUTO: 0.44 10*3/MM3 (ref 0.1–0.9)
MONOCYTES NFR BLD AUTO: 7.2 % (ref 5–12)
NEUTROPHILS NFR BLD AUTO: 2.47 10*3/MM3 (ref 1.7–7)
NEUTROPHILS NFR BLD AUTO: 40.5 % (ref 42.7–76)
NRBC BLD AUTO-RTO: 0 /100 WBC (ref 0–0.2)
PLATELET # BLD AUTO: 223 10*3/MM3 (ref 140–450)
PMV BLD AUTO: 10.2 FL (ref 6–12)
POTASSIUM SERPL-SCNC: 3.6 MMOL/L (ref 3.5–5.2)
RBC # BLD AUTO: 3.7 10*6/MM3 (ref 3.77–5.28)
SODIUM SERPL-SCNC: 140 MMOL/L (ref 136–145)
WBC NRBC COR # BLD AUTO: 6.1 10*3/MM3 (ref 3.4–10.8)

## 2024-09-25 PROCEDURE — A9537 TC99M MEBROFENIN: HCPCS | Performed by: EMERGENCY MEDICINE

## 2024-09-25 PROCEDURE — 0 TECHNETIUM TC 99M MEBROFENIN KIT: Performed by: EMERGENCY MEDICINE

## 2024-09-25 PROCEDURE — 80048 BASIC METABOLIC PNL TOTAL CA: CPT

## 2024-09-25 PROCEDURE — 78227 HEPATOBIL SYST IMAGE W/DRUG: CPT

## 2024-09-25 PROCEDURE — G0378 HOSPITAL OBSERVATION PER HR: HCPCS

## 2024-09-25 PROCEDURE — 85025 COMPLETE CBC W/AUTO DIFF WBC: CPT

## 2024-09-25 RX ORDER — LACOSAMIDE 100 MG/1
200 TABLET ORAL EVERY 12 HOURS SCHEDULED
Status: DISCONTINUED | OUTPATIENT
Start: 2024-09-25 | End: 2024-09-25 | Stop reason: HOSPADM

## 2024-09-25 RX ORDER — KIT FOR THE PREPARATION OF TECHNETIUM TC 99M MEBROFENIN 45 MG/10ML
1 INJECTION, POWDER, LYOPHILIZED, FOR SOLUTION INTRAVENOUS
Status: COMPLETED | OUTPATIENT
Start: 2024-09-25 | End: 2024-09-25

## 2024-09-25 RX ORDER — ATORVASTATIN CALCIUM 20 MG/1
20 TABLET, FILM COATED ORAL NIGHTLY
Status: DISCONTINUED | OUTPATIENT
Start: 2024-09-25 | End: 2024-09-25 | Stop reason: HOSPADM

## 2024-09-25 RX ORDER — SERTRALINE HYDROCHLORIDE 100 MG/1
100 TABLET, FILM COATED ORAL DAILY
Status: DISCONTINUED | OUTPATIENT
Start: 2024-09-25 | End: 2024-09-25 | Stop reason: HOSPADM

## 2024-09-25 RX ORDER — CEPHALEXIN 500 MG/1
500 CAPSULE ORAL 2 TIMES DAILY
Qty: 14 CAPSULE | Refills: 0 | Status: SHIPPED | OUTPATIENT
Start: 2024-09-25

## 2024-09-25 RX ORDER — PANTOPRAZOLE SODIUM 40 MG/1
40 TABLET, DELAYED RELEASE ORAL DAILY
Status: DISCONTINUED | OUTPATIENT
Start: 2024-09-25 | End: 2024-09-25 | Stop reason: HOSPADM

## 2024-09-25 RX ORDER — LEVETIRACETAM 500 MG/1
1500 TABLET ORAL 2 TIMES DAILY
Status: DISCONTINUED | OUTPATIENT
Start: 2024-09-25 | End: 2024-09-25 | Stop reason: HOSPADM

## 2024-09-25 RX ORDER — SUCRALFATE 1 G/1
1 TABLET ORAL 3 TIMES DAILY
Status: DISCONTINUED | OUTPATIENT
Start: 2024-09-25 | End: 2024-09-25 | Stop reason: HOSPADM

## 2024-09-25 RX ORDER — ALPRAZOLAM 1 MG
2 TABLET ORAL ONCE
Status: COMPLETED | OUTPATIENT
Start: 2024-09-25 | End: 2024-09-25

## 2024-09-25 RX ORDER — BUSPIRONE HYDROCHLORIDE 15 MG/1
30 TABLET ORAL 2 TIMES DAILY
Status: DISCONTINUED | OUTPATIENT
Start: 2024-09-25 | End: 2024-09-25 | Stop reason: HOSPADM

## 2024-09-25 RX ORDER — LEVOTHYROXINE SODIUM 25 UG/1
25 TABLET ORAL
Status: DISCONTINUED | OUTPATIENT
Start: 2024-09-25 | End: 2024-09-25 | Stop reason: HOSPADM

## 2024-09-25 RX ORDER — ALPRAZOLAM 1 MG
2 TABLET ORAL 3 TIMES DAILY PRN
Status: DISCONTINUED | OUTPATIENT
Start: 2024-09-25 | End: 2024-09-25 | Stop reason: HOSPADM

## 2024-09-25 RX ORDER — HYDROXYZINE HYDROCHLORIDE 25 MG/1
25 TABLET, FILM COATED ORAL 3 TIMES DAILY PRN
Status: DISCONTINUED | OUTPATIENT
Start: 2024-09-25 | End: 2024-09-25 | Stop reason: HOSPADM

## 2024-09-25 RX ADMIN — PANTOPRAZOLE SODIUM 40 MG: 40 TABLET, DELAYED RELEASE ORAL at 12:30

## 2024-09-25 RX ADMIN — BUSPIRONE HYDROCHLORIDE 30 MG: 15 TABLET ORAL at 12:30

## 2024-09-25 RX ADMIN — LEVOTHYROXINE SODIUM 25 MCG: 0.03 TABLET ORAL at 12:30

## 2024-09-25 RX ADMIN — LACOSAMIDE 200 MG: 100 TABLET, FILM COATED ORAL at 12:30

## 2024-09-25 RX ADMIN — ALPRAZOLAM 2 MG: 1 TABLET ORAL at 00:11

## 2024-09-25 RX ADMIN — MEBROFENIN 1 DOSE: 45 INJECTION, POWDER, LYOPHILIZED, FOR SOLUTION INTRAVENOUS at 09:49

## 2024-09-25 RX ADMIN — LAMOTRIGINE 150 MG: 100 TABLET ORAL at 12:29

## 2024-09-25 RX ADMIN — SERTRALINE 100 MG: 100 TABLET, FILM COATED ORAL at 12:30

## 2024-09-25 RX ADMIN — LEVETIRACETAM 1500 MG: 500 TABLET, FILM COATED ORAL at 12:29

## 2024-09-25 RX ADMIN — SUCRALFATE 1 G: 1 TABLET ORAL at 12:30

## 2024-09-25 RX ADMIN — Medication 10 ML: at 12:28

## 2024-09-25 NOTE — DISCHARGE SUMMARY
[facility]    Tia Ana Rosa FILIPE, APRN    CHIEF COMPLAINT:     Abdominal pain    HISTORY OF PRESENT ILLNESS:    Abdominal Pain  Associated symptoms include constipation and dysuria. Pertinent negatives include no fever, nausea or vomiting.       ED  54-year-old female holding with history of hyperlipidemia and seizures presents the ED with complaints of right upper abdominal pain but has prior to the thoracic back is an ongoing for last 4 days. Patient denies nausea or vomiting. That she was seen twice [facility] for this however the pain has not gone away. She denies any recent trauma to her back, numbness, tingling, loss of bowel or bladder function, saddle anesthesia, nausea, vomiting, diarrhea, chest pain, shortness of breath, fever, weakness. States she has a bad odor to her urine however does not have any dysuria urinary frequency. Patient reports that she has had 3 C-sections, hysterectomy, and hernia repair in the past. Denies history of IV. Reports she has a right chest venous access that has been there for approximately a year and is there due to gastroparesis for IV hydration at home. States her last bowel movement was approximately 2 to 3 days ago       Past Medical History:   Diagnosis Date    Depression     Disease of thyroid gland     Hyperlipidemia     Migraines     On home O2 2021    3 lmp    Seizures      Past Surgical History:   Procedure Laterality Date    ABDOMINAL SURGERY       SECTION      x3    ENDOSCOPY N/A 2022    Procedure: EGD WITH DILATATION (BOUGIE #56) AND INJECTION OF BOTOX;  Surgeon: Rito Le MD;  Location: [facility] ENDOSCOPY;  Service: Gastroenterology;  Laterality: N/A;  POST: LOWER ESOPHAGEAL STRICTURE AND CRICOPHARYNGEAL STRICUTRE    ENDOSCOPY W/ PEG TUBE PLACEMENT N/A 2020    Procedure: ESOPHAGOGASTRODUODENOSCOPY WITH PERCUTANEOUS ENDOSCOPIC GASTROSTOMY TUBE INSERTION;  Surgeon: Rito Le MD;  Location: [facility]  ENDOSCOPY;  Service: Gastroenterology;  Laterality: N/A;    HERNIA REPAIR      HYSTERECTOMY      PEG TUBE REMOVAL       Family History   Problem Relation Age of Onset    No Known Problems Mother     No Known Problems Father     No Known Problems Sister     No Known Problems Brother     Alcohol abuse Daughter     No Known Problems Son     No Known Problems Maternal Grandmother     No Known Problems Paternal Grandmother     No Known Problems Maternal Aunt     No Known Problems Paternal Aunt      Social History     Tobacco Use    Smoking status: Former     Current packs/day: 0.00     Average packs/day: 0.3 packs/day for 3.0 years (0.8 ttl pk-yrs)     Types: Cigarettes     Start date:      Quit date:      Years since quittin.7    Smokeless tobacco: Never   Vaping Use    Vaping status: Never Used   Substance Use Topics    Alcohol use: No    Drug use: No     Medications Prior to Admission   Medication Sig Dispense Refill Last Dose    ALPRAZolam (XANAX) 2 MG tablet TAKE 1 TABLET BY MOUTH THREE TIMES DAILY AS NEEDED FOR ANXIETY 90 tablet 0 2024    amitriptyline (ELAVIL) 25 MG tablet TAKE 1 TABLET BY MOUTH EVERY NIGHT AT BEDTIME 90 tablet 1 2024    atorvastatin (LIPITOR) 20 MG tablet Take 1 tablet by mouth Every Night. 90 tablet 3 2024    busPIRone (BUSPAR) 30 MG tablet TAKE 1 TABLET BY MOUTH TWICE DAILY 180 tablet 1 2024    lacosamide (VIMPAT) 200 MG tablet Take 1 tablet by mouth Every 12 (Twelve) Hours. 60 tablet 5 2024    lamoTRIgine (LaMICtal) 150 MG tablet Take 1 tablet by mouth 2 (Two) Times a Day. 60 tablet 5 2024    levETIRAcetam (KEPPRA) 500 MG tablet Take 3 tablets by mouth 2 (Two) Times a Day. 540 tablet 3 2024    levothyroxine (SYNTHROID, LEVOTHROID) 25 MCG tablet Take 1 tablet by mouth Daily. 90 tablet 0 2024    Motegrity 2 MG tablet Take 1 tablet by mouth Daily.   2024    pantoprazole (PROTONIX) 40 MG EC tablet TAKE 1 TABLET BY MOUTH DAILY 90 tablet 1  9/24/2024    QUEtiapine XR (SEROquel XR) 400 MG 24 hr tablet TAKE 2 TABLETS BY MOUTH EVERY NIGHT AT BEDTIME 180 tablet 1 9/24/2024    sertraline (ZOLOFT) 100 MG tablet Take 1 tablet by mouth Daily. 90 tablet 1 9/24/2024    sucralfate (CARAFATE) 1 g tablet Take 1 tablet by mouth 3 (Three) Times a Day.   9/24/2024    vitamin D (ERGOCALCIFEROL) 1.25 MG (06432 UT) capsule capsule TAKE 1 CAPSULE BY MOUTH 1 TIME EVERY WEEK 8 capsule 0 Past Week    BD PosiFlush 0.9 % flush        Cariprazine HCl (Vraylar) 1.5 MG capsule capsule Take 1 capsule by mouth Daily. 90 capsule 1     ferrous gluconate (FERGON) 324 MG tablet Take 1 tablet by mouth Daily With Breakfast. (Patient not taking: Reported on 7/24/2024) 90 tablet 1     heparin 100 UNIT/ML solution injection        heparin 100 UNIT/ML solution injection        hydrOXYzine (ATARAX) 25 MG tablet TAKE 1 TABLET BY MOUTH THREE TIMES DAILY AS NEEDED FOR ANXIETY 90 tablet 2     nystatin 409134 UNIT/GM powder Apply  topically to the appropriate area as directed 2 (Two) Times a Day. 30 g 1     ondansetron (ZOFRAN) 2 mg/mL injection Infuse 2 mL into a venous catheter Every 6 (Six) Hours As Needed.       promethazine (PHENERGAN) 25 MG/ML injection Infuse 0.5 mL into a venous catheter Every 6 (Six) Hours As Needed.       sodium chloride 0.9 % solution        SUMAtriptan (IMITREX) 100 MG tablet Take 1 tablet by mouth 1 (One) Time As Needed for Migraine for up to 30 days. Take 1 tab at onset of Migraine. May repeat dose 1 time after 2 hrs if Migraine not resolved.  MAX 3 TABS PER WEEK 9 tablet 5      Allergies:  Ketorolac, Sulfa antibiotics, Tramadol hcl, and Adhesive tape    Immunization History   Administered Date(s) Administered    COVID-19 (PFIZER) Purple Cap Monovalent 05/05/2021, 05/26/2021    DTP 1968, 1968, 02/09/1970, 03/22/1971, 04/07/1972    Flu Vaccine Quad PF >36MO 04/14/2022    FluMist 2-49yrs 10/21/2013    Fluzone  >6mos 09/18/2014, 10/02/2015    Fluzone (or  Fluarix & Flulaval for VFC) >6mos 12/15/2019    MMR 11/06/1990    Measles 01/02/1970    OPV 1968, 1968, 02/09/1970, 03/22/1971, 04/07/1972    Pneumococcal Conjugate 20-Valent (PCV20) 06/16/2023    Rubella 05/14/1978    Shingrix 12/15/2019, 03/03/2020    Smallpox 03/22/1971    Td (TDVAX) 07/11/2003    Tdap 04/14/2022           REVIEW OF SYSTEMS:    Review of Systems   Constitutional: Negative for fever.   Gastrointestinal:  Positive for abdominal pain and constipation. Negative for nausea and vomiting.   Genitourinary:  Positive for dysuria.           Vital Signs  Temp:  [97.5 °F (36.4 °C)-98.1 °F (36.7 °C)] 97.5 °F (36.4 °C)  Heart Rate:  [79-90] 80  Resp:  [15-18] 18  BP: (103-140)/(60-89) 103/69          Physical Exam:  Physical Exam  Constitutional:       Appearance: Normal appearance.   HENT:      Mouth/Throat:      Mouth: Mucous membranes are moist.   Cardiovascular:      Rate and Rhythm: Normal rate and regular rhythm.   Pulmonary:      Effort: Pulmonary effort is normal.      Breath sounds: Normal breath sounds.   Skin:     General: Skin is warm and dry.   Neurological:      General: No focal deficit present.      Mental Status: She is oriented to person, place, and time.      Comments: Pt easily falling asleep   Pt on many medications that cause drowsiness   Psychiatric:         Mood and Affect: Mood normal.         Behavior: Behavior normal.     Emotional Behavior:    wnl   Debilities:   None      Results Review:    I reviewed the patient's new clinical results.  Lab Results (most recent)       Procedure Component Value Units Date/Time    Urine Culture - Urine, Urine, Clean Catch [[id number]]  (Abnormal) Collected: 09/24/24 1531    Specimen: Urine, Clean Catch Updated: 09/25/24 1241     Urine Culture >100,000 CFU/mL Gram Negative Bacilli    Narrative:      Colonization of the urinary tract without infection is common. Treatment is discouraged unless the patient is symptomatic, pregnant, or  undergoing an invasive urologic procedure.    Basic Metabolic Panel [[id number]]  (Abnormal) Collected: 09/25/24 0517    Specimen: Blood Updated: 09/25/24 0643     Glucose 82 mg/dL      BUN 8 mg/dL      Creatinine 0.94 mg/dL      Sodium 140 mmol/L      Potassium 3.6 mmol/L      Chloride 106 mmol/L      CO2 23.0 mmol/L      Calcium 8.4 mg/dL      BUN/Creatinine Ratio 8.5     Anion Gap 11.0 mmol/L      eGFR 71.4 mL/min/1.73     Narrative:      GFR Normal >60  Chronic Kidney Disease <60  Kidney Failure <15      CBC Auto Differential [[id number]]  (Abnormal) Collected: 09/25/24 0517    Specimen: Blood Updated: 09/25/24 0610     WBC 6.10 10*3/mm3      RBC 3.70 10*6/mm3      Hemoglobin 10.6 g/dL      Hematocrit 33.4 %      MCV 90.3 fL      MCH 28.6 pg      MCHC 31.7 g/dL      RDW 14.5 %      RDW-SD 47.8 fl      MPV 10.2 fL      Platelets 223 10*3/mm3      Neutrophil % 40.5 %      Lymphocyte % 47.4 %      Monocyte % 7.2 %      Eosinophil % 3.9 %      Basophil % 0.7 %      Immature Grans % 0.3 %      Neutrophils, Absolute 2.47 10*3/mm3      Lymphocytes, Absolute 2.89 10*3/mm3      Monocytes, Absolute 0.44 10*3/mm3      Eosinophils, Absolute 0.24 10*3/mm3      Basophils, Absolute 0.04 10*3/mm3      Immature Grans, Absolute 0.02 10*3/mm3      nRBC 0.0 /100 WBC     Comprehensive Metabolic Panel [[id number]] Collected: 09/24/24 1618    Specimen: Blood Updated: 09/24/24 1646     Glucose 81 mg/dL      BUN 7 mg/dL      Creatinine 0.94 mg/dL      Sodium 140 mmol/L      Potassium 4.1 mmol/L      Chloride 105 mmol/L      CO2 24.7 mmol/L      Calcium 8.9 mg/dL      Total Protein 7.3 g/dL      Albumin 4.2 g/dL      ALT (SGPT) 9 U/L      AST (SGOT) 15 U/L      Alkaline Phosphatase 97 U/L      Total Bilirubin 0.2 mg/dL      Globulin 3.1 gm/dL      A/G Ratio 1.4 g/dL      BUN/Creatinine Ratio 7.4     Anion Gap 10.3 mmol/L      eGFR 71.4 mL/min/1.73     Narrative:      GFR Normal >60  Chronic Kidney Disease <60  Kidney Failure <15      Lipase  [[id number]]  (Normal) Collected: 09/24/24 1618    Specimen: Blood Updated: 09/24/24 1646     Lipase 17 U/L     Urinalysis, Microscopic Only - Urine, Clean Catch [[id number]]  (Abnormal) Collected: 09/24/24 1531    Specimen: Urine, Clean Catch Updated: 09/24/24 1642     RBC, UA None Seen /HPF      WBC, UA 6-10 /HPF      Bacteria, UA 2+ /HPF      Squamous Epithelial Cells, UA 0-2 /HPF      Hyaline Casts, UA None Seen /LPF      Methodology Manual Light Microscopy    Urinalysis With Culture If Indicated - Urine, Clean Catch [[id number]]  (Abnormal) Collected: 09/24/24 1531    Specimen: Urine, Clean Catch Updated: 09/24/24 1629     Color, UA Dark Yellow     Appearance, UA Cloudy     pH, UA 6.0     Specific Gravity, UA 1.027     Glucose, UA Negative     Ketones, UA Trace     Bilirubin, UA Negative     Blood, UA Negative     Protein, UA 30 mg/dL (1+)     Leuk Esterase, UA Moderate (2+)     Nitrite, UA Negative     Urobilinogen, UA 1.0 E.U./dL    Narrative:      In absence of clinical symptoms, the presence of pyuria, bacteria, and/or nitrites on the urinalysis result does not correlate with infection.    CBC & Differential [[id number]]  (Abnormal) Collected: 09/24/24 1618    Specimen: Blood Updated: 09/24/24 1627    Narrative:      The following orders were created for panel order CBC & Differential.  Procedure                               Abnormality         Status                     ---------                               -----------         ------                     CBC Auto Differential[[id number]]        Abnormal            Final result                 Please view results for these tests on the individual orders.    CBC Auto Differential [[id number]]  (Abnormal) Collected: 09/24/24 1618    Specimen: Blood Updated: 09/24/24 1627     WBC 6.15 10*3/mm3      RBC 4.01 10*6/mm3      Hemoglobin 11.4 g/dL      Hematocrit 35.9 %      MCV 89.5 fL      MCH 28.4 pg      MCHC 31.8 g/dL      RDW 14.5 %      RDW-SD 47.4 fl      MPV  10.5 fL      Platelets 236 10*3/mm3      Neutrophil % 55.6 %      Lymphocyte % 34.1 %      Monocyte % 6.7 %      Eosinophil % 2.6 %      Basophil % 0.7 %      Immature Grans % 0.3 %      Neutrophils, Absolute 3.42 10*3/mm3      Lymphocytes, Absolute 2.10 10*3/mm3      Monocytes, Absolute 0.41 10*3/mm3      Eosinophils, Absolute 0.16 10*3/mm3      Basophils, Absolute 0.04 10*3/mm3      Immature Grans, Absolute 0.02 10*3/mm3      nRBC 0.0 /100 WBC             Imaging Results (Most Recent)       Procedure Component Value Units Date/Time    NM HIDA SCAN WITH PHARMACOLOGICAL INTERVENTION [[id number]] Collected: 09/25/24 1205     Updated: 09/25/24 1208    Narrative:      NM HIDA SCAN WITH PHARMACOLOGICAL INTERVENTION    Date of Exam: 9/25/2024 9:48 AM EDT    Indication: gallbladder distention on abd CT and ultrasound. Possible cholecystitis.  Right upper quadrant abdominal pain.    Comparison: HIDA scan 2/16/2020. CT abdomen and pelvis 9/24/2024.    Technique: The patient received 5.8 mCi of technetium 99m Choletec intravenously and images were obtained of the abdomen in the anterior projection through 60 minutes. The patient ingested 8 oz of Boost and images were taken per protocol post ingestion.    Counts were obtained over the gallbladder to calculate the ejection fraction.      Findings:  Normal radiopharmaceutical uptake is demonstrated within the liver. Gallbladder is visualized within the first 60 minutes of imaging, indicating patency of the cystic duct. Progression of radiopharmaceutical into the small bowel and acute patency of the   common bile duct. Calculated gallbladder ejection fraction is 69%, within normal limits.      Impression:      Impression:  Normal HIDA scan. Normal gallbladder ejection fraction, 69%.        Electronically Signed: Estrella Gan MD    9/25/2024 12:06 PM EDT    Workstation ID: [id number]    [employer] Abdomen Limited [[id number]] Collected: 09/24/24 1905     Updated: 09/24/24 1913     Narrative:      US ABDOMEN LIMITED    Date of Exam: 9/24/2024 6:28 PM EDT    Indication: poss cholecystitis on abdominal CT.  Right upper quadrant abdominal pain.    Comparison: Same day contrast-enhanced CT of the abdomen and pelvis.    Technique: Grayscale and color Doppler ultrasound evaluation of the right upper quadrant was performed.      Findings:    Overlying bowel gas limits evaluation.    The liver appears normal in size and echogenicity. No focal lesions identified. Normal flow is present within the hepatic vasculature.    No gallstones or sludge identified. There is mild gallbladder distention measuring 9.8 x 4.5 cm. No evidence of gallbladder wall thickening or pericholecystic fluid. Negative sonographic Mora sign. Common bile duct measures 0.5 cm    Limited visualization of the pancreas demonstrates no gross abnormality.    The right kidney appears normal in size with no focal lesions. No evidence of nephrolithiasis or hydronephrosis. The right kidney measures 9.8 x 4.5 x 4.2 cm.           Impression:      Impression:    Limited study due to overlying bowel gas.    Mild nonspecific gallbladder distention without evidence of sludge or calculi. No sonographic evidence of acute cholecystitis. Finding may be secondary to prolonged fasting state. Gallbladder dyskinesis or less likely acalculous cholecystitis is a   consideration.        Electronically Signed: Vasquez Schuler MD    9/24/2024 7:11 PM EDT    Workstation ID: [id number]    CT Abdomen Pelvis With Contrast [[id number]] Collected: 09/24/24 1701     Updated: 09/24/24 1710    Narrative:      CT ABDOMEN PELVIS W CONTRAST    Date of Exam: 9/24/2024 4:53 PM EDT    Indication: RUQ abd pain that spread to thoracic back pain x4 days, no N/V/D.    Comparison: Contrast-enhanced CT of the abdomen pelvis performed on September 30, 2022    Technique: Axial CT images were obtained of the abdomen and pelvis following the uneventful intravenous administration of  iodinated contrast. Sagittal and coronal reconstructions were performed.  Automated exposure control and iterative reconstruction   methods were used.        Findings:    Lung Bases:  Mild bibasilar atelectasis is visualized. Stable elevation of the right hemidiaphragm.    Peritoneum:  No free intraperitoneal air or fluid.     Abdominal wall:  Unremarkable.    Liver:  The liver is within normal limits in size and contour. Stable low-density lesion in the inferior right hepatic lobe measuring 3.5 cm, likely representing hemangioma.    Biliary/Gallbladder:  The gallbladder appears distended. No radiopaque gallstones are visualized. No pericholecystic inflammatory changes are seen. The biliary tree is nondilated.    Pancreas:  Pancreas is within normal limits. There is no evidence of pancreatic mass or peripancreatic inflammatory changes.    Spleen:  Spleen is normal in size and contour.    Gastrointestinal/Mesentery:   No evidence of bowel obstruction or gross inflammatory changes. The appendix appears within normal limits. There is mild diffuse fecal retention.    Adrenals:  Adrenal glands are unremarkable.    Kidneys:  The kidneys are in anatomic position. No evidence of nephrolithiasis. No evidence of hydronephrosis or significant perinephric fat stranding.    Bladder:   The urinary bladder is unremarkable.    Reproductive organs:    The patient is post hysterectomy.    Lymph Nodes:  No significant adenopathy is identified.     Vasculature:  Unremarkable aside from small calcified plaque in the infrarenal abdominal aorta.    Osseous Structures:    No acute fracture or aggressive lesions.        Impression:      Impression:    Nonspecific mild gallbladder distention. Correlate for symptoms of cholecystitis. This can be further evaluated with ultrasound as clinically warranted.    Stable 3.5 cm low-density lesion in the inferior right hepatic lobe likely representing hemangioma.    Findings compatible with mild  constipation.            Electronically Signed: Vasquez Schuler MD    9/24/2024 5:07 PM EDT    Workstation ID: [id number]          reviewed    ECG/EMG Results (most recent)       None          reviewed            Microbiology Results (last 10 days)       Procedure Component Value - Date/Time    Urine Culture - Urine, Urine, Clean Catch [[id number]]  (Abnormal) Collected: 09/24/24 1531    Lab Status: Preliminary result Specimen: Urine, Clean Catch Updated: 09/25/24 1241     Urine Culture >100,000 CFU/mL Gram Negative Bacilli    Narrative:      Colonization of the urinary tract without infection is common. Treatment is discouraged unless the patient is symptomatic, pregnant, or undergoing an invasive urologic procedure.            Assessment & Plan     RUQ abdominal pain     UTI  - cbc, cmp ,lipase unremarkable  - ua mod LE, 2+ bacteria, 6-10 wbc  - ct abd reviewed and showing Nonspecific mild gallbladder distention. Correlate for symptoms of cholecystitis. This can be further evaluated with ultrasound as clinically warranted.  Stable 3.5 cm low-density lesion in the inferior right hepatic lobe likely representing hemangioma.Findings compatible with mild constipation.  - US abdomen reviewed and showing mild nonspecific gallbladder distention without evidence of sludge or calculi. No sonographic evidence of acute cholecystitis. Finding may be secondary to prolonged fasting state. Gallbladder dyskinesis or less likely acalculous cholecystitis is a   consideration.  - HIDA scan performed and gall bladder function is normal.   - iv rocephin  - will discharge with oral abx for uti      Constipation  - educated on bowel regimen   - ct abd no obstruction, mild constipation    Hx of seizure  - cont home meds keppra, lamictal, vimpat    Hypothyroidism  - cont synthroid    Hx of bipolar  - cont seroquel, zoloft        I discussed the patients findings and my recommendations with patient and nursing staff.     Discharge  Diagnosis:      RUQ abdominal pain      Hospital Course  Patient is a 54 y.o. female presented with abdominal pain, er evaluated and admitted to observation. Labs including cbc, cmp, lipase are normal. Ua showing uti. Ct abd showing potential gall bladder distention but US and HIDA scan are normal. Pt started on iv rocephin. Urine cx pending. Pt slightly constipated with no obstruction or blockage. Use miralax. Pt will be discharged with oral abx and follow up with primary care. Discharge discussed with pt and she is agreeable to plan. Instructed pt to return to er if symptoms reoccur or worsen.     Past Medical History:     Past Medical History:   Diagnosis Date    Depression     Disease of thyroid gland     Hyperlipidemia     Migraines     On home O2 2021    3 lmp    Seizures        Past Surgical History:     Past Surgical History:   Procedure Laterality Date    ABDOMINAL SURGERY       SECTION      x3    ENDOSCOPY N/A 2022    Procedure: EGD WITH DILATATION (BOUGIE #56) AND INJECTION OF BOTOX;  Surgeon: Rito Le MD;  Location: [facility] ENDOSCOPY;  Service: Gastroenterology;  Laterality: N/A;  POST: LOWER ESOPHAGEAL STRICTURE AND CRICOPHARYNGEAL STRICUTRE    ENDOSCOPY W/ PEG TUBE PLACEMENT N/A 2020    Procedure: ESOPHAGOGASTRODUODENOSCOPY WITH PERCUTANEOUS ENDOSCOPIC GASTROSTOMY TUBE INSERTION;  Surgeon: Rito Le MD;  Location: [facility] ENDOSCOPY;  Service: Gastroenterology;  Laterality: N/A;    HERNIA REPAIR      HYSTERECTOMY      PEG TUBE REMOVAL         Social History:   Social History     Socioeconomic History    Marital status:    Tobacco Use    Smoking status: Former     Current packs/day: 0.00     Average packs/day: 0.3 packs/day for 3.0 years (0.8 ttl pk-yrs)     Types: Cigarettes     Start date:      Quit date:      Years since quittin.7    Smokeless tobacco: Never   Vaping Use    Vaping status: Never Used   Substance and Sexual Activity    Alcohol use: No     Drug use: No    Sexual activity: Never       Procedures Performed         Consults:   Consults       No orders found for last 30 day(s).            Condition on Discharge:     Stable    Discharge Disposition  Home or Self Care    Discharge Medications     Discharge Medications        New Medications        Instructions Start Date   cephalexin 500 MG capsule  Commonly known as: Keflex   500 mg, Oral, 2 Times Daily             Continue These Medications        Instructions Start Date   ALPRAZolam 2 MG tablet  Commonly known as: XANAX   2 mg, Oral, 3 Times Daily PRN, for anxiety      amitriptyline 25 MG tablet  Commonly known as: ELAVIL   25 mg, Oral, Every Night at Bedtime      atorvastatin 20 MG tablet  Commonly known as: LIPITOR   20 mg, Oral, Nightly      BD PosiFlush 0.9 % flush  Generic drug: sodium chloride       busPIRone 30 MG tablet  Commonly known as: BUSPAR   30 mg, Oral, 2 Times Daily      Cariprazine HCl 1.5 MG capsule capsule  Commonly known as: Vraylar   1.5 mg, Oral, Daily      heparin 100 UNIT/ML solution injection   No dose, route, or frequency recorded.      heparin 100 UNIT/ML solution injection       hydrOXYzine 25 MG tablet  Commonly known as: ATARAX   25 mg, Oral, 3 Times Daily PRN, for anxiety      lacosamide 200 MG tablet  Commonly known as: VIMPAT   200 mg, Oral, Every 12 Hours Scheduled      lamoTRIgine 150 MG tablet  Commonly known as: LaMICtal   150 mg, Oral, 2 Times Daily      levETIRAcetam 500 MG tablet  Commonly known as: KEPPRA   1,500 mg, Oral, 2 Times Daily      levothyroxine 25 MCG tablet  Commonly known as: SYNTHROID, LEVOTHROID   25 mcg, Oral, Daily      Motegrity 2 MG tablet  Generic drug: Prucalopride Succinate   1 tablet, Oral, Daily      nystatin 357132 UNIT/GM powder  Commonly known as: nystatin   Topical, 2 Times Daily      ondansetron 2 mg/mL injection  Commonly known as: ZOFRAN   4 mg, Intravenous, Every 6 Hours PRN      pantoprazole 40 MG EC tablet  Commonly known as:  PROTONIX   40 mg, Oral, Daily      promethazine 25 MG/ML injection  Commonly known as: PHENERGAN   12.5 mg, Intravenous, Every 6 Hours PRN      QUEtiapine  MG 24 hr tablet  Commonly known as: SEROquel XR   800 mg, Oral, Every Night at Bedtime      sertraline 100 MG tablet  Commonly known as: ZOLOFT   100 mg, Oral, Daily      sodium chloride 0.9 % solution       sucralfate 1 g tablet  Commonly known as: CARAFATE   1 g, Oral, 3 Times Daily      SUMAtriptan 100 MG tablet  Commonly known as: IMITREX   100 mg, Oral, Once As Needed, Take 1 tab at onset of Migraine. May repeat dose 1 time after 2 hrs if Migraine not resolved.  MAX 3 TABS PER WEEK      vitamin D 1.25 MG (06778 UT) capsule capsule  Commonly known as: ERGOCALCIFEROL   TAKE 1 CAPSULE BY MOUTH 1 TIME EVERY WEEK             Stop These Medications      ferrous gluconate 324 MG tablet  Commonly known as: FERGON              Discharge Diet:     Activity at Discharge:     Follow-up Appointments  Future Appointments   Date Time Provider Department Center   9/30/2024  1:30 PM [facility] C[facility]DON [facility]   10/14/2024 10:30 AM Milka Pavno APRN [facility]   12/9/2024  2:00 PM Alvina Arzate APRN [facility]     Additional Instructions for the Follow-ups that You Need to Schedule       Discharge Follow-up with PCP   As directed       Currently Documented PCP:    Ana Rosa Weber APRN    PCP Phone Number:    [phone]     Follow Up Details: 1-2 weeks                Test Results Pending at Discharge  Pending Labs       Order Current Status    Urine Culture - Urine, Urine, Clean Catch Preliminary result             Risk for Readmission (LACE) Score: 6 (9/25/2024  6:00 AM)      Less Than 30 minutes spent in discharge activities for this patient    Signature:Electronically signed by Vanda Padilla PA-C, 09/25/24, 1:58 PM EDT.

## 2024-09-25 NOTE — CASE MANAGEMENT/SOCIAL WORK
Social Work Assessment   [facility]     Patient Name: Milka Espinoza  MRN: [id number]  Today's Date: 9/25/2024    Admit Date: 9/24/2024     Discharge Plan       Row Name 09/25/24 1550       Plan    Plan Comments SW assisted with [facility]  Transportation [phone] to d/c home.  Transportation to arrive at 355pm to  patient.  Booking ID: [id number], SHADY Crowdersaadia Tellez.  Last 4 digits of License Plate is [id number].  Floor nurse/Marli notified.                    JESSICA Ramos MSW    Phone: [phone]  Cell: [phone]  Fax: [phone]  [email or web address]

## 2024-09-25 NOTE — PLAN OF CARE
Goal Outcome Evaluation:            Discharged instructions reviewed with patient.

## 2024-09-25 NOTE — PLAN OF CARE
Problem: Adult Inpatient Plan of Care  Goal: Plan of Care Review  Outcome: Progressing  Flowsheets (Taken 9/25/2024 0041)  Outcome Evaluation: pt admitted for RUQ abdominal pain that radiates towards the back, pt given iv pain medication once she arrived on the floor and has two lidocaine patches on her back that were placed in the ed. Plans to have HIDA scan in am, provider ordered that pt can not get pain medications after midnight, pt made aware. pt resting in bed at this time, will continue to monitor  Goal: Patient-Specific Goal (Individualized)  Outcome: Progressing  Goal: Absence of Hospital-Acquired Illness or Injury  Outcome: Progressing  Intervention: Identify and Manage Fall Risk  Recent Flowsheet Documentation  Taken 9/24/2024 2347 by Adriane Kingston RN  Safety Promotion/Fall Prevention: safety round/check completed  Intervention: Prevent Skin Injury  Recent Flowsheet Documentation  Taken 9/24/2024 2347 by Adriane Kingston RN  Skin Protection: adhesive use limited  Intervention: Prevent and Manage VTE (Venous Thromboembolism) Risk  Recent Flowsheet Documentation  Taken 9/24/2024 2347 by Adriane Kingston RN  Range of Motion: active ROM (range of motion) encouraged  Intervention: Prevent Infection  Recent Flowsheet Documentation  Taken 9/24/2024 2347 by Adriane Kingston RN  Infection Prevention:   single patient room provided   hand hygiene promoted   personal protective equipment utilized   rest/sleep promoted   equipment surfaces disinfected  Goal: Optimal Comfort and Wellbeing  Outcome: Progressing  Intervention: Monitor Pain and Promote Comfort  Recent Flowsheet Documentation  Taken 9/24/2024 2347 by Adriane Kingston RN  Pain Management Interventions: see MAR  Intervention: Provide Person-Centered Care  Recent Flowsheet Documentation  Taken 9/24/2024 2347 by Adriane Kingston RN  Trust Relationship/Rapport:   care explained   choices provided   questions answered   questions encouraged  Goal:  Readiness for Transition of Care  Outcome: Progressing  Intervention: Mutually Develop Transition Plan  Recent Flowsheet Documentation  Taken 9/24/2024 2351 by Adriane Kingston, RN  Transportation Anticipated: family or friend will provide  Patient/Family Anticipated Services at Transition: none  Patient/Family Anticipates Transition to: home with family  Taken 9/24/2024 2347 by Adriane Kingston, RN  Equipment Currently Used at Home:   walker, standard   medication pump   Goal Outcome Evaluation:              Outcome Evaluation: pt admitted for RUQ abdominal pain that radiates towards the back, pt given iv pain medication once she arrived on the floor and has two lidocaine patches on her back that were placed in the ed. Plans to have HIDA scan in am, provider ordered that pt can not get pain medications after midnight, pt made aware. pt resting in bed at this time, will continue to monitor

## 2024-09-25 NOTE — CASE MANAGEMENT/SOCIAL WORK
Discharge Planning Assessment   Fox     Patient Name: Milka Espinoza  MRN: [id number]  Today's Date: 9/25/2024    Admit Date: 9/24/2024    Plan: From home with adult  son. Will need transport from insurance plan   Discharge Needs Assessment       Row Name 09/25/24 1429       Living Environment    People in Home child(starla), adult    Name(s) of People in Home sonBi 29 y.o; autistic    Current Living Arrangements home    Potentially Unsafe Housing Conditions none    In the past 12 months has the electric, gas, oil, or water company threatened to shut off services in your home? No    Primary Care Provided by self    Provides Primary Care For no one, unable/limited ability to care for self    Family Caregiver if Needed friend(s)    Family Caregiver Names Sulema    Quality of Family Relationships helpful;supportive    Able to Return to Prior Arrangements yes       Resource/Environmental Concerns    Resource/Environmental Concerns none    Transportation Concerns rides, unreliable from others       Transportation Needs    In the past 12 months, has lack of transportation kept you from medical appointments or from getting medications? no    In the past 12 months, has lack of transportation kept you from meetings, work, or from getting things needed for daily living? No       Food Insecurity    Within the past 12 months, you worried that your food would run out before you got the money to buy more. Never true    Within the past 12 months, the food you bought just didn't last and you didn't have money to get more. Never true       Transition Planning    Patient/Family Anticipates Transition to home with family    Patient/Family Anticipated Services at Transition none    Transportation Anticipated health plan transportation       Discharge Needs Assessment    Equipment Currently Used at Home medication pump  recieves IVFs daily at home    Concerns to be Addressed no discharge needs identified    Anticipated Changes  Related to Illness none    Equipment Needed After Discharge none                   Discharge Plan       Row Name 09/25/24 1431       Plan    Plan From home with adult  son. Will need transport from insurance plan    Patient/Family in Agreement with Plan yes    Plan Comments DC orders. CM met with Ms. Espinoza who is a/o, and lives with her 29 y.o autistic son. She drives and is independent and self administers IVFs daily. She does not have home health and doesn't use any other equipment. She denied any financial concerns. She reported her friend is not available to transport her home and has not used transport from her [employer] or her Medicaid before. CM informed nursing to let CM know when she was ready for discharge and CM will call and check if she is eligible to be transported home                  Demographic Summary       Row Name 09/25/24 1428       General Information    Admission Type observation    Arrived From emergency department    Required Notices Provided Observation Status Notice    Referral Source admission list    Reason for Consult discharge planning    Preferred Language English       Contact Information    Permission Granted to Share Info With                    Functional Status       Row Name 09/25/24 1428       Functional Status    Usual Activity Tolerance good    Current Activity Tolerance moderate       Functional Status, IADL    Medications independent    Meal Preparation independent    Housekeeping independent    Laundry independent    Shopping independent       Mental Status    General Appearance WDL WDL       Mental Status Summary    Recent Changes in Mental Status/Cognitive Functioning no changes       Employment/    Employment Status disabled                             Colette CONNOLLY,RN Case Manager  [facility]  Phone: Desk- [phone] cell- [phone]

## 2024-09-25 NOTE — ED NOTES
Nursing report ED to floor  Milka Espinoza  54 y.o.  female    HPI:   Chief Complaint   Patient presents with    Abdominal Pain       Admitting doctor:   Rito Calderón MD    Admitting diagnosis:   The primary encounter diagnosis was Urinary tract infection without hematuria, site unspecified. Diagnoses of RUQ abdominal pain, Thickening of wall of gallbladder, and Acute bilateral thoracic back pain were also pertinent to this visit.    Code status:   Current Code Status       Date Active Code Status Order ID Comments User Context       9/24/2024 2047 CPR (Attempt to Resuscitate) [id number]  Olman Glover APRN ED        Question Answer    Code Status (Patient has no pulse and is not breathing) CPR (Attempt to Resuscitate)    Medical Interventions (Patient has pulse or is breathing) Full Support                    Allergies:   Ketorolac, Sulfa antibiotics, Tramadol hcl, and Adhesive tape    Isolation:  No active isolations     Fall Risk:  Fall Risk Assessment was completed, and patient is at moderate risk for falls.   Predictive Model Details         8 (Low) Factor Value    Calculated 9/24/2024 22:08 Age 54    Risk of Fall Model Active Peripheral IV Present     Imaging order in this encounter Present     Number of Distinct Medication Classes administered 8     Number of administrations of Anti-Convulsants 2     Magnesium not on file     Srinath Scale not on file     Number of administrations of Analgesic Narcotics 2     Calcium 8.9 mg/dL     Diastolic BP 89     Gastrointestinal Assessment X     Days after Admission 0.314     Creatinine 0.94 mg/dL     Albumin 4.2 g/dL     Tobacco Use Quit     ALT 9 U/L     Potassium 4.1 mmol/L     Chloride 105 mmol/L     Total Bilirubin 0.2 mg/dL     Respiratory Rate 15         Weight:       09/24/24  1432   Weight: 91.6 kg (202 lb)       Intake and Output    Intake/Output Summary (Last 24 hours) at 9/24/2024 2210  Last data filed at 9/24/2024 1800  Gross per 24 hour   Intake  "500 ml   Output --   Net 500 ml       Diet:   Dietary Orders (From admission, onward)       Start     Ordered    09/25/24 0001  NPO Diet NPO Type: Strict NPO  Diet Effective Midnight        Question:  NPO Type  Answer:  Strict NPO    09/24/24 2047                     Most recent vitals:   Vitals:    09/24/24 1432 09/24/24 1738 09/24/24 1930 09/24/24 2200   BP: 129/60 136/89 134/89 139/88   BP Location: Left arm      Patient Position: Lying      Pulse: 88 84 79 90   Resp: 15      Temp: 98.1 °F (36.7 °C)      TempSrc: Oral      SpO2: 97%      Weight: 91.6 kg (202 lb)      Height: 162.6 cm (64\")          Active LDAs/IV Access:   Lines, Drains & Airways       Active LDAs       Name Placement date Placement time Site Days    Peripheral IV 09/24/24 1618 Right Antecubital 09/24/24  1618  Antecubital  less than 1    Dual Lumen Implantable Port 09/24/24 Right Subclavian 09/24/24  1619  -- less than 1                    Skin Condition:   Skin Assessments (last day)       None             Labs (abnormal labs have a star):   Labs Reviewed   URINALYSIS W/ CULTURE IF INDICATED - Abnormal; Notable for the following components:       Result Value    Color, UA Dark Yellow (*)     Appearance, UA Cloudy (*)     Ketones, UA Trace (*)     Protein, UA 30 mg/dL (1+) (*)     Leuk Esterase, UA Moderate (2+) (*)     All other components within normal limits    Narrative:     In absence of clinical symptoms, the presence of pyuria, bacteria, and/or nitrites on the urinalysis result does not correlate with infection.   CBC WITH AUTO DIFFERENTIAL - Abnormal; Notable for the following components:    Hemoglobin 11.4 (*)     All other components within normal limits   URINALYSIS, MICROSCOPIC ONLY - Abnormal; Notable for the following components:    WBC, UA 6-10 (*)     Bacteria, UA 2+ (*)     All other components within normal limits   LIPASE - Normal   URINE CULTURE   COMPREHENSIVE METABOLIC PANEL    Narrative:     GFR Normal >60  Chronic Kidney " Disease <60  Kidney Failure <15     BASIC METABOLIC PANEL   CBC WITH AUTO DIFFERENTIAL   CBC AND DIFFERENTIAL    Narrative:     The following orders were created for panel order CBC & Differential.  Procedure                               Abnormality         Status                     ---------                               -----------         ------                     CBC Auto Differential[[id number]]        Abnormal            Final result                 Please view results for these tests on the individual orders.       LOC: Person, Place, Time, and Situation    Telemetry:  Observation Unit    Cardiac Monitoring Ordered: yes    EKG:   No orders to display       Medications Given in the ED:   Medications   sodium chloride 0.9 % flush 10 mL (has no administration in time range)   Lidocaine 4 % 2 patch (2 patches Transdermal Medication Applied 9/24/24 1704)   cefTRIAXone (ROCEPHIN) 2,000 mg in sodium chloride 0.9 % 100 mL MBP (2,000 mg Intravenous New Bag 9/24/24 2150)   sodium chloride 0.9 % flush 10 mL (10 mL Intravenous Given 9/24/24 2150)   sodium chloride 0.9 % flush 10 mL (has no administration in time range)   sodium chloride 0.9 % infusion 40 mL (has no administration in time range)   ondansetron (ZOFRAN) injection 4 mg (has no administration in time range)   melatonin tablet 5 mg (has no administration in time range)   sennosides-docusate (PERICOLACE) 8.6-50 MG per tablet 2 tablet (has no administration in time range)     And   polyethylene glycol (MIRALAX) packet 17 g (has no administration in time range)     And   bisacodyl (DULCOLAX) EC tablet 5 mg (has no administration in time range)     And   bisacodyl (DULCOLAX) suppository 10 mg (has no administration in time range)   sodium chloride 0.9 % bolus 500 mL (0 mL Intravenous Stopped 9/24/24 1800)   morphine injection 2 mg (2 mg Intravenous Given 9/24/24 1704)   ondansetron (ZOFRAN) injection 4 mg (4 mg Intravenous Given 9/24/24 1704)   iopamidol  (ISOVUE-370) 76 % injection 100 mL (100 mL Intravenous Given 24)   HYDROmorphone (DILAUDID) injection 0.5 mg (0.5 mg Intravenous Given 24)   methocarbamol (ROBAXIN) tablet 500 mg (500 mg Oral Given 24)   lacosamide (VIMPAT) tablet 200 mg (200 mg Oral Given 24)   levETIRAcetam (KEPPRA) tablet 1,500 mg (1,500 mg Oral Given 24)       Imaging results:  US Abdomen Limited    Result Date: 2024  Impression: Limited study due to overlying bowel gas. Mild nonspecific gallbladder distention without evidence of sludge or calculi. No sonographic evidence of acute cholecystitis. Finding may be secondary to prolonged fasting state. Gallbladder dyskinesis or less likely acalculous cholecystitis is a consideration. Electronically Signed: Vasquez Schuler MD  2024 7:11 PM EDT  Workstation ID: [id number]    CT Abdomen Pelvis With Contrast    Result Date: 2024  Impression: Nonspecific mild gallbladder distention. Correlate for symptoms of cholecystitis. This can be further evaluated with ultrasound as clinically warranted. Stable 3.5 cm low-density lesion in the inferior right hepatic lobe likely representing hemangioma. Findings compatible with mild constipation. Electronically Signed: Vasquez Schuler MD  2024 5:07 PM EDT  Workstation ID: [id number]     Social issues:   Social History     Socioeconomic History    Marital status:    Tobacco Use    Smoking status: Former     Current packs/day: 0.00     Average packs/day: 0.3 packs/day for 3.0 years (0.8 ttl pk-yrs)     Types: Cigarettes     Start date:      Quit date:      Years since quittin.7    Smokeless tobacco: Never   Vaping Use    Vaping status: Never Used   Substance and Sexual Activity    Alcohol use: No    Drug use: No    Sexual activity: Never       NIH Stroke Scale:  Interval: (not recorded)  1a. Level of Consciousness: (not recorded)  1b. LOC Questions: (not recorded)  1c. LOC Commands: (not  recorded)  2. Best Gaze: (not recorded)  3. Visual: (not recorded)  4. Facial Palsy: (not recorded)  5a. Motor Arm, Left: (not recorded)  5b. Motor Arm, Right: (not recorded)  6a. Motor Leg, Left: (not recorded)  6b. Motor Leg, Right: (not recorded)  7. Limb Ataxia: (not recorded)  8. Sensory: (not recorded)  9. Best Language: (not recorded)  10. Dysarthria: (not recorded)  11. Extinction and Inattention (formerly Neglect): (not recorded)    Total (NIH Stroke Scale): (not recorded)     Additional notable assessment information:     Nursing report ED to floor:  2B JAMAICA Dudley, RN   09/24/24 22:10 EDT

## 2024-09-26 ENCOUNTER — TRANSITIONAL CARE MANAGEMENT TELEPHONE ENCOUNTER (OUTPATIENT)
Dept: CALL CENTER | Facility: HOSPITAL | Age: 54
End: 2024-09-26
Payer: MEDICARE

## 2024-09-26 ENCOUNTER — TELEPHONE (OUTPATIENT)
Dept: FAMILY MEDICINE CLINIC | Facility: CLINIC | Age: 54
End: 2024-09-26
Payer: MEDICARE

## 2024-09-26 LAB — BACTERIA SPEC AEROBE CULT: ABNORMAL

## 2024-09-26 RX ORDER — ALPRAZOLAM 2 MG
2 TABLET ORAL 3 TIMES DAILY PRN
Qty: 90 TABLET | Refills: 0 | Status: SHIPPED | OUTPATIENT
Start: 2024-09-26

## 2024-09-26 NOTE — CASE MANAGEMENT/SOCIAL WORK
Case Management Discharge Note      Final Note: Routine home                 Transportation Services  Private: Car ([facility] transport)    Final Discharge Disposition Code: 01 - home or self-care

## 2024-09-30 ENCOUNTER — OFFICE VISIT (OUTPATIENT)
Dept: FAMILY MEDICINE CLINIC | Facility: CLINIC | Age: 54
End: 2024-09-30
Payer: MEDICARE

## 2024-09-30 VITALS
HEIGHT: 64 IN | RESPIRATION RATE: 18 BRPM | BODY MASS INDEX: 32.58 KG/M2 | HEART RATE: 84 BPM | SYSTOLIC BLOOD PRESSURE: 112 MMHG | WEIGHT: 190.8 LBS | TEMPERATURE: 97.3 F | OXYGEN SATURATION: 98 % | DIASTOLIC BLOOD PRESSURE: 64 MMHG

## 2024-09-30 DIAGNOSIS — Z09 HOSPITAL DISCHARGE FOLLOW-UP: Primary | ICD-10-CM

## 2024-09-30 DIAGNOSIS — M54.50 CHRONIC BILATERAL LOW BACK PAIN WITHOUT SCIATICA: ICD-10-CM

## 2024-09-30 DIAGNOSIS — G89.29 CHRONIC BILATERAL LOW BACK PAIN WITHOUT SCIATICA: ICD-10-CM

## 2024-09-30 PROBLEM — K76.9 LESION OF RIGHT LOBE OF LIVER: Status: ACTIVE | Noted: 2024-09-30

## 2024-09-30 PROCEDURE — 1125F AMNT PAIN NOTED PAIN PRSNT: CPT

## 2024-09-30 PROCEDURE — 1111F DSCHRG MED/CURRENT MED MERGE: CPT

## 2024-09-30 PROCEDURE — 99495 TRANSJ CARE MGMT MOD F2F 14D: CPT

## 2024-09-30 RX ORDER — CYCLOBENZAPRINE HYDROCHLORIDE 7.5 MG/1
7.5 TABLET, FILM COATED ORAL 3 TIMES DAILY PRN
Qty: 30 TABLET | Refills: 0 | Status: SHIPPED | OUTPATIENT
Start: 2024-09-30 | End: 2024-10-01 | Stop reason: RX

## 2024-09-30 NOTE — PROGRESS NOTES
Subjective   Milka Espinoza is a 54 y.o. female. Presents to [facility]    Milka was seen at [facility] . She was admitted on 09/24/2024  for abdominal pain. She was discharged on 09/25/2024. Discharge diagnosis was urinary tract infection, constipation.  . Labs that were performed during the encounter included: cbc, cmp ,lipase, urinalysis with mod LE, 2+ bacteria, 6-10 wbc.  Diagnostic studies that were performed included:  US abdomen reviewed and showing mild nonspecific gallbladder distention without evidence of sludge or calculi. No sonographic evidence of acute cholecystitis. Finding may be secondary to prolonged fasting state. Gallbladder dyskinesis or less likely acalculous cholecystitis. CT abdomen and pelvis with nonspecific mild gallbladder distention.  And stable 3.5 cm low-density lesion in the inferior right hepatic lobe likely hemangioma, mild constipation.   . Currently Milka receives care at home. Complications from the hospital stay include UTI. The patient stated that they do not need help with their daily life and activities. The patient stated that they do have emotional support at home.  Current outpatient and discharge medications have been reconciled for the patient.  Reviewed by: SANJEEV Domingo    Transitional Care Management Certification  I certify that the following are true:  Communication was made within 2 business days of discharge.  Complexity of Medical Decision Making is low.  Face to face visit occurred within 5 days.    *Note: 04598 is for high complexity patients with a face to face visit within 7 days of discharge.  64708 is for high complexity patients with a face to face on days 8-14 post discharge or medium complexity with face to face visit within 14 days post discharge.    She was also seen at [facility] on September 19, 2024 for back pain.  She was given hydrocodone 5 mg In the ER department and methocarbamol  "with relief.  She ambulated but was unable to give any urine and sent home.  Imaging was not performed due to no trauma history.    Patient was then seen at [facility] on September 23, 2024 for abdominal pain.  She reports a history of constipation.  X-ray abdomen completed with findings of moderate constipation.  She was given a prescription for mag citrate and Colace.    Chief Complaint   Patient presents with    Hospital Follow Up Visit     UTI symptom with IV Rocephin       History of Present Illness   History of Present Illness  The patient is a 54-year-old female who presents for a hospital follow-up.    She was recently treated for a urinary tract infection (UTI) with Rocephin and Keflex, which she started taking twice daily on 09/25/2024. Her urination has improved, and she no longer experiences pain during urination or constipation. She still has a few days left of this medication.    She is here today with complaints of low back pain.  No recent injury or accident to the pain she reports Tylenol is like eating candy and states she does not experience pain relief.    Denies any current constipation or dysuria today.  No abdominal pain, diarrhea, nausea, vomiting or dysuria.      I personally reviewed and updated the patient's allergies, medications, problem list, and past medical, surgical, social, and family history. I have reviewed and confirmed the accuracy of the History of Present Illness and Review of Symptoms as documented by the MA/JACQUEN/RN. Alvina Arzate, SANJEEV    Allergies:  Allergies   Allergen Reactions    Ketorolac Other (See Comments)     pt states \"this medicine sends me into seizures\"    Sulfa Antibiotics Hives    Tramadol Hcl Mental Status Change    Adhesive Tape Rash     Paper tape       Social History:  Social History     Socioeconomic History    Marital status:    Tobacco Use    Smoking status: Former     Current packs/day: 0.00     Average packs/day: 0.3 " packs/day for 3.0 years (0.8 ttl pk-yrs)     Types: Cigarettes     Start date:      Quit date:      Years since quittin.7    Smokeless tobacco: Never   Vaping Use    Vaping status: Never Used   Substance and Sexual Activity    Alcohol use: No    Drug use: No    Sexual activity: Never       Family History:  Family History   Problem Relation Age of Onset    No Known Problems Mother     No Known Problems Father     No Known Problems Sister     No Known Problems Brother     Alcohol abuse Daughter     No Known Problems Son     No Known Problems Maternal Grandmother     No Known Problems Paternal Grandmother     No Known Problems Maternal Aunt     No Known Problems Paternal Aunt        Past Medical History :  Patient Active Problem List   Diagnosis    Bipolar disorder, in full remission, most recent episode depressed    Depression    Mood disorder in conditions classified elsewhere    Seizures    Asthma    General medical exam    Abdominal pain    Obesity    GERD (gastroesophageal reflux disease)    Tobacco use    Nausea and vomiting    Borderline hypoglycemic episodes    Migraines    Pseudoseizure    Panic disorder with agoraphobia    Gastroparesis    HLD (hyperlipidemia)    Hypothyroid    Bipolar disorder    Dissociative convulsions    Anxiety    Back pain    Other diseases of stomach and duodenum    Esophageal dysmotility    Other specified disease of esophagus    Hiatal hernia    Special screening for malignant neoplasms, colon    Sleep apnea    Weight loss    Restless leg syndrome    Colon polyps    Pure hypercholesterolemia    Panic attacks    Noninfectious gastroenteritis    Irritable bowel syndrome    Intestinal autonomic neuropathy    Hemorrhoid    Hemoptysis    Hemangioma of liver    Helicobacter pylori gastritis    H/O: depression    Gastro-esophageal reflux disease with esophagitis    Ex-cigarette smoker    Epileptic seizure, tonic    Duodenal ulcer, unspecified as acute or chronic, without  hemorrhage or perforation    Dizziness    Disease of digestive system, unspecified    COPD (chronic obstructive pulmonary disease)    CKD (chronic kidney disease)    Benign neoplasm of transverse colon    Benign esophageal stricture    Autoantibody titer positive    Attention disturbance    Acute gastric ulcer without hemorrhage or perforation    Personal history of colonic polyps    H/O colonoscopy    Abnormal findings on diagnostic imaging of other parts of digestive tract    Foreign body in digestive system    Normal esophagogastroduodenoscopy (EGD)    Vitamin D deficiency    RUQ abdominal pain    Lesion of right lobe of liver       Medication List:    Current Outpatient Medications:     ALPRAZolam (XANAX) 2 MG tablet, TAKE 1 TABLET BY MOUTH THREE TIMES DAILY AS NEEDED FOR ANXIETY, Disp: 90 tablet, Rfl: 0    amitriptyline (ELAVIL) 25 MG tablet, TAKE 1 TABLET BY MOUTH EVERY NIGHT AT BEDTIME, Disp: 90 tablet, Rfl: 1    atorvastatin (LIPITOR) 20 MG tablet, Take 1 tablet by mouth Every Night., Disp: 90 tablet, Rfl: 3    BD PosiFlush 0.9 % flush, , Disp: , Rfl:     busPIRone (BUSPAR) 30 MG tablet, TAKE 1 TABLET BY MOUTH TWICE DAILY, Disp: 180 tablet, Rfl: 1    Cariprazine HCl (Vraylar) 1.5 MG capsule capsule, Take 1 capsule by mouth Daily., Disp: 90 capsule, Rfl: 1    cephalexin (Keflex) 500 MG capsule, Take 1 capsule by mouth 2 (Two) Times a Day., Disp: 14 capsule, Rfl: 0    heparin 100 UNIT/ML solution injection, , Disp: , Rfl:     heparin 100 UNIT/ML solution injection, , Disp: , Rfl:     hydrOXYzine (ATARAX) 25 MG tablet, TAKE 1 TABLET BY MOUTH THREE TIMES DAILY AS NEEDED FOR ANXIETY, Disp: 90 tablet, Rfl: 2    lacosamide (VIMPAT) 200 MG tablet, Take 1 tablet by mouth Every 12 (Twelve) Hours., Disp: 60 tablet, Rfl: 5    lamoTRIgine (LaMICtal) 150 MG tablet, Take 1 tablet by mouth 2 (Two) Times a Day., Disp: 60 tablet, Rfl: 5    levETIRAcetam (KEPPRA) 500 MG tablet, Take 3 tablets by mouth 2 (Two) Times a Day.,  Disp: 540 tablet, Rfl: 3    levothyroxine (SYNTHROID, LEVOTHROID) 25 MCG tablet, Take 1 tablet by mouth Daily., Disp: 90 tablet, Rfl: 0    Motegrity 2 MG tablet, Take 1 tablet by mouth Daily., Disp: , Rfl:     nystatin 656358 UNIT/GM powder, Apply  topically to the appropriate area as directed 2 (Two) Times a Day., Disp: 30 g, Rfl: 1    ondansetron (ZOFRAN) 2 mg/mL injection, Infuse 2 mL into a venous catheter Every 6 (Six) Hours As Needed., Disp: , Rfl:     pantoprazole (PROTONIX) 40 MG EC tablet, TAKE 1 TABLET BY MOUTH DAILY, Disp: 90 tablet, Rfl: 1    QUEtiapine XR (SEROquel XR) 400 MG 24 hr tablet, TAKE 2 TABLETS BY MOUTH EVERY NIGHT AT BEDTIME, Disp: 180 tablet, Rfl: 1    sodium chloride 0.9 % solution, , Disp: , Rfl:     sucralfate (CARAFATE) 1 g tablet, Take 1 tablet by mouth 3 (Three) Times a Day., Disp: , Rfl:     vitamin D (ERGOCALCIFEROL) 1.25 MG (05867 UT) capsule capsule, TAKE 1 CAPSULE BY MOUTH 1 TIME EVERY WEEK, Disp: 8 capsule, Rfl: 0    cyclobenzaprine (FEXMID) 7.5 MG tablet, Take 1 tablet by mouth 3 (Three) Times a Day As Needed for Muscle Spasms., Disp: 30 tablet, Rfl: 0    sertraline (ZOLOFT) 100 MG tablet, Take 1 tablet by mouth Daily. (Patient not taking: Reported on 2024), Disp: 90 tablet, Rfl: 1    Past Surgical History:  Past Surgical History:   Procedure Laterality Date    ABDOMINAL SURGERY       SECTION      x3    ENDOSCOPY N/A 2022    Procedure: EGD WITH DILATATION (BOUGIE #56) AND INJECTION OF BOTOX;  Surgeon: Rito Le MD;  Location: [facility] ENDOSCOPY;  Service: Gastroenterology;  Laterality: N/A;  POST: LOWER ESOPHAGEAL STRICTURE AND CRICOPHARYNGEAL STRICUTRE    ENDOSCOPY W/ PEG TUBE PLACEMENT N/A 2020    Procedure: ESOPHAGOGASTRODUODENOSCOPY WITH PERCUTANEOUS ENDOSCOPIC GASTROSTOMY TUBE INSERTION;  Surgeon: Rito Le MD;  Location: [facility] END[facility];  Service: Gastroenterology;  Laterality: N/A;    HERNIA REPAIR      HYSTERECTOMY      PEG TUBE REMOVAL  "        Review of Systems:  Review of Systems   Gastrointestinal:  Negative for constipation.   Genitourinary:  Negative for dysuria.       Physical Exam:  Vital Signs:  Vital Signs:   /64 (BP Location: Right arm, Patient Position: Sitting, Cuff Size: Large Adult)   Pulse 84   Temp 97.3 °F (36.3 °C) (Temporal)   Resp 18   Ht 162 cm (63.78\")   Wt 86.5 kg (190 lb 12.8 oz)   SpO2 98%   BMI 32.98 kg/m²     Result Review :              Results  Laboratory Studies  Moderate constipation observed in x-ray. Hemoglobin levels indicate anemia.    Imaging  Ultrasound of abdomen shows gallbladder distention but no sludge or calculi, no gallstones. HIDA scan shows normal ejection fraction from the gallbladder. CT abdomen and pelvis shows a stable lesion in the right liver lobe, likely a hemangioma.      Physical Exam  Vitals and nursing note reviewed.   Constitutional:       General: She is not in acute distress.     Appearance: Normal appearance. She is well-groomed. She is not ill-appearing.   HENT:      Head: Normocephalic and atraumatic.   Eyes:      General: Lids are normal. Vision grossly intact.   Neck:      Vascular: No carotid bruit.   Cardiovascular:      Rate and Rhythm: Normal rate and regular rhythm.      Pulses: Normal pulses.      Heart sounds: Normal heart sounds.   Pulmonary:      Effort: Pulmonary effort is normal.      Breath sounds: Normal breath sounds and air entry.   Chest:      Comments: Venous access device in place right subclavian with tegaderm style dressing and two lumen iv access device in place.  No erythem or redness noted.   Abdominal:      General: Abdomen is flat. Bowel sounds are normal. There is no distension or abdominal bruit. There are no signs of injury.      Palpations: Abdomen is soft.      Tenderness: There is no abdominal tenderness. There is no right CVA tenderness or left CVA tenderness.   Musculoskeletal:      Lumbar back: No swelling, deformity, spasms, tenderness or " bony tenderness. Normal range of motion. Negative right straight leg raise test and negative left straight leg raise test.      Right lower leg: No edema.      Left lower leg: No edema.   Skin:     General: Skin is warm and dry.   Neurological:      Mental Status: She is alert and oriented to person, place, and time. Mental status is at baseline.   Psychiatric:         Mood and Affect: Mood normal.         Behavior: Behavior normal. Behavior is cooperative.       Physical Exam      Assessment and Plan:  Problems Addressed this Visit          Unprioritized    Back pain    Relevant Medications    cyclobenzaprine (FEXMID) 7.5 MG tablet    Other Relevant Orders    XR Spine Lumbar 2 or 3 View     Other Visit Diagnoses       Hospital discharge follow-up    -  Primary          Diagnoses         Codes Comments    Hospital discharge follow-up    -  Primary ICD-10-CM: Z09  ICD-9-CM: V67.59     Chronic bilateral low back pain without sciatica     ICD-10-CM: M54.50, G89.29  ICD-9-CM: 724.2, 338.29            Diagnoses and all orders for this visit:    1. Hospital discharge follow-up (Primary)    2. Chronic bilateral low back pain without sciatica  -     XR Spine Lumbar 2 or 3 View  -     cyclobenzaprine (FEXMID) 7.5 MG tablet; Take 1 tablet by mouth 3 (Three) Times a Day As Needed for Muscle Spasms.  Dispense: 30 tablet; Refill: 0      Assessment & Plan  1. Urinary Tract Infection (UTI).  She was treated with Rocephin and Keflex, starting on 09/25/2024, and reports no pain during urination now. She should complete the remaining days of her Keflex course.    2. Constipation.  She experienced moderate constipation, confirmed by an x-ray on 09/23/2024, and was treated with mag citrate and Colace, resulting in bowel movements. No current constipation reported.    3. Back Pain.  Persistent back pain was noted, unrelieved by muscle rubs or icy hot. A lumbar x-ray will be performed today to rule out any underlying issues. Flexeril  will be prescribed for muscle relaxation. She is advised to apply heat or ice to her back, depending on which provides more relief, and to use a pillow under her legs or knees to reduce back pressure.    4. Anemia.  Mild anemia was noted in her lab results. Further evaluation and management will be considered based on the x-ray and ongoing symptoms.      Risk for Readmission (LACE) Score: 6 (9/25/2024  6:00 AM)      An After Visit Summary and PPPS were given to the patient.       I wore protective equipment throughout this patient encounter to include mask and eye protection. Hand hygiene was performed before donning protective equipment and after removal when leaving the room.    Follow Up   No follow-ups on file.  Patient was given instructions and counseling regarding her condition or for health maintenance advice. Please see specific information pulled into the AVS if appropriate.    There are no Patient Instructions on file for this visit.     This document is intended for medical expert use only. Reading of this document by patients and/or patient's family without participating medical staff guidance may result in misinterpretation and unintended morbidity. Any interpretation of such data is the responsibility of the patient and/or family member responsible for the patient in concert with their primary or specialist providers, not to be left for sources of online searches such as [employer], [employer] or similar queries. Relying on these approaches to knowledge may result in misinterpretation, misguided goals of care and even death should patients or family members try recommendations outside of the realm of professional medical care.      Patient or patient representative verbalized consent for the use of Ambient Listening during the visit with  SANJEEV Domingo for chart documentation. 9/30/2024  16:15 EDT

## 2024-10-01 ENCOUNTER — TELEPHONE (OUTPATIENT)
Dept: FAMILY MEDICINE CLINIC | Facility: CLINIC | Age: 54
End: 2024-10-01
Payer: MEDICARE

## 2024-10-01 ENCOUNTER — HOSPITAL ENCOUNTER (OUTPATIENT)
Dept: GENERAL RADIOLOGY | Facility: HOSPITAL | Age: 54
Discharge: HOME | End: 2024-10-01
Payer: MEDICARE

## 2024-10-01 PROCEDURE — 72100 X-RAY EXAM L-S SPINE 2/3 VWS: CPT

## 2024-10-01 RX ORDER — CYCLOBENZAPRINE HCL 5 MG
5 TABLET ORAL DAILY PRN
Qty: 30 TABLET | Refills: 0 | Status: SHIPPED | OUTPATIENT
Start: 2024-10-01

## 2024-10-01 NOTE — TELEPHONE ENCOUNTER
Caller: Milka Espinoza    Relationship: Self    Best call back number: [phone]     What medication are you requesting: MEDICATION TO HELP WITH BACK PAIN    What are your current symptoms: BACK PAIN    preferred pharmacy and phone number: [id number] - [place] [id number] [facility] [address] [facility] [address] [facility] [address] - [phone]  - [phone] FX     Additional notes: PATIENT HAD X RAY DONE TODAY    PLEASE ADVISE

## 2024-10-01 NOTE — TELEPHONE ENCOUNTER
Abbie called in regards to prescription for cyclobenzaprine.  States they do no have 7.5mg in stock.  They have 5mg or 10mg.  Would like to know if you will change to one of these strengths.  Pharmacy can be reached at [phone]

## 2024-10-07 ENCOUNTER — TELEPHONE (OUTPATIENT)
Dept: FAMILY MEDICINE CLINIC | Facility: CLINIC | Age: 54
End: 2024-10-07
Payer: MEDICARE

## 2024-10-07 NOTE — TELEPHONE ENCOUNTER
Caller: Milka Espinoza    Relationship: Self    Best call back number: [phone]       What test was performed: XRAY/BACK    When was the test performed: LAST WEEK     Additional notes: PATIENT IS STILL HAVING A LOT OF PAIN. PLEASE CALL.

## 2024-10-08 ENCOUNTER — TELEPHONE (OUTPATIENT)
Dept: FAMILY MEDICINE CLINIC | Facility: CLINIC | Age: 54
End: 2024-10-08
Payer: MEDICARE

## 2024-10-08 NOTE — TELEPHONE ENCOUNTER
"  Caller: Milka Espinoza    Relationship: Self    Best call back number: [phone]    What was the call regarding: PATIENT CALLED STATES \"SHE GOT THE REQUEST THING ON HER PHONE \" BOB WOULD KNOW WHAT SHE IS TALKING ABOUT      "

## 2024-10-08 NOTE — TELEPHONE ENCOUNTER
Caller: Milka Espinoza    Relationship: Self    Best call back number: [phone]    What medication are you requesting: PAIN RELIEVER, SOMETHING FOR SLEEP     What are your current symptoms: HAS A WIRE IN HER MOUTH, HAS TO TAKE IT OUT AT NIGHT. STATED THAT SHE WILL EXPERIENCE PAIN THEN . STATED SHE WILL NEED SOMETHING TO HELP HER SLEEP.         Have you had these symptoms before:    [x] Yes  [] No    Have you been treated for these symptoms before:   [x] Yes  [] No    If a prescription is needed, what is your preferred pharmacy and phone number: [id number] - [address] [facility] [address] [facility] [address] - [phone] [facility] [phone] FX     Additional notes:

## 2024-10-10 RX ORDER — LEVOTHYROXINE SODIUM 25 UG/1
25 TABLET ORAL DAILY
Qty: 90 TABLET | Refills: 0 | OUTPATIENT
Start: 2024-10-10

## 2024-10-10 RX ORDER — CYCLOBENZAPRINE HCL 5 MG
5 TABLET ORAL DAILY PRN
Qty: 30 TABLET | Refills: 0 | OUTPATIENT
Start: 2024-10-10

## 2024-10-14 ENCOUNTER — TELEPHONE (OUTPATIENT)
Dept: FAMILY MEDICINE CLINIC | Facility: CLINIC | Age: 54
End: 2024-10-14
Payer: MEDICARE

## 2024-10-28 DIAGNOSIS — F41.0 PANIC DISORDER: Chronic | ICD-10-CM

## 2024-10-28 DIAGNOSIS — F41.1 GENERALIZED ANXIETY DISORDER: Chronic | ICD-10-CM

## 2024-10-28 NOTE — TELEPHONE ENCOUNTER
Rx Refill Note  Requested Prescriptions     Pending Prescriptions Disp Refills    ALPRAZolam (XANAX) 2 MG tablet [Pharmacy Med Name: ALPRAZOLAM 2MG TABLETS] 90 tablet      Sig: TAKE 1 TABLET BY MOUTH THREE TIMES DAILY AS NEEDED FOR ANXIETY        Last office visit with prescribing clinician: 5/30/2024     Next office visit with prescribing clinician: 12/31/2024     Office Visit with Milka Pavon, SANJEEV (05/30/2024)     ToxAssure Flex 22, Ur w/DL - Urine, Random Void (05/30/2024 10:26)     BEHAVIORAL HEALTH - SCAN - Inspect report, Chayo, 10/28/24 (10/28/2024)     Inspect Fill 9/27/24    Sofia Feliciano  10/28/24, 13:11 EDT

## 2024-10-29 RX ORDER — ALPRAZOLAM 2 MG/1
2 TABLET ORAL 3 TIMES DAILY PRN
Qty: 90 TABLET | Refills: 0 | Status: SHIPPED | OUTPATIENT
Start: 2024-10-29

## 2024-11-25 ENCOUNTER — TELEPHONE (OUTPATIENT)
Dept: NEUROLOGY | Facility: CLINIC | Age: 54
End: 2024-11-25
Payer: MEDICARE

## 2024-11-25 DIAGNOSIS — F41.0 PANIC DISORDER: Chronic | ICD-10-CM

## 2024-11-25 DIAGNOSIS — F41.1 GENERALIZED ANXIETY DISORDER: Chronic | ICD-10-CM

## 2024-11-25 RX ORDER — ALPRAZOLAM 2 MG/1
2 TABLET ORAL 3 TIMES DAILY PRN
Qty: 90 TABLET | Refills: 0 | Status: SHIPPED | OUTPATIENT
Start: 2024-11-25

## 2024-11-25 NOTE — TELEPHONE ENCOUNTER
Provider: LACHO     Caller: Milka Espinoza    Relationship to Patient: Self    Phone Number: [phone] (home)     Reason for Call: PATIENT STATED THAT HER PHARMACY WILL NOT COVER MORE THAN 9 TABLETS EVERY 30 DAYS. PATIENT IS REQUESTING FOR AN ALTERNATIVE TO BE SENT TO THE PHARMACY FOR HER MIGRAINE.    PLEASE REVIEW

## 2024-11-25 NOTE — TELEPHONE ENCOUNTER
Rx Refill Note  Requested Prescriptions     Pending Prescriptions Disp Refills    ALPRAZolam (XANAX) 2 MG tablet [Pharmacy Med Name: ALPRAZOLAM 2MG TABLETS] 90 tablet      Sig: TAKE 1 TABLET BY MOUTH THREE TIMES DAILY AS NEEDED FOR ANXIETY        Last office visit with prescribing clinician: 5/30/2024     Next office visit with prescribing clinician: 12/31/2024     Office Visit with Milka Pavon APRN (05/30/2024)     ToxAssure Flex 22, Ur w/DL - Urine, Random Void (05/30/2024 10:26)     BEHAVIORAL HEALTH - SCAN - Inspect reportChayo. 11/25/24 (11/25/2024)     Inspect Fill 10/30/24    Sofia Feliciano  11/25/24, 10:24 EST

## 2024-11-26 DIAGNOSIS — G43.009 MIGRAINE WITHOUT AURA AND WITHOUT STATUS MIGRAINOSUS, NOT INTRACTABLE: Primary | ICD-10-CM

## 2024-11-26 RX ORDER — RIZATRIPTAN BENZOATE 5 MG/1
TABLET, ORALLY DISINTEGRATING ORAL
Qty: 12 TABLET | Refills: 11 | Status: SHIPPED | OUTPATIENT
Start: 2024-11-26

## 2024-12-17 ENCOUNTER — TELEPHONE (OUTPATIENT)
Dept: NEUROLOGY | Facility: CLINIC | Age: 54
End: 2024-12-17
Payer: MEDICARE

## 2024-12-17 NOTE — TELEPHONE ENCOUNTER
Provider: ÁNGEL MATHEWS    Caller: Milka Espinoza    Relationship to Patient: Self    Pharmacy: [facility]    Phone Number: [phone]    Reason for Call: PT HAD A SEIZURE YESTERDAY AFTERNOON.  SHE WENT INTO THE BATHROOM AND WAS LOOKING IN THE  MIRROR AND THE NEXT THING SHE KNEW SHE WAS ON THE FLOOR.  SHE DID NOT HIT HER HEAD.  THE ONLY THING THAT HURT WAS HER UPPER RIGHT ARM AND LOWER RIGHT LEG.  SHE IS JUST SORE WHEN SHE PRESSES ON THE AREA.  NO BRUISING YET.   DID NOT GO TO ED    PT IS HAVING THEM ABOUT THREE TIMES A WEEK.  ABOUT TWO MONTHS AGO SHE HAD A SEIZURE AND BROKE A FRONT TOOTH. SHE THINKS SHE MIGHT BE HAVING SEIZURES AT NIGHT WHEN SHE SLEEPS ALSO BECAUSE WHEN SHE WAKES UP SHE IS SORE ALL OVER.      NEXT AVAILABLE APPT IS NOT UNTIL 4/7/24    ADVISED PT SHE NEEDS TO GO TO ED FOR SEIZURES. WILL W/T TO OFFICE DUE TO HUB PROTOCOL.         PLEASE REVIEW AND ADVISE     THANK YOU

## 2024-12-18 DIAGNOSIS — F40.01 PANIC DISORDER WITH AGORAPHOBIA: Chronic | ICD-10-CM

## 2024-12-18 RX ORDER — BUSPIRONE HYDROCHLORIDE 30 MG/1
30 TABLET ORAL 2 TIMES DAILY
Qty: 180 TABLET | Refills: 1 | Status: SHIPPED | OUTPATIENT
Start: 2024-12-18

## 2024-12-20 DIAGNOSIS — F40.01 PANIC DISORDER WITH AGORAPHOBIA: Chronic | ICD-10-CM

## 2024-12-20 RX ORDER — HYDROXYZINE HYDROCHLORIDE 25 MG/1
25 TABLET, FILM COATED ORAL 3 TIMES DAILY PRN
Qty: 90 TABLET | Refills: 2 | Status: SHIPPED | OUTPATIENT
Start: 2024-12-20

## 2024-12-22 NOTE — TELEPHONE ENCOUNTER
Caller: Milka Espinoza    Relationship: Self    Best call back number: [phone] (H)    PATIENT WAS IN FOR A VISIT TODAY AND WAS DISCUSSING WEIGHT LOSS MEDICATION/INJECTIONS.    HER INSURANCE COMPANY WILL NOT COVER THE SAXENDA.    SHE IS WANTING TO SEE ABOUT GETTING ON PHENTERMINE    PLEASE ADVISE       [facility] #[id number] - [address] AT SEC OF [facility] [address] & [facility] [address] - [phone]  - [phone] [phone]   no

## 2024-12-23 DIAGNOSIS — F41.1 GENERALIZED ANXIETY DISORDER: Chronic | ICD-10-CM

## 2024-12-23 DIAGNOSIS — F41.0 PANIC DISORDER: Chronic | ICD-10-CM

## 2024-12-23 NOTE — TELEPHONE ENCOUNTER
Rx Refill Note  Requested Prescriptions     Pending Prescriptions Disp Refills    ALPRAZolam (XANAX) 2 MG tablet [Pharmacy Med Name: ALPRAZOLAM 2MG TABLETS] 90 tablet      Sig: TAKE 1 TABLET BY MOUTH THREE TIMES DAILY AS NEEDED FOR ANXIETY        Last office visit with prescribing clinician: 5/30/2024     Next office visit with prescribing clinician: 12/31/2024     Office Visit with Milka Pavon, SANJEEV (05/30/2024)     ToxAssure Flex 22, Ur w/DL - Urine, Random Void (05/30/2024 10:26)     BEHAVIORAL HEALTH - SCAN - Inspect report, Chayo, 12/23/24 (12/23/2024)     Inspect Fill 11/29/24    Sofia Feliciano  12/23/24, 11:22 EST

## 2024-12-26 RX ORDER — ALPRAZOLAM 2 MG/1
2 TABLET ORAL 3 TIMES DAILY PRN
Qty: 90 TABLET | Refills: 0 | Status: SHIPPED | OUTPATIENT
Start: 2024-12-26

## 2024-12-30 NOTE — PROGRESS NOTES
[facility]   [address], [address]  [place] [address]  [phone]  Milka Pavon, MSN, APRN, PMHNP-BC      Subjective     Milka Espinoza is a 54 y.o. female who presents today for follow-up for psychiatric medication management.     Chief Complaint:  Bipolar disorder, anxiety     History of Present Illness:     Medication adjustments last visit:   Continue sertraline 100mg.   Continue Vraylar 1.5mg.   Continue Quetiapine XR 800mg daily.   Continue buspirone 30mg twice daily.   Continue alprazolam 2mg three times daily. We will try to reduce this dose over time.     12/31/24: Patient here for follow up. She states last time she was here, she had broken her ankle during a seizure.   A couple months ago, she said she noticed her front tooth was missing, and she assumes this happened during a seizure. She has seen her neurologist since our last appointment, but states she has not had any changes to her seizure medications.   She is going back on IV nutrition due to gastroparesis. She is waiting on supplies to arrive.   Denies any suicidal thoughts.   She is currently not on sertraline because she says she is trying to figure out which medication would cause her to feel unbalanced. She has dropped it off for a few days to see.   Will keep medications the same, and follow up in 3 months.       5/30/24: Patient here for follow up.  She states is having seizures back to back. She has been out  of her lacosamide. She has a call out to her neurologist, but has not heard back.   I let her know that if she had been out of the lacosamide, that is likely why she is having more seizures. She is going to call again today to neurology.   I did advise her that she already has 3 same day cancels, and generally after the 3rd one, you are dismissed. Advised patient that she couldn't miss again, or she would be dismissed. I stressed the importance of keeping follow up  appointments especially with her being on a controlled medication.  Denies any suicidal thoughts.     Patient presents with symptoms and behaviors that are consistent with the following DSM-5 diagnoses:  Bipolar disorder  2. Panic disorder  3. Generalized anxiety     The following portions of the patient's history were reviewed and updated as appropriate: allergies, current medications, past family history, past medical history, past social history, past surgical history and problem list.    PAST OUTPATIENT TREATMENT  Diagnosis treated:  Affective Disorder, Anxiety/Panic Disorder, substance use   Treatment Type:  Medication Management  Prior Psychiatric Medications:  Abilify  Risperidone  Vraylar  Valium  Xanax  Lamictal  Gabapentin  Trazodone, feels 'prickly all over'  Buspar  Seroquel  Support Groups:  None  Sequelae Of Mental Disorder:  social isolation, family disruption, emotional distress    Interval History  No Change    Side Effects  None      Past Medical History:  Past Medical History:   Diagnosis Date    Depression     Disease of thyroid gland     Hyperlipidemia     Migraines     On home O2 2021    3 lmp    Seizures        Social History:  Social History     Socioeconomic History    Marital status:    Tobacco Use    Smoking status: Former     Current packs/day: 0.00     Average packs/day: 0.3 packs/day for 3.0 years (0.8 ttl pk-yrs)     Types: Cigarettes     Start date:      Quit date:      Years since quittin.0    Smokeless tobacco: Never   Vaping Use    Vaping status: Never Used   Substance and Sexual Activity    Alcohol use: No    Drug use: No    Sexual activity: Never       Family History:  Family History   Problem Relation Age of Onset    No Known Problems Mother     No Known Problems Father     No Known Problems Sister     No Known Problems Brother     Alcohol abuse Daughter     No Known Problems Son     No Known Problems Maternal Grandmother     No Known Problems Paternal  Grandmother     No Known Problems Maternal Aunt     No Known Problems Paternal Aunt        Past Surgical History:  Past Surgical History:   Procedure Laterality Date    ABDOMINAL SURGERY       SECTION      x3    ENDOSCOPY N/A 2022    Procedure: EGD WITH DILATATION (BOUGIE #56) AND INJECTION OF BOTOX;  Surgeon: Rito Le MD;  Location: [facility] ENDOSCOPY;  Service: Gastroenterology;  Laterality: N/A;  POST: LOWER ESOPHAGEAL STRICTURE AND CRICOPHARYNGEAL STRICUTRE    ENDOSCOPY W/ PEG TUBE PLACEMENT N/A 2020    Procedure: ESOPHAGOGASTRODUODENOSCOPY WITH PERCUTANEOUS ENDOSCOPIC GASTROSTOMY TUBE INSERTION;  Surgeon: Rito Le MD;  Location: [facility] ENDOSCOPY;  Service: Gastroenterology;  Laterality: N/A;    HERNIA REPAIR      HYSTERECTOMY      PEG TUBE REMOVAL         Problem List:  Patient Active Problem List   Diagnosis    Bipolar disorder, in full remission, most recent episode depressed    Depression    Mood disorder in conditions classified elsewhere    Seizures    Asthma    General medical exam    Abdominal pain    Obesity    GERD (gastroesophageal reflux disease)    Tobacco use    Nausea and vomiting    Borderline hypoglycemic episodes    Migraines    Pseudoseizure    Panic disorder with agoraphobia    Gastroparesis    HLD (hyperlipidemia)    Hypothyroid    Bipolar disorder    Dissociative convulsions    Anxiety    Back pain    Other diseases of stomach and duodenum    Esophageal dysmotility    Other specified disease of esophagus    Hiatal hernia    Special screening for malignant neoplasms, colon    Sleep apnea    Weight loss    Restless leg syndrome    Colon polyps    Pure hypercholesterolemia    Panic attacks    Noninfectious gastroenteritis    Irritable bowel syndrome    Intestinal autonomic neuropathy    Hemorrhoid    Hemoptysis    Hemangioma of liver    Helicobacter pylori gastritis    H/O: depression    Gastro-esophageal reflux disease with esophagitis    Ex-cigarette smoker     "Epileptic seizure, tonic    Duodenal ulcer, unspecified as acute or chronic, without hemorrhage or perforation    Dizziness    Disease of digestive system, unspecified    COPD (chronic obstructive pulmonary disease)    CKD (chronic kidney disease)    Benign neoplasm of transverse colon    Benign esophageal stricture    Autoantibody titer positive    Attention disturbance    Acute gastric ulcer without hemorrhage or perforation    Personal history of colonic polyps    H/O colonoscopy    Abnormal findings on diagnostic imaging of other parts of digestive tract    Foreign body in digestive system    Normal esophagogastroduodenoscopy (EGD)    Vitamin D deficiency    RUQ abdominal pain    Lesion of right lobe of liver       Allergy:   Allergies   Allergen Reactions    Ketorolac Other (See Comments)     pt states \"this medicine sends me into seizures\"    Sulfa Antibiotics Hives    Tramadol Hcl Mental Status Change    Adhesive Tape Rash     Paper tape        Discontinued Medications:  Medications Discontinued During This Encounter   Medication Reason    QUEtiapine XR (SEROquel XR) 400 MG 24 hr tablet Reorder               Current Medications:   Current Outpatient Medications   Medication Sig Dispense Refill    ALPRAZolam (XANAX) 2 MG tablet TAKE 1 TABLET BY MOUTH THREE TIMES DAILY AS NEEDED FOR ANXIETY 90 tablet 0    amitriptyline (ELAVIL) 25 MG tablet TAKE 1 TABLET BY MOUTH EVERY NIGHT AT BEDTIME 90 tablet 1    atorvastatin (LIPITOR) 20 MG tablet Take 1 tablet by mouth Every Night. 90 tablet 3    BD PosiFlush 0.9 % flush       busPIRone (BUSPAR) 30 MG tablet TAKE 1 TABLET BY MOUTH TWICE DAILY 180 tablet 1    Cariprazine HCl (Vraylar) 1.5 MG capsule capsule Take 1 capsule by mouth Daily. 90 capsule 1    cephalexin (Keflex) 500 MG capsule Take 1 capsule by mouth 2 (Two) Times a Day. 14 capsule 0    cyclobenzaprine (FLEXERIL) 5 MG tablet Take 1 tablet by mouth Daily As Needed for Muscle Spasms. 30 tablet 0    heparin 100 " UNIT/ML solution injection       heparin 100 UNIT/ML solution injection       hydrOXYzine (ATARAX) 25 MG tablet TAKE 1 TABLET BY MOUTH THREE TIMES DAILY AS NEEDED FOR ANXIETY 90 tablet 2    lacosamide (VIMPAT) 200 MG tablet Take 1 tablet by mouth Every 12 (Twelve) Hours. 60 tablet 5    lamoTRIgine (LaMICtal) 150 MG tablet Take 1 tablet by mouth 2 (Two) Times a Day. 60 tablet 5    levETIRAcetam (KEPPRA) 500 MG tablet Take 3 tablets by mouth 2 (Two) Times a Day. 540 tablet 3    levothyroxine (SYNTHROID, LEVOTHROID) 25 MCG tablet Take 1 tablet by mouth Daily. 90 tablet 0    Motegrity 2 MG tablet Take 1 tablet by mouth Daily.      nystatin 952212 UNIT/GM powder Apply  topically to the appropriate area as directed 2 (Two) Times a Day. 30 g 1    Octagam 30 GM/300ML solution infusion Infuse 30 g into a venous catheter 1 (One) Time Per Week.      ondansetron (ZOFRAN) 2 mg/mL injection Infuse 2 mL into a venous catheter Every 6 (Six) Hours As Needed.      pantoprazole (PROTONIX) 40 MG EC tablet TAKE 1 TABLET BY MOUTH DAILY 90 tablet 1    promethazine (PHENERGAN) 25 MG tablet Take 1 tablet by mouth Every 6 (Six) Hours As Needed for Nausea or Vomiting.      promethazine (PHENERGAN) 6.25 MG/5ML solution oral solution Take 20 mL by mouth Every 6 (Six) Hours As Needed.      QUEtiapine XR (SEROquel XR) 400 MG 24 hr tablet Take 2 tablets by mouth every night at bedtime. 180 tablet 1    rizatriptan MLT (MAXALT-MLT) 5 MG disintegrating tablet Take 1 tab at onset of MMigraine May repeat after 2 hours max 2 tab in 24 hrs 12 tablet 11    sertraline (ZOLOFT) 100 MG tablet Take 1 tablet by mouth Daily. 90 tablet 1    sodium chloride 0.9 % solution       sucralfate (CARAFATE) 1 g tablet Take 1 tablet by mouth 3 (Three) Times a Day.      SUMAtriptan (IMITREX) 100 MG tablet Take 1 tablet by mouth 1 (One) Time As Needed for Migraine.      vitamin D (ERGOCALCIFEROL) 1.25 MG (42580 UT) capsule capsule TAKE 1 CAPSULE BY MOUTH 1 TIME EVERY WEEK 8  capsule 0     No current facility-administered medications for this visit.       Physical Exam:   not currently breastfeeding.    MENTAL STATUS EXAM   General Appearance:  Cleanly groomed and dressed  Eye Contact:  Fair  Attitude:  Cooperative  Motor Activity:  Normal gait, posture  Muscle Strength:  Normal  Speech:  Normal rate, tone, volume  Language:  Spontaneous  Mood and affect:  Depressed and anxious  Hopelessness:  Denies  Loneliness: Denies  Thought Process:  Logical and goal-directed  Associations/ Thought Content:  No delusions  Hallucinations:  None  Suicidal Ideations:  Not present  Homicidal Ideation:  Not present  Sensorium:  Alert  Orientation:  Person, place, time and situation  Immediate Recall, Recent, and Remote Memory:  Intact  Attention Span/ Concentration:  Good  Fund of Knowledge:  Appropriate for age and educational level  Intellectual Functioning:  Average range  Insight:  Fair  Judgement:  Fair  Reliability:  Fair  Impulse Control:  Fair       PHQ-9 Depression Screening  Little interest or pleasure in doing things? Over half   Feeling down, depressed, or hopeless? Several days   PHQ-2 Total Score 3   Trouble falling or staying asleep, or sleeping too much? Almost all   Feeling tired or having little energy? Almost all   Poor appetite or overeating? Over half   Feeling bad about yourself - or that you are a failure or have let yourself or your family down? Several days   Trouble concentrating on things, such as reading the newspaper or watching television? Not at all   Moving or speaking so slowly that other people could have noticed? Or the opposite - being so fidgety or restless that you have been moving around a lot more than usual? Not at all   Thoughts that you would be better off dead, or of hurting yourself in some way? Not at all   PHQ-9 Total Score 12   If you checked off any problems, how difficult have these problems made it for you to do your work, take care of things at home, or  get along with other people? Somewhat difficult      GAD7 Documentation:  Feeling nervous, anxious or on edge 3   Not being able to stop or control worrying 3   Worrying too much about different things 3   Trouble relaxing 1   Being so restless that it is hard to sit still 1   Becoming easily annoyed or irritable 1   Feeling Afraid as if something awful might happen 1   CHANDRA Total Score 13   How difficult have these problems made it for you?        Former smoker    I advised Milka of the risks of tobacco use.     Result Review:    Labs:  No visits with results within 3 Month(s) from this visit.   Latest known visit with results is:   Admission on 09/24/2024, Discharged on 09/25/2024   Component Date Value Ref Range Status    Glucose 09/24/2024 81  65 - 99 mg/dL Final    BUN 09/24/2024 7  6 - 20 mg/dL Final    Creatinine 09/24/2024 0.94  0.57 - 1.00 mg/dL Final    Sodium 09/24/2024 140  136 - 145 mmol/L Final    Potassium 09/24/2024 4.1  3.5 - 5.2 mmol/L Final    Chloride 09/24/2024 105  98 - 107 mmol/L Final    CO2 09/24/2024 24.7  22.0 - 29.0 mmol/L Final    Calcium 09/24/2024 8.9  8.6 - 10.5 mg/dL Final    Total Protein 09/24/2024 7.3  6.0 - 8.5 g/dL Final    Albumin 09/24/2024 4.2  3.5 - 5.2 g/dL Final    ALT (SGPT) 09/24/2024 9  1 - 33 U/L Final    AST (SGOT) 09/24/2024 15  1 - 32 U/L Final    Alkaline Phosphatase 09/24/2024 97  39 - 117 U/L Final    Total Bilirubin 09/24/2024 0.2  0.0 - 1.2 mg/dL Final    Globulin 09/24/2024 3.1  gm/dL Final    A/G Ratio 09/24/2024 1.4  g/dL Final    BUN/Creatinine Ratio 09/24/2024 7.4  7.0 - 25.0 Final    Anion Gap 09/24/2024 10.3  5.0 - 15.0 mmol/L Final    eGFR 09/24/2024 71.4  >60.0 mL/min/1.73 Final    Lipase 09/24/2024 17  13 - 60 U/L Final    Color,  09/24/2024 Dark Yellow (A)  Yellow, Straw Final    Appearance, UA 09/24/2024 Cloudy (A)  Clear Final    pH,  09/24/2024 6.0  5.0 - 8.0 Final    Specific Gravity,  09/24/2024 1.027  1.005 - 1.030 Final    Glucose, UA  09/24/2024 Negative  Negative Final    Ketones, UA 09/24/2024 Trace (A)  Negative Final    Bilirubin, UA 09/24/2024 Negative  Negative Final    Blood, UA 09/24/2024 Negative  Negative Final    Protein, UA 09/24/2024 30 mg/dL (1+) (A)  Negative Final    Leuk Esterase, UA 09/24/2024 Moderate (2+) (A)  Negative Final    Nitrite, UA 09/24/2024 Negative  Negative Final    Urobilinogen, UA 09/24/2024 1.0 E.U./dL  0.2 - 1.0 E.U./dL Final    WBC 09/24/2024 6.15  3.40 - 10.80 10*3/mm3 Final    RBC 09/24/2024 4.01  3.77 - 5.28 10*6/mm3 Final    Hemoglobin 09/24/2024 11.4 (L)  12.0 - 15.9 g/dL Final    Hematocrit 09/24/2024 35.9  34.0 - 46.6 % Final    MCV 09/24/2024 89.5  79.0 - 97.0 fL Final    MCH 09/24/2024 28.4  26.6 - 33.0 pg Final    MCHC 09/24/2024 31.8  31.5 - 35.7 g/dL Final    RDW 09/24/2024 14.5  12.3 - 15.4 % Final    RDW-SD 09/24/2024 47.4  37.0 - 54.0 fl Final    MPV 09/24/2024 10.5  6.0 - 12.0 fL Final    Platelets 09/24/2024 236  140 - 450 10*3/mm3 Final    Neutrophil % 09/24/2024 55.6  42.7 - 76.0 % Final    Lymphocyte % 09/24/2024 34.1  19.6 - 45.3 % Final    Monocyte % 09/24/2024 6.7  5.0 - 12.0 % Final    Eosinophil % 09/24/2024 2.6  0.3 - 6.2 % Final    Basophil % 09/24/2024 0.7  0.0 - 1.5 % Final    Immature Grans % 09/24/2024 0.3  0.0 - 0.5 % Final    Neutrophils, Absolute 09/24/2024 3.42  1.70 - 7.00 10*3/mm3 Final    Lymphocytes, Absolute 09/24/2024 2.10  0.70 - 3.10 10*3/mm3 Final    Monocytes, Absolute 09/24/2024 0.41  0.10 - 0.90 10*3/mm3 Final    Eosinophils, Absolute 09/24/2024 0.16  0.00 - 0.40 10*3/mm3 Final    Basophils, Absolute 09/24/2024 0.04  0.00 - 0.20 10*3/mm3 Final    Immature Grans, Absolute 09/24/2024 0.02  0.00 - 0.05 10*3/mm3 Final    nRBC 09/24/2024 0.0  0.0 - 0.2 /100 WBC Final    RBC, UA 09/24/2024 None Seen  None Seen, 0-2 /HPF Final    WBC, UA 09/24/2024 6-10 (A)  None Seen, 0-2 /HPF Final    Bacteria, UA 09/24/2024 2+ (A)  None Seen /HPF Final    Squamous Epithelial Cells, UA  09/24/2024 0-2  None Seen, 0-2 /HPF Final    Hyaline Casts, UA 09/24/2024 None Seen  None Seen /LPF Final    Methodology 09/24/2024 Manual Light Microscopy   Final    Urine Culture 09/24/2024 >100,000 CFU/mL Klebsiella oxytoca (A)   Final    Glucose 09/25/2024 82  65 - 99 mg/dL Final    BUN 09/25/2024 8  6 - 20 mg/dL Final    Creatinine 09/25/2024 0.94  0.57 - 1.00 mg/dL Final    Sodium 09/25/2024 140  136 - 145 mmol/L Final    Potassium 09/25/2024 3.6  3.5 - 5.2 mmol/L Final    Chloride 09/25/2024 106  98 - 107 mmol/L Final    CO2 09/25/2024 23.0  22.0 - 29.0 mmol/L Final    Calcium 09/25/2024 8.4 (L)  8.6 - 10.5 mg/dL Final    BUN/Creatinine Ratio 09/25/2024 8.5  7.0 - 25.0 Final    Anion Gap 09/25/2024 11.0  5.0 - 15.0 mmol/L Final    eGFR 09/25/2024 71.4  >60.0 mL/min/1.73 Final    WBC 09/25/2024 6.10  3.40 - 10.80 10*3/mm3 Final    RBC 09/25/2024 3.70 (L)  3.77 - 5.28 10*6/mm3 Final    Hemoglobin 09/25/2024 10.6 (L)  12.0 - 15.9 g/dL Final    Hematocrit 09/25/2024 33.4 (L)  34.0 - 46.6 % Final    MCV 09/25/2024 90.3  79.0 - 97.0 fL Final    MCH 09/25/2024 28.6  26.6 - 33.0 pg Final    MCHC 09/25/2024 31.7  31.5 - 35.7 g/dL Final    RDW 09/25/2024 14.5  12.3 - 15.4 % Final    RDW-SD 09/25/2024 47.8  37.0 - 54.0 fl Final    MPV 09/25/2024 10.2  6.0 - 12.0 fL Final    Platelets 09/25/2024 223  140 - 450 10*3/mm3 Final    Neutrophil % 09/25/2024 40.5 (L)  42.7 - 76.0 % Final    Lymphocyte % 09/25/2024 47.4 (H)  19.6 - 45.3 % Final    Monocyte % 09/25/2024 7.2  5.0 - 12.0 % Final    Eosinophil % 09/25/2024 3.9  0.3 - 6.2 % Final    Basophil % 09/25/2024 0.7  0.0 - 1.5 % Final    Immature Grans % 09/25/2024 0.3  0.0 - 0.5 % Final    Neutrophils, Absolute 09/25/2024 2.47  1.70 - 7.00 10*3/mm3 Final    Lymphocytes, Absolute 09/25/2024 2.89  0.70 - 3.10 10*3/mm3 Final    Monocytes, Absolute 09/25/2024 0.44  0.10 - 0.90 10*3/mm3 Final    Eosinophils, Absolute 09/25/2024 0.24  0.00 - 0.40 10*3/mm3 Final    Basophils,  Absolute 09/25/2024 0.04  0.00 - 0.20 10*3/mm3 Final    Immature Grans, Absolute 09/25/2024 0.02  0.00 - 0.05 10*3/mm3 Final    nRBC 09/25/2024 0.0  0.0 - 0.2 /100 WBC Final       Assessment & Plan   Diagnoses and all orders for this visit:    1. Bipolar affective disorder, currently depressed, moderate (Primary)  -     QUEtiapine XR (SEROquel XR) 400 MG 24 hr tablet; Take 2 tablets by mouth every night at bedtime.  Dispense: 180 tablet; Refill: 1    2. Panic disorder with agoraphobia    3. Generalized anxiety disorder      Continue sertraline 100mg.   Continue Vraylar 1.5mg.   Continue Quetiapine XR 800mg daily.   Continue buspirone 30mg twice daily.   Continue alprazolam 2mg three times daily. We will try to reduce this dose over time.     Visit Diagnoses:    ICD-10-CM ICD-9-CM   1. Bipolar affective disorder, currently depressed, moderate  F31.32 296.52   2. Panic disorder with agoraphobia  F40.01 300.21   3. Generalized anxiety disorder  F41.1 300.02           TREATMENT PLAN/GOALS: Continue supportive psychotherapy efforts and medications as indicated. Treatment and medication options discussed during today's visit. Patient ackowledged and verbally consented to continue with current treatment plan and was educated on the importance of compliance with treatment and follow-up appointments.    CRISIS RESOURCES:    In the event you have personal crisis, there are several resources to reach someone to talk with:    988 Suicide and Crisis Lifeline  Call or text 088 or chat 988Catch.comline.org  [facility]'s National Helpline  5-135-397-HELP (4359)  Text your zip code to 986022 (HELP4U)  [place]'s Crisis Line  Dial 988, then press 1  Text 861528    MEDICATION ISSUES:  INSPECT reviewed as expected.     UDS on 5/9/23 consistent with prescribed medications.    Discussed medication options and treatment plan of prescribed medication as well as the risks, benefits, and side effects including potential falls, possible impaired  driving and metabolic adversities among others. Patient is agreeable to call the office with any worsening of symptoms or onset of side effects. Patient is agreeable to call 911 or go to the nearest ER should he/she begin having SI/HI. No medication side effects or related complaints today.     MEDS ORDERED DURING VISIT:  New Medications Ordered This Visit   Medications    QUEtiapine XR (SEROquel XR) 400 MG 24 hr tablet     Sig: Take 2 tablets by mouth every night at bedtime.     Dispense:  180 tablet     Refill:  1       Return in about 3 months (around 3/31/2025).         This document has been electronically signed by SANJEEV Jacobs  December 31, 2024 11:09 EST    Part of this note may be an electronic transcription/translation of spoken language to printed text using the Dragon Dictation System.

## 2024-12-31 ENCOUNTER — OFFICE VISIT (OUTPATIENT)
Dept: PSYCHIATRY | Facility: CLINIC | Age: 54
End: 2024-12-31
Payer: MEDICARE

## 2024-12-31 DIAGNOSIS — F31.32 BIPOLAR AFFECTIVE DISORDER, CURRENTLY DEPRESSED, MODERATE: Primary | Chronic | ICD-10-CM

## 2024-12-31 DIAGNOSIS — F40.01 PANIC DISORDER WITH AGORAPHOBIA: Chronic | ICD-10-CM

## 2024-12-31 DIAGNOSIS — F41.1 GENERALIZED ANXIETY DISORDER: Chronic | ICD-10-CM

## 2024-12-31 RX ORDER — SUMATRIPTAN SUCCINATE 100 MG/1
100 TABLET ORAL ONCE AS NEEDED
COMMUNITY
Start: 2024-12-06

## 2024-12-31 RX ORDER — IMMUNE GLOBULIN 100 MG/ML
30 SOLUTION INTRAVENOUS WEEKLY
COMMUNITY
Start: 2024-12-26

## 2024-12-31 RX ORDER — PROMETHAZINE HYDROCHLORIDE 25 MG/1
25 TABLET ORAL EVERY 6 HOURS PRN
COMMUNITY
Start: 2024-11-25

## 2024-12-31 RX ORDER — PROMETHAZINE HYDROCHLORIDE 6.25 MG/5ML
25 SYRUP ORAL EVERY 6 HOURS PRN
COMMUNITY
Start: 2024-11-06

## 2024-12-31 RX ORDER — QUETIAPINE 400 MG/1
800 TABLET, FILM COATED, EXTENDED RELEASE ORAL
Qty: 180 TABLET | Refills: 1 | Status: SHIPPED | OUTPATIENT
Start: 2024-12-31

## 2025-01-17 ENCOUNTER — OFFICE VISIT (OUTPATIENT)
Dept: FAMILY MEDICINE CLINIC | Facility: CLINIC | Age: 55
End: 2025-01-17
Payer: MEDICARE

## 2025-01-17 VITALS
WEIGHT: 199.2 LBS | RESPIRATION RATE: 18 BRPM | SYSTOLIC BLOOD PRESSURE: 92 MMHG | BODY MASS INDEX: 34.01 KG/M2 | HEIGHT: 64 IN | TEMPERATURE: 97.1 F | DIASTOLIC BLOOD PRESSURE: 68 MMHG | HEART RATE: 96 BPM | OXYGEN SATURATION: 94 %

## 2025-01-17 DIAGNOSIS — K31.84 GASTROPARESIS: ICD-10-CM

## 2025-01-17 DIAGNOSIS — E55.9 VITAMIN D DEFICIENCY: ICD-10-CM

## 2025-01-17 DIAGNOSIS — F41.0 PANIC DISORDER: Chronic | ICD-10-CM

## 2025-01-17 DIAGNOSIS — Z90.79 ACQUIRED ABSENCE OF OTHER GENITAL ORGAN(S): ICD-10-CM

## 2025-01-17 DIAGNOSIS — F41.1 GENERALIZED ANXIETY DISORDER: Chronic | ICD-10-CM

## 2025-01-17 DIAGNOSIS — R56.9 SEIZURES: ICD-10-CM

## 2025-01-17 DIAGNOSIS — E03.9 HYPOTHYROIDISM, UNSPECIFIED TYPE: ICD-10-CM

## 2025-01-17 DIAGNOSIS — Z87.891 EX-CIGARETTE SMOKER: ICD-10-CM

## 2025-01-17 DIAGNOSIS — Z00.00 MEDICARE ANNUAL WELLNESS VISIT, SUBSEQUENT: Primary | ICD-10-CM

## 2025-01-17 DIAGNOSIS — K21.9 GASTROESOPHAGEAL REFLUX DISEASE, UNSPECIFIED WHETHER ESOPHAGITIS PRESENT: ICD-10-CM

## 2025-01-17 DIAGNOSIS — K22.4 ESOPHAGEAL DYSMOTILITY: ICD-10-CM

## 2025-01-17 DIAGNOSIS — Z12.11 SCREENING FOR COLON CANCER: ICD-10-CM

## 2025-01-17 DIAGNOSIS — E78.2 MIXED HYPERLIPIDEMIA: ICD-10-CM

## 2025-01-17 DIAGNOSIS — Z13.820 SCREENING FOR OSTEOPOROSIS: ICD-10-CM

## 2025-01-17 DIAGNOSIS — F31.32 BIPOLAR AFFECTIVE DISORDER, CURRENTLY DEPRESSED, MODERATE: Chronic | ICD-10-CM

## 2025-01-17 PROBLEM — Z72.0 TOBACCO USE: Chronic | Status: RESOLVED | Noted: 2020-02-13 | Resolved: 2025-01-17

## 2025-01-17 PROBLEM — R04.2 HEMOPTYSIS: Status: RESOLVED | Noted: 2023-03-16 | Resolved: 2025-01-17

## 2025-01-17 PROBLEM — Z86.59 H/O: DEPRESSION: Status: RESOLVED | Noted: 2023-03-16 | Resolved: 2025-01-17

## 2025-01-17 PROCEDURE — 1126F AMNT PAIN NOTED NONE PRSNT: CPT

## 2025-01-17 PROCEDURE — 99214 OFFICE O/P EST MOD 30 MIN: CPT

## 2025-01-17 PROCEDURE — 1170F FXNL STATUS ASSESSED: CPT

## 2025-01-17 PROCEDURE — G0439 PPPS, SUBSEQ VISIT: HCPCS

## 2025-01-17 RX ORDER — ATORVASTATIN CALCIUM 20 MG/1
20 TABLET, FILM COATED ORAL NIGHTLY
Qty: 90 TABLET | Refills: 3 | Status: SHIPPED | OUTPATIENT
Start: 2025-01-17

## 2025-01-17 RX ORDER — LEVOTHYROXINE SODIUM 25 UG/1
25 TABLET ORAL DAILY
Qty: 90 TABLET | Refills: 3 | Status: SHIPPED | OUTPATIENT
Start: 2025-01-17

## 2025-01-17 RX ORDER — SERTRALINE HYDROCHLORIDE 100 MG/1
100 TABLET, FILM COATED ORAL DAILY
Qty: 90 TABLET | Refills: 3 | Status: SHIPPED | OUTPATIENT
Start: 2025-01-17 | End: 2026-01-17

## 2025-01-17 RX ORDER — PANTOPRAZOLE SODIUM 40 MG/1
40 TABLET, DELAYED RELEASE ORAL DAILY
Qty: 90 TABLET | Refills: 3 | Status: SHIPPED | OUTPATIENT
Start: 2025-01-17

## 2025-01-17 NOTE — ASSESSMENT & PLAN NOTE
Lipid levels will be checked.  Continue diet, lifestyle and medication.       Orders:    atorvastatin (LIPITOR) 20 MG tablet; Take 1 tablet by mouth Every Night.

## 2025-01-17 NOTE — ASSESSMENT & PLAN NOTE
Avoid eating or drinking approximately three hours before bed.  Avoid tobacco use, spicy foods, alcohol, caffeinated beverages or other foods that irritate GERD.  Sleep elevated 10-15 degrees.  Take medication as directed (if ordered).

## 2025-01-17 NOTE — ASSESSMENT & PLAN NOTE
Continue medication as directed.  Follow-up with neurology as previously referred.  Orders:    Ambulatory Referral to Neurology

## 2025-01-17 NOTE — PROGRESS NOTES
Subjective   The ABCs of the Annual Wellness Visit  Medicare Wellness Visit      Milka Espinoza is a 54 y.o. patient who presents for a Medicare Wellness Visit.    The following portions of the patient's history were reviewed and   updated as appropriate: allergies, current medications, past family history, past medical history, past social history, past surgical history, and problem list.    Compared to one year ago, the patient's physical   health is worse.  Compared to one year ago, the patient's mental   health is the same.    Recent Hospitalizations:  This patient has had a [facility] admission record on file within the last 365 days.  Current Medical Providers:  Patient Care Team:  Alvina Arzate APRN as PCP - General (Nurse Practitioner)  Ophthalmologist: Neal  Specialty care providers:   Psychiatry - Milka Pavon  Neurology - [facility]  Gastroenterology - [facility].   Colonoscopy/EGD at Dr. Le [facility].  2023    Outpatient Medications Prior to Visit   Medication Sig Dispense Refill    ALPRAZolam (XANAX) 2 MG tablet TAKE 1 TABLET BY MOUTH THREE TIMES DAILY AS NEEDED FOR ANXIETY 90 tablet 0    amitriptyline (ELAVIL) 25 MG tablet TAKE 1 TABLET BY MOUTH EVERY NIGHT AT BEDTIME 90 tablet 1    BD PosiFlush 0.9 % flush       busPIRone (BUSPAR) 30 MG tablet TAKE 1 TABLET BY MOUTH TWICE DAILY 180 tablet 1    Cariprazine HCl (Vraylar) 1.5 MG capsule capsule Take 1 capsule by mouth Daily. 90 capsule 1    heparin 100 UNIT/ML solution injection       heparin 100 UNIT/ML solution injection       hydrOXYzine (ATARAX) 25 MG tablet TAKE 1 TABLET BY MOUTH THREE TIMES DAILY AS NEEDED FOR ANXIETY 90 tablet 2    lacosamide (VIMPAT) 200 MG tablet Take 1 tablet by mouth Every 12 (Twelve) Hours. 60 tablet 5    lamoTRIgine (LaMICtal) 150 MG tablet Take 1 tablet by mouth 2 (Two) Times a Day. 60 tablet 5    levETIRAcetam  (KEPPRA) 500 MG tablet Take 3 tablets by mouth 2 (Two) Times a Day. 540 tablet 3    Motegrity 2 MG tablet Take 1 tablet by mouth Daily.      nystatin 331989 UNIT/GM powder Apply  topically to the appropriate area as directed 2 (Two) Times a Day. 30 g 1    Octagam 30 GM/300ML solution infusion Infuse 30 g into a venous catheter 1 (One) Time Per Week.      ondansetron (ZOFRAN) 2 mg/mL injection Infuse 2 mL into a venous catheter Every 6 (Six) Hours As Needed.      promethazine (PHENERGAN) 25 MG tablet Take 1 tablet by mouth Every 6 (Six) Hours As Needed for Nausea or Vomiting.      promethazine (PHENERGAN) 6.25 MG/5ML solution oral solution Take 20 mL by mouth Every 6 (Six) Hours As Needed.      QUEtiapine XR (SEROquel XR) 400 MG 24 hr tablet Take 2 tablets by mouth every night at bedtime. 180 tablet 1    rizatriptan MLT (MAXALT-MLT) 5 MG disintegrating tablet Take 1 tab at onset of MMigraine May repeat after 2 hours max 2 tab in 24 hrs 12 tablet 11    sodium chloride 0.9 % solution       sucralfate (CARAFATE) 1 g tablet Take 1 tablet by mouth 3 (Three) Times a Day.      SUMAtriptan (IMITREX) 100 MG tablet Take 1 tablet by mouth 1 (One) Time As Needed for Migraine.      atorvastatin (LIPITOR) 20 MG tablet Take 1 tablet by mouth Every Night. 90 tablet 3    cephalexin (Keflex) 500 MG capsule Take 1 capsule by mouth 2 (Two) Times a Day. 14 capsule 0    cyclobenzaprine (FLEXERIL) 5 MG tablet Take 1 tablet by mouth Daily As Needed for Muscle Spasms. 30 tablet 0    levothyroxine (SYNTHROID, LEVOTHROID) 25 MCG tablet Take 1 tablet by mouth Daily. 90 tablet 0    pantoprazole (PROTONIX) 40 MG EC tablet TAKE 1 TABLET BY MOUTH DAILY 90 tablet 1    vitamin D (ERGOCALCIFEROL) 1.25 MG (20766 UT) capsule capsule TAKE 1 CAPSULE BY MOUTH 1 TIME EVERY WEEK 8 capsule 0    sertraline (ZOLOFT) 100 MG tablet Take 1 tablet by mouth Daily. 90 tablet 1     No facility-administered medications prior to visit.     No opioid medication  identified on active medication list. I have reviewed chart for other potential  high risk medication/s and harmful drug interactions in the elderly.      Aspirin is not on active medication list.  Aspirin use is not indicated based on review of current medical condition/s. Risk of harm outweighs potential benefits.  .    Patient Active Problem List   Diagnosis    Bipolar disorder, in full remission, most recent episode depressed    Depression    Mood disorder in conditions classified elsewhere    Seizures    Obesity    GERD (gastroesophageal reflux disease)    Nausea and vomiting    Borderline hypoglycemic episodes    Migraines    Pseudoseizure    Panic disorder with agoraphobia    Gastroparesis    HLD (hyperlipidemia)    Hypothyroid    Bipolar disorder    Dissociative convulsions    Anxiety    Back pain    Other diseases of stomach and duodenum    Esophageal dysmotility    Other specified disease of esophagus    Hiatal hernia    Special screening for malignant neoplasms, colon    Sleep apnea    Weight loss    Restless leg syndrome    Colon polyps    Pure hypercholesterolemia    Panic attacks    Noninfectious gastroenteritis    Irritable bowel syndrome    Intestinal autonomic neuropathy    Hemorrhoid    Hemangioma of liver    Helicobacter pylori gastritis    Gastro-esophageal reflux disease with esophagitis    Ex-cigarette smoker    Epileptic seizure, tonic    Duodenal ulcer, unspecified as acute or chronic, without hemorrhage or perforation    Dizziness    Disease of digestive system, unspecified    COPD (chronic obstructive pulmonary disease)    CKD (chronic kidney disease)    Benign neoplasm of transverse colon    Benign esophageal stricture    Autoantibody titer positive    Attention disturbance    Acute gastric ulcer without hemorrhage or perforation    Personal history of colonic polyps    H/O colonoscopy    Abnormal findings on diagnostic imaging of other parts of digestive tract    Foreign body in digestive  "system    Normal esophagogastroduodenoscopy (EGD)    Vitamin D deficiency    RUQ abdominal pain    Lesion of right lobe of liver     Advance Care Planning Advance Directive is not on file.  ACP discussion was declined by the patient. Patient does not have an advance directive, declines further assistance.            Objective   Vitals:    25 0745   BP: 92/68   BP Location: Right arm   Patient Position: Sitting   Cuff Size: Large Adult   Pulse: 96   Resp: 18   Temp: 97.1 °F (36.2 °C)   TempSrc: Temporal   SpO2: 94%   Weight: 90.4 kg (199 lb 3.2 oz)   Height: 162 cm (63.78\")   PainSc: 0-No pain       Estimated body mass index is 34.43 kg/m² as calculated from the following:    Height as of this encounter: 162 cm (63.78\").    Weight as of this encounter: 90.4 kg (199 lb 3.2 oz).                Does the patient have evidence of cognitive impairment? No                                                                                                Health  Risk Assessment    Smoking Status:  Social History     Tobacco Use   Smoking Status Former    Current packs/day: 0.00    Average packs/day: 0.3 packs/day for 3.0 years (0.8 ttl pk-yrs)    Types: Cigarettes    Start date:     Quit date:     Years since quittin.0   Smokeless Tobacco Never     Alcohol Consumption:  Social History     Substance and Sexual Activity   Alcohol Use No       Fall Risk Screen  STEADI Fall Risk Assessment was completed, and patient is at HIGH risk for falls. Assessment completed on:2025    Depression Screening   Little interest or pleasure in doing things? Not at all   Feeling down, depressed, or hopeless? Not at all   PHQ-2 Total Score 0      Health Habits and Functional and Cognitive Screenin/16/2025     6:12 PM   Functional & Cognitive Status   Do you have difficulty preparing food and eating? No    Do you have difficulty bathing yourself, getting dressed or grooming yourself? Yes    Do you have difficulty using " the toilet? No    Do you have difficulty moving around from place to place? Yes    Do you have trouble with steps or getting out of a bed or a chair? Yes    Current Diet Other    Dental Exam Other    Eye Exam Other    Exercise (times per week) 2 times per week    Current Exercises Include Aerobics;Dancing;House Cleaning    Do you need help using the phone?  No    Are you deaf or do you have serious difficulty hearing?  No    Do you need help to go to places out of walking distance? Yes    Do you need help shopping? Yes    Do you need help preparing meals?  No    Do you need help with housework?  Yes    Do you need help with laundry? No    Do you need help taking your medications? No    Do you need help managing money? No    Do you ever drive or ride in a car without wearing a seat belt? No    Have you felt unusual stress, anger or loneliness in the last month? No    Who do you live with? Child    If you need help, do you have trouble finding someone available to you? No    Have you been bothered in the last four weeks by sexual problems? No    Do you have difficulty concentrating, remembering or making decisions? Yes        Patient-reported           Age-appropriate Screening Schedule:  Refer to the list below for future screening recommendations based on patient's age, sex and/or medical conditions. Orders for these recommended tests are listed in the plan section. The patient has been provided with a written plan.    Health Maintenance List  Health Maintenance   Topic Date Due    COLORECTAL CANCER SCREENING  Never done    LIPID PANEL  06/16/2024    MAMMOGRAM  08/01/2024    DXA SCAN  Never done    COVID-19 Vaccine (3 - 2024-25 season) 01/19/2025 (Originally 9/1/2024)    INFLUENZA VACCINE  03/31/2025 (Originally 7/1/2024)    BMI FOLLOWUP  09/09/2025    ANNUAL WELLNESS VISIT  01/17/2026    TDAP/TD VACCINES (3 - Td or Tdap) 04/14/2032    HEPATITIS C SCREENING  Completed    Orthopox/Monkeypox  Completed    Pneumococcal  Vaccine 0-64  Completed    ZOSTER VACCINE  Completed    PAP SMEAR  Discontinued                                                                                                                                                CMS Preventative Services Quick Reference  Risk Factors Identified During Encounter  None Identified    The above risks/problems have been discussed with the patient.  Pertinent information has been shared with the patient in the After Visit Summary.  An After Visit Summary and PPPS were made available to the patient.    Follow Up:   Next Medicare Wellness visit to be scheduled in 1 year.         Additional E&M Note during same encounter follows:  Patient has additional, significant, and separately identifiable condition(s)/problem(s) that require work above and beyond the Medicare Wellness Visit     Chief Complaint  Medicare Wellness-subsequent    Subjective    HPI  Milka is also being seen today for additional medical problem/s.       The patient is a 54-year-old female who presents for a Medicare wellness visit.        She reports utilizing a shower chair due to mobility issues caused by her seizures. She experiences difficulty with stair navigation and requires assistance for transportation to medical appointments. She also reports cognitive difficulties, including memory loss, decision making and concentration. She has not yet scheduled her mammogram, which was ordered in 09/2024. She has undergone multiple colonoscopies but has not had one recently. She has a history of hysterectomy and does not require Pap smears. She has never undergone a bone density scan for osteoporosis. She is currently not taking vitamin D 50,000 units once a week. She is on levothyroxine 25 mcg for thyroid management. She is on atorvastatin 20 mg nightly for cholesterol management. She is on amitriptyline nightly at bedtime. She is on Xanax 2 mg 3 times a day as needed. She is on Vraylar and buspirone. She is on  Phenergan as needed. She is on Protonix daily. She is on Seroquel 800 mg nightly at bedtime. She is on Keppra 500 mg 3 tablets in the morning and 3 tablets at night. She is on lamotrigine 150 mg twice a day. She is on lacosamide 1 tablet every 12 hours. She is on Zofran for nausea. She is on Imitrex for migraines. She is on Carafate as needed 3 times a day. She is on sertraline 100 mg daily. She is on hydroxyzine. She has discontinued the use of Keflex. She has a history of COPD but does not use daily inhalers and reports no shortness of breath or wheezing. She does not report any hemoptysis or hematemesis. She does not report any concerning skin spots, moles, or rashes.    She has a history of gastroparesis, which results in bloating and weight gain following food or fluid intake. She has a PICC line in place for the past 3 years, through which she receives TPN once a week. She has a nurse who comes once a week to change the bandage and clean the area. She is unable to retain oral intake.    She has a history of seizures, which have resulted in falls and subsequent injuries, including a broken tibia and fibula that required surgical intervention with a plate and 12 screws. She also sustained a fall that resulted in a broken front tooth, which was temporarily repaired and is scheduled for permanent repair in 02/2025. She is under the care of Camilla Figueroa for her seizures and was referred to an epileptologist, Gilmar Etienne, in 07/2024, but has not yet scheduled an appointment.    She has a history of bipolar disorder, which is currently in full remission. She also has a history of depression and mood disorder, which are well-managed. She experiences anxiety and panic attacks, which are her most challenging symptoms.    SOCIAL HISTORY  She does not smoke.    MEDICATIONS  Current: Imitrex, Carafate, sertraline, hydroxyzine, Keppra, Seroquel, Phenergan, Protonix, levothyroxine, atorvastatin, amitriptyline,  "Xanax, Vraylar, buspirone, pantoprazole, Zofran, lacosamide, lamotrigine.  Discontinued: Vitamin D, Keflex.  Review of Systems   Constitutional:  Negative for chills, fatigue and fever.   HENT: Negative.     Respiratory:  Negative for cough, shortness of breath and wheezing.    Cardiovascular:  Negative for chest pain, palpitations and leg swelling.   Gastrointestinal:  Positive for constipation.        Gerd   Genitourinary: Negative.    Allergic/Immunologic: Positive for environmental allergies.   Neurological:  Positive for seizures.   Psychiatric/Behavioral:  Negative for dysphoric mood, self-injury, sleep disturbance and suicidal ideas. The patient is not nervous/anxious.           Objective   Vital Signs:  BP 92/68 (BP Location: Right arm, Patient Position: Sitting, Cuff Size: Large Adult)   Pulse 96   Temp 97.1 °F (36.2 °C) (Temporal)   Resp 18   Ht 162 cm (63.78\")   Wt 90.4 kg (199 lb 3.2 oz)   SpO2 94%   BMI 34.43 kg/m²   Physical Exam  Vitals and nursing note reviewed.   Constitutional:       General: She is not in acute distress.     Appearance: Normal appearance. She is well-groomed. She is not ill-appearing.   HENT:      Head: Normocephalic and atraumatic.      Right Ear: Tympanic membrane, ear canal and external ear normal. There is no impacted cerumen.      Left Ear: Tympanic membrane, ear canal and external ear normal. There is no impacted cerumen.      Nose: Nose normal. No congestion or rhinorrhea.      Mouth/Throat:      Mouth: Mucous membranes are moist.      Pharynx: Oropharynx is clear. No oropharyngeal exudate or posterior oropharyngeal erythema.   Eyes:      General: Lids are normal. Vision grossly intact.      Extraocular Movements: Extraocular movements intact.      Pupils: Pupils are equal, round, and reactive to light.   Neck:      Vascular: No carotid bruit.   Cardiovascular:      Rate and Rhythm: Normal rate and regular rhythm.      Pulses: Normal pulses.      Heart sounds: Normal " heart sounds.   Pulmonary:      Effort: Pulmonary effort is normal.      Breath sounds: Normal breath sounds and air entry.   Chest:      Comments: Venous access device in place right subclavian with tegaderm style dressing and two lumen iv access device in place.  No erythem or redness noted.   Abdominal:      General: Abdomen is flat. Bowel sounds are normal. There is no distension or abdominal bruit. There are no signs of injury.      Palpations: Abdomen is soft.      Tenderness: There is no abdominal tenderness. There is no right CVA tenderness or left CVA tenderness.   Musculoskeletal:      Cervical back: Normal range of motion and neck supple.      Lumbar back: No swelling, deformity, spasms, tenderness or bony tenderness. Normal range of motion. Negative right straight leg raise test and negative left straight leg raise test.      Right lower leg: No edema.      Left lower leg: No edema.   Skin:     General: Skin is warm and dry.   Neurological:      Mental Status: She is alert and oriented to person, place, and time. Mental status is at baseline.      Comments: Gait and balance stable.      Psychiatric:         Mood and Affect: Mood normal.         Behavior: Behavior normal. Behavior is cooperative.         Thought Content: Thought content normal.           .    Vital Signs  Blood pressure is low.    The following data was reviewed by: SAJNEEV Domingo on 01/17/2025:        Results                Assessment and Plan Additional age appropriate preventative wellness advice topics were discussed during today's preventative wellness exam(some topics already addressed during AWV portion of the note above):    Physical Activity: Advised cardiovascular activity 150 minutes per week as tolerated. (example brisk walk for 30 minutes, 5 days a week).   Nutrition: Discussed nutrition plan with patient. Information shared in after visit summary. Goal is for a well balanced diet to enhance overall health.   Healthy  Weight: Discussed current and goal BMI with patient. Steps to attain this goal discussed. Information shared in after visit summary.   Gun Safety Awareness Discussion: Information Shared in after visit summary.   Tobacco Misuse Discussion: Information shared in after visit summary.   Alcohol Misuse Discussion: Information shared in after visit summary.   Drug Misuse Discussion:  Avoidance of Drug Use recommendation given.  Information shared in after visit summary.   Motor Vehicle Safety Discussion:  Wearing Seatbelt While in Motor Vehicle recommendation. Adhering to posted speed limit recommendation.   Injury Prevention Discussion:  Information shared in after visit summary.        Assessment & Plan  Medicare annual wellness visit, subsequent  Discussed injury prevention, diet and exercise, safe sexual practices, and screening for common diseases. Encouraged use of sunscreen and seatbelts. Avoidance of tobacco encouraged. Limitation or avoidance of alcohol encouraged.      Anticipatory guidance given and routine screenings recommended with recommendations of yearly dental and eye exams., monitoring of blood pressure, lipids, and screening for colon and lung cancer risks.          Mixed hyperlipidemia   Lipid levels will be checked.  Continue diet, lifestyle and medication.       Orders:    atorvastatin (LIPITOR) 20 MG tablet; Take 1 tablet by mouth Every Night.    Gastroesophageal reflux disease, unspecified whether esophagitis present  Avoid eating or drinking approximately three hours before bed.  Avoid tobacco use, spicy foods, alcohol, caffeinated beverages or other foods that irritate GERD.  Sleep elevated 10-15 degrees.  Take medication as directed (if ordered).         Hypothyroidism, unspecified type  Continue levothyroxine.       Gastroparesis  Continue follow-up with specialist.       Esophageal dysmotility  Continue follow-up with specialist.       Seizures  Continue medication as directed.  Follow-up  with neurology as previously referred.  Orders:    Ambulatory Referral to Neurology    Generalized anxiety disorder  Continue care with psychiatry as previously discussed.    Orders:    sertraline (ZOLOFT) 100 MG tablet; Take 1 tablet by mouth Daily.    Panic disorder  Continue care with psychiatry as previously discussed.    Orders:    sertraline (ZOLOFT) 100 MG tablet; Take 1 tablet by mouth Daily.    Bipolar affective disorder, currently depressed, moderate      Orders:    sertraline (ZOLOFT) 100 MG tablet; Take 1 tablet by mouth Daily.    Vitamin D deficiency  Recheck vitamin D level.       Screening for osteoporosis  Bone density scan ordered.  Orders:    DEXA Bone Density Axial; Future    Screening for colon cancer  Referral placed for colonoscopy.  Orders:    Ambulatory Referral For Screening Colonoscopy    Acquired absence of other genital organ(s)    Orders:    DEXA Bone Density Axial; Future    Ex-cigarette smoker  Please continue to abstain.              1. Medicare wellness visit.  She was advised to engage in physical activity for a minimum of 150 minutes per week. A Mediterranean diet was recommended, emphasizing heart health and the avoidance of fried, fast, and fatty foods. Safety measures such as seatbelt use, sunscreen application, and the installation of a functional smoke detector at home were discussed. She was encouraged to store firearms securely in a safe. Regular skin monitoring for any changes was advised. She was instructed to adhere to her medication regimen as prescribed. A mammogram was ordered in 09/2024, and she is due for this screening. A colonoscopy referral has been placed.     2. Gastroparesis.  She reports significant bloating and difficulty keeping food down. She is currently receiving TPN through a PICC line once a week. She has a nurse who visits weekly to manage the PICC line. She is advised to continue her current regimen and follow up with her gastroenterologist as  needed.    3. Seizure disorder.  She has a history of seizures and is currently on Keppra 1500 mg twice a day, Lamotrigine 150 mg twice a day, and Lacosamide 1 tablet every 12 hours. She was referred to an epileptologist, Gilmar Etienne, back in July but has not yet scheduled an appointment. A new referral to neurology has been initiated. She is advised to contact her neurologist for a refill of Vimpat.    4. Bipolar disorder.  Her condition is currently well-managed. She is on Seroquel 800 mg every night at bedtime and Vraylar. She is advised to continue her current medication regimen and follow up with psychiatry as scheduled.    5. Anxiety and panic attacks.  She reports ongoing issues with anxiety and panic attacks. She is on Hydroxyzine and Sertraline 100 mg daily. She is advised to continue her current medications and follow up with her psychiatrist as scheduled.                Follow Up   No follow-ups on file.  Patient was given instructions and counseling regarding her condition or for health maintenance advice. Please see specific information pulled into the AVS if appropriate.  Patient or patient representative verbalized consent for the use of Ambient Listening during the visit with  SANJEEV Domingo for chart documentation. 1/17/2025  07:41 EST

## 2025-01-19 RX ORDER — CARIPRAZINE 1.5 MG/1
1.5 CAPSULE, GELATIN COATED ORAL DAILY
Qty: 90 CAPSULE | Refills: 1 | Status: SHIPPED | OUTPATIENT
Start: 2025-01-19

## 2025-01-22 ENCOUNTER — HOSPITAL ENCOUNTER (OUTPATIENT)
Dept: BONE DENSITY | Facility: HOSPITAL | Age: 55
Discharge: HOME | End: 2025-01-22
Payer: MEDICARE

## 2025-01-22 DIAGNOSIS — F41.0 PANIC DISORDER: Chronic | ICD-10-CM

## 2025-01-22 DIAGNOSIS — Z90.79 ACQUIRED ABSENCE OF OTHER GENITAL ORGAN(S): ICD-10-CM

## 2025-01-22 DIAGNOSIS — F41.1 GENERALIZED ANXIETY DISORDER: Chronic | ICD-10-CM

## 2025-01-22 DIAGNOSIS — Z13.820 SCREENING FOR OSTEOPOROSIS: ICD-10-CM

## 2025-01-22 PROCEDURE — 77080 DXA BONE DENSITY AXIAL: CPT

## 2025-01-22 NOTE — TELEPHONE ENCOUNTER
Rx Refill Note  Requested Prescriptions     Pending Prescriptions Disp Refills    ALPRAZolam (XANAX) 2 MG tablet [Pharmacy Med Name: ALPRAZOLAM 2MG TABLETS] 90 tablet      Sig: TAKE 1 TABLET BY MOUTH THREE TIMES DAILY AS NEEDED FOR ANXIETY        Last office visit with prescribing clinician: 12/31/2024     Next office visit with prescribing clinician: 3/27/2025     Office Visit with Milka Pavon APRN (12/31/2024)     ToxAssure Flex 22, Ur w/DL - Urine, Random Void (05/30/2024 10:26)     BEHAVIORAL HEALTH - SCAN - Inspect report, [place], 1/22/25 (01/22/2025)     Inspect Fill 1/2/25    Sofia Feliciano  01/22/25, 13:13 EST

## 2025-01-23 PROBLEM — M85.852 OSTEOPENIA OF NECK OF LEFT FEMUR: Status: ACTIVE | Noted: 2025-01-23

## 2025-01-23 RX ORDER — ALPRAZOLAM 2 MG/1
2 TABLET ORAL 3 TIMES DAILY PRN
Qty: 90 TABLET | Refills: 1 | Status: SHIPPED | OUTPATIENT
Start: 2025-01-23

## 2025-02-03 RX ORDER — SUMATRIPTAN SUCCINATE 100 MG/1
TABLET ORAL
Qty: 9 TABLET | Refills: 11 | Status: SHIPPED | OUTPATIENT
Start: 2025-02-03

## 2025-02-21 ENCOUNTER — TELEPHONE (OUTPATIENT)
Dept: NEUROLOGY | Facility: CLINIC | Age: 55
End: 2025-02-21

## 2025-02-21 NOTE — TELEPHONE ENCOUNTER
MEDICATION CONCERNS    Caller: Milka Espinoza    Relationship: Self    Best call back number: [phone]    Preferred pharmacy: [id number] [address] [facility] [address] [facility] [phone] [facility] [phone] FX    What medications are you currently taking:   Current Outpatient Medications on File Prior to Visit   Medication Sig Dispense Refill    ALPRAZolam (XANAX) 2 MG tablet TAKE 1 TABLET BY MOUTH THREE TIMES DAILY AS NEEDED FOR ANXIETY 90 tablet 1    amitriptyline (ELAVIL) 25 MG tablet TAKE 1 TABLET BY MOUTH EVERY NIGHT AT BEDTIME 90 tablet 1    atorvastatin (LIPITOR) 20 MG tablet Take 1 tablet by mouth Every Night. 90 tablet 3    BD PosiFlush 0.9 % flush       busPIRone (BUSPAR) 30 MG tablet TAKE 1 TABLET BY MOUTH TWICE DAILY 180 tablet 1    heparin 100 UNIT/ML solution injection       heparin 100 UNIT/ML solution injection       hydrOXYzine (ATARAX) 25 MG tablet TAKE 1 TABLET BY MOUTH THREE TIMES DAILY AS NEEDED FOR ANXIETY 90 tablet 2    lacosamide (VIMPAT) 200 MG tablet Take 1 tablet by mouth Every 12 (Twelve) Hours. 60 tablet 5    lamoTRIgine (LaMICtal) 150 MG tablet Take 1 tablet by mouth 2 (Two) Times a Day. 60 tablet 5    levETIRAcetam (KEPPRA) 500 MG tablet Take 3 tablets by mouth 2 (Two) Times a Day. 540 tablet 3    levothyroxine (SYNTHROID, LEVOTHROID) 25 MCG tablet Take 1 tablet by mouth Daily. 90 tablet 3    Motegrity 2 MG tablet Take 1 tablet by mouth Daily.      nystatin 134739 UNIT/GM powder Apply  topically to the appropriate area as directed 2 (Two) Times a Day. 30 g 1    Octagam 30 GM/300ML solution infusion Infuse 30 g into a venous catheter 1 (One) Time Per Week.      ondansetron (ZOFRAN) 2 mg/mL injection Infuse 2 mL into a venous catheter Every 6 (Six) Hours As Needed.      pantoprazole (PROTONIX) 40 MG EC tablet Take 1 tablet by mouth Daily. 90 tablet 3    promethazine (PHENERGAN) 25 MG tablet Take 1 tablet by mouth Every  6 (Six) Hours As Needed for Nausea or Vomiting.      promethazine (PHENERGAN) 6.25 MG/5ML solution oral solution Take 20 mL by mouth Every 6 (Six) Hours As Needed.      QUEtiapine XR (SEROquel XR) 400 MG 24 hr tablet Take 2 tablets by mouth every night at bedtime. 180 tablet 1    sertraline (ZOLOFT) 100 MG tablet Take 1 tablet by mouth Daily. 90 tablet 3    sodium chloride 0.9 % solution       sucralfate (CARAFATE) 1 g tablet Take 1 tablet by mouth 3 (Three) Times a Day.      SUMAtriptan (IMITREX) 100 MG tablet TAKE 1 TABLET BY MOUTH 1 TIME AT ONSET OF MIGRAINE AS NEEDED FOR MIGRAINE. MAY REPEAT DOSE 1 TIME AFTER 2 HOURS IF MIGRAINE NOT RESOLVED 9 tablet 11    Vraylar 1.5 MG capsule capsule TAKE 1 CAPSULE BY MOUTH EVERY DAY 90 capsule 1     No current facility-administered medications on file prior to visit.     Which medication are you concerned about: SUMAtriptan (IMITREX) 100 MG tablet- TAKE 1 TABLET BY MOUTH 1 TIME AT ONSET OF MIGRAINE AS NEEDED FOR MIGRAINE. MAY REPEAT DOSE 1 TIME AFTER 2 HOURS IF MIGRAINE NOT RESOLVED.    Who prescribed you this medication: SANJEEV DUBOIS    When did you start taking these medications: 2024    What are your concerns: PT STATES THE SUMATRIPTAN MEDICATION WORKS WELL TO ALLEVIATE HER MIGRAINE WHEN SHE FEELS ONE STARTING, HOWEVER, PT IS HAVING MIGRAINES MORE FREQUENTLY AND IS FINDING HERSELF RUNNING OUT OF SUMATRIPTAN BEFORE HER NEXT REFILL AS SHE ONLY RECEIVES 9 TABLETS A MONTH.    PT STATES SHE GETS A MIGRAINE NEARLY EVERY OTHER DAY AND TRIES TO HOLD OFF ON TAKING THE SUMATRIPTAN IN FEAR SHE MAY RUN OUT.    PT HAS CALLED TO ASK IF SANJEEV DUBOIS WOULD HAVE ANY FURTHER SUGGESTIONS IN THESE REGARDS.    PLEASE REVIEW AND ADVISE.

## 2025-02-24 NOTE — TELEPHONE ENCOUNTER
Tell the patient that she needs a preventative for Migraine,  can't take Sumatriptan more than 3 times a week.  Choices are Topamax, Inderal or referral to pain clinic for Botox.   Let me know which she is interested in and I can call her with side effects and benefits.  She could make a follow up apt to discuss also.

## 2025-03-02 ENCOUNTER — APPOINTMENT (OUTPATIENT)
Dept: CT IMAGING | Facility: HOSPITAL | Age: 55
DRG: 195 | End: 2025-03-02
Payer: MEDICARE

## 2025-03-02 ENCOUNTER — HOSPITAL ENCOUNTER (INPATIENT)
Facility: HOSPITAL | Age: 55
LOS: 1 days | Discharge: HOME | DRG: 195 | End: 2025-03-05
Attending: EMERGENCY MEDICINE | Admitting: INTERNAL MEDICINE
Payer: MEDICARE

## 2025-03-02 DIAGNOSIS — M54.9 BACK PAIN, UNSPECIFIED BACK LOCATION, UNSPECIFIED BACK PAIN LATERALITY, UNSPECIFIED CHRONICITY: ICD-10-CM

## 2025-03-02 DIAGNOSIS — N39.0 URINARY TRACT INFECTION WITHOUT HEMATURIA, SITE UNSPECIFIED: ICD-10-CM

## 2025-03-02 DIAGNOSIS — J18.9 PNEUMONIA DUE TO INFECTIOUS ORGANISM, UNSPECIFIED LATERALITY, UNSPECIFIED PART OF LUNG: Primary | ICD-10-CM

## 2025-03-02 LAB
ALBUMIN SERPL-MCNC: 3.8 G/DL (ref 3.5–5.2)
ALBUMIN/GLOB SERPL: 0.7 G/DL
ALP SERPL-CCNC: 138 U/L (ref 39–117)
ALT SERPL W P-5'-P-CCNC: 8 U/L (ref 1–33)
ANION GAP SERPL CALCULATED.3IONS-SCNC: 12.9 MMOL/L (ref 5–15)
AST SERPL-CCNC: 16 U/L (ref 1–32)
BACTERIA UR QL AUTO: ABNORMAL /HPF
BASOPHILS # BLD AUTO: 0.04 10*3/MM3 (ref 0–0.2)
BASOPHILS NFR BLD AUTO: 0.4 % (ref 0–1.5)
BILIRUB SERPL-MCNC: 0.3 MG/DL (ref 0–1.2)
BILIRUB UR QL STRIP: NEGATIVE
BUN SERPL-MCNC: 8 MG/DL (ref 6–20)
BUN/CREAT SERPL: 9.3 (ref 7–25)
CALCIUM SPEC-SCNC: 9 MG/DL (ref 8.6–10.5)
CHLORIDE SERPL-SCNC: 104 MMOL/L (ref 98–107)
CLARITY UR: ABNORMAL
CO2 SERPL-SCNC: 22.1 MMOL/L (ref 22–29)
COLOR UR: YELLOW
CREAT SERPL-MCNC: 0.86 MG/DL (ref 0.57–1)
DEPRECATED RDW RBC AUTO: 46.2 FL (ref 37–54)
EGFRCR SERPLBLD CKD-EPI 2021: 79.4 ML/MIN/1.73
EOSINOPHIL # BLD AUTO: 0.04 10*3/MM3 (ref 0–0.4)
EOSINOPHIL NFR BLD AUTO: 0.4 % (ref 0.3–6.2)
ERYTHROCYTE [DISTWIDTH] IN BLOOD BY AUTOMATED COUNT: 15.1 % (ref 12.3–15.4)
GEN 5 1HR TROPONIN T REFLEX: <6 NG/L
GLOBULIN UR ELPH-MCNC: 5.2 GM/DL
GLUCOSE SERPL-MCNC: 108 MG/DL (ref 65–99)
GLUCOSE UR STRIP-MCNC: NEGATIVE MG/DL
HCT VFR BLD AUTO: 31.2 % (ref 34–46.6)
HGB BLD-MCNC: 9.7 G/DL (ref 12–15.9)
HGB UR QL STRIP.AUTO: ABNORMAL
HOLD SPECIMEN: NORMAL
HYALINE CASTS UR QL AUTO: ABNORMAL /LPF
IMM GRANULOCYTES # BLD AUTO: 0.09 10*3/MM3 (ref 0–0.05)
IMM GRANULOCYTES NFR BLD AUTO: 0.9 % (ref 0–0.5)
KETONES UR QL STRIP: NEGATIVE
LEUKOCYTE ESTERASE UR QL STRIP.AUTO: ABNORMAL
LIPASE SERPL-CCNC: 23 U/L (ref 13–60)
LYMPHOCYTES # BLD AUTO: 3.01 10*3/MM3 (ref 0.7–3.1)
LYMPHOCYTES NFR BLD AUTO: 30.9 % (ref 19.6–45.3)
MCH RBC QN AUTO: 26.2 PG (ref 26.6–33)
MCHC RBC AUTO-ENTMCNC: 31.1 G/DL (ref 31.5–35.7)
MCV RBC AUTO: 84.3 FL (ref 79–97)
MONOCYTES # BLD AUTO: 0.66 10*3/MM3 (ref 0.1–0.9)
MONOCYTES NFR BLD AUTO: 6.8 % (ref 5–12)
NEUTROPHILS NFR BLD AUTO: 5.89 10*3/MM3 (ref 1.7–7)
NEUTROPHILS NFR BLD AUTO: 60.6 % (ref 42.7–76)
NITRITE UR QL STRIP: NEGATIVE
NRBC BLD AUTO-RTO: 0 /100 WBC (ref 0–0.2)
PH UR STRIP.AUTO: 6 [PH] (ref 5–8)
PLATELET # BLD AUTO: 396 10*3/MM3 (ref 140–450)
PMV BLD AUTO: 9.6 FL (ref 6–12)
POTASSIUM SERPL-SCNC: 3.7 MMOL/L (ref 3.5–5.2)
PROT SERPL-MCNC: 9 G/DL (ref 6–8.5)
PROT UR QL STRIP: NEGATIVE
RBC # BLD AUTO: 3.7 10*6/MM3 (ref 3.77–5.28)
RBC # UR STRIP: ABNORMAL /HPF
REF LAB TEST METHOD: ABNORMAL
SODIUM SERPL-SCNC: 139 MMOL/L (ref 136–145)
SP GR UR STRIP: 1.01 (ref 1–1.03)
SQUAMOUS #/AREA URNS HPF: ABNORMAL /HPF
TROPONIN T NUMERIC DELTA: NORMAL
TROPONIN T SERPL HS-MCNC: <6 NG/L
UROBILINOGEN UR QL STRIP: ABNORMAL
WBC # UR STRIP: ABNORMAL /HPF
WBC NRBC COR # BLD AUTO: 9.73 10*3/MM3 (ref 3.4–10.8)
WHOLE BLOOD HOLD COAG: NORMAL

## 2025-03-02 PROCEDURE — 85025 COMPLETE CBC W/AUTO DIFF WBC: CPT | Performed by: EMERGENCY MEDICINE

## 2025-03-02 PROCEDURE — 81001 URINALYSIS AUTO W/SCOPE: CPT | Performed by: EMERGENCY MEDICINE

## 2025-03-02 PROCEDURE — 25010000002 ONDANSETRON PER 1 MG: Performed by: EMERGENCY MEDICINE

## 2025-03-02 PROCEDURE — 25510000001 IOPAMIDOL PER 1 ML: Performed by: EMERGENCY MEDICINE

## 2025-03-02 PROCEDURE — 80053 COMPREHEN METABOLIC PANEL: CPT | Performed by: EMERGENCY MEDICINE

## 2025-03-02 PROCEDURE — 25010000002 HYDROMORPHONE 1 MG/ML SOLUTION: Performed by: EMERGENCY MEDICINE

## 2025-03-02 PROCEDURE — 84484 ASSAY OF TROPONIN QUANT: CPT | Performed by: EMERGENCY MEDICINE

## 2025-03-02 PROCEDURE — 83690 ASSAY OF LIPASE: CPT | Performed by: EMERGENCY MEDICINE

## 2025-03-02 PROCEDURE — 25010000002 CEFTRIAXONE PER 250 MG: Performed by: EMERGENCY MEDICINE

## 2025-03-02 PROCEDURE — 87086 URINE CULTURE/COLONY COUNT: CPT | Performed by: EMERGENCY MEDICINE

## 2025-03-02 PROCEDURE — 36415 COLL VENOUS BLD VENIPUNCTURE: CPT

## 2025-03-02 PROCEDURE — 99285 EMERGENCY DEPT VISIT HI MDM: CPT

## 2025-03-02 PROCEDURE — 83880 ASSAY OF NATRIURETIC PEPTIDE: CPT | Performed by: EMERGENCY MEDICINE

## 2025-03-02 PROCEDURE — 71275 CT ANGIOGRAPHY CHEST: CPT

## 2025-03-02 RX ORDER — SODIUM CHLORIDE 0.9 % (FLUSH) 0.9 %
10 SYRINGE (ML) INJECTION AS NEEDED
Status: DISCONTINUED | OUTPATIENT
Start: 2025-03-02 | End: 2025-03-05 | Stop reason: HOSPADM

## 2025-03-02 RX ORDER — IOPAMIDOL 755 MG/ML
100 INJECTION, SOLUTION INTRAVASCULAR
Status: COMPLETED | OUTPATIENT
Start: 2025-03-03 | End: 2025-03-02

## 2025-03-02 RX ORDER — ONDANSETRON 2 MG/ML
4 INJECTION INTRAMUSCULAR; INTRAVENOUS ONCE
Status: COMPLETED | OUTPATIENT
Start: 2025-03-02 | End: 2025-03-02

## 2025-03-02 RX ADMIN — HYDROMORPHONE HYDROCHLORIDE 0.5 MG: 1 INJECTION, SOLUTION INTRAMUSCULAR; INTRAVENOUS; SUBCUTANEOUS at 22:02

## 2025-03-02 RX ADMIN — CEFTRIAXONE 2000 MG: 2 INJECTION, POWDER, FOR SOLUTION INTRAMUSCULAR; INTRAVENOUS at 23:02

## 2025-03-02 RX ADMIN — IOPAMIDOL 100 ML: 755 INJECTION, SOLUTION INTRAVENOUS at 23:58

## 2025-03-02 RX ADMIN — ONDANSETRON 4 MG: 2 INJECTION, SOLUTION INTRAMUSCULAR; INTRAVENOUS at 22:03

## 2025-03-03 PROBLEM — J18.9 PNA (PNEUMONIA): Status: ACTIVE | Noted: 2025-03-03

## 2025-03-03 LAB
ALBUMIN SERPL-MCNC: 3.7 G/DL (ref 3.5–5.2)
ALBUMIN/GLOB SERPL: 0.7 G/DL
ALP SERPL-CCNC: 133 U/L (ref 39–117)
ALT SERPL W P-5'-P-CCNC: 6 U/L (ref 1–33)
ANION GAP SERPL CALCULATED.3IONS-SCNC: 10.9 MMOL/L (ref 5–15)
AST SERPL-CCNC: 12 U/L (ref 1–32)
B PARAPERT DNA SPEC QL NAA+PROBE: NOT DETECTED
B PERT DNA SPEC QL NAA+PROBE: NOT DETECTED
BASOPHILS # BLD AUTO: 0.03 10*3/MM3 (ref 0–0.2)
BASOPHILS NFR BLD AUTO: 0.4 % (ref 0–1.5)
BILIRUB SERPL-MCNC: 0.3 MG/DL (ref 0–1.2)
BUN SERPL-MCNC: 8 MG/DL (ref 6–20)
BUN/CREAT SERPL: 9.3 (ref 7–25)
C PNEUM DNA NPH QL NAA+NON-PROBE: NOT DETECTED
CALCIUM SPEC-SCNC: 9.1 MG/DL (ref 8.6–10.5)
CHLORIDE SERPL-SCNC: 101 MMOL/L (ref 98–107)
CO2 SERPL-SCNC: 24.1 MMOL/L (ref 22–29)
CREAT SERPL-MCNC: 0.86 MG/DL (ref 0.57–1)
DEPRECATED RDW RBC AUTO: 47.6 FL (ref 37–54)
EGFRCR SERPLBLD CKD-EPI 2021: 79.4 ML/MIN/1.73
EOSINOPHIL # BLD AUTO: 0.06 10*3/MM3 (ref 0–0.4)
EOSINOPHIL NFR BLD AUTO: 0.8 % (ref 0.3–6.2)
ERYTHROCYTE [DISTWIDTH] IN BLOOD BY AUTOMATED COUNT: 15.5 % (ref 12.3–15.4)
FLUAV SUBTYP SPEC NAA+PROBE: NOT DETECTED
FLUBV RNA ISLT QL NAA+PROBE: NOT DETECTED
GLOBULIN UR ELPH-MCNC: 5.2 GM/DL
GLUCOSE BLDC GLUCOMTR-MCNC: 118 MG/DL (ref 70–105)
GLUCOSE BLDC GLUCOMTR-MCNC: 135 MG/DL (ref 70–105)
GLUCOSE SERPL-MCNC: 94 MG/DL (ref 65–99)
HADV DNA SPEC NAA+PROBE: NOT DETECTED
HCOV 229E RNA SPEC QL NAA+PROBE: NOT DETECTED
HCOV HKU1 RNA SPEC QL NAA+PROBE: NOT DETECTED
HCOV NL63 RNA SPEC QL NAA+PROBE: NOT DETECTED
HCOV OC43 RNA SPEC QL NAA+PROBE: NOT DETECTED
HCT VFR BLD AUTO: 29.9 % (ref 34–46.6)
HGB BLD-MCNC: 9.4 G/DL (ref 12–15.9)
HMPV RNA NPH QL NAA+NON-PROBE: NOT DETECTED
HPIV1 RNA ISLT QL NAA+PROBE: NOT DETECTED
HPIV2 RNA SPEC QL NAA+PROBE: NOT DETECTED
HPIV3 RNA NPH QL NAA+PROBE: NOT DETECTED
HPIV4 P GENE NPH QL NAA+PROBE: NOT DETECTED
IMM GRANULOCYTES # BLD AUTO: 0.08 10*3/MM3 (ref 0–0.05)
IMM GRANULOCYTES NFR BLD AUTO: 1 % (ref 0–0.5)
LYMPHOCYTES # BLD AUTO: 2.28 10*3/MM3 (ref 0.7–3.1)
LYMPHOCYTES NFR BLD AUTO: 29.6 % (ref 19.6–45.3)
M PNEUMO IGG SER IA-ACNC: NOT DETECTED
MAGNESIUM SERPL-MCNC: 2.1 MG/DL (ref 1.6–2.6)
MCH RBC QN AUTO: 26.7 PG (ref 26.6–33)
MCHC RBC AUTO-ENTMCNC: 31.4 G/DL (ref 31.5–35.7)
MCV RBC AUTO: 84.9 FL (ref 79–97)
MONOCYTES # BLD AUTO: 0.66 10*3/MM3 (ref 0.1–0.9)
MONOCYTES NFR BLD AUTO: 8.6 % (ref 5–12)
NEUTROPHILS NFR BLD AUTO: 4.6 10*3/MM3 (ref 1.7–7)
NEUTROPHILS NFR BLD AUTO: 59.6 % (ref 42.7–76)
NRBC BLD AUTO-RTO: 0 /100 WBC (ref 0–0.2)
NT-PROBNP SERPL-MCNC: 38.1 PG/ML (ref 0–900)
PHOSPHATE SERPL-MCNC: 3.8 MG/DL (ref 2.5–4.5)
PLATELET # BLD AUTO: 370 10*3/MM3 (ref 140–450)
PMV BLD AUTO: 9.4 FL (ref 6–12)
POTASSIUM SERPL-SCNC: 3.9 MMOL/L (ref 3.5–5.2)
PROT SERPL-MCNC: 8.9 G/DL (ref 6–8.5)
QT INTERVAL: 366 MS
QTC INTERVAL: 449 MS
RBC # BLD AUTO: 3.52 10*6/MM3 (ref 3.77–5.28)
RHINOVIRUS RNA SPEC NAA+PROBE: NOT DETECTED
RSV RNA NPH QL NAA+NON-PROBE: NOT DETECTED
SARS-COV-2 RNA RESP QL NAA+PROBE: NOT DETECTED
SODIUM SERPL-SCNC: 136 MMOL/L (ref 136–145)
WBC NRBC COR # BLD AUTO: 7.71 10*3/MM3 (ref 3.4–10.8)

## 2025-03-03 PROCEDURE — 84100 ASSAY OF PHOSPHORUS: CPT | Performed by: STUDENT IN AN ORGANIZED HEALTH CARE EDUCATION/TRAINING PROGRAM

## 2025-03-03 PROCEDURE — 0202U NFCT DS 22 TRGT SARS-COV-2: CPT | Performed by: EMERGENCY MEDICINE

## 2025-03-03 PROCEDURE — 82948 REAGENT STRIP/BLOOD GLUCOSE: CPT

## 2025-03-03 PROCEDURE — 94799 UNLISTED PULMONARY SVC/PX: CPT

## 2025-03-03 PROCEDURE — 83735 ASSAY OF MAGNESIUM: CPT | Performed by: STUDENT IN AN ORGANIZED HEALTH CARE EDUCATION/TRAINING PROGRAM

## 2025-03-03 PROCEDURE — 93005 ELECTROCARDIOGRAM TRACING: CPT | Performed by: EMERGENCY MEDICINE

## 2025-03-03 PROCEDURE — 25010000002 HYDROMORPHONE PER 4 MG: Performed by: STUDENT IN AN ORGANIZED HEALTH CARE EDUCATION/TRAINING PROGRAM

## 2025-03-03 PROCEDURE — 25010000002 HYDROMORPHONE PER 4 MG: Performed by: INTERNAL MEDICINE

## 2025-03-03 PROCEDURE — 87040 BLOOD CULTURE FOR BACTERIA: CPT | Performed by: EMERGENCY MEDICINE

## 2025-03-03 PROCEDURE — 25010000002 CEFTRIAXONE PER 250 MG: Performed by: STUDENT IN AN ORGANIZED HEALTH CARE EDUCATION/TRAINING PROGRAM

## 2025-03-03 PROCEDURE — 94640 AIRWAY INHALATION TREATMENT: CPT

## 2025-03-03 PROCEDURE — G0378 HOSPITAL OBSERVATION PER HR: HCPCS

## 2025-03-03 PROCEDURE — 85025 COMPLETE CBC W/AUTO DIFF WBC: CPT | Performed by: STUDENT IN AN ORGANIZED HEALTH CARE EDUCATION/TRAINING PROGRAM

## 2025-03-03 PROCEDURE — 94761 N-INVAS EAR/PLS OXIMETRY MLT: CPT

## 2025-03-03 PROCEDURE — 80053 COMPREHEN METABOLIC PANEL: CPT | Performed by: STUDENT IN AN ORGANIZED HEALTH CARE EDUCATION/TRAINING PROGRAM

## 2025-03-03 RX ORDER — HYDROMORPHONE HYDROCHLORIDE 1 MG/ML
0.5 INJECTION, SOLUTION INTRAMUSCULAR; INTRAVENOUS; SUBCUTANEOUS
Status: DISCONTINUED | OUTPATIENT
Start: 2025-03-03 | End: 2025-03-05 | Stop reason: HOSPADM

## 2025-03-03 RX ORDER — SODIUM CHLORIDE 0.9 % (FLUSH) 0.9 %
10 SYRINGE (ML) INJECTION AS NEEDED
Status: DISCONTINUED | OUTPATIENT
Start: 2025-03-03 | End: 2025-03-05 | Stop reason: HOSPADM

## 2025-03-03 RX ORDER — HYDROXYZINE HYDROCHLORIDE 25 MG/1
25 TABLET, FILM COATED ORAL 3 TIMES DAILY PRN
Status: DISCONTINUED | OUTPATIENT
Start: 2025-03-03 | End: 2025-03-05 | Stop reason: HOSPADM

## 2025-03-03 RX ORDER — SODIUM CHLORIDE 0.9 % (FLUSH) 0.9 %
10 SYRINGE (ML) INJECTION EVERY 12 HOURS SCHEDULED
Status: DISCONTINUED | OUTPATIENT
Start: 2025-03-03 | End: 2025-03-05 | Stop reason: HOSPADM

## 2025-03-03 RX ORDER — LEVETIRACETAM 500 MG/1
1500 TABLET ORAL EVERY 12 HOURS SCHEDULED
Status: DISCONTINUED | OUTPATIENT
Start: 2025-03-03 | End: 2025-03-05 | Stop reason: HOSPADM

## 2025-03-03 RX ORDER — POLYETHYLENE GLYCOL 3350 17 G/17G
17 POWDER, FOR SOLUTION ORAL DAILY PRN
Status: DISCONTINUED | OUTPATIENT
Start: 2025-03-03 | End: 2025-03-05 | Stop reason: HOSPADM

## 2025-03-03 RX ORDER — HYDROMORPHONE HYDROCHLORIDE 1 MG/ML
0.25 INJECTION, SOLUTION INTRAMUSCULAR; INTRAVENOUS; SUBCUTANEOUS
Status: COMPLETED | OUTPATIENT
Start: 2025-03-03 | End: 2025-03-03

## 2025-03-03 RX ORDER — LEVOTHYROXINE SODIUM 25 UG/1
25 TABLET ORAL
Status: DISCONTINUED | OUTPATIENT
Start: 2025-03-03 | End: 2025-03-05 | Stop reason: HOSPADM

## 2025-03-03 RX ORDER — HYDROMORPHONE HYDROCHLORIDE 1 MG/ML
0.25 INJECTION, SOLUTION INTRAMUSCULAR; INTRAVENOUS; SUBCUTANEOUS
Status: DISCONTINUED | OUTPATIENT
Start: 2025-03-03 | End: 2025-03-03

## 2025-03-03 RX ORDER — SUMATRIPTAN 50 MG/1
50 TABLET, FILM COATED ORAL
Status: DISCONTINUED | OUTPATIENT
Start: 2025-03-03 | End: 2025-03-05 | Stop reason: HOSPADM

## 2025-03-03 RX ORDER — BISACODYL 10 MG
10 SUPPOSITORY, RECTAL RECTAL DAILY PRN
Status: DISCONTINUED | OUTPATIENT
Start: 2025-03-03 | End: 2025-03-05 | Stop reason: HOSPADM

## 2025-03-03 RX ORDER — AMOXICILLIN 250 MG
2 CAPSULE ORAL 2 TIMES DAILY PRN
Status: DISCONTINUED | OUTPATIENT
Start: 2025-03-03 | End: 2025-03-05 | Stop reason: HOSPADM

## 2025-03-03 RX ORDER — ACETAMINOPHEN 650 MG/1
650 SUPPOSITORY RECTAL EVERY 4 HOURS PRN
Status: DISCONTINUED | OUTPATIENT
Start: 2025-03-03 | End: 2025-03-05 | Stop reason: HOSPADM

## 2025-03-03 RX ORDER — BUDESONIDE 0.5 MG/2ML
0.5 INHALANT ORAL
Status: DISCONTINUED | OUTPATIENT
Start: 2025-03-03 | End: 2025-03-05 | Stop reason: HOSPADM

## 2025-03-03 RX ORDER — SODIUM CHLORIDE 9 MG/ML
40 INJECTION, SOLUTION INTRAVENOUS AS NEEDED
Status: DISCONTINUED | OUTPATIENT
Start: 2025-03-03 | End: 2025-03-05 | Stop reason: HOSPADM

## 2025-03-03 RX ORDER — IPRATROPIUM BROMIDE AND ALBUTEROL SULFATE 2.5; .5 MG/3ML; MG/3ML
3 SOLUTION RESPIRATORY (INHALATION)
Status: DISCONTINUED | OUTPATIENT
Start: 2025-03-03 | End: 2025-03-05 | Stop reason: HOSPADM

## 2025-03-03 RX ORDER — ACETAMINOPHEN 325 MG/1
650 TABLET ORAL EVERY 4 HOURS PRN
Status: DISCONTINUED | OUTPATIENT
Start: 2025-03-03 | End: 2025-03-05 | Stop reason: HOSPADM

## 2025-03-03 RX ORDER — OXYCODONE HYDROCHLORIDE 5 MG/1
5 TABLET ORAL EVERY 4 HOURS PRN
Status: DISCONTINUED | OUTPATIENT
Start: 2025-03-03 | End: 2025-03-05 | Stop reason: HOSPADM

## 2025-03-03 RX ORDER — BISACODYL 5 MG/1
5 TABLET, DELAYED RELEASE ORAL DAILY PRN
Status: DISCONTINUED | OUTPATIENT
Start: 2025-03-03 | End: 2025-03-05 | Stop reason: HOSPADM

## 2025-03-03 RX ORDER — ACETAMINOPHEN 160 MG/5ML
650 SOLUTION ORAL EVERY 4 HOURS PRN
Status: DISCONTINUED | OUTPATIENT
Start: 2025-03-03 | End: 2025-03-05 | Stop reason: HOSPADM

## 2025-03-03 RX ADMIN — Medication 10 ML: at 01:02

## 2025-03-03 RX ADMIN — LEVETIRACETAM 1500 MG: 500 TABLET, FILM COATED ORAL at 21:17

## 2025-03-03 RX ADMIN — HYDROXYZINE HYDROCHLORIDE 25 MG: 25 TABLET, FILM COATED ORAL at 21:32

## 2025-03-03 RX ADMIN — DOXYCYCLINE 100 MG: 100 INJECTION, POWDER, LYOPHILIZED, FOR SOLUTION INTRAVENOUS at 01:02

## 2025-03-03 RX ADMIN — BUDESONIDE INHALATION 0.5 MG: 0.5 SUSPENSION RESPIRATORY (INHALATION) at 07:56

## 2025-03-03 RX ADMIN — Medication 10 ML: at 21:18

## 2025-03-03 RX ADMIN — HYDROMORPHONE HYDROCHLORIDE 0.25 MG: 1 INJECTION, SOLUTION INTRAMUSCULAR; INTRAVENOUS; SUBCUTANEOUS at 05:27

## 2025-03-03 RX ADMIN — IPRATROPIUM BROMIDE AND ALBUTEROL SULFATE 3 ML: .5; 3 SOLUTION RESPIRATORY (INHALATION) at 20:21

## 2025-03-03 RX ADMIN — BUDESONIDE INHALATION 0.5 MG: 0.5 SUSPENSION RESPIRATORY (INHALATION) at 20:27

## 2025-03-03 RX ADMIN — SUMATRIPTAN SUCCINATE 50 MG: 50 TABLET ORAL at 13:55

## 2025-03-03 RX ADMIN — IPRATROPIUM BROMIDE AND ALBUTEROL SULFATE 3 ML: .5; 3 SOLUTION RESPIRATORY (INHALATION) at 07:55

## 2025-03-03 RX ADMIN — HYDROMORPHONE HYDROCHLORIDE 0.25 MG: 1 INJECTION, SOLUTION INTRAMUSCULAR; INTRAVENOUS; SUBCUTANEOUS at 02:28

## 2025-03-03 RX ADMIN — LEVOTHYROXINE SODIUM 25 MCG: 0.03 TABLET ORAL at 05:27

## 2025-03-03 RX ADMIN — DOXYCYCLINE 100 MG: 100 INJECTION, POWDER, LYOPHILIZED, FOR SOLUTION INTRAVENOUS at 13:32

## 2025-03-03 RX ADMIN — HYDROMORPHONE HYDROCHLORIDE 0.5 MG: 1 INJECTION, SOLUTION INTRAMUSCULAR; INTRAVENOUS; SUBCUTANEOUS at 16:29

## 2025-03-03 RX ADMIN — IPRATROPIUM BROMIDE AND ALBUTEROL SULFATE 3 ML: .5; 3 SOLUTION RESPIRATORY (INHALATION) at 14:59

## 2025-03-03 RX ADMIN — HYDROMORPHONE HYDROCHLORIDE 0.5 MG: 1 INJECTION, SOLUTION INTRAMUSCULAR; INTRAVENOUS; SUBCUTANEOUS at 21:17

## 2025-03-03 RX ADMIN — LEVETIRACETAM 1500 MG: 500 TABLET, FILM COATED ORAL at 08:53

## 2025-03-03 RX ADMIN — HYDROMORPHONE HYDROCHLORIDE 0.25 MG: 1 INJECTION, SOLUTION INTRAMUSCULAR; INTRAVENOUS; SUBCUTANEOUS at 10:32

## 2025-03-03 RX ADMIN — Medication 10 ML: at 08:54

## 2025-03-03 RX ADMIN — CEFTRIAXONE 2000 MG: 2 INJECTION, POWDER, FOR SOLUTION INTRAMUSCULAR; INTRAVENOUS at 21:18

## 2025-03-03 NOTE — PLAN OF CARE
Goal Outcome Evaluation:      Pt. Arrived to PCU in no acute distress. Pt. Is on room air. Still presents with left flank pain from coughing. Pain medication given per orders. Will continue to monitor.

## 2025-03-03 NOTE — H&P
[facility] Medicine Services  History & Physical    Patient Name: Milka Espinoza  : 1970  MRN: [id number]  Primary Care Physician:  Alvina Arzate APRN  Date of admission: 3/2/2025  Date and Time of Service: 3/2/2025 at 0025    Subjective      Chief Complaint: left flank pain, SOB associated with cough    History of Present Illness: Milka Espinoza is a 54 y.o. female with a CMH of seizure disorder, bipolar disorder, hypothyroidism, hyperlipidemia, who presented to [facility] on 3/2/2025 with  left flank pain, shortness of breath associated with cough.    Patient is currently awake alert [place] x 3, conversational.  Patient stated she developed left flank pain about 2 days ago, also went to [facility] earlier today on 3/2/25 (per patient), patient stated workup there was unremarkable she was sent home.  However pain did not improve which prompted her to come to our facility for further evaluation.  Patient currently denies chest pain, fever or chills.    In ED, vital stable, afebrile, troponins within normal limits, CMP mostly unremarkable, lipase 23, CBC remarkable for normocytic anemia with hemoglobin of 9.7, UA positive for bacteria, CT angio negative for acute PE, mild patchy nodular airspace disease posterior aspect of the right upper lobe, as well as bilateral lower lobes likely related to pneumonia, trace to small bilateral pleural effusions are present.  Patient received dose of doxycycline as well as ceftriaxone for pneumonia, admitted to medicine team for further inpatient management.    Review of Systems negative unless mentioned above    Personal History     Past Medical History:   Diagnosis Date    Depression     Disease of thyroid gland     Hyperlipidemia     Migraines     On home O2 2021    3 lmp    Seizures        Past Surgical History:   Procedure Laterality Date    ABDOMINAL SURGERY       SECTION      x3    ENDOSCOPY N/A 2022     Procedure: EGD WITH DILATATION (BOUGIE #56) AND INJECTION OF BOTOX;  Surgeon: Rito eL MD;  Location: [facility] ENDOSCOPY;  Service: Gastroenterology;  Laterality: N/A;  POST: LOWER ESOPHAGEAL STRICTURE AND CRICOPHARYNGEAL STRICUTRE    ENDOSCOPY W/ PEG TUBE PLACEMENT N/A 8/20/2020    Procedure: ESOPHAGOGASTRODUODENOSCOPY WITH PERCUTANEOUS ENDOSCOPIC GASTROSTOMY TUBE INSERTION;  Surgeon: Rito Le MD;  Location: [facility] ENDOSCOPY;  Service: Gastroenterology;  Laterality: N/A;    HERNIA REPAIR      HYSTERECTOMY      PEG TUBE REMOVAL         Family History: family history includes Alcohol abuse in her daughter; No Known Problems in her brother, father, maternal aunt, maternal grandmother, mother, paternal aunt, paternal grandmother, sister, and son. Otherwise pertinent FHx was reviewed and not pertinent to current issue.    Social History:  reports that she quit smoking about 2 years ago. Her smoking use included cigarettes. She started smoking about 5 years ago. She has a 0.8 pack-year smoking history. She has never used smokeless tobacco. She reports that she does not drink alcohol and does not use drugs.    Home Medications:  Prior to Admission Medications       Prescriptions Last Dose Informant Patient Reported? Taking?    ALPRAZolam (XANAX) 2 MG tablet   No No    TAKE 1 TABLET BY MOUTH THREE TIMES DAILY AS NEEDED FOR ANXIETY    amitriptyline (ELAVIL) 25 MG tablet   No No    TAKE 1 TABLET BY MOUTH EVERY NIGHT AT BEDTIME    atorvastatin (LIPITOR) 20 MG tablet   No No    Take 1 tablet by mouth Every Night.    BD PosiFlush 0.9 % flush   Yes No    busPIRone (BUSPAR) 30 MG tablet   No No    TAKE 1 TABLET BY MOUTH TWICE DAILY    heparin 100 UNIT/ML solution injection   Yes No    heparin 100 UNIT/ML solution injection   Yes No    hydrOXYzine (ATARAX) 25 MG tablet   No No    TAKE 1 TABLET BY MOUTH THREE TIMES DAILY AS NEEDED FOR ANXIETY    lacosamide (VIMPAT) 200 MG tablet   No No    Take 1 tablet by mouth Every  "12 (Twelve) Hours.    lamoTRIgine (LaMICtal) 150 MG tablet   No No    Take 1 tablet by mouth 2 (Two) Times a Day.    levETIRAcetam (KEPPRA) 500 MG tablet   No No    Take 3 tablets by mouth 2 (Two) Times a Day.    levothyroxine (SYNTHROID, LEVOTHROID) 25 MCG tablet   No No    Take 1 tablet by mouth Daily.    Motegrity 2 MG tablet   Yes No    Take 1 tablet by mouth Daily.    nystatin 251047 UNIT/GM powder   No No    Apply  topically to the appropriate area as directed 2 (Two) Times a Day.    Octagam 30 GM/300ML solution infusion   Yes No    Infuse 30 g into a venous catheter 1 (One) Time Per Week.    ondansetron (ZOFRAN) 2 mg/mL injection   Yes No    Infuse 2 mL into a venous catheter Every 6 (Six) Hours As Needed.    pantoprazole (PROTONIX) 40 MG EC tablet   No No    Take 1 tablet by mouth Daily.    promethazine (PHENERGAN) 25 MG tablet   Yes No    Take 1 tablet by mouth Every 6 (Six) Hours As Needed for Nausea or Vomiting.    promethazine (PHENERGAN) 6.25 MG/5ML solution oral solution   Yes No    Take 20 mL by mouth Every 6 (Six) Hours As Needed.    QUEtiapine XR (SEROquel XR) 400 MG 24 hr tablet   No No    Take 2 tablets by mouth every night at bedtime.    sertraline (ZOLOFT) 100 MG tablet   No No    Take 1 tablet by mouth Daily.    sodium chloride 0.9 % solution   Yes No    sucralfate (CARAFATE) 1 g tablet   Yes No    Take 1 tablet by mouth 3 (Three) Times a Day.    SUMAtriptan (IMITREX) 100 MG tablet   No No    TAKE 1 TABLET BY MOUTH 1 TIME AT ONSET OF MIGRAINE AS NEEDED FOR MIGRAINE. MAY REPEAT DOSE 1 TIME AFTER 2 HOURS IF MIGRAINE NOT RESOLVED    Vraylar 1.5 MG capsule capsule   No No    TAKE 1 CAPSULE BY MOUTH EVERY DAY              Allergies:  Allergies   Allergen Reactions    Ketorolac Other (See Comments)     pt states \"this medicine sends me into seizures\"    Sulfa Antibiotics Hives    Tramadol Hcl Mental Status Change    Adhesive Tape Rash     Paper tape       Objective      Vitals:   Temp:  [97.6 °F " (36.4 °C)] 97.6 °F (36.4 °C)  Heart Rate:  [] 93  Resp:  [17] 17  BP: ()/(53-71) 111/70  Body mass index is 32.85 kg/m².    Physical Exam  General: Awake alert oriented x 3, conversational  HEENT: Atraumatic normocephalic  Respiratory: decreased breath sounds at bases  Cardio: Heart rate normal, rhythm regular  Abdomen: Soft, nontender, no rebound or guarding  : Positive costovertebral tenderness on the left side  Extremities: No remarkable edema of the bilateral extremities  Neuro: Awake alert oriented x 3, moves all extremities    Diagnostic Data:  Lab Results (last 24 hours)       Procedure Component Value Units Date/Time    BNP [[id number]] Collected: 03/02/25 2300    Specimen: Blood Updated: 03/03/25 0016    High Sensitivity Troponin T 1Hr [[id number]] Collected: 03/02/25 2300    Specimen: Blood Updated: 03/02/25 2330     HS Troponin T <6 ng/L      Troponin T Numeric Delta --     Comment: Unable to calculate.       Narrative:      High Sensitive Troponin T Reference Range:  <14.0 ng/L- Negative Female for AMI  <22.0 ng/L- Negative Male for AMI  >=14 - Abnormal Female indicating possible myocardial injury.  >=22 - Abnormal Male indicating possible myocardial injury.   Clinicians would have to utilize clinical acumen, EKG, Troponin, and serial changes to determine if it is an Acute Myocardial Infarction or myocardial injury due to an underlying chronic condition.         High Sensitivity Troponin T [[id number]]  (Normal) Collected: 03/02/25 2152    Specimen: Blood from Arm, Right Updated: 03/02/25 2239     HS Troponin T <6 ng/L     Narrative:      High Sensitive Troponin T Reference Range:  <14.0 ng/L- Negative Female for AMI  <22.0 ng/L- Negative Male for AMI  >=14 - Abnormal Female indicating possible myocardial injury.  >=22 - Abnormal Male indicating possible myocardial injury.   Clinicians would have to utilize clinical acumen, EKG, Troponin, and serial changes to determine if it is an Acute  Myocardial Infarction or myocardial injury due to an underlying chronic condition.         Urinalysis, Microscopic Only - Urine, Clean Catch [[id number]]  (Abnormal) Collected: 03/02/25 2153    Specimen: Urine, Clean Catch Updated: 03/02/25 2227     RBC, UA 0-2 /HPF      WBC, UA 21-50 /HPF      Bacteria, UA 1+ /HPF      Squamous Epithelial Cells, UA 21-30 /HPF      Hyaline Casts, UA 0-2 /LPF      Methodology Manual Light Microscopy    Urine Culture - Urine, Urine, Clean Catch [[id number]] Collected: 03/02/25 2153    Specimen: Urine, Clean Catch Updated: 03/02/25 2227    Lipase [[id number]]  (Normal) Collected: 03/02/25 2152    Specimen: Blood from Arm, Right Updated: 03/02/25 2221     Lipase 23 U/L     Comprehensive Metabolic Panel [[id number]]  (Abnormal) Collected: 03/02/25 2152    Specimen: Blood from Arm, Right Updated: 03/02/25 2221     Glucose 108 mg/dL      BUN 8 mg/dL      Creatinine 0.86 mg/dL      Sodium 139 mmol/L      Potassium 3.7 mmol/L      Chloride 104 mmol/L      CO2 22.1 mmol/L      Calcium 9.0 mg/dL      Total Protein 9.0 g/dL      Albumin 3.8 g/dL      ALT (SGPT) 8 U/L      AST (SGOT) 16 U/L      Alkaline Phosphatase 138 U/L      Total Bilirubin 0.3 mg/dL      Globulin 5.2 gm/dL      A/G Ratio 0.7 g/dL      BUN/Creatinine Ratio 9.3     Anion Gap 12.9 mmol/L      eGFR 79.4 mL/min/1.73     Narrative:      GFR Categories in Chronic Kidney Disease (CKD)      GFR Category          GFR (mL/min/1.73)    Interpretation  G1                     90 or greater         Normal or high (1)  G2                      60-89                Mild decrease (1)  G3a                   45-59                Mild to moderate decrease  G3b                   30-44                Moderate to severe decrease  G4                    15-29                Severe decrease  G5                    14 or less           Kidney failure          (1)In the absence of evidence of kidney disease, neither GFR category G1 or G2 fulfill the criteria  for CKD.    eGFR calculation 2021 CKD-EPI creatinine equation, which does not include race as a factor    Urinalysis With Culture If Indicated - Urine, Clean Catch [[id number]]  (Abnormal) Collected: 03/02/25 2153    Specimen: Urine, Clean Catch Updated: 03/02/25 2203     Color, UA Yellow     Appearance, UA Hazy     pH, UA 6.0     Specific Gravity, UA 1.012     Glucose, UA Negative     Ketones, UA Negative     Bilirubin, UA Negative     Blood, UA Trace     Protein, UA Negative     Leuk Esterase, UA Trace     Nitrite, UA Negative     Urobilinogen, UA 1.0 E.U./dL    Narrative:      In absence of clinical symptoms, the presence of pyuria, bacteria, and/or nitrites on the urinalysis result does not correlate with infection.    Extra Tubes [[id number]] Collected: 03/02/25 2152    Specimen: Blood from Arm, Right Updated: 03/02/25 2200    Narrative:      The following orders were created for panel order Extra Tubes.  Procedure                               Abnormality         Status                     ---------                               -----------         ------                     Gold Top - SST[[id number]]                                   Final result               Light Blue Top[[id number]]                                   Final result                 Please view results for these tests on the individual orders.    Gold Top - SST [[id number]] Collected: 03/02/25 2152    Specimen: Blood from Arm, Right Updated: 03/02/25 2200     Extra Tube Hold for add-ons.     Comment: Auto resulted.       Light Blue Top [[id number]] Collected: 03/02/25 2152    Specimen: Blood from Arm, Right Updated: 03/02/25 2200     Extra Tube Hold for add-ons.     Comment: Auto resulted       CBC & Differential [[id number]]  (Abnormal) Collected: 03/02/25 2152    Specimen: Blood from Arm, Right Updated: 03/02/25 2200    Narrative:      The following orders were created for panel order CBC & Differential.  Procedure                                Abnormality         Status                     ---------                               -----------         ------                     CBC Auto Differential[[id number]]        Abnormal            Final result                 Please view results for these tests on the individual orders.    CBC Auto Differential [[id number]]  (Abnormal) Collected: 03/02/25 2152    Specimen: Blood from Arm, Right Updated: 03/02/25 2200     WBC 9.73 10*3/mm3      RBC 3.70 10*6/mm3      Hemoglobin 9.7 g/dL      Hematocrit 31.2 %      MCV 84.3 fL      MCH 26.2 pg      MCHC 31.1 g/dL      RDW 15.1 %      RDW-SD 46.2 fl      MPV 9.6 fL      Platelets 396 10*3/mm3      Neutrophil % 60.6 %      Lymphocyte % 30.9 %      Monocyte % 6.8 %      Eosinophil % 0.4 %      Basophil % 0.4 %      Immature Grans % 0.9 %      Neutrophils, Absolute 5.89 10*3/mm3      Lymphocytes, Absolute 3.01 10*3/mm3      Monocytes, Absolute 0.66 10*3/mm3      Eosinophils, Absolute 0.04 10*3/mm3      Basophils, Absolute 0.04 10*3/mm3      Immature Grans, Absolute 0.09 10*3/mm3      nRBC 0.0 /100 WBC              Imaging Results (Last 24 Hours)       Procedure Component Value Units Date/Time    CT Angiogram Chest Pulmonary Embolism [[id number]] Collected: 03/03/25 0004     Updated: 03/03/25 0009    Narrative:      CT ANGIOGRAM CHEST PULMONARY EMBOLISM    Date of Exam: 3/2/2025 11:55 PM EST    Indication: soa.    Comparison: 12/7/2023, 10/14/2021.    Technique: Axial CT images were obtained of the chest after the uneventful intravenous administration of iodinated contrast utilizing pulmonary embolism protocol.  In addition, a 3-D volume rendered image was created for interpretation.  Sagittal and   coronal reconstructions were performed.  Automated exposure control and iterative reconstruction methods were used.      Findings:    Pulmonary arteries: Adequate opacification of the pulmonary arteries. No evidence of acute pulmonary embolism.    Lungs and Pleura: Trace to small  bilateral pleural effusions are present. Mild patchy nodular airspace disease present along the posterior aspect of the right upper lobe, the lingula and within the bilateral lower lobes. Mild atelectatic changes are   present within the lung bases bilaterally...    Mediastinum/Elizabet: No mediastinal or hilar lymphadenopathy.    Lymph nodes: No axillary or supraclavicular adenopathy.    Cardiovascular: The cardiac chambers are within normal limits. The pericardium is normal. The aorta and its arch branch vessels are unremarkable.       Upper Abdomen: The upper abdominal contents are unremarkable.          Bones and Soft Tissue: No suspicious osseous lesion.        Impression:      Impression:  1.No evidence of pulmonary embolism.  2.Mild patchy nodular airspace disease present within the posterior aspect of the right upper lobe, the lingula and within the bilateral lower lobes likely related to a mild pneumonia. Trace to small bilateral pleural effusions are present. Follow-up is   recommended given the nodular appearance.  3.Ancillary findings as described above.            Electronically Signed: Elise Steele MD    3/3/2025 12:06 AM EST    Workstation ID: [id number]              Assessment & Plan    Milka Espinoza is a 54 y.o. female with a CMH of seizure disorder, bipolar disorder, hypothyroidism, hyperlipidemia, who presented to [facility] on 3/2/2025 with  left flank pain, shortness of breath associated with cough.    Assessments  Possible community-acquired pneumonia/UTI  Generalized weakness  History of gastroparesis, right sided IV access for home IV hydration  Seizure disorder  Bipolar disorder  Hyperlipidemia  Hypothyroidism    Plan  -CT angio negative for acute PE, mild patchy nodular airspace disease posterior aspect of the right upper lobe, as well as bilateral lower lobes likely related to pneumonia, trace to small bilateral pleural effusions are present. Per ED: CTAP results pending from  [facility]  -Initiated on ceftriaxone and doxycycline in ED -will continue, add duonebs/Pulmicort  -Obtain strep pneumo, Legionella antigens, supplemental oxygen as needed  -Follow-up on blood cultures and urine cultures, adjust antibiotics as needed  -Resume Keppra, PPI, Atarax, hydromorphone 0.25 every 3 hours as needed  -Resume other home medication once reconciled per pharmacy or medically appropriate  -[facility]    VTE Prophylaxis:  No VTE prophylaxis order currently exists.      CODE STATUS: Full      I discussed the patient's findings and my recommendations with patient.      This document has been electronically signed by Saul Murphy MD on March 3, 2025 00:26 [facility] Hospitalist Team

## 2025-03-03 NOTE — CASE MANAGEMENT/SOCIAL WORK
Discharge Planning Assessment   [facility]     Patient Name: Milka Espinoza  MRN: [id number]  Today's Date: 3/3/2025    Admit Date: 3/2/2025    Plan: Home   Discharge Needs Assessment       Row Name 03/03/25 1546       Living Environment    People in Home child(starla), adult    Name(s) of People in Home Jose Manuel Pat    Current Living Arrangements apartment    Potentially Unsafe Housing Conditions none    In the past 12 months has the electric, gas, oil, or water company threatened to shut off services in your home? No    Primary Care Provided by self    Provides Primary Care For no one    Family Caregiver if Needed child(starla), adult    Family Caregiver Names Jose Manuel Pat    Quality of Family Relationships helpful;involved;stressful    Able to Return to Prior Arrangements yes       Resource/Environmental Concerns    Resource/Environmental Concerns none       Transportation Needs    In the past 12 months, has lack of transportation kept you from medical appointments or from getting medications? no    In the past 12 months, has lack of transportation kept you from meetings, work, or from getting things needed for daily living? No       Food Insecurity    Within the past 12 months, you worried that your food would run out before you got the money to buy more. Never true    Within the past 12 months, the food you bought just didn't last and you didn't have money to get more. Never true       Transition Planning    Patient/Family Anticipates Transition to home with family    Patient/Family Anticipated Services at Transition none    Transportation Anticipated family or friend will provide  Friend Jaki can transport at d/c.       Discharge Needs Assessment    Readmission Within the Last 30 Days no previous admission in last 30 days    Equipment Currently Used at Home other (see comments)  Medication pump    Concerns to be Addressed no discharge needs identified    Do you want help finding or keeping work or a job? I do not need  or want help    Do you want help with school or training? For example, starting or completing job training or getting a high school diploma, GED or equivalent No                   Discharge Plan       Row Name 03/03/25 4080       Plan    Plan Home    Patient/Family in Agreement with Plan yes    Plan Comments CM spoke to ptChristina Jarquin at bedside. PCP and pharmacy confirmed. Pt. denies financial, medication, or transportation issues. Pt's friend Jaki can transport at d/c. Pt. agrees with M2B. DC barriers: IV meds.                Demographic Summary       Row Name 03/03/25 1549       General Information    Admission Type observation    Arrived From home    Required Notices Provided Observation Status Notice    Referral Source admission list    Reason for Consult discharge planning    Preferred Language English       Contact Information    Permission Granted to Share Info With     Contact Information Obtained for                    Functional Status       Row Name 03/03/25 1546       Functional Status    Usual Activity Tolerance good    Current Activity Tolerance moderate       Physical Activity    On average, how many days per week do you engage in moderate to strenuous exercise (like a brisk walk)? 7 days    On average, how many minutes do you engage in exercise at this level? 80 min    Number of minutes of exercise per week 560       Functional Status, IADL    Medications independent    Meal Preparation independent    Housekeeping independent    Laundry independent    Shopping independent    If for any reason you need help with day-to-day activities such as bathing, preparing meals, shopping, managing finances, etc., do you get the help you need? I don't need any help                Patient Forms       Row Name 03/03/25 1556       Patient Forms    Important Message from Medicare (IMM) Delivered  MOON per registration 3/3/2025    Patient Observation Letter Delivered  RAJPUT per registration  3/3/2025                      Social Drivers of Health     Tobacco Use: Medium Risk (3/3/2025)    Patient History     Smoking Tobacco Use: Former     Smokeless Tobacco Use: Never     Passive Exposure: Past   Alcohol Use: Not At Risk (3/3/2025)    AUDIT-C     Frequency of Alcohol Consumption: Never     Average Number of Drinks: Patient does not drink     Frequency of Binge Drinking: Never   Financial Resource Strain: Low Risk  (3/3/2025)    Overall Financial Resource Strain (CARDIA)     Difficulty of Paying Living Expenses: Not hard at all   Food Insecurity: No Food Insecurity (3/3/2025)    Hunger Vital Sign     Worried About Running Out of Food in the Last Year: Never true     Ran Out of Food in the Last Year: Never true   Transportation Needs: No Transportation Needs (3/3/2025)    PRAPARE - Transportation     Lack of Transportation (Medical): No     Lack of Transportation (Non-Medical): No   Physical Activity: Sufficiently Active (3/3/2025)    Exercise Vital Sign     Days of Exercise per Week: 7 days     Minutes of Exercise per Session: 60 min   Stress: Stress Concern Present (3/3/2025)    [employer] - Occupational Stress Questionnaire     Feeling of Stress : Very much   Social Connections: Not At Risk (3/3/2025)    Family and Community Support     Help with Day-to-Day Activities: I don't need any help     Lonely or Isolated: Never   Interpersonal Safety: Not At Risk (3/3/2025)    Abuse Screen     Unsafe at Home or Work/School: no     Feels Threatened by Someone?: no     Does Anyone Keep You from Contacting Others or Doint Things Outside the Home?: no     Physical Sign of Abuse Present: no   Depression: Not at risk (3/3/2025)    PHQ-2     PHQ-2 Score: 0   Recent Concern: Depression - At risk (12/31/2024)    PHQ-2     PHQ-2 Score: 12   Housing Stability: Not At Risk (3/3/2025)    Housing Stability     Current Living Arrangements: apartment     Potentially Unsafe Housing Conditions: none    Utilities: Not At Risk (3/3/2025)    [facility] Utilities     Threatened with loss of utilities: No   Health Literacy: Not At Risk (3/3/2025)    Education     Help with school or training?: No     Preferred Language: English   Employment: Not At Risk (3/3/2025)    Employment     Do you want help finding or keeping work or a job?: I do not need or want help   Disabilities: Not At Risk (3/3/2025)    Disabilities     Concentrating, Remembering, or Making Decisions Difficulty: no     Doing Errands Independently Difficulty: no       Lorrie Nieves RN    Office: [phone]  Fax: [phone]  [email or web address]

## 2025-03-03 NOTE — ED PROVIDER NOTES
"Inserted Subjective   History of Present Illness  It chief complaint: Patient presents with left back pain.  Hurts more with movement.  He has been severe for the last week.  She is having a hard time with ambulating secondary to pain.  Hip does radiate over to the left flank.  She does not have any weakness in her legs.  She has no fever.  She was seen at [facility].  She had CT scan and blood test performed today.  She had no emergent findings.  But with persisting pain and decreased mobility she presented here to the emergency department for further evaluation.  No leg pain or swelling.  No weakness or numbness.  She has had cough for 1-1/2 weeks.  She has been coughing significantly.  She has not had fever.  Her cough has been dry.  She does feel short of breath when she breathes in.    Context:    Duration:    Timing:    Severity:    Associated Symptoms:        PCP:  LMP:      Review of Systems   Constitutional:  Negative for fever.   HENT:  Positive for congestion.    Respiratory:  Positive for cough and shortness of breath.    Cardiovascular:  Negative for chest pain.   Gastrointestinal:  Negative for abdominal distention.   Genitourinary:  Positive for flank pain.   Musculoskeletal:  Positive for back pain.   Skin: Negative.        Past Medical History:   Diagnosis Date    Depression     Disease of thyroid gland     Hyperlipidemia     Migraines     On home O2 2021    3 lmp    Seizures        Allergies   Allergen Reactions    Ketorolac Other (See Comments)     pt states \"this medicine sends me into seizures\"    Sulfa Antibiotics Hives    Tramadol Hcl Mental Status Change    Adhesive Tape Rash     Paper tape       Past Surgical History:   Procedure Laterality Date    ABDOMINAL SURGERY       SECTION      x3    ENDOSCOPY N/A 2022    Procedure: EGD WITH DILATATION (BOUGIE #56) AND INJECTION OF BOTOX;  Surgeon: Rito Le MD;  Location: [facility] ENDOSCOPY;  Service: " Gastroenterology;  Laterality: N/A;  POST: LOWER ESOPHAGEAL STRICTURE AND CRICOPHARYNGEAL STRICUTRE    ENDOSCOPY W/ PEG TUBE PLACEMENT N/A 2020    Procedure: ESOPHAGOGASTRODUODENOSCOPY WITH PERCUTANEOUS ENDOSCOPIC GASTROSTOMY TUBE INSERTION;  Surgeon: Rito Le MD;  Location: [facility] ENDOSCOPY;  Service: Gastroenterology;  Laterality: N/A;    HERNIA REPAIR      HYSTERECTOMY      PEG TUBE REMOVAL         Family History   Problem Relation Age of Onset    No Known Problems Mother     No Known Problems Father     No Known Problems Sister     No Known Problems Brother     Alcohol abuse Daughter     No Known Problems Son     No Known Problems Maternal Grandmother     No Known Problems Paternal Grandmother     No Known Problems Maternal Aunt     No Known Problems Paternal Aunt        Social History     Socioeconomic History    Marital status:    Tobacco Use    Smoking status: Former     Current packs/day: 0.00     Average packs/day: 0.3 packs/day for 3.0 years (0.8 ttl pk-yrs)     Types: Cigarettes     Start date:      Quit date:      Years since quittin.1    Smokeless tobacco: Never   Vaping Use    Vaping status: Never Used   Substance and Sexual Activity    Alcohol use: No    Drug use: No    Sexual activity: Never           Objective   Physical Exam  Vitals and nursing note reviewed.   HENT:      Head: Normocephalic and atraumatic.   Cardiovascular:      Rate and Rhythm: Normal rate and regular rhythm.      Heart sounds: Normal heart sounds.   Pulmonary:      Effort: Pulmonary effort is normal. No respiratory distress.      Comments: Is mildly decreased bilaterally  Abdominal:      Palpations: Abdomen is soft.      Tenderness: There is no abdominal tenderness.   Musculoskeletal:      Cervical back: Normal range of motion.      Lumbar back: Tenderness and bony tenderness present.        Back:    Skin:     General: Skin is warm and dry.   Neurological:      General: No focal deficit present.       Mental Status: She is alert and oriented to person, place, and time.   Psychiatric:         Mood and Affect: Mood normal.         Thought Content: Thought content normal.         Procedures           ED Course  ED Course as of 03/03/25 0010   Mon Mar 03, 2025   0009 CT Angiogram Chest Pulmonary Embolism [LH]      ED Course User Index  [LH] Mayank Ochoa DO Robert                                           Results for orders placed or performed during the hospital encounter of 03/02/25   Comprehensive Metabolic Panel    Collection Time: 03/02/25  9:52 PM    Specimen: Arm, Right; Blood   Result Value Ref Range    Glucose 108 (H) 65 - 99 mg/dL    BUN 8 6 - 20 mg/dL    Creatinine 0.86 0.57 - 1.00 mg/dL    Sodium 139 136 - 145 mmol/L    Potassium 3.7 3.5 - 5.2 mmol/L    Chloride 104 98 - 107 mmol/L    CO2 22.1 22.0 - 29.0 mmol/L    Calcium 9.0 8.6 - 10.5 mg/dL    Total Protein 9.0 (H) 6.0 - 8.5 g/dL    Albumin 3.8 3.5 - 5.2 g/dL    ALT (SGPT) 8 1 - 33 U/L    AST (SGOT) 16 1 - 32 U/L    Alkaline Phosphatase 138 (H) 39 - 117 U/L    Total Bilirubin 0.3 0.0 - 1.2 mg/dL    Globulin 5.2 gm/dL    A/G Ratio 0.7 g/dL    BUN/Creatinine Ratio 9.3 7.0 - 25.0    Anion Gap 12.9 5.0 - 15.0 mmol/L    eGFR 79.4 >60.0 mL/min/1.73   Lipase    Collection Time: 03/02/25  9:52 PM    Specimen: Arm, Right; Blood   Result Value Ref Range    Lipase 23 13 - 60 U/L   CBC Auto Differential    Collection Time: 03/02/25  9:52 PM    Specimen: Arm, Right; Blood   Result Value Ref Range    WBC 9.73 3.40 - 10.80 10*3/mm3    RBC 3.70 (L) 3.77 - 5.28 10*6/mm3    Hemoglobin 9.7 (L) 12.0 - 15.9 g/dL    Hematocrit 31.2 (L) 34.0 - 46.6 %    MCV 84.3 79.0 - 97.0 fL    MCH 26.2 (L) 26.6 - 33.0 pg    MCHC 31.1 (L) 31.5 - 35.7 g/dL    RDW 15.1 12.3 - 15.4 %    RDW-SD 46.2 37.0 - 54.0 fl    MPV 9.6 6.0 - 12.0 fL    Platelets 396 140 - 450 10*3/mm3    Neutrophil % 60.6 42.7 - 76.0 %    Lymphocyte % 30.9 19.6 - 45.3 %    Monocyte % 6.8 5.0 - 12.0 %    Eosinophil  % 0.4 0.3 - 6.2 %    Basophil % 0.4 0.0 - 1.5 %    Immature Grans % 0.9 (H) 0.0 - 0.5 %    Neutrophils, Absolute 5.89 1.70 - 7.00 10*3/mm3    Lymphocytes, Absolute 3.01 0.70 - 3.10 10*3/mm3    Monocytes, Absolute 0.66 0.10 - 0.90 10*3/mm3    Eosinophils, Absolute 0.04 0.00 - 0.40 10*3/mm3    Basophils, Absolute 0.04 0.00 - 0.20 10*3/mm3    Immature Grans, Absolute 0.09 (H) 0.00 - 0.05 10*3/mm3    nRBC 0.0 0.0 - 0.2 /100 WBC   High Sensitivity Troponin T    Collection Time: 03/02/25  9:52 PM    Specimen: Arm, Right; Blood   Result Value Ref Range    HS Troponin T <6 <14 ng/L   Gold Top - SST    Collection Time: 03/02/25  9:52 PM   Result Value Ref Range    Extra Tube Hold for add-ons.    Light Blue Top    Collection Time: 03/02/25  9:52 PM   Result Value Ref Range    Extra Tube Hold for add-ons.    Urinalysis With Culture If Indicated - Urine, Clean Catch    Collection Time: 03/02/25  9:53 PM    Specimen: Urine, Clean Catch   Result Value Ref Range    Color, UA Yellow Yellow, Straw    Appearance, UA Hazy (A) Clear    pH, UA 6.0 5.0 - 8.0    Specific Gravity, UA 1.012 1.005 - 1.030    Glucose, UA Negative Negative    Ketones, UA Negative Negative    Bilirubin, UA Negative Negative    Blood, UA Trace (A) Negative    Protein, UA Negative Negative    Leuk Esterase, UA Trace (A) Negative    Nitrite, UA Negative Negative    Urobilinogen, UA 1.0 E.U./dL 0.2 - 1.0 E.U./dL   Urinalysis, Microscopic Only - Urine, Clean Catch    Collection Time: 03/02/25  9:53 PM    Specimen: Urine, Clean Catch   Result Value Ref Range    RBC, UA 0-2 None Seen, 0-2 /HPF    WBC, UA 21-50 (A) None Seen, 0-2 /HPF    Bacteria, UA 1+ (A) None Seen /HPF    Squamous Epithelial Cells, UA 21-30 (A) None Seen, 0-2 /HPF    Hyaline Casts, UA 0-2 None Seen /LPF    Methodology Manual Light Microscopy    High Sensitivity Troponin T 1Hr    Collection Time: 03/02/25 11:00 PM    Specimen: Blood   Result Value Ref Range    HS Troponin T <6 <14 ng/L    Troponin T  Numeric Delta            CT Angiogram Chest Pulmonary Embolism    Result Date: 3/3/2025  Impression: 1.No evidence of pulmonary embolism. 2.Mild patchy nodular airspace disease present within the posterior aspect of the right upper lobe, the lingula and within the bilateral lower lobes likely related to a mild pneumonia. Trace to small bilateral pleural effusions are present. Follow-up is recommended given the nodular appearance. 3.Ancillary findings as described above. Electronically Signed: Elise Steele MD  3/3/2025 12:06 AM EST  Workstation ID: [id number]          Medical Decision Making    Patient was seen and evaluated for the above problem    Differential diagnosis includes but is not limited to pneumonia, PE, pyelonephritis,    Patient did present to [facility] earlier today.  She has had pain in her back for a week and a half.  CT scan of her abdomen pelvis was obtained.  That showed no emergent finding.  She persisted with pain.  She has been feeling short of breath when she breathes in.  She also has had a cough and congestion.  She has not noted a fever.  CT scan chest does reveal infiltrates and pneumonia.  She was started on IV antibiotics.  [facility]          Problems Addressed:  Back pain, unspecified back location, unspecified back pain laterality, unspecified chronicity: complicated acute illness or injury  Pneumonia due to infectious organism, unspecified laterality, unspecified part of lung: complicated acute illness or injury  Urinary tract infection without hematuria, site unspecified: complicated acute illness or injury    Amount and/or Complexity of Data Reviewed  Labs: ordered. Decision-making details documented in ED Course.     Details: Labs reviewed by myself  Radiology: ordered and independent interpretation performed. Decision-making details documented in ED Course.     Details: CT reviewed by myself as above  ECG/medicine tests:  ordered.    Risk  Prescription drug management.  Decision regarding hospitalization.        Final diagnoses:   None   Pneumonia  Flank pain      ED Disposition  ED Disposition       None            No follow-up provider specified.       Medication List      No changes were made to your prescriptions during this visit.            Mayank Ochoa,   03/03/25 0032

## 2025-03-04 LAB
ALBUMIN SERPL-MCNC: 3.6 G/DL (ref 3.5–5.2)
ALBUMIN/GLOB SERPL: 0.7 G/DL
ALP SERPL-CCNC: 136 U/L (ref 39–117)
ALT SERPL W P-5'-P-CCNC: 6 U/L (ref 1–33)
ANION GAP SERPL CALCULATED.3IONS-SCNC: 12 MMOL/L (ref 5–15)
AST SERPL-CCNC: 12 U/L (ref 1–32)
BACTERIA SPEC AEROBE CULT: NO GROWTH
BASOPHILS # BLD AUTO: 0.04 10*3/MM3 (ref 0–0.2)
BASOPHILS NFR BLD AUTO: 0.3 % (ref 0–1.5)
BILIRUB SERPL-MCNC: 0.4 MG/DL (ref 0–1.2)
BUN SERPL-MCNC: 5 MG/DL (ref 6–20)
BUN/CREAT SERPL: 6.9 (ref 7–25)
CALCIUM SPEC-SCNC: 9.4 MG/DL (ref 8.6–10.5)
CHLORIDE SERPL-SCNC: 99 MMOL/L (ref 98–107)
CO2 SERPL-SCNC: 24 MMOL/L (ref 22–29)
CREAT SERPL-MCNC: 0.72 MG/DL (ref 0.57–1)
DEPRECATED RDW RBC AUTO: 46.4 FL (ref 37–54)
EGFRCR SERPLBLD CKD-EPI 2021: 98.3 ML/MIN/1.73
EOSINOPHIL # BLD AUTO: 0.02 10*3/MM3 (ref 0–0.4)
EOSINOPHIL NFR BLD AUTO: 0.2 % (ref 0.3–6.2)
ERYTHROCYTE [DISTWIDTH] IN BLOOD BY AUTOMATED COUNT: 15.3 % (ref 12.3–15.4)
GLOBULIN UR ELPH-MCNC: 5.1 GM/DL
GLUCOSE SERPL-MCNC: 107 MG/DL (ref 65–99)
HCT VFR BLD AUTO: 30.1 % (ref 34–46.6)
HGB BLD-MCNC: 9.4 G/DL (ref 12–15.9)
IMM GRANULOCYTES # BLD AUTO: 0.07 10*3/MM3 (ref 0–0.05)
IMM GRANULOCYTES NFR BLD AUTO: 0.6 % (ref 0–0.5)
LYMPHOCYTES # BLD AUTO: 1.62 10*3/MM3 (ref 0.7–3.1)
LYMPHOCYTES NFR BLD AUTO: 13 % (ref 19.6–45.3)
MAGNESIUM SERPL-MCNC: 1.9 MG/DL (ref 1.6–2.6)
MCH RBC QN AUTO: 26.3 PG (ref 26.6–33)
MCHC RBC AUTO-ENTMCNC: 31.2 G/DL (ref 31.5–35.7)
MCV RBC AUTO: 84.1 FL (ref 79–97)
MONOCYTES # BLD AUTO: 0.9 10*3/MM3 (ref 0.1–0.9)
MONOCYTES NFR BLD AUTO: 7.2 % (ref 5–12)
NEUTROPHILS NFR BLD AUTO: 78.7 % (ref 42.7–76)
NEUTROPHILS NFR BLD AUTO: 9.81 10*3/MM3 (ref 1.7–7)
NRBC BLD AUTO-RTO: 0 /100 WBC (ref 0–0.2)
PHOSPHATE SERPL-MCNC: 4.3 MG/DL (ref 2.5–4.5)
PLATELET # BLD AUTO: 371 10*3/MM3 (ref 140–450)
PMV BLD AUTO: 9.5 FL (ref 6–12)
POTASSIUM SERPL-SCNC: 3.9 MMOL/L (ref 3.5–5.2)
PROT SERPL-MCNC: 8.7 G/DL (ref 6–8.5)
RBC # BLD AUTO: 3.58 10*6/MM3 (ref 3.77–5.28)
SODIUM SERPL-SCNC: 135 MMOL/L (ref 136–145)
WBC NRBC COR # BLD AUTO: 12.46 10*3/MM3 (ref 3.4–10.8)

## 2025-03-04 PROCEDURE — 25010000002 KETOROLAC TROMETHAMINE PER 15 MG: Performed by: INTERNAL MEDICINE

## 2025-03-04 PROCEDURE — 85025 COMPLETE CBC W/AUTO DIFF WBC: CPT | Performed by: STUDENT IN AN ORGANIZED HEALTH CARE EDUCATION/TRAINING PROGRAM

## 2025-03-04 PROCEDURE — 94799 UNLISTED PULMONARY SVC/PX: CPT

## 2025-03-04 PROCEDURE — 94664 DEMO&/EVAL PT USE INHALER: CPT

## 2025-03-04 PROCEDURE — 83735 ASSAY OF MAGNESIUM: CPT | Performed by: STUDENT IN AN ORGANIZED HEALTH CARE EDUCATION/TRAINING PROGRAM

## 2025-03-04 PROCEDURE — 25010000002 HYDROMORPHONE PER 4 MG: Performed by: INTERNAL MEDICINE

## 2025-03-04 PROCEDURE — 25810000003 SODIUM CHLORIDE 0.9 % SOLUTION: Performed by: INTERNAL MEDICINE

## 2025-03-04 PROCEDURE — 84100 ASSAY OF PHOSPHORUS: CPT | Performed by: STUDENT IN AN ORGANIZED HEALTH CARE EDUCATION/TRAINING PROGRAM

## 2025-03-04 PROCEDURE — 25010000002 CEFTRIAXONE PER 250 MG: Performed by: STUDENT IN AN ORGANIZED HEALTH CARE EDUCATION/TRAINING PROGRAM

## 2025-03-04 PROCEDURE — 94761 N-INVAS EAR/PLS OXIMETRY MLT: CPT

## 2025-03-04 PROCEDURE — 80053 COMPREHEN METABOLIC PANEL: CPT | Performed by: STUDENT IN AN ORGANIZED HEALTH CARE EDUCATION/TRAINING PROGRAM

## 2025-03-04 RX ORDER — SODIUM CHLORIDE 9 MG/ML
100 INJECTION, SOLUTION INTRAVENOUS ONCE
Status: COMPLETED | OUTPATIENT
Start: 2025-03-04 | End: 2025-03-04

## 2025-03-04 RX ORDER — KETOROLAC TROMETHAMINE 30 MG/ML
30 INJECTION, SOLUTION INTRAMUSCULAR; INTRAVENOUS EVERY 6 HOURS
Status: DISCONTINUED | OUTPATIENT
Start: 2025-03-04 | End: 2025-03-05 | Stop reason: HOSPADM

## 2025-03-04 RX ORDER — LACOSAMIDE 100 MG/1
200 TABLET ORAL EVERY 12 HOURS SCHEDULED
Status: DISCONTINUED | OUTPATIENT
Start: 2025-03-04 | End: 2025-03-05 | Stop reason: HOSPADM

## 2025-03-04 RX ORDER — ONDANSETRON 2 MG/ML
4 INJECTION INTRAMUSCULAR; INTRAVENOUS EVERY 6 HOURS PRN
Status: DISCONTINUED | OUTPATIENT
Start: 2025-03-04 | End: 2025-03-05 | Stop reason: HOSPADM

## 2025-03-04 RX ORDER — SERTRALINE HYDROCHLORIDE 100 MG/1
100 TABLET, FILM COATED ORAL DAILY
Status: DISCONTINUED | OUTPATIENT
Start: 2025-03-04 | End: 2025-03-05 | Stop reason: HOSPADM

## 2025-03-04 RX ORDER — ALPRAZOLAM 1 MG/1
2 TABLET ORAL 3 TIMES DAILY PRN
Status: DISCONTINUED | OUTPATIENT
Start: 2025-03-04 | End: 2025-03-05 | Stop reason: HOSPADM

## 2025-03-04 RX ORDER — PANTOPRAZOLE SODIUM 40 MG/1
40 TABLET, DELAYED RELEASE ORAL
Status: DISCONTINUED | OUTPATIENT
Start: 2025-03-05 | End: 2025-03-05 | Stop reason: HOSPADM

## 2025-03-04 RX ORDER — BUSPIRONE HYDROCHLORIDE 15 MG/1
30 TABLET ORAL 2 TIMES DAILY
Status: DISCONTINUED | OUTPATIENT
Start: 2025-03-04 | End: 2025-03-05 | Stop reason: HOSPADM

## 2025-03-04 RX ORDER — ATORVASTATIN CALCIUM 20 MG/1
20 TABLET, FILM COATED ORAL NIGHTLY
Status: DISCONTINUED | OUTPATIENT
Start: 2025-03-04 | End: 2025-03-05 | Stop reason: HOSPADM

## 2025-03-04 RX ADMIN — LAMOTRIGINE 150 MG: 100 TABLET ORAL at 22:59

## 2025-03-04 RX ADMIN — BUDESONIDE INHALATION 0.5 MG: 0.5 SUSPENSION RESPIRATORY (INHALATION) at 21:28

## 2025-03-04 RX ADMIN — LEVOTHYROXINE SODIUM 25 MCG: 0.03 TABLET ORAL at 05:22

## 2025-03-04 RX ADMIN — SUMATRIPTAN SUCCINATE 50 MG: 50 TABLET ORAL at 05:22

## 2025-03-04 RX ADMIN — IPRATROPIUM BROMIDE AND ALBUTEROL SULFATE 3 ML: .5; 3 SOLUTION RESPIRATORY (INHALATION) at 21:22

## 2025-03-04 RX ADMIN — KETOROLAC TROMETHAMINE 30 MG: 30 INJECTION, SOLUTION INTRAMUSCULAR at 14:23

## 2025-03-04 RX ADMIN — BUSPIRONE HYDROCHLORIDE 30 MG: 15 TABLET ORAL at 22:59

## 2025-03-04 RX ADMIN — IPRATROPIUM BROMIDE AND ALBUTEROL SULFATE 3 ML: .5; 3 SOLUTION RESPIRATORY (INHALATION) at 07:40

## 2025-03-04 RX ADMIN — SERTRALINE HYDROCHLORIDE 100 MG: 100 TABLET, FILM COATED ORAL at 22:59

## 2025-03-04 RX ADMIN — DOXYCYCLINE 100 MG: 100 INJECTION, POWDER, LYOPHILIZED, FOR SOLUTION INTRAVENOUS at 01:03

## 2025-03-04 RX ADMIN — ATORVASTATIN CALCIUM 20 MG: 20 TABLET, FILM COATED ORAL at 22:59

## 2025-03-04 RX ADMIN — OXYCODONE 5 MG: 5 TABLET ORAL at 00:02

## 2025-03-04 RX ADMIN — HYDROMORPHONE HYDROCHLORIDE 0.5 MG: 1 INJECTION, SOLUTION INTRAMUSCULAR; INTRAVENOUS; SUBCUTANEOUS at 08:01

## 2025-03-04 RX ADMIN — Medication 10 ML: at 23:01

## 2025-03-04 RX ADMIN — SUMATRIPTAN SUCCINATE 50 MG: 50 TABLET ORAL at 20:45

## 2025-03-04 RX ADMIN — AMITRIPTYLINE HYDROCHLORIDE 25 MG: 25 TABLET, FILM COATED ORAL at 22:59

## 2025-03-04 RX ADMIN — IPRATROPIUM BROMIDE AND ALBUTEROL SULFATE 3 ML: .5; 3 SOLUTION RESPIRATORY (INHALATION) at 11:17

## 2025-03-04 RX ADMIN — LEVETIRACETAM 1500 MG: 500 TABLET, FILM COATED ORAL at 07:50

## 2025-03-04 RX ADMIN — LACOSAMIDE 200 MG: 100 TABLET, FILM COATED ORAL at 22:59

## 2025-03-04 RX ADMIN — Medication 10 ML: at 07:50

## 2025-03-04 RX ADMIN — CEFTRIAXONE 2000 MG: 2 INJECTION, POWDER, FOR SOLUTION INTRAMUSCULAR; INTRAVENOUS at 23:01

## 2025-03-04 RX ADMIN — LEVETIRACETAM 1500 MG: 500 TABLET, FILM COATED ORAL at 22:58

## 2025-03-04 RX ADMIN — ACETAMINOPHEN 650 MG: 325 TABLET, FILM COATED ORAL at 01:03

## 2025-03-04 RX ADMIN — SUMATRIPTAN SUCCINATE 50 MG: 50 TABLET ORAL at 07:49

## 2025-03-04 RX ADMIN — BUDESONIDE INHALATION 0.5 MG: 0.5 SUSPENSION RESPIRATORY (INHALATION) at 07:41

## 2025-03-04 RX ADMIN — SUMATRIPTAN SUCCINATE 50 MG: 50 TABLET ORAL at 02:11

## 2025-03-04 RX ADMIN — DOXYCYCLINE 100 MG: 100 INJECTION, POWDER, LYOPHILIZED, FOR SOLUTION INTRAVENOUS at 14:02

## 2025-03-04 RX ADMIN — SODIUM CHLORIDE 100 ML/HR: 9 INJECTION, SOLUTION INTRAVENOUS at 13:18

## 2025-03-04 NOTE — PLAN OF CARE
Goal Outcome Evaluation:  Plan of Care Reviewed With: patient        Progress: improving          Pt resting in olivarez bed overnight on RA. Pt c/o flank pain, treated originally with PRN dilaudid with very short duration of relief. Provider contacted and PRN zaid 5 added. Plan to use dilaudid for breakthrough pain. Pt endorses 100% relief of pain with PO zaid, although it did make her slightly dizzy with ambulation. Pt also c/o a migraine starting, treated with PRN imitrex to prevent worsening. Pt ambulating to staff bathroom with minimal assistance. Pt with fever overnight, treated successfully with tylenol. VSS. Plan for continuing abx today.   .

## 2025-03-04 NOTE — CASE MANAGEMENT/SOCIAL WORK
Continued Stay Note   [facility]     Patient Name: Milka Espinoza  MRN: [id number]  Today's Date: 3/4/2025    Admit Date: 3/2/2025    Plan: Home   Discharge Plan       Row Name 03/04/25 1032       Plan    Plan Comments Discharge barriers:IV abx, blood cx pending, urine cx pending, increased WBC, monitor H&H, decreased BUN.                 Expected Discharge Date and Time       Expected Discharge Date Expected Discharge Time    Mar 5, 2025           Phone communication or documentation only - no physical contact with patient or family.     Alison Griffin RN

## 2025-03-04 NOTE — PROGRESS NOTES
[facility] MEDICINE SERVICE  DAILY PROGRESS NOTE    NAME: Milka Espinoza  : 1970  MRN: [id number]      LOS: 0 days     PROVIDER OF SERVICE: Eddie Conrad MD    Chief Complaint: PNA (pneumonia)    Subjective:     Interval History:  Patient still with general malaise and pain of posterior chest worse with deep inspiration. Had some vomiting this AM.        Review of Systems:   Review of Systems    Objective:     Vital Signs  Temp:  [97.9 °F (36.6 °C)-101.9 °F (38.8 °C)] 98.1 °F (36.7 °C)  Heart Rate:  [] 94  Resp:  [14-20] 20  BP: (109-132)/(63-81) 109/63   Body mass index is 33.97 kg/m².    Physical Exam  Physical Exam  General Appearance:  Alert, cooperative, no distress, appears stated age  Head:  Normocephalic, without obvious abnormality, atraumatic  Eyes:  PERRL, conjunctiva/corneas clear, EOM's intact, fundi benign, both eyes  Ears:  Normal TM's and external ear canals, both ears  Nose: Nares normal, septum midline, mucosa normal, no drainage or sinus tenderness  Throat: Lips, mucosa, and tongue normal; teeth and gums normal  Neck: Supple, symmetrical, trachea midline, no adenopathy, thyroid: not enlarged, symmetric, no tenderness/mass/nodules, no carotid bruit or JVD  Lungs:   Clear to auscultation bilaterally, respirations unlabored  Heart:  Regular rate and rhythm, S1, S2 normal, no murmur, rub or gallop  Abdomen:  Soft, non-tender, bowel sounds active all four quadrants,  no masses, no organomegaly  Extremities: Extremities normal, atraumatic, no cyanosis or edema  Pulses: 2+ and symmetric  Skin: Skin color, texture, turgor normal, no rashes or lesions  Neurologic: Normal         Diagnostic Data    Results from last 7 days   Lab Units 25  0736   WBC 10*3/mm3 12.46*   HEMOGLOBIN g/dL 9.4*   HEMATOCRIT % 30.1*   PLATELETS 10*3/mm3 371   GLUCOSE mg/dL 107*   CREATININE mg/dL 0.72   BUN mg/dL 5*   SODIUM mmol/L 135*   POTASSIUM mmol/L 3.9   AST (SGOT) U/L 12   ALT (SGPT) U/L 6    ALK PHOS U/L 136*   BILIRUBIN mg/dL 0.4   ANION GAP mmol/L 12.0       CT Angiogram Chest Pulmonary Embolism    Result Date: 3/3/2025  Impression: 1.No evidence of pulmonary embolism. 2.Mild patchy nodular airspace disease present within the posterior aspect of the right upper lobe, the lingula and within the bilateral lower lobes likely related to a mild pneumonia. Trace to small bilateral pleural effusions are present. Follow-up is recommended given the nodular appearance. 3.Ancillary findings as described above. Electronically Signed: Elise Steele MD  3/3/2025 12:06 AM EST  Workstation ID: [id number]       I reviewed the patient's new clinical results.    Assessment/Plan:     Active and Resolved Problems  Active Hospital Problems    Diagnosis  POA    **PNA (pneumonia) [J18.9]  Yes      Resolved Hospital Problems   No resolved problems to display.       Community Acquired bacterial pneumonia, unspecified organism  -Seen on imaging of chest  -Started on broad spectrum antibiotics  -Patient has significant pleurisy associated with pneumonia; started on scheduled IV toradol  -Encourage pulmonary toileting with IS, flutter valve    Seizure disorder  -Patient states she had a seizure at home prior to arrival to the hospital; likely triggered by underlying infectious process  -Continue home dose of Keppra, Vimpat    Bipolar disorder  -Continue home meds    Dyslipidemia  -Resume statin    Hypothyroidism  -Continue synthroid    VTE Prophylaxis:  No VTE prophylaxis order currently exists.             Disposition Planning:     Barriers to Discharge:Improvement in pneumonia  Anticipated Date of Discharge: 3/6  Place of Discharge: Home      Time: 40 minutes     Code Status and Medical Interventions: CPR (Attempt to Resuscitate); Full Support   Ordered at: 03/03/25 0035     Code Status (Patient has no pulse and is not breathing):    CPR (Attempt to Resuscitate)     Medical Interventions (Patient has pulse or is breathing):     Full Support       Signature: Electronically signed by Eddie Conrad MD, 03/04/25, 18:43 EST.  [facility] Hospitalist Team

## 2025-03-04 NOTE — PLAN OF CARE
Goal Outcome Evaluation:         Pt. Has remained fever free today. IV antibiotics given and NS 1 L infusing. Toradol started for pain. Migraine seems to be improved at this time.

## 2025-03-05 ENCOUNTER — READMISSION MANAGEMENT (OUTPATIENT)
Dept: CALL CENTER | Facility: HOSPITAL | Age: 55
End: 2025-03-05
Payer: MEDICARE

## 2025-03-05 VITALS
DIASTOLIC BLOOD PRESSURE: 70 MMHG | OXYGEN SATURATION: 93 % | HEIGHT: 64 IN | SYSTOLIC BLOOD PRESSURE: 118 MMHG | HEART RATE: 98 BPM | WEIGHT: 213.63 LBS | TEMPERATURE: 97.8 F | RESPIRATION RATE: 14 BRPM | BODY MASS INDEX: 36.47 KG/M2

## 2025-03-05 PROCEDURE — 94664 DEMO&/EVAL PT USE INHALER: CPT

## 2025-03-05 PROCEDURE — 94761 N-INVAS EAR/PLS OXIMETRY MLT: CPT

## 2025-03-05 PROCEDURE — 25010000002 KETOROLAC TROMETHAMINE PER 15 MG: Performed by: INTERNAL MEDICINE

## 2025-03-05 PROCEDURE — 94799 UNLISTED PULMONARY SVC/PX: CPT

## 2025-03-05 RX ORDER — AMOXICILLIN AND CLAVULANATE POTASSIUM 600; 42.9 MG/5ML; MG/5ML
900 POWDER, FOR SUSPENSION ORAL 2 TIMES DAILY
Qty: 75 ML | Refills: 0 | Status: SHIPPED | OUTPATIENT
Start: 2025-03-05 | End: 2025-03-10

## 2025-03-05 RX ADMIN — DOXYCYCLINE 100 MG: 100 INJECTION, POWDER, LYOPHILIZED, FOR SOLUTION INTRAVENOUS at 01:43

## 2025-03-05 RX ADMIN — CARIPRAZINE 1.5 MG: 1.5 CAPSULE, GELATIN COATED ORAL at 07:53

## 2025-03-05 RX ADMIN — LEVETIRACETAM 1500 MG: 500 TABLET, FILM COATED ORAL at 07:53

## 2025-03-05 RX ADMIN — OXYCODONE 5 MG: 5 TABLET ORAL at 07:53

## 2025-03-05 RX ADMIN — IPRATROPIUM BROMIDE AND ALBUTEROL SULFATE 3 ML: .5; 3 SOLUTION RESPIRATORY (INHALATION) at 11:42

## 2025-03-05 RX ADMIN — BUSPIRONE HYDROCHLORIDE 30 MG: 15 TABLET ORAL at 07:52

## 2025-03-05 RX ADMIN — BUDESONIDE INHALATION 0.5 MG: 0.5 SUSPENSION RESPIRATORY (INHALATION) at 08:15

## 2025-03-05 RX ADMIN — LACOSAMIDE 200 MG: 100 TABLET, FILM COATED ORAL at 07:52

## 2025-03-05 RX ADMIN — IPRATROPIUM BROMIDE AND ALBUTEROL SULFATE 3 ML: .5; 3 SOLUTION RESPIRATORY (INHALATION) at 08:15

## 2025-03-05 RX ADMIN — PANTOPRAZOLE SODIUM 40 MG: 40 TABLET, DELAYED RELEASE ORAL at 06:15

## 2025-03-05 RX ADMIN — LEVOTHYROXINE SODIUM 25 MCG: 0.03 TABLET ORAL at 06:15

## 2025-03-05 RX ADMIN — LAMOTRIGINE 150 MG: 100 TABLET ORAL at 07:52

## 2025-03-05 RX ADMIN — SERTRALINE HYDROCHLORIDE 100 MG: 100 TABLET, FILM COATED ORAL at 07:52

## 2025-03-05 RX ADMIN — OXYCODONE 5 MG: 5 TABLET ORAL at 01:42

## 2025-03-05 RX ADMIN — Medication 10 ML: at 07:54

## 2025-03-05 RX ADMIN — CARIPRAZINE 1.5 MG: 1.5 CAPSULE, GELATIN COATED ORAL at 00:02

## 2025-03-05 NOTE — CASE MANAGEMENT/SOCIAL WORK
Social Work Assessment  [facility]     Patient Name: Milka Espinoza  MRN: [id number]  Today's Date: 3/5/2025    Admit Date: 3/2/2025       Discharge Plan       Row Name 03/05/25 1533       Plan    Plan Comments LSW contacted patient’s Medicare to request transportation home from current hospital admission today (3/5). Transportation scheduled, ride booking ID: [id number]. RN, RN CM, and unit secretary updated. LSW requested that patient have all IVs removed and is dressed prior to arrival so there is no delay in transportation. Patient to be contacted by transportation once they arrive at hospital.             MACI Villalba, LSW    Phone: [phone]  Fax: [phone]  [email or web address]

## 2025-03-05 NOTE — OUTREACH NOTE
Prep Survey      Flowsheet Row Responses   Hinduism [facility] patient discharged from? [facility]   Is LACE score < 7 ? No   Eligibility [facility]   Date of Admission 03/02/25   Date of Discharge 03/05/25   Discharge Disposition Home or Self Care   Discharge diagnosis PNA (pneumonia)   Does the patient have one of the following disease processes/diagnoses(primary or secondary)? Pneumonia   Does the patient have Home health ordered? No   Is there a DME ordered? No   Prep survey completed? Yes            Maureen RAMIREZ - Registered Nurse

## 2025-03-05 NOTE — CASE MANAGEMENT/SOCIAL WORK
Continued Stay Note  MARKOS Braxton     Patient Name: Milka Espinoza  MRN: [id number]  Today's Date: 3/5/2025    Admit Date: 3/2/2025    Plan: Routine home.   Discharge Plan       Row Name 03/05/25 1544       Plan    Plan Routine home.    Patient/Family in Agreement with Plan yes    Plan Comments PCP follow up appt scheduled for this Friday 3/7. Appt info on AVS. Attempted to set pt up with Medicaid transportation but pt does not have transportation services through her Medicaid.        Megan Naegele, RN     Office Phone: [phone]  Office Cell: [phone]

## 2025-03-05 NOTE — CASE MANAGEMENT/SOCIAL WORK
Case Management Discharge Note      Final Note: Home.         Selected Continued Care - Discharged on 3/5/2025 Admission date: 3/2/2025 - Discharge disposition: Home or Self Care       Transportation Services  Taxi: other (Medicare cab)    Final Discharge Disposition Code: 01 - home or self-care

## 2025-03-05 NOTE — DISCHARGE SUMMARY
"             [facility] Medicine Services  Discharge Summary    Date of Service: 3/5/2025  Patient Name: Milka Espinoza  : 1970  MRN: [id number]    Date of Admission: 3/2/2025  Discharge Diagnosis: PNA (pneumonia)  Date of Discharge: 3/5/2025  Primary Care Physician: Alvina Arzate APRN      Presenting Problem:   PNA (pneumonia) [J18.9]  Urinary tract infection without hematuria, site unspecified [N39.0]  Pneumonia due to infectious organism, unspecified laterality, unspecified part of lung [J18.9]  Back pain, unspecified back location, unspecified back pain laterality, unspecified chronicity [M54.9]    Active and Resolved Hospital Problems:  Active Hospital Problems    Diagnosis POA    **PNA (pneumonia) [J18.9] Yes      Resolved Hospital Problems   No resolved problems to display.         Hospital Course     HPI:    \"Milka Espinoza is a 54 y.o. female with a CMH of seizure disorder, bipolar disorder, hypothyroidism, hyperlipidemia, who presented to [facility] on 3/2/2025 with  left flank pain, shortness of breath associated with cough.     Patient is currently awake alert [place] x 3, conversational.  Patient stated she developed left flank pain about 2 days ago, also went to [facility] earlier today on 3/2/25 (per patient), patient stated workup there was unremarkable she was sent home.  However pain did not improve which prompted her to come to our facility for further evaluation.  Patient currently denies chest pain, fever or chills.     In ED, vital stable, afebrile, troponins within normal limits, CMP mostly unremarkable, lipase 23, CBC remarkable for normocytic anemia with hemoglobin of 9.7, UA positive for bacteria, CT angio negative for acute PE, mild patchy nodular airspace disease posterior aspect of the right upper lobe, as well as bilateral lower lobes likely related to pneumonia, trace to small bilateral pleural effusions are present.  Patient received " "dose of doxycycline as well as ceftriaxone for pneumonia, admitted to medicine team for further inpatient management.\"    Hospital Course:  Patient was admitted to PCU for pneumonia.  Patient initially started on doxycycline and Rocephin.  Patient did not require supplemental oxygen during hospitalization.  However, patient was complaining of significant pleurisy and IV Toradol was given.  Patient also with intractable nausea and vomiting.  She reports she has a history of gastroparesis and is chronically nauseous and vomiting.  She reports following up with [facility] (Dr. Gross).  Patient was offered to be seen by GI while inpatient with possible scope given history of esophageal strictures Patient preferred to see a gastroenterologist she is established with.  Patient is medically stable for discharge home with antibiotics to finish course of treatment.      DISCHARGE Follow Up Recommendations for labs and diagnostics: Follow-up with PCP, follow-up with [facility] (Dr. Gross)        Day of Discharge     Vital Signs:  Temp:  [96.9 °F (36.1 °C)-98.3 °F (36.8 °C)] 96.9 °F (36.1 °C)  Heart Rate:  [85-99] 97  Resp:  [11-20] 20  BP: (109-138)/(63-93) 113/79    Physical Exam:  Physical Exam   General: awake, alert, in NAD  Neck: Supple  Chest: CTA bilat.  CV: S1S2 nl. RRR  Abd: Soft, NTND. +BS  Ext: No c/c/e.        Pertinent  and/or Most Recent Results     LAB RESULTS:      Lab 03/04/25  0736 03/03/25  0527 03/02/25  2152   WBC 12.46* 7.71 9.73   HEMOGLOBIN 9.4* 9.4* 9.7*   HEMATOCRIT 30.1* 29.9* 31.2*   PLATELETS 371 370 396   NEUTROS ABS 9.81* 4.60 5.89   IMMATURE GRANS (ABS) 0.07* 0.08* 0.09*   LYMPHS ABS 1.62 2.28 3.01   MONOS ABS 0.90 0.66 0.66   EOS ABS 0.02 0.06 0.04   MCV 84.1 84.9 84.3         Lab 03/04/25  0736 03/03/25  0527 03/02/25  2152   SODIUM 135* 136 139   POTASSIUM 3.9 3.9 3.7   CHLORIDE 99 101 104   CO2 24.0 24.1 22.1   ANION GAP 12.0 10.9 12.9   BUN 5* 8 8   CREATININE 0.72 0.86 0.86   EGFR 98.3 " 79.4 79.4   GLUCOSE 107* 94 108*   CALCIUM 9.4 9.1 9.0   MAGNESIUM 1.9 2.1  --    PHOSPHORUS 4.3 3.8  --          Lab 03/04/25  0736 03/03/25  0527 03/02/25  2152   TOTAL PROTEIN 8.7* 8.9* 9.0*   ALBUMIN 3.6 3.7 3.8   GLOBULIN 5.1 5.2 5.2   ALT (SGPT) 6 6 8   AST (SGOT) 12 12 16   BILIRUBIN 0.4 0.3 0.3   ALK PHOS 136* 133* 138*   LIPASE  --   --  23         Lab 03/02/25  2300 03/02/25  2152   PROBNP 38.1  --    HSTROP T <6 <6                 Brief Urine Lab Results  (Last result in the past 365 days)        Color   Clarity   Blood   Leuk Est   Nitrite   Protein   CREAT   Urine HCG        03/02/25 2153 Yellow   Hazy   Trace   Trace   Negative   Negative                 Microbiology Results (last 10 days)       Procedure Component Value - Date/Time    Respiratory Panel PCR w/COVID-19(SARS-CoV-2) ALBERTO/EMMA/[facility]LEY/PAD/COR/SARAH In-House, NP Swab in UTM/VTM, 2 HR TAT - Swab, Nasopharynx [[id number]]  (Normal) Collected: 03/03/25 0056    Lab Status: Final result Specimen: Swab from Nasopharynx Updated: 03/03/25 0157     ADENOVIRUS, PCR Not Detected     Coronavirus 229E Not Detected     Coronavirus HKU1 Not Detected     Coronavirus NL63 Not Detected     Coronavirus OC43 Not Detected     COVID19 Not Detected     Human Metapneumovirus Not Detected     Human Rhinovirus/Enterovirus Not Detected     Influenza A PCR Not Detected     Influenza B PCR Not Detected     Parainfluenza Virus 1 Not Detected     Parainfluenza Virus 2 Not Detected     Parainfluenza Virus 3 Not Detected     Parainfluenza Virus 4 Not Detected     RSV, PCR Not Detected     Bordetella pertussis pcr Not Detected     Bordetella parapertussis PCR Not Detected     Chlamydophila pneumoniae PCR Not Detected     Mycoplasma pneumo by PCR Not Detected    Narrative:      In the setting of a positive respiratory panel with a viral infection PLUS a negative procalcitonin without other underlying concern for bacterial infection, consider observing off antibiotics or  discontinuation of antibiotics and continue supportive care. If the respiratory panel is positive for atypical bacterial infection (Bordetella pertussis, Chlamydophila pneumoniae, or Mycoplasma pneumoniae), consider antibiotic de-escalation to target atypical bacterial infection.    Blood Culture - Blood, Arm, Right [[id number]]  (Normal) Collected: 03/03/25 0056    Lab Status: Preliminary result Specimen: Blood from Arm, Right Updated: 03/05/25 0115     Blood Culture No growth at 2 days    Narrative:      Less than seven (7) mL's of blood was collected.  Insufficient quantity may yield false negative results.    Blood Culture - Blood, Arm, Left [[id number]]  (Normal) Collected: 03/03/25 0056    Lab Status: Preliminary result Specimen: Blood from Arm, Left Updated: 03/05/25 0115     Blood Culture No growth at 2 days    Urine Culture - Urine, Urine, Clean Catch [[id number]]  (Normal) Collected: 03/02/25 2153    Lab Status: Final result Specimen: Urine, Clean Catch Updated: 03/04/25 0957     Urine Culture No growth            CT Angiogram Chest Pulmonary Embolism    Result Date: 3/3/2025  Impression: Impression: 1.No evidence of pulmonary embolism. 2.Mild patchy nodular airspace disease present within the posterior aspect of the right upper lobe, the lingula and within the bilateral lower lobes likely related to a mild pneumonia. Trace to small bilateral pleural effusions are present. Follow-up is recommended given the nodular appearance. 3.Ancillary findings as described above. Electronically Signed: Elise Steele MD  3/3/2025 12:06 AM EST  Workstation ID: [id number]                 Labs Pending at Discharge:  Pending Results       None            Procedures Performed           Consults:   Consults       Date and Time Order Name Status Description    3/3/2025 12:18 AM Hospitalist (on-call MD unless specified)                Discharge Details        Discharge Medications        New Medications        Instructions Start Date    amoxicillin-clavulanate 600-42.9 MG/5ML suspension  Commonly known as: Augmentin ES-600   900 mg, Oral, 2 Times Daily, Discard remainder             Continue These Medications        Instructions Start Date   ALPRAZolam 2 MG tablet  Commonly known as: XANAX   2 mg, Oral, 3 Times Daily PRN, for anxiety      amitriptyline 25 MG tablet  Commonly known as: ELAVIL   25 mg, Oral, Every Night at Bedtime      atorvastatin 20 MG tablet  Commonly known as: LIPITOR   20 mg, Oral, Nightly      BD PosiFlush 0.9 % flush  Generic drug: sodium chloride       busPIRone 30 MG tablet  Commonly known as: BUSPAR   30 mg, Oral, 2 Times Daily      heparin 100 UNIT/ML solution injection   No dose, route, or frequency recorded.      heparin 100 UNIT/ML solution injection       hydrOXYzine 25 MG tablet  Commonly known as: ATARAX   25 mg, Oral, 3 Times Daily PRN, for anxiety      lacosamide 200 MG tablet  Commonly known as: VIMPAT   200 mg, Oral, Every 12 Hours Scheduled      lamoTRIgine 150 MG tablet  Commonly known as: LaMICtal   150 mg, Oral, 2 Times Daily      levETIRAcetam 500 MG tablet  Commonly known as: KEPPRA   1,500 mg, Oral, 2 Times Daily      levothyroxine 25 MCG tablet  Commonly known as: SYNTHROID, LEVOTHROID   25 mcg, Oral, Daily      Motegrity 2 MG tablet  Generic drug: Prucalopride Succinate   1 tablet, Daily      nystatin 219697 UNIT/GM powder  Commonly known as: nystatin   Topical, 2 Times Daily      Octagam 30 GM/300ML solution infusion  Generic drug: Immune Globulin (Human)   30 g, Weekly      ondansetron 2 mg/mL injection  Commonly known as: ZOFRAN   4 mg, Every 6 Hours PRN      pantoprazole 40 MG EC tablet  Commonly known as: PROTONIX   40 mg, Oral, Daily      promethazine 6.25 MG/5ML solution oral solution  Commonly known as: PHENERGAN   25 mg, Every 6 Hours PRN      promethazine 25 MG tablet  Commonly known as: PHENERGAN   25 mg, Every 6 Hours PRN      QUEtiapine  MG 24 hr tablet  Commonly known as: SEROquel  "XR   800 mg, Oral, Every Night at Bedtime      sertraline 100 MG tablet  Commonly known as: ZOLOFT   100 mg, Oral, Daily      sodium chloride 0.9 % solution       sucralfate 1 g tablet  Commonly known as: CARAFATE   1 g, 3 Times Daily      SUMAtriptan 100 MG tablet  Commonly known as: IMITREX   TAKE 1 TABLET BY MOUTH 1 TIME AT ONSET OF MIGRAINE AS NEEDED FOR MIGRAINE. MAY REPEAT DOSE 1 TIME AFTER 2 HOURS IF MIGRAINE NOT RESOLVED      Vraylar 1.5 MG capsule capsule  Generic drug: Cariprazine HCl   1.5 mg, Oral, Daily               Allergies   Allergen Reactions    Ketorolac Other (See Comments)     pt states \"this medicine sends me into seizures\"    Sulfa Antibiotics Hives    Tramadol Hcl Mental Status Change    Adhesive Tape Rash     Paper tape         Discharge Disposition:   Home or Self Care    Diet:  Hospital:  Diet Order   Procedures    Diet: Regular/House; Fluid Consistency: Thin (IDDSI 0)         Discharge Activity: as tolerated        CODE STATUS:  Code Status and Medical Interventions: CPR (Attempt to Resuscitate); Full Support   Ordered at: 03/03/25 0035     Code Status (Patient has no pulse and is not breathing):    CPR (Attempt to Resuscitate)     Medical Interventions (Patient has pulse or is breathing):    Full Support         Future Appointments   Date Time Provider Department Center   3/27/2025 10:30 AM Milka Pavon APRN [facility]   4/7/2025  4:30 PM Ana Rosa Weber APRN [facility]   7/17/2025  8:00 AM Alvina Arzate APRN [facility]           Time spent on Discharge including face to face service:  35 minutes    Signature: Electronically signed by Main Vincent PA-C, 03/05/25, 11:58 ESTChristina Braxton Hospitalist Team    "

## 2025-03-05 NOTE — PLAN OF CARE
Goal Outcome Evaluation:   Patient came from the PCU and had no problems once getting settled into her room.

## 2025-03-05 NOTE — TELEPHONE ENCOUNTER
Patient comes to check with her GI doctor she has gastroparesis and I am not sure which ones he would prefer

## 2025-03-06 ENCOUNTER — TRANSITIONAL CARE MANAGEMENT TELEPHONE ENCOUNTER (OUTPATIENT)
Dept: CALL CENTER | Facility: HOSPITAL | Age: 55
End: 2025-03-06
Payer: MEDICARE

## 2025-03-06 NOTE — OUTREACH NOTE
Call Center TCM Note      Flowsheet Row Responses   [employer] facility patient discharged from? [facility]   Does the patient have one of the following disease processes/diagnoses(primary or secondary)? Pneumonia   TCM attempt successful? No  [no one listed on PCP VR]   Unsuccessful attempts Attempt 1   Call Status Voice mail issues  [VM not set up]            Mady Muhammad RN    3/6/2025, 08:54 EST

## 2025-03-06 NOTE — OUTREACH NOTE
Call Center TCM Note      Flowsheet Row Responses   [facility] patient discharged from? [facility]   Does the patient have one of the following disease processes/diagnoses(primary or secondary)? Pneumonia   TCM attempt successful? Yes   Call start time 1450   Call end time 1454   Discharge diagnosis PNA (pneumonia)   Meds reviewed with patient/caregiver? Yes   Is the patient having any side effects they believe may be caused by any medication additions or changes? No   Does the patient have all medications ordered at discharge? Yes   Is the patient taking all medications as directed (includes completed medication regime)? Yes   Comments Hospital d/c f/u appt on 3/27/25 @9:30am   Does the patient have an appointment with their PCP within 7-14 days of discharge? Yes   Has home health visited the patient within 72 hours of discharge? N/A   Pulse Ox monitoring None   Psychosocial issues? No   Did the patient receive a copy of their discharge instructions? Yes   Nursing interventions Reviewed instructions with patient   What is the patient's perception of their health status since discharge? Same  [pt c/o back pain]   Nursing Interventions Nurse provided patient education   Is the patient/caregiver able to teach back the hierarchy of who to call/visit for symptoms/problems? PCP, Specialist, Home health nurse, Urgent Care, ED, 911 Yes   Is the patient/caregiver able to teach back signs and symptoms of worsening condition: Fever/chills, Chest pain, Shortness of breath   Is the patient/caregiver able to teach back importance of completing antibiotic course of treatment? Yes   TCM call completed? Yes   Call end time 1454   Would this patient benefit from a Referral to Amb Social Work? No   Is the patient interested in additional calls from an ambulatory ? No            Mady Muhammad RN    3/6/2025, 14:54 EST

## 2025-03-06 NOTE — PROGRESS NOTES
Enter Query Response Below      Query Response: Urinary tract infection was not supported             If applicable, please update the problem list.

## 2025-03-08 LAB
BACTERIA SPEC AEROBE CULT: NORMAL
BACTERIA SPEC AEROBE CULT: NORMAL

## 2025-03-11 ENCOUNTER — TELEPHONE (OUTPATIENT)
Dept: FAMILY MEDICINE CLINIC | Facility: CLINIC | Age: 55
End: 2025-03-11
Payer: MEDICARE

## 2025-03-11 NOTE — TELEPHONE ENCOUNTER
Caller: Milka Espinoza    Relationship: Self    Best call back number: [phone]    What medication are you requesting: something for  PNEUMONIA     What are your current symptoms: pain in gut and mid back    How long have you been experiencing symptoms: 3 weeks    Have you had these symptoms before:    [] Yes  [x] No    Have you been treated for these symptoms before:   [] Yes  [x] No    If a prescription is needed, what is your preferred pharmacy and phone number: [id number] - [address] [facility] [address] [facility]  [phone] [facility] [phone]      Additional notes:   PATIENT STATED SHE WOULD LIKE SOMETHING FOR THE PAIN SHE'S HAVING DUE TO HER  PNEUMONIA.     PATIENT STATED SHE COULD BARELY WALK.    PATIENT DOES HAVE HOSPITAL FOLLOW UP ON 3/27/25

## 2025-03-14 ENCOUNTER — APPOINTMENT (OUTPATIENT)
Dept: GENERAL RADIOLOGY | Facility: HOSPITAL | Age: 55
DRG: 315 | End: 2025-03-14
Payer: MEDICARE

## 2025-03-14 ENCOUNTER — APPOINTMENT (OUTPATIENT)
Dept: CT IMAGING | Facility: HOSPITAL | Age: 55
DRG: 315 | End: 2025-03-14
Payer: MEDICARE

## 2025-03-14 ENCOUNTER — HOSPITAL ENCOUNTER (EMERGENCY)
Facility: HOSPITAL | Age: 55
Discharge: HOME | DRG: 315 | End: 2025-03-14
Payer: MEDICARE

## 2025-03-14 VITALS
HEART RATE: 106 BPM | OXYGEN SATURATION: 93 % | TEMPERATURE: 97.8 F | HEIGHT: 64 IN | DIASTOLIC BLOOD PRESSURE: 84 MMHG | SYSTOLIC BLOOD PRESSURE: 112 MMHG | RESPIRATION RATE: 16 BRPM | BODY MASS INDEX: 36.13 KG/M2 | WEIGHT: 211.64 LBS

## 2025-03-14 DIAGNOSIS — J18.9 PNEUMONIA DUE TO INFECTIOUS ORGANISM, UNSPECIFIED LATERALITY, UNSPECIFIED PART OF LUNG: ICD-10-CM

## 2025-03-14 DIAGNOSIS — S22.060A COMPRESSION FRACTURE OF T7 VERTEBRA, INITIAL ENCOUNTER: ICD-10-CM

## 2025-03-14 DIAGNOSIS — M54.6 MIDLINE THORACIC BACK PAIN, UNSPECIFIED CHRONICITY: ICD-10-CM

## 2025-03-14 DIAGNOSIS — R06.02 SHORTNESS OF BREATH: Primary | ICD-10-CM

## 2025-03-14 DIAGNOSIS — E87.6 HYPOKALEMIA: ICD-10-CM

## 2025-03-14 DIAGNOSIS — R07.9 CHEST PAIN, UNSPECIFIED TYPE: ICD-10-CM

## 2025-03-14 LAB
ALBUMIN SERPL-MCNC: 3.6 G/DL (ref 3.5–5.2)
ALBUMIN/GLOB SERPL: 0.8 G/DL
ALP SERPL-CCNC: 127 U/L (ref 39–117)
ALT SERPL W P-5'-P-CCNC: 10 U/L (ref 1–33)
ANION GAP SERPL CALCULATED.3IONS-SCNC: 12.4 MMOL/L (ref 5–15)
AST SERPL-CCNC: 17 U/L (ref 1–32)
B PARAPERT DNA SPEC QL NAA+PROBE: NOT DETECTED
B PERT DNA SPEC QL NAA+PROBE: NOT DETECTED
BASOPHILS # BLD AUTO: 0.04 10*3/MM3 (ref 0–0.2)
BASOPHILS NFR BLD AUTO: 0.7 % (ref 0–1.5)
BILIRUB SERPL-MCNC: 0.2 MG/DL (ref 0–1.2)
BUN SERPL-MCNC: 11 MG/DL (ref 6–20)
BUN/CREAT SERPL: 15.3 (ref 7–25)
C PNEUM DNA NPH QL NAA+NON-PROBE: NOT DETECTED
CALCIUM SPEC-SCNC: 8.7 MG/DL (ref 8.6–10.5)
CHLORIDE SERPL-SCNC: 106 MMOL/L (ref 98–107)
CO2 SERPL-SCNC: 22.6 MMOL/L (ref 22–29)
CREAT SERPL-MCNC: 0.72 MG/DL (ref 0.57–1)
D DIMER PPP FEU-MCNC: 0.63 MCGFEU/ML (ref 0–0.56)
DEPRECATED RDW RBC AUTO: 48.9 FL (ref 37–54)
EGFRCR SERPLBLD CKD-EPI 2021: 98.3 ML/MIN/1.73
EOSINOPHIL # BLD AUTO: 0.06 10*3/MM3 (ref 0–0.4)
EOSINOPHIL NFR BLD AUTO: 1.1 % (ref 0.3–6.2)
ERYTHROCYTE [DISTWIDTH] IN BLOOD BY AUTOMATED COUNT: 15.9 % (ref 12.3–15.4)
FLUAV SUBTYP SPEC NAA+PROBE: NOT DETECTED
FLUBV RNA ISLT QL NAA+PROBE: NOT DETECTED
GEN 5 1HR TROPONIN T REFLEX: <6 NG/L
GLOBULIN UR ELPH-MCNC: 4.7 GM/DL
GLUCOSE SERPL-MCNC: 147 MG/DL (ref 65–99)
HADV DNA SPEC NAA+PROBE: NOT DETECTED
HCOV 229E RNA SPEC QL NAA+PROBE: NOT DETECTED
HCOV HKU1 RNA SPEC QL NAA+PROBE: NOT DETECTED
HCOV NL63 RNA SPEC QL NAA+PROBE: NOT DETECTED
HCOV OC43 RNA SPEC QL NAA+PROBE: NOT DETECTED
HCT VFR BLD AUTO: 28.3 % (ref 34–46.6)
HGB BLD-MCNC: 8.8 G/DL (ref 12–15.9)
HMPV RNA NPH QL NAA+NON-PROBE: NOT DETECTED
HOLD SPECIMEN: NORMAL
HPIV1 RNA ISLT QL NAA+PROBE: NOT DETECTED
HPIV2 RNA SPEC QL NAA+PROBE: NOT DETECTED
HPIV3 RNA NPH QL NAA+PROBE: NOT DETECTED
HPIV4 P GENE NPH QL NAA+PROBE: NOT DETECTED
IMM GRANULOCYTES # BLD AUTO: 0.02 10*3/MM3 (ref 0–0.05)
IMM GRANULOCYTES NFR BLD AUTO: 0.4 % (ref 0–0.5)
LYMPHOCYTES # BLD AUTO: 2.08 10*3/MM3 (ref 0.7–3.1)
LYMPHOCYTES NFR BLD AUTO: 37.7 % (ref 19.6–45.3)
M PNEUMO IGG SER IA-ACNC: NOT DETECTED
MCH RBC QN AUTO: 26.6 PG (ref 26.6–33)
MCHC RBC AUTO-ENTMCNC: 31.1 G/DL (ref 31.5–35.7)
MCV RBC AUTO: 85.5 FL (ref 79–97)
MONOCYTES # BLD AUTO: 0.38 10*3/MM3 (ref 0.1–0.9)
MONOCYTES NFR BLD AUTO: 6.9 % (ref 5–12)
NEUTROPHILS NFR BLD AUTO: 2.94 10*3/MM3 (ref 1.7–7)
NEUTROPHILS NFR BLD AUTO: 53.2 % (ref 42.7–76)
NRBC BLD AUTO-RTO: 0 /100 WBC (ref 0–0.2)
NT-PROBNP SERPL-MCNC: 161 PG/ML (ref 0–900)
PLATELET # BLD AUTO: 369 10*3/MM3 (ref 140–450)
PMV BLD AUTO: 9.6 FL (ref 6–12)
POTASSIUM SERPL-SCNC: 3.3 MMOL/L (ref 3.5–5.2)
PROCALCITONIN SERPL-MCNC: 2.13 NG/ML (ref 0–0.25)
PROT SERPL-MCNC: 8.3 G/DL (ref 6–8.5)
RBC # BLD AUTO: 3.31 10*6/MM3 (ref 3.77–5.28)
RHINOVIRUS RNA SPEC NAA+PROBE: NOT DETECTED
RSV RNA NPH QL NAA+NON-PROBE: NOT DETECTED
SARS-COV-2 RNA RESP QL NAA+PROBE: NOT DETECTED
SODIUM SERPL-SCNC: 141 MMOL/L (ref 136–145)
TROPONIN T NUMERIC DELTA: NORMAL
TROPONIN T SERPL HS-MCNC: <6 NG/L
WBC NRBC COR # BLD AUTO: 5.52 10*3/MM3 (ref 3.4–10.8)

## 2025-03-14 PROCEDURE — 96375 TX/PRO/DX INJ NEW DRUG ADDON: CPT

## 2025-03-14 PROCEDURE — 80053 COMPREHEN METABOLIC PANEL: CPT | Performed by: NURSE PRACTITIONER

## 2025-03-14 PROCEDURE — 87186 SC STD MICRODIL/AGAR DIL: CPT | Performed by: NURSE PRACTITIONER

## 2025-03-14 PROCEDURE — 84145 PROCALCITONIN (PCT): CPT | Performed by: NURSE PRACTITIONER

## 2025-03-14 PROCEDURE — 71045 X-RAY EXAM CHEST 1 VIEW: CPT

## 2025-03-14 PROCEDURE — 25010000002 ONDANSETRON PER 1 MG: Performed by: NURSE PRACTITIONER

## 2025-03-14 PROCEDURE — 71275 CT ANGIOGRAPHY CHEST: CPT

## 2025-03-14 PROCEDURE — 93005 ELECTROCARDIOGRAM TRACING: CPT

## 2025-03-14 PROCEDURE — 85379 FIBRIN DEGRADATION QUANT: CPT | Performed by: NURSE PRACTITIONER

## 2025-03-14 PROCEDURE — 96365 THER/PROPH/DIAG IV INF INIT: CPT

## 2025-03-14 PROCEDURE — 25010000002 CEFTRIAXONE PER 250 MG: Performed by: NURSE PRACTITIONER

## 2025-03-14 PROCEDURE — 87077 CULTURE AEROBIC IDENTIFY: CPT | Performed by: NURSE PRACTITIONER

## 2025-03-14 PROCEDURE — 99285 EMERGENCY DEPT VISIT HI MDM: CPT

## 2025-03-14 PROCEDURE — 36415 COLL VENOUS BLD VENIPUNCTURE: CPT

## 2025-03-14 PROCEDURE — 87040 BLOOD CULTURE FOR BACTERIA: CPT | Performed by: NURSE PRACTITIONER

## 2025-03-14 PROCEDURE — 25010000002 MORPHINE PER 10 MG: Performed by: NURSE PRACTITIONER

## 2025-03-14 PROCEDURE — 0202U NFCT DS 22 TRGT SARS-COV-2: CPT | Performed by: NURSE PRACTITIONER

## 2025-03-14 PROCEDURE — 87154 CUL TYP ID BLD PTHGN 6+ TRGT: CPT | Performed by: NURSE PRACTITIONER

## 2025-03-14 PROCEDURE — 85025 COMPLETE CBC W/AUTO DIFF WBC: CPT | Performed by: NURSE PRACTITIONER

## 2025-03-14 PROCEDURE — 84484 ASSAY OF TROPONIN QUANT: CPT | Performed by: NURSE PRACTITIONER

## 2025-03-14 PROCEDURE — 25510000001 IOPAMIDOL PER 1 ML: Performed by: NURSE PRACTITIONER

## 2025-03-14 PROCEDURE — 83880 ASSAY OF NATRIURETIC PEPTIDE: CPT | Performed by: NURSE PRACTITIONER

## 2025-03-14 RX ORDER — IOPAMIDOL 755 MG/ML
100 INJECTION, SOLUTION INTRAVASCULAR
Status: COMPLETED | OUTPATIENT
Start: 2025-03-14 | End: 2025-03-14

## 2025-03-14 RX ORDER — ONDANSETRON 2 MG/ML
4 INJECTION INTRAMUSCULAR; INTRAVENOUS ONCE
Status: COMPLETED | OUTPATIENT
Start: 2025-03-14 | End: 2025-03-14

## 2025-03-14 RX ORDER — OXYCODONE HYDROCHLORIDE 5 MG/1
5 TABLET ORAL EVERY 4 HOURS PRN
Refills: 0 | Status: DISCONTINUED | OUTPATIENT
Start: 2025-03-14 | End: 2025-03-15 | Stop reason: HOSPADM

## 2025-03-14 RX ORDER — POTASSIUM CHLORIDE 1500 MG/1
40 TABLET, EXTENDED RELEASE ORAL ONCE
Status: COMPLETED | OUTPATIENT
Start: 2025-03-14 | End: 2025-03-14

## 2025-03-14 RX ORDER — HYDROCODONE BITARTRATE AND ACETAMINOPHEN 7.5; 325 MG/1; MG/1
1 TABLET ORAL EVERY 4 HOURS PRN
Qty: 15 TABLET | Refills: 0 | Status: SHIPPED | OUTPATIENT
Start: 2025-03-14 | End: 2025-03-14

## 2025-03-14 RX ORDER — HYDROCODONE BITARTRATE AND ACETAMINOPHEN 7.5; 325 MG/1; MG/1
1 TABLET ORAL EVERY 4 HOURS PRN
Qty: 12 TABLET | Refills: 0 | Status: SHIPPED | OUTPATIENT
Start: 2025-03-14 | End: 2025-03-22 | Stop reason: HOSPADM

## 2025-03-14 RX ORDER — SODIUM CHLORIDE 0.9 % (FLUSH) 0.9 %
10 SYRINGE (ML) INJECTION AS NEEDED
Status: DISCONTINUED | OUTPATIENT
Start: 2025-03-14 | End: 2025-03-15 | Stop reason: HOSPADM

## 2025-03-14 RX ADMIN — POTASSIUM CHLORIDE 40 MEQ: 1500 TABLET, EXTENDED RELEASE ORAL at 21:10

## 2025-03-14 RX ADMIN — OXYCODONE HYDROCHLORIDE 5 MG: 5 TABLET ORAL at 21:16

## 2025-03-14 RX ADMIN — MORPHINE SULFATE 4 MG: 4 INJECTION, SOLUTION INTRAMUSCULAR; INTRAVENOUS at 20:00

## 2025-03-14 RX ADMIN — CEFTRIAXONE 2000 MG: 2 INJECTION, POWDER, FOR SOLUTION INTRAMUSCULAR; INTRAVENOUS at 22:50

## 2025-03-14 RX ADMIN — ONDANSETRON 4 MG: 2 INJECTION, SOLUTION INTRAMUSCULAR; INTRAVENOUS at 19:59

## 2025-03-14 RX ADMIN — IOPAMIDOL 100 ML: 755 INJECTION, SOLUTION INTRAVENOUS at 21:54

## 2025-03-14 NOTE — ED PROVIDER NOTES
"Subjective   History of Present Illness  Patient is a 54-year-old white female with a history of hyperlipidemia, thyroid disorder, seizure disorder.  She presents today from home with complaints of worsening pain in her chest and mid back.  It has been present for the last 3 weeks.  She was seen here in the ED 2 weeks ago for the same complaint was diagnosed with pneumonia.  She was admitted but subsequently discharged home with oral antibiotics which she finished.  She reports no improvement in her pain.  Does complain of some shortness of breath.  She does report a slight nonproductive cough.  No fever or chills.  No leg pain or swelling.  Her pain is worse with movement and reproducible to palpation.  She denies any known injury or trauma.      Review of Systems   Constitutional:  Negative for chills and fever.   HENT:  Negative for congestion.    Respiratory:  Positive for cough and shortness of breath.    Cardiovascular:  Positive for chest pain. Negative for leg swelling.   Gastrointestinal:  Negative for abdominal pain, nausea and vomiting.   Musculoskeletal:  Positive for back pain.       Past Medical History:   Diagnosis Date    Depression     Disease of thyroid gland     Hyperlipidemia     Migraines     On home O2 2021    3 lmp    Seizures        Allergies   Allergen Reactions    Ketorolac Other (See Comments)     pt states \"this medicine sends me into seizures\"    Sulfa Antibiotics Hives    Tramadol Hcl Mental Status Change    Adhesive Tape Rash     Paper tape       Past Surgical History:   Procedure Laterality Date    ABDOMINAL SURGERY       SECTION      x3    ENDOSCOPY N/A 2022    Procedure: EGD WITH DILATATION (BOUGIE #56) AND INJECTION OF BOTOX;  Surgeon: Rito Le MD;  Location: [facility] ENDOSCOPY;  Service: Gastroenterology;  Laterality: N/A;  POST: LOWER ESOPHAGEAL STRICTURE AND CRICOPHARYNGEAL STRICUTRE    ENDOSCOPY W/ PEG TUBE PLACEMENT N/A 2020    Procedure: " ESOPHAGOGASTRODUODENOSCOPY WITH PERCUTANEOUS ENDOSCOPIC GASTROSTOMY TUBE INSERTION;  Surgeon: Rito Le MD;  Location: [facility] ENDOSCOPY;  Service: Gastroenterology;  Laterality: N/A;    HERNIA REPAIR      HYSTERECTOMY      PEG TUBE REMOVAL         Family History   Problem Relation Age of Onset    No Known Problems Mother     No Known Problems Father     No Known Problems Sister     No Known Problems Brother     Alcohol abuse Daughter     No Known Problems Son     No Known Problems Maternal Grandmother     No Known Problems Paternal Grandmother     No Known Problems Maternal Aunt     No Known Problems Paternal Aunt        Social History     Socioeconomic History    Marital status:    Tobacco Use    Smoking status: Former     Current packs/day: 0.00     Average packs/day: 0.3 packs/day for 3.0 years (0.8 ttl pk-yrs)     Types: Cigarettes     Start date:      Quit date:      Years since quittin.2     Passive exposure: Past    Smokeless tobacco: Never   Vaping Use    Vaping status: Never Used   Substance and Sexual Activity    Alcohol use: No    Drug use: No    Sexual activity: Defer           Objective   Physical Exam  Vital signs and triage nurse note reviewed.  Constitutional: Awake, alert; well-developed and well-nourished. No acute distress is noted.  Obese.  Appears anxious.  HEENT: Normocephalic, atraumatic; pupils are PERRL with intact EOM; oropharynx is pink and moist without exudate or erythema.  No drooling or pooling of oral secretions.  Neck: Supple  Cardiovascular: Regular rate and rhythm, normal S1-S2.  No murmur noted.  Pulmonary: Respiratory effort regular nonlabored, breath sounds clear to auscultation all fields.  Abdomen: Soft, nontender, nondistended with normoactive bowel sounds; no rebound or guarding.  Musculoskeletal: Independent range of motion of all extremities.  There is tenderness of the anterior chest wall that reproduces the patient's pain.  There is also  tenderness over the mid back that also reproduces patient's pain.  There is no crepitus or subcu emphysema.  No rash.  No ecchymosis.  No erythema  Skin: Flesh tone, warm, dry, intact; no erythematous or petechial rash or lesion.    Procedures           ED Course      Labs Reviewed   COMPREHENSIVE METABOLIC PANEL - Abnormal; Notable for the following components:       Result Value    Glucose 147 (*)     Potassium 3.3 (*)     Alkaline Phosphatase 127 (*)     All other components within normal limits    Narrative:     GFR Categories in Chronic Kidney Disease (CKD)      GFR Category          GFR (mL/min/1.73)    Interpretation  G1                     90 or greater         Normal or high (1)  G2                      60-89                Mild decrease (1)  G3a                   45-59                Mild to moderate decrease  G3b                   30-44                Moderate to severe decrease  G4                    15-29                Severe decrease  G5                    14 or less           Kidney failure          (1)In the absence of evidence of kidney disease, neither GFR category G1 or G2 fulfill the criteria for CKD.    eGFR calculation 2021 CKD-EPI creatinine equation, which does not include race as a factor   D-DIMER, QUANTITATIVE - Abnormal; Notable for the following components:    D-Dimer, Quantitative 0.63 (*)     All other components within normal limits    Narrative:     According to the assay 's published package insert, a normal (<0.50 MCGFEU/mL) D-dimer result in conjunction with a non-high clinical probability assessment, excludes deep vein thrombosis (DVT) and pulmonary embolism (PE) with high sensitivity.    D-dimer values increase with age and this can make VTE exclusion of an older population difficult. To address this, the American College of Physicians, based on best available evidence and recent guidelines, recommends that clinicians use age-adjusted D-dimer thresholds in patients  "greater than 50 years of age with: a) a low probability of PE who do not meet all Pulmonary Embolism Rule Out Criteria, or b) in those with intermediate probability of PE.   The formula for an age-adjusted D-dimer cut-off is \"age/100\".  For example, a 60 year old patient would have an age-adjusted cut-off of 0.60 MCGFEU/mL and an 80 year old 0.80 MCGFEU/mL.   PROCALCITONIN - Abnormal; Notable for the following components:    Procalcitonin 2.13 (*)     All other components within normal limits    Narrative:     As a Marker for Sepsis (Non-Neonates):    1. <0.5 ng/mL represents a low risk of severe sepsis and/or septic shock.  2. >2 ng/mL represents a high risk of severe sepsis and/or septic shock.    As a Marker for Lower Respiratory Tract Infections that require antibiotic therapy:    PCT on Admission    Antibiotic Therapy       6-12 Hrs later    >0.5                Strongly Recommended  >0.25 - <0.5        Recommended   0.1 - 0.25          Discouraged              Remeasure/reassess PCT  <0.1                Strongly Discouraged     Remeasure/reassess PCT    As 28 day mortality risk marker: \"Change in Procalcitonin Result\" (>80% or <=80%) if Day 0 (or Day 1) and Day 4 values are available. Refer to http://www.[employer]-pct-calculator.com    Change in PCT <=80%  A decrease of PCT levels below or equal to 80% defines a positive change in PCT test result representing a higher risk for 28-day all-cause mortality of patients diagnosed with severe sepsis for septic shock.    Change in PCT >80%  A decrease of PCT levels of more than 80% defines a negative change in PCT result representing a lower risk for 28-day all-cause mortality of patients diagnosed with severe sepsis or septic shock.      CBC WITH AUTO DIFFERENTIAL - Abnormal; Notable for the following components:    RBC 3.31 (*)     Hemoglobin 8.8 (*)     Hematocrit 28.3 (*)     MCHC 31.1 (*)     RDW 15.9 (*)     All other components within normal limits   RESPIRATORY " PANEL PCR W/ COVID-19 (SARS-COV-2), NP SWAB IN UTM/VTP, 2 HR TAT - Normal    Narrative:     In the setting of a positive respiratory panel with a viral infection PLUS a negative procalcitonin without other underlying concern for bacterial infection, consider observing off antibiotics or discontinuation of antibiotics and continue supportive care. If the respiratory panel is positive for atypical bacterial infection (Bordetella pertussis, Chlamydophila pneumoniae, or Mycoplasma pneumoniae), consider antibiotic de-escalation to target atypical bacterial infection.   BNP (IN-HOUSE) - Normal    Narrative:     This assay is used as an aid in the diagnosis of individuals suspected of having heart failure. It can be used as an aid in the diagnosis of acute decompensated heart failure (ADHF) in patients presenting with signs and symptoms of ADHF to the emergency department (ED). In addition, NT-proBNP of <300 pg/mL indicates ADHF is not likely.    Age Range Result Interpretation  NT-proBNP Concentration (pg/mL:      <50             Positive            >450                   Gray                 300-450                    Negative             <300    50-75           Positive            >900                  Gray                300-900                  Negative            <300      >75             Positive            >1800                  Gray                300-1800                  Negative            <300   TROPONIN - Normal    Narrative:     High Sensitive Troponin T Reference Range:  <14.0 ng/L- Negative Female for AMI  <22.0 ng/L- Negative Male for AMI  >=14 - Abnormal Female indicating possible myocardial injury.  >=22 - Abnormal Male indicating possible myocardial injury.   Clinicians would have to utilize clinical acumen, EKG, Troponin, and serial changes to determine if it is an Acute Myocardial Infarction or myocardial injury due to an underlying chronic condition.        BLOOD CULTURE   BLOOD CULTURE   HIGH  SENSITIVITIY TROPONIN T 1HR    Narrative:     High Sensitive Troponin T Reference Range:  <14.0 ng/L- Negative Female for AMI  <22.0 ng/L- Negative Male for AMI  >=14 - Abnormal Female indicating possible myocardial injury.  >=22 - Abnormal Male indicating possible myocardial injury.   Clinicians would have to utilize clinical acumen, EKG, Troponin, and serial changes to determine if it is an Acute Myocardial Infarction or myocardial injury due to an underlying chronic condition.        URINALYSIS W/ CULTURE IF INDICATED   CBC AND DIFFERENTIAL    Narrative:     The following orders were created for panel order CBC & Differential.  Procedure                               Abnormality         Status                     ---------                               -----------         ------                     CBC Auto Differential[[id number]]        Abnormal            Final result                 Please view results for these tests on the individual orders.   EXTRA TUBES    Narrative:     The following orders were created for panel order Extra Tubes.  Procedure                               Abnormality         Status                     ---------                               -----------         ------                     Gold Top - SST[[id number]]                                   Final result                 Please view results for these tests on the individual orders.   GOLD TOP - SST     CT Angiogram Chest Pulmonary Embolism  Result Date: 3/14/2025  Impression: Acute on chronic compression fracture at T7 vertebral body. No visible osseous retropulsion. Chronic appearing T8 and T9 compression fractures. No evidence of pulmonary embolism. Persistent but mildly smaller appearing nodular patchy opacities in the right upper lobe. Additional mild patchy nodular opacities in the lingula and bilateral lower lobes appear stable. This may represent evolving infectious/inflammatory opacities. Though would recommend follow-up chest  CT in 3 months given nodular appearance. Stable to mildly decreased small bilateral pleural effusions. Electronically Signed: Arsalan Leo  3/14/2025 10:16 PM EDT  Workstation ID: [id number]    XR Chest 1 View  Result Date: 3/14/2025  Impression: No radiographic evidence of an acute cardiopulmonary abnormality. Likely atelectasis at the left lung base. Electronically Signed: Arsalan Leo  3/14/2025 7:54 PM EDT  Workstation ID: [id number]    Medications   sodium chloride 0.9 % flush 10 mL (has no administration in time range)   oxyCODONE (ROXICODONE) immediate release tablet 5 mg (5 mg Oral Given 3/14/25 2116)   cefTRIAXone (ROCEPHIN) 2,000 mg in sodium chloride 0.9 % 100 mL MBP (2,000 mg Intravenous New Bag 3/14/25 2250)   ondansetron (ZOFRAN) injection 4 mg (4 mg Intravenous Given 3/14/25 1959)   morphine injection 4 mg (4 mg Intravenous Given 3/14/25 2000)   potassium chloride (KLOR-CON M20) CR tablet 40 mEq (40 mEq Oral Given 3/14/25 2110)   iopamidol (ISOVUE-370) 76 % injection 100 mL (100 mL Intravenous Given 3/14/25 2154)                                                      Medical Decision Making  Patient presents today with the above complaint.    She had above exam and evaluation.  She is placed in [employer] cardiac monitor.  IV was established.  Labs EKG and chest x-ray were obtained.  She was given morphine and Zofran for pain.    Workup: EKG was independently interpreted by Dr. Cleaning and reviewed by myself shows sinus rhythm with ventricular rate of 99, no acute ST or T wave changes, no ectopy.  CBC reveals a normal white blood cell count of 5.5, hemoglobin appears stable at 8.8.  Metabolic panel significant for potassium of 3.3 otherwise unremarkable.  D-dimer 0.63.  proBNP within normal limits at 161.  Initial troponin less than 6, repeat is less than 6.  Procalcitonin 2.1.  Respiratory panel is negative.  Blood cultures are pending.  CT PE protocol shows acute on chronic compression fracture at T7  vertebral body.  No visible osseous retropulsion.  Chronic appearing T8 and T9 compression fractures.  No evidence of pulmonary embolism.  Persistent but mildly smaller.  Nodular patchy opacities in the right upper lobe.  Additional mild patchy nodular opacities in the lingula and bilateral lower lobes appear stable.    Patient is given dose of IV Rocephin.  On reexamination she is resting quietly no distress.  No new complaints.  She is well-appearing and hemodynamically stable.  She is neurovascularly intact and ambulatory without difficulty.  She is not hypoxic.  Her chest pain and back pain is reproducible.    Findings were discussed with her.  She will be discharged home with prescription for Augmentin and Norco and instructed follow-up with primary care provider and spine center.  She was given warning signs for prompt return to the ED and she voiced understanding.  Inspect reviewed.    Problems Addressed:  Chest pain, unspecified type: complicated acute illness or injury  Compression fracture of T7 vertebra, initial encounter: complicated acute illness or injury  Hypokalemia: complicated acute illness or injury  Midline thoracic back pain, unspecified chronicity: complicated acute illness or injury  Pneumonia due to infectious organism, unspecified laterality, unspecified part of lung: complicated acute illness or injury  Shortness of breath: complicated acute illness or injury    Amount and/or Complexity of Data Reviewed  Labs: ordered.  Radiology: ordered.    Risk  Prescription drug management.        Final diagnoses:   Shortness of breath   Chest pain, unspecified type   Hypokalemia   Pneumonia due to infectious organism, unspecified laterality, unspecified part of lung   Midline thoracic back pain, unspecified chronicity   Compression fracture of T7 vertebra, initial encounter       ED Disposition  ED Disposition       ED Disposition   Discharge    Condition   Stable    Comment   --                Alvina Arzate, APRN  [address]  [place] [address]  [phone]    Schedule an appointment as soon as possible for a visit       Wesley Harrison IV, MD  [address] [place] [address] [address]  [place] [address]  [phone]    Schedule an appointment as soon as possible for a visit            Medication List        New Prescriptions      amoxicillin-clavulanate 875-125 MG per tablet  Commonly known as: AUGMENTIN  Take 1 tablet by mouth 2 (Two) Times a Day for 7 days.     HYDROcodone-acetaminophen 7.5-325 MG per tablet  Commonly known as: NORCO  Take 1 tablet by mouth Every 4 (Four) Hours As Needed for Moderate Pain or Severe Pain.               Where to Get Your Medications        These medications were sent to [facility][id number] - [address] SEC OF [facility] [address] [facility] [address] - [phone]  - [phone] [address] [place] [address]      Phone: [phone]   amoxicillin-clavulanate 875-125 MG per tablet  HYDROcodone-acetaminophen 7.5-325 MG per tablet            uYliya Friedman, SANJEEV  03/14/25 9755

## 2025-03-15 LAB
BACTERIA BLD CULT: ABNORMAL
BOTTLE TYPE: ABNORMAL
QT INTERVAL: 348 MS
QTC INTERVAL: 448 MS

## 2025-03-15 NOTE — DISCHARGE INSTRUCTIONS
Take medications as prescribed.  May continue current home medications.  Drink plenty of fluids.  Close follow-up with primary care provider and spine center.  No heavy lifting bending pulling.  Return for new or worsening symptoms.

## 2025-03-15 NOTE — NURSING NOTE
Called Abbie at [facility] [phone] and left a message requesting to cancel the prescription sent to that Abbie and informd them that it was resent to a different pharmacy.

## 2025-03-16 DIAGNOSIS — F40.01 PANIC DISORDER WITH AGORAPHOBIA: Chronic | ICD-10-CM

## 2025-03-16 RX ORDER — HYDROXYZINE HYDROCHLORIDE 25 MG/1
25 TABLET, FILM COATED ORAL 3 TIMES DAILY PRN
Qty: 90 TABLET | Refills: 2 | Status: ON HOLD | OUTPATIENT
Start: 2025-03-16

## 2025-03-17 ENCOUNTER — APPOINTMENT (OUTPATIENT)
Dept: OTHER | Facility: HOSPITAL | Age: 55
End: 2025-03-17
Payer: MEDICARE

## 2025-03-17 ENCOUNTER — READMISSION MANAGEMENT (OUTPATIENT)
Dept: CALL CENTER | Facility: HOSPITAL | Age: 55
End: 2025-03-17
Payer: MEDICARE

## 2025-03-17 ENCOUNTER — RESULTS FOLLOW-UP (OUTPATIENT)
Dept: EMERGENCY DEPT | Facility: HOSPITAL | Age: 55
End: 2025-03-17
Payer: MEDICARE

## 2025-03-17 ENCOUNTER — HOSPITAL ENCOUNTER (INPATIENT)
Facility: HOSPITAL | Age: 55
LOS: 5 days | Discharge: HOME HEALTH | End: 2025-03-22
Attending: FAMILY MEDICINE | Admitting: INTERNAL MEDICINE
Payer: MEDICARE

## 2025-03-17 DIAGNOSIS — M46.44 DISCITIS OF THORACIC REGION: Primary | ICD-10-CM

## 2025-03-17 PROBLEM — M46.40 DISCITIS: Status: ACTIVE | Noted: 2025-03-17

## 2025-03-17 RX ORDER — OXYCODONE HYDROCHLORIDE 5 MG/1
5 TABLET ORAL EVERY 6 HOURS PRN
Refills: 0 | Status: DISCONTINUED | OUTPATIENT
Start: 2025-03-17 | End: 2025-03-18

## 2025-03-17 RX ORDER — BISACODYL 5 MG/1
5 TABLET, DELAYED RELEASE ORAL DAILY PRN
Status: DISCONTINUED | OUTPATIENT
Start: 2025-03-17 | End: 2025-03-22 | Stop reason: HOSPADM

## 2025-03-17 RX ORDER — SODIUM CHLORIDE 0.9 % (FLUSH) 0.9 %
10 SYRINGE (ML) INJECTION AS NEEDED
Status: DISCONTINUED | OUTPATIENT
Start: 2025-03-17 | End: 2025-03-22 | Stop reason: HOSPADM

## 2025-03-17 RX ORDER — SODIUM CHLORIDE 0.9 % (FLUSH) 0.9 %
10 SYRINGE (ML) INJECTION EVERY 12 HOURS SCHEDULED
Status: DISCONTINUED | OUTPATIENT
Start: 2025-03-17 | End: 2025-03-22 | Stop reason: HOSPADM

## 2025-03-17 RX ORDER — SODIUM CHLORIDE 9 MG/ML
40 INJECTION, SOLUTION INTRAVENOUS AS NEEDED
Status: DISCONTINUED | OUTPATIENT
Start: 2025-03-17 | End: 2025-03-22 | Stop reason: HOSPADM

## 2025-03-17 RX ORDER — HYDROMORPHONE HYDROCHLORIDE 1 MG/ML
0.5 INJECTION, SOLUTION INTRAMUSCULAR; INTRAVENOUS; SUBCUTANEOUS
Status: DISCONTINUED | OUTPATIENT
Start: 2025-03-17 | End: 2025-03-18

## 2025-03-17 RX ORDER — AMOXICILLIN 250 MG
2 CAPSULE ORAL 2 TIMES DAILY PRN
Status: DISCONTINUED | OUTPATIENT
Start: 2025-03-17 | End: 2025-03-22 | Stop reason: HOSPADM

## 2025-03-17 RX ORDER — ENOXAPARIN SODIUM 100 MG/ML
40 INJECTION SUBCUTANEOUS DAILY
Status: DISCONTINUED | OUTPATIENT
Start: 2025-03-18 | End: 2025-03-22 | Stop reason: HOSPADM

## 2025-03-17 RX ORDER — ACETAMINOPHEN 160 MG/5ML
650 SOLUTION ORAL EVERY 4 HOURS PRN
Status: DISCONTINUED | OUTPATIENT
Start: 2025-03-17 | End: 2025-03-22 | Stop reason: HOSPADM

## 2025-03-17 RX ORDER — ONDANSETRON 2 MG/ML
4 INJECTION INTRAMUSCULAR; INTRAVENOUS EVERY 6 HOURS PRN
Status: DISCONTINUED | OUTPATIENT
Start: 2025-03-17 | End: 2025-03-18

## 2025-03-17 RX ORDER — NITROGLYCERIN 0.4 MG/1
0.4 TABLET SUBLINGUAL
Status: DISCONTINUED | OUTPATIENT
Start: 2025-03-17 | End: 2025-03-22 | Stop reason: HOSPADM

## 2025-03-17 RX ORDER — SODIUM CHLORIDE 9 MG/ML
75 INJECTION, SOLUTION INTRAVENOUS CONTINUOUS
Status: DISPENSED | OUTPATIENT
Start: 2025-03-17 | End: 2025-03-19

## 2025-03-17 RX ORDER — SODIUM CHLORIDE 0.9 % (FLUSH) 0.9 %
20 SYRINGE (ML) INJECTION AS NEEDED
Status: DISCONTINUED | OUTPATIENT
Start: 2025-03-17 | End: 2025-03-22 | Stop reason: HOSPADM

## 2025-03-17 RX ORDER — BISACODYL 10 MG
10 SUPPOSITORY, RECTAL RECTAL DAILY PRN
Status: DISCONTINUED | OUTPATIENT
Start: 2025-03-17 | End: 2025-03-22 | Stop reason: HOSPADM

## 2025-03-17 RX ORDER — SODIUM CHLORIDE 0.9 % (FLUSH) 0.9 %
10 SYRINGE (ML) INJECTION EVERY 12 HOURS SCHEDULED
Status: DISCONTINUED | OUTPATIENT
Start: 2025-03-17 | End: 2025-03-18

## 2025-03-17 RX ORDER — VANCOMYCIN/0.9 % SOD CHLORIDE 1.5G/250ML
20 PLASTIC BAG, INJECTION (ML) INTRAVENOUS ONCE
Status: COMPLETED | OUTPATIENT
Start: 2025-03-17 | End: 2025-03-18

## 2025-03-17 RX ORDER — POLYETHYLENE GLYCOL 3350 17 G/17G
17 POWDER, FOR SOLUTION ORAL DAILY PRN
Status: DISCONTINUED | OUTPATIENT
Start: 2025-03-17 | End: 2025-03-22 | Stop reason: HOSPADM

## 2025-03-17 RX ADMIN — ACETAMINOPHEN 650 MG: 650 SOLUTION ORAL at 23:30

## 2025-03-17 NOTE — OUTREACH NOTE
COPD/PN Week 1 Survey      Flowsheet Row Responses   [facility] facility patient discharged from? [facility]   Does the patient have one of the following disease processes/diagnoses(primary or secondary)? Pneumonia            Mi DENT - Registered Nurse

## 2025-03-17 NOTE — PROGRESS NOTES
ADDENDUM: Patient called, she went to the ER at [facility].     Preliminary blood culture resulted with Pseudomonas spp.. Patient was given Rx for amoxicillin-clavulanic acid. For possible PNA. Waiting for final susceptibilities, NP Olman Glover attempted to reach patient yesterday with no success. Attempted to reach patient today to recommend she return to ER. Patient called back, she is agreeable to return to ER for IV antibiotics. Recommend starting treatment with cefepime 2g IV q8h until sensitivies result.      Microbiology Results (last 10 days)       Procedure Component Value - Date/Time    Blood Culture - Blood, Blood, Venous [[id number]]  (Abnormal) Collected: 03/14/25 2216    Lab Status: Preliminary result Specimen: Blood, Venous Updated: 03/17/25 0632     Blood Culture Gram Negative Bacilli     Isolated from --     Gram Stain Aerobic Bottle Gram negative bacilli    Narrative:      Less than seven (7) mL's of blood was collected.  Insufficient quantity may yield false negative results.    Blood Culture - Blood, Arm, Right [[id number]]  (Abnormal) Collected: 03/14/25 2213    Lab Status: Preliminary result Specimen: Blood from Arm, Right Updated: 03/17/25 0632     Blood Culture Gram Negative Bacilli     Isolated from Aerobic Bottle     Gram Stain Aerobic Bottle Gram negative bacilli    Narrative:      Less than seven (7) mL's of blood was collected.  Insufficient quantity may yield false negative results.    Blood Culture ID, PCR - Blood, Arm, Right [[id number]]  (Abnormal) Collected: 03/14/25 2213    Lab Status: Final result Specimen: Blood from Arm, Right Updated: 03/15/25 2342     BCID, PCR Pseudomonas aeruginosa. Identification by BCID2 PCR     BOTTLE TYPE Aerobic Bottle    Respiratory Panel PCR w/COVID-19(SARS-CoV-2) ALBERTO/EMMA/ASHLEY/PAD/COR/SARAH In-House, NP Swab in UTM/VTM, 2 HR TAT - Swab, Nasopharynx [[id number]]  (Normal) Collected: 03/14/25 1949    Lab Status: Final result Specimen: Swab from  Nasopharynx Updated: 03/14/25 2044     ADENOVIRUS, PCR Not Detected     Coronavirus 229E Not Detected     Coronavirus HKU1 Not Detected     Coronavirus NL63 Not Detected     Coronavirus OC43 Not Detected     COVID19 Not Detected     Human Metapneumovirus Not Detected     Human Rhinovirus/Enterovirus Not Detected     Influenza A PCR Not Detected     Influenza B PCR Not Detected     Parainfluenza Virus 1 Not Detected     Parainfluenza Virus 2 Not Detected     Parainfluenza Virus 3 Not Detected     Parainfluenza Virus 4 Not Detected     RSV, PCR Not Detected     Bordetella pertussis pcr Not Detected     Bordetella parapertussis PCR Not Detected     Chlamydophila pneumoniae PCR Not Detected     Mycoplasma pneumo by PCR Not Detected    Narrative:      In the setting of a positive respiratory panel with a viral infection PLUS a negative procalcitonin without other underlying concern for bacterial infection, consider observing off antibiotics or discontinuation of antibiotics and continue supportive care. If the respiratory panel is positive for atypical bacterial infection (Bordetella pertussis, Chlamydophila pneumoniae, or Mycoplasma pneumoniae), consider antibiotic de-escalation to target atypical bacterial infection.            Tawana Louis RPH  3/17/2025 10:56 EDT

## 2025-03-18 ENCOUNTER — APPOINTMENT (OUTPATIENT)
Dept: INTERVENTIONAL RADIOLOGY/VASCULAR | Facility: HOSPITAL | Age: 55
End: 2025-03-18
Payer: MEDICARE

## 2025-03-18 ENCOUNTER — APPOINTMENT (OUTPATIENT)
Dept: CARDIOLOGY | Facility: HOSPITAL | Age: 55
End: 2025-03-18
Payer: MEDICARE

## 2025-03-18 ENCOUNTER — APPOINTMENT (OUTPATIENT)
Dept: MRI IMAGING | Facility: HOSPITAL | Age: 55
End: 2025-03-18
Payer: MEDICARE

## 2025-03-18 ENCOUNTER — APPOINTMENT (OUTPATIENT)
Dept: CT IMAGING | Facility: HOSPITAL | Age: 55
End: 2025-03-18
Payer: MEDICARE

## 2025-03-18 LAB
ALBUMIN SERPL-MCNC: 3.7 G/DL (ref 3.5–5.2)
ALBUMIN/GLOB SERPL: 0.7 G/DL
ALP SERPL-CCNC: 210 U/L (ref 39–117)
ALT SERPL W P-5'-P-CCNC: 20 U/L (ref 1–33)
ANION GAP SERPL CALCULATED.3IONS-SCNC: 11.9 MMOL/L (ref 5–15)
APTT PPP: 31.8 SECONDS (ref 22.7–35.4)
AST SERPL-CCNC: 32 U/L (ref 1–32)
BACTERIA BLD CULT: ABNORMAL
BACTERIA SPEC AEROBE CULT: ABNORMAL
BACTERIA SPEC AEROBE CULT: ABNORMAL
BASOPHILS # BLD AUTO: 0.04 10*3/MM3 (ref 0–0.2)
BASOPHILS NFR BLD AUTO: 0.4 % (ref 0–1.5)
BILIRUB SERPL-MCNC: 0.4 MG/DL (ref 0–1.2)
BILIRUB UR QL STRIP: NEGATIVE
BOTTLE TYPE: ABNORMAL
BUN SERPL-MCNC: 9 MG/DL (ref 6–20)
BUN/CREAT SERPL: 13.6 (ref 7–25)
CALCIUM SPEC-SCNC: 9.3 MG/DL (ref 8.6–10.5)
CHLORIDE SERPL-SCNC: 100 MMOL/L (ref 98–107)
CK SERPL-CCNC: 38 U/L (ref 20–180)
CLARITY UR: ABNORMAL
CO2 SERPL-SCNC: 23.1 MMOL/L (ref 22–29)
COLOR UR: YELLOW
CREAT SERPL-MCNC: 0.66 MG/DL (ref 0.57–1)
D-LACTATE SERPL-SCNC: 1.1 MMOL/L (ref 0.5–2)
DEPRECATED RDW RBC AUTO: 49.1 FL (ref 37–54)
EGFRCR SERPLBLD CKD-EPI 2021: 103.1 ML/MIN/1.73
EOSINOPHIL # BLD AUTO: 0.01 10*3/MM3 (ref 0–0.4)
EOSINOPHIL NFR BLD AUTO: 0.1 % (ref 0.3–6.2)
ERYTHROCYTE [DISTWIDTH] IN BLOOD BY AUTOMATED COUNT: 16 % (ref 12.3–15.4)
GLOBULIN UR ELPH-MCNC: 5.2 GM/DL
GLUCOSE SERPL-MCNC: 102 MG/DL (ref 65–99)
GLUCOSE UR STRIP-MCNC: NEGATIVE MG/DL
GRAM STN SPEC: ABNORMAL
GRAM STN SPEC: ABNORMAL
HCT VFR BLD AUTO: 28.7 % (ref 34–46.6)
HGB BLD-MCNC: 8.9 G/DL (ref 12–15.9)
HGB UR QL STRIP.AUTO: NEGATIVE
IMM GRANULOCYTES # BLD AUTO: 0.05 10*3/MM3 (ref 0–0.05)
IMM GRANULOCYTES NFR BLD AUTO: 0.5 % (ref 0–0.5)
INR PPP: 1.02 (ref 0.9–1.1)
ISOLATED FROM: ABNORMAL
ISOLATED FROM: ABNORMAL
KETONES UR QL STRIP: NEGATIVE
LEUKOCYTE ESTERASE UR QL STRIP.AUTO: NEGATIVE
LYMPHOCYTES # BLD AUTO: 1.38 10*3/MM3 (ref 0.7–3.1)
LYMPHOCYTES NFR BLD AUTO: 13 % (ref 19.6–45.3)
MAGNESIUM SERPL-MCNC: 2 MG/DL (ref 1.6–2.6)
MCH RBC QN AUTO: 26.2 PG (ref 26.6–33)
MCHC RBC AUTO-ENTMCNC: 31 G/DL (ref 31.5–35.7)
MCV RBC AUTO: 84.4 FL (ref 79–97)
MONOCYTES # BLD AUTO: 0.52 10*3/MM3 (ref 0.1–0.9)
MONOCYTES NFR BLD AUTO: 4.9 % (ref 5–12)
MRSA DNA SPEC QL NAA+PROBE: NORMAL
NEUTROPHILS NFR BLD AUTO: 8.6 10*3/MM3 (ref 1.7–7)
NEUTROPHILS NFR BLD AUTO: 81.1 % (ref 42.7–76)
NITRITE UR QL STRIP: NEGATIVE
NRBC BLD AUTO-RTO: 0 /100 WBC (ref 0–0.2)
PH UR STRIP.AUTO: 6.5 [PH] (ref 5–8)
PLATELET # BLD AUTO: 397 10*3/MM3 (ref 140–450)
PMV BLD AUTO: 10 FL (ref 6–12)
POTASSIUM SERPL-SCNC: 3.9 MMOL/L (ref 3.5–5.2)
PROCALCITONIN SERPL-MCNC: 0.31 NG/ML (ref 0–0.25)
PROT SERPL-MCNC: 8.9 G/DL (ref 6–8.5)
PROT UR QL STRIP: NEGATIVE
PROTHROMBIN TIME: 13.3 SECONDS (ref 11.7–14.2)
RBC # BLD AUTO: 3.4 10*6/MM3 (ref 3.77–5.28)
SODIUM SERPL-SCNC: 135 MMOL/L (ref 136–145)
SP GR UR STRIP: 1.02 (ref 1–1.03)
TSH SERPL DL<=0.05 MIU/L-ACNC: 2.78 UIU/ML (ref 0.27–4.2)
UROBILINOGEN UR QL STRIP: ABNORMAL
WBC NRBC COR # BLD AUTO: 10.6 10*3/MM3 (ref 3.4–10.8)

## 2025-03-18 PROCEDURE — 25010000002 HYDROMORPHONE PER 4 MG: Performed by: NURSE PRACTITIONER

## 2025-03-18 PROCEDURE — 25810000003 SODIUM CHLORIDE 0.9 % SOLUTION: Performed by: NURSE PRACTITIONER

## 2025-03-18 PROCEDURE — 82550 ASSAY OF CK (CPK): CPT | Performed by: NURSE PRACTITIONER

## 2025-03-18 PROCEDURE — 87186 SC STD MICRODIL/AGAR DIL: CPT | Performed by: NURSE PRACTITIONER

## 2025-03-18 PROCEDURE — 72146 MRI CHEST SPINE W/O DYE: CPT

## 2025-03-18 PROCEDURE — 25810000003 SODIUM CHLORIDE 0.9 % SOLUTION 250 ML FLEX CONT: Performed by: NURSE PRACTITIONER

## 2025-03-18 PROCEDURE — 77012 CT SCAN FOR NEEDLE BIOPSY: CPT

## 2025-03-18 PROCEDURE — 72147 MRI CHEST SPINE W/DYE: CPT

## 2025-03-18 PROCEDURE — 87077 CULTURE AEROBIC IDENTIFY: CPT | Performed by: NURSE PRACTITIONER

## 2025-03-18 PROCEDURE — 87205 SMEAR GRAM STAIN: CPT | Performed by: INTERNAL MEDICINE

## 2025-03-18 PROCEDURE — 81003 URINALYSIS AUTO W/O SCOPE: CPT | Performed by: NURSE PRACTITIONER

## 2025-03-18 PROCEDURE — 97165 OT EVAL LOW COMPLEX 30 MIN: CPT | Performed by: OCCUPATIONAL THERAPIST

## 2025-03-18 PROCEDURE — 85025 COMPLETE CBC W/AUTO DIFF WBC: CPT | Performed by: NURSE PRACTITIONER

## 2025-03-18 PROCEDURE — 0JPV3XZ REMOVAL OF TUNNELED VASCULAR ACCESS DEVICE FROM UPPER EXTREMITY SUBCUTANEOUS TISSUE AND FASCIA, PERCUTANEOUS APPROACH: ICD-10-PCS | Performed by: INTERNAL MEDICINE

## 2025-03-18 PROCEDURE — 25010000002 ONDANSETRON PER 1 MG: Performed by: NURSE PRACTITIONER

## 2025-03-18 PROCEDURE — 0J973ZX DRAINAGE OF BACK SUBCUTANEOUS TISSUE AND FASCIA, PERCUTANEOUS APPROACH, DIAGNOSTIC: ICD-10-PCS | Performed by: INTERNAL MEDICINE

## 2025-03-18 PROCEDURE — 84443 ASSAY THYROID STIM HORMONE: CPT | Performed by: NURSE PRACTITIONER

## 2025-03-18 PROCEDURE — 25010000002 ENOXAPARIN PER 10 MG: Performed by: NURSE PRACTITIONER

## 2025-03-18 PROCEDURE — 85610 PROTHROMBIN TIME: CPT | Performed by: NURSE PRACTITIONER

## 2025-03-18 PROCEDURE — 83605 ASSAY OF LACTIC ACID: CPT | Performed by: NURSE PRACTITIONER

## 2025-03-18 PROCEDURE — 62267 INTERDISCAL PERQ ASPIR DX: CPT

## 2025-03-18 PROCEDURE — 25010000002 PROCHLORPERAZINE 10 MG/2ML SOLUTION: Performed by: NURSE PRACTITIONER

## 2025-03-18 PROCEDURE — 25010000002 FENTANYL CITRATE (PF) 50 MCG/ML SOLUTION: Performed by: RADIOLOGY

## 2025-03-18 PROCEDURE — 25010000002 CEFEPIME PER 500 MG: Performed by: NURSE PRACTITIONER

## 2025-03-18 PROCEDURE — 87154 CUL TYP ID BLD PTHGN 6+ TRGT: CPT | Performed by: NURSE PRACTITIONER

## 2025-03-18 PROCEDURE — 25010000002 HYDROMORPHONE 1 MG/ML SOLUTION: Performed by: INTERNAL MEDICINE

## 2025-03-18 PROCEDURE — A9579 GAD-BASE MR CONTRAST NOS,1ML: HCPCS | Performed by: INTERNAL MEDICINE

## 2025-03-18 PROCEDURE — 25010000002 LIDOCAINE 1 % SOLUTION: Performed by: RADIOLOGY

## 2025-03-18 PROCEDURE — 93356 MYOCRD STRAIN IMG SPCKL TRCK: CPT | Performed by: STUDENT IN AN ORGANIZED HEALTH CARE EDUCATION/TRAINING PROGRAM

## 2025-03-18 PROCEDURE — 85730 THROMBOPLASTIN TIME PARTIAL: CPT | Performed by: NURSE PRACTITIONER

## 2025-03-18 PROCEDURE — 93306 TTE W/DOPPLER COMPLETE: CPT | Performed by: STUDENT IN AN ORGANIZED HEALTH CARE EDUCATION/TRAINING PROGRAM

## 2025-03-18 PROCEDURE — 25010000002 GADOTERIDOL PER 1 ML: Performed by: INTERNAL MEDICINE

## 2025-03-18 PROCEDURE — 87641 MR-STAPH DNA AMP PROBE: CPT | Performed by: NURSE PRACTITIONER

## 2025-03-18 PROCEDURE — 87070 CULTURE OTHR SPECIMN AEROBIC: CPT | Performed by: INTERNAL MEDICINE

## 2025-03-18 PROCEDURE — 80053 COMPREHEN METABOLIC PANEL: CPT | Performed by: NURSE PRACTITIONER

## 2025-03-18 PROCEDURE — 83735 ASSAY OF MAGNESIUM: CPT | Performed by: NURSE PRACTITIONER

## 2025-03-18 PROCEDURE — 93306 TTE W/DOPPLER COMPLETE: CPT

## 2025-03-18 PROCEDURE — 93356 MYOCRD STRAIN IMG SPCKL TRCK: CPT

## 2025-03-18 PROCEDURE — 87040 BLOOD CULTURE FOR BACTERIA: CPT | Performed by: NURSE PRACTITIONER

## 2025-03-18 PROCEDURE — C1819 TISSUE LOCALIZATION-EXCISION: HCPCS

## 2025-03-18 PROCEDURE — 25010000002 VANCOMYCIN HCL 1.25 G RECONSTITUTED SOLUTION 1 EACH VIAL: Performed by: NURSE PRACTITIONER

## 2025-03-18 PROCEDURE — 25010000002 MORPHINE PER 10 MG: Performed by: NURSE PRACTITIONER

## 2025-03-18 PROCEDURE — 25010000002 ONDANSETRON PER 1 MG: Performed by: RADIOLOGY

## 2025-03-18 PROCEDURE — 25010000002 VANCOMYCIN 10 G RECONSTITUTED SOLUTION: Performed by: NURSE PRACTITIONER

## 2025-03-18 PROCEDURE — 87070 CULTURE OTHR SPECIMN AEROBIC: CPT | Performed by: NURSE PRACTITIONER

## 2025-03-18 PROCEDURE — 84145 PROCALCITONIN (PCT): CPT | Performed by: NURSE PRACTITIONER

## 2025-03-18 RX ORDER — HYDROXYZINE HYDROCHLORIDE 25 MG/1
25 TABLET, FILM COATED ORAL 3 TIMES DAILY PRN
Status: DISCONTINUED | OUTPATIENT
Start: 2025-03-18 | End: 2025-03-22 | Stop reason: HOSPADM

## 2025-03-18 RX ORDER — SERTRALINE HYDROCHLORIDE 100 MG/1
100 TABLET, FILM COATED ORAL DAILY
Status: DISCONTINUED | OUTPATIENT
Start: 2025-03-18 | End: 2025-03-22 | Stop reason: HOSPADM

## 2025-03-18 RX ORDER — SUMATRIPTAN 50 MG/1
100 TABLET, FILM COATED ORAL
Status: DISCONTINUED | OUTPATIENT
Start: 2025-03-18 | End: 2025-03-22 | Stop reason: HOSPADM

## 2025-03-18 RX ORDER — LIDOCAINE HYDROCHLORIDE 10 MG/ML
INJECTION, SOLUTION INFILTRATION; PERINEURAL AS NEEDED
Status: COMPLETED | OUTPATIENT
Start: 2025-03-18 | End: 2025-03-18

## 2025-03-18 RX ORDER — FENTANYL CITRATE 50 UG/ML
INJECTION, SOLUTION INTRAMUSCULAR; INTRAVENOUS
Status: ACTIVE
Start: 2025-03-18 | End: 2025-03-19

## 2025-03-18 RX ORDER — FENTANYL CITRATE 50 UG/ML
INJECTION, SOLUTION INTRAMUSCULAR; INTRAVENOUS AS NEEDED
Status: COMPLETED | OUTPATIENT
Start: 2025-03-18 | End: 2025-03-18

## 2025-03-18 RX ORDER — ALPRAZOLAM 1 MG/1
2 TABLET ORAL 3 TIMES DAILY PRN
Status: DISCONTINUED | OUTPATIENT
Start: 2025-03-18 | End: 2025-03-22 | Stop reason: HOSPADM

## 2025-03-18 RX ORDER — PROCHLORPERAZINE EDISYLATE 5 MG/ML
10 INJECTION INTRAMUSCULAR; INTRAVENOUS EVERY 6 HOURS PRN
Status: DISCONTINUED | OUTPATIENT
Start: 2025-03-18 | End: 2025-03-22 | Stop reason: HOSPADM

## 2025-03-18 RX ORDER — BUSPIRONE HYDROCHLORIDE 15 MG/1
30 TABLET ORAL 2 TIMES DAILY
Status: DISCONTINUED | OUTPATIENT
Start: 2025-03-18 | End: 2025-03-22 | Stop reason: HOSPADM

## 2025-03-18 RX ORDER — OXYCODONE HYDROCHLORIDE 5 MG/1
10 TABLET ORAL EVERY 4 HOURS PRN
Refills: 0 | Status: DISCONTINUED | OUTPATIENT
Start: 2025-03-18 | End: 2025-03-22 | Stop reason: HOSPADM

## 2025-03-18 RX ORDER — ONDANSETRON 2 MG/ML
INJECTION INTRAMUSCULAR; INTRAVENOUS AS NEEDED
Status: COMPLETED | OUTPATIENT
Start: 2025-03-18 | End: 2025-03-18

## 2025-03-18 RX ORDER — LEVETIRACETAM 500 MG/1
1500 TABLET ORAL 2 TIMES DAILY
Status: DISCONTINUED | OUTPATIENT
Start: 2025-03-18 | End: 2025-03-22 | Stop reason: HOSPADM

## 2025-03-18 RX ORDER — ATORVASTATIN CALCIUM 20 MG/1
20 TABLET, FILM COATED ORAL NIGHTLY
Status: DISCONTINUED | OUTPATIENT
Start: 2025-03-18 | End: 2025-03-22 | Stop reason: HOSPADM

## 2025-03-18 RX ORDER — LACOSAMIDE 100 MG/1
200 TABLET ORAL EVERY 12 HOURS SCHEDULED
Status: DISCONTINUED | OUTPATIENT
Start: 2025-03-18 | End: 2025-03-22 | Stop reason: HOSPADM

## 2025-03-18 RX ORDER — PANTOPRAZOLE SODIUM 40 MG/10ML
40 INJECTION, POWDER, LYOPHILIZED, FOR SOLUTION INTRAVENOUS
Status: DISCONTINUED | OUTPATIENT
Start: 2025-03-18 | End: 2025-03-20

## 2025-03-18 RX ORDER — LEVOTHYROXINE SODIUM 25 UG/1
25 TABLET ORAL
Status: DISCONTINUED | OUTPATIENT
Start: 2025-03-18 | End: 2025-03-22 | Stop reason: HOSPADM

## 2025-03-18 RX ADMIN — Medication 10 ML: at 01:44

## 2025-03-18 RX ADMIN — AMITRIPTYLINE HYDROCHLORIDE 25 MG: 25 TABLET, FILM COATED ORAL at 23:37

## 2025-03-18 RX ADMIN — HYDROXYZINE HYDROCHLORIDE 25 MG: 25 TABLET, FILM COATED ORAL at 19:59

## 2025-03-18 RX ADMIN — ENOXAPARIN SODIUM 40 MG: 100 INJECTION SUBCUTANEOUS at 17:39

## 2025-03-18 RX ADMIN — Medication 10 ML: at 09:36

## 2025-03-18 RX ADMIN — SODIUM CHLORIDE 75 ML/HR: 9 INJECTION, SOLUTION INTRAVENOUS at 17:39

## 2025-03-18 RX ADMIN — PANTOPRAZOLE SODIUM 40 MG: 40 INJECTION, POWDER, FOR SOLUTION INTRAVENOUS at 03:16

## 2025-03-18 RX ADMIN — SERTRALINE HYDROCHLORIDE 100 MG: 100 TABLET, FILM COATED ORAL at 07:44

## 2025-03-18 RX ADMIN — ALPRAZOLAM 2 MG: 1 TABLET ORAL at 04:32

## 2025-03-18 RX ADMIN — CEFEPIME 2000 MG: 2 INJECTION, POWDER, FOR SOLUTION INTRAVENOUS at 06:52

## 2025-03-18 RX ADMIN — LAMOTRIGINE 150 MG: 100 TABLET ORAL at 07:45

## 2025-03-18 RX ADMIN — ALPRAZOLAM 2 MG: 1 TABLET ORAL at 19:58

## 2025-03-18 RX ADMIN — Medication 10 ML: at 01:13

## 2025-03-18 RX ADMIN — OXYCODONE HYDROCHLORIDE 10 MG: 5 TABLET ORAL at 15:38

## 2025-03-18 RX ADMIN — LIDOCAINE HYDROCHLORIDE 5 ML: 10 INJECTION, SOLUTION INFILTRATION; PERINEURAL at 16:51

## 2025-03-18 RX ADMIN — MORPHINE SULFATE 4 MG: 4 INJECTION, SOLUTION INTRAMUSCULAR; INTRAVENOUS at 23:38

## 2025-03-18 RX ADMIN — LEVOTHYROXINE SODIUM 25 MCG: 0.03 TABLET ORAL at 05:49

## 2025-03-18 RX ADMIN — LEVETIRACETAM 1500 MG: 500 TABLET, FILM COATED ORAL at 23:35

## 2025-03-18 RX ADMIN — HYDROMORPHONE HYDROCHLORIDE 0.5 MG: 1 INJECTION, SOLUTION INTRAMUSCULAR; INTRAVENOUS; SUBCUTANEOUS at 12:12

## 2025-03-18 RX ADMIN — PROCHLORPERAZINE EDISYLATE 10 MG: 5 INJECTION, SOLUTION INTRAMUSCULAR; INTRAVENOUS at 12:12

## 2025-03-18 RX ADMIN — GADOTERIDOL 20 ML: 279.3 INJECTION, SOLUTION INTRAVENOUS at 21:08

## 2025-03-18 RX ADMIN — CEFEPIME 2000 MG: 2 INJECTION, POWDER, FOR SOLUTION INTRAVENOUS at 23:39

## 2025-03-18 RX ADMIN — TIZANIDINE 4 MG: 4 TABLET ORAL at 03:47

## 2025-03-18 RX ADMIN — SODIUM CHLORIDE 75 ML/HR: 9 INJECTION, SOLUTION INTRAVENOUS at 02:03

## 2025-03-18 RX ADMIN — SUMATRIPTAN SUCCINATE 100 MG: 50 TABLET ORAL at 09:01

## 2025-03-18 RX ADMIN — FENTANYL CITRATE 50 MCG: 50 INJECTION, SOLUTION INTRAMUSCULAR; INTRAVENOUS at 16:53

## 2025-03-18 RX ADMIN — HYDROMORPHONE HYDROCHLORIDE 0.5 MG: 1 INJECTION, SOLUTION INTRAMUSCULAR; INTRAVENOUS; SUBCUTANEOUS at 00:30

## 2025-03-18 RX ADMIN — VANCOMYCIN HYDROCHLORIDE 1250 MG: 1.25 INJECTION, POWDER, LYOPHILIZED, FOR SOLUTION INTRAVENOUS at 12:11

## 2025-03-18 RX ADMIN — LACOSAMIDE 200 MG: 100 TABLET, FILM COATED ORAL at 07:47

## 2025-03-18 RX ADMIN — HYDROMORPHONE HYDROCHLORIDE 1 MG: 1 INJECTION, SOLUTION INTRAMUSCULAR; INTRAVENOUS; SUBCUTANEOUS at 17:51

## 2025-03-18 RX ADMIN — CARIPRAZINE 1.5 MG: 1.5 CAPSULE, GELATIN COATED ORAL at 07:54

## 2025-03-18 RX ADMIN — HYDROMORPHONE HYDROCHLORIDE 0.5 MG: 1 INJECTION, SOLUTION INTRAMUSCULAR; INTRAVENOUS; SUBCUTANEOUS at 03:15

## 2025-03-18 RX ADMIN — ONDANSETRON 4 MG: 2 INJECTION INTRAMUSCULAR; INTRAVENOUS at 16:39

## 2025-03-18 RX ADMIN — CEFEPIME 2000 MG: 2 INJECTION, POWDER, FOR SOLUTION INTRAVENOUS at 14:27

## 2025-03-18 RX ADMIN — FENTANYL CITRATE 50 MCG: 50 INJECTION, SOLUTION INTRAMUSCULAR; INTRAVENOUS at 16:44

## 2025-03-18 RX ADMIN — TIZANIDINE 4 MG: 4 TABLET ORAL at 19:58

## 2025-03-18 RX ADMIN — Medication 10 ML: at 23:45

## 2025-03-18 RX ADMIN — Medication 10 ML: at 23:38

## 2025-03-18 RX ADMIN — ATORVASTATIN CALCIUM 20 MG: 20 TABLET, FILM COATED ORAL at 23:38

## 2025-03-18 RX ADMIN — HYDROMORPHONE HYDROCHLORIDE 0.5 MG: 1 INJECTION, SOLUTION INTRAMUSCULAR; INTRAVENOUS; SUBCUTANEOUS at 06:50

## 2025-03-18 RX ADMIN — FENTANYL CITRATE 100 MCG: 50 INJECTION, SOLUTION INTRAMUSCULAR; INTRAVENOUS at 16:41

## 2025-03-18 RX ADMIN — OXYCODONE HYDROCHLORIDE 10 MG: 5 TABLET ORAL at 19:58

## 2025-03-18 RX ADMIN — VANCOMYCIN HYDROCHLORIDE 1500 MG: 10 INJECTION, POWDER, LYOPHILIZED, FOR SOLUTION INTRAVENOUS at 01:46

## 2025-03-18 RX ADMIN — PROCHLORPERAZINE EDISYLATE 10 MG: 5 INJECTION, SOLUTION INTRAMUSCULAR; INTRAVENOUS at 06:01

## 2025-03-18 RX ADMIN — BUSPIRONE HYDROCHLORIDE 30 MG: 15 TABLET ORAL at 23:37

## 2025-03-18 RX ADMIN — LAMOTRIGINE 150 MG: 100 TABLET ORAL at 23:35

## 2025-03-18 RX ADMIN — OXYCODONE HYDROCHLORIDE 5 MG: 5 TABLET ORAL at 01:45

## 2025-03-18 RX ADMIN — LIDOCAINE HYDROCHLORIDE 8 ML: 10 INJECTION, SOLUTION INFILTRATION; PERINEURAL at 17:07

## 2025-03-18 RX ADMIN — LEVETIRACETAM 1500 MG: 500 TABLET, FILM COATED ORAL at 07:46

## 2025-03-18 RX ADMIN — CEFEPIME 2000 MG: 2 INJECTION, POWDER, FOR SOLUTION INTRAVENOUS at 01:07

## 2025-03-18 RX ADMIN — Medication 10 ML: at 12:13

## 2025-03-18 RX ADMIN — BUSPIRONE HYDROCHLORIDE 30 MG: 15 TABLET ORAL at 07:45

## 2025-03-18 RX ADMIN — QUETIAPINE FUMARATE 800 MG: 300 TABLET, EXTENDED RELEASE ORAL at 23:33

## 2025-03-18 RX ADMIN — LACOSAMIDE 200 MG: 100 TABLET, FILM COATED ORAL at 23:32

## 2025-03-18 RX ADMIN — ONDANSETRON 4 MG: 2 INJECTION, SOLUTION INTRAMUSCULAR; INTRAVENOUS at 00:35

## 2025-03-18 NOTE — CASE MANAGEMENT/SOCIAL WORK
Discharge Planning Assessment   [facility]     Patient Name: Milka Espinoza  MRN: [id number]  Today's Date: 3/18/2025    Admit Date: 3/17/2025    Plan: D/C home on IV antibiotics.  (Agency TBD for antibiotics. Current with [facility] for IVIG for gastroparesis. Nsg sees for weekly for dsg changes to port).  Will discharge home.  Transport by car.   Discharge Needs Assessment    No documentation.                  Discharge Plan       Row Name 03/18/25 6677       Plan    Plan D/C home on IV antibiotics.  (Agency TBD for antibiotics. Current with [facility] for IVIG for gastroparesis. Nsg sees for weekly for dsg changes to port).  Will discharge home.  Transport by car.    Plan Comments Met with pt at bedside. Reviewed IMM, signed, and copy given. Confimed PCP and Pharm.  No difficulties paying for medications. Has Ind.BRAYDEN.  Will likely need 6 weeks of IV antiobiotics for discitis. TLSO brace ordered for comp fx's in [facility]. Pt normally is ind.  Lives with high level functioning 30yo son with [employer].  He can assist as needed.  She drives occasionally short distances. She is on disability from seizures.  She does own rolling walker and shower chair.  No other needs expressed or identified.   Barrier to D/C: N.S and ID consulted.  Workup in progress.                       Demographic Summary       Row Name 03/18/25 4770       General Information    Admission Type inpatient    Arrived From emergency department    Required Notices Provided Important Message from Medicare    Referral Source admission list    Reason for Consult discharge planning    Preferred Language English                   Functional Status       Row Name 03/18/25 2733       Functional Status    Usual Activity Tolerance good    Current Activity Tolerance moderate       Functional Status, IADL    Medications independent    Meal Preparation independent;assistive person    Housekeeping independent;assistive person    Laundry  independent;assistive person    Shopping independent    If for any reason you need help with day-to-day activities such as bathing, preparing meals, shopping, managing finances, etc., do you get the help you need? I get all the help I need       Mental Status    General Appearance WDL WDL       Mental Status Summary    Recent Changes in Mental Status/Cognitive Functioning no changes       Employment/    Employment Status disabled    Current or Previous Occupation not applicable                          Leann Lewis RN  RN/.  Office Ph. [phone]  Cell Ph.  [phone]

## 2025-03-18 NOTE — CASE MANAGEMENT/SOCIAL WORK
Case Management Readmission Assessment Note    Case Management Readmission Assessment (all recorded)       Readmission Interview       Row Name 03/18/25 1439             Readmission Indications    Is the patient and/or family able to complete the readmission assessment questions? Yes      Is this hospitalization related to the prior hospital diagnosis? No        Row Name 03/18/25 1439             Recommendation for rehospitalization    Did you speak with your physician prior to coming to the hospital No  Pharmacist called pt at home        Row Name 03/18/25 1439             Follow-up Appointments    Do you have a PCP? Yes      Did you have an appointment with PCP after your hospitalization? No      Did you have an appointment with a Specialist? No      Are you current with the Pulmonary Clinic? No      Are you current with the CHF Clinic? No        [facility] Name 03/18/25 1439             Medications    Did you have newly prescribed medications at discharge? Yes      Did you understand the reasons for your medications at discharge and how to take them? Yes      Did you understand the side effects of your medications? Yes      Are you taking all of you prescribed medications? Yes      What pharmacy was used to fill prescription(s)? [facility]      Were medications picked up? Yes        [facility] Name 03/18/25 1439             Discharge Instructions    Did you understand your discharge instructions? Yes      Did your family/caregiver hear your instructions? No      Were you told to eat a special diet? No        [facility] Name 03/18/25 1439             Index discharge location/services    Where did you go upon discharge? Home      Do you have supportive family or friends in the home? Yes      What services were arranged at discharge? Other (comment)  None.        Row Name 03/18/25 1439             Discharge Readiness    On a scale of 1-5 (5 being well prepared), how ready were you for discharge 4      Recommendation based on  interview Other (comment)  Nothing identified        Row Name 03/18/25 1439             Palliative Care/Hospice    Are you current with Palliative Care? No      Are you current with Hospice Care? No        Row Name 03/17/25 3201             Advance Directives (For Healthcare)    Pre-existing AND/MOST/POLST Order No      Advance Directive Status Patient does not have advance directive      Have you reviewed your Advance Directive and is it valid for this stay? No      Literature Provided on Advance Directives No      Patient Requests Assistance on Advance Directives Patient Declined        Row Name 03/18/25 1439             Readmission Assessment Final Comments    Final Comments 3/3-3/5/25 previous admission: Presented to [facility] [place] ER with left flank pain and SOB with cough. (Had gone to [facility] e.r. earlier in the day with for same issue.  They sent her home.  When symptoms did not improve, she went to [facility], [place]).  CT angiogram was completed. Neg for PE, but did show airspace disease in rt upper lobe, and bilateral lower lobes, likely related to PNU.  Admitted to PCU. started on doxycycline and Rocephin. Supplemental 02 was not indicated. She medically stabilized and d/c to home on 3/5/25 on po amoxicillin that she picked up from [facility], and completed the course given.  No home services indicated.    3/17/24-Current. Went to [facility] er on 3/14 for dyspnea, and CP. CT revealed acute on chronic comp fx at T7,8,&9. Also, peristent but smaller nodular patchy opacities in RUL. Given IV cetriaxone, and blood cx's were taken.  She discharged home on po Augmentin and Norco. Recommendations to follow up with Dr Harrison for comp fx's. Blood cultures tested positive for pseudomanas aeruginosa on 3/16. Pharmacist reached out to pt. on 3/17 and suggested she go to ER.  [facility] completed CT of thorax.  Revealed discitis. Transferred to [facility] for higher level of care.  Workup in progress, and ID was consulted.  Likely home on 6 weeks of antibx.  TLSO  brace ordered.

## 2025-03-18 NOTE — PLAN OF CARE
Goal Outcome Evaluation:  Plan of Care Reviewed With: patient        Progress: no change        Pt. newly admitted for chest pain and severe back pain. Pt on IV Cefepime, and also has IV Saline running continuously. Contacted on call APRN for pain medication/muscle relaxers for pt. MRI also done and pt taken down at 0510 hours. Safety precautions in place and will continue to monitor.

## 2025-03-18 NOTE — PLAN OF CARE
Patient is a 54-year-old female who presents to [facility] for back pain.  Patient had positive blood cultures on 3/16 for Pseudomonas.  Patient had an MRI of her thoracic spine without contrast that was suspicious for possible osteomyelitis.  I went ahead and ordered an MRI of her thoracic spine with contrast that will give us a better idea if she does have osteomyelitis.  Patient has yet to have her MRI with contrast completed.  Neurosurgery will follow-up with patient once her MRI with contrast is completed.  Please reach out any questions or concerns.    Yara Piedra PA-C   2 = A lot of assistance

## 2025-03-18 NOTE — THERAPY EVALUATION
Patient Name: Milka Espinoza  : 1970    MRN: [id number]                              Today's Date: 3/18/2025       Admit Date: 3/17/2025    Visit Dx: No diagnosis found.  Patient Active Problem List   Diagnosis    Bipolar disorder, in full remission, most recent episode depressed    Depression    Mood disorder in conditions classified elsewhere    Seizures    Obesity    GERD (gastroesophageal reflux disease)    Nausea and vomiting    Borderline hypoglycemic episodes    Migraines    Pseudoseizure    Panic disorder with agoraphobia    Gastroparesis    HLD (hyperlipidemia)    Hypothyroid    Bipolar disorder    Dissociative convulsions    Anxiety    Back pain    Other diseases of stomach and duodenum    Esophageal dysmotility    Other specified disease of esophagus    Hiatal hernia    Special screening for malignant neoplasms, colon    Sleep apnea    Weight loss    Restless leg syndrome    Colon polyps    Pure hypercholesterolemia    Panic attacks    Noninfectious gastroenteritis    Irritable bowel syndrome    Intestinal autonomic neuropathy    Hemorrhoid    Hemangioma of liver    Helicobacter pylori gastritis    Gastro-esophageal reflux disease with esophagitis    Ex-cigarette smoker    Epileptic seizure, tonic    Duodenal ulcer, unspecified as acute or chronic, without hemorrhage or perforation    Dizziness    Disease of digestive system, unspecified    COPD (chronic obstructive pulmonary disease)    CKD (chronic kidney disease)    Benign neoplasm of transverse colon    Benign esophageal stricture    Autoantibody titer positive    Attention disturbance    Acute gastric ulcer without hemorrhage or perforation    Personal history of colonic polyps    H/O colonoscopy    Abnormal findings on diagnostic imaging of other parts of digestive tract    Foreign body in digestive system    Normal esophagogastroduodenoscopy (EGD)    Vitamin D deficiency    RUQ abdominal pain    Lesion of right lobe of liver     Osteopenia of neck of left femur    PNA (pneumonia)    Discitis     Past Medical History:   Diagnosis Date    Depression     Disease of thyroid gland     Hyperlipidemia     Migraines     On home O2 2021    3 lmp    Seizures      Past Surgical History:   Procedure Laterality Date    ABDOMINAL SURGERY       SECTION      x3    ENDOSCOPY N/A 2022    Procedure: EGD WITH DILATATION (BOUGIE #56) AND INJECTION OF BOTOX;  Surgeon: Rito Le MD;  Location: [facility] ENDOSCOPY;  Service: Gastroenterology;  Laterality: N/A;  POST: LOWER ESOPHAGEAL STRICTURE AND CRICOPHARYNGEAL STRICUTRE    ENDOSCOPY W/ PEG TUBE PLACEMENT N/A 2020    Procedure: ESOPHAGOGASTRODUODENOSCOPY WITH PERCUTANEOUS ENDOSCOPIC GASTROSTOMY TUBE INSERTION;  Surgeon: Rito Le MD;  Location: [facility] ENDOSCOPY;  Service: Gastroenterology;  Laterality: N/A;    HERNIA REPAIR      HYSTERECTOMY      PEG TUBE REMOVAL        General Information       Row Name 25 1546          OT Time and Intention    Document Type evaluation  -DT     Mode of Treatment occupational therapy  -DT     Symptoms Noted During/After Treatment increased pain  -DT       Row Name 25 154          General Information    Patient Profile Reviewed yes  -DT     Prior Level of Function independent:;all household mobility;ADL's;using stairs  Pt drives infrequently. Has tub/shower combo wtih seat PRN. Amb without AD. Has a RW if needed. Son does IADLs in home.  -DT     Existing Precautions/Restrictions brace worn when out of bed;seizures;TLSO;fall  -DT     Barriers to Rehab medically complex  -DT       Row Name 25 1543          Living Environment    Current Living Arrangements apartment;other (see comments)  House that is made into apartments  -DT     People in Home child(starla), adult  son lives with her  -DT       Row Name 25 1540          Home Main Entrance    Number of Stairs, Main Entrance other (see comments)  13  -DT       Row Name 25  1546          Stairs Within Home, Primary    Number of Stairs, Within Home, Primary none  -DT       Row Name 03/18/25 1546          Cognition    Orientation Status (Cognition) oriented x 4  -DT               User Key  (r) = Recorded By, (t) = Taken By, (c) = Cosigned By      Initials Name Provider Type    DT Gia Hudson, OT Occupational Therapist                     Mobility/ADL's       Row Name 03/18/25 1549          Bed Mobility    Bed Mobility bed mobility (all) activities  -DT     All Activities, [place] (Bed Mobility) verbal cues;modified independence  -DT     Assistive Device (Bed Mobility) bed rails  -DT     Comment, (Bed Mobility) v.c. for proper body mech.  -DT       Row Name 03/18/25 1549          Transfers    Transfers sit-stand transfer;stand-sit transfer  -DT       Row Name 03/18/25 1549          Sit-Stand Transfer    Sit-Stand [place] (Transfers) independent  -DT       Row Name 03/18/25 1549          Stand-Sit Transfer    Stand-Sit [place] (Transfers) independent  -DT       Row Name 03/18/25 1549          Activities of Daily Living    BADL Assessment/Intervention lower body dressing;toileting;upper body dressing  -DT       Row Name 03/18/25 1549          Lower Body Dressing Assessment/Training    [place] Level (Lower Body Dressing) lower body dressing skills;supervision  -DT     Position (Lower Body Dressing) edge of bed sitting;unsupported standing;unsupported sitting  -DT       Row Name 03/18/25 1549          Toileting Assessment/Training    [place] Level (Toileting) toileting skills;supervision  -DT       Row Name 03/18/25 1549          Upper Body Dressing Assessment/Training    [place] Level (Upper Body Dressing) upper body dressing skills;other (see comments)  -DT     Position (Upper Body Dressing) edge of bed sitting;unsupported standing  -DT     Comment, (Upper Body Dressing) donning TLSO = min A with min v.c.  -DT               User Key  (r) = Recorded  By, (t) = Taken By, (c) = Cosigned By      Initials Name Provider Type    DT Gia Hudson, OT Occupational Therapist                   Obj/Interventions       Row Name 03/18/25 1551          Range of Motion Comprehensive    General Range of Motion bilateral upper extremity ROM WFL;bilateral lower extremity ROM WFL  -DT       Row Name 03/18/25 1551          Strength Comprehensive (MMT)    Comment, General Manual Muscle Testing (MMT) Assessment BUE = grossly 3+/5. WFL  -DT       Row Name 03/18/25 1551          Balance    Balance Assessment sitting static balance;sitting dynamic balance;standing static balance;standing dynamic balance  -DT     Static Sitting Balance independent  -DT     Dynamic Sitting Balance independent  -DT     Position, Sitting Balance unsupported;sitting edge of bed  -DT     Static Standing Balance supervision  -DT     Dynamic Standing Balance supervision  -DT     Position/Device Used, Standing Balance unsupported  -DT               User Key  (r) = Recorded By, (t) = Taken By, (c) = Cosigned By      Initials Name Provider Type    DT Gia Hudson, OT Occupational Therapist                   Goals/Plan    No documentation.                  Clinical Impression       Row Name 03/18/25 1552          Pain Assessment    Pretreatment Pain Rating 8/10  -DT     Posttreatment Pain Rating 10/10  -DT     Pain Location back  -DT     Pain Side/Orientation lower  -DT     Pain Management Interventions nursing notified;premedicated for activity;activity modification encouraged;breathing exercises utilized  -DT     Response to Pain Interventions activity participation with increased pain  -DT       Row Name 03/18/25 1559          Plan of Care Review    Plan of Care Reviewed With patient  -DT     Outcome Evaluation Pt is a 54 y.o. female with a CMH of bipolar disorder, GERD, gastroparesis, hypothyroidism, COPD, seizures who presented to [facility] on 3/17/2025 with back pain & adm with  discitis, blood cultures (+) for pseudomonas. Pt found to have T7 compression fx on recent ED visit & was recommended to f/u outpt.  Last hospitalization 3/2-3/5/2025 for pneumonia, UTI & was  seen in ER on 3/14 for dyspnea, CP. At baseline, pt & son live together in apt with [address]. She is ind with BADLs & amb. She has a RW, but wasn't using it. She also has a tub seat that she uses occasionally. Pt drives infrequently. Upon eval, pt reporting back pain at rest as 8/10 in bed & 10/10 with EOB & OOB activity. Pt has been given pain meds. She is supervison & min v.c. for correct body mech during bed mobility & ADL transfers. She is supervision for toileting, LB dressing in standing as a safety prec & min A with min v.c. for donning TLSO. Pt seems safe to d/c home once medically cleared. No further OT tx recommended at this time. OT will sign off.  -DT       Row Name 03/18/25 1552          Therapy Assessment/Plan (OT)    Criteria for Skilled Therapeutic Interventions Met (OT) no;no problems identified which require skilled intervention  -DT     Therapy Frequency (OT) evaluation only  -DT       Row Name 03/18/25 2942          Therapy Plan Review/Discharge Plan (OT)    Anticipated Discharge Disposition (OT) home  -DT       Row Name 03/18/25 4774          Positioning and Restraints    Pre-Treatment Position in bed  -DT     Post Treatment Position bed  -DT     In Bed notified nsg;side lying right;call light within reach;encouraged to call for assist;exit alarm on;side rails up x2  -DT               User Key  (r) = Recorded By, (t) = Taken By, (c) = Cosigned By      Initials Name Provider Type    DT Gia Hudson, OT Occupational Therapist                   Outcome Measures       Row Name 03/18/25 0722          How much help from another person do you currently need...    Turning from your back to your side while in flat bed without using bedrails? 4  -JW     Moving from lying on back to sitting on the side of a  flat bed without bedrails? 4  -JW     Moving to and from a bed to a chair (including a wheelchair)? 4  -JW     Standing up from a chair using your arms (e.g., wheelchair, bedside chair)? 4  -JW     Climbing 3-5 steps with a railing? 4  -JW     To walk in hospital room? 4  -JW     AM-PAC 6 Clicks Score (PT) 24  -JW               User Key  (r) = Recorded By, (t) = Taken By, (c) = Cosigned By      Initials Name Provider Type    Kamran Crowder, RN Registered Nurse                    Occupational Therapy Education       Title: PT OT SLP Therapies (Done)       Topic: Occupational Therapy (Done)       Point: ADL training (Done)       Learning Progress Summary            Patient Acceptance, E,TB, VU,DU by DT at 3/18/2025 1603    Comment: Role of OT, body mech, back prec, donning TLSO brace & recommended wear schedule                      Point: Precautions (Done)       Learning Progress Summary            Patient Acceptance, E,TB, VU,DU by DT at 3/18/2025 1603    Comment: Role of OT, body mech, back prec, donning TLSO brace & recommended wear schedule                      Point: Body mechanics (Done)       Learning Progress Summary            Patient Acceptance, E,TB, VU,DU by DT at 3/18/2025 1603    Comment: Role of OT, body mech, back prec, donning TLSO brace & recommended wear schedule                                      User Key       Initials Effective Dates Name Provider Type Discipline    DT 07/11/23 -  Gia Hudson, OT Occupational Therapist OT                  OT Recommendation and Plan  Therapy Frequency (OT): evaluation only  Plan of Care Review  Plan of Care Reviewed With: patient  Outcome Evaluation: Pt is a 54 y.o. female with a CMH of bipolar disorder, GERD, gastroparesis, hypothyroidism, COPD, seizures who presented to [facility] on 3/17/2025 with back pain & adm with discitis, blood cultures (+) for pseudomonas. Pt found to have T7 compression fx on recent ED visit & was recommended  to f/u outpt.  Last hospitalization 3/2-3/5/2025 for pneumonia, UTI & was  seen in ER on 3/14 for dyspnea, CP. At baseline, pt & son live together in apt with [address]. She is ind with BADLs & amb. She has a RW, but wasn't using it. She also has a tub seat that she uses occasionally. Pt drives infrequently. Upon eval, pt reporting back pain at rest as 8/10 in bed & 10/10 with EOB & OOB activity. Pt has been given pain meds. She is supervison & min v.c. for correct body mech during bed mobility & ADL transfers. She is supervision for toileting, LB dressing in standing as a safety prec & min A with min v.c. for donning TLSO. Pt seems safe to d/c home once medically cleared. No further OT tx recommended at this time. OT will sign off.     Time Calculation:         Time Calculation- OT       Row Name 03/18/25 1603             Time Calculation- OT    OT Start Time 1519  -DT      OT Stop Time 1536  -DT      OT Time Calculation (min) 17 min  -DT      OT Received On 03/18/25  -DT                User Key  (r) = Recorded By, (t) = Taken By, (c) = Cosigned By      Initials Name Provider Type    Gia Gee, OT Occupational Therapist                           Gia Hudson, OT  3/18/2025

## 2025-03-18 NOTE — PROGRESS NOTES
[facility] MEDICINE SERVICE  DAILY PROGRESS NOTE    NAME: Milka Espinoza  : 1970  MRN: [id number]      LOS: 1 day     PROVIDER OF SERVICE: Eddie Conrad MD    Chief Complaint: Discitis    Subjective:     Interval History: Patient still with significant mid back pain, did have fever overnight.        Review of Systems:   Review of Systems    Objective:     Vital Signs  Temp:  [98.4 °F (36.9 °C)-100.5 °F (38.1 °C)] 98.9 °F (37.2 °C)  Heart Rate:  [] 91  Resp:  [18-30] 20  BP: (108-157)/(62-92) 108/62   Body mass index is 34.74 kg/m².    Physical Exam  Physical Exam  General Appearance:  Alert, cooperative, no distress, appears stated age  Head:  Normocephalic, without obvious abnormality, atraumatic  Eyes:  PERRL, conjunctiva/corneas clear, EOM's intact, fundi benign, both eyes  Ears:  Normal TM's and external ear canals, both ears  Nose: Nares normal, septum midline, mucosa normal, no drainage or sinus tenderness  Throat: Lips, mucosa, and tongue normal; teeth and gums normal  Neck: Supple, symmetrical, trachea midline, no adenopathy, thyroid: not enlarged, symmetric, no tenderness/mass/nodules, no carotid bruit or JVD  Lungs:   Clear to auscultation bilaterally, respirations unlabored, right chest wall tunneled catheter  Heart:  Regular rate and rhythm, S1, S2 normal, no murmur, rub or gallop  Abdomen:  Soft, non-tender, bowel sounds active all four quadrants,  no masses, no organomegaly  Extremities: Extremities normal, atraumatic, no cyanosis or edema  Pulses: 2+ and symmetric  Skin: Skin color, texture, turgor normal, no rashes or lesions  Neurologic: Normal         Diagnostic Data    Results from last 7 days   Lab Units 25  0022   WBC 10*3/mm3 10.60   HEMOGLOBIN g/dL 8.9*   HEMATOCRIT % 28.7*   PLATELETS 10*3/mm3 397   GLUCOSE mg/dL 102*   CREATININE mg/dL 0.66   BUN mg/dL 9   SODIUM mmol/L 135*   POTASSIUM mmol/L 3.9   AST (SGOT) U/L 32   ALT (SGPT) U/L 20   ALK PHOS U/L 210*    BILIRUBIN mg/dL 0.4   ANION GAP mmol/L 11.9       MRI Thoracic Spine Without Contrast  Result Date: 3/18/2025  Impression: 1.Findings concerning for discitis/osteomyelitis at T6-T7. There is also circumferential epidural signal abnormality primarily at the ventral aspect of the spinal canal at this level. A small epidural abscess cannot be excluded. Further characterization is limited without IV contrast. 2.There is acute/subacute compression fracture of T7 with approximately 30% loss of height anteriorly. 3.There is compression of T8 and T9, which could be subacute to chronic. 4.Mild thoracic spondylosis. Electronically Signed: Reddy Ernst MD  3/18/2025 5:58 AM EDT  Workstation ID: [id number]        I reviewed the patient's new clinical results.    Assessment/Plan:     Active and Resolved Problems  Active Hospital Problems    Diagnosis  POA    **Discitis [M46.40]  Yes      Resolved Hospital Problems   No resolved problems to display.       Pseudomonas bacteremia with T6-T7 discitis with osteomyelitis  -Imaging on admission concerning for discitis and confirmed with MRI of the thoracic spine  -Patient also has positive blood cultures with Pseudomonas, with her tunneled central line likely being the source  -Consult IR for tunneled central line removal  -Initiated on IV cefepime, vancomycin  -Will have IR attempted to aspirate fluid collection around discitis or possible abscess and sent for cultures  -ID consulted  -Repeat blood cultures pending    T5-T7 vertebral compression fractures  -Patient did have a seizure prior to her previous admission and could have injured herself at that time leading to compression fractures  -No evidence of retropulsion or any other cord impingement or disc herniation  -Obtain TLSO brace  -PT/OT  -Pain control; adjust pain medications as necessary    Seizure disorder  Continue [place]    Bipolar disorder  Continue medications    Hypothyroidism  -Continue Synthroid    GERD  with gastroparesis  -Continue PPI  -Patient does receive immunotherapy via her tunneled central line but this will need to be removed and replaced with a new central line or port once blood cultures are clear    VTE Prophylaxis:  Pharmacologic VTE prophylaxis orders are present.             Disposition Planning:     Barriers to Discharge: Bacteremia, discitis treatment  Anticipated Date of Discharge: 3/22  Place of Discharge: Home      Time: 45 minutes     Code Status and Medical Interventions: CPR (Attempt to Resuscitate); Full Support   Ordered at: 03/17/25 7604     Code Status (Patient has no pulse and is not breathing):    CPR (Attempt to Resuscitate)     Medical Interventions (Patient has pulse or is breathing):    Full Support     Level Of Support Discussed With:    Patient       Signature: Electronically signed by Eddie Conrad MD, 03/18/25, 14:10 EDT.  [facility] Hospitalist Team

## 2025-03-18 NOTE — OUTREACH NOTE
COPD/PN Week 2 Survey      Flowsheet Row Responses   [facility]? [facility]   Does the patient have one of the following disease processes/diagnoses(primary or secondary)? Pneumonia   Week 2 attempt successful? No   Unsuccessful attempts Attempt 1   Revoke Readmitted            Maureen RAMIREZ - Registered Nurse

## 2025-03-18 NOTE — PLAN OF CARE
Goal Outcome Evaluation:  Plan of Care Reviewed With: patient        Progress: no change  Outcome Evaluation: TLSO brace ordered. Therapy eval completed. Cont. to have c/o pain in back. PRN pain meds helpful. Consult to IR o remove cath & do aspiration on poss. abscess in back. Plans to return home. Blood cultures +

## 2025-03-18 NOTE — H&P
[facility] Medicine Services  History & Physical    Patient Name: Milka Espinoza  : 1970  MRN: [id number]  Primary Care Physician:  Alvina Arzate APRN  Date of admission: 3/17/2025  Date and Time of Service: 3/17/2025 at 2330    Subjective      Chief Complaint: back pain    History of Present Illness: Milka Espinoza is a 54 y.o. female with a CMH of bipolar disorder, GERD, gastroparesis, hypothyroidism, COPD, seizures who presented to [facility] on 3/17/2025 with back pain. Last hospitalization 3/2-3/5/2025 for pneumonia, UTI.   Was seen in ER on 3/14 for dyspnea, CP. CT PE revealed acute on chronic compression fracture T7. Chronic T8, T9 compression fractures. Persistent but smaller nodular patchy opacities RUL. Given IV ceftriaxone and discharge home with Augmentin and norco and to follow up with Dr. Harrison for compression fracture. On 3/16, blood cultures resulted pseudomonas aeruginosa. ED provider attempted to contact patient but unable to reach, message left on voicemail. On 3/17, another voicemail left by pharmacist to go to ER for evaluation due to positive blood cultures.   Presented to [facility] ER today for positive blood cultures on 3/14. Reported severe back pain for 3 weeks. Vital signs in ER 36.0-998-/97-96% room air. Workup revealed lactic acid 2.4, WBC 10.1, CT imaging thorax revealed discitis. Transfer to this facility requested for higher level of care.     Upon evaluation, awake, alert, resting in bed. Current VS: 98.4-98-/83-96% room air. Rates back pain 10/10. Tenderness over mid back that reproduces pain without crepitus or erythema. Arrived with DL PICC right arm.   Admission labs pending.       Review of Systems   Constitutional:  Positive for activity change.   Respiratory:  Negative for shortness of breath.    Cardiovascular:  Negative for chest pain.   Musculoskeletal:  Positive for back pain.   Neurological:  Positive for  weakness.       Personal History     Past Medical History:   Diagnosis Date    Depression     Disease of thyroid gland     Hyperlipidemia     Migraines     On home O2 2021    3 lmp    Seizures        Past Surgical History:   Procedure Laterality Date    ABDOMINAL SURGERY       SECTION      x3    ENDOSCOPY N/A 2022    Procedure: EGD WITH DILATATION (BOUGIE #[id number]) AND INJECTION OF BOTOX;  Surgeon: Rito Le MD;  Location: [facility] ENDOSCOPY;  Service: Gastroenterology;  Laterality: N/A;  POST: LOWER ESOPHAGEAL STRICTURE AND CRICOPHARYNGEAL STRICUTRE    ENDOSCOPY W/ PEG TUBE PLACEMENT N/A 2020    Procedure: ESOPHAGOGASTRODUODENOSCOPY WITH PERCUTANEOUS ENDOSCOPIC GASTROSTOMY TUBE INSERTION;  Surgeon: Rito Le MD;  Location: [facility] ENDOSCOPY;  Service: Gastroenterology;  Laterality: N/A;    HERNIA REPAIR      HYSTERECTOMY      PEG TUBE REMOVAL         Family History: family history includes Alcohol abuse in her daughter; No Known Problems in her brother, father, maternal aunt, maternal grandmother, mother, paternal aunt, paternal grandmother, sister, and son. Otherwise pertinent FHx was reviewed and not pertinent to current issue.    Social History:  reports that she quit smoking about 2 years ago. Her smoking use included cigarettes. She started smoking about 5 years ago. She has a 0.8 pack-year smoking history. She has been exposed to tobacco smoke. She has never used smokeless tobacco. She reports that she does not drink alcohol and does not use drugs.    Home Medications:  Prior to Admission Medications       Prescriptions Last Dose Informant Patient Reported? Taking?    ALPRAZolam (XANAX) 2 MG tablet 3/16/2025  No Yes    TAKE 1 TABLET BY MOUTH THREE TIMES DAILY AS NEEDED FOR ANXIETY    amitriptyline (ELAVIL) 25 MG tablet 3/16/2025  No Yes    TAKE 1 TABLET BY MOUTH EVERY NIGHT AT BEDTIME    amoxicillin-clavulanate (AUGMENTIN) 875-125 MG per tablet 3/16/2025  No Yes    Take 1 tablet by  mouth 2 (Two) Times a Day for 7 days.    atorvastatin (LIPITOR) 20 MG tablet 3/16/2025  No Yes    Take 1 tablet by mouth Every Night.    busPIRone (BUSPAR) 30 MG tablet 3/16/2025  No Yes    TAKE 1 TABLET BY MOUTH TWICE DAILY    HYDROcodone-acetaminophen (NORCO) 7.5-325 MG per tablet 3/16/2025  No Yes    Take 1 tablet by mouth Every 4 (Four) Hours As Needed for Moderate Pain or Severe Pain.    hydrOXYzine (ATARAX) 25 MG tablet 3/16/2025  No Yes    TAKE 1 TABLET BY MOUTH THREE TIMES DAILY AS NEEDED FOR ANXIETY    lacosamide (VIMPAT) 200 MG tablet 3/16/2025  No Yes    Take 1 tablet by mouth Every 12 (Twelve) Hours.    lamoTRIgine (LaMICtal) 150 MG tablet 3/16/2025  No Yes    Take 1 tablet by mouth 2 (Two) Times a Day.    levETIRAcetam (KEPPRA) 500 MG tablet 3/16/2025  No Yes    Take 3 tablets by mouth 2 (Two) Times a Day.    levothyroxine (SYNTHROID, LEVOTHROID) 25 MCG tablet 3/16/2025  No Yes    Take 1 tablet by mouth Daily.    Motegrity 2 MG tablet Past Week  Yes Yes    Take 1 tablet by mouth Daily.    nystatin 031293 UNIT/GM powder 3/16/2025  No Yes    Apply  topically to the appropriate area as directed 2 (Two) Times a Day.    ondansetron (ZOFRAN) 2 mg/mL injection 3/17/2025  Yes Yes    Infuse 2 mL into a venous catheter Every 6 (Six) Hours As Needed.    promethazine (PHENERGAN) 25 MG tablet Past Week  Yes Yes    Take 1 tablet by mouth Every 6 (Six) Hours As Needed for Nausea or Vomiting.    QUEtiapine XR (SEROquel XR) 400 MG 24 hr tablet 3/16/2025  No Yes    Take 2 tablets by mouth every night at bedtime.    sertraline (ZOLOFT) 100 MG tablet 3/16/2025  No Yes    Take 1 tablet by mouth Daily.    SUMAtriptan (IMITREX) 100 MG tablet Past Week  No Yes    TAKE 1 TABLET BY MOUTH 1 TIME AT ONSET OF MIGRAINE AS NEEDED FOR MIGRAINE. MAY REPEAT DOSE 1 TIME AFTER 2 HOURS IF MIGRAINE NOT RESOLVED    Vraylar 1.5 MG capsule capsule 3/16/2025  No Yes    TAKE 1 CAPSULE BY MOUTH EVERY DAY    BD PosiFlush 0.9 % flush   Yes No     "heparin 100 UNIT/ML solution injection   Yes No    heparin 100 UNIT/ML solution injection   Yes No    Octagam 30 GM/300ML solution infusion   Yes No    Infuse 30 g into a venous catheter 1 (One) Time Per Week.    pantoprazole (PROTONIX) 40 MG EC tablet Not Taking  No No    Take 1 tablet by mouth Daily.    Patient not taking:  Reported on 3/17/2025    promethazine (PHENERGAN) 6.25 MG/5ML solution oral solution   Yes No    Take 20 mL by mouth Every 6 (Six) Hours As Needed.    sodium chloride 0.9 % solution   Yes No    sucralfate (CARAFATE) 1 g tablet Not Taking  Yes No    Take 1 tablet by mouth 3 (Three) Times a Day.    Patient not taking:  Reported on 3/17/2025              Allergies:  Allergies   Allergen Reactions    Ketorolac Other (See Comments)     pt states \"this medicine sends me into seizures\"    Sulfa Antibiotics Hives    Tramadol Hcl Mental Status Change    Adhesive Tape Rash     Paper tape       Objective      Vitals:   Temp:  [98.4 °F (36.9 °C)] 98.4 °F (36.9 °C)  Heart Rate:  [94-98] 98  Resp:  [24] 24  BP: (146-157)/(83-92) 146/83  There is no height or weight on file to calculate BMI.  Physical Exam  Constitutional:       Appearance: Normal appearance.   HENT:      Head: Normocephalic and atraumatic.      Mouth/Throat:      Mouth: Mucous membranes are moist.   Eyes:      Extraocular Movements: Extraocular movements intact.      Pupils: Pupils are equal, round, and reactive to light.   Cardiovascular:      Rate and Rhythm: Regular rhythm. Tachycardia present.      Pulses: Normal pulses.      Heart sounds: Normal heart sounds.   Pulmonary:      Effort: Pulmonary effort is normal.      Breath sounds: Normal breath sounds.   Abdominal:      General: Bowel sounds are normal. There is no distension.      Palpations: Abdomen is soft.      Tenderness: There is no abdominal tenderness.   Musculoskeletal:      Right lower leg: No edema.      Left lower leg: No edema.      Comments: Tenderness over mid back that " reproduces pain without crepitus or erythema   Skin:     General: Skin is warm and dry.      Comments: PICC line right arm.      Neurological:      Mental Status: She is alert.         Diagnostic Data:  Lab Results (last 24 hours)       Procedure Component Value Units Date/Time    Comprehensive Metabolic Panel [[id number]]  (Abnormal) Collected: 03/18/25 0022    Specimen: Blood from Arm, Right Updated: 03/18/25 0155     Glucose 102 mg/dL      BUN 9 mg/dL      Creatinine 0.66 mg/dL      Sodium 135 mmol/L      Potassium 3.9 mmol/L      Chloride 100 mmol/L      CO2 23.1 mmol/L      Calcium 9.3 mg/dL      Total Protein 8.9 g/dL      Albumin 3.7 g/dL      ALT (SGPT) 20 U/L      AST (SGOT) 32 U/L      Alkaline Phosphatase 210 U/L      Total Bilirubin 0.4 mg/dL      Globulin 5.2 gm/dL      A/G Ratio 0.7 g/dL      BUN/Creatinine Ratio 13.6     Anion Gap 11.9 mmol/L      eGFR 103.1 mL/min/1.73     Narrative:      GFR Categories in Chronic Kidney Disease (CKD)      GFR Category          GFR (mL/min/1.73)    Interpretation  G1                     90 or greater         Normal or high (1)  G2                      60-89                Mild decrease (1)  G3a                   45-59                Mild to moderate decrease  G3b                   30-44                Moderate to severe decrease  G4                    15-29                Severe decrease  G5                    14 or less           Kidney failure          (1)In the absence of evidence of kidney disease, neither GFR category G1 or G2 fulfill the criteria for CKD.    eGFR calculation 2021 CKD-EPI creatinine equation, which does not include race as a factor    Magnesium [[id number]]  (Normal) Collected: 03/18/25 0022    Specimen: Blood from Arm, Right Updated: 03/18/25 0155     Magnesium 2.0 mg/dL     CK [[id number]]  (Normal) Collected: 03/18/25 0022    Specimen: Blood from Arm, Right Updated: 03/18/25 0140     Creatine Kinase 38 U/L     Procalcitonin [[id number]]   "(Abnormal) Collected: 03/18/25 0022    Specimen: Blood from Arm, Right Updated: 03/18/25 0140     Procalcitonin 0.31 ng/mL     Narrative:      As a Marker for Sepsis (Non-Neonates):    1. <0.5 ng/mL represents a low risk of severe sepsis and/or septic shock.  2. >2 ng/mL represents a high risk of severe sepsis and/or septic shock.    As a Marker for Lower Respiratory Tract Infections that require antibiotic therapy:    PCT on Admission    Antibiotic Therapy       6-12 Hrs later    >0.5                Strongly Recommended  >0.25 - <0.5        Recommended   0.1 - 0.25          Discouraged              Remeasure/reassess PCT  <0.1                Strongly Discouraged     Remeasure/reassess PCT    As 28 day mortality risk marker: \"Change in Procalcitonin Result\" (>80% or <=80%) if Day 0 (or Day 1) and Day 4 values are available. Refer to http://www.[facility]-pct-calculator.com    Change in PCT <=80%  A decrease of PCT levels below or equal to 80% defines a positive change in PCT test result representing a higher risk for 28-day all-cause mortality of patients diagnosed with severe sepsis for septic shock.    Change in PCT >80%  A decrease of PCT levels of more than 80% defines a negative change in PCT result representing a lower risk for 28-day all-cause mortality of patients diagnosed with severe sepsis or septic shock.       Lactic Acid, Plasma [[id number]]  (Normal) Collected: 03/18/25 0022    Specimen: Blood from Arm, Right Updated: 03/18/25 0134     Lactate 1.1 mmol/L     aPTT [[id number]]  (Normal) Collected: 03/18/25 0022    Specimen: Blood from Arm, Right Updated: 03/18/25 0124     PTT 31.8 seconds     Protime-INR [[id number]]  (Normal) Collected: 03/18/25 0022    Specimen: Blood from Arm, Right Updated: 03/18/25 0124     Protime 13.3 Seconds      INR 1.02    Blood Culture - Blood, Arm, Left [[id number]] Collected: 03/18/25 0106    Specimen: Blood from Arm, Left Updated: 03/18/25 0119    CBC Auto Differential " [[id number]]  (Abnormal) Collected: 03/18/25 0022    Specimen: Blood from Arm, Right Updated: 03/18/25 0112     WBC 10.60 10*3/mm3      RBC 3.40 10*6/mm3      Hemoglobin 8.9 g/dL      Hematocrit 28.7 %      MCV 84.4 fL      MCH 26.2 pg      MCHC 31.0 g/dL      RDW 16.0 %      RDW-SD 49.1 fl      MPV 10.0 fL      Platelets 397 10*3/mm3      Neutrophil % 81.1 %      Lymphocyte % 13.0 %      Monocyte % 4.9 %      Eosinophil % 0.1 %      Basophil % 0.4 %      Immature Grans % 0.5 %      Neutrophils, Absolute 8.60 10*3/mm3      Lymphocytes, Absolute 1.38 10*3/mm3      Monocytes, Absolute 0.52 10*3/mm3      Eosinophils, Absolute 0.01 10*3/mm3      Basophils, Absolute 0.04 10*3/mm3      Immature Grans, Absolute 0.05 10*3/mm3      nRBC 0.0 /100 WBC     Blood Culture - Blood, Arm, Right [[id number]] Collected: 03/18/25 0022    Specimen: Blood from Arm, Right Updated: 03/18/25 0109             Imaging Results (Last 24 Hours)       ** No results found for the last 24 hours. **              Assessment & Plan        This is a 54 y.o. female with  PMH of bipolar disorder, GERD, gastroparesis, hypothyroidism, COPD, seizures who presented to OSH for follow up on positive blood cultures. Endorsed ongoing back pain for 3 weeks. CT thorax revealed discitis. Transfer to this facility requested for higher level of care.         Active and Resolved Problems  Active Hospital Problems    Diagnosis  POA    **Discitis [M46.40]  Yes      Resolved Hospital Problems   No resolved problems to display.     Discitis  Bacteremia:  -blood cultures from 3/14 resulted positive for pseudomonas.   -OSH CT thoracic spine revealed discitis.   -obtain MRI thoracic spine  -repeat blood cultures pending.   -treat with cefepime and vancomycin. Pharmacy to dose vancomycin  -ID consult in AM. Appreciate recommendations  -lactic acid at OSH was 2.4, repeat on admission  -meets SIRS criteria with mild tachycardia, tachypnea, source of infection.   -procal/CRP  pending.    -pain control.     Seizure disorder  -continue keppra, vimpat    Bipolar disorder  -continue home medications    Hypothyroidism:  -continue levothyroxine.   -TSH pending    GERD  Gastroparesis  -Protonix for GI ppx.   -Currently with immunotherapy via PICC line. Follows with [facility] GI.       VTE Prophylaxis:  Pharmacologic VTE prophylaxis orders are present.        The patient desires to be as follows:    CODE STATUS:    Code Status (Patient has no pulse and is not breathing): CPR (Attempt to Resuscitate)  Medical Interventions (Patient has pulse or is breathing): Full Support  Level Of Support Discussed With: Patient      Admission Status:  I believe this patient meets IP status.    Expected Length of Stay: 3 days    PDMP and Medication Dispenses via Sidebar reviewed and consistent with patient reported medications.    I discussed the patient's findings and my recommendations with patient and nursing staff.      Signature:     This document has been electronically signed by SANJEEV Soler on March 18, 2025 02:34 EDT   [facility] Hospitalist Team

## 2025-03-18 NOTE — CONSULTS
Infectious Diseases Consult Note    Referring Provider: Eddie Conrad MD    Reason for Consultation: Bacteremia    Patient Care Team:  Alvina Arzate APRN as PCP - General (Nurse Practitioner)    Chief complaint mid back pain    Subjective     History of present illness:      This is a 54-year-old female presents to the hospital on 3/17/2025 due to severe back pain and being asked to return due to positive blood cultures.  Patient was recently in the ED for worsening pain in her chest and mid back.  Patient was in the hospital 2 weeks before that was diagnosed with pneumonia.  States that her symptoms has not improved on treatment and in fact is worsening.  Patient was seen to have a compression fracture of T7 and was sent home with an outpatient appointment to see neurosurgery.  She was discharged on p.o. Augmentin.  Continues to have mid back pain and chills but denies high-grade fever, shortness of breath, cough, GI symptoms or urinary symptoms.  Denies any significant extremity weakness or bowel or bladder incontinence    Review of Systems   Review of Systems   Constitutional: Negative.  Positive for chills.   HENT: Negative.     Eyes: Negative.    Respiratory: Negative.     Cardiovascular: Negative.    Gastrointestinal: Negative.    Endocrine: Negative.    Genitourinary: Negative.    Musculoskeletal: Negative.  Positive for back pain.   Skin: Negative.    Neurological: Negative.    Psychiatric/Behavioral: Negative.     All other systems reviewed and are negative.      Medications  Medications Prior to Admission   Medication Sig Dispense Refill Last Dose/Taking    ALPRAZolam (XANAX) 2 MG tablet TAKE 1 TABLET BY MOUTH THREE TIMES DAILY AS NEEDED FOR ANXIETY 90 tablet 1 3/16/2025    amitriptyline (ELAVIL) 25 MG tablet TAKE 1 TABLET BY MOUTH EVERY NIGHT AT BEDTIME 90 tablet 1 3/16/2025    amoxicillin-clavulanate (AUGMENTIN) 875-125 MG per tablet Take 1 tablet by mouth 2 (Two) Times a Day for 7 days. 14 tablet 0  3/16/2025    atorvastatin (LIPITOR) 20 MG tablet Take 1 tablet by mouth Every Night. 90 tablet 3 3/16/2025    busPIRone (BUSPAR) 30 MG tablet TAKE 1 TABLET BY MOUTH TWICE DAILY 180 tablet 1 3/16/2025    HYDROcodone-acetaminophen (NORCO) 7.5-325 MG per tablet Take 1 tablet by mouth Every 4 (Four) Hours As Needed for Moderate Pain or Severe Pain. 12 tablet 0 3/16/2025    hydrOXYzine (ATARAX) 25 MG tablet TAKE 1 TABLET BY MOUTH THREE TIMES DAILY AS NEEDED FOR ANXIETY 90 tablet 2 3/16/2025    lacosamide (VIMPAT) 200 MG tablet Take 1 tablet by mouth Every 12 (Twelve) Hours. 60 tablet 5 3/16/2025    lamoTRIgine (LaMICtal) 150 MG tablet Take 1 tablet by mouth 2 (Two) Times a Day. 60 tablet 5 3/16/2025    levETIRAcetam (KEPPRA) 500 MG tablet Take 3 tablets by mouth 2 (Two) Times a Day. 540 tablet 3 3/16/2025    levothyroxine (SYNTHROID, LEVOTHROID) 25 MCG tablet Take 1 tablet by mouth Daily. 90 tablet 3 3/16/2025    Motegrity 2 MG tablet Take 1 tablet by mouth Daily.   Past Week    nystatin 623736 UNIT/GM powder Apply  topically to the appropriate area as directed 2 (Two) Times a Day. 30 g 1 3/16/2025    ondansetron (ZOFRAN) 2 mg/mL injection Infuse 2 mL into a venous catheter Every 6 (Six) Hours As Needed.   3/17/2025    promethazine (PHENERGAN) 25 MG tablet Take 1 tablet by mouth Every 6 (Six) Hours As Needed for Nausea or Vomiting.   Past Week    QUEtiapine XR (SEROquel XR) 400 MG 24 hr tablet Take 2 tablets by mouth every night at bedtime. 180 tablet 1 3/16/2025    sertraline (ZOLOFT) 100 MG tablet Take 1 tablet by mouth Daily. 90 tablet 3 3/16/2025    SUMAtriptan (IMITREX) 100 MG tablet TAKE 1 TABLET BY MOUTH 1 TIME AT ONSET OF MIGRAINE AS NEEDED FOR MIGRAINE. MAY REPEAT DOSE 1 TIME AFTER 2 HOURS IF MIGRAINE NOT RESOLVED 9 tablet 11 Past Week    Vraylar 1.5 MG capsule capsule TAKE 1 CAPSULE BY MOUTH EVERY DAY 90 capsule 1 3/16/2025    BD PosiFlush 0.9 % flush        heparin 100 UNIT/ML solution injection        heparin  100 UNIT/ML solution injection        Octagam 30 GM/300ML solution infusion Infuse 30 g into a venous catheter 1 (One) Time Per Week.       pantoprazole (PROTONIX) 40 MG EC tablet Take 1 tablet by mouth Daily. (Patient not taking: Reported on 3/17/2025) 90 tablet 3 Not Taking    promethazine (PHENERGAN) 6.25 MG/5ML solution oral solution Take 20 mL by mouth Every 6 (Six) Hours As Needed.       sodium chloride 0.9 % solution        sucralfate (CARAFATE) 1 g tablet Take 1 tablet by mouth 3 (Three) Times a Day. (Patient not taking: Reported on 3/17/2025)   Not Taking       History  Past Medical History:   Diagnosis Date    Depression     Disease of thyroid gland     Hyperlipidemia     Migraines     On home O2 2021    3 lmp    Seizures      Past Surgical History:   Procedure Laterality Date    ABDOMINAL SURGERY       SECTION      x3    ENDOSCOPY N/A 2022    Procedure: EGD WITH DILATATION (BOUGIE #56) AND INJECTION OF BOTOX;  Surgeon: Rito Le MD;  Location: [facility] ENDOSCOPY;  Service: Gastroenterology;  Laterality: N/A;  POST: LOWER ESOPHAGEAL STRICTURE AND CRICOPHARYNGEAL STRICUTRE    ENDOSCOPY W/ PEG TUBE PLACEMENT N/A 2020    Procedure: ESOPHAGOGASTRODUODENOSCOPY WITH PERCUTANEOUS ENDOSCOPIC GASTROSTOMY TUBE INSERTION;  Surgeon: Rito Le MD;  Location: [facility] ENDOSCOPY;  Service: Gastroenterology;  Laterality: N/A;    HERNIA REPAIR      HYSTERECTOMY      PEG TUBE REMOVAL         Family History  Family History   Problem Relation Age of Onset    No Known Problems Mother     No Known Problems Father     No Known Problems Sister     No Known Problems Brother     Alcohol abuse Daughter     No Known Problems Son     No Known Problems Maternal Grandmother     No Known Problems Paternal Grandmother     No Known Problems Maternal Aunt     No Known Problems Paternal Aunt        Social History   reports that she quit smoking about 2 years ago. Her smoking use included cigarettes. She started  smoking about 5 years ago. She has a 0.8 pack-year smoking history. She has been exposed to tobacco smoke. She has never used smokeless tobacco. She reports that she does not drink alcohol and does not use drugs.    Allergies  Ketorolac, Sulfa antibiotics, Tramadol hcl, and Adhesive tape    Objective     Vital Signs   Vital Signs (last 24 hours)         03/17 0700  03/18 0659 03/18 0700  03/18 1511   Most Recent      Temp (°F) 98.4 -  98.5    98.9 -  100.5     98.9 (37.2) 03/18 1145    Heart Rate 94 -  102    91 -  114     91 03/18 1145    Resp 18 -  24    20 -  30     20 03/18 1145    /75 -  157/92    108/62 -  125/80     108/62 03/18 1145    SpO2 (%) 95 -  97    91 -  96     91 03/18 1145            Physical Exam:  Physical Exam  Vitals and nursing note reviewed.   Constitutional:       General: She is not in acute distress.     Appearance: She is well-developed and normal weight. She is ill-appearing. She is not diaphoretic.   HENT:      Head: Normocephalic and atraumatic.   Eyes:      General: No scleral icterus.     Extraocular Movements: Extraocular movements intact.      Conjunctiva/sclera: Conjunctivae normal.      Pupils: Pupils are equal, round, and reactive to light.   Cardiovascular:      Rate and Rhythm: Normal rate and regular rhythm.      Heart sounds: Normal heart sounds, S1 normal and S2 normal. No murmur heard.  Pulmonary:      Effort: Pulmonary effort is normal. No respiratory distress.      Breath sounds: Normal breath sounds. No stridor. No wheezing or rales.   Chest:      Chest wall: No tenderness.   Abdominal:      General: Bowel sounds are normal. There is no distension.      Palpations: Abdomen is soft. There is no mass.      Tenderness: There is no abdominal tenderness. There is no guarding.   Musculoskeletal:         General: No swelling, tenderness or deformity. Normal range of motion.      Cervical back: Neck supple.      Comments: Mid back tenderness   Skin:     General: Skin is  warm and dry.      Coloration: Skin is not pale.      Findings: No bruising, erythema or rash.      Comments: Patient has a tunneled central line in her right chest   Neurological:      General: No focal deficit present.      Mental Status: She is alert and oriented to person, place, and time. Mental status is at baseline.      Cranial Nerves: No cranial nerve deficit.   Psychiatric:         Mood and Affect: Mood normal.         Microbiology  Microbiology Results (last 10 days)       Procedure Component Value - Date/Time    MRSA Screen, PCR (Inpatient) - Swab, Nares [[id number]]  (Normal) Collected: 03/18/25 0417    Lab Status: Final result Specimen: Swab from Nares Updated: 03/18/25 0545     MRSA PCR No MRSA Detected    Narrative:      The negative predictive value of this diagnostic test is high and should only be used to consider de-escalating anti-MRSA therapy. A positive result may indicate colonization with MRSA and must be correlated clinically.    Blood Culture - Blood, Blood, Venous [[id number]]  (Abnormal) Collected: 03/14/25 2216    Lab Status: Final result Specimen: Blood, Venous Updated: 03/18/25 0614     Blood Culture Pseudomonas aeruginosa     Isolated from --     Gram Stain Aerobic Bottle Gram negative bacilli    Narrative:      Refer to previous blood culture collected on 03/14/2025 2213 for MICs    Less than seven (7) mL's of blood was collected.  Insufficient quantity may yield false negative results.    Blood Culture - Blood, Arm, Right [[id number]]  (Abnormal)  (Susceptibility) Collected: 03/14/25 2213    Lab Status: Final result Specimen: Blood from Arm, Right Updated: 03/18/25 0614     Blood Culture Pseudomonas aeruginosa     Isolated from Aerobic Bottle     Gram Stain Aerobic Bottle Gram negative bacilli    Narrative:      Less than seven (7) mL's of blood was collected.  Insufficient quantity may yield false negative results.    Susceptibility        Pseudomonas aeruginosa      VENITA       Cefepime Susceptible      Ceftazidime Susceptible      Ciprofloxacin Susceptible      Levofloxacin Susceptible      Piperacillin + Tazobactam Susceptible                       Susceptibility Comments       Pseudomonas aeruginosa    With the exception of urinary-sourced infections, aminoglycosides should not be used as monotherapy.               Blood Culture ID, PCR - Blood, Arm, Right [[id number]]  (Abnormal) Collected: 03/14/25 2213    Lab Status: Final result Specimen: Blood from Arm, Right Updated: 03/15/25 2342     BCID, PCR Pseudomonas aeruginosa. Identification by BCID2 PCR     BOTTLE TYPE Aerobic Bottle    Respiratory Panel PCR w/COVID-19(SARS-CoV-2) ALBERTO/EMMA/ASHLEY/PAD/[facility]/SARAH In-House, NP Swab in UTM/VTM, 2 HR TAT - Swab, Nasopharynx [[id number]]  (Normal) Collected: 03/14/25 1949    Lab Status: Final result Specimen: Swab from Nasopharynx Updated: 03/14/25 2044     ADENOVIRUS, PCR Not Detected     Coronavirus 229E Not Detected     Coronavirus HKU1 Not Detected     Coronavirus NL63 Not Detected     Coronavirus OC43 Not Detected     COVID19 Not Detected     Human Metapneumovirus Not Detected     Human Rhinovirus/Enterovirus Not Detected     Influenza A PCR Not Detected     Influenza B PCR Not Detected     Parainfluenza Virus 1 Not Detected     Parainfluenza Virus 2 Not Detected     Parainfluenza Virus 3 Not Detected     Parainfluenza Virus 4 Not Detected     RSV, PCR Not Detected     Bordetella pertussis pcr Not Detected     Bordetella parapertussis PCR Not Detected     Chlamydophila pneumoniae PCR Not Detected     Mycoplasma pneumo by PCR Not Detected    Narrative:      In the setting of a positive respiratory panel with a viral infection PLUS a negative procalcitonin without other underlying concern for bacterial infection, consider observing off antibiotics or discontinuation of antibiotics and continue supportive care. If the respiratory panel is positive for atypical bacterial infection (Bordetella  pertussis, Chlamydophila pneumoniae, or Mycoplasma pneumoniae), consider antibiotic de-escalation to target atypical bacterial infection.            Laboratory  Results from last 7 days   Lab Units 03/18/25  0022   WBC 10*3/mm3 10.60   HEMOGLOBIN g/dL 8.9*   HEMATOCRIT % 28.7*   PLATELETS 10*3/mm3 397     Results from last 7 days   Lab Units 03/18/25  0022   SODIUM mmol/L 135*   POTASSIUM mmol/L 3.9   CHLORIDE mmol/L 100   CO2 mmol/L 23.1   BUN mg/dL 9   CREATININE mg/dL 0.66   GLUCOSE mg/dL 102*   CALCIUM mg/dL 9.3     Results from last 7 days   Lab Units 03/18/25  0022   SODIUM mmol/L 135*   POTASSIUM mmol/L 3.9   CHLORIDE mmol/L 100   CO2 mmol/L 23.1   BUN mg/dL 9   CREATININE mg/dL 0.66   GLUCOSE mg/dL 102*   CALCIUM mg/dL 9.3     Results from last 7 days   Lab Units 03/18/25  0022   CK TOTAL U/L 38               Radiology  Imaging Results (Last 72 Hours)       Procedure Component Value Units Date/Time    CT Outside Films [[id number]] Resulted: 03/18/25 1022     Updated: 03/18/25 1022    Narrative:      This procedure was auto-finalized with no dictation required.    MRI Thoracic Spine Without Contrast [[id number]] Collected: 03/18/25 0550     Updated: 03/18/25 0600    Narrative:      MRI THORACIC SPINE WO CONTRAST    Date of Exam: 3/18/2025 5:25 AM EDT    Indication: discitis. back pain 3 weeks..     Comparison: CT chest 3/17/2025 and 3/2/2025.    Technique:  Routine multiplanar/multisequence sequence images of the thoracic spine were obtained without contrast administration.      Findings:  12 rib-bearing thoracic vertebrae are identified. Alignment is anatomic. There is height loss again noted at the T7 vertebral body. There is approximately 30% loss of height anteriorly. This appears acute/subacute. There is compression of T8 and T9, as   well. There is minimal edema-like signal within the posterior half of the T8 vertebral body and within the anterior aspect of the T9 superior endplate. These areas could be  subacute to chronic. They are new since 8/24/2022, but unchanged since 3/2/2025.   There is mild narrowing of the T6-T7, T10-T11 and T11-T12 discs. There is desiccation of the T10-T11 and T11-T12 discs. There is increased T2 signal within the T6-T7 disc and there is paraspinal edema centered around T6-T7 extending as high as T5 and is   low as T9. There are small areas of loculated fluid along the anterior and left lateral aspect of the T6-T7 disc measuring up to 14 mm diameter, which may reflect abscess. Further characterization is limited without IV contrast. There is circumferential   epidural signal abnormality primarily at the ventral aspect measuring up to 3 mm thick as seen on STIR image 11 of the sagittal series. A small epidural abscess cannot be excluded. There are no additional edematous signal abnormalities in the spine.   Spinal cord signal is normal. No cord compression. There is no obvious focal disc protrusion or extrusion. There is no significant neuroforaminal narrowing. Paraspinal musculature is preserved.      Impression:      Impression:  1.Findings concerning for discitis/osteomyelitis at T6-T7. There is also circumferential epidural signal abnormality primarily at the ventral aspect of the spinal canal at this level. A small epidural abscess cannot be excluded. Further characterization   is limited without IV contrast.  2.There is acute/subacute compression fracture of T7 with approximately 30% loss of height anteriorly.  3.There is compression of T8 and T9, which could be subacute to chronic.  4.Mild thoracic spondylosis.            Electronically Signed: Reddy Ernst MD    3/18/2025 5:58 AM EDT    Workstation ID: [id number]            Cardiology      Results Review:  I have reviewed all clinical data, test, lab, and imaging results.       Schedule Meds  amitriptyline, 25 mg, Oral, Nightly  atorvastatin, 20 mg, Oral, Nightly  busPIRone, 30 mg, Oral, BID  Cariprazine HCl, 1.5 mg, Oral,  Daily  cefepime, 2,000 mg, Intravenous, Q8H  enoxaparin sodium, 40 mg, Subcutaneous, Daily  lacosamide, 200 mg, Oral, Q12H  lamoTRIgine, 150 mg, Oral, BID  levETIRAcetam, 1,500 mg, Oral, BID  levothyroxine, 25 mcg, Oral, Q AM  pantoprazole, 40 mg, Intravenous, Q AM  QUEtiapine XR, 800 mg, Oral, Nightly  sertraline, 100 mg, Oral, Daily  sodium chloride, 10 mL, Intravenous, Q12H  sodium chloride, 10 mL, Intravenous, Q12H        Infusion Meds  sodium chloride, 75 mL/hr, Last Rate: 75 mL/hr (03/18/25 0203)        PRN Meds    acetaminophen    ALPRAZolam    senna-docusate sodium **AND** polyethylene glycol **AND** bisacodyl **AND** bisacodyl    HYDROmorphone    hydrOXYzine    Magnesium Standard Dose Replacement - Follow Nurse / BPA Driven Protocol    nitroglycerin    oxyCODONE    Potassium Replacement - Follow Nurse / BPA Driven Protocol    prochlorperazine    sodium chloride    sodium chloride    sodium chloride    sodium chloride    sodium chloride    SUMAtriptan    tiZANidine      Assessment & Plan       Assessment    Pseudomonas aeruginosa bacteremia from ED visit on 3/14/2025.  Most likely source of infection is a  tunneled central line which patient had for long duration    Discitis/osteomyelitis of T6-T7 area.  Most likely seeded from the bacteremia versus another source.  Patient's been having mid back pain for approximately 3 weeks.  The patient has no significant neurological symptoms    Gastroparesis-patient has poor venous access and has a tunneled central line for fluids and other medications as needed    Remote history of ankle fracture with hardware placement after patient fell during a seizure.  No active signs of infection in the ankle    History of seizures    Plan    Continue IV cefepime 2 g every 8 hours  Discontinue IV vancomycin  Waiting on repeat blood cultures  Will consult IR to remove the tunneled central line and culture tip  Will consult IR to aspirate the T6-T7 area  Patient will need 6 to 8  weeks of IV antibiotics  Neurosurgery is also been consulted  Continue supportive care  A.m. labs  Case discussed with RN and hospitalist    Patricia Garcia, APRN  03/18/25  15:11 EDT    Note is dictated utilizing voice recognition software/Dragon

## 2025-03-18 NOTE — PLAN OF CARE
Goal Outcome Evaluation:  Plan of Care Reviewed With: patient           Outcome Evaluation: Pt is a 54 y.o. female with a CMH of bipolar disorder, GERD, gastroparesis, hypothyroidism, COPD, seizures who presented to [facility] on 3/17/2025 with back pain & adm with discitis, blood cultures (+) for pseudomonas. Pt found to have T7 compression fx on recent ED visit & was recommended to f/u outpt.  Last hospitalization 3/2-3/5/2025 for pneumonia, UTI & was  seen in ER on 3/14 for dyspnea, CP. At baseline, pt & son live together in apt with 13 CHAIRTY. She is ind with BADLs & amb. She has a RW, but wasn't using it. She also has a tub seat that she uses occasionally. Pt drives infrequently. Upon eval, pt reporting back pain at rest as 8/10 in bed & 10/10 with EOB & OOB activity. Pt has been given pain meds. She is supervison & min v.c. for correct body mech during bed mobility & ADL transfers. She is supervision for toileting, LB dressing in standing as a safety prec & min A with min v.c. for donning TLSO. Pt seems safe to d/c home once medically cleared. No further OT tx recommended at this time. OT will sign off.    Anticipated Discharge Disposition (OT): home

## 2025-03-18 NOTE — PROGRESS NOTES
Pharmacy Antimicrobial Dosing Service    Subjective:  Milka Espinoza is a 54 y.o.female admitted with discitis. Pharmacy has been consulted to dose Vancomycin for possible bone/joint infection, discitis.    PMH: hypothyroid, HLD, seizure disorder      Assessment/Plan    1. Day #1 Vancomycin: Goal -600 mcg*h/mL.   - will give a loading dose of vancomycin 1500mg (20 mg/kg AdjBW) then initiate a regimen of vancomycin 1250mg q12h which corresponds to a therapeutic AUC of 505 mg/L*hr  - check a peak on 3/19 @0400, and a trough on 3/19 @1100 to confirm appropriate kinetics    2. Day #1 Cefepime: 2g IV q8h for estCrCl > 60 mL/min.    Will continue to monitor drug levels, renal function, culture and sensitivities, and patient clinical status.       Objective:  Relevant clinical data and objective history reviewed:          Ideal body weight: 54.7 kg (120 lb 9.5 oz)  Adjusted ideal body weight: 71.2 kg (157 lb 0.2 oz)  There is no height or weight on file to calculate BMI.        Results from last 7 days   Lab Units 03/14/25 1949   CREATININE mg/dL 0.72     Estimated Creatinine Clearance: 98.1 mL/min (by C-G formula based on SCr of 0.72 mg/dL).  No intake/output data recorded.    Results from last 7 days   Lab Units 03/14/25 1949   WBC 10*3/mm3 5.52     There were no vitals filed for this visit.  Baseline culture/source/susceptibility:  Microbiology Results (last 10 days)       Procedure Component Value - Date/Time    Blood Culture - Blood, Blood, Venous [[id number]]  (Abnormal) Collected: 03/14/25 2216    Lab Status: Preliminary result Specimen: Blood, Venous Updated: 03/17/25 0632     Blood Culture Gram Negative Bacilli     Isolated from --     Gram Stain Aerobic Bottle Gram negative bacilli    Narrative:      Less than seven (7) mL's of blood was collected.  Insufficient quantity may yield false negative results.    Blood Culture - Blood, Arm, Right [[id number]]  (Abnormal) Collected: 03/14/25 2213    Lab  Status: Preliminary result Specimen: Blood from Arm, Right Updated: 03/17/25 0632     Blood Culture Gram Negative Bacilli     Isolated from Aerobic Bottle     Gram Stain Aerobic Bottle Gram negative bacilli    Narrative:      Less than seven (7) mL's of blood was collected.  Insufficient quantity may yield false negative results.    Blood Culture ID, PCR - Blood, Arm, Right [[id number]]  (Abnormal) Collected: 03/14/25 2213    Lab Status: Final result Specimen: Blood from Arm, Right Updated: 03/15/25 2342     BCID, PCR Pseudomonas aeruginosa. Identification by BCID2 PCR     BOTTLE TYPE Aerobic Bottle    Respiratory Panel PCR w/COVID-19(SARS-CoV-2) ALBERTO/EMMA/ASHLEY/[facility]/SARAH In-House, NP Swab in UTM/VTM, 2 HR TAT - Swab, Nasopharynx [[id number]]  (Normal) Collected: 03/14/25 1949    Lab Status: Final result Specimen: Swab from Nasopharynx Updated: 03/14/25 2044     ADENOVIRUS, PCR Not Detected     Coronavirus 229E Not Detected     Coronavirus HKU1 Not Detected     Coronavirus NL63 Not Detected     Coronavirus OC43 Not Detected     COVID19 Not Detected     Human Metapneumovirus Not Detected     Human Rhinovirus/Enterovirus Not Detected     Influenza A PCR Not Detected     Influenza B PCR Not Detected     Parainfluenza Virus 1 Not Detected     Parainfluenza Virus 2 Not Detected     Parainfluenza Virus 3 Not Detected     Parainfluenza Virus 4 Not Detected     RSV, PCR Not Detected     Bordetella pertussis pcr Not Detected     Bordetella parapertussis PCR Not Detected     Chlamydophila pneumoniae PCR Not Detected     Mycoplasma pneumo by PCR Not Detected    Narrative:      In the setting of a positive respiratory panel with a viral infection PLUS a negative procalcitonin without other underlying concern for bacterial infection, consider observing off antibiotics or discontinuation of antibiotics and continue supportive care. If the respiratory panel is positive for atypical bacterial infection (Bordetella pertussis,  Chlamydophila pneumoniae, or Mycoplasma pneumoniae), consider antibiotic de-escalation to target atypical bacterial infection.            Linh Massey [facility]  03/17/25 22:28 EDT

## 2025-03-18 NOTE — PROGRESS NOTES
Patient blood culture resulted with P. aeruginosa. Susceptible to Pan-sensitive.  Patient was transferred from [facility] to  [facility]  Therapy is appropriate coverage. No further follow-up required.    Microbiology Results (last 10 days)       Procedure Component Value - Date/Time    MRSA Screen, PCR (Inpatient) - Swab, Nares [[id number]]  (Normal) Collected: 03/18/25 0417    Lab Status: Final result Specimen: Swab from Nares Updated: 03/18/25 0545     MRSA PCR No MRSA Detected    Narrative:      The negative predictive value of this diagnostic test is high and should only be used to consider de-escalating anti-MRSA therapy. A positive result may indicate colonization with MRSA and must be correlated clinically.    Blood Culture - Blood, Blood, Venous [[id number]]  (Abnormal) Collected: 03/14/25 2216    Lab Status: Final result Specimen: Blood, Venous Updated: 03/18/25 0614     Blood Culture Pseudomonas aeruginosa     Isolated from --     Gram Stain Aerobic Bottle Gram negative bacilli    Narrative:      Refer to previous blood culture collected on 03/14/2025 2213 for MICs    Less than seven (7) mL's of blood was collected.  Insufficient quantity may yield false negative results.    Blood Culture - Blood, Arm, Right [[id number]]  (Abnormal)  (Susceptibility) Collected: 03/14/25 2213    Lab Status: Final result Specimen: Blood from Arm, Right Updated: 03/18/25 0614     Blood Culture Pseudomonas aeruginosa     Isolated from Aerobic Bottle     Gram Stain Aerobic Bottle Gram negative bacilli    Narrative:      Less than seven (7) mL's of blood was collected.  Insufficient quantity may yield false negative results.    Susceptibility        Pseudomonas aeruginosa      VENITA      Cefepime 2 ug/ml Susceptible      Ceftazidime 2 ug/ml Susceptible      Ciprofloxacin 0.12 ug/ml Susceptible      Levofloxacin 0.25 ug/ml Susceptible      Piperacillin + Tazobactam 8 ug/ml Susceptible                       Susceptibility Comments        Pseudomonas aeruginosa    With the exception of urinary-sourced infections, aminoglycosides should not be used as monotherapy.               Blood Culture ID, PCR - Blood, Arm, Right [[id number]]  (Abnormal) Collected: 03/14/25 2213    Lab Status: Final result Specimen: Blood from Arm, Right Updated: 03/15/25 2342     BCID, PCR Pseudomonas aeruginosa. Identification by BCID2 PCR     BOTTLE TYPE Aerobic Bottle    Respiratory Panel PCR w/COVID-19(SARS-CoV-2) ALBERTO/EMMA/ASHLEY/PAD/COR/SARAH In-House, NP Swab in UTM/VTM, 2 HR TAT - Swab, Nasopharynx [[id number]]  (Normal) Collected: 03/14/25 1949    Lab Status: Final result Specimen: Swab from Nasopharynx Updated: 03/14/25 2044     ADENOVIRUS, PCR Not Detected     Coronavirus 229E Not Detected     Coronavirus HKU1 Not Detected     Coronavirus NL63 Not Detected     Coronavirus OC43 Not Detected     COVID19 Not Detected     Human Metapneumovirus Not Detected     Human Rhinovirus/Enterovirus Not Detected     Influenza A PCR Not Detected     Influenza B PCR Not Detected     Parainfluenza Virus 1 Not Detected     Parainfluenza Virus 2 Not Detected     Parainfluenza Virus 3 Not Detected     Parainfluenza Virus 4 Not Detected     RSV, PCR Not Detected     Bordetella pertussis pcr Not Detected     Bordetella parapertussis PCR Not Detected     Chlamydophila pneumoniae PCR Not Detected     Mycoplasma pneumo by PCR Not Detected    Narrative:      In the setting of a positive respiratory panel with a viral infection PLUS a negative procalcitonin without other underlying concern for bacterial infection, consider observing off antibiotics or discontinuation of antibiotics and continue supportive care. If the respiratory panel is positive for atypical bacterial infection (Bordetella pertussis, Chlamydophila pneumoniae, or Mycoplasma pneumoniae), consider antibiotic de-escalation to target atypical bacterial infection.            Tawana Louis RPH  3/18/2025 10:34 EDT

## 2025-03-19 LAB
ANION GAP SERPL CALCULATED.3IONS-SCNC: 10 MMOL/L (ref 5–15)
AORTIC DIMENSIONLESS INDEX: 0.75 (DI)
AV MEAN PRESS GRAD SYS DOP V1V2: 2 MMHG
AV VMAX SYS DOP: 101 CM/SEC
BASOPHILS # BLD AUTO: 0.03 10*3/MM3 (ref 0–0.2)
BASOPHILS NFR BLD AUTO: 0.5 % (ref 0–1.5)
BH CV ECHO LEFT VENTRICLE GLOBAL LONGITUDINAL STRAIN: -16.2 %
BH CV ECHO MEAS - ACS: 2 CM
BH CV ECHO MEAS - AO MAX PG: 4.1 MMHG
BH CV ECHO MEAS - AO V2 VTI: 19.5 CM
BH CV ECHO MEAS - AVA(I,D): 2.36 CM2
BH CV ECHO MEAS - EDV(CUBED): 74.1 ML
BH CV ECHO MEAS - EDV(MOD-SP4): 72.3 ML
BH CV ECHO MEAS - EF(MOD-SP4): 60 %
BH CV ECHO MEAS - ESV(CUBED): 22 ML
BH CV ECHO MEAS - ESV(MOD-SP4): 28.9 ML
BH CV ECHO MEAS - FS: 33.3 %
BH CV ECHO MEAS - IVS/LVPW: 1 CM
BH CV ECHO MEAS - IVSD: 1 CM
BH CV ECHO MEAS - LA DIMENSION: 3.7 CM
BH CV ECHO MEAS - LAT PEAK E' VEL: 14.8 CM/SEC
BH CV ECHO MEAS - LV DIASTOLIC VOL/BSA (35-75): 36.8 CM2
BH CV ECHO MEAS - LV MASS(C)D: 137.2 GRAMS
BH CV ECHO MEAS - LV MAX PG: 2.27 MMHG
BH CV ECHO MEAS - LV MEAN PG: 1 MMHG
BH CV ECHO MEAS - LV SYSTOLIC VOL/BSA (12-30): 14.7 CM2
BH CV ECHO MEAS - LV V1 MAX: 75.4 CM/SEC
BH CV ECHO MEAS - LV V1 VTI: 13.3 CM
BH CV ECHO MEAS - LVIDD: 4.2 CM
BH CV ECHO MEAS - LVIDS: 2.8 CM
BH CV ECHO MEAS - LVOT AREA: 3.5 CM2
BH CV ECHO MEAS - LVOT DIAM: 2.1 CM
BH CV ECHO MEAS - LVPWD: 1 CM
BH CV ECHO MEAS - MED PEAK E' VEL: 9.3 CM/SEC
BH CV ECHO MEAS - MR MAX PG: 45.4 MMHG
BH CV ECHO MEAS - MR MAX VEL: 337 CM/SEC
BH CV ECHO MEAS - MV A MAX VEL: 61 CM/SEC
BH CV ECHO MEAS - MV DEC SLOPE: 615 CM/SEC2
BH CV ECHO MEAS - MV DEC TIME: 0.17 SEC
BH CV ECHO MEAS - MV E MAX VEL: 87.9 CM/SEC
BH CV ECHO MEAS - MV E/A: 1.44
BH CV ECHO MEAS - MV MAX PG: 3.3 MMHG
BH CV ECHO MEAS - MV MEAN PG: 2 MMHG
BH CV ECHO MEAS - MV P1/2T: 43.3 MSEC
BH CV ECHO MEAS - MV V2 VTI: 19 CM
BH CV ECHO MEAS - MVA(P1/2T): 5.1 CM2
BH CV ECHO MEAS - MVA(VTI): 2.42 CM2
BH CV ECHO MEAS - PA ACC TIME: 0.07 SEC
BH CV ECHO MEAS - PA V2 MAX: 88 CM/SEC
BH CV ECHO MEAS - RAP SYSTOLE: 3 MMHG
BH CV ECHO MEAS - RV MAX PG: 1.84 MMHG
BH CV ECHO MEAS - RV V1 MAX: 67.8 CM/SEC
BH CV ECHO MEAS - RV V1 VTI: 14.8 CM
BH CV ECHO MEAS - RVDD: 3.8 CM
BH CV ECHO MEAS - RVSP: 33.3 MMHG
BH CV ECHO MEAS - SV(LVOT): 46.1 ML
BH CV ECHO MEAS - SV(MOD-SP4): 43.4 ML
BH CV ECHO MEAS - SVI(LVOT): 23.5 ML/M2
BH CV ECHO MEAS - SVI(MOD-SP4): 22.1 ML/M2
BH CV ECHO MEAS - TAPSE (>1.6): 1.37 CM
BH CV ECHO MEAS - TR MAX PG: 30.3 MMHG
BH CV ECHO MEAS - TR MAX VEL: 275 CM/SEC
BH CV ECHO MEASUREMENTS AVERAGE E/E' RATIO: 7.29
BH CV XLRA - TDI S': 10.1 CM/SEC
BUN SERPL-MCNC: 7 MG/DL (ref 6–20)
BUN/CREAT SERPL: 10 (ref 7–25)
CALCIUM SPEC-SCNC: 8.5 MG/DL (ref 8.6–10.5)
CHLORIDE SERPL-SCNC: 105 MMOL/L (ref 98–107)
CO2 SERPL-SCNC: 23 MMOL/L (ref 22–29)
CREAT SERPL-MCNC: 0.7 MG/DL (ref 0.57–1)
DEPRECATED RDW RBC AUTO: 52.2 FL (ref 37–54)
EGFRCR SERPLBLD CKD-EPI 2021: 101.6 ML/MIN/1.73
EOSINOPHIL # BLD AUTO: 0.14 10*3/MM3 (ref 0–0.4)
EOSINOPHIL NFR BLD AUTO: 2.5 % (ref 0.3–6.2)
ERYTHROCYTE [DISTWIDTH] IN BLOOD BY AUTOMATED COUNT: 16.2 % (ref 12.3–15.4)
GLUCOSE SERPL-MCNC: 94 MG/DL (ref 65–99)
HCT VFR BLD AUTO: 25 % (ref 34–46.6)
HGB BLD-MCNC: 7.5 G/DL (ref 12–15.9)
IMM GRANULOCYTES # BLD AUTO: 0.02 10*3/MM3 (ref 0–0.05)
IMM GRANULOCYTES NFR BLD AUTO: 0.4 % (ref 0–0.5)
LV EF BIPLANE MOD: 60 %
LYMPHOCYTES # BLD AUTO: 2.07 10*3/MM3 (ref 0.7–3.1)
LYMPHOCYTES NFR BLD AUTO: 36.4 % (ref 19.6–45.3)
MAGNESIUM SERPL-MCNC: 2 MG/DL (ref 1.6–2.6)
MCH RBC QN AUTO: 26.1 PG (ref 26.6–33)
MCHC RBC AUTO-ENTMCNC: 30 G/DL (ref 31.5–35.7)
MCV RBC AUTO: 87.1 FL (ref 79–97)
MONOCYTES # BLD AUTO: 0.52 10*3/MM3 (ref 0.1–0.9)
MONOCYTES NFR BLD AUTO: 9.1 % (ref 5–12)
NEUTROPHILS NFR BLD AUTO: 2.91 10*3/MM3 (ref 1.7–7)
NEUTROPHILS NFR BLD AUTO: 51.1 % (ref 42.7–76)
NRBC BLD AUTO-RTO: 0 /100 WBC (ref 0–0.2)
PLATELET # BLD AUTO: 323 10*3/MM3 (ref 140–450)
PMV BLD AUTO: 10 FL (ref 6–12)
POTASSIUM SERPL-SCNC: 3.8 MMOL/L (ref 3.5–5.2)
RBC # BLD AUTO: 2.87 10*6/MM3 (ref 3.77–5.28)
SINUS: 3.1 CM
SODIUM SERPL-SCNC: 138 MMOL/L (ref 136–145)
WBC NRBC COR # BLD AUTO: 5.69 10*3/MM3 (ref 3.4–10.8)

## 2025-03-19 PROCEDURE — 25010000002 HYDROMORPHONE 1 MG/ML SOLUTION: Performed by: INTERNAL MEDICINE

## 2025-03-19 PROCEDURE — 25010000002 CEFEPIME PER 500 MG: Performed by: NURSE PRACTITIONER

## 2025-03-19 PROCEDURE — 25010000002 PROCHLORPERAZINE 10 MG/2ML SOLUTION: Performed by: NURSE PRACTITIONER

## 2025-03-19 PROCEDURE — 83735 ASSAY OF MAGNESIUM: CPT | Performed by: NURSE PRACTITIONER

## 2025-03-19 PROCEDURE — 87040 BLOOD CULTURE FOR BACTERIA: CPT | Performed by: INTERNAL MEDICINE

## 2025-03-19 PROCEDURE — 25010000002 ENOXAPARIN PER 10 MG: Performed by: NURSE PRACTITIONER

## 2025-03-19 PROCEDURE — 80048 BASIC METABOLIC PNL TOTAL CA: CPT | Performed by: NURSE PRACTITIONER

## 2025-03-19 PROCEDURE — 85025 COMPLETE CBC W/AUTO DIFF WBC: CPT | Performed by: NURSE PRACTITIONER

## 2025-03-19 PROCEDURE — 97162 PT EVAL MOD COMPLEX 30 MIN: CPT

## 2025-03-19 PROCEDURE — 99222 1ST HOSP IP/OBS MODERATE 55: CPT | Performed by: NURSE PRACTITIONER

## 2025-03-19 RX ADMIN — LEVOTHYROXINE SODIUM 25 MCG: 0.03 TABLET ORAL at 05:38

## 2025-03-19 RX ADMIN — BUSPIRONE HYDROCHLORIDE 30 MG: 15 TABLET ORAL at 21:22

## 2025-03-19 RX ADMIN — BUSPIRONE HYDROCHLORIDE 30 MG: 15 TABLET ORAL at 09:15

## 2025-03-19 RX ADMIN — LEVETIRACETAM 1500 MG: 500 TABLET, FILM COATED ORAL at 09:14

## 2025-03-19 RX ADMIN — ALPRAZOLAM 2 MG: 1 TABLET ORAL at 21:21

## 2025-03-19 RX ADMIN — OXYCODONE HYDROCHLORIDE 10 MG: 5 TABLET ORAL at 09:59

## 2025-03-19 RX ADMIN — SERTRALINE HYDROCHLORIDE 100 MG: 100 TABLET, FILM COATED ORAL at 09:16

## 2025-03-19 RX ADMIN — CARIPRAZINE 1.5 MG: 1.5 CAPSULE, GELATIN COATED ORAL at 09:15

## 2025-03-19 RX ADMIN — OXYCODONE HYDROCHLORIDE 10 MG: 5 TABLET ORAL at 14:39

## 2025-03-19 RX ADMIN — TIZANIDINE 4 MG: 4 TABLET ORAL at 21:25

## 2025-03-19 RX ADMIN — ATORVASTATIN CALCIUM 20 MG: 20 TABLET, FILM COATED ORAL at 21:24

## 2025-03-19 RX ADMIN — LACOSAMIDE 200 MG: 100 TABLET, FILM COATED ORAL at 21:23

## 2025-03-19 RX ADMIN — Medication 10 ML: at 09:16

## 2025-03-19 RX ADMIN — PROCHLORPERAZINE EDISYLATE 10 MG: 5 INJECTION, SOLUTION INTRAMUSCULAR; INTRAVENOUS at 21:28

## 2025-03-19 RX ADMIN — QUETIAPINE FUMARATE 800 MG: 300 TABLET, EXTENDED RELEASE ORAL at 21:25

## 2025-03-19 RX ADMIN — OXYCODONE HYDROCHLORIDE 10 MG: 5 TABLET ORAL at 06:01

## 2025-03-19 RX ADMIN — PROCHLORPERAZINE EDISYLATE 10 MG: 5 INJECTION, SOLUTION INTRAMUSCULAR; INTRAVENOUS at 13:23

## 2025-03-19 RX ADMIN — OXYCODONE HYDROCHLORIDE 10 MG: 5 TABLET ORAL at 18:26

## 2025-03-19 RX ADMIN — LAMOTRIGINE 150 MG: 100 TABLET ORAL at 09:14

## 2025-03-19 RX ADMIN — HYDROXYZINE HYDROCHLORIDE 25 MG: 25 TABLET, FILM COATED ORAL at 21:24

## 2025-03-19 RX ADMIN — CEFEPIME 2000 MG: 2 INJECTION, POWDER, FOR SOLUTION INTRAVENOUS at 23:50

## 2025-03-19 RX ADMIN — ENOXAPARIN SODIUM 40 MG: 100 INJECTION SUBCUTANEOUS at 15:01

## 2025-03-19 RX ADMIN — HYDROMORPHONE HYDROCHLORIDE 1 MG: 1 INJECTION, SOLUTION INTRAMUSCULAR; INTRAVENOUS; SUBCUTANEOUS at 13:23

## 2025-03-19 RX ADMIN — CEFEPIME 2000 MG: 2 INJECTION, POWDER, FOR SOLUTION INTRAVENOUS at 06:02

## 2025-03-19 RX ADMIN — LEVETIRACETAM 1500 MG: 500 TABLET, FILM COATED ORAL at 23:50

## 2025-03-19 RX ADMIN — CEFEPIME 2000 MG: 2 INJECTION, POWDER, FOR SOLUTION INTRAVENOUS at 14:39

## 2025-03-19 RX ADMIN — HYDROMORPHONE HYDROCHLORIDE 1 MG: 1 INJECTION, SOLUTION INTRAMUSCULAR; INTRAVENOUS; SUBCUTANEOUS at 21:53

## 2025-03-19 RX ADMIN — Medication 10 ML: at 21:28

## 2025-03-19 RX ADMIN — HYDROMORPHONE HYDROCHLORIDE 1 MG: 1 INJECTION, SOLUTION INTRAMUSCULAR; INTRAVENOUS; SUBCUTANEOUS at 17:36

## 2025-03-19 RX ADMIN — LAMOTRIGINE 150 MG: 100 TABLET ORAL at 21:26

## 2025-03-19 RX ADMIN — LACOSAMIDE 200 MG: 100 TABLET, FILM COATED ORAL at 09:14

## 2025-03-19 RX ADMIN — Medication 10 ML: at 21:33

## 2025-03-19 RX ADMIN — AMITRIPTYLINE HYDROCHLORIDE 25 MG: 25 TABLET, FILM COATED ORAL at 21:25

## 2025-03-19 RX ADMIN — PANTOPRAZOLE SODIUM 40 MG: 40 INJECTION, POWDER, FOR SOLUTION INTRAVENOUS at 05:38

## 2025-03-19 NOTE — PLAN OF CARE
Goal Outcome Evaluation:  Plan of Care Reviewed With: patient        Progress: no change        Pt slept through the night with minimal complaints of pain. Pt put on 2 liters of oxygen. IV cefepime and continuous IV saline running. Safety precautions in place and will continue to monitor.                          REF#[id number] VISITS    Referral completed in [facility], expires 09/26/19-pt informed-lb

## 2025-03-19 NOTE — THERAPY EVALUATION
Patient Name: Milka Espinoza  : 1970    MRN: [id number]                              Today's Date: 3/19/2025       Admit Date: 3/17/2025    Visit Dx: No diagnosis found.  Patient Active Problem List   Diagnosis    Bipolar disorder, in full remission, most recent episode depressed    Depression    Mood disorder in conditions classified elsewhere    Seizures    Obesity    GERD (gastroesophageal reflux disease)    Nausea and vomiting    Borderline hypoglycemic episodes    Migraines    Pseudoseizure    Panic disorder with agoraphobia    Gastroparesis    HLD (hyperlipidemia)    Hypothyroid    Bipolar disorder    Dissociative convulsions    Anxiety    Back pain    Other diseases of stomach and duodenum    Esophageal dysmotility    Other specified disease of esophagus    Hiatal hernia    Special screening for malignant neoplasms, colon    Sleep apnea    Weight loss    Restless leg syndrome    Colon polyps    Pure hypercholesterolemia    Panic attacks    Noninfectious gastroenteritis    Irritable bowel syndrome    Intestinal autonomic neuropathy    Hemorrhoid    Hemangioma of liver    Helicobacter pylori gastritis    Gastro-esophageal reflux disease with esophagitis    Ex-cigarette smoker    Epileptic seizure, tonic    Duodenal ulcer, unspecified as acute or chronic, without hemorrhage or perforation    Dizziness    Disease of digestive system, unspecified    COPD (chronic obstructive pulmonary disease)    CKD (chronic kidney disease)    Benign neoplasm of transverse colon    Benign esophageal stricture    Autoantibody titer positive    Attention disturbance    Acute gastric ulcer without hemorrhage or perforation    Personal history of colonic polyps    H/O colonoscopy    Abnormal findings on diagnostic imaging of other parts of digestive tract    Foreign body in digestive system    Normal esophagogastroduodenoscopy (EGD)    Vitamin D deficiency    RUQ abdominal pain    Lesion of right lobe of liver     Osteopenia of neck of left femur    PNA (pneumonia)    Discitis     Past Medical History:   Diagnosis Date    Depression     Disease of thyroid gland     Hyperlipidemia     Migraines     On home O2 2021    3 lmp    Seizures      Past Surgical History:   Procedure Laterality Date    ABDOMINAL SURGERY       SECTION      x3    ENDOSCOPY N/A 2022    Procedure: EGD WITH DILATATION (BOUGIE #56) AND INJECTION OF BOTOX;  Surgeon: Rito Le MD;  Location: [facility] ENDOSCOPY;  Service: Gastroenterology;  Laterality: N/A;  POST: LOWER ESOPHAGEAL STRICTURE AND CRICOPHARYNGEAL STRICUTRE    ENDOSCOPY W/ PEG TUBE PLACEMENT N/A 2020    Procedure: ESOPHAGOGASTRODUODENOSCOPY WITH PERCUTANEOUS ENDOSCOPIC GASTROSTOMY TUBE INSERTION;  Surgeon: Rito Le MD;  Location: [facility] ENDOSCOPY;  Service: Gastroenterology;  Laterality: N/A;    HERNIA REPAIR      HYSTERECTOMY      PEG TUBE REMOVAL        General Information       Row Name 25 1237          Physical Therapy Time and Intention    Document Type evaluation  -BR     Mode of Treatment physical therapy  -BR       Row Name 25 1241 25 1237       General Information    Patient Profile Reviewed -- yes  -BR    Prior Level of Function driving  -BR independent:;all household mobility;gait;transfer;community mobility  -BR    Existing Precautions/Restrictions -- TLSO;fall;oxygen therapy device and L/min  -BR    Barriers to Rehab -- medically complex  -BR      Row Name 25 1237          Living Environment    Current Living Arrangements apartment  -BR     People in Home child(starla), adult  -BR       Row Name 25 1237          Home Main Entrance    Number of Stairs, Main Entrance other (see comments)  13 CHAR[address]  -BR       Row Name 25 1237          Stairs Within Home, Primary    Number of Stairs, Within Home, Primary none  -BR       Row Name 25 1237          Cognition    Orientation Status (Cognition) oriented x 4  -BR       Row Name  03/19/25 1237          Safety Issues/Impairments Affecting Functional Mobility    Impairments Affecting Function (Mobility) pain;balance;endurance/activity tolerance;strength  -BR               User Key  (r) = Recorded By, (t) = Taken By, (c) = Cosigned By      Initials Name Provider Type    Malinda Tavares PT Physical Therapist                   Mobility       Row Name 03/19/25 1240 03/19/25 1239       Bed Mobility    Bed Mobility -- supine-sit  -BR    Supine-Sit Martin (Bed Mobility) contact guard  -BR --    Assistive Device (Bed Mobility) head of bed elevated  -BR --    Comment, (Bed Mobility) Pt was cued for log roll technique.  -BR --      Row Name 03/19/25 1240          Transfers    Comment, (Transfers) PT donned TLSO on pt at edge of bed with max assist  -BR       Row Name 03/19/25 1240          Sit-Stand Transfer    Sit-Stand Martin (Transfers) contact guard;1 person assist  -BR     Assistive Device (Sit-Stand Transfers) walker, front-wheeled  -BR       Row Name 03/19/25 1242 03/19/25 1240       Gait/Stairs (Locomotion)    Martin Level (Gait) contact guard;1 person assist  -BR --    Assistive Device (Gait) walker, front-wheeled  -BR --    Patient was able to Ambulate yes  -BR yes  -BR    Distance in Feet (Gait) 40  -BR --    Deviations/Abnormal Patterns (Gait) antalgic;weight shifting decreased  -BR --    Bilateral Gait Deviations forward flexed posture  -BR --              User Key  (r) = Recorded By, (t) = Taken By, (c) = Cosigned By      Initials Name Provider Type    Malinda Tavares PT Physical Therapist                   Obj/Interventions       Row Name 03/19/25 1242          Range of Motion Comprehensive    General Range of Motion bilateral lower extremity ROM WFL  -BR       Row Name 03/19/25 1242          Strength Comprehensive (MMT)    Comment, General Manual Muscle Testing (MMT) Assessment BLE strength grossly 3+/5  -BR       Row Name 03/19/25 1242          Balance     Balance Assessment sitting static balance;sitting dynamic balance;standing static balance  -BR     Static Sitting Balance modified independence  -BR     Dynamic Sitting Balance standby assist  -BR     Position, Sitting Balance unsupported;sitting edge of bed  -BR     Static Standing Balance contact guard  -BR     Position/Device Used, Standing Balance supported;walker, rolling  -BR       Row Name 03/19/25 1242          Sensory Assessment (Somatosensory)    Sensory Assessment (Somatosensory) sensation intact  -BR               User Key  (r) = Recorded By, (t) = Taken By, (c) = Cosigned By      Initials Name Provider Type    BR Malinda Renee, PT Physical Therapist                   Goals/Plan       Row Name 03/19/25 1256          Bed Mobility Goal 1 (PT)    Activity/Assistive Device (Bed Mobility Goal 1, PT) bed mobility activities, all  -BR     [place] Level/Cues Needed (Bed Mobility Goal 1, PT) independent  -BR     Time Frame (Bed Mobility Goal 1, PT) long term goal (LTG);2 weeks  -BR       Row Name 03/19/25 1256          Transfer Goal 1 (PT)    Activity/Assistive Device (Transfer Goal 1, PT) transfers, all  -BR     [place] Level/Cues Needed (Transfer Goal 1, PT) modified independence  -BR     Time Frame (Transfer Goal 1, PT) long term goal (LTG);2 weeks  -BR       Row Name 03/19/25 1256          Gait Training Goal 1 (PT)    Activity/Assistive Device (Gait Training Goal 1, PT) gait (walking locomotion);assistive device use  -BR     [place] Level (Gait Training Goal 1, PT) modified independence  -BR     Distance (Gait Training Goal 1, PT) 150  -BR     Time Frame (Gait Training Goal 1, PT) long term goal (LTG);2 weeks  -BR       Row Name 03/19/25 1256          Stairs Goal 1 (PT)    Activity/Assistive Device (Stairs Goal 1, PT) stairs, all skills  -BR     [place] Level/Cues Needed (Stairs Goal 1, PT) contact guard required  -BR     Number of Stairs (Stairs Goal 1, PT) 6  -BR     Time Frame  (Stairs Goal 1, PT) long term goal (LTG);2 weeks  -BR       Row Name 03/19/25 1256          Therapy Assessment/Plan (PT)    Planned Therapy Interventions (PT) balance training;bed mobility training;gait training;patient/family education;transfer training;ROM (range of motion);strengthening;neuromuscular re-education  -BR               User Key  (r) = Recorded By, (t) = Taken By, (c) = Cosigned By      Initials Name Provider Type    BR Malinda Renee, PT Physical Therapist                   Clinical Impression       Row Name 03/19/25 1243          Pain    Pretreatment Pain Rating 10/10  -BR     Posttreatment Pain Rating 10/10  -BR     Pain Location back  -BR       Row Name 03/19/25 1256 03/19/25 1243       Plan of Care Review    Plan of Care Reviewed With -- patient  -BR    Outcome Evaluation Milka Espinoza is a 54 y.o. female with a CMH of bipolar disorder, GERD, gastroparesis, hypothyroidism, COPD, seizures who presented to [facility] on 3/17/2025 with back pain. Last hospitalization 3/2-3/5/2025 for pneumonia, UTI.  CT PE revealed acute on chronic compression fracture T7., Chronic T8, T9 compression fractures. MRI showed findings consistent with discitis/osteomyelitis at T6-T7 with some meningeal enhancement and vertebral soft tissue inflammation but no drainable abscess. Neurosurgery recommends continuing with antimicrobial therapy per ID,No neurosurgical intervention planned, Continue TLSO brace when out of bed. Pt A and O x 4  with O2  at 2 L ( pt does not use O2 at baseline). Pt needing to use toilet. Pt rates back pain 10/10. Pt typically is independent with household mobility without AD. She lives in home with 13 [address] with her autistic son. This date, pt transfers to edge of bed with CGA of 1 with PT cueing pt for log roll technique. PT dons TLSO on pt with max assist. Pt transfers sit<>stand with CGA of 1 and she ambulated 40 feet with rolling walker with CGA of 1 with antalgic gait and  Forward flexed posture. PT will follow pt with PT recommendation of Home with Assist and Home Health Physical Therapy. Pt will need a rolling walker for home - CM and RN were made aware.  -BR --      Row Name 03/19/25 1243          Therapy Assessment/Plan (PT)    Rehab Potential (PT) good  -BR     Criteria for Skilled Interventions Met (PT) yes;meets criteria;skilled treatment is necessary  -BR     Therapy Frequency (PT) 3 times/wk  -BR     Predicted Duration of Therapy Intervention (PT) until D/C  -BR       Row Name 03/19/25 1243          Vital Signs    Pre SpO2 (%) 94  -BR     O2 Delivery Pre Treatment nasal cannula  -BR     Intra SpO2 (%) 88  -BR     O2 Delivery Intra Treatment nasal cannula  2L  -BR     Post SpO2 (%) 92  -BR     O2 Delivery Post Treatment nasal cannula  2L  -BR     Pre Patient Position Supine  -BR     Intra Patient Position Standing  -BR     Post Patient Position Sitting  -BR       Row Name 03/19/25 1243          Positioning and Restraints    Pre-Treatment Position in bed  -BR     Post Treatment Position chair  -BR     In Chair notified nsg;reclined;call light within reach;encouraged to call for assist;exit alarm on;legs elevated;with brace  -BR               User Key  (r) = Recorded By, (t) = Taken By, (c) = Cosigned By      Initials Name Provider Type    BR Malinda Renee, PT Physical Therapist                   Outcome Measures       Row Name 03/19/25 1257 03/19/25 0800       How much help from another person do you currently need...    Turning from your back to your side while in flat bed without using bedrails? 4  -BR 4  -EH    Moving from lying on back to sitting on the side of a flat bed without bedrails? 3  -BR 4  -EH    Moving to and from a bed to a chair (including a wheelchair)? 3  -BR 3  -EH    Standing up from a chair using your arms (e.g., wheelchair, bedside chair)? 3  -BR 3  -EH    Climbing 3-5 steps with a railing? 2  -BR 3  -EH    To walk in hospital room? 3  -BR 3  -EH     AM-PAC 6 Clicks Score (PT) 18  -BR 20  -    Highest Level of Mobility Goal 6 --> Walk 10 steps or more  -BR 6 --> Walk 10 steps or more  -      Row Name 03/19/25 [facility]          Functional Assessment    Outcome Measure Options AM-PAC 6 Clicks Basic Mobility (PT)  -BR               User Key  (r) = Recorded By, (t) = Taken By, (c) = Cosigned By      Initials Name Provider Type     Pat Thorne, RN Registered Nurse    Malinda Tavares, PT Physical Therapist                                   PT Recommendation and Plan  Planned Therapy Interventions (PT): balance training, bed mobility training, gait training, patient/family education, transfer training, ROM (range of motion), strengthening, neuromuscular re-education  Outcome Evaluation: Milka Espinoza is a 54 y.o. female with a CMH of bipolar disorder, GERD, gastroparesis, hypothyroidism, COPD, seizures who presented to [facility] on 3/17/2025 with back pain. Last hospitalization 3/2-3/5/2025 for pneumonia, UTI.  CT PE revealed acute on chronic compression fracture T7., Chronic T8, T9 compression fractures. MRI showed findings consistent with discitis/osteomyelitis at T6-T7 with some meningeal enhancement and vertebral soft tissue inflammation but no drainable abscess. Neurosurgery recommends continuing with antimicrobial therapy per ID,No neurosurgical intervention planned, Continue TLSO brace when out of bed. Pt A and O x 4  with O2  at 2 L ( pt does not use O2 at baseline). Pt needing to use toilet. Pt rates back pain 10/10. Pt typically is independent with household mobility without AD. She lives in home with 13 [address] with her autistic son. This date, pt transfers to edge of bed with CGA of 1 with PT cueing pt for log roll technique. PT dons TLSO on pt with max assist. Pt transfers sit<>stand with CGA of 1 and she ambulated 40 feet with rolling walker with CGA of 1 with antalgic gait and Forward flexed posture. PT will follow pt with PT  recommendation of Home with Assist and Home Health Physical Therapy. Pt will need a rolling walker for home - CM and RN were made aware.     Time Calculation:         PT Charges       Row Name 03/19/25 1258             Time Calculation    Start Time 0830  -BR      Stop Time 0900  -BR      Time Calculation (min) 30 min  -BR      PT Received On 03/19/25  -BR      PT - Next Appointment 03/20/25  -BR      PT Goal Re-Cert Due Date 04/02/25  -BR         Time Calculation- PT    Total Timed Code Minutes- PT 0 minute(s)  -BR                User Key  (r) = Recorded By, (t) = Taken By, (c) = Cosigned By      Initials Name Provider Type    BR Malinda Renee, PT Physical Therapist                  Therapy Charges for Today       Code Description Service Date Service Provider Modifiers Qty    71897658515 HC PT EVAL MOD COMPLEXITY 4 3/19/2025 Malinda Renee, PT GP 1            PT G-Codes  Outcome Measure Options: AM-PAC 6 Clicks Basic Mobility (PT)  AM-PAC 6 Clicks Score (PT): 18  PT Discharge Summary  Anticipated Discharge Disposition (PT): home with assist, home with home health    Malinda Renee, PT  3/19/2025

## 2025-03-19 NOTE — PLAN OF CARE
Goal Outcome Evaluation:  Plan of Care Reviewed With: patient  Milka Espinoza is a 54 y.o. female with a CMH of bipolar disorder, GERD, gastroparesis, hypothyroidism, COPD, seizures who presented to [facility] on 3/17/2025 with back pain. Last hospitalization 3/2-3/5/2025 for pneumonia, UTI.  CT PE revealed acute on chronic compression fracture T7., Chronic T8, T9 compression fractures. MRI showed findings consistent with discitis/osteomyelitis at T6-T7 with some meningeal enhancement and vertebral soft tissue inflammation but no drainable abscess. Neurosurgery recommends continuing with antimicrobial therapy per ID,No neurosurgical intervention planned, Continue TLSO brace when out of bed. Pt A and O x 4  with O2  at 2 L ( pt does not use O2 at baseline). Pt needing to use toilet. Pt rates back pain 10/10. Pt typically is independent with household mobility without AD. She lives in home with 13 [address] with her autistic son. This date, pt transfers to edge of bed with CGA of 1 with PT cueing pt for log roll technique. PT dons TLSO on pt with max assist. Pt transfers sit<>stand with CGA of 1 and she ambulated 40 feet with rolling walker with CGA of 1 with antalgic gait and Forward flexed posture. PT will follow pt with PT recommendation of Home with Assist and Home Health Physical Therapy. Pt will need a rolling walker for home - CM and RN were made aware.                 Anticipated Discharge Disposition (PT): home with assist, home with home health

## 2025-03-19 NOTE — PROGRESS NOTES
Infectious Diseases Progress Note      LOS: 2 days   Patient Care Team:  Alvina Arzate APRN as PCP - General (Nurse Practitioner)    Chief Complaint: Back pain    Subjective       The patient has been afebrile for the last 24 hours.  The patient is on room air, hemodynamically stable, and is tolerating antimicrobial therapy.  Patient still having back pain but no new symptoms.  Fairly lethargic today      Review of Systems:   Review of Systems   Constitutional: Negative.  Positive for fatigue.   HENT: Negative.     Eyes: Negative.    Respiratory: Negative.     Cardiovascular: Negative.    Gastrointestinal: Negative.    Endocrine: Negative.    Genitourinary: Negative.    Musculoskeletal: Negative.  Positive for back pain.   Skin: Negative.    Neurological: Negative.    Psychiatric/Behavioral: Negative.     All other systems reviewed and are negative.       Objective     Vital Signs  Temp:  [97.6 °F (36.4 °C)-98.9 °F (37.2 °C)] 97.8 °F (36.6 °C)  Heart Rate:  [] 92  Resp:  [16-20] 16  BP: ()/(49-84) 106/60    Physical Exam:  Physical Exam  Vitals and nursing note reviewed.   Constitutional:       General: She is not in acute distress.     Appearance: She is well-developed and normal weight. She is ill-appearing. She is not diaphoretic.   HENT:      Head: Normocephalic and atraumatic.   Eyes:      General: No scleral icterus.     Extraocular Movements: Extraocular movements intact.      Conjunctiva/sclera: Conjunctivae normal.      Pupils: Pupils are equal, round, and reactive to light.   Cardiovascular:      Rate and Rhythm: Normal rate and regular rhythm.      Heart sounds: Normal heart sounds, S1 normal and S2 normal. No murmur heard.  Pulmonary:      Effort: Pulmonary effort is normal. No respiratory distress.      Breath sounds: Normal breath sounds. No stridor. No wheezing or rales.   Chest:      Chest wall: No tenderness.   Abdominal:      General: Bowel sounds are normal. There is no distension.       Palpations: Abdomen is soft. There is no mass.      Tenderness: There is no abdominal tenderness. There is no guarding.   Musculoskeletal:         General: No swelling, tenderness or deformity. Normal range of motion.      Cervical back: Neck supple.      Comments: Mid back tenderness   Skin:     General: Skin is warm and dry.      Coloration: Skin is not pale.      Findings: No bruising, erythema or rash.      Comments: Central line has been removed   Neurological:      Mental Status: She is alert and oriented to person, place, and time.          Results Review:    I have reviewed all clinical data, test, lab, and imaging results.     Radiology  Adult Transthoracic Echo Complete W/ Cont if Necessary Per Protocol  Result Date: 3/19/2025    Left ventricular systolic function is normal. Calculated left ventricular EF = 60%   Left ventricular diastolic function was normal.   Normal RV systolic function.   No significant valvular disease.   Estimated right ventricular systolic pressure from tricuspid regurgitation is normal (<35 mmHg).   No evidence of infective endocarditis noted. Electronically signed by Mich Sotomayor MD, 03/19/25, 8:47 AM EDT.    CT Guided Aspiration Disc  Result Date: 3/19/2025  DATE OF EXAM: 3/18/2025 4:32 PM EDT PROCEDURE: CT GUIDED ASPIRATION DISC INDICATIONS: see above COMPARISON: No Comparisons Available FLUOROSCOPIC TIME: 20 seconds PHYSICIAN MONITORED CONSCIOUS SEDATION TIME: None minutes TECHNIQUE: A detailed explanation of the procedure, including the risks, benefits, and alternatives was provided. A preprocedure timeout was performed. The interventional radiology nurse mild the patient at all x2 the patient was placed prone on the CT fluoroscopy table and a planning scan was performed. This demonstrated destruction of the T7-T8 disc space as well as 6 significant soft tissue thickening and edema adjacent to the disc. An appropriate access site was selected and  the area is prepped and draped in usual sterile fashion. The skin and subcutaneous tissues were anesthetized with 1% lidocaine, and a 18-gauge coaxial needle was advanced to the soft tissue thickening adjacent to the disc. From this position multiple 22-gauge FNAs were obtained and sent to the lab for analysis. The needle was removed. The patient tolerated procedure well without immediate complication. FINDINGS: See above     1. Successful FNA of T7-8 right paravertebral soft tissue infection. Electronically Signed: Harvey Holt MD  3/19/2025 7:43 AM EDT  Workstation ID: [id number]    IR Removal Tunnel CV Cath Without Port  Result Date: 3/19/2025  DATE OF EXAM: 3/18/2025 4:33 PM EDT PROCEDURE: IR REMOVAL TUNNEL CV CATH WO PORT INDICATIONS: Patient no longer needs the catheter COMPARISON: No Comparisons Available FLUOROSCOPIC TIME: None PHYSICIAN MONITORED CONSCIOUS SEDATION TIME: None minutes TECHNIQUE: A detailed explanation of the procedure, including the risks, benefits, and alternatives was provided. A preprocedure timeout was performed. The patient was placed supine in the bed and the pre-existing PermCath and skin was prepped and draped in the usual sterile fashion. The skin was anesthetized with 1% lidocaine. Next, using sharp and blunt dissection the tunneled PICC was freed from the subcutaneous tissues and removed in toto. The patient tolerated the procedure well without immediate complication. FINDINGS: See above     Successful removal of tunneled PICC. Electronically Signed: Harvey Holt MD  3/19/2025 7:41 AM EDT  Workstation ID: [id number]    MRI Thoracic Spine With Contrast  Result Date: 3/18/2025  MRI THORACIC SPINE W CONTRAST Date of Exam: 3/18/2025 8:39 PM EDT Indication: possible osteomyelitis.  Comparison: 3/18/2025 Technique:  Routine multiplanar/multisequence sequence images of the thoracic spine were obtained after the uneventful administration of 20 cc Prohance.  Findings: Patient motion limits  evaluation. T2 signal changes at T6 and T7. Enhancement of the T6 and T7 vertebral bodies with patchy developing enhancement of T8 and possibly the superior endplate of T9. Adjacent meningeal enhancement extending posterior to T5 and inferiorly through the T8-T9 level. Significant surrounding enhancement of the soft tissues noted involving the perivertebral soft tissues extending from the inferior endplate of T5 through the T8 vertebral body level. Moderate chronic loss of height of T7. No drainable abscess is appreciated. The lung fields are poorly evaluated by MRI due to respiratory motion. A small amount of fluid is present within the T6-T7 intervertebral disc space. T1-T2: No significant spinal canal or foraminal narrowing. T2-T3: No significant spinal canal or foraminal narrowing. T3-T4: No significant spinal canal or foraminal narrowing. T4-5: No significant spinal canal or foraminal narrowing. T5-T6: No significant spinal canal or foraminal narrowing. T6-T7: Disc osteophyte complex. Mild canal stenosis. No foraminal narrowing. T7-T8: No facet disease. No canal stenosis. No foraminal narrowing. T8-T9: No significant spinal canal or foraminal narrowing. T9-T10: No significant spinal canal or foraminal narrowing. T10-T11: No significant spinal canal or foraminal narrowing. T11-T12: No significant spinal canal or foraminal narrowing.     Findings consistent with discitis/osteomyelitis at T6-T7. A small volume of intradiscal fluid with associated meningeal enhancement and surrounding perivertebral soft tissue inflammation. No drainable abscess. Electronically Signed: Cricket Paredes MD  3/18/2025 10:57 PM EDT  Workstation ID: [id number]      Cardiology    Laboratory    Results from last 7 days   Lab Units 03/19/25  0537 03/18/25  0022 03/14/25  1949   WBC 10*3/mm3 5.69 10.60 5.52   HEMOGLOBIN g/dL 7.5* 8.9* 8.8*   HEMATOCRIT % 25.0* 28.7* 28.3*   PLATELETS 10*3/mm3 323 397 369     Results from last 7 days   Lab Units  03/19/25  0537 03/18/25  0022 03/14/25  1949   SODIUM mmol/L 138 135* 141   POTASSIUM mmol/L 3.8 3.9 3.3*   CHLORIDE mmol/L 105 100 106   CO2 mmol/L 23.0 23.1 22.6   BUN mg/dL 7 9 11   CREATININE mg/dL 0.70 0.66 0.72   GLUCOSE mg/dL 94 102* 147*   ALBUMIN g/dL  --  3.7 3.6   BILIRUBIN mg/dL  --  0.4 0.2   ALK PHOS U/L  --  210* 127*   AST (SGOT) U/L  --  32 17   ALT (SGPT) U/L  --  20 10   CALCIUM mg/dL 8.5* 9.3 8.7     Results from last 7 days   Lab Units 03/18/25  0022   CK TOTAL U/L 38             Microbiology   Microbiology Results (last 10 days)       Procedure Component Value - Date/Time    Catheter Culture - Cath Tip, Hand, Left [[id number]] Collected: 03/18/25 1651    Lab Status: Preliminary result Specimen: Cath Tip from Hand, Left Updated: 03/19/25 0712     Catheter Culture No growth    Body Fluid Culture - Body Fluid, Spine, Thoracic [[id number]] Collected: 03/18/25 1651    Lab Status: Preliminary result Specimen: Body Fluid from Spine, Thoracic Updated: 03/19/25 0713     Body Fluid Culture No growth     Gram Stain Rare (1+) WBCs seen      No organisms seen    MRSA Screen, PCR (Inpatient) - Swab, Nares [[id number]]  (Normal) Collected: 03/18/25 0417    Lab Status: Final result Specimen: Swab from Nares Updated: 03/18/25 0545     MRSA PCR No MRSA Detected    Narrative:      The negative predictive value of this diagnostic test is high and should only be used to consider de-escalating anti-MRSA therapy. A positive result may indicate colonization with MRSA and must be correlated clinically.    Blood Culture - Blood, Arm, Left [[id number]]  (Abnormal) Collected: 03/18/25 0106    Lab Status: Preliminary result Specimen: Blood from Arm, Left Updated: 03/19/25 0619     Blood Culture Growth present, too young to evaluate     Gram Stain Aerobic Bottle Gram negative bacilli      Anaerobic Bottle Gram negative bacilli    Narrative:      Less than seven (7) mL's of blood was collected.  Insufficient quantity may yield  false negative results.    Blood Culture ID, PCR - Blood, Arm, Left [[id number]]  (Abnormal) Collected: 03/18/25 0106    Lab Status: Final result Specimen: Blood from Arm, Left Updated: 03/18/25 2145     BCID, PCR Pseudomonas aeruginosa. Identification by BCID2 PCR     BOTTLE TYPE Aerobic Bottle    Blood Culture - Blood, Arm, Right [[id number]]  (Normal) Collected: 03/18/25 0022    Lab Status: Preliminary result Specimen: Blood from Arm, Right Updated: 03/19/25 0115     Blood Culture No growth at 24 hours    Blood Culture - Blood, Blood, Venous [[id number]]  (Abnormal) Collected: 03/14/25 2216    Lab Status: Final result Specimen: Blood, Venous Updated: 03/18/25 0614     Blood Culture Pseudomonas aeruginosa     Isolated from --     Gram Stain Aerobic Bottle Gram negative bacilli    Narrative:      Refer to previous blood culture collected on 03/14/2025 2213 for MICs    Less than seven (7) mL's of blood was collected.  Insufficient quantity may yield false negative results.    Blood Culture - Blood, Arm, Right [[id number]]  (Abnormal)  (Susceptibility) Collected: 03/14/25 2213    Lab Status: Final result Specimen: Blood from Arm, Right Updated: 03/18/25 0614     Blood Culture Pseudomonas aeruginosa     Isolated from Aerobic Bottle     Gram Stain Aerobic Bottle Gram negative bacilli    Narrative:      Less than seven (7) mL's of blood was collected.  Insufficient quantity may yield false negative results.    Susceptibility        Pseudomonas aeruginosa      VENITA      Cefepime Susceptible      Ceftazidime Susceptible      Ciprofloxacin Susceptible      Levofloxacin Susceptible      Piperacillin + Tazobactam Susceptible                       Susceptibility Comments       Pseudomonas aeruginosa    With the exception of urinary-sourced infections, aminoglycosides should not be used as monotherapy.               Blood Culture ID, PCR - Blood, Arm, Right [[id number]]  (Abnormal) Collected: 03/14/25 2213    Lab Status: Final  result Specimen: Blood from Arm, Right Updated: 03/15/25 2342     BCID, PCR Pseudomonas aeruginosa. Identification by BCID2 PCR     BOTTLE TYPE Aerobic Bottle    Respiratory Panel PCR w/COVID-19(SARS-CoV-2) ALBERTO/EMMA/ASHLEY/PAD/[facility]/SARAH In-House, NP Swab in UTM/VTM, 2 HR TAT - Swab, Nasopharynx [[id number]]  (Normal) Collected: 03/14/25 1949    Lab Status: Final result Specimen: Swab from Nasopharynx Updated: 03/14/25 2044     ADENOVIRUS, PCR Not Detected     Coronavirus 229E Not Detected     Coronavirus HKU1 Not Detected     Coronavirus NL63 Not Detected     Coronavirus OC43 Not Detected     COVID19 Not Detected     Human Metapneumovirus Not Detected     Human Rhinovirus/Enterovirus Not Detected     Influenza A PCR Not Detected     Influenza B PCR Not Detected     Parainfluenza Virus 1 Not Detected     Parainfluenza Virus 2 Not Detected     Parainfluenza Virus 3 Not Detected     Parainfluenza Virus 4 Not Detected     RSV, PCR Not Detected     Bordetella pertussis pcr Not Detected     Bordetella parapertussis PCR Not Detected     Chlamydophila pneumoniae PCR Not Detected     Mycoplasma pneumo by PCR Not Detected    Narrative:      In the setting of a positive respiratory panel with a viral infection PLUS a negative procalcitonin without other underlying concern for bacterial infection, consider observing off antibiotics or discontinuation of antibiotics and continue supportive care. If the respiratory panel is positive for atypical bacterial infection (Bordetella pertussis, Chlamydophila pneumoniae, or Mycoplasma pneumoniae), consider antibiotic de-escalation to target atypical bacterial infection.            Medication Review:       Schedule Meds  amitriptyline, 25 mg, Oral, Nightly  atorvastatin, 20 mg, Oral, Nightly  busPIRone, 30 mg, Oral, BID  Cariprazine HCl, 1.5 mg, Oral, Daily  cefepime, 2,000 mg, Intravenous, Q8H  enoxaparin sodium, 40 mg, Subcutaneous, Daily  lacosamide, 200 mg, Oral, Q12H  lamoTRIgine, 150 mg,  Oral, BID  levETIRAcetam, 1,500 mg, Oral, BID  levothyroxine, 25 mcg, Oral, Q AM  pantoprazole, 40 mg, Intravenous, Q AM  QUEtiapine XR, 800 mg, Oral, Nightly  sertraline, 100 mg, Oral, Daily  sodium chloride, 10 mL, Intravenous, Q12H  sodium chloride, 10 mL, Intravenous, Q12H        Infusion Meds       PRN Meds    acetaminophen    ALPRAZolam    senna-docusate sodium **AND** polyethylene glycol **AND** bisacodyl **AND** bisacodyl    HYDROmorphone    hydrOXYzine    Magnesium Standard Dose Replacement - Follow Nurse / BPA Driven Protocol    nitroglycerin    oxyCODONE    Potassium Replacement - Follow Nurse / BPA Driven Protocol    prochlorperazine    sodium chloride    sodium chloride    sodium chloride    sodium chloride    sodium chloride    SUMAtriptan    tiZANidine        Assessment & Plan       Antimicrobial Therapy   1.  IV cefepime        2.  IV vancomycin        3.        4.        5.            Assessment     Pseudomonas aeruginosa bacteremia from ED visit on 3/14/2025.  Most likely source of infection is a  tunneled central line which patient had for long duration.  Repeat blood cultures from 3/18/2025 also growing Pseudomonas however line did not been removed yet..  Central tunneled line was removed by IR later in the afternoon of 3/18/2025 and tip cultures pending     Discitis/osteomyelitis of T6-T7 area.  Most likely seeded from the bacteremia versus another source.  Patient's been having mid back pain for approximately 3 weeks.  The patient has no significant neurological symptoms.  Status post IR aspiration of the thoracic spine on 3/18/2025-cultures pending.  Neurosurgery not planning on any surgical intervention     Gastroparesis-patient has poor venous access and has a tunneled central line for fluids and other medications as needed     Remote history of ankle fracture with hardware placement after patient fell during a seizure.  No active signs of infection in the ankle     History of seizures      Plan     Continue IV cefepime 2 g every 8 hours  Waiting on repeat blood cultures  Repeat blood cultures taken on 3/19/2025  Waiting on T6-T7 aspiration cultures  Waiting on tip culture  Discontinue IV vancomycin  Patient will need 6 weeks of IV antibiotics negative blood culture.  Would like to postpone placing a new PICC line until patient's blood cultures are clear for 48 hours  Continue supportive care  Rosibel labs  Case discussed with hospitalist      Patricia Garcia, [email or web address]  03/19/25  16:10 EDT    Note is dictated utilizing voice recognition software/Dragon

## 2025-03-19 NOTE — PROGRESS NOTES
[facility] MEDICINE SERVICE  DAILY PROGRESS NOTE    NAME: Milka Espinoza  : 1970  MRN: [id number]      LOS: 2 days     PROVIDER OF SERVICE: Eddie Conrad MD    Chief Complaint: Discitis    Subjective:     Interval History: Patient still has significant back pain despite adjustment in pain medications.  No fevers overnight.        Review of Systems:   Review of Systems    Objective:     Vital Signs  Temp:  [97.6 °F (36.4 °C)-98.9 °F (37.2 °C)] 97.6 °F (36.4 °C)  Heart Rate:  [] 84  Resp:  [16-20] 16  BP: ()/(49-84) 89/49  Flow (L/min) (Oxygen Therapy):  [2] 2   Body mass index is 34.74 kg/m².    Physical Exam  Physical Exam  General Appearance:  Alert, cooperative, no distress, appears stated age  Head:  Normocephalic, without obvious abnormality, atraumatic  Eyes:  PERRL, conjunctiva/corneas clear, EOM's intact, fundi benign, both eyes  Ears:  Normal TM's and external ear canals, both ears  Nose: Nares normal, septum midline, mucosa normal, no drainage or sinus tenderness  Throat: Lips, mucosa, and tongue normal; teeth and gums normal  Neck: Supple, symmetrical, trachea midline, no adenopathy, thyroid: not enlarged, symmetric, no tenderness/mass/nodules, no carotid bruit or JVD  Lungs:   Clear to auscultation bilaterally, respirations unlabored, right chest wall tunneled catheter  Heart:  Regular rate and rhythm, S1, S2 normal, no murmur, rub or gallop  Abdomen:  Soft, non-tender, bowel sounds active all four quadrants,  no masses, no organomegaly  Extremities: Extremities normal, atraumatic, no cyanosis or edema  Pulses: 2+ and symmetric  Skin: Skin color, texture, turgor normal, no rashes or lesions  Neurologic: Normal         Diagnostic Data    Results from last 7 days   Lab Units 25  0537 25  0022   WBC 10*3/mm3 5.69 10.60   HEMOGLOBIN g/dL 7.5* 8.9*   HEMATOCRIT % 25.0* 28.7*   PLATELETS 10*3/mm3 323 397   GLUCOSE mg/dL 94 102*   CREATININE mg/dL 0.70 0.66   BUN  mg/dL 7 9   SODIUM mmol/L 138 135*   POTASSIUM mmol/L 3.8 3.9   AST (SGOT) U/L  --  32   ALT (SGPT) U/L  --  20   ALK PHOS U/L  --  210*   BILIRUBIN mg/dL  --  0.4   ANION GAP mmol/L 10.0 11.9       CT Guided Aspiration Disc  Result Date: 3/19/2025  1. Successful FNA of T7-8 right paravertebral soft tissue infection. Electronically Signed: Harvey Holt MD  3/19/2025 7:43 AM EDT  Workstation ID: [id number]    IR Removal Tunnel CV Cath Without Port  Result Date: 3/19/2025  Successful removal of tunneled PICC. Electronically Signed: Harvey Holt MD  3/19/2025 7:41 AM EDT  Workstation ID: [id number]    MRI Thoracic Spine With Contrast  Result Date: 3/18/2025  Findings consistent with discitis/osteomyelitis at T6-T7. A small volume of intradiscal fluid with associated meningeal enhancement and surrounding perivertebral soft tissue inflammation. No drainable abscess. Electronically Signed: Cricket Paredes MD  3/18/2025 10:57 PM EDT  Workstation ID: [id number]    MRI Thoracic Spine Without Contrast  Result Date: 3/18/2025  Impression: 1.Findings concerning for discitis/osteomyelitis at T6-T7. There is also circumferential epidural signal abnormality primarily at the ventral aspect of the spinal canal at this level. A small epidural abscess cannot be excluded. Further characterization is limited without IV contrast. 2.There is acute/subacute compression fracture of T7 with approximately 30% loss of height anteriorly. 3.There is compression of T8 and T9, which could be subacute to chronic. 4.Mild thoracic spondylosis. Electronically Signed: Reddy Ernst MD  3/18/2025 5:58 AM EDT  Workstation ID: [id number]        I reviewed the patient's new clinical results.    Assessment/Plan:     Active and Resolved Problems  Active Hospital Problems    Diagnosis  POA    **Discitis [M46.40]  Yes      Resolved Hospital Problems   No resolved problems to display.       Pseudomonas bacteremia with T6-T7 discitis with osteomyelitis  -Imaging on  admission concerning for discitis and confirmed with MRI of the thoracic spine  -Patient also has positive blood cultures with Pseudomonas, with her tunneled central line likely being the source  -IR removed tunneled central line and biopsied T6 disc on 3/18  -Initiated on IV cefepime, vancomycin but vancomycin discontinued as this is most likely Pseudomonas infection and no evidence of MRSA  -ID consulted  -Repeat blood cultures from 3/18 still growing Pseudomonas, will repeat blood cultures today  -Patient will likely need 6 to 8 weeks of IV antibiotics on discharge    T5-T7 vertebral compression fractures  -Patient did have a seizure prior to her previous admission and could have injured herself at that time leading to compression fractures  -No evidence of retropulsion or any other cord impingement or disc herniation  -Placed TLSO brace  -PT/OT  -Pain control; adjust pain medications as necessary    Seizure disorder  -Continue Keppra, Vimpat    Bipolar disorder  -Continue medications    Hypothyroidism  -Continue Synthroid    GERD with gastroparesis  -Continue PPI  -Patient does receive immunotherapy via her tunneled central line but this will need to be removed and replaced with a new central line or port once blood cultures are clear    VTE Prophylaxis:  Pharmacologic VTE prophylaxis orders are present.             Disposition Planning:     Barriers to Discharge: Bacteremia, discitis treatment  Anticipated Date of Discharge: 3/22  Place of Discharge: Home      Time: 45 minutes     Code Status and Medical Interventions: CPR (Attempt to Resuscitate); Full Support   Ordered at: 03/17/25 2213     Code Status (Patient has no pulse and is not breathing):    CPR (Attempt to Resuscitate)     Medical Interventions (Patient has pulse or is breathing):    Full Support     Level Of Support Discussed With:    Patient       Signature: Electronically signed by Eddie Conrad MD, 03/19/25, 12:03 EDT.  [facility] Hospitalist  Team

## 2025-03-19 NOTE — CONSULTS
[facility] NEUROSURGERY CONSULT NOTE    Patient name: Milka Espinoza  Referring Provider:     Eddie Conrad MD      Reason for Consultation: Osteomyelitis/discitis    Patient Care Team:  Alvina Arzate APRN as PCP - General (Nurse Practitioner)    Chief complaint: Back pain    Subjective .     History of present illness:    Patient is a 54 y.o. right handed female that initially presented to [facility] emergency department on 3/17/2025 with worsening back pain.  Patient had prior hospital count on 3/14/2025 with thoracic back pain and chest pain and heart was noted to have a T7 compression deformity.  She was recommended to follow-up outpatient spinal office.  Her back pain worsened and she was asked to return to the hospital after positive blood cultures.  Source of infection possibly from tunneled cath in her chest.  ID recommended removal.  MRI showed findings consistent with discitis/osteomyelitis at T6-T7 with some meningeal enhancement and vertebral soft tissue inflammation but no drainable abscess.  Upon my evaluation, patient is awake alert and oriented.  She is utilizing a walker and working with physical therapy.  She is wearing TLSO brace.  She complains of focal back pain that started a couple weeks ago.  She reports generalized weakness with no focal weakness.  She reports no radicular component.  She does report that she just had her port that was placed for her gastroparesis taken out yesterday.  She reports no acute bowel or bladder dysfunction or saddle anesthesia.       review of Systems  Review of Systems   Musculoskeletal:  Positive for back pain.   Neurological:  Positive for weakness.   All other systems reviewed and are negative.      History  PAST MEDICAL HISTORY  Past Medical History:   Diagnosis Date    Depression     Disease of thyroid gland     Hyperlipidemia     Migraines     On home O2 12/2021    3 lmp    Seizures        PAST SURGICAL HISTORY  Past Surgical History:    Procedure Laterality Date    ABDOMINAL SURGERY       SECTION      x3    ENDOSCOPY N/A 2022    Procedure: EGD WITH DILATATION (BOUGIE #56) AND INJECTION OF BOTOX;  Surgeon: Rito Le MD;  Location: [facility] ENDOSCOPY;  Service: Gastroenterology;  Laterality: N/A;  POST: LOWER ESOPHAGEAL STRICTURE AND CRICOPHARYNGEAL STRICUTRE    ENDOSCOPY W/ PEG TUBE PLACEMENT N/A 2020    Procedure: ESOPHAGOGASTRODUODENOSCOPY WITH PERCUTANEOUS ENDOSCOPIC GASTROSTOMY TUBE INSERTION;  Surgeon: Rito Le MD;  Location: [facility] ENDOSCOPY;  Service: Gastroenterology;  Laterality: N/A;    HERNIA REPAIR      HYSTERECTOMY      PEG TUBE REMOVAL         FAMILY HISTORY  Family History   Problem Relation Age of Onset    No Known Problems Mother     No Known Problems Father     No Known Problems Sister     No Known Problems Brother     Alcohol abuse Daughter     No Known Problems Son     No Known Problems Maternal Grandmother     No Known Problems Paternal Grandmother     No Known Problems Maternal Aunt     No Known Problems Paternal Aunt        SOCIAL HISTORY  Social History     Tobacco Use    Smoking status: Former     Current packs/day: 0.00     Average packs/day: 0.3 packs/day for 3.0 years (0.8 ttl pk-yrs)     Types: Cigarettes     Start date:      Quit date:      Years since quittin.2     Passive exposure: Past    Smokeless tobacco: Never   Vaping Use    Vaping status: Never Used   Substance Use Topics    Alcohol use: No    Drug use: No       unemployed, disabled      Allergies:  Ketorolac, Sulfa antibiotics, Tramadol hcl, and Adhesive tape    MEDICATIONS:  Medications Prior to Admission   Medication Sig Dispense Refill Last Dose/Taking    ALPRAZolam (XANAX) 2 MG tablet TAKE 1 TABLET BY MOUTH THREE TIMES DAILY AS NEEDED FOR ANXIETY 90 tablet 1 3/16/2025    amitriptyline (ELAVIL) 25 MG tablet TAKE 1 TABLET BY MOUTH EVERY NIGHT AT BEDTIME 90 tablet 1 3/16/2025    amoxicillin-clavulanate  (AUGMENTIN) 875-125 MG per tablet Take 1 tablet by mouth 2 (Two) Times a Day for 7 days. 14 tablet 0 3/16/2025    atorvastatin (LIPITOR) 20 MG tablet Take 1 tablet by mouth Every Night. 90 tablet 3 3/16/2025    busPIRone (BUSPAR) 30 MG tablet TAKE 1 TABLET BY MOUTH TWICE DAILY 180 tablet 1 3/16/2025    HYDROcodone-acetaminophen (NORCO) 7.5-325 MG per tablet Take 1 tablet by mouth Every 4 (Four) Hours As Needed for Moderate Pain or Severe Pain. 12 tablet 0 3/16/2025    hydrOXYzine (ATARAX) 25 MG tablet TAKE 1 TABLET BY MOUTH THREE TIMES DAILY AS NEEDED FOR ANXIETY 90 tablet 2 3/16/2025    lacosamide (VIMPAT) 200 MG tablet Take 1 tablet by mouth Every 12 (Twelve) Hours. 60 tablet 5 3/16/2025    lamoTRIgine (LaMICtal) 150 MG tablet Take 1 tablet by mouth 2 (Two) Times a Day. 60 tablet 5 3/16/2025    levETIRAcetam (KEPPRA) 500 MG tablet Take 3 tablets by mouth 2 (Two) Times a Day. 540 tablet 3 3/16/2025    levothyroxine (SYNTHROID, LEVOTHROID) 25 MCG tablet Take 1 tablet by mouth Daily. 90 tablet 3 3/16/2025    Motegrity 2 MG tablet Take 1 tablet by mouth Daily.   Past Week    nystatin 085198 UNIT/GM powder Apply  topically to the appropriate area as directed 2 (Two) Times a Day. 30 g 1 3/16/2025    ondansetron (ZOFRAN) 2 mg/mL injection Infuse 2 mL into a venous catheter Every 6 (Six) Hours As Needed.   3/17/2025    promethazine (PHENERGAN) 25 MG tablet Take 1 tablet by mouth Every 6 (Six) Hours As Needed for Nausea or Vomiting.   Past Week    QUEtiapine XR (SEROquel XR) 400 MG 24 hr tablet Take 2 tablets by mouth every night at bedtime. 180 tablet 1 3/16/2025    sertraline (ZOLOFT) 100 MG tablet Take 1 tablet by mouth Daily. 90 tablet 3 3/16/2025    SUMAtriptan (IMITREX) 100 MG tablet TAKE 1 TABLET BY MOUTH 1 TIME AT ONSET OF MIGRAINE AS NEEDED FOR MIGRAINE. MAY REPEAT DOSE 1 TIME AFTER 2 HOURS IF MIGRAINE NOT RESOLVED 9 tablet 11 Past Week    Vraylar 1.5 MG capsule capsule TAKE 1 CAPSULE BY MOUTH EVERY DAY 90 capsule  1 3/16/2025    BD PosiFlush 0.9 % flush        heparin 100 UNIT/ML solution injection        heparin 100 UNIT/ML solution injection        Octagam 30 GM/300ML solution infusion Infuse 30 g into a venous catheter 1 (One) Time Per Week.       pantoprazole (PROTONIX) 40 MG EC tablet Take 1 tablet by mouth Daily. (Patient not taking: Reported on 3/17/2025) 90 tablet 3 Not Taking    promethazine (PHENERGAN) 6.25 MG/5ML solution oral solution Take 20 mL by mouth Every 6 (Six) Hours As Needed.       sodium chloride 0.9 % solution        sucralfate (CARAFATE) 1 g tablet Take 1 tablet by mouth 3 (Three) Times a Day. (Patient not taking: Reported on 3/17/2025)   Not Taking         Current Facility-Administered Medications:     acetaminophen (TYLENOL) 160 MG/5ML oral solution 650 mg, 650 mg, Oral, Q4H PRN, Dayan Boyle, APRN, 650 mg at 03/17/25 2330    ALPRAZolam (XANAX) tablet 2 mg, 2 mg, Oral, TID PRN, Dayan Boyle, APRN, 2 mg at 03/18/25 1958    amitriptyline (ELAVIL) tablet 25 mg, 25 mg, Oral, Nightly, Dayan Boyle, APRN, 25 mg at 03/18/25 2337    atorvastatin (LIPITOR) tablet 20 mg, 20 mg, Oral, Nightly, Dayan Boyle, APRN, 20 mg at 03/18/25 2338    sennosides-docusate (PERICOLACE) 8.6-50 MG per tablet 2 tablet, 2 tablet, Oral, BID PRN **AND** polyethylene glycol (MIRALAX) packet 17 g, 17 g, Oral, Daily PRN **AND** bisacodyl (DULCOLAX) EC tablet 5 mg, 5 mg, Oral, Daily PRN **AND** bisacodyl (DULCOLAX) suppository 10 mg, 10 mg, Rectal, Daily PRN, Dayan Boyle, APRN    busPIRone (BUSPAR) tablet 30 mg, 30 mg, Oral, BID, Dayan Boyle, APRN, 30 mg at 03/19/25 0915    Cariprazine HCl (VRAYLAR) capsule capsule 1.5 mg, 1.5 mg, Oral, Daily, Dayan Boyle APRN, 1.5 mg at 03/19/25 0915    cefepime 2000 mg IVPB in 100 mL NS (MBP), 2,000 mg, Intravenous, Q8H, Dayan Boyle APRN, 2,000 mg at 03/19/25 0602    enoxaparin sodium (LOVENOX) syringe 40 mg, 40 mg, Subcutaneous, Daily, [place]  SANJEEV Nguyễn, 40 mg at 03/18/25 1739    HYDROmorphone (DILAUDID) injection 1 mg, 1 mg, Intravenous, Q3H PRN, Eddie Conrad MD, 1 mg at 03/18/25 1751    hydrOXYzine (ATARAX) tablet 25 mg, 25 mg, Oral, TID PRN, Eddie Conrad MD, 25 mg at 03/18/25 1959    lacosamide (VIMPAT) tablet 200 mg, 200 mg, Oral, Q12H, Dayan Boyle APRN, 200 mg at 03/19/25 0914    lamoTRIgine (LaMICtal) tablet 150 mg, 150 mg, Oral, BID, Dayan Boyle APRN, 150 mg at 03/19/25 0914    levETIRAcetam (KEPPRA) tablet 1,500 mg, 1,500 mg, Oral, BID, Dayan Boyle APRN, 1,500 mg at 03/19/25 0914    levothyroxine (SYNTHROID, LEVOTHROID) tablet 25 mcg, 25 mcg, Oral, Q AM, Dayan Boyle APRN, 25 mcg at 03/19/25 0538    Magnesium Standard Dose Replacement - Follow Nurse / BPA Driven Protocol, , Not Applicable, PRN, Dayan Boyle APRN    nitroglycerin (NITROSTAT) SL tablet 0.4 mg, 0.4 mg, Sublingual, Q5 Min PRN, Dayan Boyle APRN    oxyCODONE (ROXICODONE) immediate release tablet 10 mg, 10 mg, Oral, Q4H PRN, Eddie Conrad MD, 10 mg at 03/19/25 0959    pantoprazole (PROTONIX) injection 40 mg, 40 mg, Intravenous, Q AM, Dayan Boyle APRN, 40 mg at 03/19/25 0538    Potassium Replacement - Follow Nurse / BPA Driven Protocol, , Not Applicable, PRN, Dayan Boyle, APRN    prochlorperazine (COMPAZINE) injection 10 mg, 10 mg, Intravenous, Q6H PRN, Dayan Boyle APRN, 10 mg at 03/18/25 1212    QUEtiapine fumarate ER (SEROquel XR) tablet 800 mg, 800 mg, Oral, Nightly, Dayan Boyle, APRN, 800 mg at 03/18/25 2333    sertraline (ZOLOFT) tablet 100 mg, 100 mg, Oral, Daily, Dayan Boyle, APRN, 100 mg at 03/19/25 0916    sodium chloride 0.9 % flush 10 mL, 10 mL, Intravenous, PRN, Dayan Boyle, APRN    sodium chloride 0.9 % flush 10 mL, 10 mL, Intravenous, Q12H, Dayan Boyle, APRN, 10 mL at 03/19/25 0916    sodium chloride 0.9 % flush 10 mL, 10 mL, Intravenous, Q12H, Dayan Boyle,  APRN, 10 mL at 03/19/25 0916    sodium chloride 0.9 % flush 10 mL, 10 mL, Intravenous, PRN, Dayan Boyle D, APRN, 10 mL at 03/18/25 1213    sodium chloride 0.9 % flush 20 mL, 20 mL, Intravenous, PRN, BoyleDayan walker D, APRN    sodium chloride 0.9 % infusion 40 mL, 40 mL, Intravenous, PRN, Dayan Boyle D, APRN    sodium chloride 0.9 % infusion 40 mL, 40 mL, Intravenous, PRN, Dayan Boyle D, APRN    SUMAtriptan (IMITREX) tablet 100 mg, 100 mg, Oral, Q2H PRN, Eddie Conrad MD, 100 mg at 03/18/25 0901    tiZANidine (ZANAFLEX) tablet 4 mg, 4 mg, Oral, Q12H PRN, Dayan Boyle, APRN, 4 mg at 03/18/25 1958      Objective     Results Review:  LABS:  Results from last 7 days   Lab Units 03/19/25  0537 03/18/25  0022 03/14/25 1949   WBC 10*3/mm3 5.69 10.60 5.52   HEMOGLOBIN g/dL 7.5* 8.9* 8.8*   HEMATOCRIT % 25.0* 28.7* 28.3*   PLATELETS 10*3/mm3 323 397 369     Results from last 7 days   Lab Units 03/19/25  0537 03/18/25  0022 03/14/25 1949   SODIUM mmol/L 138 135* 141   POTASSIUM mmol/L 3.8 3.9 3.3*   CHLORIDE mmol/L 105 100 106   CO2 mmol/L 23.0 23.1 22.6   BUN mg/dL 7 9 11   CREATININE mg/dL 0.70 0.66 0.72   CALCIUM mg/dL 8.5* 9.3 8.7   BILIRUBIN mg/dL  --  0.4 0.2   ALK PHOS U/L  --  210* 127*   ALT (SGPT) U/L  --  20 10   AST (SGOT) U/L  --  32 17   GLUCOSE mg/dL 94 102* 147*         DIAGNOSTICS:  MRI thoracic spine with and without contrast    Results Review:   I reviewed the patient's new clinical results.  I personally viewed and interpreted the patient's thoracic MRI with findings consistent with discitis/osteomyelitis at T6-T7 with some meningeal enhancement and vertebral soft tissue inflammation but no drainable abscess.  No high-grade canal stenosis or foraminal stenosis noted.    Vital Signs   Temp:  [98.2 °F (36.8 °C)-98.9 °F (37.2 °C)] 98.2 °F (36.8 °C)  Heart Rate:  [] 85  Resp:  [16-20] 16  BP: ()/(49-84) 98/49    Physical Exam:  Physical Exam  Neurological Exam  Alert and  "oriented by 3  Sensation is intact to soft touch  in both upper and lower extremities  Motor strength is 5/5 in  lower extremities with no focal motor deficits   no upper motor neuron signs  Gait is nonantalgic but slow  Heel toe walking is intact  Focal tenderness mid thoracic spine    Assessment & Plan       Discitis      Problem List Items Addressed This Visit    None       COMORBID CONDITIONS:  chronic kidney disease including stage and Osteoporosis  Assessment: Patient is a 54-year-old female who presented to [facility] for worsening chest and back pain and bacteremia.  Her chest port was taken out yesterday and cultured to look for possible source of bacteremia.  She has radiographic evidence for probable discitis osteomyelitis at T6-T7 with some thickening of some soft tissues but no drainable abscess noted.  She does have evidence of subacute compression fracture along superior and inferior endplates of T7 with no retropulsion or  high-grade canal stenosis.  There is no neurosurgical intervention warranted.  Neurosurgery recommends continuing with antimicrobial therapy per ID.    PLAN:       -No neurosurgical intervention planned  -Continue TLSO brace when out of bed  - Further medical management per ID and hospitalist  -Okay to follow-up in the outpatient clinic after completing full antimicrobial therapy  -Please call for any questions or concerns    I discussed the patient's findings and my recommendations with patient and Dr. Harrison who agrees with plan of care.    During patient visit,. Appropriate PPE was worn during the entire visit.  Hand hygiene was completed before and after.     Gia Vital, SANJEEV  03/19/25  10:51 EDT    \"Dictated utilizing Dragon dictation\".      "

## 2025-03-20 LAB
ANION GAP SERPL CALCULATED.3IONS-SCNC: 11.2 MMOL/L (ref 5–15)
BASOPHILS # BLD AUTO: 0.03 10*3/MM3 (ref 0–0.2)
BASOPHILS NFR BLD AUTO: 0.6 % (ref 0–1.5)
BUN SERPL-MCNC: 6 MG/DL (ref 6–20)
BUN/CREAT SERPL: 7.7 (ref 7–25)
CALCIUM SPEC-SCNC: 8.5 MG/DL (ref 8.6–10.5)
CHLORIDE SERPL-SCNC: 105 MMOL/L (ref 98–107)
CO2 SERPL-SCNC: 20.8 MMOL/L (ref 22–29)
CREAT SERPL-MCNC: 0.78 MG/DL (ref 0.57–1)
DEPRECATED RDW RBC AUTO: 57.2 FL (ref 37–54)
EGFRCR SERPLBLD CKD-EPI 2021: 89.3 ML/MIN/1.73
EOSINOPHIL # BLD AUTO: 0.13 10*3/MM3 (ref 0–0.4)
EOSINOPHIL NFR BLD AUTO: 2.7 % (ref 0.3–6.2)
ERYTHROCYTE [DISTWIDTH] IN BLOOD BY AUTOMATED COUNT: 16.6 % (ref 12.3–15.4)
GLUCOSE SERPL-MCNC: 83 MG/DL (ref 65–99)
HCT VFR BLD AUTO: 25.2 % (ref 34–46.6)
HCT VFR BLD AUTO: 26.9 % (ref 34–46.6)
HGB BLD-MCNC: 7 G/DL (ref 12–15.9)
HGB BLD-MCNC: 8 G/DL (ref 12–15.9)
IMM GRANULOCYTES # BLD AUTO: 0.02 10*3/MM3 (ref 0–0.05)
IMM GRANULOCYTES NFR BLD AUTO: 0.4 % (ref 0–0.5)
LYMPHOCYTES # BLD AUTO: 1.96 10*3/MM3 (ref 0.7–3.1)
LYMPHOCYTES NFR BLD AUTO: 41.3 % (ref 19.6–45.3)
MAGNESIUM SERPL-MCNC: 2.2 MG/DL (ref 1.6–2.6)
MCH RBC QN AUTO: 26.1 PG (ref 26.6–33)
MCHC RBC AUTO-ENTMCNC: 27.8 G/DL (ref 31.5–35.7)
MCV RBC AUTO: 94 FL (ref 79–97)
MONOCYTES # BLD AUTO: 0.52 10*3/MM3 (ref 0.1–0.9)
MONOCYTES NFR BLD AUTO: 10.9 % (ref 5–12)
NEUTROPHILS NFR BLD AUTO: 2.09 10*3/MM3 (ref 1.7–7)
NEUTROPHILS NFR BLD AUTO: 44.1 % (ref 42.7–76)
NRBC BLD AUTO-RTO: 0 /100 WBC (ref 0–0.2)
PLATELET # BLD AUTO: 235 10*3/MM3 (ref 140–450)
PMV BLD AUTO: 11.3 FL (ref 6–12)
POTASSIUM SERPL-SCNC: 4.3 MMOL/L (ref 3.5–5.2)
RBC # BLD AUTO: 2.68 10*6/MM3 (ref 3.77–5.28)
SODIUM SERPL-SCNC: 137 MMOL/L (ref 136–145)
WBC NRBC COR # BLD AUTO: 4.75 10*3/MM3 (ref 3.4–10.8)

## 2025-03-20 PROCEDURE — 25010000002 PROCHLORPERAZINE 10 MG/2ML SOLUTION: Performed by: NURSE PRACTITIONER

## 2025-03-20 PROCEDURE — 85014 HEMATOCRIT: CPT

## 2025-03-20 PROCEDURE — 25010000002 HYDROMORPHONE 1 MG/ML SOLUTION: Performed by: INTERNAL MEDICINE

## 2025-03-20 PROCEDURE — 25010000002 CEFEPIME PER 500 MG: Performed by: NURSE PRACTITIONER

## 2025-03-20 PROCEDURE — 85025 COMPLETE CBC W/AUTO DIFF WBC: CPT | Performed by: NURSE PRACTITIONER

## 2025-03-20 PROCEDURE — 83735 ASSAY OF MAGNESIUM: CPT | Performed by: NURSE PRACTITIONER

## 2025-03-20 PROCEDURE — 85018 HEMOGLOBIN: CPT

## 2025-03-20 PROCEDURE — 80048 BASIC METABOLIC PNL TOTAL CA: CPT | Performed by: NURSE PRACTITIONER

## 2025-03-20 PROCEDURE — 25010000002 ENOXAPARIN PER 10 MG: Performed by: NURSE PRACTITIONER

## 2025-03-20 RX ORDER — PANTOPRAZOLE SODIUM 40 MG/1
40 TABLET, DELAYED RELEASE ORAL
Status: DISCONTINUED | OUTPATIENT
Start: 2025-03-21 | End: 2025-03-22 | Stop reason: HOSPADM

## 2025-03-20 RX ADMIN — LACOSAMIDE 200 MG: 100 TABLET, FILM COATED ORAL at 21:04

## 2025-03-20 RX ADMIN — Medication 10 ML: at 09:03

## 2025-03-20 RX ADMIN — LEVOTHYROXINE SODIUM 25 MCG: 0.03 TABLET ORAL at 06:16

## 2025-03-20 RX ADMIN — CEFEPIME 2000 MG: 2 INJECTION, POWDER, FOR SOLUTION INTRAVENOUS at 06:17

## 2025-03-20 RX ADMIN — BUSPIRONE HYDROCHLORIDE 30 MG: 15 TABLET ORAL at 21:06

## 2025-03-20 RX ADMIN — ATORVASTATIN CALCIUM 20 MG: 20 TABLET, FILM COATED ORAL at 21:06

## 2025-03-20 RX ADMIN — HYDROMORPHONE HYDROCHLORIDE 1 MG: 1 INJECTION, SOLUTION INTRAMUSCULAR; INTRAVENOUS; SUBCUTANEOUS at 18:27

## 2025-03-20 RX ADMIN — OXYCODONE HYDROCHLORIDE 10 MG: 5 TABLET ORAL at 06:27

## 2025-03-20 RX ADMIN — BUSPIRONE HYDROCHLORIDE 30 MG: 15 TABLET ORAL at 09:01

## 2025-03-20 RX ADMIN — ALPRAZOLAM 2 MG: 1 TABLET ORAL at 21:01

## 2025-03-20 RX ADMIN — Medication 10 ML: at 21:24

## 2025-03-20 RX ADMIN — HYDROMORPHONE HYDROCHLORIDE 1 MG: 1 INJECTION, SOLUTION INTRAMUSCULAR; INTRAVENOUS; SUBCUTANEOUS at 08:58

## 2025-03-20 RX ADMIN — PANTOPRAZOLE SODIUM 40 MG: 40 INJECTION, POWDER, FOR SOLUTION INTRAVENOUS at 06:20

## 2025-03-20 RX ADMIN — PROCHLORPERAZINE EDISYLATE 10 MG: 5 INJECTION, SOLUTION INTRAMUSCULAR; INTRAVENOUS at 15:37

## 2025-03-20 RX ADMIN — SENNOSIDES AND DOCUSATE SODIUM 2 TABLET: 50; 8.6 TABLET ORAL at 21:07

## 2025-03-20 RX ADMIN — HYDROXYZINE HYDROCHLORIDE 25 MG: 25 TABLET, FILM COATED ORAL at 21:07

## 2025-03-20 RX ADMIN — SERTRALINE HYDROCHLORIDE 100 MG: 100 TABLET, FILM COATED ORAL at 09:02

## 2025-03-20 RX ADMIN — OXYCODONE HYDROCHLORIDE 10 MG: 5 TABLET ORAL at 21:06

## 2025-03-20 RX ADMIN — PROCHLORPERAZINE EDISYLATE 10 MG: 5 INJECTION, SOLUTION INTRAMUSCULAR; INTRAVENOUS at 08:58

## 2025-03-20 RX ADMIN — ENOXAPARIN SODIUM 40 MG: 100 INJECTION SUBCUTANEOUS at 15:15

## 2025-03-20 RX ADMIN — TIZANIDINE 4 MG: 4 TABLET ORAL at 21:07

## 2025-03-20 RX ADMIN — LEVETIRACETAM 1500 MG: 500 TABLET, FILM COATED ORAL at 21:03

## 2025-03-20 RX ADMIN — SENNOSIDES AND DOCUSATE SODIUM 2 TABLET: 50; 8.6 TABLET ORAL at 06:16

## 2025-03-20 RX ADMIN — HYDROMORPHONE HYDROCHLORIDE 1 MG: 1 INJECTION, SOLUTION INTRAMUSCULAR; INTRAVENOUS; SUBCUTANEOUS at 15:37

## 2025-03-20 RX ADMIN — HYDROMORPHONE HYDROCHLORIDE 1 MG: 1 INJECTION, SOLUTION INTRAMUSCULAR; INTRAVENOUS; SUBCUTANEOUS at 12:41

## 2025-03-20 RX ADMIN — HYDROMORPHONE HYDROCHLORIDE 1 MG: 1 INJECTION, SOLUTION INTRAMUSCULAR; INTRAVENOUS; SUBCUTANEOUS at 21:24

## 2025-03-20 RX ADMIN — CEFEPIME 2000 MG: 2 INJECTION, POWDER, FOR SOLUTION INTRAVENOUS at 15:15

## 2025-03-20 RX ADMIN — Medication 10 ML: at 21:09

## 2025-03-20 RX ADMIN — LACOSAMIDE 200 MG: 100 TABLET, FILM COATED ORAL at 09:02

## 2025-03-20 RX ADMIN — AMITRIPTYLINE HYDROCHLORIDE 25 MG: 25 TABLET, FILM COATED ORAL at 21:03

## 2025-03-20 RX ADMIN — LEVETIRACETAM 1500 MG: 500 TABLET, FILM COATED ORAL at 09:02

## 2025-03-20 RX ADMIN — LAMOTRIGINE 150 MG: 100 TABLET ORAL at 21:04

## 2025-03-20 RX ADMIN — CEFEPIME 2000 MG: 2 INJECTION, POWDER, FOR SOLUTION INTRAVENOUS at 23:42

## 2025-03-20 RX ADMIN — LAMOTRIGINE 150 MG: 100 TABLET ORAL at 09:02

## 2025-03-20 RX ADMIN — CARIPRAZINE 1.5 MG: 1.5 CAPSULE, GELATIN COATED ORAL at 09:02

## 2025-03-20 RX ADMIN — QUETIAPINE FUMARATE 800 MG: 300 TABLET, EXTENDED RELEASE ORAL at 21:07

## 2025-03-20 NOTE — PLAN OF CARE
Problem: Adult Inpatient Plan of Care  Goal: Patient-Specific Goal (Individualized)  3/20/2025 1508 by Pat Thorne, RN  Outcome: Progressing  3/20/2025 1508 by Pat Thorne, RN  Outcome: Progressing   Goal Outcome Evaluation:

## 2025-03-20 NOTE — PROGRESS NOTES
[facility] MEDICINE SERVICE  DAILY PROGRESS NOTE    NAME: Milka Espinoza  : 1970  MRN: [id number]      LOS: 3 days     PROVIDER OF SERVICE: Eddie Conrad MD    Chief Complaint: Discitis    Subjective:     Interval History: Patient still has significant back pain but did not receive much pain medication overnight.  She is still using her TLSO brace when ambulating.        Review of Systems:   Review of Systems    Objective:     Vital Signs  Temp:  [97.7 °F (36.5 °C)-98 °F (36.7 °C)] 98 °F (36.7 °C)  Heart Rate:  [85-95] 85  Resp:  [14-20] 20  BP: (106-124)/(60-74) 124/74  Flow (L/min) (Oxygen Therapy):  [3] 3   Body mass index is 34.74 kg/m².    Physical Exam  Physical Exam  General Appearance:  Alert, cooperative, no distress, appears stated age  Head:  Normocephalic, without obvious abnormality, atraumatic  Eyes:  PERRL, conjunctiva/corneas clear, EOM's intact, fundi benign, both eyes  Ears:  Normal TM's and external ear canals, both ears  Nose: Nares normal, septum midline, mucosa normal, no drainage or sinus tenderness  Throat: Lips, mucosa, and tongue normal; teeth and gums normal  Neck: Supple, symmetrical, trachea midline, no adenopathy, thyroid: not enlarged, symmetric, no tenderness/mass/nodules, no carotid bruit or JVD  Lungs:   Clear to auscultation bilaterally, respirations unlabored, right chest wall tunneled catheter  Heart:  Regular rate and rhythm, S1, S2 normal, no murmur, rub or gallop  Abdomen:  Soft, non-tender, bowel sounds active all four quadrants,  no masses, no organomegaly  Extremities: Extremities normal, atraumatic, no cyanosis or edema  Pulses: 2+ and symmetric  Skin: Skin color, texture, turgor normal, no rashes or lesions  Neurologic: Normal         Diagnostic Data    Results from last 7 days   Lab Units 25  1006 25  0351 25  0537 25  0022   WBC 10*3/mm3  --  4.75   < > 10.60   HEMOGLOBIN g/dL 8.0* 7.0*   < > 8.9*   HEMATOCRIT % 26.9* 25.2*    < > 28.7*   PLATELETS 10*3/mm3  --  235   < > 397   GLUCOSE mg/dL  --  83   < > 102*   CREATININE mg/dL  --  0.78   < > 0.66   BUN mg/dL  --  6   < > 9   SODIUM mmol/L  --  137   < > 135*   POTASSIUM mmol/L  --  4.3   < > 3.9   AST (SGOT) U/L  --   --   --  32   ALT (SGPT) U/L  --   --   --  20   ALK PHOS U/L  --   --   --  210*   BILIRUBIN mg/dL  --   --   --  0.4   ANION GAP mmol/L  --  11.2   < > 11.9    < > = values in this interval not displayed.       CT Guided Aspiration Disc  Result Date: 3/19/2025  1. Successful FNA of T7-8 right paravertebral soft tissue infection. Electronically Signed: Harvey Holt MD  3/19/2025 7:43 AM EDT  Workstation ID: [id number]    IR Removal Tunnel CV Cath Without Port  Result Date: 3/19/2025  Successful removal of tunneled PICC. Electronically Signed: Harvey Holt MD  3/19/2025 7:41 AM EDT  Workstation ID: [id number]    MRI Thoracic Spine With Contrast  Result Date: 3/18/2025  Findings consistent with discitis/osteomyelitis at T6-T7. A small volume of intradiscal fluid with associated meningeal enhancement and surrounding perivertebral soft tissue inflammation. No drainable abscess. Electronically Signed: Cricket Paredes MD  3/18/2025 10:57 PM EDT  Workstation ID: [id number]        I reviewed the patient's new clinical results.    Assessment/Plan:     Active and Resolved Problems  Active Hospital Problems    Diagnosis  POA    **Discitis [M46.40]  Yes      Resolved Hospital Problems   No resolved problems to display.       Pseudomonas bacteremia with T6-T7 discitis with osteomyelitis  -Imaging on admission concerning for discitis and confirmed with MRI of the thoracic spine  -Patient also has positive blood cultures with Pseudomonas, with her tunneled central line likely being the source  -IR removed tunneled central line and biopsied T6 disc on 3/18  -Initiated on IV cefepime, vancomycin but vancomycin discontinued as this is most likely Pseudomonas infection and no evidence of  MRSA  -ID consulted  -Repeat blood cultures from 3/18 still growing Pseudomonas, repeat blood cultures from 3/19 pending  -Tunneled central line catheter tip culture also positive for GNR, likely Pseudomonas  -Patient will likely need 6 to 8 weeks of IV antibiotics on discharge; will wait for repeat blood cultures to be negative for growth prior to placing new PICC line    T5-T7 vertebral compression fractures  -Patient did have a seizure prior to her previous admission and could have injured herself at that time leading to compression fractures  -No evidence of retropulsion or any other cord impingement or disc herniation  -Placed TLSO brace  -PT/OT  -Pain control; adjust pain medications as necessary    Seizure disorder  -Continue Keppra, Vimpat    Bipolar disorder  -Continue medications    Hypothyroidism  -Continue Synthroid    GERD with gastroparesis  -Continue PPI  -Patient does receive immunotherapy via her tunneled central line but this was removed due to her bacteremia; patient will receive new PICC line prior to discharge which can be used for her medications related to her gastroparesis    VTE Prophylaxis:  Pharmacologic VTE prophylaxis orders are present.             Disposition Planning:     Barriers to Discharge: Bacteremia, discitis treatment  Anticipated Date of Discharge: 3/22  Place of Discharge: Home      Time: 45 minutes     Code Status and Medical Interventions: CPR (Attempt to Resuscitate); Full Support   Ordered at: 03/17/25 7483     Code Status (Patient has no pulse and is not breathing):    CPR (Attempt to Resuscitate)     Medical Interventions (Patient has pulse or is breathing):    Full Support     Level Of Support Discussed With:    Patient       Signature: Electronically signed by Eddie Conrad MD, 03/20/25, 11:27 EDT.  [facility] Hospitalist Team

## 2025-03-20 NOTE — PLAN OF CARE
Goal Outcome Evaluation:  Plan of Care Reviewed With: patient        Progress: improving     Pt slept comfortably through the night with no complaints of pain. IV Saline along with IV antibiotics running. Safety precautions in place and will continue to monitor.

## 2025-03-20 NOTE — PROGRESS NOTES
Infectious Diseases Progress Note      LOS: 3 days   Patient Care Team:  Alvina Arzate APRN as PCP - General (Nurse Practitioner)    Chief Complaint: Back pain    Subjective       The patient has been afebrile for the last 24 hours.  The patient is on room air, hemodynamically stable, and is tolerating antimicrobial therapy.  Patient still having back pain but no new neurological symptoms      Review of Systems:   Review of Systems   Constitutional: Negative.    HENT: Negative.     Eyes: Negative.    Respiratory: Negative.     Cardiovascular: Negative.    Gastrointestinal: Negative.    Endocrine: Negative.    Genitourinary: Negative.    Musculoskeletal:  Positive for back pain.   Skin: Negative.    Neurological: Negative.    Psychiatric/Behavioral: Negative.     All other systems reviewed and are negative.       Objective     Vital Signs  Temp:  [97.7 °F (36.5 °C)-98 °F (36.7 °C)] 98 °F (36.7 °C)  Heart Rate:  [85-95] 85  Resp:  [14-20] 20  BP: (106-124)/(60-74) 124/74    Physical Exam:  Physical Exam  Vitals and nursing note reviewed.   Constitutional:       General: She is not in acute distress.     Appearance: She is well-developed and normal weight. She is ill-appearing. She is not diaphoretic.   HENT:      Head: Normocephalic and atraumatic.   Eyes:      General: No scleral icterus.     Extraocular Movements: Extraocular movements intact.      Conjunctiva/sclera: Conjunctivae normal.      Pupils: Pupils are equal, round, and reactive to light.   Cardiovascular:      Rate and Rhythm: Normal rate and regular rhythm.      Heart sounds: Normal heart sounds, S1 normal and S2 normal. No murmur heard.  Pulmonary:      Effort: Pulmonary effort is normal. No respiratory distress.      Breath sounds: Normal breath sounds. No stridor. No wheezing or rales.   Chest:      Chest wall: No tenderness.   Abdominal:      General: Bowel sounds are normal. There is no distension.      Palpations: Abdomen is soft. There is no  mass.      Tenderness: There is no abdominal tenderness. There is no guarding.   Musculoskeletal:         General: No swelling, tenderness or deformity. Normal range of motion.      Cervical back: Neck supple.      Comments: Mid back tenderness   Skin:     General: Skin is warm and dry.      Coloration: Skin is not pale.      Findings: No bruising, erythema or rash.      Comments: Central line has been removed   Neurological:      Mental Status: She is alert and oriented to person, place, and time.          Results Review:    I have reviewed all clinical data, test, lab, and imaging results.     Radiology  No Radiology Exams Resulted Within Past 24 Hours      Cardiology    Laboratory    Results from last 7 days   Lab Units 03/20/25  1006 03/20/25  0351 03/19/25  0537 03/18/25  0022 03/14/25 1949   WBC 10*3/mm3  --  4.75 5.69 10.60 5.52   HEMOGLOBIN g/dL 8.0* 7.0* 7.5* 8.9* 8.8*   HEMATOCRIT % 26.9* 25.2* 25.0* 28.7* 28.3*   PLATELETS 10*3/mm3  --  235 323 397 369     Results from last 7 days   Lab Units 03/20/25  0351 03/19/25  0537 03/18/25  0022 03/14/25 1949   SODIUM mmol/L 137 138 135* 141   POTASSIUM mmol/L 4.3 3.8 3.9 3.3*   CHLORIDE mmol/L 105 105 100 106   CO2 mmol/L 20.8* 23.0 23.1 22.6   BUN mg/dL 6 7 9 11   CREATININE mg/dL 0.78 0.70 0.66 0.72   GLUCOSE mg/dL 83 94 102* 147*   ALBUMIN g/dL  --   --  3.7 3.6   BILIRUBIN mg/dL  --   --  0.4 0.2   ALK PHOS U/L  --   --  210* 127*   AST (SGOT) U/L  --   --  32 17   ALT (SGPT) U/L  --   --  20 10   CALCIUM mg/dL 8.5* 8.5* 9.3 8.7     Results from last 7 days   Lab Units 03/18/25  0022   CK TOTAL U/L 38             Microbiology   Microbiology Results (last 10 days)       Procedure Component Value - Date/Time    Blood Culture - Blood, Arm, Right [[id number]]  (Normal) Collected: 03/19/25 0843    Lab Status: Preliminary result Specimen: Blood from Arm, Right Updated: 03/20/25 1000     Blood Culture No growth at 24 hours    Narrative:      Less than seven (7)  mL's of blood was collected.  Insufficient quantity may yield false negative results.    Blood Culture - Blood, Arm, Left [[id number]]  (Normal) Collected: 03/19/25 0835    Lab Status: Preliminary result Specimen: Blood from Arm, Left Updated: 03/20/25 1000     Blood Culture No growth at 24 hours    Narrative:      Less than seven (7) mL's of blood was collected.  Insufficient quantity may yield false negative results.    Catheter Culture - Cath Tip, Hand, Left [[id number]]  (Abnormal) Collected: 03/18/25 1651    Lab Status: Preliminary result Specimen: Cath Tip from Hand, Left Updated: 03/20/25 0902     Catheter Culture >15 CFU/mL - Indicative of infection. Gram Negative Bacilli    Body Fluid Culture - Body Fluid, Spine, Thoracic [[id number]] Collected: 03/18/25 1651    Lab Status: Preliminary result Specimen: Body Fluid from Spine, Thoracic Updated: 03/20/25 0812     Body Fluid Culture No growth at 2 days     Gram Stain Rare (1+) WBCs seen      No organisms seen    MRSA Screen, PCR (Inpatient) - Swab, Nares [[id number]]  (Normal) Collected: 03/18/25 0417    Lab Status: Final result Specimen: Swab from Nares Updated: 03/18/25 0545     MRSA PCR No MRSA Detected    Narrative:      The negative predictive value of this diagnostic test is high and should only be used to consider de-escalating anti-MRSA therapy. A positive result may indicate colonization with MRSA and must be correlated clinically.    Blood Culture - Blood, Arm, Left [[id number]]  (Abnormal) Collected: 03/18/25 0106    Lab Status: Preliminary result Specimen: Blood from Arm, Left Updated: 03/20/25 0618     Blood Culture Gram Negative Bacilli     Isolated from Aerobic and Anaerobic Bottles     Gram Stain Aerobic Bottle Gram negative bacilli      Anaerobic Bottle Gram negative bacilli    Narrative:      Less than seven (7) mL's of blood was collected.  Insufficient quantity may yield false negative results.    Blood Culture ID, PCR - Blood, Arm, Left  [[id number]]  (Abnormal) Collected: 03/18/25 0106    Lab Status: Final result Specimen: Blood from Arm, Left Updated: 03/18/25 2145     BCID, PCR Pseudomonas aeruginosa. Identification by BCID2 PCR     BOTTLE TYPE Aerobic Bottle    Blood Culture - Blood, Arm, Right [[id number]]  (Normal) Collected: 03/18/25 0022    Lab Status: Preliminary result Specimen: Blood from Arm, Right Updated: 03/20/25 0115     Blood Culture No growth at 2 days    Blood Culture - Blood, Blood, Venous [[id number]]  (Abnormal) Collected: 03/14/25 2216    Lab Status: Final result Specimen: Blood, Venous Updated: 03/18/25 0614     Blood Culture Pseudomonas aeruginosa     Isolated from --     Gram Stain Aerobic Bottle Gram negative bacilli    Narrative:      Refer to previous blood culture collected on 03/14/2025 2213 for MICs    Less than seven (7) mL's of blood was collected.  Insufficient quantity may yield false negative results.    Blood Culture - Blood, Arm, Right [[id number]]  (Abnormal)  (Susceptibility) Collected: 03/14/25 2213    Lab Status: Final result Specimen: Blood from Arm, Right Updated: 03/18/25 0614     Blood Culture Pseudomonas aeruginosa     Isolated from Aerobic Bottle     Gram Stain Aerobic Bottle Gram negative bacilli    Narrative:      Less than seven (7) mL's of blood was collected.  Insufficient quantity may yield false negative results.    Susceptibility        Pseudomonas aeruginosa      VENITA      Cefepime Susceptible      Ceftazidime Susceptible      Ciprofloxacin Susceptible      Levofloxacin Susceptible      Piperacillin + Tazobactam Susceptible                       Susceptibility Comments       Pseudomonas aeruginosa    With the exception of urinary-sourced infections, aminoglycosides should not be used as monotherapy.               Blood Culture ID, PCR - Blood, Arm, Right [[id number]]  (Abnormal) Collected: 03/14/25 2213    Lab Status: Final result Specimen: Blood from Arm, Right Updated: 03/15/25 2342     BCID, PCR  Pseudomonas aeruginosa. Identification by BCID2 PCR     BOTTLE TYPE Aerobic Bottle    Respiratory Panel PCR w/COVID-19(SARS-CoV-2) [facility]LEY/[facility] In-House, NP Swab in UTM/VTM, 2 HR TAT - Swab, Nasopharynx [[id number]]  (Normal) Collected: 03/14/25 1949    Lab Status: Final result Specimen: Swab from Nasopharynx Updated: 03/14/25 2044     ADENOVIRUS, PCR Not Detected     Coronavirus 229E Not Detected     Coronavirus HKU1 Not Detected     Coronavirus NL63 Not Detected     Coronavirus OC43 Not Detected     COVID19 Not Detected     Human Metapneumovirus Not Detected     Human Rhinovirus/Enterovirus Not Detected     Influenza A PCR Not Detected     Influenza B PCR Not Detected     Parainfluenza Virus 1 Not Detected     Parainfluenza Virus 2 Not Detected     Parainfluenza Virus 3 Not Detected     Parainfluenza Virus 4 Not Detected     RSV, PCR Not Detected     Bordetella pertussis pcr Not Detected     Bordetella parapertussis PCR Not Detected     Chlamydophila pneumoniae PCR Not Detected     Mycoplasma pneumo by PCR Not Detected    Narrative:      In the setting of a positive respiratory panel with a viral infection PLUS a negative procalcitonin without other underlying concern for bacterial infection, consider observing off antibiotics or discontinuation of antibiotics and continue supportive care. If the respiratory panel is positive for atypical bacterial infection (Bordetella pertussis, Chlamydophila pneumoniae, or Mycoplasma pneumoniae), consider antibiotic de-escalation to target atypical bacterial infection.            Medication Review:       Schedule Meds  amitriptyline, 25 mg, Oral, Nightly  atorvastatin, 20 mg, Oral, Nightly  busPIRone, 30 mg, Oral, BID  Cariprazine HCl, 1.5 mg, Oral, Daily  cefepime, 2,000 mg, Intravenous, Q8H  enoxaparin sodium, 40 mg, Subcutaneous, Daily  lacosamide, 200 mg, Oral, Q12H  lamoTRIgine, 150 mg, Oral, BID  levETIRAcetam, 1,500 mg, Oral, BID  levothyroxine, 25 mcg, Oral, Q  AM  [START ON 3/21/2025] pantoprazole, 40 mg, Oral, QAM AC  QUEtiapine XR, 800 mg, Oral, Nightly  sertraline, 100 mg, Oral, Daily  sodium chloride, 10 mL, Intravenous, Q12H  sodium chloride, 10 mL, Intravenous, Q12H        Infusion Meds       PRN Meds    acetaminophen    ALPRAZolam    senna-docusate sodium **AND** polyethylene glycol **AND** bisacodyl **AND** bisacodyl    HYDROmorphone    hydrOXYzine    Magnesium Standard Dose Replacement - Follow Nurse / BPA Driven Protocol    nitroglycerin    oxyCODONE    Potassium Replacement - Follow Nurse / BPA Driven Protocol    prochlorperazine    sodium chloride    sodium chloride    sodium chloride    sodium chloride    sodium chloride    SUMAtriptan    tiZANidine        Assessment & Plan       Antimicrobial Therapy   1.  IV cefepime        2.          3.        4.        5.            Assessment     Pseudomonas aeruginosa bacteremia from ED visit on 3/14/2025.  Most likely source of infection is a  tunneled central line which patient had for long duration.  Repeat blood cultures from 3/18/2025 also growing Pseudomonas however line did not been removed yet..  Central tunneled line was removed by IR later in the afternoon of 3/18/2025 and tip cultures growing gram-negative bacilli.  Repeat blood cultures from 3/19/2025 are negative so far.  2D echo did not show any signs of obvious vegetation     Discitis/osteomyelitis of T6-T7 area.  Most likely seeded from the bacteremia versus another source.  Patient's been having mid back pain for approximately 3 weeks.  The patient has no significant neurological symptoms.  Status post IR aspiration of the thoracic spine on 3/18/2025-cultures pending.  Neurosurgery not planning on any surgical intervention     Gastroparesis-patient has poor venous access and has a tunneled central line for fluids and other medications as needed     Remote history of ankle fracture with hardware placement after patient fell during a seizure.  No active  signs of infection in the ankle     History of seizures     Plan     Continue IV cefepime 2 g every 8 hours  Waiting on repeat blood cultures  Repeat blood cultures taken on 3/19/2025  Waiting on T6-T7 aspiration cultures  Waiting on tip culture  Patient will need 6 weeks of IV antibiotics negative blood culture.  Would like to postpone placing a new PICC line until patient's blood cultures are clear for 48 hours  Continue supportive care  A.mChristina Garcia, APRN  03/20/25  14:10 EDT    Note is dictated utilizing voice recognition software/Dragon

## 2025-03-21 ENCOUNTER — TELEPHONE (OUTPATIENT)
Dept: FAMILY MEDICINE CLINIC | Facility: CLINIC | Age: 55
End: 2025-03-21
Payer: MEDICARE

## 2025-03-21 DIAGNOSIS — A49.8 PSEUDOMONAS AERUGINOSA INFECTION: Primary | ICD-10-CM

## 2025-03-21 LAB
ANION GAP SERPL CALCULATED.3IONS-SCNC: 12.2 MMOL/L (ref 5–15)
BACTERIA FLD CULT: NORMAL
BACTERIA SPEC AEROBE CULT: ABNORMAL
BASOPHILS # BLD AUTO: 0.02 10*3/MM3 (ref 0–0.2)
BASOPHILS NFR BLD AUTO: 0.4 % (ref 0–1.5)
BUN SERPL-MCNC: 4 MG/DL (ref 6–20)
BUN/CREAT SERPL: 6.2 (ref 7–25)
CALCIUM SPEC-SCNC: 8.9 MG/DL (ref 8.6–10.5)
CATHETER CULTURE: ABNORMAL
CHLORIDE SERPL-SCNC: 100 MMOL/L (ref 98–107)
CO2 SERPL-SCNC: 21.8 MMOL/L (ref 22–29)
CREAT SERPL-MCNC: 0.65 MG/DL (ref 0.57–1)
DEPRECATED RDW RBC AUTO: 56.7 FL (ref 37–54)
EGFRCR SERPLBLD CKD-EPI 2021: 103.5 ML/MIN/1.73
EOSINOPHIL # BLD AUTO: 0.14 10*3/MM3 (ref 0–0.4)
EOSINOPHIL NFR BLD AUTO: 2.6 % (ref 0.3–6.2)
ERYTHROCYTE [DISTWIDTH] IN BLOOD BY AUTOMATED COUNT: 16.4 % (ref 12.3–15.4)
GLUCOSE SERPL-MCNC: 83 MG/DL (ref 65–99)
GRAM STN SPEC: ABNORMAL
GRAM STN SPEC: ABNORMAL
GRAM STN SPEC: NORMAL
GRAM STN SPEC: NORMAL
HCT VFR BLD AUTO: 29.9 % (ref 34–46.6)
HGB BLD-MCNC: 8.3 G/DL (ref 12–15.9)
IMM GRANULOCYTES # BLD AUTO: 0.03 10*3/MM3 (ref 0–0.05)
IMM GRANULOCYTES NFR BLD AUTO: 0.6 % (ref 0–0.5)
ISOLATED FROM: ABNORMAL
LYMPHOCYTES # BLD AUTO: 2.36 10*3/MM3 (ref 0.7–3.1)
LYMPHOCYTES NFR BLD AUTO: 44.4 % (ref 19.6–45.3)
MCH RBC QN AUTO: 26.3 PG (ref 26.6–33)
MCHC RBC AUTO-ENTMCNC: 27.8 G/DL (ref 31.5–35.7)
MCV RBC AUTO: 94.9 FL (ref 79–97)
MONOCYTES # BLD AUTO: 0.44 10*3/MM3 (ref 0.1–0.9)
MONOCYTES NFR BLD AUTO: 8.3 % (ref 5–12)
NEUTROPHILS NFR BLD AUTO: 2.33 10*3/MM3 (ref 1.7–7)
NEUTROPHILS NFR BLD AUTO: 43.7 % (ref 42.7–76)
NRBC BLD AUTO-RTO: 0 /100 WBC (ref 0–0.2)
PLATELET # BLD AUTO: 301 10*3/MM3 (ref 140–450)
PMV BLD AUTO: 9.7 FL (ref 6–12)
POTASSIUM SERPL-SCNC: 4 MMOL/L (ref 3.5–5.2)
RBC # BLD AUTO: 3.15 10*6/MM3 (ref 3.77–5.28)
SODIUM SERPL-SCNC: 134 MMOL/L (ref 136–145)
WBC NRBC COR # BLD AUTO: 5.32 10*3/MM3 (ref 3.4–10.8)

## 2025-03-21 PROCEDURE — 05HY33Z INSERTION OF INFUSION DEVICE INTO UPPER VEIN, PERCUTANEOUS APPROACH: ICD-10-PCS | Performed by: INTERNAL MEDICINE

## 2025-03-21 PROCEDURE — 80048 BASIC METABOLIC PNL TOTAL CA: CPT | Performed by: NURSE PRACTITIONER

## 2025-03-21 PROCEDURE — 97116 GAIT TRAINING THERAPY: CPT

## 2025-03-21 PROCEDURE — 25010000002 CEFEPIME PER 500 MG: Performed by: INTERNAL MEDICINE

## 2025-03-21 PROCEDURE — 97530 THERAPEUTIC ACTIVITIES: CPT

## 2025-03-21 PROCEDURE — 25010000002 HYDROMORPHONE 1 MG/ML SOLUTION: Performed by: INTERNAL MEDICINE

## 2025-03-21 PROCEDURE — 25010000002 CEFEPIME PER 500 MG: Performed by: NURSE PRACTITIONER

## 2025-03-21 PROCEDURE — 25010000002 ENOXAPARIN PER 10 MG: Performed by: NURSE PRACTITIONER

## 2025-03-21 PROCEDURE — C1751 CATH, INF, PER/CENT/MIDLINE: HCPCS

## 2025-03-21 PROCEDURE — 25010000002 DIAZEPAM PER 5 MG: Performed by: INTERNAL MEDICINE

## 2025-03-21 PROCEDURE — 85025 COMPLETE CBC W/AUTO DIFF WBC: CPT | Performed by: NURSE PRACTITIONER

## 2025-03-21 RX ORDER — SODIUM CHLORIDE 0.9 % (FLUSH) 0.9 %
10 SYRINGE (ML) INJECTION EVERY 12 HOURS SCHEDULED
Status: DISCONTINUED | OUTPATIENT
Start: 2025-03-21 | End: 2025-03-22 | Stop reason: HOSPADM

## 2025-03-21 RX ORDER — SODIUM CHLORIDE 0.9 % (FLUSH) 0.9 %
20 SYRINGE (ML) INJECTION AS NEEDED
Status: DISCONTINUED | OUTPATIENT
Start: 2025-03-21 | End: 2025-03-22 | Stop reason: HOSPADM

## 2025-03-21 RX ORDER — DIAZEPAM 10 MG/2ML
5 INJECTION, SOLUTION INTRAMUSCULAR; INTRAVENOUS ONCE
Status: COMPLETED | OUTPATIENT
Start: 2025-03-21 | End: 2025-03-21

## 2025-03-21 RX ORDER — SODIUM CHLORIDE 0.9 % (FLUSH) 0.9 %
10 SYRINGE (ML) INJECTION AS NEEDED
Status: DISCONTINUED | OUTPATIENT
Start: 2025-03-21 | End: 2025-03-22 | Stop reason: HOSPADM

## 2025-03-21 RX ORDER — SODIUM CHLORIDE 9 MG/ML
40 INJECTION, SOLUTION INTRAVENOUS AS NEEDED
Status: DISCONTINUED | OUTPATIENT
Start: 2025-03-21 | End: 2025-03-22 | Stop reason: HOSPADM

## 2025-03-21 RX ADMIN — BUSPIRONE HYDROCHLORIDE 30 MG: 15 TABLET ORAL at 08:57

## 2025-03-21 RX ADMIN — AMITRIPTYLINE HYDROCHLORIDE 25 MG: 25 TABLET, FILM COATED ORAL at 21:39

## 2025-03-21 RX ADMIN — Medication 10 ML: at 08:58

## 2025-03-21 RX ADMIN — LEVETIRACETAM 1500 MG: 500 TABLET, FILM COATED ORAL at 21:38

## 2025-03-21 RX ADMIN — CEFEPIME 2000 MG: 2 INJECTION, POWDER, FOR SOLUTION INTRAVENOUS at 16:34

## 2025-03-21 RX ADMIN — QUETIAPINE FUMARATE 800 MG: 300 TABLET, EXTENDED RELEASE ORAL at 21:40

## 2025-03-21 RX ADMIN — PANTOPRAZOLE SODIUM 40 MG: 40 TABLET, DELAYED RELEASE ORAL at 08:56

## 2025-03-21 RX ADMIN — CARIPRAZINE 1.5 MG: 1.5 CAPSULE, GELATIN COATED ORAL at 08:57

## 2025-03-21 RX ADMIN — CEFEPIME 2000 MG: 2 INJECTION, POWDER, FOR SOLUTION INTRAVENOUS at 22:34

## 2025-03-21 RX ADMIN — HYDROMORPHONE HYDROCHLORIDE 1 MG: 1 INJECTION, SOLUTION INTRAMUSCULAR; INTRAVENOUS; SUBCUTANEOUS at 21:45

## 2025-03-21 RX ADMIN — CEFEPIME 2000 MG: 2 INJECTION, POWDER, FOR SOLUTION INTRAVENOUS at 06:15

## 2025-03-21 RX ADMIN — ATORVASTATIN CALCIUM 20 MG: 20 TABLET, FILM COATED ORAL at 21:39

## 2025-03-21 RX ADMIN — LAMOTRIGINE 150 MG: 100 TABLET ORAL at 08:56

## 2025-03-21 RX ADMIN — Medication 10 ML: at 21:49

## 2025-03-21 RX ADMIN — LACOSAMIDE 200 MG: 100 TABLET, FILM COATED ORAL at 21:39

## 2025-03-21 RX ADMIN — HYDROMORPHONE HYDROCHLORIDE 1 MG: 1 INJECTION, SOLUTION INTRAMUSCULAR; INTRAVENOUS; SUBCUTANEOUS at 08:54

## 2025-03-21 RX ADMIN — OXYCODONE HYDROCHLORIDE 10 MG: 5 TABLET ORAL at 11:33

## 2025-03-21 RX ADMIN — LACOSAMIDE 200 MG: 100 TABLET, FILM COATED ORAL at 08:56

## 2025-03-21 RX ADMIN — ENOXAPARIN SODIUM 40 MG: 100 INJECTION SUBCUTANEOUS at 16:35

## 2025-03-21 RX ADMIN — LEVOTHYROXINE SODIUM 25 MCG: 0.03 TABLET ORAL at 06:15

## 2025-03-21 RX ADMIN — HYDROMORPHONE HYDROCHLORIDE 1 MG: 1 INJECTION, SOLUTION INTRAMUSCULAR; INTRAVENOUS; SUBCUTANEOUS at 13:05

## 2025-03-21 RX ADMIN — LEVETIRACETAM 1500 MG: 500 TABLET, FILM COATED ORAL at 08:55

## 2025-03-21 RX ADMIN — LAMOTRIGINE 150 MG: 100 TABLET ORAL at 21:39

## 2025-03-21 RX ADMIN — SERTRALINE HYDROCHLORIDE 100 MG: 100 TABLET, FILM COATED ORAL at 08:57

## 2025-03-21 RX ADMIN — BUSPIRONE HYDROCHLORIDE 30 MG: 15 TABLET ORAL at 21:39

## 2025-03-21 RX ADMIN — DIAZEPAM 5 MG: 10 INJECTION, SOLUTION INTRAMUSCULAR; INTRAVENOUS at 14:00

## 2025-03-21 NOTE — PROGRESS NOTES
Infectious Diseases Progress Note      LOS: 4 days   Patient Care Team:  Alvina Arzate APRN as PCP - General (Nurse Practitioner)    Chief Complaint: Back pain    Subjective       The patient has been afebrile for the last 24 hours.  The patient is on room air, hemodynamically stable, and is tolerating antimicrobial therapy.  Patient still having back pain but no new neurological symptoms      Review of Systems:   Review of Systems   Constitutional: Negative.    HENT: Negative.     Eyes: Negative.    Respiratory: Negative.     Cardiovascular: Negative.    Gastrointestinal: Negative.    Endocrine: Negative.    Genitourinary: Negative.    Musculoskeletal:  Positive for back pain.   Skin: Negative.    Neurological: Negative.    Psychiatric/Behavioral: Negative.     All other systems reviewed and are negative.       Objective     Vital Signs  Temp:  [98 °F (36.7 °C)-98.6 °F (37 °C)] 98.5 °F (36.9 °C)  Heart Rate:  [] 99  Resp:  [9-18] 12  BP: (105-125)/(53-76) 113/66    Physical Exam:  Physical Exam  Vitals and nursing note reviewed.   Constitutional:       General: She is not in acute distress.     Appearance: She is well-developed and normal weight. She is ill-appearing. She is not diaphoretic.   HENT:      Head: Normocephalic and atraumatic.   Eyes:      General: No scleral icterus.     Extraocular Movements: Extraocular movements intact.      Conjunctiva/sclera: Conjunctivae normal.      Pupils: Pupils are equal, round, and reactive to light.   Cardiovascular:      Rate and Rhythm: Normal rate and regular rhythm.      Heart sounds: Normal heart sounds, S1 normal and S2 normal. No murmur heard.  Pulmonary:      Effort: Pulmonary effort is normal. No respiratory distress.      Breath sounds: Normal breath sounds. No stridor. No wheezing or rales.   Chest:      Chest wall: No tenderness.   Abdominal:      General: Bowel sounds are normal. There is no distension.      Palpations: Abdomen is soft. There is no  mass.      Tenderness: There is no abdominal tenderness. There is no guarding.   Musculoskeletal:         General: No swelling, tenderness or deformity. Normal range of motion.      Cervical back: Neck supple.      Comments: Mid back tenderness   Skin:     General: Skin is warm and dry.      Coloration: Skin is not pale.      Findings: No bruising, erythema or rash.      Comments: Central line has been removed   Neurological:      Mental Status: She is alert and oriented to person, place, and time.          Results Review:    I have reviewed all clinical data, test, lab, and imaging results.     Radiology  No Radiology Exams Resulted Within Past 24 Hours      Cardiology    Laboratory    Results from last 7 days   Lab Units 03/21/25  0245 03/20/25  1006 03/20/25  0351 03/19/25  0537 03/18/25  0022 03/14/25 1949   WBC 10*3/mm3 5.32  --  4.75 5.69 10.60 5.52   HEMOGLOBIN g/dL 8.3* 8.0* 7.0* 7.5* 8.9* 8.8*   HEMATOCRIT % 29.9* 26.9* 25.2* 25.0* 28.7* 28.3*   PLATELETS 10*3/mm3 301  --  235 323 397 369     Results from last 7 days   Lab Units 03/21/25  0245 03/20/25  0351 03/19/25  0537 03/18/25  0022 03/14/25 1949   SODIUM mmol/L 134* 137 138 135* 141   POTASSIUM mmol/L 4.0 4.3 3.8 3.9 3.3*   CHLORIDE mmol/L 100 105 105 100 106   CO2 mmol/L 21.8* 20.8* 23.0 23.1 22.6   BUN mg/dL 4* 6 7 9 11   CREATININE mg/dL 0.65 0.78 0.70 0.66 0.72   GLUCOSE mg/dL 83 83 94 102* 147*   ALBUMIN g/dL  --   --   --  3.7 3.6   BILIRUBIN mg/dL  --   --   --  0.4 0.2   ALK PHOS U/L  --   --   --  210* 127*   AST (SGOT) U/L  --   --   --  32 17   ALT (SGPT) U/L  --   --   --  20 10   CALCIUM mg/dL 8.9 8.5* 8.5* 9.3 8.7     Results from last 7 days   Lab Units 03/18/25  0022   CK TOTAL U/L 38             Microbiology   Microbiology Results (last 10 days)       Procedure Component Value - Date/Time    Blood Culture - Blood, Arm, Right [[id number]]  (Normal) Collected: 03/19/25 0843    Lab Status: Preliminary result Specimen: Blood from Arm,  Right Updated: 03/21/25 1000     Blood Culture No growth at 2 days    Narrative:      Less than seven (7) mL's of blood was collected.  Insufficient quantity may yield false negative results.    Blood Culture - Blood, Arm, Left [[id number]]  (Normal) Collected: 03/19/25 0835    Lab Status: Preliminary result Specimen: Blood from Arm, Left Updated: 03/21/25 1000     Blood Culture No growth at 2 days    Narrative:      Less than seven (7) mL's of blood was collected.  Insufficient quantity may yield false negative results.    Catheter Culture - Cath Tip, Hand, Left [[id number]]  (Abnormal)  (Susceptibility) Collected: 03/18/25 1651    Lab Status: Final result Specimen: Cath Tip from Hand, Left Updated: 03/21/25 0804     Catheter Culture >15 CFU/mL - Indicative of infection. Pseudomonas aeruginosa    Susceptibility        Pseudomonas aeruginosa      VENITA      Cefepime Susceptible      Ceftazidime Susceptible      Ciprofloxacin Susceptible      Levofloxacin Susceptible      Piperacillin + Tazobactam Susceptible                       Susceptibility Comments       Pseudomonas aeruginosa    With the exception of urinary-sourced infections, aminoglycosides should not be used as monotherapy.               Body Fluid Culture - Body Fluid, Spine, Thoracic [[id number]] Collected: 03/18/25 1651    Lab Status: Final result Specimen: Body Fluid from Spine, Thoracic Updated: 03/21/25 0948     Body Fluid Culture No growth at 3 days     Gram Stain Rare (1+) WBCs seen      No organisms seen    MRSA Screen, PCR (Inpatient) - Swab, Nares [[id number]]  (Normal) Collected: 03/18/25 0417    Lab Status: Final result Specimen: Swab from Nares Updated: 03/18/25 0545     MRSA PCR No MRSA Detected    Narrative:      The negative predictive value of this diagnostic test is high and should only be used to consider de-escalating anti-MRSA therapy. A positive result may indicate colonization with MRSA and must be correlated clinically.    Blood Culture  - Blood, Arm, Left [[id number]]  (Abnormal)  (Susceptibility) Collected: 03/18/25 0106    Lab Status: Final result Specimen: Blood from Arm, Left Updated: 03/21/25 0612     Blood Culture Pseudomonas aeruginosa     Isolated from Aerobic and Anaerobic Bottles     Gram Stain Aerobic Bottle Gram negative bacilli      Anaerobic Bottle Gram negative bacilli    Narrative:      Less than seven (7) mL's of blood was collected.  Insufficient quantity may yield false negative results.    Susceptibility        Pseudomonas aeruginosa      VENITA      Cefepime Susceptible      Ceftazidime Susceptible      Ciprofloxacin Susceptible      Levofloxacin Susceptible      Piperacillin + Tazobactam Susceptible                       Susceptibility Comments       Pseudomonas aeruginosa    With the exception of urinary-sourced infections, aminoglycosides should not be used as monotherapy.               Blood Culture ID, PCR - Blood, Arm, Left [[id number]]  (Abnormal) Collected: 03/18/25 0106    Lab Status: Final result Specimen: Blood from Arm, Left Updated: 03/18/25 2145     BCID, PCR Pseudomonas aeruginosa. Identification by BCID2 PCR     BOTTLE TYPE Aerobic Bottle    Blood Culture - Blood, Arm, Right [[id number]]  (Normal) Collected: 03/18/25 0022    Lab Status: Preliminary result Specimen: Blood from Arm, Right Updated: 03/21/25 0115     Blood Culture No growth at 3 days    Blood Culture - Blood, Blood, Venous [[id number]]  (Abnormal) Collected: 03/14/25 2216    Lab Status: Final result Specimen: Blood, Venous Updated: 03/18/25 0614     Blood Culture Pseudomonas aeruginosa     Isolated from --     Gram Stain Aerobic Bottle Gram negative bacilli    Narrative:      Refer to previous blood culture collected on 03/14/2025 2213 for MICs    Less than seven (7) mL's of blood was collected.  Insufficient quantity may yield false negative results.    Blood Culture - Blood, Arm, Right [[id number]]  (Abnormal)  (Susceptibility) Collected: 03/14/25 2213     Lab Status: Final result Specimen: Blood from Arm, Right Updated: 03/18/25 0614     Blood Culture Pseudomonas aeruginosa     Isolated from Aerobic Bottle     Gram Stain Aerobic Bottle Gram negative bacilli    Narrative:      Less than seven (7) mL's of blood was collected.  Insufficient quantity may yield false negative results.    Susceptibility        Pseudomonas aeruginosa      VENITA      Cefepime Susceptible      Ceftazidime Susceptible      Ciprofloxacin Susceptible      Levofloxacin Susceptible      Piperacillin + Tazobactam Susceptible                       Susceptibility Comments       Pseudomonas aeruginosa    With the exception of urinary-sourced infections, aminoglycosides should not be used as monotherapy.               Blood Culture ID, PCR - Blood, Arm, Right [[id number]]  (Abnormal) Collected: 03/14/25 2213    Lab Status: Final result Specimen: Blood from Arm, Right Updated: 03/15/25 2342     BCID, PCR Pseudomonas aeruginosa. Identification by BCID2 PCR     BOTTLE TYPE Aerobic Bottle    Respiratory Panel PCR w/COVID-19(SARS-CoV-2) ALBERTO/EMMA/ASHLEY/[facility]/S[facility]AH In-House, NP Swab in UTM/VTM, 2 HR TAT - Swab, Nasopharynx [[id number]]  (Normal) Collected: 03/14/25 1949    Lab Status: Final result Specimen: Swab from Nasopharynx Updated: 03/14/25 2044     ADENOVIRUS, PCR Not Detected     Coronavirus 229E Not Detected     Coronavirus HKU1 Not Detected     Coronavirus NL63 Not Detected     Coronavirus OC43 Not Detected     COVID19 Not Detected     Human Metapneumovirus Not Detected     Human Rhinovirus/Enterovirus Not Detected     Influenza A PCR Not Detected     Influenza B PCR Not Detected     Parainfluenza Virus 1 Not Detected     Parainfluenza Virus 2 Not Detected     Parainfluenza Virus 3 Not Detected     Parainfluenza Virus 4 Not Detected     RSV, PCR Not Detected     Bordetella pertussis pcr Not Detected     Bordetella parapertussis PCR Not Detected     Chlamydophila pneumoniae PCR Not Detected     Mycoplasma  pneumo by PCR Not Detected    Narrative:      In the setting of a positive respiratory panel with a viral infection PLUS a negative procalcitonin without other underlying concern for bacterial infection, consider observing off antibiotics or discontinuation of antibiotics and continue supportive care. If the respiratory panel is positive for atypical bacterial infection (Bordetella pertussis, Chlamydophila pneumoniae, or Mycoplasma pneumoniae), consider antibiotic de-escalation to target atypical bacterial infection.            Medication Review:       Schedule Meds  amitriptyline, 25 mg, Oral, Nightly  atorvastatin, 20 mg, Oral, Nightly  busPIRone, 30 mg, Oral, BID  Cariprazine HCl, 1.5 mg, Oral, Daily  cefepime, 2,000 mg, Intravenous, Q8H  enoxaparin sodium, 40 mg, Subcutaneous, Daily  lacosamide, 200 mg, Oral, Q12H  lamoTRIgine, 150 mg, Oral, BID  levETIRAcetam, 1,500 mg, Oral, BID  levothyroxine, 25 mcg, Oral, Q AM  pantoprazole, 40 mg, Oral, QAM AC  QUEtiapine XR, 800 mg, Oral, Nightly  sertraline, 100 mg, Oral, Daily  sodium chloride, 10 mL, Intravenous, Q12H  sodium chloride, 10 mL, Intravenous, Q12H        Infusion Meds       PRN Meds    acetaminophen    ALPRAZolam    senna-docusate sodium **AND** polyethylene glycol **AND** bisacodyl **AND** bisacodyl    HYDROmorphone    hydrOXYzine    Magnesium Standard Dose Replacement - Follow Nurse / BPA Driven Protocol    nitroglycerin    oxyCODONE    Potassium Replacement - Follow Nurse / BPA Driven Protocol    prochlorperazine    sodium chloride    sodium chloride    sodium chloride    sodium chloride    sodium chloride    SUMAtriptan    tiZANidine        Assessment & Plan       Antimicrobial Therapy   1.  IV cefepime        2.          3.        4.        5.            Assessment     Pseudomonas aeruginosa bacteremia from ED visit on 3/14/2025.  Most likely source of infection is a  tunneled central line which patient had for long duration.  Repeat blood cultures  from 3/18/2025 also growing Pseudomonas however line did not been removed yet..  Central tunneled line was removed by IR later in the afternoon of 3/18/2025 and tip cultures growing Pseudomonas.  Repeat blood cultures from 3/19/2025 are negative so far.  2D echo did not show any signs of obvious vegetation     Discitis/osteomyelitis of T6-T7 area.  Most likely seeded from the bacteremia versus another source.  Patient's been having mid back pain for approximately 3 weeks.  The patient has no significant neurological symptoms.  Status post IR aspiration of the thoracic spine on 3/18/2025-cultures are negative neurosurgery not planning on any surgical intervention     Gastroparesis-patient has poor venous access and has a tunneled central line for fluids and other medications as needed     Remote history of ankle fracture with hardware placement after patient fell during a seizure.  No active signs of infection in the ankle     History of seizures     Plan     Continue IV cefepime 2 g every 8 hours-for 6 weeks from negative blood culture-last day on 4/29/2025  Patient will need a PICC line before discharge-please remove when IV antibiotics are completed.  Standard weekly PICC line care  Weekly labs CBC, creatinine and sed rate x 6 weeks  Recommend repeat MRI of the thoracic spine once IV antibiotics are completed to make sure that infection has not progressed  Continue supportive care  Okay to discharge from Infectious Disease standpoint  Ambulatory care orders have been placed for labs and line care  Case discussed with hospitalist  Not much more to add from infectious disease standpoint-we will sign off at this time-please call with any questions.    Please fax all post discharge lab results, imaging studies and correspondence to this fax number [phone]  For any question or concern please contact our service number [phone]    SANJEEV Daugherty  03/21/25  14:04 EDT    Note is dictated utilizing  voice recognition software/Dragon

## 2025-03-21 NOTE — TELEPHONE ENCOUNTER
Caller: STEFANO DIALLO    Relationship:     Best call back number: [phone]    What was the call regarding: STATED THAT THEY WANTED TO MAKE SURE THE PATIENT'S PROVIDER IS AWARE OF THE INPATIENT STAY FOR THE PATIENT FOR THE DATES OF 3/4-3/5 AND 3/17-3/19.

## 2025-03-21 NOTE — PLAN OF CARE
Goal Outcome Evaluation:  Plan of Care Reviewed With: patient        Progress: improving        Pt slept comfortably throughout the night with no complaints of pain. On IV Cefepime and also on 3 liters of oxygen at night. Safety precautions are in place and will continue to monitor.

## 2025-03-21 NOTE — THERAPY TREATMENT NOTE
"Subjective: Pt agreeable to therapeutic plan of care. Pt reports her son will be able to assist her at home with the TLSO and with walking as needed.     Objective:     Precautions - seizures and TLSO    TLSO was donned on pt with pt at edge of bed with mod assist  Bed mobility - Modified-Independent  Transfers - SBA  Ambulation - 100 feet Min-A and with rolling walker    Vitals: WNL    Pain: 10 VAS Location: back  Intervention for pain: Repositioned and RN notified    Education: Provided education on the importance of mobility in the acute care setting, Verbal/Tactile Cues, Transfer Training, and Gait Training    Assessment: Milka Espinoza presents with functional mobility impairments which indicate the need for skilled intervention. Pt doesn't verbalize much but she is requesting pain meds - RN notified. Pt moans with movement. Pt ambulated with forward flexed posture and decreased heel strike BLE. Pt reassured pt that her son will be able to help her at home. PT increased frequency to 5 x/week. PT educated pt that she needs to be out of bed more as tolerated during the day and pt verbalized understanding. Pt will need ongoing abx for discitis per chart review. Tolerating session today without incident. Will continue to follow and progress as tolerated.     Plan/Recommendations:   If medically appropriate, Low Intensity Therapy recommended post-acute care - This is recommended as therapy feels this patient would require 2-3 visits per week. OP or HH would be the best option depending on patient's home bound status. Consider, if the patient has other  \"skilled\" needs such as wounds, IV antibiotics, etc. Combined with \"low intensity\" could also equate to a SNF. If patient is medically complex, consider LTAC. Pt requires rolling walker at discharge.     Pt desires Home with Home Health and Home with family assist at discharge. Pt cooperative; agreeable to therapeutic recommendations and plan of care. "         Basic Mobility 6-click:  Rollin = Total, A lot = 2, A little = 3; 4 = None  Supine>Sit:   1 = Total, A lot = 2, A little = 3; 4 = None   Sit>Stand with arms:  1 = Total, A lot = 2, A little = 3; 4 = None  Bed>Chair:   1 = Total, A lot = 2, A little = 3; 4 = None  Ambulate in room:  1 = Total, A lot = 2, A little = 3; 4 = None  3-5 Steps with railin = Total, A lot = 2, A little = 3; 4 = None  Score: 20    Modified Leupp: N/A = No pre-op stroke/TIA    Post-Tx Position: Up in Chair, Alarms activated, and Call light and personal items within reach  PPE: gloves    Therapy Charges for Today       Code Description Service Date Service Provider Modifiers Qty    06677265486 HC GAIT TRAINING EA 15 MIN 3/21/2025 Malinda Renee, PT GP 1    [id number]  PT THERAPEUTIC ACT EA 15 MIN 3/21/2025 Malinda Renee, PT GP 1           PT Charges       Row Name 25 1131             Time Calculation    Start Time 1030  -BR      Stop Time 1048  -BR      Time Calculation (min) 18 min  -BR      PT Received On 25  -BR      PT - Next Appointment 25  -BR         Time Calculation- PT    Total Timed Code Minutes- PT 18 minute(s)  -BR                User Key  (r) = Recorded By, (t) = Taken By, (c) = Cosigned By      Initials Name Provider Type    BR Malinda Renee, PT Physical Therapist

## 2025-03-21 NOTE — PROGRESS NOTES
[facility] MEDICINE SERVICE  DAILY PROGRESS NOTE    NAME: Milka Espinoza  : 1970  MRN: [id number]      LOS: 4 days     PROVIDER OF SERVICE: Eddie Conrad MD    Chief Complaint: Discitis    Subjective:     Interval History: Patient states her back pain is tolerable although she did require several doses of Dilaudid over the last 24 hours but was not using any of her oral pain medications.  No fevers or chills over the last 48 hours.        Review of Systems:   Review of Systems    Objective:     Vital Signs  Temp:  [98 °F (36.7 °C)-98.6 °F (37 °C)] 98.5 °F (36.9 °C)  Heart Rate:  [] 99  Resp:  [9-18] 12  BP: (105-125)/(53-76) 113/66  Flow (L/min) (Oxygen Therapy):  [1-3] 3   Body mass index is 34.74 kg/m².    Physical Exam  Physical Exam  General Appearance:  Alert, cooperative, no distress, appears stated age  Head:  Normocephalic, without obvious abnormality, atraumatic  Eyes:  PERRL, conjunctiva/corneas clear, EOM's intact, fundi benign, both eyes  Ears:  Normal TM's and external ear canals, both ears  Nose: Nares normal, septum midline, mucosa normal, no drainage or sinus tenderness  Throat: Lips, mucosa, and tongue normal; teeth and gums normal  Neck: Supple, symmetrical, trachea midline, no adenopathy, thyroid: not enlarged, symmetric, no tenderness/mass/nodules, no carotid bruit or JVD  Lungs:   Clear to auscultation bilaterally, respirations unlabored, right chest wall tunneled catheter  Heart:  Regular rate and rhythm, S1, S2 normal, no murmur, rub or gallop  Abdomen:  Soft, non-tender, bowel sounds active all four quadrants,  no masses, no organomegaly  Extremities: Extremities normal, atraumatic, no cyanosis or edema  Pulses: 2+ and symmetric  Skin: Skin color, texture, turgor normal, no rashes or lesions  Neurologic: Normal         Diagnostic Data    Results from last 7 days   Lab Units 25  0245 25  0537 25  0022   WBC 10*3/mm3 5.32   < > 10.60   HEMOGLOBIN  g/dL 8.3*   < > 8.9*   HEMATOCRIT % 29.9*   < > 28.7*   PLATELETS 10*3/mm3 301   < > 397   GLUCOSE mg/dL 83   < > 102*   CREATININE mg/dL 0.65   < > 0.66   BUN mg/dL 4*   < > 9   SODIUM mmol/L 134*   < > 135*   POTASSIUM mmol/L 4.0   < > 3.9   AST (SGOT) U/L  --   --  32   ALT (SGPT) U/L  --   --  20   ALK PHOS U/L  --   --  210*   BILIRUBIN mg/dL  --   --  0.4   ANION GAP mmol/L 12.2   < > 11.9    < > = values in this interval not displayed.       No radiology results for the last day        I reviewed the patient's new clinical results.    Assessment/Plan:     Active and Resolved Problems  Active Hospital Problems    Diagnosis  POA    **Discitis [M46.40]  Yes      Resolved Hospital Problems   No resolved problems to display.       Pseudomonas bacteremia with T6-T7 discitis with osteomyelitis  -Imaging on admission concerning for discitis and confirmed with MRI of the thoracic spine  -Patient also has positive blood cultures with Pseudomonas, with her tunneled central line likely being the source  -IR removed tunneled central line and biopsied T6 disc on 3/18  -Initiated on IV cefepime, vancomycin but vancomycin discontinued as this is most likely Pseudomonas infection and no evidence of MRSA  -ID consulted  -Repeat blood cultures from 3/18 still growing Pseudomonas, repeat blood cultures from 3/19 with no growth at 48-hours  -Tunneled central line catheter tip culture also positive for Pseudomonas, confirming source of infection  -Patient will likely need 6 to 8 weeks of IV antibiotics on discharge; will place PICC line given that patient's blood cultures from 3/19 now negative at 48 hours    T5-T7 vertebral compression fractures  -Patient did have a seizure prior to her previous admission and could have injured herself at that time leading to compression fractures  -No evidence of retropulsion or any other cord impingement or disc herniation  -Placed TLSO brace  -PT/OT  -Pain control; adjust pain medications as  necessary    Seizure disorder  -Continue Keppra, Vimpat    Bipolar disorder  -Continue medications    Hypothyroidism  -Continue Synthroid    GERD with gastroparesis  -Continue PPI  -Patient does receive immunotherapy via her tunneled central line but this was removed due to her bacteremia; patient will receive new PICC line prior to discharge which can be used for her medications related to her gastroparesis    VTE Prophylaxis:  Pharmacologic VTE prophylaxis orders are present.             Disposition Planning:     Barriers to Discharge: Bacteremia, discitis treatment  Anticipated Date of Discharge: 3/22  Place of Discharge: Home      Time: 45 minutes     Code Status and Medical Interventions: CPR (Attempt to Resuscitate); Full Support   Ordered at: 03/17/25 2213     Code Status (Patient has no pulse and is not breathing):    CPR (Attempt to Resuscitate)     Medical Interventions (Patient has pulse or is breathing):    Full Support     Level Of Support Discussed With:    Patient       Signature: Electronically signed by Eddie Conrad MD, 03/21/25, 11:25 EDT.  [facility] Hospitalist Team

## 2025-03-21 NOTE — PLAN OF CARE
"Goal Outcome Evaluation:  Plan of Care Reviewed With: patient   Assessment: Milka Espinoza presents with functional mobility impairments which indicate the need for skilled intervention. Pt doesn't verbalize much but she is requesting pain meds - RN notified. Pt moans with movement. Pt ambulated with forward flexed posture and decreased heel strike BLE. Pt reassured pt that her son will be able to help her at home. PT increased frequency to 5 x/week. PT educated pt that she needs to be out of bed more as tolerated during the day and pt verbalized understanding. Pt will need ongoing abx for discitis per chart review. Tolerating session today without incident. Will continue to follow and progress as tolerated.     Plan/Recommendations:   If medically appropriate, Low Intensity Therapy recommended post-acute care - This is recommended as therapy feels this patient would require 2-3 visits per week. OP or HH would be the best option depending on patient's home bound status. Consider, if the patient has other  \"skilled\" needs such as wounds, IV antibiotics, etc. Combined with \"low intensity\" could also equate to a SNF. If patient is medically complex, consider LTAC. Pt requires rolling walker at discharge.     Pt desires Home with Home Health and Home with family assist at discharge. Pt cooperative; agreeable to therapeutic recommendations and plan of care.        Anticipated Discharge Disposition (PT): home with assist, home with home health                        "

## 2025-03-21 NOTE — CONSULTS
PICC Line Insertion Procedure Note    Procedure: Insertion of #4 FR/18G PICC    Indications:  Home IV therapy    Active Time Out:  Correct patient: Yes  Correct procedure: Yes  Correct site: Yes  Verified with: primary rn    Procedure Details:  Informed consent was obtained for the procedure.  Risk include, but are not limited to infection, air embolism, catheter tip moving, catheter blockage and phlebitis.     Maximum sterile technique was used including antiseptics, cap, gloves, gown, hand hygiene, mask, and sheet.    Ultrasound Guidance: Yes    #4 FR/18G PICC inserted to the R Basilic vein per hospital protocol by ovidio barbosa RN.   Non-pulsatile blood return: yes    Lot #: [id number]  Expiration date: 11/30/2025    Complications:  none    Findings:  Catheter inserted to 36 cm, with 0 cm exposed.   Mid upper arm circumference is 32 cm.   Catheter was flushed with 20 cc NS and sterile dressing applied.  Patient tolerated procedure well.  PICC tip verified by:       [x] Sapiens 3cg       [] Chest X-ray    Recommendations:  Verbal and/or written Care/Maintenance instructions provided to patient.   Primary nurse notified.    Ovidio Barbosa RN  03/21/25  15:28 EDT

## 2025-03-21 NOTE — PLAN OF CARE
Goal Outcome Evaluation:      Pt had PICC placed in Right upper arm for DC antibiotics.   ID signed off- IV antibiotics to go until 4/29/25  Rolling walker needed at DC    Pt complaints of severe back pain, IV pain meds given, PRN oxygen currently on RA.  Pt improving

## 2025-03-22 ENCOUNTER — READMISSION MANAGEMENT (OUTPATIENT)
Dept: CALL CENTER | Facility: HOSPITAL | Age: 55
End: 2025-03-22
Payer: MEDICARE

## 2025-03-22 VITALS
TEMPERATURE: 97.6 F | OXYGEN SATURATION: 98 % | WEIGHT: 202.38 LBS | RESPIRATION RATE: 18 BRPM | HEART RATE: 93 BPM | HEIGHT: 64 IN | SYSTOLIC BLOOD PRESSURE: 140 MMHG | DIASTOLIC BLOOD PRESSURE: 78 MMHG | BODY MASS INDEX: 34.55 KG/M2

## 2025-03-22 PROCEDURE — 25010000002 CEFEPIME PER 500 MG: Performed by: INTERNAL MEDICINE

## 2025-03-22 PROCEDURE — 25010000002 HYDROMORPHONE 1 MG/ML SOLUTION: Performed by: INTERNAL MEDICINE

## 2025-03-22 RX ORDER — OXYCODONE HYDROCHLORIDE 10 MG/1
10 TABLET ORAL EVERY 4 HOURS PRN
Qty: 60 TABLET | Refills: 0 | Status: SHIPPED | OUTPATIENT
Start: 2025-03-22

## 2025-03-22 RX ORDER — PANTOPRAZOLE SODIUM 40 MG/1
40 TABLET, DELAYED RELEASE ORAL DAILY
Qty: 90 TABLET | Refills: 3 | Status: SHIPPED | OUTPATIENT
Start: 2025-03-22

## 2025-03-22 RX ADMIN — LAMOTRIGINE 150 MG: 100 TABLET ORAL at 08:49

## 2025-03-22 RX ADMIN — LEVOTHYROXINE SODIUM 25 MCG: 0.03 TABLET ORAL at 06:07

## 2025-03-22 RX ADMIN — CARIPRAZINE 1.5 MG: 1.5 CAPSULE, GELATIN COATED ORAL at 08:49

## 2025-03-22 RX ADMIN — Medication 10 ML: at 08:51

## 2025-03-22 RX ADMIN — SERTRALINE HYDROCHLORIDE 100 MG: 100 TABLET, FILM COATED ORAL at 08:49

## 2025-03-22 RX ADMIN — CEFEPIME 2000 MG: 2 INJECTION, POWDER, FOR SOLUTION INTRAVENOUS at 06:07

## 2025-03-22 RX ADMIN — HYDROMORPHONE HYDROCHLORIDE 1 MG: 1 INJECTION, SOLUTION INTRAMUSCULAR; INTRAVENOUS; SUBCUTANEOUS at 08:49

## 2025-03-22 RX ADMIN — OXYCODONE HYDROCHLORIDE 10 MG: 5 TABLET ORAL at 06:08

## 2025-03-22 RX ADMIN — LEVETIRACETAM 1500 MG: 500 TABLET, FILM COATED ORAL at 08:50

## 2025-03-22 RX ADMIN — OXYCODONE HYDROCHLORIDE 10 MG: 5 TABLET ORAL at 00:55

## 2025-03-22 RX ADMIN — CEFEPIME 2000 MG: 2 INJECTION, POWDER, FOR SOLUTION INTRAVENOUS at 14:21

## 2025-03-22 RX ADMIN — PANTOPRAZOLE SODIUM 40 MG: 40 TABLET, DELAYED RELEASE ORAL at 08:50

## 2025-03-22 RX ADMIN — Medication 10 ML: at 08:52

## 2025-03-22 RX ADMIN — BUSPIRONE HYDROCHLORIDE 30 MG: 15 TABLET ORAL at 08:49

## 2025-03-22 RX ADMIN — HYDROMORPHONE HYDROCHLORIDE 1 MG: 1 INJECTION, SOLUTION INTRAMUSCULAR; INTRAVENOUS; SUBCUTANEOUS at 03:31

## 2025-03-22 RX ADMIN — LACOSAMIDE 200 MG: 100 TABLET, FILM COATED ORAL at 08:51

## 2025-03-22 NOTE — DISCHARGE PLACEMENT REQUEST
"Milka Robledo (54 y.o. Female)       Date of Birth   1970    Social Security Number       Address   [address] [facility] [place] [address]    Home Phone   [phone]    MRN   [id number]       Yarsani   None    Marital Status                               Admission Date   3/17/2025    Admission Type   Urgent    Admitting Provider   Manuel Bright MD    Attending Provider       Department, Room/Bed   [facility] 2D, 256/1       Discharge Date   3/22/2025    Discharge Disposition   [facility]    Discharge Destination                                 Attending Provider: (none)   Allergies: Ketorolac, Sulfa Antibiotics, Tramadol Hcl, Adhesive Tape    Isolation: None   Infection: None   Code Status: Prior    Ht: 162.6 cm (64\")   Wt: 91.8 kg (202 lb 6.1 oz)    Admission Cmt: None   Principal Problem: Discitis [M46.40]                   Active Insurance as of 3/17/2025       Primary Coverage       Payor Plan Insurance Group Employer/Plan Group    ANTHEM MEDICARE REPLACEMENT ANTHEM MEDICARE ADVANTAGE SNP [id number]       Payor Plan Address Payor Plan Phone Number Payor Plan Fax Number Effective Dates    [address] [phone]  1/1/2024 - None Entered    [facility] [address]         Subscriber Name Subscriber Birth Date Member ID       MILKA ROBLEDO 1970 [id number]               Secondary Coverage       Payor Plan Insurance Group Employer/Plan Group    [id number]        Payor Plan Address Payor Plan Phone Number Payor Plan Fax Number Effective Dates    [address] [address]   9/17/2024 - None Entered    [place] [address]         Subscriber Name Subscriber Birth Date Member ID       MILKA ROBLEDO 1970 [id number]                     Emergency Contacts        (Rel.) Home Phone Work Phone Mobile Phone    RAMON ROBLEDO (Daughter) [phone] -- [phone]    KAPLEY,DEB (Friend) [phone] -- " [phone]    LÁZARO HEATH (Friend) -- -- [phone]

## 2025-03-22 NOTE — CASE MANAGEMENT/SOCIAL WORK
Continued Stay Note  MARKOS Braxton     Patient Name: Milka Espinoza  MRN: [id number]  Today's Date: 3/22/2025    Admit Date: 3/17/2025    Plan: D/C home on IV antibiotics.  (Agency TBD for antibiotics. Current with [facility] for IVIG for gastroparesis. Nsg sees for weekly for dsg changes to port).  Will discharge home.  Transport by car.   Discharge Plan       Row Name 03/22/25 1327       Plan    Plan Comments Verified with Dina with [facility] pt should receive 3pm dose in hospital. She is unsure delivery time today to pt home.                                       Expected Discharge Date and Time       Expected Discharge Date Expected Discharge Time    Mar 22, 2025               Emely Chen RN

## 2025-03-22 NOTE — PLAN OF CARE
Goal Outcome Evaluation:  Plan of Care Reviewed With: patient        Progress: improving  Outcome Evaluation: pt is alert and oriented, VSS. CHG bath given . Pain meds given per protocol and MD orders. Possible d/c this morning to home. Pt on 2L NC humified.

## 2025-03-22 NOTE — OUTREACH NOTE
Prep Survey      Flowsheet Row Responses   [facility] patient discharged from? [place]   Is LACE score < 7 ? Yes   Eligibility [facility]   Date of Admission 03/17/25   Date of Discharge 03/22/25   Discharge diagnosis Discitis   Does the patient have one of the following disease processes/diagnoses(primary or secondary)? Other   Is there a DME ordered? Yes   What DME was ordered? [facility]- IV ABX   Medication alerts for this patient IV ABX   Prep survey completed? Yes            Mi DENT - Registered Nurse

## 2025-03-22 NOTE — CASE MANAGEMENT/SOCIAL WORK
"Continued Stay Note   [facility]     Patient Name: Milka Espinoza  MRN: [id number]  Today's Date: 3/22/2025    Admit Date: 3/17/2025    Plan: Return home with [facility] for IV antibiotics. Current with [facility] for IVIG/weekly dressing changes.   Discharge Plan       Row Name 03/22/25 1909       Plan    Plan Return home with [facility] for IV antibiotics. Current with [facility] for IVIG/weekly dressing changes.    Plan Comments Per Dina with [facility], teaching completed. She is aware of discharge. Medication to be delivered today. Patient to receive antibiotics dose prior to discharging. PICC dressing changed 3/21, per documentation. CM met with patient at bedside. She states \"[facility]\" comes to her house once weekly to do dressing changes and biweekly labs. She stated [facility] comes to her home for infusions. She states her nurse's name is Safia and her #[phone]. CM discussed PT recommendations. Patient agreeable to  PT and did not have agency preference. Referral sent to [facility] and liaison notified.               Expected Discharge Date and Time       Expected Discharge Date Expected Discharge Time    Mar 22, 2025        Quynh Silva RN     Office: [phone]  Fax: [phone]     "

## 2025-03-22 NOTE — CASE MANAGEMENT/SOCIAL WORK
Continued Stay Note  MARKOS Braxton     Patient Name: Milka Espinoza  MRN: [id number]  Today's Date: 3/22/2025    Admit Date: 3/17/2025    Plan: D/C home on IV antibiotics.  (Agency TBD for antibiotics. Current with [facility] for IVIG for gastroparesis. Nsg sees for weekly for dsg changes to port).  Will discharge home.  Transport by car.   Discharge Plan       Row Name 03/22/25 1204       Plan    Plan Comments Received call from Dina with [facility] to verify discharge today. They will deliver to pt home today. Per Dina, pt has been taught and will do line and lab care outpt.                      Expected Discharge Date and Time       Expected Discharge Date Expected Discharge Time    Mar 22, 2025               Emely Chen RN

## 2025-03-22 NOTE — PLAN OF CARE
Goal Outcome Evaluation:                                            Problem: Adult Inpatient Plan of Care  Goal: Plan of Care Review  Outcome: Progressing  Goal: Patient-Specific Goal (Individualized)  Outcome: Progressing  Goal: Absence of Hospital-Acquired Illness or Injury  Outcome: Progressing  Intervention: Identify and Manage Fall Risk  Recent Flowsheet Documentation  Taken 3/22/2025 1411 by Marry Zavala LPN  Safety Promotion/Fall Prevention:   safety round/check completed   room organization consistent   nonskid shoes/slippers when out of bed   mobility aid in reach   lighting adjusted   gait belt   fall prevention program maintained   clutter free environment maintained   assistive device/personal items within reach   activity supervised  Taken 3/22/2025 1205 by Marry Zavala LPN  Safety Promotion/Fall Prevention:   safety round/check completed   room organization consistent   nonskid shoes/slippers when out of bed   mobility aid in reach   lighting adjusted   gait belt   fall prevention program maintained   clutter free environment maintained   assistive device/personal items within reach   activity supervised  Taken 3/22/2025 1000 by Marry Zavala LPN  Safety Promotion/Fall Prevention:   safety round/check completed   room organization consistent   nonskid shoes/slippers when out of bed   lighting adjusted   mobility aid in reach   gait belt   fall prevention program maintained   clutter free environment maintained   assistive device/personal items within reach   activity supervised  Taken 3/22/2025 0849 by Marry Zavala LPN  Safety Promotion/Fall Prevention:   safety round/check completed   room organization consistent   muscle strengthening facilitated   nonskid shoes/slippers when out of bed   lighting adjusted   gait belt   fall prevention program maintained   clutter free environment maintained   assistive device/personal items within reach   activity supervised  Intervention: Prevent Skin Injury  Recent  Flowsheet Documentation  Taken 3/22/2025 1205 by Marry Zavala LPN  Body Position: position changed independently  Skin Protection: incontinence pads utilized  Taken 3/22/2025 0849 by Marry Zavala LPN  Body Position: position changed independently  Skin Protection: incontinence pads utilized  Intervention: Prevent and Manage VTE (Venous Thromboembolism) Risk  Recent Flowsheet Documentation  Taken 3/22/2025 1205 by Marry Zavala LPN  VTE Prevention/Management:   SCDs (sequential compression devices) off   patient refused intervention  Taken 3/22/2025 0849 by Marry Zavala LPN  VTE Prevention/Management:   SCDs (sequential compression devices) off   patient refused intervention  Intervention: Prevent Infection  Recent Flowsheet Documentation  Taken 3/22/2025 1411 by Marry Zavala LPN  Infection Prevention:   single patient room provided   rest/sleep promoted   personal protective equipment utilized   hand hygiene promoted   environmental surveillance performed   cohorting utilized   equipment surfaces disinfected  Taken 3/22/2025 1205 by Marry Zavala LPN  Infection Prevention:   single patient room provided   rest/sleep promoted   personal protective equipment utilized   hand hygiene promoted   equipment surfaces disinfected   environmental surveillance performed   cohorting utilized  Taken 3/22/2025 1000 by Marry Zavala LPN  Infection Prevention:   single patient room provided   rest/sleep promoted   personal protective equipment utilized   cohorting utilized   hand hygiene promoted   equipment surfaces disinfected   environmental surveillance performed  Taken 3/22/2025 0849 by Marry Zavala LPN  Infection Prevention:   single patient room provided   rest/sleep promoted   hand hygiene promoted   personal protective equipment utilized   equipment surfaces disinfected   environmental surveillance performed   cohorting utilized  Goal: Optimal Comfort and Wellbeing  Outcome: Progressing  Intervention: Provide Person-Centered  Care  Recent Flowsheet Documentation  Taken 3/22/2025 1205 by Marry Zavala LPN  Trust Relationship/Rapport:   care explained   reassurance provided   questions answered   choices provided   emotional support provided   empathic listening provided   thoughts/feelings acknowledged   questions encouraged  Taken 3/22/2025 0849 by Marry Zavala LPN  Trust Relationship/Rapport:   care explained   emotional support provided   questions encouraged   questions answered   empathic listening provided   choices provided   thoughts/feelings acknowledged   reassurance provided  Goal: Readiness for Transition of Care  Outcome: Progressing     Problem: Comorbidity Management  Goal: Maintenance of COPD Symptom Control  Outcome: Progressing  Intervention: Maintain COPD (Chronic Obstructive Pulmonary Disease) Symptom Control  Recent Flowsheet Documentation  Taken 3/22/2025 1411 by Marry Zavala LPN  Medication Review/Management: medications reviewed  Taken 3/22/2025 1205 by Marry Zavala LPN  Medication Review/Management: medications reviewed  Taken 3/22/2025 1000 by Marry Zavala LPN  Medication Review/Management: medications reviewed  Taken 3/22/2025 0849 by Marry Zavala LPN  Medication Review/Management: medications reviewed     Problem: Fall Injury Risk  Goal: Absence of Fall and Fall-Related Injury  Outcome: Progressing  Intervention: Identify and Manage Contributors  Recent Flowsheet Documentation  Taken 3/22/2025 1411 by Marry Zavala LPN  Medication Review/Management: medications reviewed  Taken 3/22/2025 1205 by Marry Zavala LPN  Medication Review/Management: medications reviewed  Taken 3/22/2025 1000 by Marry Zavala LPN  Medication Review/Management: medications reviewed  Taken 3/22/2025 0849 by Marry Zavala LPN  Medication Review/Management: medications reviewed  Intervention: Promote Injury-Free Environment  Recent Flowsheet Documentation  Taken 3/22/2025 1411 by Marry Zavala LPN  Safety Promotion/Fall Prevention:   safety  round/check completed   room organization consistent   nonskid shoes/slippers when out of bed   mobility aid in reach   lighting adjusted   gait belt   fall prevention program maintained   clutter free environment maintained   assistive device/personal items within reach   activity supervised  Taken 3/22/2025 1205 by Marry Zavala LPN  Safety Promotion/Fall Prevention:   safety round/check completed   room organization consistent   nonskid shoes/slippers when out of bed   mobility aid in reach   lighting adjusted   gait belt   fall prevention program maintained   clutter free environment maintained   assistive device/personal items within reach   activity supervised  Taken 3/22/2025 1000 by Marry Zavala LPN  Safety Promotion/Fall Prevention:   safety round/check completed   room organization consistent   nonskid shoes/slippers when out of bed   lighting adjusted   mobility aid in reach   gait belt   fall prevention program maintained   clutter free environment maintained   assistive device/personal items within reach   activity supervised  Taken 3/22/2025 0849 by Marry Zavala LPN  Safety Promotion/Fall Prevention:   safety round/check completed   room organization consistent   muscle strengthening facilitated   nonskid shoes/slippers when out of bed   lighting adjusted   gait belt   fall prevention program maintained   clutter free environment maintained   assistive device/personal items within reach   activity supervised

## 2025-03-22 NOTE — DISCHARGE SUMMARY
[facility] Medicine Services  Discharge Summary    Date of Service: 3/22/2025  Patient Name: Milka Espinoza  : 1970  MRN: [id number]    Date of Admission: 3/17/2025  Discharge Diagnosis:   Pseudomonas bacteremia  T6-T7 discitis with osteomyelitis  T5-T7 vertebral compression fractures  Seizure disorder  Bipolar disorder  Hypothyroidism  GERD with gastroparesis  Obesity    Date of Discharge: 3/22/2025  Primary Care Physician: Alvina Arzate APRN      Presenting Problem:   Discitis [M46.40]    Active and Resolved Hospital Problems:  Active Hospital Problems    Diagnosis POA    **Discitis [M46.40] Yes      Resolved Hospital Problems   No resolved problems to display.         Hospital Course     HPI:    Patient is a 54-year-old female who presented to the hospital with complaints of back pain and fevers.  Please see H&P for details.    Hospital Course:  The patient was admitted to the hospital with concern for infectious process.  Initial blood cultures were drawn on a previous presentation to the ER a couple of days prior to this admission which were growing Pseudomonas and the patient was actually advised to come back to the ER for further evaluation.  Repeat blood cultures here were also growing Pseudomonas.  She was started on IV vancomycin and cefepime, however vancomycin was discontinued given that her infectious process was likely Pseudomonas only.  The patient also had significant back pain and MRI imaging did reveal multiple vertebral fractures and T5-T7 area as well as evidence of discitis and osteomyelitis in the T6-C7 area.  This was confirmed with MRI with contrast, however there was no evidence of abscess.  Her vertebral fractures could have been related to a fall she had recently after having a seizure episode.  She was placed in a TLSO brace and evaluated by neuro spine surgery who did not recommend a surgical intervention at this time.  She had a tunneled central line  in place for the past year which she was using to give herself medications and IV fluids given that she has chronic gastroparesis with recurrent episodes of dehydration.  Most likely this catheter was the source of infection.  This was removed and follow-up blood cultures afterwards were negative for any growth.  Catheter tip was positive for Pseudomonas, indicating that this was a source of infection.  PICC line was placed once her repeat blood cultures were negative.  She will continue IV cefepime for a total of 6 weeks.  She will then need to have a discussion with her gastroenterologist regarding what would be the best course of action for a replacement central line.  She is currently stable for discharge.        DISCHARGE Follow Up Recommendations for labs and diagnostics: Follow-up with spine surgery in 4 to 6 weeks.        Day of Discharge     Vital Signs:  Temp:  [97.6 °F (36.4 °C)-98.6 °F (37 °C)] 97.6 °F (36.4 °C)  Heart Rate:  [] 93  Resp:  [13-18] 18  BP: (123-149)/(64-87) 140/78  Flow (L/min) (Oxygen Therapy):  [2-3] 2    Physical Exam:  Physical Exam   General Appearance:  Alert, cooperative, no distress, appears stated age  Head:  Normocephalic, without obvious abnormality, atraumatic  Eyes:  PERRL, conjunctiva/corneas clear, EOM's intact, fundi benign, both eyes  Ears:  Normal TM's and external ear canals, both ears  Nose: Nares normal, septum midline, mucosa normal, no drainage or sinus tenderness  Throat: Lips, mucosa, and tongue normal; teeth and gums normal  Neck: Supple, symmetrical, trachea midline, no adenopathy, thyroid: not enlarged, symmetric, no tenderness/mass/nodules, no carotid bruit or JVD  Lungs:   Clear to auscultation bilaterally, respirations unlabored  Heart:  Regular rate and rhythm, S1, S2 normal, no murmur, rub or gallop  Abdomen:  Soft, non-tender, bowel sounds active all four quadrants,  no masses, no organomegaly  Extremities: Extremities normal, atraumatic, no  cyanosis or edema  Pulses: 2+ and symmetric  Skin: Skin color, texture, turgor normal, no rashes or lesions  Neurologic: Normal        Pertinent  and/or Most Recent Results     LAB RESULTS:      Lab 03/21/25  0245 03/20/25  1006 03/20/25  0351 03/19/25  0537 03/18/25  0022   WBC 5.32  --  4.75 5.69 10.60   HEMOGLOBIN 8.3* 8.0* 7.0* 7.5* 8.9*   HEMATOCRIT 29.9* 26.9* 25.2* 25.0* 28.7*   PLATELETS 301  --  235 323 397   NEUTROS ABS 2.33  --  2.09 2.91 8.60*   IMMATURE GRANS (ABS) 0.03  --  0.02 0.02 0.05   LYMPHS ABS 2.36  --  1.96 2.07 1.38   MONOS ABS 0.44  --  0.52 0.52 0.52   EOS ABS 0.14  --  0.13 0.14 0.01   MCV 94.9  --  94.0 87.1 84.4   PROCALCITONIN  --   --   --   --  0.31*   LACTATE  --   --   --   --  1.1   PROTIME  --   --   --   --  13.3   APTT  --   --   --   --  31.8         Lab 03/21/25  0245 03/20/25  0351 03/19/25  0537 03/18/25  0022   SODIUM 134* 137 138 135*   POTASSIUM 4.0 4.3 3.8 3.9   CHLORIDE 100 105 105 100   CO2 21.8* 20.8* 23.0 23.1   ANION GAP 12.2 11.2 10.0 11.9   BUN 4* 6 7 9   CREATININE 0.65 0.78 0.70 0.66   EGFR 103.5 89.3 101.6 103.1   GLUCOSE 83 83 94 102*   CALCIUM 8.9 8.5* 8.5* 9.3   MAGNESIUM  --  2.2 2.0 2.0   TSH  --   --   --  2.780         Lab 03/18/25  0022   TOTAL PROTEIN 8.9*   ALBUMIN 3.7   GLOBULIN 5.2   ALT (SGPT) 20   AST (SGOT) 32   BILIRUBIN 0.4   ALK PHOS 210*         Lab 03/18/25  0022   PROTIME 13.3   INR 1.02                 Brief Urine Lab Results  (Last result in the past 365 days)        Color   Clarity   Blood   Leuk Est   Nitrite   Protein   CREAT   Urine HCG        03/18/25 0417 Yellow   Cloudy   Negative   Negative   Negative   Negative                 Microbiology Results (last 10 days)       Procedure Component Value - Date/Time    Blood Culture - Blood, Arm, Right [[id number]]  (Normal) Collected: 03/19/25 0843    Lab Status: Preliminary result Specimen: Blood from Arm, Right Updated: 03/22/25 1000     Blood Culture No growth at 3 days    Narrative:       Less than seven (7) mL's of blood was collected.  Insufficient quantity may yield false negative results.    Blood Culture - Blood, Arm, Left [[id number]]  (Normal) Collected: 03/19/25 0835    Lab Status: Preliminary result Specimen: Blood from Arm, Left Updated: 03/22/25 1000     Blood Culture No growth at 3 days    Narrative:      Less than seven (7) mL's of blood was collected.  Insufficient quantity may yield false negative results.    Catheter Culture - Cath Tip, Hand, Left [[id number]]  (Abnormal)  (Susceptibility) Collected: 03/18/25 1651    Lab Status: Final result Specimen: Cath Tip from Hand, Left Updated: 03/21/25 0804     Catheter Culture >15 CFU/mL - Indicative of infection. Pseudomonas aeruginosa    Susceptibility        Pseudomonas aeruginosa      VENITA      Cefepime Susceptible      Ceftazidime Susceptible      Ciprofloxacin Susceptible      Levofloxacin Susceptible      Piperacillin + Tazobactam Susceptible                       Susceptibility Comments       Pseudomonas aeruginosa    With the exception of urinary-sourced infections, aminoglycosides should not be used as monotherapy.               Body Fluid Culture - Body Fluid, Spine, Thoracic [[id number]] Collected: 03/18/25 1651    Lab Status: Final result Specimen: Body Fluid from Spine, Thoracic Updated: 03/21/25 0948     Body Fluid Culture No growth at 3 days     Gram Stain Rare (1+) WBCs seen      No organisms seen    MRSA Screen, PCR (Inpatient) - Swab, Nares [[id number]]  (Normal) Collected: 03/18/25 0417    Lab Status: Final result Specimen: Swab from Nares Updated: 03/18/25 0545     MRSA PCR No MRSA Detected    Narrative:      The negative predictive value of this diagnostic test is high and should only be used to consider de-escalating anti-MRSA therapy. A positive result may indicate colonization with MRSA and must be correlated clinically.    Blood Culture - Blood, Arm, Left [[id number]]  (Abnormal)  (Susceptibility) Collected: 03/18/25  0106    Lab Status: Final result Specimen: Blood from Arm, Left Updated: 03/21/25 0612     Blood Culture Pseudomonas aeruginosa     Isolated from Aerobic and Anaerobic Bottles     Gram Stain Aerobic Bottle Gram negative bacilli      Anaerobic Bottle Gram negative bacilli    Narrative:      Less than seven (7) mL's of blood was collected.  Insufficient quantity may yield false negative results.    Susceptibility        Pseudomonas aeruginosa      VENITA      Cefepime Susceptible      Ceftazidime Susceptible      Ciprofloxacin Susceptible      Levofloxacin Susceptible      Piperacillin + Tazobactam Susceptible                       Susceptibility Comments       Pseudomonas aeruginosa    With the exception of urinary-sourced infections, aminoglycosides should not be used as monotherapy.               Blood Culture ID, PCR - Blood, Arm, Left [[id number]]  (Abnormal) Collected: 03/18/25 0106    Lab Status: Final result Specimen: Blood from Arm, Left Updated: 03/18/25 2145     BCID, PCR Pseudomonas aeruginosa. Identification by BCID2 PCR     BOTTLE TYPE Aerobic Bottle    Blood Culture - Blood, Arm, Right [[id number]]  (Normal) Collected: 03/18/25 0022    Lab Status: Preliminary result Specimen: Blood from Arm, Right Updated: 03/22/25 0115     Blood Culture No growth at 4 days    Blood Culture - Blood, Blood, Venous [[id number]]  (Abnormal) Collected: 03/14/25 2216    Lab Status: Final result Specimen: Blood, Venous Updated: 03/18/25 0614     Blood Culture Pseudomonas aeruginosa     Isolated from --     Gram Stain Aerobic Bottle Gram negative bacilli    Narrative:      Refer to previous blood culture collected on 03/14/2025 2213 for MICs    Less than seven (7) mL's of blood was collected.  Insufficient quantity may yield false negative results.    Blood Culture - Blood, Arm, Right [[id number]]  (Abnormal)  (Susceptibility) Collected: 03/14/25 2213    Lab Status: Final result Specimen: Blood from Arm, Right Updated: 03/18/25 0614      Blood Culture Pseudomonas aeruginosa     Isolated from Aerobic Bottle     Gram Stain Aerobic Bottle Gram negative bacilli    Narrative:      Less than seven (7) mL's of blood was collected.  Insufficient quantity may yield false negative results.    Susceptibility        Pseudomonas aeruginosa      VENITA      Cefepime Susceptible      Ceftazidime Susceptible      Ciprofloxacin Susceptible      Levofloxacin Susceptible      Piperacillin + Tazobactam Susceptible                       Susceptibility Comments       Pseudomonas aeruginosa    With the exception of urinary-sourced infections, aminoglycosides should not be used as monotherapy.               Blood Culture ID, PCR - Blood, Arm, Right [[id number]]  (Abnormal) Collected: 03/14/25 2213    Lab Status: Final result Specimen: Blood from Arm, Right Updated: 03/15/25 2342     BCID, PCR Pseudomonas aeruginosa. Identification by BCID2 PCR     BOTTLE TYPE Aerobic Bottle    Respiratory Panel PCR w/COVID-19(SARS-CoV-2) [facility]ERTO/[facility] In-House, NP Swab in UTM/VTM, 2 HR TAT - Swab, Nasopharynx [[id number]]  (Normal) Collected: 03/14/25 1949    Lab Status: Final result Specimen: Swab from Nasopharynx Updated: 03/14/25 2044     ADENOVIRUS, PCR Not Detected     Coronavirus 229E Not Detected     Coronavirus HKU1 Not Detected     Coronavirus NL63 Not Detected     Coronavirus OC43 Not Detected     COVID19 Not Detected     Human Metapneumovirus Not Detected     Human Rhinovirus/Enterovirus Not Detected     Influenza A PCR Not Detected     Influenza B PCR Not Detected     Parainfluenza Virus 1 Not Detected     Parainfluenza Virus 2 Not Detected     Parainfluenza Virus 3 Not Detected     Parainfluenza Virus 4 Not Detected     RSV, PCR Not Detected     Bordetella pertussis pcr Not Detected     Bordetella parapertussis PCR Not Detected     Chlamydophila pneumoniae PCR Not Detected     Mycoplasma pneumo by PCR Not Detected    Narrative:      In the setting of a positive  respiratory panel with a viral infection PLUS a negative procalcitonin without other underlying concern for bacterial infection, consider observing off antibiotics or discontinuation of antibiotics and continue supportive care. If the respiratory panel is positive for atypical bacterial infection (Bordetella pertussis, Chlamydophila pneumoniae, or Mycoplasma pneumoniae), consider antibiotic de-escalation to target atypical bacterial infection.            CT Guided Aspiration Disc  Result Date: 3/19/2025  Impression: 1. Successful FNA of T7-8 right paravertebral soft tissue infection. Electronically Signed: Harvey Holt MD  3/19/2025 7:43 AM EDT  Workstation ID: [id number]    IR Removal Tunnel CV Cath Without Port  Result Date: 3/19/2025  Impression: Successful removal of tunneled PICC. Electronically Signed: Harvey Holt MD  3/19/2025 7:41 AM EDT  Workstation ID: [id number]    MRI Thoracic Spine With Contrast  Result Date: 3/18/2025  Impression: Findings consistent with discitis/osteomyelitis at T6-T7. A small volume of intradiscal fluid with associated meningeal enhancement and surrounding perivertebral soft tissue inflammation. No drainable abscess. Electronically Signed: Cricket Paredes MD  3/18/2025 10:57 PM EDT  Workstation ID: [id number]    MRI Thoracic Spine Without Contrast  Result Date: 3/18/2025  Impression: Impression: 1.Findings concerning for discitis/osteomyelitis at T6-T7. There is also circumferential epidural signal abnormality primarily at the ventral aspect of the spinal canal at this level. A small epidural abscess cannot be excluded. Further characterization is limited without IV contrast. 2.There is acute/subacute compression fracture of T7 with approximately 30% loss of height anteriorly. 3.There is compression of T8 and T9, which could be subacute to chronic. 4.Mild thoracic spondylosis. Electronically Signed: Reddy Ernst MD  3/18/2025 5:58 AM EDT  Workstation ID: [id number]    CT Angiogram Chest  Pulmonary Embolism  Result Date: 3/14/2025  Impression: Impression: Acute on chronic compression fracture at T7 vertebral body. No visible osseous retropulsion. Chronic appearing T8 and T9 compression fractures. No evidence of pulmonary embolism. Persistent but mildly smaller appearing nodular patchy opacities in the right upper lobe. Additional mild patchy nodular opacities in the lingula and bilateral lower lobes appear stable. This may represent evolving infectious/inflammatory opacities. Though would recommend follow-up chest CT in 3 months given nodular appearance. Stable to mildly decreased small bilateral pleural effusions. Electronically Signed: Arsalan Leo  3/14/2025 10:16 PM EDT  Workstation ID: [id number]    XR Chest 1 View  Result Date: 3/14/2025  Impression: Impression: No radiographic evidence of an acute cardiopulmonary abnormality. Likely atelectasis at the left lung base. Electronically Signed: Arsalan Leo  3/14/2025 7:54 PM EDT  Workstation ID: [id number]    CT Angiogram Chest Pulmonary Embolism  Result Date: 3/3/2025  Impression: Impression: 1.No evidence of pulmonary embolism. 2.Mild patchy nodular airspace disease present within the posterior aspect of the right upper lobe, the lingula and within the bilateral lower lobes likely related to a mild pneumonia. Trace to small bilateral pleural effusions are present. Follow-up is recommended given the nodular appearance. 3.Ancillary findings as described above. Electronically Signed: Elise Steele MD  3/3/2025 12:06 AM EST  Workstation ID: [id number]              Results for orders placed during the hospital encounter of 03/17/25    Adult Transthoracic Echo Complete W/ Cont if Necessary Per Protocol    Interpretation Summary    Left ventricular systolic function is normal. Calculated left ventricular EF = 60%    Left ventricular diastolic function was normal.    Normal RV systolic function.    No significant valvular disease.    Estimated right  ventricular systolic pressure from tricuspid regurgitation is normal (<35 mmHg).    No evidence of infective endocarditis noted.    Electronically signed by Mich Sotomayor MD, 03/19/25, 8:47 AM EDT.      Labs Pending at Discharge:  Pending Results       None            Procedures Performed           Consults:   Consults       Date and Time Order Name Status Description    3/18/2025  8:12 AM Inpatient Spine Surgery Consult Completed     3/17/2025 10:13 PM Inpatient Infectious Diseases Consult Completed               Discharge Details        Discharge Medications        New Medications        Instructions Start Date   cefepime 2000 mg IVPB in 100 mL NS (MBP)   2,000 mg, Intravenous, Every 8 Hours      oxyCODONE 10 MG tablet  Commonly known as: ROXICODONE   10 mg, Oral, Every 4 Hours PRN      tiZANidine 4 MG tablet  Commonly known as: ZANAFLEX   4 mg, Oral, Every 8 Hours PRN             Continue These Medications        Instructions Start Date   ALPRAZolam 2 MG tablet  Commonly known as: XANAX   2 mg, Oral, 3 Times Daily PRN, for anxiety      amitriptyline 25 MG tablet  Commonly known as: ELAVIL   25 mg, Oral, Every Night at Bedtime      atorvastatin 20 MG tablet  Commonly known as: LIPITOR   20 mg, Oral, Nightly      BD PosiFlush 0.9 % flush  Generic drug: sodium chloride       busPIRone 30 MG tablet  Commonly known as: BUSPAR   30 mg, Oral, 2 Times Daily      heparin 100 UNIT/ML solution injection   No dose, route, or frequency recorded.      heparin 100 UNIT/ML solution injection       hydrOXYzine 25 MG tablet  Commonly known as: ATARAX   25 mg, Oral, 3 Times Daily PRN, for anxiety      lacosamide 200 MG tablet  Commonly known as: VIMPAT   200 mg, Oral, Every 12 Hours Scheduled      lamoTRIgine 150 MG tablet  Commonly known as: LaMICtal   150 mg, Oral, 2 Times Daily      levETIRAcetam 500 MG tablet  Commonly known as: KEPPRA   1,500 mg, Oral, 2 Times Daily      levothyroxine 25 MCG  "tablet  Commonly known as: SYNTHROID, LEVOTHROID   25 mcg, Oral, Daily      Motegrity 2 MG tablet  Generic drug: Prucalopride Succinate   1 tablet, Daily      nystatin 324226 UNIT/GM powder  Commonly known as: nystatin   Topical, 2 Times Daily      Octagam 30 GM/300ML solution infusion  Generic drug: Immune Globulin (Human)   30 g, Weekly      ondansetron 2 mg/mL injection  Commonly known as: ZOFRAN   4 mg, Every 6 Hours PRN      pantoprazole 40 MG EC tablet  Commonly known as: PROTONIX   40 mg, Oral, Daily      promethazine 6.25 MG/5ML solution oral solution  Commonly known as: PHENERGAN   25 mg, Every 6 Hours PRN      promethazine 25 MG tablet  Commonly known as: PHENERGAN   25 mg, Every 6 Hours PRN      QUEtiapine  MG 24 hr tablet  Commonly known as: SEROquel XR   800 mg, Oral, Every Night at Bedtime      sertraline 100 MG tablet  Commonly known as: ZOLOFT   100 mg, Oral, Daily      sodium chloride 0.9 % solution       SUMAtriptan 100 MG tablet  Commonly known as: IMITREX   TAKE 1 TABLET BY MOUTH 1 TIME AT ONSET OF MIGRAINE AS NEEDED FOR MIGRAINE. MAY REPEAT DOSE 1 TIME AFTER 2 HOURS IF MIGRAINE NOT RESOLVED      Vraylar 1.5 MG capsule capsule  Generic drug: Cariprazine HCl   1.5 mg, Oral, Daily             Stop These Medications      amoxicillin-clavulanate 875-125 MG per tablet  Commonly known as: AUGMENTIN     HYDROcodone-acetaminophen 7.5-325 MG per tablet  Commonly known as: NORCO     sucralfate 1 g tablet  Commonly known as: CARAFATE              Allergies   Allergen Reactions    Ketorolac Other (See Comments)     pt states \"this medicine sends me into seizures\"    Sulfa Antibiotics Hives    Tramadol Hcl Mental Status Change    Adhesive Tape Rash     Paper tape         Discharge Disposition:     Home-Health Care Svc    Diet:  Hospital:  Diet Order   Procedures    Diet: Regular/House; Fluid Consistency: Thin (IDDSI 0)         Discharge Activity:         CODE STATUS:  Code Status and Medical " Interventions: CPR (Attempt to Resuscitate); Full Support   Ordered at: 03/17/25 2213     Code Status (Patient has no pulse and is not breathing):    CPR (Attempt to Resuscitate)     Medical Interventions (Patient has pulse or is breathing):    Full Support     Level Of Support Discussed With:    Patient         Future Appointments   Date Time Provider Department Center   3/28/2025 12:30 PM ROOM 02 [facility] None   4/4/2025 12:30 PM ROOM 02 [facility] None   4/7/2025  4:30 PM Ana Rosa Weber APRN [facility]   4/11/2025 12:30 PM ROOM 02 [facility] None   4/18/2025 12:30 PM ROOM 02 [facility] None   4/25/2025 12:30 PM ROOM 02 [facility] None   4/30/2025 12:30 PM ROOM 02 [facility]  [facility] None   7/17/2025  8:00 AM Bob Mcfarlane APRN [facility] [id number] [facility]       Additional Instructions for the Follow-ups that You Need to Schedule       Ambulatory Referral to Home Health   As directed      Face to Face Visit Date: 3/22/2025   Follow-up provider for Plan of Care?: I treated the patient in an acute care facility and will not continue treatment after discharge.   Follow-up provider: BOB MCFARLANE [[id number]]   Reason/Clinical Findings: Multiple thoracic fractures, T5-T6 discitis, bacteremia   Describe mobility limitations that make leaving home difficult: Patient has multiple vertebral fractures and discitis, with increased pain making it difficult for her to ambulate long distances and leave the house safely.   Nursing/Therapeutic Services Requested: Physical Therapy Occupational Therapy Skilled Nursing   Skilled nursing orders: Vascular access care/instruction   Select type: PICC   Frequency: 1 Week 1        Discharge Follow-up with Specified Provider: Follow-up with spine surgery in 4 to 6 weeks.; 1 Month   As directed      To: Follow-up with spine surgery in 4 to 6 weeks.   Follow Up: 1 Month                Time spent on Discharge including face to face service:  Greater than 30 minutes    Signature: Electronically signed by Eddie Conrad MD, 03/22/25, 11:32 EDT.  [facility]ist Team

## 2025-03-23 ENCOUNTER — DOCUMENTATION (OUTPATIENT)
Dept: HOME HEALTH SERVICES | Facility: HOME HEALTHCARE | Age: 55
End: 2025-03-23
Payer: MEDICARE

## 2025-03-23 LAB — BACTERIA SPEC AEROBE CULT: NORMAL

## 2025-03-23 NOTE — CASE MANAGEMENT/SOCIAL WORK
Case Management Discharge Note      Final Note: Home with [facility].         Selected Continued Care - Discharged on 3/22/2025 Admission date: 3/17/2025 - Discharge disposition: Home-Health Care [facility]        Dialysis/Infusion Coordination complete.      Service Provider Services Address Phone Fax Patient Preferred    [facility] [id number] [id number] [phone] --              Home Medical Care Coordination complete.      Service Provider Services Address Phone Fax Patient Preferred    [facility] [address] [id number] [phone] --               Transportation Services  Private: Car    Final Discharge Disposition Code: 06 - home with home health care

## 2025-03-23 NOTE — PROGRESS NOTES
Start PACC Note    Home Health Referral    Evaluated patient on Home Care and services available. Patient offered choice of available HHC and agreeable to RN/PT services with [facility].    Care Types:   Isolation Precautions: [unfilled]    Social Determinants of Health:  Tobacco Use: Medium Risk (3/17/2025)    Patient History     Smoking Tobacco Use: Former     Smokeless Tobacco Use: Never     Passive Exposure: Past     Social History     Substance and Sexual Activity   Alcohol Use No     Social History     Substance and Sexual Activity   Drug Use No       Does the patient have any financial resource strain?   Does the patient have any food insecurities?   Does the patient have any housing instabilities?     If any of the above is noted as yes - consider a MSW evaluation once the patient returns home.    START PATIENT REGISTRATION INFORMATION  Order Information  Order Signing Physician: Dr. Leola Llamas  Service Ordered RN?: Yes  Service Ordered PT?: Yes  Service Ordered OT?: yes  Service Ordered ST?: No  Service Ordered MSW?: No  Service Ordered HHA?: No  Following Physician: Dr. Leola Llamas  Following Physician Phone: [phone]  Overseeing Physician: Dr. Leola Llamas  (Required for Residents Only)  Agreeable to Follow? Yes  Date/Time of Call 03/23/25 15:03 EDT, Spoke with: per md    Care Coordination  Same Day SOC?: No  Primary Care Physician: Alvina Arzate APRN  Primary Care Physician Phone: [phone]  Primary Care Physician Address: [address] [place] [address] [address] [place] [address]  Visit Instructions: N/A  Service Discharge Location Type: Home  Service Facility Name: N/A  Service Floor Facility: N/A  Service Room No: N/A    Demographics  Patient Last Name: Olga  Patient First Name: Milka  Language/Communication Barrier: none  Service Address: [address] [place] [address] [address]  Service City: [place]  Service State: [place]  Service Zip: [address]  Service Home Phone: [phone]  Other Phone Numbers:    Telephone Information:   Mobile [phone]     Emergency Contact:   Extended Emergency Contact Information  Primary Emergency Contact: RAMON ROBLEDO  Home Phone: [phone]  Mobile Phone: [phone]  Relation: Daughter  Hearing or visual needs: None  Other needs: None  Preferred language: English   needed? No  Secondary Emergency Contact: KAPLEY,DEB  Home Phone: [phone]  Mobile Phone: [phone]  Relation: Friend  Hearing or visual needs: None  Other needs: None  Preferred language: English   needed? No    Admission Information  Admit Date: 03/17/2025  Patient Status at Discharge: Inpatient  Admitting Diagnosis: Discitis with osteomyelitis    Caregiver Information  Caregiver First Name:   Caregiver Last Name:   Caregiver Relationship to Patient:   Caregiver Phone Number:   Caregiver Notes:     HITECH  Hi-Tech List  HIGHTECH: HI TECH - IV  Orders: Cefepime 2g every 8 hrs for 6 weeks. Last day on 4.29.25  IV Method of Administration:  via picc line  Date and Time of Next Dose Due: per md order  Infusion Company: [facility]  Infectious Disease Physician: Dr. Leola Llamas    Teachable Caregiver  Teachable Caregiver First Name: Ramon  Teachable Caregiver Last Name: Olga  Teachable Caregiver Relationship to Patient: daughter  Teachable Caregiver Phone Number: [phone]  Teachable Caregiver Notes: N/A  Teachable Caregiver available for Start of Care visit?: Yes  Teachable Caregiver agreeable to provide skilled High Tech care per physician's orders?: Yes      END PATIENT REGISTRATION INFORMATION    Start PACC Summary    Additional Comments:  Standard weekly PICC line care  Weekly labs CBC, creatinine and sed rate x 6 weeks  Please fax all post discharge lab results, imaging studies and correspondence to this fax number [phone]     [facility] provides IVIG weekly to patient for gastroporesis. This is a service that [facility] cannot provide. Patient is aware that [facility]  will be assuming the responsibility of changing the picc line dressing and drawing weekly labs while she is receiving the antibiotics. Verified approval with Tawana Ba, Director  END PACC Summary    Discharge Date: Pending    Referral Source: MARKOS Braxton    Signed By: Mi Boyce RN, 3/23/2025, 15:03 EDT     Date/Time: 03/23/25 15:03 EDT    End PACC Note

## 2025-03-23 NOTE — CASE MANAGEMENT/SOCIAL WORK
Continued Stay Note  MARKOS Braxton     Patient Name: Milka Espinoza  MRN: [id number]  Today's Date: 3/23/2025    Admit Date: 3/17/2025    Plan: Return home with [facility] for IV antibiotics. Current with [facility] for IVIG/weekly dressing changes. [facility]  referral pending for PT.   Discharge Plan       Row Name 03/23/25 1332       Plan    Plan Return home with [facility] for IV antibiotics. Current with [facility] for IVIG/weekly dressing changes. [facility]  referral pending for PT.    Plan Comments Mi [facility]  liaison notified  that they can accept patient for therapy services if she is agreeable to allow them to do her weekly dressing changes and weekly labs. Mi stated that insurance will not cover PT services if she is receiving other skilled services that [facility]  can provide. CM left message for patient to verify if she would be agreeable to allowing [facility]  to do her weekly dressing changes and labs in addition to PT services.             Quynh Silva RN     Office: [phone]  Fax: [phone]

## 2025-03-23 NOTE — CASE MANAGEMENT/SOCIAL WORK
Continued Stay Note   [facility]     Patient Name: Milka Espinoza  MRN: [id number]  Today's Date: 3/23/2025    Admit Date: 3/17/2025    Plan: Return home with [facility] for IV antibiotics. Current with [facility] for IVIG. [facility]  accepted for PT + weekly labs/line care.   Discharge Plan       Row Name 03/23/25 1440       Plan    Plan Return home with [facility] for IV antibiotics. Current with [facility] for IVIG. [facility]  accepted for PT + weekly labs/line care.    Plan Comments Patient called  back and is agreeable to allow [facility]  see her for PT services and do her weekly labs/line care. LiaisonMi notified.      Row Name 03/23/25 1332       Plan    Plan Return home with [facility] for IV antibiotics. Current with [facility] for IVIG/weekly dressing changes. [facility]  referral pending for PT.    Plan Comments Mi, [facility]  liaison notified  that they can accept patient for therapy services if she is agreeable to allow them to do her weekly dressing changes and weekly labs. Mi stated that insurance will not cover PT services if she is receiving other skilled services that [facility] can provide. CM left message for patient to verify if she would be agreeable to allowing [facility]  to do her weekly dressing changes and labs.             Quynh Silva RN    Office: [phone]  Fax: [phone]

## 2025-03-24 ENCOUNTER — TELEPHONE (OUTPATIENT)
Dept: FAMILY MEDICINE CLINIC | Facility: CLINIC | Age: 55
End: 2025-03-24

## 2025-03-24 ENCOUNTER — TRANSITIONAL CARE MANAGEMENT TELEPHONE ENCOUNTER (OUTPATIENT)
Dept: CALL CENTER | Facility: HOSPITAL | Age: 55
End: 2025-03-24
Payer: MEDICARE

## 2025-03-24 DIAGNOSIS — F41.0 PANIC DISORDER: Chronic | ICD-10-CM

## 2025-03-24 DIAGNOSIS — F41.1 GENERALIZED ANXIETY DISORDER: Chronic | ICD-10-CM

## 2025-03-24 LAB
BACTERIA SPEC AEROBE CULT: NORMAL
BACTERIA SPEC AEROBE CULT: NORMAL

## 2025-03-24 NOTE — TELEPHONE ENCOUNTER
Caller: Milka Espinoza    Relationship to patient: Self    Best call back number:   Telephone Information:   Mobile [phone]         Patient is needing: PATIENT ADVISED PHARMACY IS OUT OF HER oxyCODONE (ROXICODONE) 10 MG tablet   SHE DOES NOT KNOW WHAT TO DO. PLEASE CALL BACK TO ADVISE

## 2025-03-24 NOTE — TELEPHONE ENCOUNTER
Patient was notified that she will need to contact the er doctor that prescribed that medication.

## 2025-03-24 NOTE — OUTREACH NOTE
Call Center TCM Note      Flowsheet Row Responses   [facility] patient discharged from? [facility]   Does the patient have one of the following disease processes/diagnoses(primary or secondary)? Other   TCM attempt successful? Yes   Call start time 1547   Call end time 1554   Discharge diagnosis Discitis   Person spoke with today (if not patient) and relationship Patient   Medication alerts for this patient IV ABX   Meds reviewed with patient/caregiver? Yes   Is the patient having any side effects they believe may be caused by any medication additions or changes? No   Does the patient have all medications ordered at discharge? No   Prescription comments patient states [facility] in [place], [place] is out of Oxycodone. Patient called several pharmacies and found it in stock at [place] [facility] [facility], [address] [place] [address]. She states she contacted the surgeon to send in a new rx to that location and is awaiting a call back.   Is the patient taking all medications as directed (includes completed medication regime)? Yes   Comments Patient declines a PCP HOSPITAL f/u appt at this time. She states she will call and schedule when she is feeling better.   Does the patient have an appointment with their PCP within 7-14 days of discharge? No   Nursing Interventions Patient declined scheduling/rescheduling appointment at this time   Has home health visited the patient within 72 hours of discharge? Yes   What DME was ordered? [facility]- IV ABX   Has all DME been delivered? Yes   Psychosocial issues? No   Did the patient receive a copy of their discharge instructions? Yes   Nursing interventions Reviewed instructions with patient   What is the patient's perception of their health status since discharge? Same   Is the patient/caregiver able to teach back signs and symptoms related to disease process for when to call PCP? Yes   Is the patient/caregiver able to teach back signs and symptoms related to disease process  for when to call 911? Yes   Is the patient/caregiver able to teach back the hierarchy of who to call/visit for symptoms/problems? PCP, Specialist, Home health nurse, Urgent Care, ED, 911 Yes   If the patient is a current smoker, are they able to teach back resources for cessation? Not a smoker   TCM call completed? Yes   Wrap up additional comments Patient declines a PCP HOSPITAL f/u appt at this time. She states she will call and schedule when she is feeling better.   Call end time 1554   Would this patient benefit from a Referral to [facility] Social Work? No   Is the patient interested in additional calls from an ambulatory ? No            Grace Wills, RN    3/24/2025, 15:54 EDT            Grace THRASHER - Registered Nurse

## 2025-03-24 NOTE — TELEPHONE ENCOUNTER
Rx Refill Note  Requested Prescriptions     Pending Prescriptions Disp Refills    ALPRAZolam (XANAX) 2 MG tablet [Pharmacy Med Name: ALPRAZOLAM 2MG TABLETS] 90 tablet      Sig: TAKE 1 TABLET BY MOUTH THREE TIMES DAILY AS NEEDED FOR ANXIETY      Last office visit with prescribing clinician: 12/31/2024   Last telemedicine visit with prescribing clinician: Visit date not found   Next office visit with prescribing clinician: Visit date not found   Office Visit with Milka Pavon APRN (12/31/2024)      ToxAssure Flex 22, Ur w/DL - Urine, Random Void (05/30/2024 10:26)                  Would you like a call back once the refill request has been completed: [] Yes [] No    If the office needs to give you a call back, can they leave a voicemail: [] Yes [] No  Last sold 2/28/25  BEHAVIORAL HEALTH - SCAN - INSPECT C,COTTON 3/24/25 (03/24/2025)   Emma Boyce  03/24/25, 10:40 EDT

## 2025-03-24 NOTE — OUTREACH NOTE
Call Center TCM Note      Flowsheet Row Responses   [facility] facility patient discharged from? [facility]   Does the patient have one of the following disease processes/diagnoses(primary or secondary)? Other   TCM attempt successful? No   Unsuccessful attempts Attempt 1  [No one listed on  PCP verbal release.]            Grace Wills RN    3/24/2025, 10:27 EDT            Grace THRASHER - Registered Nurse

## 2025-03-27 RX ORDER — ALPRAZOLAM 2 MG/1
2 TABLET ORAL 3 TIMES DAILY PRN
Qty: 90 TABLET | Refills: 0 | Status: SHIPPED | OUTPATIENT
Start: 2025-03-27

## 2025-04-01 ENCOUNTER — HOSPITAL ENCOUNTER (EMERGENCY)
Facility: HOSPITAL | Age: 55
Discharge: HOME | End: 2025-04-01
Attending: EMERGENCY MEDICINE
Payer: MEDICARE

## 2025-04-01 VITALS
HEIGHT: 64 IN | OXYGEN SATURATION: 98 % | SYSTOLIC BLOOD PRESSURE: 119 MMHG | RESPIRATION RATE: 16 BRPM | TEMPERATURE: 97.7 F | WEIGHT: 179.15 LBS | HEART RATE: 91 BPM | BODY MASS INDEX: 30.58 KG/M2 | DIASTOLIC BLOOD PRESSURE: 59 MMHG

## 2025-04-01 DIAGNOSIS — Z78.9 PROBLEM WITH VASCULAR ACCESS: Primary | ICD-10-CM

## 2025-04-01 DIAGNOSIS — Z95.828 S/P PICC CENTRAL LINE PLACEMENT: ICD-10-CM

## 2025-04-01 PROCEDURE — C1751 CATH, INF, PER/CENT/MIDLINE: HCPCS

## 2025-04-01 PROCEDURE — 99282 EMERGENCY DEPT VISIT SF MDM: CPT

## 2025-04-01 RX ORDER — SODIUM CHLORIDE 9 MG/ML
40 INJECTION, SOLUTION INTRAVENOUS AS NEEDED
Status: DISCONTINUED | OUTPATIENT
Start: 2025-04-01 | End: 2025-04-02 | Stop reason: HOSPADM

## 2025-04-01 RX ORDER — SODIUM CHLORIDE 0.9 % (FLUSH) 0.9 %
20 SYRINGE (ML) INJECTION AS NEEDED
OUTPATIENT
Start: 2025-04-01

## 2025-04-01 RX ORDER — SODIUM CHLORIDE 0.9 % (FLUSH) 0.9 %
10 SYRINGE (ML) INJECTION EVERY 12 HOURS SCHEDULED
OUTPATIENT
Start: 2025-04-01

## 2025-04-01 RX ORDER — SODIUM CHLORIDE 0.9 % (FLUSH) 0.9 %
10 SYRINGE (ML) INJECTION EVERY 12 HOURS SCHEDULED
Status: DISCONTINUED | OUTPATIENT
Start: 2025-04-01 | End: 2025-04-02 | Stop reason: HOSPADM

## 2025-04-01 RX ORDER — SODIUM CHLORIDE 0.9 % (FLUSH) 0.9 %
10 SYRINGE (ML) INJECTION AS NEEDED
OUTPATIENT
Start: 2025-04-01

## 2025-04-01 RX ORDER — SODIUM CHLORIDE 0.9 % (FLUSH) 0.9 %
10 SYRINGE (ML) INJECTION AS NEEDED
Status: DISCONTINUED | OUTPATIENT
Start: 2025-04-01 | End: 2025-04-02 | Stop reason: HOSPADM

## 2025-04-01 RX ORDER — SODIUM CHLORIDE 0.9 % (FLUSH) 0.9 %
20 SYRINGE (ML) INJECTION AS NEEDED
Status: DISCONTINUED | OUTPATIENT
Start: 2025-04-01 | End: 2025-04-02 | Stop reason: HOSPADM

## 2025-04-02 NOTE — ED PROVIDER NOTES
"Subjective   History of Present Illness  54-year-old female history of spinal osteomyelitis and T6 and 7 states that her PICC line accidentally came out today.  She is unsure exactly how this happened.  She his being treated with cefepime 2 g every 8 hours for the next 4 weeks for Pseudomonas as the etiology agent.  The patient denied intent to self-harm or harm others she denied hallucination or delusional thinking she states that she is not sure exactly how it came out and must of jerked while he was connected to tubing.  The patient has experienced with previous IV access devices the patient denies self injection      Review of Systems   Constitutional:  Positive for fatigue. Negative for chills and fever.   Genitourinary:  Negative for dysuria.   Musculoskeletal:  Positive for back pain.   Neurological:  Negative for numbness.       Past Medical History:   Diagnosis Date    Depression     Disease of thyroid gland     Hyperlipidemia     Migraines     On home O2 2021    3 lmp    Seizures        Allergies   Allergen Reactions    Ketorolac Other (See Comments)     pt states \"this medicine sends me into seizures\"    Sulfa Antibiotics Hives    Tramadol Hcl Mental Status Change    Adhesive Tape Rash     Paper tape       Past Surgical History:   Procedure Laterality Date    ABDOMINAL SURGERY       SECTION      x3    ENDOSCOPY N/A 2022    Procedure: EGD WITH DILATATION (BOUGIE #56) AND INJECTION OF BOTOX;  Surgeon: Rito Le MD;  Location: [facility] ENDOSCOPY;  Service: Gastroenterology;  Laterality: N/A;  POST: LOWER ESOPHAGEAL STRICTURE AND CRICOPHARYNGEAL STRICUTRE    ENDOSCOPY W/ PEG TUBE PLACEMENT N/A 2020    Procedure: ESOPHAGOGASTRODUODENOSCOPY WITH PERCUTANEOUS ENDOSCOPIC GASTROSTOMY TUBE INSERTION;  Surgeon: Rito Le MD;  Location: [facility] ENDOSCOPY;  Service: Gastroenterology;  Laterality: N/A;    HERNIA REPAIR      HYSTERECTOMY      PEG TUBE REMOVAL         Family History "   Problem Relation Age of Onset    No Known Problems Mother     No Known Problems Father     No Known Problems Sister     No Known Problems Brother     Alcohol abuse Daughter     No Known Problems Son     No Known Problems Maternal Grandmother     No Known Problems Paternal Grandmother     No Known Problems Maternal Aunt     No Known Problems Paternal Aunt        Social History     Socioeconomic History    Marital status:    Tobacco Use    Smoking status: Former     Current packs/day: 0.00     Average packs/day: 0.3 packs/day for 3.0 years (0.8 ttl pk-yrs)     Types: Cigarettes     Start date:      Quit date:      Years since quittin.2     Passive exposure: Past    Smokeless tobacco: Never   Vaping Use    Vaping status: Never Used   Substance and Sexual Activity    Alcohol use: No    Drug use: No    Sexual activity: Defer           Objective   Physical Exam  Alert [place] Coma Scale 15 an evasive historian   HEENT: Pupils equal and reactive to light. Conjunctivae are not injected. Normal tympanic membranes. Oropharynx and nares are normal.   Neck: Supple. Midline trachea. No JVD. No goiter.   Chest: Clear and equal breath sounds bilaterally, regular rate and rhythm without murmur or rub.   Abdomen: Positive bowel sounds, nontender, nondistended. No rebound or peritoneal signs. No CVA tenderness.   Extremities no clubbing. cyanosis or edema. Motor sensory exam is normal. The full range of motion is intact no active bleeding was noted there was no erythema or cellulitic changes noted at the insertion site no palpable cord or distal edema   Skin: Warm and dry, no rashes or petechia.   Lymphatic: No regional lymphadenopathy. No calf pain, swelling or Homans sign    Procedures       See PICC team documentation    ED Course      Labs Reviewed - No data to display  Medications - No data to display  No radiology results for the last day                                                   Medical Decision  Making  Patient was encouraged to follow-up with primary care provider and her home health assistant.  The patient was stable at discharge and vocalized understanding of discharge instruction worn        Final diagnoses:   Problem with vascular access   S/P PICC central line placement       ED Disposition  ED Disposition       ED Disposition   Discharge    Condition   Stable    Comment   --               Alvina Arzate, APRN  [address] [facility] [address]  [address] [place] [facility]  [phone]               Medication List      No changes were made to your prescriptions during this visit.            Randy Matthew MD  04/01/25 0573

## 2025-04-02 NOTE — CONSULTS
PICC Line Insertion Procedure Note    Procedure: Insertion of #4 FR/18G PICC    Indications:  Home IV therapy    Active Time Out:  Correct patient: Yes  Correct procedure: Yes  Correct site: Yes  Verified with: Pt's RN    Procedure Details:  Informed consent was obtained for the procedure.  Risk include, but are not limited to infection, air embolism, catheter tip moving, catheter blockage and phlebitis.     Maximum sterile technique was used including antiseptics, cap, gloves, gown, hand hygiene, mask, and sheet.    Ultrasound Guidance: Yes    #4 FR/18G PICC inserted to the R Upper Arm vein per hospital protocol by Josue Dunham RN.   Non-pulsatile blood return: yes    Lot #: [id number]  Expiration date: 2026-01-31    Complications:  none    Findings:  Catheter inserted to 37 cm, with 0 cm exposed.   Mid upper arm circumference is 32 cm.   Catheter was flushed with 20 cc NS and sterile dressing applied.  Patient tolerated procedure well.  PICC tip verified by:       [x] Sapiens 3cg       [] Chest X-ray    Recommendations:  Verbal and/or written Care/Maintenance instructions provided to patient.   Primary nurse notified.    Gilmar Dunham RN  04/01/25  23:09 EDT

## 2025-04-03 NOTE — PROGRESS NOTES
Subjective: Epilepsy and chronic migraine    Patient ID: Milka Espinoza is a 54 y.o. female.    History of Present Illness Ms. Espinoza is a 54-year-old  female with a BMI of ---- who presented today for follow-up on epilepsy  and migraine .  Ms. Mejia Hernandez followed up with her daughter Maty after being seen at the [facility].  She has had inpatient seizure monitoring which showed she does have some seizures and also has some pseudo seizures.  Since being placed on Lamictal and Keppra the patient's pseudo and real seizures have been completely controlled.  She presented today wearing a spinal brace stating she has an infection in her spine and is being treated.  The patient is quite lethargic and is on oxycodone 5 mg every 6 hours for her spinal pain.  Her daughter stated the 10 mg Are asleep all the time so she is decreased to half.  The patient states her current pain is controlled.    Migraines: The patient states she no longer has migraines.    Seizures: The patient was seen by Victor Manuel AGUILAR, epilepsy clinic and states that the patient did have a left seizure focus and could possibly have seizure surgery if they continue.  I have called and left a message for Victor Manuel asking who currently does the seizure surgery at [facility].  As the patient is currently controlled, we do not need to entertain surgery at this time.          Follow up on migraine  Medication: imitrex  Frequency-stopped  Duration?  Change in migraine-better    She was seen at [facility]Looks like they want her to be seen here???  It is likely appropriate to look at surgical options should her current seizures be epileptic in nature.   She is welcome to follow up with me or she can follow up with her current neurologist who she seems to like and trust very much.   No orders of the defined types were placed in this encounter.  No follow-ups on file.       Seizure Type: Grand mal/ Pseudo    Frequency: Rare  Last  Seizure: 1 month ago  Semiology change: No  Medication:               1.  Keppra 1500 twice daily              2.  Lamictal 150 twice daily  She is not taking Vimpat pharmacy not filling it  Any adverse effects of meds- no      The following portions of the patient's history were reviewed and updated as appropriate: allergies, current medications, past family history, past medical history, past social history, past surgical history and problem list.    Family History   Problem Relation Age of Onset    No Known Problems Mother     No Known Problems Father     No Known Problems Sister     No Known Problems Brother     Alcohol abuse Daughter     No Known Problems Son     No Known Problems Maternal Grandmother     No Known Problems Paternal Grandmother     No Known Problems Maternal Aunt     No Known Problems Paternal Aunt        Past Medical History:   Diagnosis Date    Depression     Disease of thyroid gland     Hyperlipidemia     Migraines     On home O2 2021    3 lmp    Seizures        Social History     Socioeconomic History    Marital status:    Tobacco Use    Smoking status: Former     Current packs/day: 0.00     Average packs/day: 0.3 packs/day for 3.0 years (0.8 ttl pk-yrs)     Types: Cigarettes     Start date:      Quit date:      Years since quittin.2     Passive exposure: Past    Smokeless tobacco: Never   Vaping Use    Vaping status: Never Used   Substance and Sexual Activity    Alcohol use: No    Drug use: No    Sexual activity: Defer         Current Outpatient Medications:     ALPRAZolam (XANAX) 2 MG tablet, TAKE 1 TABLET BY MOUTH THREE TIMES DAILY AS NEEDED FOR ANXIETY, Disp: 90 tablet, Rfl: 0    amitriptyline (ELAVIL) 25 MG tablet, TAKE 1 TABLET BY MOUTH EVERY NIGHT AT BEDTIME, Disp: 90 tablet, Rfl: 1    atorvastatin (LIPITOR) 20 MG tablet, Take 1 tablet by mouth Every Night., Disp: 90 tablet, Rfl: 3    BD PosiFlush 0.9 % flush, , Disp: , Rfl:     busPIRone (BUSPAR) 30 MG tablet,  TAKE 1 TABLET BY MOUTH TWICE DAILY, Disp: 180 tablet, Rfl: 1    cefepime (MAXIPIME) 2 g injection, , Disp: , Rfl:     cefepime 2000 mg IVPB in 100 mL NS (MBP), Infuse 2,000 mg into a venous catheter Every 8 (Eight) Hours for 113 doses. Indications: Bone and/or Joint Infection, discitis, Disp: , Rfl:     heparin 100 UNIT/ML solution injection, , Disp: , Rfl:     heparin 100 UNIT/ML solution injection, , Disp: , Rfl:     hydrOXYzine (ATARAX) 25 MG tablet, TAKE 1 TABLET BY MOUTH THREE TIMES DAILY AS NEEDED FOR ANXIETY, Disp: 90 tablet, Rfl: 2    lacosamide (VIMPAT) 200 MG tablet, Take 1 tablet by mouth Every 12 (Twelve) Hours., Disp: 60 tablet, Rfl: 5    lamoTRIgine (LaMICtal) 150 MG tablet, Take 1 tablet by mouth 2 (Two) Times a Day., Disp: 60 tablet, Rfl: 5    levETIRAcetam (KEPPRA) 500 MG tablet, Take 3 tablets by mouth 2 (Two) Times a Day., Disp: 540 tablet, Rfl: 3    levothyroxine (SYNTHROID, LEVOTHROID) 25 MCG tablet, Take 1 tablet by mouth Daily., Disp: 90 tablet, Rfl: 3    Motegrity 2 MG tablet, Take 1 tablet by mouth Daily., Disp: , Rfl:     naloxone (Narcan) 4 MG/0.1ML nasal spray, = 1 spray(s), Nostril-Both, Once, # 2 EA, 0 Refill(s), Pharmacy: [id number], 1 spray(s) Nostril-Both Once, 162, cm, 04/04/25 19:23:00 EDT, Height, 89, kg, 04/04/25 19:25:00 EDT, Weight Dosing, Disp: , Rfl:     nystatin 720301 UNIT/GM powder, Apply  topically to the appropriate area as directed 2 (Two) Times a Day., Disp: 30 g, Rfl: 1    Octagam 30 GM/300ML solution infusion, Infuse 30 g into a venous catheter 1 (One) Time Per Week., Disp: , Rfl:     ondansetron (ZOFRAN) 2 mg/mL injection, Infuse 2 mL into a venous catheter Every 6 (Six) Hours As Needed., Disp: , Rfl:     oxyCODONE (ROXICODONE) 10 MG tablet, Take 1 tablet by mouth Every 4 (Four) Hours As Needed for Moderate Pain., Disp: 60 tablet, Rfl: 0    pantoprazole (PROTONIX) 40 MG EC tablet, Take 1 tablet by mouth Daily., Disp: 90 tablet, Rfl: 3     promethazine (PHENERGAN) 25 MG tablet, Take 1 tablet by mouth Every 6 (Six) Hours As Needed for Nausea or Vomiting., Disp: , Rfl:     promethazine (PHENERGAN) 6.25 MG/5ML solution oral solution, Take 20 mL by mouth Every 6 (Six) Hours As Needed., Disp: , Rfl:     QUEtiapine XR (SEROquel XR) 400 MG 24 hr tablet, Take 2 tablets by mouth every night at bedtime., Disp: 180 tablet, Rfl: 1    sertraline (ZOLOFT) 100 MG tablet, Take 1 tablet by mouth Daily., Disp: 90 tablet, Rfl: 3    sodium chloride 0.9 % solution, , Disp: , Rfl:     SUMAtriptan (IMITREX) 100 MG tablet, TAKE 1 TABLET BY MOUTH 1 TIME AT ONSET OF MIGRAINE AS NEEDED FOR MIGRAINE. MAY REPEAT DOSE 1 TIME AFTER 2 HOURS IF MIGRAINE NOT RESOLVED, Disp: 9 tablet, Rfl: 11    tiZANidine (ZANAFLEX) 4 MG tablet, Take 1 tablet by mouth Every 8 (Eight) Hours As Needed for Muscle Spasms., Disp: 60 tablet, Rfl: 0    Vraylar 1.5 MG capsule capsule, TAKE 1 CAPSULE BY MOUTH EVERY DAY, Disp: 90 capsule, Rfl: 1    Review of Systems   Neurological:  Positive for seizures and headaches.   All other systems reviewed and are negative.         I have reviewed ROS completed by medical assistant.     Objective:      Neurological Exam  Mental Status  Drowsy. Oriented only to person, place, time and situation. Speech: Some slurred speech currently on oxycodone 5 mg every 4 to 6 hours for pain..    Cranial Nerves  CN II: Right visual acuity: Finger movement. Left visual acuity: Finger movement. Right normal visual field. Left normal visual field.  CN V:  Right: Facial sensation is normal.  CN VII:  Right: There is no facial weakness.  Left: There is no facial weakness.    Motor  Normal muscle bulk throughout. No fasciculations present.    Sensory  Light touch is normal in upper and lower extremities.     Gait  Casual gait is normal including stance, stride, and arm swing.  Currently has a spinal brace on.  Ambulates without difficulty.         Assessment/Plan:  Discussion:  At this time  I do not believe seizure surgery is warranted as the patient is controlled with medications.  Her migraine is controlled as well.    She is to continue seizure precautions:   The patient was told to observe all seizure precautions, including, but not limited to:   If a Seizures occurs: No driving until seizure free for more than 3 months  Avoid all high-risk activity, examples: Take showers instead of baths, avoid swimming without observation, Avoid open heat sources, Avoid working at heights, and Avoid engaging in all potentially hazardous activities , Avoid use of fire arms/hunting,    Patient expressed clear understanding.     Extensive clinic time counseling patient and family on the following topics: common seizure triggers (sleep deprivation, medication non-compliance, stress, fever, and drugs/alcohol), personal risk for recurrence, epilepsy specific safety issues, and seizure first aid. If events last longer than 5 minutes, if the patient has multiple events without return to baseline, or if the patient has a serious injury from seizure, I advised them to call 911 immediately.         Diagnoses and all orders for this visit:    1. Generalized epilepsy (Primary)  Comments:  Controlled with Lamictal and Keppra  Orders:  -     levETIRAcetam (KEPPRA) 500 MG tablet; Take 3 tablets by mouth 2 (Two) Times a Day.  Dispense: 540 tablet; Refill: 3  -     lamoTRIgine (LaMICtal) 150 MG tablet; Take 1 tablet by mouth 2 (Two) Times a Day.  Dispense: 60 tablet; Refill: 5    2. Psychogenic nonepileptic seizure  Comments:  Controlled with Lamictal    3. Migraine without aura and without status migrainosus, not intractable  Comments:  controlled with Imitrex          Return in about 6 months (around 10/7/2025) for Ana Rosa Weber .     I spent 38 minutes caring for Milka on this date of service. This time includes time spent by me in the following activities: preparing for the visit, reviewing tests, obtaining and/or  reviewing a separately obtained history, performing a medically appropriate examination and/or evaluation, counseling and educating the patient/family/caregiver, ordering medications, tests, or procedures, and documenting information in the medical record.      This document has been electronically signed by LAUREN Mojica on April 7, 2025 17:07 EDT

## 2025-04-07 ENCOUNTER — NURSE TRIAGE (OUTPATIENT)
Dept: CALL CENTER | Facility: HOSPITAL | Age: 55
End: 2025-04-07
Payer: MEDICARE

## 2025-04-07 ENCOUNTER — OFFICE VISIT (OUTPATIENT)
Dept: NEUROLOGY | Facility: CLINIC | Age: 55
End: 2025-04-07
Payer: MEDICARE

## 2025-04-07 VITALS
WEIGHT: 180 LBS | HEIGHT: 64 IN | BODY MASS INDEX: 30.73 KG/M2 | HEART RATE: 102 BPM | SYSTOLIC BLOOD PRESSURE: 98 MMHG | DIASTOLIC BLOOD PRESSURE: 64 MMHG

## 2025-04-07 DIAGNOSIS — G40.804 OTHER INTRACTABLE EPILEPSY WITHOUT STATUS EPILEPTICUS: Primary | ICD-10-CM

## 2025-04-07 DIAGNOSIS — F44.5 PSYCHOGENIC NONEPILEPTIC SEIZURE: ICD-10-CM

## 2025-04-07 DIAGNOSIS — G43.009 MIGRAINE WITHOUT AURA AND WITHOUT STATUS MIGRAINOSUS, NOT INTRACTABLE: Chronic | ICD-10-CM

## 2025-04-07 RX ORDER — LAMOTRIGINE 150 MG/1
150 TABLET ORAL 2 TIMES DAILY
Qty: 60 TABLET | Refills: 5 | Status: SHIPPED | OUTPATIENT
Start: 2025-04-07

## 2025-04-07 RX ORDER — LEVETIRACETAM 500 MG/1
1500 TABLET ORAL 2 TIMES DAILY
Qty: 540 TABLET | Refills: 3 | Status: SHIPPED | OUTPATIENT
Start: 2025-04-07

## 2025-04-07 RX ORDER — CEFEPIME HYDROCHLORIDE 2 G/1
INJECTION, POWDER, FOR SOLUTION INTRAVENOUS
COMMUNITY
Start: 2025-03-28

## 2025-04-07 NOTE — TELEPHONE ENCOUNTER
States has pain in the middle of her back, states son called EMS on her and they took them away from her, son was in charge of the pills and son got them to take away, EMS came couple days ago and took her to [facility] was DC that night    States pain been going on for couple weeks Triaged per protocol, office number provided states will call in am when they open

## 2025-04-07 NOTE — TELEPHONE ENCOUNTER
Reason for Disposition   Back pain present > 2 weeks    Additional Information   Negative: Passed out (i.e., lost consciousness, collapsed and was not responding)   Negative: Shock suspected (e.g., cold/pale/clammy skin, too weak to stand, low BP, rapid pulse)   Negative: Sounds like a life-threatening emergency to the triager   Negative: Major injury to the back (e.g., MVA, fall > 10 feet or 3 meters, penetrating injury, etc.)   Negative: Followed a tailbone injury   Negative: [1] Pain in the upper back over the ribs (rib cage) AND [2] radiates (travels, goes) into chest   Negative: [1] Pain in the upper back over the ribs (rib cage) AND [2] worsened by coughing (or clearly increases with breathing)   Negative: Back pain during pregnancy   Negative: Pain mainly in flank (i.e., in the side, over the lower ribs or just below the ribs)   Negative: [1] SEVERE back pain (e.g., excruciating) AND [2] sudden onset AND [3] age > 60 years   Negative: [1] Unable to urinate (or only a few drops) > 4 hours AND [2] bladder feels very full (e.g., palpable bladder or strong urge to urinate)   Negative: [1] Loss of bladder or bowel control (urine or bowel incontinence; wetting self, leaking stool) AND [2] new-onset   Negative: Numbness in groin or rectal area (i.e., loss of sensation)   Negative: [1] SEVERE abdominal pain AND [2] present > 1 hour   Negative: [1] Abdominal pain AND [2] age > 60 years   Negative: Weakness of a leg or foot (e.g., unable to bear weight, dragging foot)   Negative: Unable to walk   Negative: Patient sounds very sick or weak to the triager   Negative: [1] SEVERE back pain (e.g., excruciating, unable to do any normal activities) AND [2] not improved 2 hours after pain medicine   Negative: [1] Pain radiates into the thigh or further down the leg AND [2] both legs   Negative: [1] Fever > 100.0 F (37.8 C) AND [2] flank pain (i.e., in side, below ribs and above hip)   Negative: [1] Pain or burning with passing  "urine (urination) AND [2] flank pain (i.e., in side, below ribs and above hip)   Negative: Numbness in a leg or foot (i.e., loss of sensation)   Negative: [1] Numbness in an arm or hand (i.e., loss of sensation) AND [2] upper back pain   Negative: High-risk adult (e.g., history of cancer, HIV, or IV drug use)   Negative: Soft tissue infection (e.g., abscess, cellulitis) or other serious infection (e.g., bacteremia) in last 2 weeks   Negative: [1] Fever AND [2] no symptoms of UTI  (Exception: Has generalized muscle pains, not localized back pain.)   Negative: Rash in same area as pain (may be described as \"small blisters\")   Negative: Blood in urine (red, pink, or tea-colored)   Negative: [1] MODERATE back pain (e.g., interferes with normal activities) AND [2] present > 3 days   Negative: [1] Pain radiates into the thigh or further down the leg AND [2] one leg   Negative: [1] Age > 50 AND [2] no history of prior similar back pain   Negative: Back pain is a chronic symptom (recurrent or ongoing AND present > 4 weeks)   Negative: Caused by a twisting, bending, or lifting injury   Negative: Caused by overuse from recent vigorous activity (e.g., exercise, gardening, lifting and carrying, sports)   Negative: Back pain   Negative: Preventing back strain, questions about    Answer Assessment - Initial Assessment Questions  1. ONSET: \"When did the pain begin?\"       Back pain been going on for about 10 days   2. LOCATION: \"Where does it hurt?\" (upper, mid or lower back)      Mid back area had surgery there in the past   3. SEVERITY: \"How bad is the pain?\"  (e.g., Scale 1-10; mild, moderate, or severe)    - MILD (1-3): Doesn't interfere with normal activities.     - MODERATE (4-7): Interferes with normal activities or awakens from sleep.     - SEVERE (8-10): Excruciating pain, unable to do any normal activities.       Pain level 10   4. PATTERN: \"Is the pain constant?\" (e.g., yes, no; constant, intermittent)       Constant " "  5. RADIATION: \"Does the pain shoot into your legs or somewhere else?\"      No   6. CAUSE:  \"What do you think is causing the back pain?\"       The past surgery   7. BACK OVERUSE:  \"Any recent lifting of heavy objects, strenuous work or exercise?\"      no  8. MEDICINES: \"What have you taken so far for the pain?\" (e.g., nothing, acetaminophen, NSAIDS)      Tylenol     9. NEUROLOGIC SYMPTOMS: \"Do you have any weakness, numbness, or problems with bowel/bladder control?\"      no    10. OTHER SYMPTOMS: \"Do you have any other symptoms?\" (e.g., fever, abdomen pain, burning with urination, blood in urine)        Hard to walk   11. PREGNANCY: \"Is there any chance you are pregnant?\" \"When was your last menstrual period?\"        No    Protocols used: Back Pain-ADULT-AH    "

## 2025-04-08 ENCOUNTER — TELEPHONE (OUTPATIENT)
Dept: FAMILY MEDICINE CLINIC | Facility: CLINIC | Age: 55
End: 2025-04-08

## 2025-04-08 NOTE — PROGRESS NOTES
Called patient today, via telephone, to discuss safely getting her off her alprazolam. Our service received a call over this past weekend, through [facility], from an MD at the emergency department, that the patient had overdosed, unintentionally, taking opiates with her prescribed alprazolam.     Patient also recently had three same day cancels with our office.     I explained to the patient that due to the overdose, and her combining opiates with alprazolam, she will need to go to an inpatient hospital to detox off her alprazolam and opiates. I explained to her that is dangerous to quit the alprazolam abruptly, and she should present to inpatient detox this week, prior to running out of her current alprazolam prescription. I gave her possible options of [facility] or [facility]. She expressed understanding, and states she will go.     Advised patient due to missing three follow up appointments, she will also be submitted to be dismissed from the practice. She expressed understanding.

## 2025-04-15 ENCOUNTER — HOSPITAL ENCOUNTER (OUTPATIENT)
Facility: HOSPITAL | Age: 55
Setting detail: OBSERVATION
Discharge: HOME | End: 2025-04-17
Attending: EMERGENCY MEDICINE
Payer: MEDICARE

## 2025-04-15 DIAGNOSIS — M46.44 DISCITIS OF THORACIC REGION: ICD-10-CM

## 2025-04-15 DIAGNOSIS — S22.070D CLOSED WEDGE COMPRESSION FRACTURE OF T9 VERTEBRA WITH ROUTINE HEALING, SUBSEQUENT ENCOUNTER: ICD-10-CM

## 2025-04-15 DIAGNOSIS — M54.9 INTRACTABLE BACK PAIN: Primary | ICD-10-CM

## 2025-04-15 DIAGNOSIS — M46.24 OSTEOMYELITIS OF THORACIC SPINE: ICD-10-CM

## 2025-04-15 LAB
ANION GAP SERPL CALCULATED.3IONS-SCNC: 14.3 MMOL/L (ref 5–15)
BASOPHILS # BLD AUTO: 0.05 10*3/MM3 (ref 0–0.2)
BASOPHILS NFR BLD AUTO: 0.8 % (ref 0–1.5)
BUN SERPL-MCNC: 15 MG/DL (ref 6–20)
BUN/CREAT SERPL: 15.8 (ref 7–25)
CALCIUM SPEC-SCNC: 9.3 MG/DL (ref 8.6–10.5)
CHLORIDE SERPL-SCNC: 105 MMOL/L (ref 98–107)
CO2 SERPL-SCNC: 17.7 MMOL/L (ref 22–29)
CREAT SERPL-MCNC: 0.95 MG/DL (ref 0.57–1)
DEPRECATED RDW RBC AUTO: 46 FL (ref 37–54)
EGFRCR SERPLBLD CKD-EPI 2021: 70.5 ML/MIN/1.73
EOSINOPHIL # BLD AUTO: 0.05 10*3/MM3 (ref 0–0.4)
EOSINOPHIL NFR BLD AUTO: 0.8 % (ref 0.3–6.2)
ERYTHROCYTE [DISTWIDTH] IN BLOOD BY AUTOMATED COUNT: 15.3 % (ref 12.3–15.4)
GLUCOSE SERPL-MCNC: 105 MG/DL (ref 65–99)
HCT VFR BLD AUTO: 33.6 % (ref 34–46.6)
HGB BLD-MCNC: 10.3 G/DL (ref 12–15.9)
IMM GRANULOCYTES # BLD AUTO: 0.02 10*3/MM3 (ref 0–0.05)
IMM GRANULOCYTES NFR BLD AUTO: 0.3 % (ref 0–0.5)
LYMPHOCYTES # BLD AUTO: 2 10*3/MM3 (ref 0.7–3.1)
LYMPHOCYTES NFR BLD AUTO: 31.9 % (ref 19.6–45.3)
MCH RBC QN AUTO: 25.4 PG (ref 26.6–33)
MCHC RBC AUTO-ENTMCNC: 30.7 G/DL (ref 31.5–35.7)
MCV RBC AUTO: 82.8 FL (ref 79–97)
MONOCYTES # BLD AUTO: 0.4 10*3/MM3 (ref 0.1–0.9)
MONOCYTES NFR BLD AUTO: 6.4 % (ref 5–12)
NEUTROPHILS NFR BLD AUTO: 3.75 10*3/MM3 (ref 1.7–7)
NEUTROPHILS NFR BLD AUTO: 59.8 % (ref 42.7–76)
NRBC BLD AUTO-RTO: 0 /100 WBC (ref 0–0.2)
PLATELET # BLD AUTO: 279 10*3/MM3 (ref 140–450)
PMV BLD AUTO: 10.9 FL (ref 6–12)
POTASSIUM SERPL-SCNC: 3.4 MMOL/L (ref 3.5–5.2)
RBC # BLD AUTO: 4.06 10*6/MM3 (ref 3.77–5.28)
SODIUM SERPL-SCNC: 137 MMOL/L (ref 136–145)
WBC NRBC COR # BLD AUTO: 6.27 10*3/MM3 (ref 3.4–10.8)

## 2025-04-15 PROCEDURE — 96375 TX/PRO/DX INJ NEW DRUG ADDON: CPT

## 2025-04-15 PROCEDURE — 80048 BASIC METABOLIC PNL TOTAL CA: CPT | Performed by: EMERGENCY MEDICINE

## 2025-04-15 PROCEDURE — 25010000002 ONDANSETRON PER 1 MG: Performed by: EMERGENCY MEDICINE

## 2025-04-15 PROCEDURE — 25010000002 HYDROMORPHONE 1 MG/ML SOLUTION: Performed by: EMERGENCY MEDICINE

## 2025-04-15 PROCEDURE — 25010000002 MORPHINE PER 10 MG: Performed by: EMERGENCY MEDICINE

## 2025-04-15 PROCEDURE — 99285 EMERGENCY DEPT VISIT HI MDM: CPT

## 2025-04-15 PROCEDURE — 85025 COMPLETE CBC W/AUTO DIFF WBC: CPT | Performed by: EMERGENCY MEDICINE

## 2025-04-15 PROCEDURE — 25010000002 HYDROMORPHONE 1 MG/ML SOLUTION: Performed by: NURSE PRACTITIONER

## 2025-04-15 PROCEDURE — 96376 TX/PRO/DX INJ SAME DRUG ADON: CPT

## 2025-04-15 RX ORDER — OXYCODONE HYDROCHLORIDE 5 MG/1
10 TABLET ORAL EVERY 4 HOURS PRN
Refills: 0 | Status: DISCONTINUED | OUTPATIENT
Start: 2025-04-15 | End: 2025-04-17 | Stop reason: HOSPADM

## 2025-04-15 RX ORDER — ONDANSETRON 2 MG/ML
4 INJECTION INTRAMUSCULAR; INTRAVENOUS ONCE
Status: COMPLETED | OUTPATIENT
Start: 2025-04-15 | End: 2025-04-15

## 2025-04-15 RX ORDER — LIDOCAINE 4 G/G
2 PATCH TOPICAL NIGHTLY
Status: DISCONTINUED | OUTPATIENT
Start: 2025-04-15 | End: 2025-04-17 | Stop reason: HOSPADM

## 2025-04-15 RX ORDER — SODIUM CHLORIDE 0.9 % (FLUSH) 0.9 %
10 SYRINGE (ML) INJECTION AS NEEDED
Status: DISCONTINUED | OUTPATIENT
Start: 2025-04-15 | End: 2025-04-17 | Stop reason: HOSPADM

## 2025-04-15 RX ADMIN — HYDROMORPHONE HYDROCHLORIDE 1 MG: 1 INJECTION, SOLUTION INTRAMUSCULAR; INTRAVENOUS; SUBCUTANEOUS at 21:29

## 2025-04-15 RX ADMIN — HYDROMORPHONE HYDROCHLORIDE 1 MG: 1 INJECTION, SOLUTION INTRAMUSCULAR; INTRAVENOUS; SUBCUTANEOUS at 23:38

## 2025-04-15 RX ADMIN — TIZANIDINE 4 MG: 4 TABLET ORAL at 23:39

## 2025-04-15 RX ADMIN — ONDANSETRON 4 MG: 2 INJECTION, SOLUTION INTRAMUSCULAR; INTRAVENOUS at 21:29

## 2025-04-15 RX ADMIN — LIDOCAINE 2 PATCH: 4 PATCH TOPICAL at 23:39

## 2025-04-15 RX ADMIN — MORPHINE SULFATE 4 MG: 4 INJECTION, SOLUTION INTRAMUSCULAR; INTRAVENOUS at 23:02

## 2025-04-16 ENCOUNTER — TELEPHONE (OUTPATIENT)
Dept: NEUROSURGERY | Facility: CLINIC | Age: 55
End: 2025-04-16
Payer: MEDICARE

## 2025-04-16 ENCOUNTER — APPOINTMENT (OUTPATIENT)
Dept: MRI IMAGING | Facility: HOSPITAL | Age: 55
End: 2025-04-16
Payer: MEDICARE

## 2025-04-16 PROBLEM — M54.9 INTRACTABLE BACK PAIN: Status: ACTIVE | Noted: 2025-04-16

## 2025-04-16 LAB
ANION GAP SERPL CALCULATED.3IONS-SCNC: 12.8 MMOL/L (ref 5–15)
BASOPHILS # BLD AUTO: 0.03 10*3/MM3 (ref 0–0.2)
BASOPHILS NFR BLD AUTO: 0.5 % (ref 0–1.5)
BUN SERPL-MCNC: 14 MG/DL (ref 6–20)
BUN/CREAT SERPL: 15.1 (ref 7–25)
CALCIUM SPEC-SCNC: 9.3 MG/DL (ref 8.6–10.5)
CHLORIDE SERPL-SCNC: 103 MMOL/L (ref 98–107)
CO2 SERPL-SCNC: 20.2 MMOL/L (ref 22–29)
CREAT SERPL-MCNC: 0.93 MG/DL (ref 0.57–1)
DEPRECATED RDW RBC AUTO: 45.6 FL (ref 37–54)
EGFRCR SERPLBLD CKD-EPI 2021: 71.8 ML/MIN/1.73
EOSINOPHIL # BLD AUTO: 0.12 10*3/MM3 (ref 0–0.4)
EOSINOPHIL NFR BLD AUTO: 1.8 % (ref 0.3–6.2)
ERYTHROCYTE [DISTWIDTH] IN BLOOD BY AUTOMATED COUNT: 15.1 % (ref 12.3–15.4)
FLUAV RNA RESP QL NAA+PROBE: NOT DETECTED
FLUBV RNA RESP QL NAA+PROBE: NOT DETECTED
GLUCOSE SERPL-MCNC: 101 MG/DL (ref 65–99)
HCT VFR BLD AUTO: 33.5 % (ref 34–46.6)
HGB BLD-MCNC: 10.3 G/DL (ref 12–15.9)
IMM GRANULOCYTES # BLD AUTO: 0.01 10*3/MM3 (ref 0–0.05)
IMM GRANULOCYTES NFR BLD AUTO: 0.2 % (ref 0–0.5)
LYMPHOCYTES # BLD AUTO: 2.86 10*3/MM3 (ref 0.7–3.1)
LYMPHOCYTES NFR BLD AUTO: 43.7 % (ref 19.6–45.3)
MCH RBC QN AUTO: 25.4 PG (ref 26.6–33)
MCHC RBC AUTO-ENTMCNC: 30.7 G/DL (ref 31.5–35.7)
MCV RBC AUTO: 82.5 FL (ref 79–97)
MONOCYTES # BLD AUTO: 0.59 10*3/MM3 (ref 0.1–0.9)
MONOCYTES NFR BLD AUTO: 9 % (ref 5–12)
NEUTROPHILS NFR BLD AUTO: 2.94 10*3/MM3 (ref 1.7–7)
NEUTROPHILS NFR BLD AUTO: 44.8 % (ref 42.7–76)
NRBC BLD AUTO-RTO: 0 /100 WBC (ref 0–0.2)
PLATELET # BLD AUTO: 271 10*3/MM3 (ref 140–450)
PMV BLD AUTO: 10.9 FL (ref 6–12)
POTASSIUM SERPL-SCNC: 3.5 MMOL/L (ref 3.5–5.2)
QT INTERVAL: 376 MS
QTC INTERVAL: 452 MS
RBC # BLD AUTO: 4.06 10*6/MM3 (ref 3.77–5.28)
RSV RNA RESP QL NAA+PROBE: NOT DETECTED
SARS-COV-2 RNA RESP QL NAA+PROBE: NOT DETECTED
SODIUM SERPL-SCNC: 136 MMOL/L (ref 136–145)
WBC NRBC COR # BLD AUTO: 6.55 10*3/MM3 (ref 3.4–10.8)

## 2025-04-16 PROCEDURE — 93010 ELECTROCARDIOGRAM REPORT: CPT | Performed by: INTERNAL MEDICINE

## 2025-04-16 PROCEDURE — 96366 THER/PROPH/DIAG IV INF ADDON: CPT

## 2025-04-16 PROCEDURE — 25010000002 MORPHINE PER 10 MG

## 2025-04-16 PROCEDURE — 72146 MRI CHEST SPINE W/O DYE: CPT

## 2025-04-16 PROCEDURE — 87040 BLOOD CULTURE FOR BACTERIA: CPT | Performed by: NURSE PRACTITIONER

## 2025-04-16 PROCEDURE — 96365 THER/PROPH/DIAG IV INF INIT: CPT

## 2025-04-16 PROCEDURE — G0378 HOSPITAL OBSERVATION PER HR: HCPCS

## 2025-04-16 PROCEDURE — 96376 TX/PRO/DX INJ SAME DRUG ADON: CPT

## 2025-04-16 PROCEDURE — 25010000002 ONDANSETRON PER 1 MG: Performed by: NURSE PRACTITIONER

## 2025-04-16 PROCEDURE — 25010000002 CEFEPIME PER 500 MG: Performed by: NURSE PRACTITIONER

## 2025-04-16 PROCEDURE — 25010000002 HYDROMORPHONE 1 MG/ML SOLUTION: Performed by: NURSE PRACTITIONER

## 2025-04-16 PROCEDURE — 85025 COMPLETE CBC W/AUTO DIFF WBC: CPT | Performed by: NURSE PRACTITIONER

## 2025-04-16 PROCEDURE — 80048 BASIC METABOLIC PNL TOTAL CA: CPT | Performed by: NURSE PRACTITIONER

## 2025-04-16 PROCEDURE — 87637 SARSCOV2&INF A&B&RSV AMP PRB: CPT | Performed by: STUDENT IN AN ORGANIZED HEALTH CARE EDUCATION/TRAINING PROGRAM

## 2025-04-16 PROCEDURE — 93005 ELECTROCARDIOGRAM TRACING: CPT | Performed by: NURSE PRACTITIONER

## 2025-04-16 PROCEDURE — 99213 OFFICE O/P EST LOW 20 MIN: CPT

## 2025-04-16 PROCEDURE — 36415 COLL VENOUS BLD VENIPUNCTURE: CPT

## 2025-04-16 RX ORDER — ACETAMINOPHEN 325 MG/1
650 TABLET ORAL EVERY 4 HOURS PRN
Status: DISCONTINUED | OUTPATIENT
Start: 2025-04-16 | End: 2025-04-17 | Stop reason: HOSPADM

## 2025-04-16 RX ORDER — LACOSAMIDE 100 MG/1
200 TABLET ORAL EVERY 12 HOURS SCHEDULED
Status: DISCONTINUED | OUTPATIENT
Start: 2025-04-16 | End: 2025-04-17 | Stop reason: HOSPADM

## 2025-04-16 RX ORDER — BISACODYL 10 MG
10 SUPPOSITORY, RECTAL RECTAL DAILY PRN
Status: DISCONTINUED | OUTPATIENT
Start: 2025-04-16 | End: 2025-04-17 | Stop reason: HOSPADM

## 2025-04-16 RX ORDER — SODIUM CHLORIDE 0.9 % (FLUSH) 0.9 %
10 SYRINGE (ML) INJECTION AS NEEDED
Status: DISCONTINUED | OUTPATIENT
Start: 2025-04-16 | End: 2025-04-17 | Stop reason: HOSPADM

## 2025-04-16 RX ORDER — POLYETHYLENE GLYCOL 3350 17 G/17G
17 POWDER, FOR SOLUTION ORAL DAILY PRN
Status: DISCONTINUED | OUTPATIENT
Start: 2025-04-16 | End: 2025-04-17 | Stop reason: HOSPADM

## 2025-04-16 RX ORDER — PROMETHAZINE HYDROCHLORIDE 25 MG/1
25 TABLET ORAL EVERY 6 HOURS PRN
Status: DISCONTINUED | OUTPATIENT
Start: 2025-04-16 | End: 2025-04-17 | Stop reason: HOSPADM

## 2025-04-16 RX ORDER — ALPRAZOLAM 1 MG/1
2 TABLET ORAL 3 TIMES DAILY PRN
Status: DISCONTINUED | OUTPATIENT
Start: 2025-04-16 | End: 2025-04-17 | Stop reason: HOSPADM

## 2025-04-16 RX ORDER — BISACODYL 5 MG/1
5 TABLET, DELAYED RELEASE ORAL DAILY PRN
Status: DISCONTINUED | OUTPATIENT
Start: 2025-04-16 | End: 2025-04-17 | Stop reason: HOSPADM

## 2025-04-16 RX ORDER — SUMATRIPTAN 50 MG/1
100 TABLET, FILM COATED ORAL ONCE AS NEEDED
Status: DISCONTINUED | OUTPATIENT
Start: 2025-04-16 | End: 2025-04-17 | Stop reason: HOSPADM

## 2025-04-16 RX ORDER — SODIUM CHLORIDE 0.9 % (FLUSH) 0.9 %
10 SYRINGE (ML) INJECTION EVERY 12 HOURS SCHEDULED
Status: DISCONTINUED | OUTPATIENT
Start: 2025-04-16 | End: 2025-04-17 | Stop reason: HOSPADM

## 2025-04-16 RX ORDER — ACETAMINOPHEN 650 MG/1
650 SUPPOSITORY RECTAL EVERY 4 HOURS PRN
Status: DISCONTINUED | OUTPATIENT
Start: 2025-04-16 | End: 2025-04-17 | Stop reason: HOSPADM

## 2025-04-16 RX ORDER — LEVETIRACETAM 500 MG/1
1500 TABLET ORAL 2 TIMES DAILY
Status: DISCONTINUED | OUTPATIENT
Start: 2025-04-16 | End: 2025-04-17 | Stop reason: HOSPADM

## 2025-04-16 RX ORDER — AMOXICILLIN 250 MG
2 CAPSULE ORAL 2 TIMES DAILY
Status: DISCONTINUED | OUTPATIENT
Start: 2025-04-16 | End: 2025-04-17 | Stop reason: HOSPADM

## 2025-04-16 RX ORDER — ATORVASTATIN CALCIUM 20 MG/1
20 TABLET, FILM COATED ORAL NIGHTLY
Status: DISCONTINUED | OUTPATIENT
Start: 2025-04-16 | End: 2025-04-17 | Stop reason: HOSPADM

## 2025-04-16 RX ORDER — ONDANSETRON 2 MG/ML
4 INJECTION INTRAMUSCULAR; INTRAVENOUS EVERY 6 HOURS PRN
Status: ACTIVE | OUTPATIENT
Start: 2025-04-16 | End: 2025-04-17

## 2025-04-16 RX ORDER — PROMETHAZINE HYDROCHLORIDE 6.25 MG/5ML
25 SYRUP ORAL EVERY 6 HOURS PRN
Status: DISCONTINUED | OUTPATIENT
Start: 2025-04-16 | End: 2025-04-16

## 2025-04-16 RX ORDER — ACETAMINOPHEN 160 MG/5ML
650 SOLUTION ORAL EVERY 4 HOURS PRN
Status: DISCONTINUED | OUTPATIENT
Start: 2025-04-16 | End: 2025-04-17 | Stop reason: HOSPADM

## 2025-04-16 RX ORDER — ONDANSETRON 2 MG/ML
4 INJECTION INTRAMUSCULAR; INTRAVENOUS EVERY 6 HOURS PRN
Status: DISCONTINUED | OUTPATIENT
Start: 2025-04-16 | End: 2025-04-16

## 2025-04-16 RX ORDER — SERTRALINE HYDROCHLORIDE 100 MG/1
100 TABLET, FILM COATED ORAL DAILY
Status: DISCONTINUED | OUTPATIENT
Start: 2025-04-16 | End: 2025-04-17 | Stop reason: HOSPADM

## 2025-04-16 RX ORDER — SODIUM CHLORIDE 9 MG/ML
40 INJECTION, SOLUTION INTRAVENOUS AS NEEDED
Status: DISCONTINUED | OUTPATIENT
Start: 2025-04-16 | End: 2025-04-17 | Stop reason: HOSPADM

## 2025-04-16 RX ORDER — MORPHINE SULFATE 2 MG/ML
1 INJECTION, SOLUTION INTRAMUSCULAR; INTRAVENOUS ONCE
Status: COMPLETED | OUTPATIENT
Start: 2025-04-16 | End: 2025-04-16

## 2025-04-16 RX ORDER — HYDROXYZINE HYDROCHLORIDE 25 MG/1
25 TABLET, FILM COATED ORAL 3 TIMES DAILY PRN
Status: DISCONTINUED | OUTPATIENT
Start: 2025-04-16 | End: 2025-04-17 | Stop reason: HOSPADM

## 2025-04-16 RX ORDER — PANTOPRAZOLE SODIUM 40 MG/1
40 TABLET, DELAYED RELEASE ORAL DAILY
Status: DISCONTINUED | OUTPATIENT
Start: 2025-04-16 | End: 2025-04-17 | Stop reason: HOSPADM

## 2025-04-16 RX ORDER — BUSPIRONE HYDROCHLORIDE 15 MG/1
30 TABLET ORAL 2 TIMES DAILY
Status: DISCONTINUED | OUTPATIENT
Start: 2025-04-16 | End: 2025-04-17 | Stop reason: HOSPADM

## 2025-04-16 RX ORDER — ALUMINA, MAGNESIA, AND SIMETHICONE 2400; 2400; 240 MG/30ML; MG/30ML; MG/30ML
15 SUSPENSION ORAL EVERY 6 HOURS PRN
Status: DISCONTINUED | OUTPATIENT
Start: 2025-04-16 | End: 2025-04-17 | Stop reason: HOSPADM

## 2025-04-16 RX ORDER — LEVOTHYROXINE SODIUM 25 UG/1
25 TABLET ORAL
Status: DISCONTINUED | OUTPATIENT
Start: 2025-04-16 | End: 2025-04-17 | Stop reason: HOSPADM

## 2025-04-16 RX ADMIN — Medication 10 ML: at 03:40

## 2025-04-16 RX ADMIN — CARIPRAZINE 1.5 MG: 1.5 CAPSULE, GELATIN COATED ORAL at 08:26

## 2025-04-16 RX ADMIN — OXYCODONE 10 MG: 5 TABLET ORAL at 14:13

## 2025-04-16 RX ADMIN — HYDROMORPHONE HYDROCHLORIDE 1 MG: 1 INJECTION, SOLUTION INTRAMUSCULAR; INTRAVENOUS; SUBCUTANEOUS at 02:50

## 2025-04-16 RX ADMIN — ALPRAZOLAM 2 MG: 1 TABLET ORAL at 16:30

## 2025-04-16 RX ADMIN — TIZANIDINE 4 MG: 4 TABLET ORAL at 08:25

## 2025-04-16 RX ADMIN — TIZANIDINE 4 MG: 4 TABLET ORAL at 16:30

## 2025-04-16 RX ADMIN — CEFEPIME 2000 MG: 2 INJECTION, POWDER, FOR SOLUTION INTRAVENOUS at 11:00

## 2025-04-16 RX ADMIN — OXYCODONE 10 MG: 5 TABLET ORAL at 18:16

## 2025-04-16 RX ADMIN — Medication 10 ML: at 22:17

## 2025-04-16 RX ADMIN — BUSPIRONE HYDROCHLORIDE 30 MG: 15 TABLET ORAL at 08:26

## 2025-04-16 RX ADMIN — CEFEPIME 2000 MG: 2 INJECTION, POWDER, FOR SOLUTION INTRAVENOUS at 18:16

## 2025-04-16 RX ADMIN — ONDANSETRON 4 MG: 2 INJECTION, SOLUTION INTRAMUSCULAR; INTRAVENOUS at 01:22

## 2025-04-16 RX ADMIN — LIDOCAINE 2 PATCH: 4 PATCH TOPICAL at 05:42

## 2025-04-16 RX ADMIN — LACOSAMIDE 200 MG: 100 TABLET, FILM COATED ORAL at 08:26

## 2025-04-16 RX ADMIN — LEVOTHYROXINE SODIUM 25 MCG: 0.03 TABLET ORAL at 05:42

## 2025-04-16 RX ADMIN — PANTOPRAZOLE SODIUM 40 MG: 40 TABLET, DELAYED RELEASE ORAL at 08:26

## 2025-04-16 RX ADMIN — LAMOTRIGINE 150 MG: 100 TABLET ORAL at 08:25

## 2025-04-16 RX ADMIN — SENNOSIDES AND DOCUSATE SODIUM 2 TABLET: 50; 8.6 TABLET ORAL at 08:26

## 2025-04-16 RX ADMIN — ALPRAZOLAM 2 MG: 1 TABLET ORAL at 04:10

## 2025-04-16 RX ADMIN — OXYCODONE 10 MG: 5 TABLET ORAL at 00:50

## 2025-04-16 RX ADMIN — SERTRALINE HYDROCHLORIDE 100 MG: 100 TABLET, FILM COATED ORAL at 08:26

## 2025-04-16 RX ADMIN — OXYCODONE 10 MG: 5 TABLET ORAL at 05:42

## 2025-04-16 RX ADMIN — MORPHINE SULFATE 1 MG: 2 INJECTION, SOLUTION INTRAMUSCULAR; INTRAVENOUS at 08:38

## 2025-04-16 RX ADMIN — HYDROXYZINE HYDROCHLORIDE 25 MG: 25 TABLET, FILM COATED ORAL at 08:26

## 2025-04-16 RX ADMIN — Medication 10 ML: at 08:27

## 2025-04-16 RX ADMIN — LEVETIRACETAM 1500 MG: 500 TABLET, FILM COATED ORAL at 08:25

## 2025-04-16 RX ADMIN — CEFEPIME 2000 MG: 2 INJECTION, POWDER, FOR SOLUTION INTRAVENOUS at 03:39

## 2025-04-16 NOTE — PROGRESS NOTES
Nonbillable note  Patient seen and examined at bedside, resting comfortably and says pain is well-controlled.  She underwent MRI and results reviewed.  Neurosurgery consult placed to see if any surgical intervention is required.

## 2025-04-16 NOTE — NURSING NOTE
Patient arrived to the floor from the ED. It was reported that the staff was able to collect one set of blood cultures but unsuccessful with the 2nd set. Will attempt to obtain other set of cultures.

## 2025-04-16 NOTE — CASE MANAGEMENT/SOCIAL WORK
Discharge Planning Assessment   [facility]     Patient Name: Milka Espinoza  MRN: [id number]  Today's Date: 4/16/2025    Admit Date: 4/15/2025    Plan: Anticipate return home, current with [facility] for IV abx. [facility] can accept for nursing.   Discharge Needs Assessment       Row Name 04/16/25 1511       Living Environment    People in Home child(starla), adult    Name(s) of People in Home Son-Pawan    Current Living Arrangements home    Potentially Unsafe Housing Conditions none    In the past 12 months has the electric, gas, oil, or water company threatened to shut off services in your home? No    Primary Care Provided by self    Provides Primary Care For no one    Family Caregiver if Needed friend(s)    Family Caregiver Names Sulema    Quality of Family Relationships helpful;involved;supportive    Able to Return to Prior Arrangements yes       Resource/Environmental Concerns    Resource/Environmental Concerns none    Transportation Concerns none       Transportation Needs    In the past 12 months, has lack of transportation kept you from medical appointments or from getting medications? no    In the past 12 months, has lack of transportation kept you from meetings, work, or from getting things needed for daily living? No       Food Insecurity    Within the past 12 months, you worried that your food would run out before you got the money to buy more. Never true    Within the past 12 months, the food you bought just didn't last and you didn't have money to get more. Never true       Transition Planning    Patient/Family Anticipates Transition to home with help/services    Patient/Family Anticipated Services at Transition none    Transportation Anticipated family or friend will provide;car, drives self       Discharge Needs Assessment    Readmission Within the Last 30 Days other (see comments)  previous hospitalization 3/17/2025 - 3/22/2025    Current Outpatient/Agency/Support Group homecare agency;infusion therapy,  home    Equipment Currently Used at Home walker, rolling;shower chair    Concerns to be Addressed denies needs/concerns at this time;no discharge needs identified    Do you want help finding or keeping work or a job? Patient declined    Do you want help with school or training? For example, starting or completing job training or getting a high school diploma, GED or equivalent No    Anticipated Changes Related to Illness none    Equipment Needed After Discharge none    Outpatient/Agency/Support Group Needs homecare agency    Discharge Facility/Level of Care Needs home with home health    Provided Post Acute Provider List? N/A    Provided Post Acute Provider Quality & Resource List? N/A                   Discharge Plan       Row Name 04/16/25 5904       Plan    Plan Anticipate return home, current with [facility] for IV abx. [facility] can accept for nursing.    Patient/Family in Agreement with Plan yes    Plan Comments CM met with patient at bedside in shared room. Pt lives at home with her son, she drives occassionally. Reports her friend Sulema provides transportation when needed. Independent with ADL's. PCP and pharmacy confirmed ([facility]) - pt agreeable to use [facility]. DME includes walker and shower chair. Per chart review of patient's previous admission 3/17/2025 - 3/22/2025, pt was set up with home health services with [facility] and IV Cefepime through 4/29 with [facility]. Added to both baskets and liaisons Mi and Le notified. Pt says her friend's  Tino will transport home at [place]. RAJPUT letter provided and signature obtained. Awaiting NeuroSx consult. CM received update from  liaison Mi that per their records, pt was a nonadmit on April 9th. Le with [facility] reports they were not providing nursing care for labs/line care. CM to follow up tomorrow on if pt will be agreeable to [facility] providing nursing care vs. other agency vs. ambulatory care.                   Continued Care and Services - Admitted Since 4/15/2025       Dialysis/Infusion       Service Provider Request Status Services Address Phone Fax Patient Preferred    [facility] Accepted -- [id number] [id number] [phone] --              Home Medical Care       Service Provider Request Status Services Address Phone Fax Patient Preferred    [facility] Pending - Request Sent -- [address] [facility] [address] [phone] --                  Expected Discharge Date and Time       Expected Discharge Date Expected Discharge Time    Apr 18, 2025            Demographic Summary       Row Name 04/16/25 1511       General Information    Admission Type observation    Arrived From emergency department    Required Notices Provided Observation Status Notice    Referral Source admission list    Reason for Consult care coordination/care conference;discharge planning    Preferred Language English       Contact Information    Permission Granted to Share Info With                    Functional Status       Row Name 04/16/25 1511       Functional Status    Usual Activity Tolerance moderate    Current Activity Tolerance moderate       Functional Status, IADL    Medications independent    Meal Preparation independent    Housekeeping independent    Laundry independent    Shopping independent    If for any reason you need help with day-to-day activities such as bathing, preparing meals, shopping, managing finances, etc., do you get the help you need? I get all the help I need             Megan Naegele, RN     Office Phone: [phone]  Office Cell: [phone]

## 2025-04-16 NOTE — ED NOTES
Patient complains of back pain that's is worse then it ever has been. Patient had a spinal tap about a month ago and is being treated for bacterial meningitis.

## 2025-04-16 NOTE — H&P
[facility] Medicine Services    Hospitalist History and Physical     Milka Espinoza : 1970 MRN:[id number] LOS:0 ROOM:      Reason for admission: Intractable back pain     Assessment / Plan     Intractable thoracic back pain  Known Discitis/osteomyelitis of T6-T7 area, prior to admission   Known T5-T7 vertebral compression fractures  - WBC normal, afebrile  - previous admission pt was evaluated by neurosurgery and placed in TLSO brace for out of bed, no surgical intervention recommended at that time  - check MRI thoracic spine, then consider KAREN consultation  - resume home pain meds and add prn dilaudid x1 day  - check blood cultures  - cont home IV cefepime q8 hours   - PT eval    Seizure disorder  - Continue home Keppra, Lamictal, Vimpat    Bipolar disorder  - Continue home BuSpar, Elavil, Vraylar, Seroquel, Zoloft    GERD with gastroparesis   - cont ppi       Nutrition:   Diet: Regular/House; Fluid Consistency: Thin (IDDSI 0)     VTE Prophylaxis:  Mechanical VTE prophylaxis orders are present.      History of Present illness     Milka Espinoza is a 55 y.o. female with diagnosed with T6-T7 discitis with osteomyelitis suspected secondary to bacteremia and T5-T7 vertebral compression fractures in 2025 currently on IV cefepime q8 hours at home presented to [facility] ED 4/15/2025 with increasing thoracic back pain uncontrolled by home pain medication. She denied any numbness or tingling. She has no loss of bowel or bladder. She states she has been compliant with her cefepime at home. She ran out of zanaflex and feels she was not given very many tablets. She has been taking her oxycodone without relief.     In the ED pt had normal WBC, afebrile, vitals stable. She was admitted for further workup of intractable back pain.     Subjective / Review of systems     Review of Systems   Constitutional: Negative.    HENT: Negative.     Eyes: Negative.    Respiratory: Negative.      Cardiovascular: Negative.    Gastrointestinal: Negative.    Genitourinary: Negative.    Musculoskeletal:  Positive for back pain.   Skin: Negative.    Neurological: Negative.    Psychiatric/Behavioral: Negative.          Past Medical/Surgical/Social/Family History & Allergies     Past Medical History:   Diagnosis Date    Depression     Disease of thyroid gland     Hyperlipidemia     Migraines     On home O2 2021    3 lmp    Seizures       Past Surgical History:   Procedure Laterality Date    ABDOMINAL SURGERY       SECTION      x3    ENDOSCOPY N/A 2022    Procedure: EGD WITH DILATATION (BOUGIE #56) AND INJECTION OF BOTOX;  Surgeon: Rito Le MD;  Location: [facility] ENDOSCOPY;  Service: Gastroenterology;  Laterality: N/A;  POST: LOWER ESOPHAGEAL STRICTURE AND CRICOPHARYNGEAL STRICUTRE    ENDOSCOPY W/ PEG TUBE PLACEMENT N/A 2020    Procedure: ESOPHAGOGASTRODUODENOSCOPY WITH PERCUTANEOUS ENDOSCOPIC GASTROSTOMY TUBE INSERTION;  Surgeon: Rito Le MD;  Location: [facility] ENDOSCOPY;  Service: Gastroenterology;  Laterality: N/A;    HERNIA REPAIR      HYSTERECTOMY      PEG TUBE REMOVAL        Social History     Socioeconomic History    Marital status:    Tobacco Use    Smoking status: Former     Current packs/day: 0.00     Average packs/day: 0.3 packs/day for 3.0 years (0.8 ttl pk-yrs)     Types: Cigarettes     Start date:      Quit date:      Years since quittin.2     Passive exposure: Past    Smokeless tobacco: Never   Vaping Use    Vaping status: Never Used   Substance and Sexual Activity    Alcohol use: No    Drug use: No    Sexual activity: Defer      Family History   Problem Relation Age of Onset    No Known Problems Mother     No Known Problems Father     No Known Problems Sister     No Known Problems Brother     Alcohol abuse Daughter     No Known Problems Son     No Known Problems Maternal Grandmother     No Known Problems Paternal Grandmother     No Known  "Problems Maternal Aunt     No Known Problems Paternal Aunt       Allergies   Allergen Reactions    Ketorolac Other (See Comments)     pt states \"this medicine sends me into seizures\"    Sulfa Antibiotics Hives    Tramadol Hcl Mental Status Change    Topamax [Topiramate] Unknown - High Severity    Adhesive Tape Rash     Paper tape      Social Drivers of Health     Tobacco Use: Medium Risk (4/16/2025)    Patient History     Smoking Tobacco Use: Former     Smokeless Tobacco Use: Never     Passive Exposure: Past   Alcohol Use: Not At Risk (4/16/2025)    AUDIT-C     Frequency of Alcohol Consumption: Monthly or less     Average Number of Drinks: 1 or 2     Frequency of Binge Drinking: Never   Financial Resource Strain: Low Risk  (3/3/2025)    Overall Financial Resource Strain (CARDIA)     Difficulty of Paying Living Expenses: Not hard at all   Food Insecurity: No Food Insecurity (3/3/2025)    Hunger Vital Sign     Worried About Running Out of Food in the Last Year: Never true     Ran Out of Food in the Last Year: Never true   Transportation Needs: No Transportation Needs (3/3/2025)    PRAPARE - Transportation     Lack of Transportation (Medical): No     Lack of Transportation (Non-Medical): No   Physical Activity: Sufficiently Active (3/3/2025)    Exercise Vital Sign     Days of Exercise per Week: 7 days     Minutes of Exercise per Session: 60 min   Stress: Stress Concern Present (3/3/2025)    [employer] - Occupational Stress Questionnaire     Feeling of Stress : Very much   Social Connections: Not At Risk (3/18/2025)    Family and Community Support     Help with Day-to-Day Activities: I get all the help I need     Lonely or Isolated: Never   Interpersonal Safety: Not At Risk (4/16/2025)    Abuse Screen     Unsafe at Home or Work/School: no     Feels Threatened by Someone?: no     Does Anyone Keep You from Contacting Others or Doint Things Outside the Home?: no     Physical Sign of Abuse " Present: no   Depression: Not at risk (3/3/2025)    PHQ-2     PHQ-2 Score: 0   Recent Concern: Depression - At risk (12/31/2024)    PHQ-2     PHQ-2 Score: 12   Housing Stability: Not At Risk (4/16/2025)    Housing Stability     Current Living Arrangements: home     Potentially Unsafe Housing Conditions: none   Utilities: Not At Risk (3/3/2025)    [facility] Utilities     Threatened with loss of utilities: No   Health Literacy: Not At Risk (3/18/2025)    Education     Help with school or training?: No     Preferred Language: English   Employment: Not At Risk (3/3/2025)    Employment     Do you want help finding or keeping work or a job?: I do not need or want help   Disabilities: Not At Risk (4/16/2025)    Disabilities     Concentrating, Remembering, or Making Decisions Difficulty: no     Doing Errands Independently Difficulty: no        Home Medications     Prior to Admission medications    Medication Sig Start Date End Date Taking? Authorizing Provider   ALPRAZolam (XANAX) 2 MG tablet TAKE 1 TABLET BY MOUTH THREE TIMES DAILY AS NEEDED FOR ANXIETY 3/27/25  Yes Milka Pavon APRN   amitriptyline (ELAVIL) 25 MG tablet TAKE 1 TABLET BY MOUTH EVERY NIGHT AT BEDTIME 3/6/23  Yes Safia Reyna APRN   atorvastatin (LIPITOR) 20 MG tablet Take 1 tablet by mouth Every Night. 1/17/25  Yes Alvina Arzate APRN   busPIRone (BUSPAR) 30 MG tablet TAKE 1 TABLET BY MOUTH TWICE DAILY 12/18/24  Yes Milka Pavon APRN   cefepime 2000 mg IVPB in 100 mL NS (MBP) Infuse 2,000 mg into a venous catheter Every 8 (Eight) Hours for 113 doses. Indications: Bone and/or Joint Infection, discitis 3/22/25 4/29/25 Yes Eddie Conrad MD   hydrOXYzine (ATARAX) 25 MG tablet TAKE 1 TABLET BY MOUTH THREE TIMES DAILY AS NEEDED FOR ANXIETY 3/16/25  Yes Milka Pavon APRN   lacosamide (VIMPAT) 200 MG tablet Take 1 tablet by mouth Every 12 (Twelve) Hours. 7/24/24  Yes Ana Rosa Weber APRN   lamoTRIgine (LaMICtal) 150 MG  tablet Take 1 tablet by mouth 2 (Two) Times a Day. 4/7/25  Yes Ana Rosa Weber APRN   levETIRAcetam (KEPPRA) 500 MG tablet Take 3 tablets by mouth 2 (Two) Times a Day. 4/7/25  Yes Ana Rosa eWber APRN   levothyroxine (SYNTHROID, LEVOTHROID) 25 MCG tablet Take 1 tablet by mouth Daily. 1/17/25  Yes Alvina Arzate APRN   Motegrity 2 MG tablet Take 1 tablet by mouth Daily. 5/17/21  Yes Ritesh Funse MD   naloxone (Narcan) 4 MG/0.1ML nasal spray = 1 spray(s), Nostril-Both, Once, # 2 EA, 0 Refill(s), Pharmacy: [id number], 1 spray(s) Nostril-Both Once, 162, cm, 04/04/25 19:23:00 EDT, Height, 89, kg, 04/04/25 19:25:00 EDT, Weight Dosing 4/4/25  Yes Ritesh Funes MD   nystatin 788268 UNIT/GM powder Apply  topically to the appropriate area as directed 2 (Two) Times a Day. 9/9/24  Yes Alvina Arzate APRN   ondansetron (ZOFRAN) 2 mg/mL injection Infuse 2 mL into a venous catheter Every 6 (Six) Hours As Needed. 9/21/23  Yes Ritesh Funes MD   oxyCODONE (ROXICODONE) 10 MG tablet Take 1 tablet by mouth Every 4 (Four) Hours As Needed for Moderate Pain. 3/22/25  Yes Eddie Conrad MD   pantoprazole (PROTONIX) 40 MG EC tablet Take 1 tablet by mouth Daily. 3/22/25  Yes Eddie Conrad MD   promethazine (PHENERGAN) 25 MG tablet Take 1 tablet by mouth Every 6 (Six) Hours As Needed for Nausea or Vomiting. 11/25/24  Yes Ritesh Funes MD   promethazine (PHENERGAN) 6.25 MG/5ML solution oral solution Take 20 mL by mouth Every 6 (Six) Hours As Needed. 11/6/24  Yes Ritesh Funes MD   QUEtiapine XR (SEROquel XR) 400 MG 24 hr tablet Take 2 tablets by mouth every night at bedtime. 12/31/24  Yes Milka Pavon APRN   sertraline (ZOLOFT) 100 MG tablet Take 1 tablet by mouth Daily. 1/17/25 1/17/26 Yes Alvina Arzate APRN   SUMAtriptan (IMITREX) 100 MG tablet TAKE 1 TABLET BY MOUTH 1 TIME AT ONSET OF MIGRAINE AS NEEDED FOR MIGRAINE. MAY REPEAT DOSE 1 TIME AFTER  "2 HOURS IF MIGRAINE NOT RESOLVED 2/3/25  Yes Ana Rosa Weber APRN   tiZANidine (ZANAFLEX) 4 MG tablet Take 1 tablet by mouth Every 8 (Eight) Hours As Needed for Muscle Spasms. 3/22/25  Yes Eddie Conrad MD   Vraylar 1.5 MG capsule capsule TAKE 1 CAPSULE BY MOUTH EVERY DAY 1/19/25  Yes Milka Pavon APRN   Octagam 30 GM/300ML solution infusion Infuse 30 g into a venous catheter 1 (One) Time Per Week. 12/26/24   Provider, MD Ritesh        Objective / Physical Exam     Vital signs:  Temp: 98.1 °F (36.7 °C)  BP: 124/86  Heart Rate: 97  Resp: 15  SpO2: 93 %  Weight: 81 kg (178 lb 9.2 oz)    Admission Weight: Weight: 81 kg (178 lb 9.2 oz)    Physical Exam  Vitals and nursing note reviewed.   HENT:      Head: Normocephalic and atraumatic.   Eyes:      Extraocular Movements: Extraocular movements intact.      Pupils: Pupils are equal, round, and reactive to light.   Cardiovascular:      Rate and Rhythm: Normal rate and regular rhythm.      Pulses: Normal pulses.      Heart sounds: Normal heart sounds.   Pulmonary:      Effort: Pulmonary effort is normal.      Breath sounds: Normal breath sounds.   Abdominal:      General: Bowel sounds are normal.      Palpations: Abdomen is soft.      Tenderness: There is no abdominal tenderness.   Musculoskeletal:         General: Normal range of motion.      Comments: Mid thoracic back pain  Moves all extremities    Skin:     General: Skin is warm and dry.   Neurological:      Mental Status: She is alert and oriented to person, place, and time.   Psychiatric:         Mood and Affect: Mood normal.         Behavior: Behavior normal.          Labs     Results from last 7 days   Lab Units 04/15/25  2125   WBC 10*3/mm3 6.27   HEMOGLOBIN g/dL 10.3*   HEMATOCRIT % 33.6*   PLATELETS 10*3/mm3 279            Invalid input(s): \"BILI TOTAL\"        Results from last 7 days   Lab Units 04/15/25  2125   SODIUM mmol/L 137   POTASSIUM mmol/L 3.4*   CHLORIDE mmol/L 105   CO2 mmol/L " 17.7*   BUN mg/dL 15   CREATININE mg/dL 0.95   GLUCOSE mg/dL 105*        Current Medications     Scheduled Meds:  amitriptyline, 25 mg, Oral, Nightly  atorvastatin, 20 mg, Oral, Nightly  busPIRone, 30 mg, Oral, BID  Cariprazine HCl, 1.5 mg, Oral, Daily  cefepime 2000 mg IVPB in 100 mL NS (MBP), 2,000 mg, Intravenous, Q8H  HYDROmorphone, 1 mg, Intravenous, Once  lacosamide, 200 mg, Oral, Q12H  lamoTRIgine, 150 mg, Oral, BID  levETIRAcetam, 1,500 mg, Oral, BID  levothyroxine, 25 mcg, Oral, Q AM  Lidocaine, 2 patch, Transdermal, Nightly  miconazole, 1 Application, Topical, Q12H  pantoprazole, 40 mg, Oral, Daily  QUEtiapine XR, 800 mg, Oral, Nightly  senna-docusate sodium, 2 tablet, Oral, BID  sertraline, 100 mg, Oral, Daily  sodium chloride, 10 mL, Intravenous, Q12H           Indy Lange [facility]  04/16/25   02:16 EDT

## 2025-04-16 NOTE — NURSING NOTE
It was reported that the patient is to be wearing TSLO brace when getting up. Patient did not have a brace on when she arrived to the floor. When this nurse asked patient where her brace was she said that she left it at home. This nurse as well as charge nurse told patient to have her family bring her the brace and not to get up until she had the brace.

## 2025-04-16 NOTE — ED PROVIDER NOTES
"Subjective   History of Present Illness  Chief complaint: Back pain    54-year-old female presents with back pain.  Patient is currently being treated for osteomyelitis/discitis at the T6/T7 level.  She has a PICC line in the right arm and is receiving IV antibiotics.  She states her back pain has gotten worse over the past couple days.  She has been taking oxycodone at home with no improvement.  She denies fever.  She denies any numbness, weakness, tingling.  She states it is just the pain that has been bothering her.    History provided by:  Patient      Review of Systems   Constitutional:  Negative for fever.   HENT:  Negative for congestion.    Respiratory:  Negative for cough and shortness of breath.    Cardiovascular:  Negative for chest pain.   Gastrointestinal:  Negative for abdominal pain and vomiting.   Genitourinary:  Negative for difficulty urinating.   Musculoskeletal:  Positive for back pain.   Neurological:  Negative for weakness, numbness and headaches.       Past Medical History:   Diagnosis Date    Depression     Disease of thyroid gland     Hyperlipidemia     Migraines     On home O2 2021    3 lmp    Seizures        Allergies   Allergen Reactions    Ketorolac Other (See Comments)     pt states \"this medicine sends me into seizures\"    Sulfa Antibiotics Hives    Tramadol Hcl Mental Status Change    Topamax [Topiramate] Unknown - High Severity    Adhesive Tape Rash     Paper tape       Past Surgical History:   Procedure Laterality Date    ABDOMINAL SURGERY       SECTION      x3    ENDOSCOPY N/A 2022    Procedure: EGD WITH DILATATION (BOUGIE #56) AND INJECTION OF BOTOX;  Surgeon: Rito Le MD;  Location: [facility] ENDOSCOPY;  Service: Gastroenterology;  Laterality: N/A;  POST: LOWER ESOPHAGEAL STRICTURE AND CRICOPHARYNGEAL STRICUTRE    ENDOSCOPY W/ PEG TUBE PLACEMENT N/A 2020    Procedure: ESOPHAGOGASTRODUODENOSCOPY WITH PERCUTANEOUS ENDOSCOPIC GASTROSTOMY TUBE INSERTION;  " "Surgeon: Rito Le MD;  Location: [facility] ENDOSCOPY;  Service: Gastroenterology;  Laterality: N/A;    HERNIA REPAIR      HYSTERECTOMY      PEG TUBE REMOVAL         Family History   Problem Relation Age of Onset    No Known Problems Mother     No Known Problems Father     No Known Problems Sister     No Known Problems Brother     Alcohol abuse Daughter     No Known Problems Son     No Known Problems Maternal Grandmother     No Known Problems Paternal Grandmother     No Known Problems Maternal Aunt     No Known Problems Paternal Aunt        Social History     Socioeconomic History    Marital status:    Tobacco Use    Smoking status: Former     Current packs/day: 0.00     Average packs/day: 0.3 packs/day for 3.0 years (0.8 ttl pk-yrs)     Types: Cigarettes     Start date:      Quit date:      Years since quittin.2     Passive exposure: Past    Smokeless tobacco: Never   Vaping Use    Vaping status: Never Used   Substance and Sexual Activity    Alcohol use: No    Drug use: No    Sexual activity: Defer       /66   Pulse 89   Temp 97.9 °F (36.6 °C)   Resp 20   Ht 162.6 cm (64\")   Wt 81 kg (178 lb 9.2 oz)   SpO2 95%   BMI 30.65 kg/m²       Objective   Physical Exam  Vitals and nursing note reviewed.   Constitutional:       Appearance: Normal appearance.   HENT:      Head: Normocephalic and atraumatic.      Mouth/Throat:      Mouth: Mucous membranes are moist.   Cardiovascular:      Rate and Rhythm: Normal rate and regular rhythm.      Heart sounds: Normal heart sounds.   Pulmonary:      Effort: Pulmonary effort is normal. No respiratory distress.      Breath sounds: Normal breath sounds.   Abdominal:      Palpations: Abdomen is soft.      Tenderness: There is no abdominal tenderness.   Musculoskeletal:      Comments: There is tenderness throughout the thoracic and lumbar spine with no specific point tenderness   Skin:     General: Skin is warm and dry.   Neurological:      General: No " focal deficit present.      Mental Status: She is alert and oriented to person, place, and time.         Procedures           ED Course      Results for orders placed or performed during the hospital encounter of 04/15/25   Basic Metabolic Panel    Collection Time: 04/15/25  9:25 PM    Specimen: Blood   Result Value Ref Range    Glucose 105 (H) 65 - 99 mg/dL    BUN 15 6 - 20 mg/dL    Creatinine 0.95 0.57 - 1.00 mg/dL    Sodium 137 136 - 145 mmol/L    Potassium 3.4 (L) 3.5 - 5.2 mmol/L    Chloride 105 98 - 107 mmol/L    CO2 17.7 (L) 22.0 - 29.0 mmol/L    Calcium 9.3 8.6 - 10.5 mg/dL    BUN/Creatinine Ratio 15.8 7.0 - 25.0    Anion Gap 14.3 5.0 - 15.0 mmol/L    eGFR 70.5 >60.0 mL/min/1.73   CBC Auto Differential    Collection Time: 04/15/25  9:25 PM    Specimen: Blood   Result Value Ref Range    WBC 6.27 3.40 - 10.80 10*3/mm3    RBC 4.06 3.77 - 5.28 10*6/mm3    Hemoglobin 10.3 (L) 12.0 - 15.9 g/dL    Hematocrit 33.6 (L) 34.0 - 46.6 %    MCV 82.8 79.0 - 97.0 fL    MCH 25.4 (L) 26.6 - 33.0 pg    MCHC 30.7 (L) 31.5 - 35.7 g/dL    RDW 15.3 12.3 - 15.4 %    RDW-SD 46.0 37.0 - 54.0 fl    MPV 10.9 6.0 - 12.0 fL    Platelets 279 140 - 450 10*3/mm3    Neutrophil % 59.8 42.7 - 76.0 %    Lymphocyte % 31.9 19.6 - 45.3 %    Monocyte % 6.4 5.0 - 12.0 %    Eosinophil % 0.8 0.3 - 6.2 %    Basophil % 0.8 0.0 - 1.5 %    Immature Grans % 0.3 0.0 - 0.5 %    Neutrophils, Absolute 3.75 1.70 - 7.00 10*3/mm3    Lymphocytes, Absolute 2.00 0.70 - 3.10 10*3/mm3    Monocytes, Absolute 0.40 0.10 - 0.90 10*3/mm3    Eosinophils, Absolute 0.05 0.00 - 0.40 10*3/mm3    Basophils, Absolute 0.05 0.00 - 0.20 10*3/mm3    Immature Grans, Absolute 0.02 0.00 - 0.05 10*3/mm3    nRBC 0.0 0.0 - 0.2 /100 WBC                                                      Medical Decision Making  Amount and/or Complexity of Data Reviewed  Labs: ordered.    Risk  Prescription drug management.      Patient had the above valuation.  Results were discussed with the patient.   White blood cell count is normal.  BMP is unremarkable.  Patient was given Dilaudid for her pain initially.  She reported some improvement but pain was still not well-controlled.  She was given a dose of morphine.  I discussed with the nurse practitioner on-call for the hospitalist and the patient will be admitted for further evaluation and management.      Final diagnoses:   Intractable back pain   Osteomyelitis of thoracic spine       ED Disposition  ED Disposition       ED Disposition   Decision to Admit    Condition   --    Comment   Level of Care: Med/Surg [1]   Admitting Physician: CRISSY JOHNSON [[id number]]   Attending Physician: CRISSY JOHNSON [[id number]]                 No follow-up provider specified.       Medication List      No changes were made to your prescriptions during this visit.            Jose Klein MD  04/15/25 [id number]

## 2025-04-16 NOTE — PLAN OF CARE
Problem: Adult Inpatient Plan of Care  Goal: Plan of Care Review  Outcome: Not Progressing  Goal: Patient-Specific Goal (Individualized)  Outcome: Not Progressing  Goal: Absence of Hospital-Acquired Illness or Injury  Outcome: Not Progressing  Intervention: Identify and Manage Fall Risk  Recent Flowsheet Documentation  Taken 4/16/2025 0010 by Ana Paula Velazquez RN  Safety Promotion/Fall Prevention: safety round/check completed  Intervention: Prevent Skin Injury  Recent Flowsheet Documentation  Taken 4/16/2025 0010 by Ana Paula Velazquez RN  Body Position: position changed independently  Skin Protection: pulse oximeter probe site changed  Intervention: Prevent and Manage VTE (Venous Thromboembolism) Risk  Recent Flowsheet Documentation  Taken 4/16/2025 0010 by Ana Paula Velazquez RN  VTE Prevention/Management:   bilateral   SCDs (sequential compression devices) off   patient refused intervention  Goal: Optimal Comfort and Wellbeing  Outcome: Not Progressing  Intervention: Monitor Pain and Promote Comfort  Recent Flowsheet Documentation  Taken 4/16/2025 0010 by Ana Paula Velazquez RN  Pain Management Interventions: pain management plan reviewed with patient/caregiver  Intervention: Provide Person-Centered Care  Recent Flowsheet Documentation  Taken 4/16/2025 0010 by Ana Paula Velazquez RN  Trust Relationship/Rapport:   care explained   choices provided   thoughts/feelings acknowledged  Goal: Readiness for Transition of Care  Outcome: Not Progressing  Intervention: Mutually Develop Transition Plan  Recent Flowsheet Documentation  Taken 4/16/2025 0032 by Ana Paula Velazquez RN  Equipment Currently Used at Home: none  Transportation Anticipated:   family or friend will provide   car, drives self  Patient/Family Anticipated Services at Transition: none  Patient/Family Anticipates Transition to: home with family   Goal Outcome Evaluation:

## 2025-04-16 NOTE — CONSULTS
[facility] NEUROSURGERY CONSULT NOTE    Patient name: Milka Espinoza  Referring Provider: Shy Henson MD   Reason for Consultation: intractable back pain     Patient Care Team:  Alvina Arzate APRN as PCP - General (Nurse Practitioner)    Chief complaint: Back pain     Subjective .     History of present illness:    Patient is a 55 y.o. female who was recently seen by my colleague Gia Vital a few weeks ago for back pain. It turned out she has discitis at T6/7 in addition to a compression fracture at T9. Patient was to start antibiotics per ID and wear her TLSO brace for eight weeks. Patient returns today for worsening back pain. Patient is not wearing her brace, stating that she didn't realize she had to wear it at all times. She states that her pain has not gotten better since she was in the hospital last. She reports weakness in her legs, but no pain or numbness/tingling, bowel/bladder incontinence or saddle anesthesia.     Review of Systems  Review of Systems   Constitutional:  Positive for activity change.   Musculoskeletal:  Positive for back pain.   Neurological:  Positive for weakness.       History  PAST MEDICAL HISTORY  Past Medical History:   Diagnosis Date    Depression     Disease of thyroid gland     Hyperlipidemia     Migraines     On home O2 2021    3 lmp    Seizures        PAST SURGICAL HISTORY  Past Surgical History:   Procedure Laterality Date    ABDOMINAL SURGERY       SECTION      x3    ENDOSCOPY N/A 2022    Procedure: EGD WITH DILATATION (BOUGIE #56) AND INJECTION OF BOTOX;  Surgeon: Rito Le MD;  Location: [facility] ENDOSCOPY;  Service: Gastroenterology;  Laterality: N/A;  POST: LOWER ESOPHAGEAL STRICTURE AND CRICOPHARYNGEAL STRICUTRE    ENDOSCOPY W/ PEG TUBE PLACEMENT N/A 2020    Procedure: ESOPHAGOGASTRODUODENOSCOPY WITH PERCUTANEOUS ENDOSCOPIC GASTROSTOMY TUBE INSERTION;  Surgeon: Rito Le MD;  Location: [facility] ENDOSCOPY;  Service:  Gastroenterology;  Laterality: N/A;    HERNIA REPAIR      HYSTERECTOMY      PEG TUBE REMOVAL         FAMILY HISTORY  Family History   Problem Relation Age of Onset    No Known Problems Mother     No Known Problems Father     No Known Problems Sister     No Known Problems Brother     Alcohol abuse Daughter     No Known Problems Son     No Known Problems Maternal Grandmother     No Known Problems Paternal Grandmother     No Known Problems Maternal Aunt     No Known Problems Paternal Aunt        SOCIAL HISTORY  Social History     Tobacco Use    Smoking status: Former     Current packs/day: 0.00     Average packs/day: 0.3 packs/day for 3.0 years (0.8 ttl pk-yrs)     Types: Cigarettes     Start date:      Quit date:      Years since quittin.2     Passive exposure: Past    Smokeless tobacco: Never   Vaping Use    Vaping status: Never Used   Substance Use Topics    Alcohol use: No    Drug use: No         Allergies:  Ketorolac, Sulfa antibiotics, Tramadol hcl, Topamax [topiramate], and Adhesive tape    MEDICATIONS:  Medications Prior to Admission   Medication Sig Dispense Refill Last Dose/Taking    ALPRAZolam (XANAX) 2 MG tablet TAKE 1 TABLET BY MOUTH THREE TIMES DAILY AS NEEDED FOR ANXIETY 90 tablet 0 Taking As Needed    amitriptyline (ELAVIL) 25 MG tablet TAKE 1 TABLET BY MOUTH EVERY NIGHT AT BEDTIME 90 tablet 1 Taking    atorvastatin (LIPITOR) 20 MG tablet Take 1 tablet by mouth Every Night. 90 tablet 3 Taking    busPIRone (BUSPAR) 30 MG tablet TAKE 1 TABLET BY MOUTH TWICE DAILY 180 tablet 1 Taking    cefepime 2000 mg IVPB in 100 mL NS (MBP) Infuse 2,000 mg into a venous catheter Every 8 (Eight) Hours for 113 doses. Indications: Bone and/or Joint Infection, discitis   Taking    hydrOXYzine (ATARAX) 25 MG tablet TAKE 1 TABLET BY MOUTH THREE TIMES DAILY AS NEEDED FOR ANXIETY 90 tablet 2 Taking As Needed    lacosamide (VIMPAT) 200 MG tablet Take 1 tablet by mouth Every 12 (Twelve) Hours. 60 tablet 5 Taking     lamoTRIgine (LaMICtal) 150 MG tablet Take 1 tablet by mouth 2 (Two) Times a Day. 60 tablet 5 Taking    levETIRAcetam (KEPPRA) 500 MG tablet Take 3 tablets by mouth 2 (Two) Times a Day. 540 tablet 3 Taking    levothyroxine (SYNTHROID, LEVOTHROID) 25 MCG tablet Take 1 tablet by mouth Daily. 90 tablet 3 Taking    Motegrity 2 MG tablet Take 1 tablet by mouth Daily.   Taking    naloxone (Narcan) 4 MG/0.1ML nasal spray = 1 spray(s), Nostril-Both, Once, # 2 EA, 0 Refill(s), Pharmacy: [id number], 1 spray(s) Nostril-Both Once, 162, cm, 04/04/25 19:23:00 EDT, Height, 89, kg, 04/04/25 19:25:00 EDT, Weight Dosing   Taking    nystatin 200342 UNIT/GM powder Apply  topically to the appropriate area as directed 2 (Two) Times a Day. 30 g 1 Taking    ondansetron (ZOFRAN) 2 mg/mL injection Infuse 2 mL into a venous catheter Every 6 (Six) Hours As Needed.   Taking As Needed    oxyCODONE (ROXICODONE) 10 MG tablet Take 1 tablet by mouth Every 4 (Four) Hours As Needed for Moderate Pain. 60 tablet 0 Taking As Needed    pantoprazole (PROTONIX) 40 MG EC tablet Take 1 tablet by mouth Daily. 90 tablet 3 Taking    promethazine (PHENERGAN) 25 MG tablet Take 1 tablet by mouth Every 6 (Six) Hours As Needed for Nausea or Vomiting.   Taking As Needed    promethazine (PHENERGAN) 6.25 MG/5ML solution oral solution Take 20 mL by mouth Every 6 (Six) Hours As Needed.   Taking As Needed    QUEtiapine XR (SEROquel XR) 400 MG 24 hr tablet Take 2 tablets by mouth every night at bedtime. 180 tablet 1 Taking    sertraline (ZOLOFT) 100 MG tablet Take 1 tablet by mouth Daily. 90 tablet 3 Taking    SUMAtriptan (IMITREX) 100 MG tablet TAKE 1 TABLET BY MOUTH 1 TIME AT ONSET OF MIGRAINE AS NEEDED FOR MIGRAINE. MAY REPEAT DOSE 1 TIME AFTER 2 HOURS IF MIGRAINE NOT RESOLVED 9 tablet 11 Taking    tiZANidine (ZANAFLEX) 4 MG tablet Take 1 tablet by mouth Every 8 (Eight) Hours As Needed for Muscle Spasms. 60 tablet 0 Taking As Needed    Vraylar 1.5 MG  capsule capsule TAKE 1 CAPSULE BY MOUTH EVERY DAY 90 capsule 1 Taking    Octagam 30 GM/300ML solution infusion Infuse 30 g into a venous catheter 1 (One) Time Per Week.            Current Facility-Administered Medications:     acetaminophen (TYLENOL) tablet 650 mg, 650 mg, Oral, Q4H PRN **OR** acetaminophen (TYLENOL) 160 MG/5ML oral solution 650 mg, 650 mg, Oral, Q4H PRN **OR** acetaminophen (TYLENOL) suppository 650 mg, 650 mg, Rectal, Q4H PRN, Achterberg, Indy, APRN    ALPRAZolam (XANAX) tablet 2 mg, 2 mg, Oral, TID PRN, Achterberg, Indy, APRN, 2 mg at 04/16/25 0410    aluminum-magnesium hydroxide-simethicone (MAALOX MAX) 400-400-40 MG/5ML suspension 15 mL, 15 mL, Oral, Q6H PRN, Achterberg, Indy, APRN    amitriptyline (ELAVIL) tablet 25 mg, 25 mg, Oral, Nightly, Achterberg, Indy, APRN    atorvastatin (LIPITOR) tablet 20 mg, 20 mg, Oral, Nightly, Achterberg, Indy, APRN    sennosides-docusate (PERICOLACE) 8.6-50 MG per tablet 2 tablet, 2 tablet, Oral, BID, 2 tablet at 04/16/25 0826 **AND** polyethylene glycol (MIRALAX) packet 17 g, 17 g, Oral, Daily PRN **AND** bisacodyl (DULCOLAX) EC tablet 5 mg, 5 mg, Oral, Daily PRN **AND** bisacodyl (DULCOLAX) suppository 10 mg, 10 mg, Rectal, Daily PRN, Achterberg, Indy, APRN    busPIRone (BUSPAR) tablet 30 mg, 30 mg, Oral, BID, Achterberg, Indy, APRN, 30 mg at 04/16/25 0826    Cariprazine HCl (VRAYLAR) capsule capsule 1.5 mg, 1.5 mg, Oral, Daily, Achterberg, Indy, APRN, 1.5 mg at 04/16/25 0826    cefepime 2000 mg IVPB in 100 mL NS (MBP), 2,000 mg, Intravenous, Q8H, Indy Lange APRN, Last Rate: 0 mL/hr at 04/16/25 0439, 2,000 mg at 04/16/25 1100    hydrOXYzine (ATARAX) tablet 25 mg, 25 mg, Oral, TID PRN, Indy Lange, APRN, 25 mg at 04/16/25 0826    lacosamide (VIMPAT) tablet 200 mg, 200 mg, Oral, Q12H, Indy Lange, APRN, 200 mg at 04/16/25 0826    lamoTRIgine (LaMICtal) tablet 150 mg, 150 mg, Oral, BID, Anisa,  Indy, APRN, 150 mg at 04/16/25 0825    levETIRAcetam (KEPPRA) tablet 1,500 mg, 1,500 mg, Oral, BID, Achterberg, Indy, APRN, 1,500 mg at 04/16/25 0825    levothyroxine (SYNTHROID, LEVOTHROID) tablet 25 mcg, 25 mcg, Oral, Q AM, Achterberg, Indy, APRN, 25 mcg at 04/16/25 0542    Lidocaine 4 % 2 patch, 2 patch, Transdermal, Nightly, Achterberg, Indy, APRN, 2 patch at 04/16/25 0542    miconazole (MICOTIN) 2 % powder 1 Application, 1 Application, Topical, Q12H, Achterberg, Indy, APRN    ondansetron (ZOFRAN) injection 4 mg, 4 mg, Intravenous, Q6H PRN, Achterberg, Indy, APRN    oxyCODONE (ROXICODONE) immediate release tablet 10 mg, 10 mg, Oral, Q4H PRN, Achterberg, Indy, APRN, 10 mg at 04/16/25 1413    pantoprazole (PROTONIX) EC tablet 40 mg, 40 mg, Oral, Daily, Achterberg, Indy, APRN, 40 mg at 04/16/25 0826    promethazine (PHENERGAN) tablet 25 mg, 25 mg, Oral, Q6H PRN, Achterberg, Indy, APRN    QUEtiapine fumarate ER (SEROquel XR) tablet 800 mg, 800 mg, Oral, Nightly, Achterberg, Indy, APRN    sertraline (ZOLOFT) tablet 100 mg, 100 mg, Oral, Daily, Achterberg, Indy, APRN, 100 mg at 04/16/25 0826    [COMPLETED] Insert Peripheral IV, , , Once **AND** sodium chloride 0.9 % flush 10 mL, 10 mL, Intravenous, PRN, Jose Klein MD    sodium chloride 0.9 % flush 10 mL, 10 mL, Intravenous, Q12H, Achterberg, Indy, APRN, 10 mL at 04/16/25 0827    sodium chloride 0.9 % flush 10 mL, 10 mL, Intravenous, PRN, Achterberg, Indy, APRN    sodium chloride 0.9 % infusion 40 mL, 40 mL, Intravenous, PRN, Achterberg, Indy, APRN    SUMAtriptan (IMITREX) tablet 100 mg, 100 mg, Oral, Once PRN, Achterberg, Indy, APRN    tiZANidine (ZANAFLEX) tablet 4 mg, 4 mg, Oral, Q8H PRN, QianterJudie rodriguezle, APRN, 4 mg at 04/16/25 0825      Objective     Results Review:  LABS:  Results from last 7 days   Lab Units 04/16/25  0338 04/15/25  2125   WBC 10*3/mm3 6.55 6.27   HEMOGLOBIN g/dL 10.3* 10.3*    HEMATOCRIT % 33.5* 33.6*   PLATELETS 10*3/mm3 271 279     Results from last 7 days   Lab Units 04/16/25  0338 04/15/25  2125   SODIUM mmol/L 136 137   POTASSIUM mmol/L 3.5 3.4*   CHLORIDE mmol/L 103 105   CO2 mmol/L 20.2* 17.7*   BUN mg/dL 14 15   CREATININE mg/dL 0.93 0.95   CALCIUM mg/dL 9.3 9.3   GLUCOSE mg/dL 101* 105*         DIAGNOSTICS:  MRI THORACIC SPINE WO CONTRAST     Date of Exam: 4/16/2025 2:21 AM EDT     Indication: increased back pain in patient with known T6-7 osteomyelitis and compression fractures.     Comparison: 3/18/2025.     Technique:  Routine multiplanar/multisequence sequence images of the thoracic spine were obtained without contrast administration.        Findings:  Redemonstration of destructive changes at the T6/T7 disc space with surrounding reactive edema seen throughout the T6 and T7 vertebral bodies. The destructive changes appear to have progressed as compared to the previous study. Small amount of fluid is   present within the disc space. The fluid is new/progressed as compared to the previous study. There is collapse of the disc space with a small amount of posterior disc bulge present with effacement of the thecal sac anteriorly with no evidence of canal   stenosis. The bulge is new as compared to the previous study. Trace amount of epidural fluid present which appears to have decreased as compared to the previous study. No new areas of suspicious osseous edema identified. No additional epidural fluid   identified. The thoracic cord appears normal in signal and caliber throughout its course. Redemonstration of hyperintense T2 signal changes seen within the posterior aspect of the T8 vertebral body which appears similar as compared to the previous study   and likely represents a hemangioma.     IMPRESSION:  Impression:  Redemonstration of discitis/osteomyelitis at the T6/T7 level with interval progression of destructive changes as compared to the previous study. There is a new  small amount of fluid present within the disc space with a small amount of posterior disc   bulge present with effacement of the thecal sac anteriorly with no evidence of canal stenosis which is also new. The epidural fluid appears to have decreased as compared to the previous study. No new areas of suspicious osseous edema identified.       Results Review:   I reviewed the patient's new clinical results.  I personally viewed patient's chart and imaging     Vital Signs   Temp:  [97.7 °F (36.5 °C)-98.7 °F (37.1 °C)] 97.7 °F (36.5 °C)  Heart Rate:  [] 81  Resp:  [15-20] 16  BP: (110-138)/(63-93) 110/63    Physical Exam:  Physical Exam  Vitals reviewed.   Musculoskeletal:         General: Normal range of motion.      Cervical back: Normal range of motion.   Skin:     General: Skin is warm and dry.   Neurological:      General: No focal deficit present.   Psychiatric:         Mood and Affect: Mood normal.         Speech: Speech normal.       Neurological Exam  Mental Status  Awake, alert and oriented to person, place and time. Oriented to person, place and time. Speech is normal. Language is fluent with no aphasia.    Motor    5/5 lower extremity strength .    Reflexes    Right pathological reflexes: Ankle clonus absent.  Left pathological reflexes: Ankle clonus absent.      Assessment & Plan       Intractable back pain    Bipolar disorder, in full remission, most recent episode depressed    Depression    Seizures    Obesity    GERD (gastroesophageal reflux disease)    Gastroparesis    HLD (hyperlipidemia)    Back pain      Problem List Items Addressed This Visit       * (Principal) Intractable back pain - Primary     Other Visit Diagnoses         Osteomyelitis of thoracic spine        Relevant Medications    cefepime 2000 mg IVPB in 100 mL NS (MBP)             COMORBID CONDITIONS:  Seizures     Patient is a 55 year old female who presents to [facility] for worsening back pain with a diagnosis of discitis and a  "compression fracture.     The patient is not being compliant with her TLSO brace and not wearing it as she should. She did not bring the brace with her to the hospital.     On physical examination, the patient is strong in her lower extremities and has no clonus.     The patient had an updated MRI thoracic that is similar to her previous MRI. At this time there is no surgical intervention indicated. The patient is to continue wearing her TLSO brace and should continue her antibiotic therapy.     Patient is to follow up in eight weeks with updated imaging to ensure fracture is healing.     Neurosurgery will sign off. Please reach out with any questions or concerns.       PLAN:   Discitis  Compression fracture  - Nothing surgical   - Continue to wear TLSO unless sleeping or showering  - Antibiotic therapy per ID  - Follow up in 8 weeks with imaging    I discussed the patient's findings and my recommendations with patient, nursing staff, and Dr. Harrison     During patient visit, I utilized appropriate personal protective equipment including gloves and mask.  Mask used was standard procedure mask. Appropriate PPE was worn during the entire visit.  Hand hygiene was completed before and after.     Yara Piedra PA-C  04/16/25  15:44 EDT    \"Dictated utilizing Dragon dictation\".    "

## 2025-04-16 NOTE — DISCHARGE PLACEMENT REQUEST
"Milka Robledo (55 y.o. Female)       Date of Birth   1970    Social Security Number       Address   [address] [facility] [place] [address]    Home Phone   [phone]    MRN   [id number]       Evangel[employer]   None    Marital Status                               Admission Date   4/15/2025    Admission Type   Emergency    Admitting Provider   Saul Murphy MD    Attending Provider   Shy Henson MD    Department, Room/Bed   [facility] INPATIENT, 360/A       Discharge Date       Discharge Disposition       Discharge Destination                                 Attending Provider: Shy Henson MD    Allergies: Ketorolac, Sulfa Antibiotics, Tramadol Hcl, Topamax [Topiramate], Adhesive Tape    Isolation: None   Infection: None   Code Status: CPR    Ht: 162.6 cm (64\")   Wt: 81 kg (178 lb 9.2 oz)    Admission Cmt: None   Principal Problem: Intractable back pain [M54.9]                   Active Insurance as of 4/15/2025       Primary Coverage       Payor Plan Insurance Group Employer/Plan Group    ANTHEM MEDICARE REPLACEMENT ANTHEM MEDICARE ADVANTAGE SNP [id number]       Payor Plan Address Payor Plan Phone Number Payor Plan Fax Number Effective Dates    [address] [phone]  1/1/2024 - None Entered    [facility] [address]         Subscriber Name Subscriber Birth Date Member ID       MILKA ROBLEDO 1970 [id number]               Secondary Coverage       Payor Plan Insurance Group Employer/Plan Group    [id number]        Payor Plan Address Payor Plan Phone Number Payor Plan Fax Number Effective Dates    [address] [address]   9/17/2024 - None Entered    [place] [address]         Subscriber Name Subscriber Birth Date Member ID       MILKA ROBLEDO 1970 [id number]                     Emergency Contacts        (Rel.) Home Phone Work Phone Mobile Phone    RAMON ROBLEDO (Daughter) [phone] -- " [phone]    KAPLEY,DEB (Friend) [phone] -- [phone]    LÁZARO HEATH (Friend) -- -- [phone]

## 2025-04-17 ENCOUNTER — READMISSION MANAGEMENT (OUTPATIENT)
Dept: CALL CENTER | Facility: HOSPITAL | Age: 55
End: 2025-04-17
Payer: MEDICARE

## 2025-04-17 VITALS
HEIGHT: 64 IN | HEART RATE: 76 BPM | SYSTOLIC BLOOD PRESSURE: 111 MMHG | WEIGHT: 178.57 LBS | OXYGEN SATURATION: 94 % | DIASTOLIC BLOOD PRESSURE: 57 MMHG | RESPIRATION RATE: 16 BRPM | BODY MASS INDEX: 30.49 KG/M2 | TEMPERATURE: 97.9 F

## 2025-04-17 LAB
ANION GAP SERPL CALCULATED.3IONS-SCNC: 13.6 MMOL/L (ref 5–15)
BASOPHILS # BLD AUTO: 0.05 10*3/MM3 (ref 0–0.2)
BASOPHILS NFR BLD AUTO: 0.7 % (ref 0–1.5)
BUN SERPL-MCNC: 14 MG/DL (ref 6–20)
BUN/CREAT SERPL: 14 (ref 7–25)
CALCIUM SPEC-SCNC: 9.1 MG/DL (ref 8.6–10.5)
CHLORIDE SERPL-SCNC: 108 MMOL/L (ref 98–107)
CO2 SERPL-SCNC: 20.4 MMOL/L (ref 22–29)
CREAT SERPL-MCNC: 1 MG/DL (ref 0.57–1)
DEPRECATED RDW RBC AUTO: 47.9 FL (ref 37–54)
EGFRCR SERPLBLD CKD-EPI 2021: 65.8 ML/MIN/1.73
EOSINOPHIL # BLD AUTO: 0.19 10*3/MM3 (ref 0–0.4)
EOSINOPHIL NFR BLD AUTO: 2.8 % (ref 0.3–6.2)
ERYTHROCYTE [DISTWIDTH] IN BLOOD BY AUTOMATED COUNT: 15.6 % (ref 12.3–15.4)
GLUCOSE SERPL-MCNC: 81 MG/DL (ref 65–99)
HCT VFR BLD AUTO: 33.6 % (ref 34–46.6)
HGB BLD-MCNC: 10.1 G/DL (ref 12–15.9)
IMM GRANULOCYTES # BLD AUTO: 0.01 10*3/MM3 (ref 0–0.05)
IMM GRANULOCYTES NFR BLD AUTO: 0.1 % (ref 0–0.5)
LYMPHOCYTES # BLD AUTO: 3.12 10*3/MM3 (ref 0.7–3.1)
LYMPHOCYTES NFR BLD AUTO: 46.7 % (ref 19.6–45.3)
MCH RBC QN AUTO: 25.2 PG (ref 26.6–33)
MCHC RBC AUTO-ENTMCNC: 30.1 G/DL (ref 31.5–35.7)
MCV RBC AUTO: 83.8 FL (ref 79–97)
MONOCYTES # BLD AUTO: 0.49 10*3/MM3 (ref 0.1–0.9)
MONOCYTES NFR BLD AUTO: 7.3 % (ref 5–12)
NEUTROPHILS NFR BLD AUTO: 2.82 10*3/MM3 (ref 1.7–7)
NEUTROPHILS NFR BLD AUTO: 42.4 % (ref 42.7–76)
NRBC BLD AUTO-RTO: 0 /100 WBC (ref 0–0.2)
PLATELET # BLD AUTO: 270 10*3/MM3 (ref 140–450)
PMV BLD AUTO: 11.9 FL (ref 6–12)
POTASSIUM SERPL-SCNC: 3.9 MMOL/L (ref 3.5–5.2)
RBC # BLD AUTO: 4.01 10*6/MM3 (ref 3.77–5.28)
SODIUM SERPL-SCNC: 142 MMOL/L (ref 136–145)
WBC NRBC COR # BLD AUTO: 6.68 10*3/MM3 (ref 3.4–10.8)

## 2025-04-17 PROCEDURE — G0378 HOSPITAL OBSERVATION PER HR: HCPCS

## 2025-04-17 PROCEDURE — 80048 BASIC METABOLIC PNL TOTAL CA: CPT | Performed by: NURSE PRACTITIONER

## 2025-04-17 PROCEDURE — 96366 THER/PROPH/DIAG IV INF ADDON: CPT

## 2025-04-17 PROCEDURE — 85025 COMPLETE CBC W/AUTO DIFF WBC: CPT | Performed by: NURSE PRACTITIONER

## 2025-04-17 PROCEDURE — 25010000002 CEFEPIME PER 500 MG: Performed by: NURSE PRACTITIONER

## 2025-04-17 RX ADMIN — CEFEPIME 2000 MG: 2 INJECTION, POWDER, FOR SOLUTION INTRAVENOUS at 10:00

## 2025-04-17 RX ADMIN — LACOSAMIDE 200 MG: 100 TABLET, FILM COATED ORAL at 08:55

## 2025-04-17 RX ADMIN — CEFEPIME 2000 MG: 2 INJECTION, POWDER, FOR SOLUTION INTRAVENOUS at 01:15

## 2025-04-17 RX ADMIN — SERTRALINE HYDROCHLORIDE 100 MG: 100 TABLET, FILM COATED ORAL at 08:53

## 2025-04-17 RX ADMIN — OXYCODONE 10 MG: 5 TABLET ORAL at 13:02

## 2025-04-17 RX ADMIN — OXYCODONE 10 MG: 5 TABLET ORAL at 05:02

## 2025-04-17 RX ADMIN — LEVOTHYROXINE SODIUM 25 MCG: 0.03 TABLET ORAL at 05:02

## 2025-04-17 RX ADMIN — Medication 10 ML: at 08:55

## 2025-04-17 RX ADMIN — SENNOSIDES AND DOCUSATE SODIUM 2 TABLET: 50; 8.6 TABLET ORAL at 08:54

## 2025-04-17 RX ADMIN — ALPRAZOLAM 2 MG: 1 TABLET ORAL at 08:53

## 2025-04-17 RX ADMIN — LEVETIRACETAM 1500 MG: 500 TABLET, FILM COATED ORAL at 08:53

## 2025-04-17 RX ADMIN — PANTOPRAZOLE SODIUM 40 MG: 40 TABLET, DELAYED RELEASE ORAL at 08:54

## 2025-04-17 RX ADMIN — LAMOTRIGINE 150 MG: 100 TABLET ORAL at 08:53

## 2025-04-17 RX ADMIN — OXYCODONE 10 MG: 5 TABLET ORAL at 01:15

## 2025-04-17 RX ADMIN — TIZANIDINE 4 MG: 4 TABLET ORAL at 05:02

## 2025-04-17 RX ADMIN — BUSPIRONE HYDROCHLORIDE 30 MG: 15 TABLET ORAL at 08:55

## 2025-04-17 RX ADMIN — OXYCODONE 10 MG: 5 TABLET ORAL at 08:53

## 2025-04-17 RX ADMIN — CARIPRAZINE 1.5 MG: 1.5 CAPSULE, GELATIN COATED ORAL at 08:55

## 2025-04-17 RX ADMIN — TIZANIDINE 4 MG: 4 TABLET ORAL at 13:01

## 2025-04-17 NOTE — PLAN OF CARE
Problem: Adult Inpatient Plan of Care  Goal: Plan of Care Review  Outcome: Progressing  Goal: Patient-Specific Goal (Individualized)  Outcome: Progressing  Goal: Absence of Hospital-Acquired Illness or Injury  Outcome: Progressing  Intervention: Identify and Manage Fall Risk  Recent Flowsheet Documentation  Taken 4/17/2025 0000 by Parth Sy RN  Safety Promotion/Fall Prevention: safety round/check completed  Intervention: Prevent Skin Injury  Recent Flowsheet Documentation  Taken 4/17/2025 0000 by Parth Sy RN  Body Position: position changed independently  Intervention: Prevent and Manage VTE (Venous Thromboembolism) Risk  Recent Flowsheet Documentation  Taken 4/17/2025 0000 by Parth Sy RN  VTE Prevention/Management: patient refused intervention  Goal: Optimal Comfort and Wellbeing  Outcome: Progressing  Intervention: Monitor Pain and Promote Comfort  Recent Flowsheet Documentation  Taken 4/17/2025 0000 by Parth Sy RN  Pain Management Interventions: pain medication given  Intervention: Provide Person-Centered Care  Recent Flowsheet Documentation  Taken 4/17/2025 0000 by Parth Sy RN  Trust Relationship/Rapport:   care explained   choices provided  Goal: Readiness for Transition of Care  Outcome: Progressing   Goal Outcome Evaluation:

## 2025-04-17 NOTE — CASE MANAGEMENT/SOCIAL WORK
Start PACC Note    Home Health Referral    Evaluated patient on Home Care and services available. Patient offered choice of available HHC and agreeable to PT/OT services with [facility].    Care Types:   Isolation Precautions: No active isolations    Social Determinants of Health:  Tobacco Use: Medium Risk (4/16/2025)    Patient History     Smoking Tobacco Use: Former     Smokeless Tobacco Use: Never     Passive Exposure: Past     Social History     Substance and Sexual Activity   Alcohol Use No     Social History     Substance and Sexual Activity   Drug Use No       Does the patient have any financial resource strain?   Does the patient have any food insecurities?   Does the patient have any housing instabilities?     If any of the above is noted as yes - consider a MSW evaluation once the patient returns home.    START PATIENT REGISTRATION INFORMATION  Order Information  Order Signing Physician: Apryl Abdi MD  Service Ordered RN?: No  Service Ordered PT?: Yes  Service Ordered OT?: Yes  Service Ordered ST?: No  Service Ordered MSW?: No  Service Ordered HHA?: No  Following Physician: Alvina Arzate APRN  Following Physician Phone: [phone]  Overseeing Physician: Alvina Arzate APRN  (Required for Residents Only)  Agreeable to Follow? Yes  Date/Time of Call 04/17/25 14:42 EDT, Spoke with: via secure chat    Care Coordination  Same Day SOC?: No  Primary Care Physician: Alvian Arzate APRN  Primary Care Physician Phone: [phone]  Primary Care Physician Address: [address] [place] [address] [address] / [place] [address]  Visit Instructions: N/A  Service Discharge Location Type: Home  Service Facility Name: N/A  Service Floor Facility: N/A  Service Room No: N/A    Demographics  Patient Last Name: Olga  Patient First Name: Milka  Language/Communication Barrier: none  Service Address: [address]  Service City: [place]  Service State: [place]  Service Zip: [address]  Service Home Phone:  [phone]  Other Phone Numbers:   Telephone Information:   Mobile [phone]     Emergency Contact:   Extended Emergency Contact Information  Primary Emergency Contact: RAMON ROBLEDO  Home Phone: [phone]  Mobile Phone: [phone]  Relation: Daughter  Hearing or visual needs: None  Other needs: None  Preferred language: English   needed? No  Secondary Emergency Contact: KAPLEY,DEB  Home Phone: [phone]  Mobile Phone: [phone]  Relation: Friend  Hearing or visual needs: None  Other needs: None  Preferred language: English   needed? No    Admission Information  Admit Date: 4/15/2025  Patient Status at Discharge: Observation  Admitting Diagnosis: Intractable back pain [M54.9]  Osteomyelitis of thoracic spine [M46.24]  Back pain [M54.9]    Caregiver Information  Caregiver First Name:   Caregiver Last Name:   Caregiver Relationship to Patient:   Caregiver Phone Number:   Caregiver Notes:     [employer]  [employer]  No      END PATIENT REGISTRATION INFORMATION    Start PACC Summary    Additional Comments: Pt receives nursing from [facility] for administration of IVIG(we dont provide). They will continue to come for picc mgt, labs, and infusion. We are only providing PT/OT    END PACC Summary    Discharge Date: Pending    Referral Source: MARKOS Braxton    Signed By: Mi Boyce RN, 4/17/2025, 14:42 EDT     Date/Time: 04/17/25 14:42 EDT    End PACC Note

## 2025-04-17 NOTE — OUTREACH NOTE
Prep Survey      Flowsheet Row Responses   [facility] patient discharged from? [facility]   Is LACE score < 7 ? No   Eligibility [facility]   Date of Admission 04/15/25   Date of Discharge 04/17/25   Discharge Disposition Home-Health Care Sv   Discharge diagnosis Intractable back pain, Osteomyelitis of thoracic spine   Does the patient have one of the following disease processes/diagnoses(primary or secondary)? Other   Does the patient have Home health ordered? Yes   What is the Home health agency?  Current with [facility] for IV abx. Current with [facility] for line/lab care. [facility] for PT (order requested)   Is there a DME ordered? No   Prep survey completed? Yes            Maureen RAMIREZ - Registered Nurse

## 2025-04-17 NOTE — PLAN OF CARE
Goal Outcome Evaluation: Plan is for patient to discharge home today.

## 2025-04-17 NOTE — DISCHARGE SUMMARY
"             [facility] Medicine Services  Discharge Summary    Date of Service: 2025  Patient Name: Milka Espinoza  : 1970  MRN: [id number]    Date of Admission: 4/15/2025  Discharge Diagnosis: Intractable back pain  Date of Discharge: 2025  Primary Care Physician: Alvina Arzate APRN      Presenting Problem:   Intractable back pain [M54.9]  Osteomyelitis of thoracic spine [M46.24]  Back pain [M54.9]    Active and Resolved Hospital Problems:  Active Hospital Problems    Diagnosis POA    **Intractable back pain [M54.9] Yes    Back pain [M54.9] Yes    HLD (hyperlipidemia) [E78.5] Yes    Gastroparesis [K31.84] Yes    GERD (gastroesophageal reflux disease) [K21.9] Yes    Obesity [E66.9] Yes    Depression [F32.A] Yes    Bipolar disorder, in full remission, most recent episode depressed [F31.76] Yes    Seizures [R56.9] Yes      Resolved Hospital Problems   No resolved problems to display.         Hospital Course     HPI:    \"Milka Espinoza is a 55 y.o. female with diagnosed with T6-T7 discitis with osteomyelitis suspected secondary to bacteremia and T5-T7 vertebral compression fractures in 2025 currently on IV cefepime q8 hours at home presented to [facility] ED 4/15/2025 with increasing thoracic back pain uncontrolled by home pain medication. She denied any numbness or tingling. She has no loss of bowel or bladder. She states she has been compliant with her cefepime at home. She ran out of zanaflex and feels she was not given very many tablets. She has been taking her oxycodone without relief.      In the ED pt had normal WBC, afebrile, vitals stable. She was admitted for further workup of intractable back pain.\"    Hospital Course:  Patient was admitted for pain management secondary to known compression fractures/discitis and osteomyelitis on long-term IV antibiotics.  Neurosurgery evaluated patient and recommended to continue IV antibiotics and repeat MRI in 4 to 6 weeks.  No " neurosurgical intervention indicated at this time.  Also recommended compliance with TLSO.  Neurosurgery signed off.  Patient's IV cefepime q8h was continued while inpatient with stop date of 4/29/2025. Referral to pain management clinic was placed.  Patient is stable for discharge home with outpatient follow-up.      DISCHARGE Follow Up Recommendations for labs and diagnostics: Referral to pain management clinic, f/u with PCP         Day of Discharge     Vital Signs:  Temp:  [97.8 °F (36.6 °C)-98.9 °F (37.2 °C)] 97.9 °F (36.6 °C)  Heart Rate:  [75-90] 76  Resp:  [14-18] 16  BP: (103-129)/(54-83) 111/57    Physical Exam:  Physical Exam   General: awake, alert, in NAD  Neck: Supple  Chest: CTA bilat.  CV: S1S2 nl. RRR  Abd: Soft, NTND. +BS  Ext: No c/c/e.        Pertinent  and/or Most Recent Results     LAB RESULTS:      Lab 04/17/25 0515 04/16/25  0338 04/15/25  2125   WBC 6.68 6.55 6.27   HEMOGLOBIN 10.1* 10.3* 10.3*   HEMATOCRIT 33.6* 33.5* 33.6*   PLATELETS 270 271 279   NEUTROS ABS 2.82 2.94 3.75   IMMATURE GRANS (ABS) 0.01 0.01 0.02   LYMPHS ABS 3.12* 2.86 2.00   MONOS ABS 0.49 0.59 0.40   EOS ABS 0.19 0.12 0.05   MCV 83.8 82.5 82.8         Lab 04/17/25  0515 04/16/25  0338 04/15/25  2125   SODIUM 142 136 137   POTASSIUM 3.9 3.5 3.4*   CHLORIDE 108* 103 105   CO2 20.4* 20.2* 17.7*   ANION GAP 13.6 12.8 14.3   BUN 14 14 15   CREATININE 1.00 0.93 0.95   EGFR 65.8 71.8 70.5   GLUCOSE 81 101* 105*   CALCIUM 9.1 9.3 9.3                         Brief Urine Lab Results  (Last result in the past 365 days)        Color   Clarity   Blood   Leuk Est   Nitrite   Protein   CREAT   Urine HCG        03/18/25 0417 Yellow   Cloudy   Negative   Negative   Negative   Negative                 Microbiology Results (last 10 days)       Procedure Component Value - Date/Time    Blood Culture - Blood, Arm, Left [[id number]]  (Normal) Collected: 04/16/25 0338    Lab Status: Preliminary result Specimen: Blood from Arm, Left Updated:  04/17/25 0400     Blood Culture No growth at 24 hours    Narrative:      Less than seven (7) mL's of blood was collected.  Insufficient quantity may yield false negative results.    COVID-19, FLU A/B, RSV PCR 1 HR TAT - Swab, Nasopharynx [[id number]]  (Normal) Collected: 04/16/25 0139    Lab Status: Final result Specimen: Swab from Nasopharynx Updated: 04/16/25 0224     COVID19 Not Detected     Influenza A PCR Not Detected     Influenza B PCR Not Detected     RSV, PCR Not Detected    Narrative:      Fact sheet for providers: https://www.fda.gov/media/624632/download    Fact sheet for patients: https://www.fda.gov/media/924259/download    Test performed by PCR.            MRI Thoracic Spine Without Contrast  Result Date: 4/16/2025  Impression: Impression: Redemonstration of discitis/osteomyelitis at the T6/T7 level with interval progression of destructive changes as compared to the previous study. There is a new small amount of fluid present within the disc space with a small amount of posterior disc bulge present with effacement of the thecal sac anteriorly with no evidence of canal stenosis which is also new. The epidural fluid appears to have decreased as compared to the previous study. No new areas of suspicious osseous edema identified. Electronically Signed: Elise Steele MD  4/16/2025 2:44 AM EDT  Workstation ID: [id number]    CT Guided Aspiration Disc  Result Date: 3/19/2025  Impression: 1. Successful FNA of T7-8 right paravertebral soft tissue infection. Electronically Signed: Harvey Holt MD  3/19/2025 7:43 AM EDT  Workstation ID: [id number]    IR Removal Tunnel CV Cath Without Port  Result Date: 3/19/2025  Impression: Successful removal of tunneled PICC. Electronically Signed: Harvey Holt MD  3/19/2025 7:41 AM EDT  Workstation ID: [id number]    MRI Thoracic Spine With Contrast  Result Date: 3/18/2025  Impression: Findings consistent with discitis/osteomyelitis at T6-T7. A small volume of intradiscal fluid with  associated meningeal enhancement and surrounding perivertebral soft tissue inflammation. No drainable abscess. Electronically Signed: Cricket Paredes MD  3/18/2025 10:57 PM EDT  Workstation ID: [id number]              Results for orders placed during the hospital encounter of 03/17/25    Adult Transthoracic Echo Complete W/ Cont if Necessary Per Protocol    Interpretation Summary    Left ventricular systolic function is normal. Calculated left ventricular EF = 60%    Left ventricular diastolic function was normal.    Normal RV systolic function.    No significant valvular disease.    Estimated right ventricular systolic pressure from tricuspid regurgitation is normal (<35 mmHg).    No evidence of infective endocarditis noted.    Electronically signed by Mich Sotomayor MD, 03/19/25, 8:47 AM EDT.      Labs Pending at Discharge:  Pending Results       Procedure [Order ID] Specimen - Date/Time    Blood Culture - Blood, [[id number]]     Specimen: Blood             Procedures Performed           Consults:   Consults       Date and Time Order Name Status Description    4/16/2025 10:52 AM Inpatient Neurosurgery Consult Completed     4/15/2025 10:23 PM Hospitalist (on-call MD unless specified)      3/18/2025  8:12 AM Inpatient Spine Surgery Consult Completed     3/17/2025 10:13 PM Inpatient Infectious Diseases Consult Completed               Discharge Details        Discharge Medications        Continue These Medications        Instructions Start Date   ALPRAZolam 2 MG tablet  Commonly known as: XANAX   2 mg, Oral, 3 Times Daily PRN, for anxiety      amitriptyline 25 MG tablet  Commonly known as: ELAVIL   25 mg, Oral, Every Night at Bedtime      atorvastatin 20 MG tablet  Commonly known as: LIPITOR   20 mg, Oral, Nightly      busPIRone 30 MG tablet  Commonly known as: BUSPAR   30 mg, Oral, 2 Times Daily      cefepime 2000 mg IVPB in 100 mL NS (MBP)   2,000 mg, Intravenous, Every 8 Hours      hydrOXYzine 25  MG tablet  Commonly known as: ATARAX   25 mg, Oral, 3 Times Daily PRN, for anxiety      lacosamide 200 MG tablet  Commonly known as: VIMPAT   200 mg, Oral, Every 12 Hours Scheduled      lamoTRIgine 150 MG tablet  Commonly known as: LaMICtal   150 mg, Oral, 2 Times Daily      levETIRAcetam 500 MG tablet  Commonly known as: KEPPRA   1,500 mg, Oral, 2 Times Daily      levothyroxine 25 MCG tablet  Commonly known as: SYNTHROID, LEVOTHROID   25 mcg, Oral, Daily      Motegrity 2 MG tablet  Generic drug: Prucalopride Succinate   1 tablet, Daily      Narcan 4 MG/0.1ML nasal spray  Generic drug: naloxone   = 1 spray(s), Nostril-Both, Once, # 2 EA, 0 Refill(s), Pharmacy: [id number], 1 spray(s) Nostril-Both Once, 162, cm, 04/04/25 19:23:00 EDT, Height, 89, kg, 04/04/25 19:25:00 EDT, Weight Dosing      nystatin 305940 UNIT/GM powder  Commonly known as: nystatin   Topical, 2 Times Daily      Octagam 30 GM/300ML solution infusion  Generic drug: Immune Globulin (Human)   30 g, Weekly      ondansetron 2 mg/mL injection  Commonly known as: ZOFRAN   4 mg, Every 6 Hours PRN      oxyCODONE 10 MG tablet  Commonly known as: ROXICODONE   10 mg, Oral, Every 4 Hours PRN      pantoprazole 40 MG EC tablet  Commonly known as: PROTONIX   40 mg, Oral, Daily      promethazine 6.25 MG/5ML solution oral solution  Commonly known as: PHENERGAN   25 mg, Every 6 Hours PRN      promethazine 25 MG tablet  Commonly known as: PHENERGAN   25 mg, Every 6 Hours PRN      QUEtiapine  MG 24 hr tablet  Commonly known as: SEROquel XR   800 mg, Oral, Every Night at Bedtime      sertraline 100 MG tablet  Commonly known as: ZOLOFT   100 mg, Oral, Daily      SUMAtriptan 100 MG tablet  Commonly known as: IMITREX   TAKE 1 TABLET BY MOUTH 1 TIME AT ONSET OF MIGRAINE AS NEEDED FOR MIGRAINE. MAY REPEAT DOSE 1 TIME AFTER 2 HOURS IF MIGRAINE NOT RESOLVED      tiZANidine 4 MG tablet  Commonly known as: ZANAFLEX   4 mg, Oral, Every 8 Hours PRN     "  Vraylar 1.5 MG capsule capsule  Generic drug: Cariprazine HCl   1.5 mg, Oral, Daily               Allergies   Allergen Reactions    Ketorolac Other (See Comments)     pt states \"this medicine sends me into seizures\"    Sulfa Antibiotics Hives    Tramadol Hcl Mental Status Change    Topamax [Topiramate] Unknown - High Severity    Adhesive Tape Rash     Paper tape         Discharge Disposition:   Home or Self Care    Diet:  Hospital:  Diet Order   Procedures    Diet: Regular/House; Fluid Consistency: Thin (IDDSI 0)         Discharge Activity: as tolerated        CODE STATUS:  Code Status and Medical Interventions: CPR (Attempt to Resuscitate); Full Support   Ordered at: 04/16/25 0150     Code Status (Patient has no pulse and is not breathing):    CPR (Attempt to Resuscitate)     Medical Interventions (Patient has pulse or is breathing):    Full Support     Level Of Support Discussed With:    Patient         Future Appointments   Date Time Provider Department Center   5/16/2025  9:00 AM Yara Piedra PA-C [facility]   7/17/2025  8:00 AM Alvina Arzate APRN [facility]   10/28/2025  1:30 PM Ana Rosa Weber APRN [facility]       Additional Instructions for the Follow-ups that You Need to Schedule       XR Spine Thoracic 3 View   May 16, 2025      Exam reason: T9 compression fx   Release to patient: Routine Release                Time spent on Discharge including face to face service:  35 minutes    Signature: Electronically signed by Main Vincent PA-C, 04/17/25, 12:23 EDT.  [facility] Hospitalist Team    "

## 2025-04-17 NOTE — CASE MANAGEMENT/SOCIAL WORK
Continued Stay Note   [facility]     Patient Name: Milka Espinoza  MRN: [id number]  Today's Date: 4/17/2025    Admit Date: 4/15/2025    Plan: Return Home. Current with [facility] for IV abx. Current with [facility] for line/lab care. [facility] for PT (order requested)   Discharge Plan       Row Name 04/17/25 1240       Plan    Plan Return Home. Current with [facility] for IV abx. Current with [facility] for line/lab care. [facility] for PT (order requested)    Patient/Family in Agreement with Plan yes    Plan Comments CM updated per Josias Lujan Liaison whom had just spoke with patient and relayed the following information - The pt currently gets IVIG at home and Gale a nurse from [facility] sees the pt for labs/line care. The patient will have [facility] supply the IV abx. [facility] will see the patient for home PT.  reached out to MD/PA requesting  order, order pending. [facility] also reached out to liaisonMi, to confirm  will see the patient for home PT, response pending.             Expected Discharge Date and Time       Expected Discharge Date Expected Discharge Time    Apr 17, 2025        Rachel Schilling RN     [phone]  [email or web address]

## 2025-04-18 ENCOUNTER — TRANSITIONAL CARE MANAGEMENT TELEPHONE ENCOUNTER (OUTPATIENT)
Dept: CALL CENTER | Facility: HOSPITAL | Age: 55
End: 2025-04-18
Payer: MEDICARE

## 2025-04-18 ENCOUNTER — APPOINTMENT (OUTPATIENT)
Dept: GENERAL RADIOLOGY | Facility: HOSPITAL | Age: 55
End: 2025-04-18
Payer: MEDICARE

## 2025-04-18 ENCOUNTER — READMISSION MANAGEMENT (OUTPATIENT)
Dept: CALL CENTER | Facility: HOSPITAL | Age: 55
End: 2025-04-18
Payer: MEDICARE

## 2025-04-18 ENCOUNTER — HOSPITAL ENCOUNTER (OUTPATIENT)
Facility: HOSPITAL | Age: 55
Setting detail: OBSERVATION
Discharge: HOME HEALTH | End: 2025-04-21
Attending: EMERGENCY MEDICINE | Admitting: INTERNAL MEDICINE
Payer: MEDICARE

## 2025-04-18 DIAGNOSIS — M54.9 INTRACTABLE BACK PAIN: Primary | ICD-10-CM

## 2025-04-18 DIAGNOSIS — M46.44 DISCITIS OF THORACIC REGION: ICD-10-CM

## 2025-04-18 PROBLEM — Z91.199 PATIENT NONCOMPLIANCE: Status: ACTIVE | Noted: 2025-04-18

## 2025-04-18 LAB
ANION GAP SERPL CALCULATED.3IONS-SCNC: 19.4 MMOL/L (ref 5–15)
BASOPHILS # BLD AUTO: 0.02 10*3/MM3 (ref 0–0.2)
BASOPHILS NFR BLD AUTO: 0.3 % (ref 0–1.5)
BUN SERPL-MCNC: 13 MG/DL (ref 6–20)
BUN/CREAT SERPL: 16.3 (ref 7–25)
CALCIUM SPEC-SCNC: 8.8 MG/DL (ref 8.6–10.5)
CHLORIDE SERPL-SCNC: 105 MMOL/L (ref 98–107)
CO2 SERPL-SCNC: 14.6 MMOL/L (ref 22–29)
CREAT SERPL-MCNC: 0.8 MG/DL (ref 0.57–1)
CRP SERPL-MCNC: <0.3 MG/DL (ref 0–0.5)
DEPRECATED RDW RBC AUTO: 47.3 FL (ref 37–54)
EGFRCR SERPLBLD CKD-EPI 2021: 86.1 ML/MIN/1.73
EOSINOPHIL # BLD AUTO: 0 10*3/MM3 (ref 0–0.4)
EOSINOPHIL NFR BLD AUTO: 0 % (ref 0.3–6.2)
ERYTHROCYTE [DISTWIDTH] IN BLOOD BY AUTOMATED COUNT: 15.5 % (ref 12.3–15.4)
ERYTHROCYTE [SEDIMENTATION RATE] IN BLOOD: 45 MM/HR (ref 0–30)
GLUCOSE SERPL-MCNC: 145 MG/DL (ref 65–99)
HCT VFR BLD AUTO: 33.2 % (ref 34–46.6)
HGB BLD-MCNC: 10.2 G/DL (ref 12–15.9)
IMM GRANULOCYTES # BLD AUTO: 0.03 10*3/MM3 (ref 0–0.05)
IMM GRANULOCYTES NFR BLD AUTO: 0.4 % (ref 0–0.5)
LYMPHOCYTES # BLD AUTO: 0.8 10*3/MM3 (ref 0.7–3.1)
LYMPHOCYTES NFR BLD AUTO: 10.8 % (ref 19.6–45.3)
MCH RBC QN AUTO: 25.6 PG (ref 26.6–33)
MCHC RBC AUTO-ENTMCNC: 30.7 G/DL (ref 31.5–35.7)
MCV RBC AUTO: 83.4 FL (ref 79–97)
MONOCYTES # BLD AUTO: 0.13 10*3/MM3 (ref 0.1–0.9)
MONOCYTES NFR BLD AUTO: 1.8 % (ref 5–12)
NEUTROPHILS NFR BLD AUTO: 6.42 10*3/MM3 (ref 1.7–7)
NEUTROPHILS NFR BLD AUTO: 86.7 % (ref 42.7–76)
NRBC BLD AUTO-RTO: 0 /100 WBC (ref 0–0.2)
PLATELET # BLD AUTO: 285 10*3/MM3 (ref 140–450)
PMV BLD AUTO: 11.9 FL (ref 6–12)
POTASSIUM SERPL-SCNC: 3.3 MMOL/L (ref 3.5–5.2)
RBC # BLD AUTO: 3.98 10*6/MM3 (ref 3.77–5.28)
SODIUM SERPL-SCNC: 139 MMOL/L (ref 136–145)
WBC NRBC COR # BLD AUTO: 7.4 10*3/MM3 (ref 3.4–10.8)

## 2025-04-18 PROCEDURE — 99285 EMERGENCY DEPT VISIT HI MDM: CPT

## 2025-04-18 PROCEDURE — 85652 RBC SED RATE AUTOMATED: CPT | Performed by: EMERGENCY MEDICINE

## 2025-04-18 PROCEDURE — 25010000002 HYDROMORPHONE 1 MG/ML SOLUTION: Performed by: EMERGENCY MEDICINE

## 2025-04-18 PROCEDURE — 36415 COLL VENOUS BLD VENIPUNCTURE: CPT

## 2025-04-18 PROCEDURE — 96376 TX/PRO/DX INJ SAME DRUG ADON: CPT

## 2025-04-18 PROCEDURE — 25010000002 DROPERIDOL PER 5 MG: Performed by: EMERGENCY MEDICINE

## 2025-04-18 PROCEDURE — G0378 HOSPITAL OBSERVATION PER HR: HCPCS

## 2025-04-18 PROCEDURE — 85025 COMPLETE CBC W/AUTO DIFF WBC: CPT | Performed by: EMERGENCY MEDICINE

## 2025-04-18 PROCEDURE — 96375 TX/PRO/DX INJ NEW DRUG ADDON: CPT

## 2025-04-18 PROCEDURE — 71045 X-RAY EXAM CHEST 1 VIEW: CPT

## 2025-04-18 PROCEDURE — 80048 BASIC METABOLIC PNL TOTAL CA: CPT | Performed by: EMERGENCY MEDICINE

## 2025-04-18 PROCEDURE — 25010000002 DIPHENHYDRAMINE PER 50 MG: Performed by: EMERGENCY MEDICINE

## 2025-04-18 PROCEDURE — 86140 C-REACTIVE PROTEIN: CPT | Performed by: EMERGENCY MEDICINE

## 2025-04-18 PROCEDURE — 25010000002 HYDROMORPHONE 1 MG/ML SOLUTION

## 2025-04-18 PROCEDURE — 72072 X-RAY EXAM THORAC SPINE 3VWS: CPT

## 2025-04-18 RX ORDER — BISACODYL 5 MG/1
5 TABLET, DELAYED RELEASE ORAL DAILY PRN
Status: DISCONTINUED | OUTPATIENT
Start: 2025-04-18 | End: 2025-04-21 | Stop reason: HOSPADM

## 2025-04-18 RX ORDER — DIPHENHYDRAMINE HYDROCHLORIDE 50 MG/ML
25 INJECTION, SOLUTION INTRAMUSCULAR; INTRAVENOUS ONCE
Status: COMPLETED | OUTPATIENT
Start: 2025-04-18 | End: 2025-04-18

## 2025-04-18 RX ORDER — SODIUM CHLORIDE 0.9 % (FLUSH) 0.9 %
10 SYRINGE (ML) INJECTION AS NEEDED
Status: DISCONTINUED | OUTPATIENT
Start: 2025-04-18 | End: 2025-04-21 | Stop reason: HOSPADM

## 2025-04-18 RX ORDER — PANTOPRAZOLE SODIUM 40 MG/1
40 TABLET, DELAYED RELEASE ORAL
Status: DISCONTINUED | OUTPATIENT
Start: 2025-04-19 | End: 2025-04-21 | Stop reason: HOSPADM

## 2025-04-18 RX ORDER — ACETAMINOPHEN 325 MG/1
650 TABLET ORAL EVERY 4 HOURS PRN
Status: DISCONTINUED | OUTPATIENT
Start: 2025-04-18 | End: 2025-04-21 | Stop reason: HOSPADM

## 2025-04-18 RX ORDER — SERTRALINE HYDROCHLORIDE 100 MG/1
100 TABLET, FILM COATED ORAL DAILY
Status: DISCONTINUED | OUTPATIENT
Start: 2025-04-19 | End: 2025-04-21 | Stop reason: HOSPADM

## 2025-04-18 RX ORDER — BISACODYL 10 MG
10 SUPPOSITORY, RECTAL RECTAL DAILY PRN
Status: DISCONTINUED | OUTPATIENT
Start: 2025-04-18 | End: 2025-04-21 | Stop reason: HOSPADM

## 2025-04-18 RX ORDER — SODIUM CHLORIDE 0.9 % (FLUSH) 0.9 %
10 SYRINGE (ML) INJECTION EVERY 12 HOURS SCHEDULED
Status: DISCONTINUED | OUTPATIENT
Start: 2025-04-18 | End: 2025-04-21 | Stop reason: HOSPADM

## 2025-04-18 RX ORDER — ATORVASTATIN CALCIUM 20 MG/1
20 TABLET, FILM COATED ORAL NIGHTLY
Status: DISCONTINUED | OUTPATIENT
Start: 2025-04-19 | End: 2025-04-21 | Stop reason: HOSPADM

## 2025-04-18 RX ORDER — DROPERIDOL 2.5 MG/ML
1.25 INJECTION, SOLUTION INTRAMUSCULAR; INTRAVENOUS ONCE
Status: COMPLETED | OUTPATIENT
Start: 2025-04-18 | End: 2025-04-18

## 2025-04-18 RX ORDER — LEVETIRACETAM 500 MG/1
1500 TABLET ORAL 2 TIMES DAILY
Status: DISCONTINUED | OUTPATIENT
Start: 2025-04-19 | End: 2025-04-21 | Stop reason: HOSPADM

## 2025-04-18 RX ORDER — AMOXICILLIN 250 MG
2 CAPSULE ORAL 2 TIMES DAILY PRN
Status: DISCONTINUED | OUTPATIENT
Start: 2025-04-18 | End: 2025-04-21 | Stop reason: HOSPADM

## 2025-04-18 RX ORDER — ACETAMINOPHEN 650 MG/1
650 SUPPOSITORY RECTAL EVERY 4 HOURS PRN
Status: DISCONTINUED | OUTPATIENT
Start: 2025-04-18 | End: 2025-04-21 | Stop reason: HOSPADM

## 2025-04-18 RX ORDER — HYDROMORPHONE HYDROCHLORIDE 1 MG/ML
0.5 INJECTION, SOLUTION INTRAMUSCULAR; INTRAVENOUS; SUBCUTANEOUS EVERY 4 HOURS PRN
Status: DISCONTINUED | OUTPATIENT
Start: 2025-04-19 | End: 2025-04-19

## 2025-04-18 RX ORDER — NYSTATIN 100000 [USP'U]/G
1 POWDER TOPICAL 2 TIMES DAILY PRN
COMMUNITY

## 2025-04-18 RX ORDER — ONDANSETRON 2 MG/ML
4 INJECTION INTRAMUSCULAR; INTRAVENOUS EVERY 6 HOURS PRN
Status: DISCONTINUED | OUTPATIENT
Start: 2025-04-18 | End: 2025-04-21 | Stop reason: HOSPADM

## 2025-04-18 RX ORDER — ALPRAZOLAM 1 MG/1
2 TABLET ORAL 3 TIMES DAILY PRN
Status: DISCONTINUED | OUTPATIENT
Start: 2025-04-18 | End: 2025-04-21 | Stop reason: HOSPADM

## 2025-04-18 RX ORDER — POLYETHYLENE GLYCOL 3350 17 G/17G
17 POWDER, FOR SOLUTION ORAL DAILY PRN
Status: DISCONTINUED | OUTPATIENT
Start: 2025-04-18 | End: 2025-04-21 | Stop reason: HOSPADM

## 2025-04-18 RX ORDER — LIDOCAINE 4 G/G
2 PATCH TOPICAL
Status: DISCONTINUED | OUTPATIENT
Start: 2025-04-19 | End: 2025-04-18

## 2025-04-18 RX ORDER — SODIUM CHLORIDE 9 MG/ML
40 INJECTION, SOLUTION INTRAVENOUS AS NEEDED
Status: DISCONTINUED | OUTPATIENT
Start: 2025-04-18 | End: 2025-04-21 | Stop reason: HOSPADM

## 2025-04-18 RX ORDER — BUSPIRONE HYDROCHLORIDE 15 MG/1
30 TABLET ORAL 2 TIMES DAILY
Status: DISCONTINUED | OUTPATIENT
Start: 2025-04-19 | End: 2025-04-21 | Stop reason: HOSPADM

## 2025-04-18 RX ORDER — LEVOTHYROXINE SODIUM 25 UG/1
25 TABLET ORAL
Status: DISCONTINUED | OUTPATIENT
Start: 2025-04-19 | End: 2025-04-21 | Stop reason: HOSPADM

## 2025-04-18 RX ORDER — LIDOCAINE 4 G/G
2 PATCH TOPICAL NIGHTLY
Status: DISCONTINUED | OUTPATIENT
Start: 2025-04-19 | End: 2025-04-21 | Stop reason: HOSPADM

## 2025-04-18 RX ORDER — ACETAMINOPHEN 160 MG/5ML
650 SOLUTION ORAL EVERY 4 HOURS PRN
Status: DISCONTINUED | OUTPATIENT
Start: 2025-04-18 | End: 2025-04-21 | Stop reason: HOSPADM

## 2025-04-18 RX ADMIN — Medication 10 ML: at 22:59

## 2025-04-18 RX ADMIN — DROPERIDOL 1.25 MG: 2.5 INJECTION, SOLUTION INTRAMUSCULAR; INTRAVENOUS at 20:29

## 2025-04-18 RX ADMIN — DIPHENHYDRAMINE HYDROCHLORIDE 25 MG: 50 INJECTION, SOLUTION INTRAMUSCULAR; INTRAVENOUS at 22:58

## 2025-04-18 RX ADMIN — HYDROMORPHONE HYDROCHLORIDE 1 MG: 1 INJECTION, SOLUTION INTRAMUSCULAR; INTRAVENOUS; SUBCUTANEOUS at 20:29

## 2025-04-18 RX ADMIN — DROPERIDOL 1.25 MG: 2.5 INJECTION, SOLUTION INTRAMUSCULAR; INTRAVENOUS at 22:59

## 2025-04-18 RX ADMIN — HYDROMORPHONE HYDROCHLORIDE 1 MG: 1 INJECTION, SOLUTION INTRAMUSCULAR; INTRAVENOUS; SUBCUTANEOUS at 22:59

## 2025-04-18 NOTE — CASE MANAGEMENT/SOCIAL WORK
Case Management Discharge Note      Final Note: Home with [facility] and [facility] and [facility]    Provided Post Acute Provider List?: N/A  Provided Post Acute Provider Quality & Resource List?: N/A    Selected Continued Care - Discharged on 4/17/2025 Admission date: 4/15/2025 - Discharge disposition: Home or Self Care      Dialysis/Infusion Coordination complete.      Service Provider Services Address Phone Fax Patient Preferred    [facility] [address] [address] [phone] --              Home Medical Care Coordination complete.      Service Provider Services Address Phone Fax Patient Preferred    [facility] [address] [facility] [place] [address] [phone] --             Transportation Services  Private: Car    Final Discharge Disposition Code: 06 - home with home health care

## 2025-04-18 NOTE — OUTREACH NOTE
Call Center TCM Note      Flowsheet Row Responses   [facility] facility patient discharged from? [facility]   Does the patient have one of the following disease processes/diagnoses(primary or secondary)? Other   TCM attempt successful? No   Unsuccessful attempts Attempt 1   Call Status Left message            Arti Pepper Registered Nurse    4/18/2025, 11:31 EDT        
oral

## 2025-04-18 NOTE — OUTREACH NOTE
Call Center TCM Note      Flowsheet Row Responses   [facility] facility patient discharged from? [facility]   Does the patient have one of the following disease processes/diagnoses(primary or secondary)? Other   TCM attempt successful? No   Unsuccessful attempts Attempt 2   Call Status Left message            Arti Pepper Registered Nurse    4/18/2025, 15:36 EDT

## 2025-04-19 LAB
ANION GAP SERPL CALCULATED.3IONS-SCNC: 17 MMOL/L (ref 5–15)
BASOPHILS # BLD AUTO: 0.02 10*3/MM3 (ref 0–0.2)
BASOPHILS # BLD AUTO: 0.02 10*3/MM3 (ref 0–0.2)
BASOPHILS NFR BLD AUTO: 0.3 % (ref 0–1.5)
BASOPHILS NFR BLD AUTO: 0.3 % (ref 0–1.5)
BUN SERPL-MCNC: 10 MG/DL (ref 6–20)
BUN/CREAT SERPL: 11.9 (ref 7–25)
CALCIUM SPEC-SCNC: 8.9 MG/DL (ref 8.6–10.5)
CHLORIDE SERPL-SCNC: 106 MMOL/L (ref 98–107)
CO2 SERPL-SCNC: 18 MMOL/L (ref 22–29)
CREAT SERPL-MCNC: 0.84 MG/DL (ref 0.57–1)
DEPRECATED RDW RBC AUTO: 46.4 FL (ref 37–54)
DEPRECATED RDW RBC AUTO: 47.2 FL (ref 37–54)
EGFRCR SERPLBLD CKD-EPI 2021: 81.2 ML/MIN/1.73
EOSINOPHIL # BLD AUTO: 0 10*3/MM3 (ref 0–0.4)
EOSINOPHIL # BLD AUTO: 0.01 10*3/MM3 (ref 0–0.4)
EOSINOPHIL NFR BLD AUTO: 0 % (ref 0.3–6.2)
EOSINOPHIL NFR BLD AUTO: 0.1 % (ref 0.3–6.2)
ERYTHROCYTE [DISTWIDTH] IN BLOOD BY AUTOMATED COUNT: 15.5 % (ref 12.3–15.4)
ERYTHROCYTE [DISTWIDTH] IN BLOOD BY AUTOMATED COUNT: 15.9 % (ref 12.3–15.4)
GLUCOSE SERPL-MCNC: 107 MG/DL (ref 65–99)
HCT VFR BLD AUTO: 31.6 % (ref 34–46.6)
HCT VFR BLD AUTO: 34.2 % (ref 34–46.6)
HGB BLD-MCNC: 10.6 G/DL (ref 12–15.9)
HGB BLD-MCNC: 9.9 G/DL (ref 12–15.9)
IMM GRANULOCYTES # BLD AUTO: 0.02 10*3/MM3 (ref 0–0.05)
IMM GRANULOCYTES # BLD AUTO: 0.03 10*3/MM3 (ref 0–0.05)
IMM GRANULOCYTES NFR BLD AUTO: 0.3 % (ref 0–0.5)
IMM GRANULOCYTES NFR BLD AUTO: 0.4 % (ref 0–0.5)
LYMPHOCYTES # BLD AUTO: 1.47 10*3/MM3 (ref 0.7–3.1)
LYMPHOCYTES # BLD AUTO: 1.65 10*3/MM3 (ref 0.7–3.1)
LYMPHOCYTES NFR BLD AUTO: 19.3 % (ref 19.6–45.3)
LYMPHOCYTES NFR BLD AUTO: 24.4 % (ref 19.6–45.3)
MCH RBC QN AUTO: 25.3 PG (ref 26.6–33)
MCH RBC QN AUTO: 25.6 PG (ref 26.6–33)
MCHC RBC AUTO-ENTMCNC: 31 G/DL (ref 31.5–35.7)
MCHC RBC AUTO-ENTMCNC: 31.3 G/DL (ref 31.5–35.7)
MCV RBC AUTO: 81.6 FL (ref 79–97)
MCV RBC AUTO: 81.9 FL (ref 79–97)
MONOCYTES # BLD AUTO: 0.35 10*3/MM3 (ref 0.1–0.9)
MONOCYTES # BLD AUTO: 0.36 10*3/MM3 (ref 0.1–0.9)
MONOCYTES NFR BLD AUTO: 4.6 % (ref 5–12)
MONOCYTES NFR BLD AUTO: 5.3 % (ref 5–12)
NEUTROPHILS NFR BLD AUTO: 4.71 10*3/MM3 (ref 1.7–7)
NEUTROPHILS NFR BLD AUTO: 5.74 10*3/MM3 (ref 1.7–7)
NEUTROPHILS NFR BLD AUTO: 69.7 % (ref 42.7–76)
NEUTROPHILS NFR BLD AUTO: 75.3 % (ref 42.7–76)
NRBC BLD AUTO-RTO: 0 /100 WBC (ref 0–0.2)
NRBC BLD AUTO-RTO: 0 /100 WBC (ref 0–0.2)
PLATELET # BLD AUTO: 254 10*3/MM3 (ref 140–450)
PLATELET # BLD AUTO: 287 10*3/MM3 (ref 140–450)
PMV BLD AUTO: 10.9 FL (ref 6–12)
PMV BLD AUTO: 11.7 FL (ref 6–12)
POTASSIUM SERPL-SCNC: 3.7 MMOL/L (ref 3.5–5.2)
RBC # BLD AUTO: 3.86 10*6/MM3 (ref 3.77–5.28)
RBC # BLD AUTO: 4.19 10*6/MM3 (ref 3.77–5.28)
SODIUM SERPL-SCNC: 141 MMOL/L (ref 136–145)
WBC NRBC COR # BLD AUTO: 6.76 10*3/MM3 (ref 3.4–10.8)
WBC NRBC COR # BLD AUTO: 7.62 10*3/MM3 (ref 3.4–10.8)

## 2025-04-19 PROCEDURE — 96366 THER/PROPH/DIAG IV INF ADDON: CPT

## 2025-04-19 PROCEDURE — 96376 TX/PRO/DX INJ SAME DRUG ADON: CPT

## 2025-04-19 PROCEDURE — 25010000002 ORPHENADRINE CITRATE PER 60 MG

## 2025-04-19 PROCEDURE — 96365 THER/PROPH/DIAG IV INF INIT: CPT

## 2025-04-19 PROCEDURE — G0378 HOSPITAL OBSERVATION PER HR: HCPCS

## 2025-04-19 PROCEDURE — 96375 TX/PRO/DX INJ NEW DRUG ADDON: CPT

## 2025-04-19 PROCEDURE — 85025 COMPLETE CBC W/AUTO DIFF WBC: CPT

## 2025-04-19 PROCEDURE — 25010000002 HYDROMORPHONE PER 4 MG

## 2025-04-19 PROCEDURE — 25010000002 CEFEPIME PER 500 MG

## 2025-04-19 PROCEDURE — 25010000002 ONDANSETRON PER 1 MG

## 2025-04-19 PROCEDURE — 80048 BASIC METABOLIC PNL TOTAL CA: CPT

## 2025-04-19 RX ORDER — POTASSIUM CHLORIDE 1500 MG/1
40 TABLET, EXTENDED RELEASE ORAL EVERY 4 HOURS
Status: COMPLETED | OUTPATIENT
Start: 2025-04-19 | End: 2025-04-19

## 2025-04-19 RX ORDER — METHOCARBAMOL 750 MG/1
750 TABLET, FILM COATED ORAL 4 TIMES DAILY
Status: DISCONTINUED | OUTPATIENT
Start: 2025-04-19 | End: 2025-04-19

## 2025-04-19 RX ORDER — SODIUM CHLORIDE 0.9 % (FLUSH) 0.9 %
10 SYRINGE (ML) INJECTION AS NEEDED
Status: DISCONTINUED | OUTPATIENT
Start: 2025-04-19 | End: 2025-04-21 | Stop reason: HOSPADM

## 2025-04-19 RX ORDER — SODIUM CHLORIDE 0.9 % (FLUSH) 0.9 %
10 SYRINGE (ML) INJECTION EVERY 12 HOURS SCHEDULED
Status: DISCONTINUED | OUTPATIENT
Start: 2025-04-19 | End: 2025-04-21 | Stop reason: HOSPADM

## 2025-04-19 RX ORDER — SODIUM CHLORIDE 0.9 % (FLUSH) 0.9 %
20 SYRINGE (ML) INJECTION AS NEEDED
Status: DISCONTINUED | OUTPATIENT
Start: 2025-04-19 | End: 2025-04-21 | Stop reason: HOSPADM

## 2025-04-19 RX ORDER — CALCIUM CARBONATE 500 MG/1
2 TABLET, CHEWABLE ORAL 3 TIMES DAILY PRN
Status: DISCONTINUED | OUTPATIENT
Start: 2025-04-19 | End: 2025-04-21 | Stop reason: HOSPADM

## 2025-04-19 RX ORDER — OXYCODONE HYDROCHLORIDE 5 MG/1
10 TABLET ORAL EVERY 4 HOURS PRN
Refills: 0 | Status: DISCONTINUED | OUTPATIENT
Start: 2025-04-19 | End: 2025-04-21 | Stop reason: HOSPADM

## 2025-04-19 RX ORDER — ORPHENADRINE CITRATE 30 MG/ML
60 INJECTION INTRAMUSCULAR; INTRAVENOUS ONCE
Status: COMPLETED | OUTPATIENT
Start: 2025-04-19 | End: 2025-04-19

## 2025-04-19 RX ORDER — METHOCARBAMOL 750 MG/1
750 TABLET, FILM COATED ORAL 4 TIMES DAILY
Status: DISCONTINUED | OUTPATIENT
Start: 2025-04-19 | End: 2025-04-20

## 2025-04-19 RX ADMIN — LEVOTHYROXINE SODIUM 25 MCG: 0.03 TABLET ORAL at 05:33

## 2025-04-19 RX ADMIN — ALPRAZOLAM 2 MG: 1 TABLET ORAL at 00:02

## 2025-04-19 RX ADMIN — PANTOPRAZOLE SODIUM 40 MG: 40 TABLET, DELAYED RELEASE ORAL at 08:11

## 2025-04-19 RX ADMIN — CARIPRAZINE 1.5 MG: 1.5 CAPSULE, GELATIN COATED ORAL at 08:12

## 2025-04-19 RX ADMIN — Medication 10 ML: at 08:11

## 2025-04-19 RX ADMIN — CEFEPIME 2000 MG: 2 INJECTION, POWDER, FOR SOLUTION INTRAVENOUS at 10:25

## 2025-04-19 RX ADMIN — AMITRIPTYLINE HYDROCHLORIDE 25 MG: 25 TABLET, FILM COATED ORAL at 00:03

## 2025-04-19 RX ADMIN — BUSPIRONE HYDROCHLORIDE 30 MG: 15 TABLET ORAL at 21:58

## 2025-04-19 RX ADMIN — HYDROMORPHONE HYDROCHLORIDE 0.5 MG: 1 INJECTION, SOLUTION INTRAMUSCULAR; INTRAVENOUS; SUBCUTANEOUS at 03:01

## 2025-04-19 RX ADMIN — ATORVASTATIN CALCIUM 20 MG: 20 TABLET, FILM COATED ORAL at 22:11

## 2025-04-19 RX ADMIN — HYDROMORPHONE HYDROCHLORIDE 0.5 MG: 1 INJECTION, SOLUTION INTRAMUSCULAR; INTRAVENOUS; SUBCUTANEOUS at 07:10

## 2025-04-19 RX ADMIN — AMITRIPTYLINE HYDROCHLORIDE 25 MG: 25 TABLET, FILM COATED ORAL at 22:11

## 2025-04-19 RX ADMIN — QUETIAPINE FUMARATE 800 MG: 300 TABLET, EXTENDED RELEASE ORAL at 00:49

## 2025-04-19 RX ADMIN — BUSPIRONE HYDROCHLORIDE 30 MG: 15 TABLET ORAL at 08:10

## 2025-04-19 RX ADMIN — Medication 10 ML: at 02:04

## 2025-04-19 RX ADMIN — Medication 5 MG: at 00:02

## 2025-04-19 RX ADMIN — BUSPIRONE HYDROCHLORIDE 30 MG: 15 TABLET ORAL at 00:03

## 2025-04-19 RX ADMIN — METHOCARBAMOL TABLETS 750 MG: 750 TABLET, COATED ORAL at 21:58

## 2025-04-19 RX ADMIN — LIDOCAINE PAIN RELIEF 2 PATCH: 560 PATCH TOPICAL at 00:02

## 2025-04-19 RX ADMIN — ANTACID TABLETS 2 TABLET: 500 TABLET, CHEWABLE ORAL at 21:58

## 2025-04-19 RX ADMIN — LEVETIRACETAM 1500 MG: 500 TABLET, FILM COATED ORAL at 08:10

## 2025-04-19 RX ADMIN — METHOCARBAMOL TABLETS 750 MG: 750 TABLET, COATED ORAL at 11:32

## 2025-04-19 RX ADMIN — ORPHENADRINE CITRATE 60 MG: 60 INJECTION INTRAMUSCULAR; INTRAVENOUS at 08:11

## 2025-04-19 RX ADMIN — ATORVASTATIN CALCIUM 20 MG: 20 TABLET, FILM COATED ORAL at 00:02

## 2025-04-19 RX ADMIN — LEVETIRACETAM 1500 MG: 500 TABLET, FILM COATED ORAL at 00:03

## 2025-04-19 RX ADMIN — SERTRALINE HYDROCHLORIDE 100 MG: 100 TABLET, FILM COATED ORAL at 08:11

## 2025-04-19 RX ADMIN — QUETIAPINE FUMARATE 800 MG: 300 TABLET, EXTENDED RELEASE ORAL at 21:59

## 2025-04-19 RX ADMIN — Medication 10 ML: at 22:03

## 2025-04-19 RX ADMIN — CEFEPIME 2000 MG: 2 INJECTION, POWDER, FOR SOLUTION INTRAVENOUS at 02:04

## 2025-04-19 RX ADMIN — ONDANSETRON 4 MG: 2 INJECTION, SOLUTION INTRAMUSCULAR; INTRAVENOUS at 15:49

## 2025-04-19 RX ADMIN — POTASSIUM CHLORIDE 40 MEQ: 1500 TABLET, EXTENDED RELEASE ORAL at 05:33

## 2025-04-19 RX ADMIN — LAMOTRIGINE 150 MG: 100 TABLET ORAL at 21:58

## 2025-04-19 RX ADMIN — METHOCARBAMOL TABLETS 750 MG: 750 TABLET, COATED ORAL at 17:20

## 2025-04-19 RX ADMIN — LAMOTRIGINE 150 MG: 100 TABLET ORAL at 00:03

## 2025-04-19 RX ADMIN — LEVETIRACETAM 1500 MG: 500 TABLET, FILM COATED ORAL at 21:58

## 2025-04-19 RX ADMIN — LIDOCAINE PAIN RELIEF 2 PATCH: 560 PATCH TOPICAL at 21:59

## 2025-04-19 RX ADMIN — ONDANSETRON 4 MG: 2 INJECTION, SOLUTION INTRAMUSCULAR; INTRAVENOUS at 00:03

## 2025-04-19 RX ADMIN — LAMOTRIGINE 150 MG: 100 TABLET ORAL at 08:10

## 2025-04-19 RX ADMIN — CEFEPIME 2000 MG: 2 INJECTION, POWDER, FOR SOLUTION INTRAVENOUS at 17:20

## 2025-04-19 RX ADMIN — POTASSIUM CHLORIDE 40 MEQ: 1500 TABLET, EXTENDED RELEASE ORAL at 00:49

## 2025-04-19 RX ADMIN — ALPRAZOLAM 2 MG: 1 TABLET ORAL at 17:25

## 2025-04-19 NOTE — PLAN OF CARE
Goal Outcome Evaluation:      Pt alert and oriented. Pt having complaints of uncontrolled pain, see MAR. Pt currently laying in bed and not wearing TLSO brace, patient educated. Chest x-ray ordered for PICC verification, not resulted yet. VS stable. Plan of care ongoing.

## 2025-04-19 NOTE — H&P
"    [facility] Medicine Services  History & Physical    Patient Name: Milka Espinoza  : 1970  MRN: [id number]  Primary Care Physician:  Alvina Arzate APRN  Date of admission: 2025  Date and Time of Service: 2025 at 2220      Assessment & Plan      Chief Complaint: back pain    Plan:    Back Pain  Discitis Osteomyelitis of T6-T7  -Discharged from this facility on 25 after treatment for the same back pain  -Xray of Thoracic Spine showed unchanged discitis/osteomyelitis compared to recent thoracic spine MRI  from 25  -Ketamine given in EMS  -Dilaudid, Inapsine and Benadryl IV given in ED  -Patient reports no improvement in pain  -Patient continues to be noncompliant with her TLSO brace  -Lidocaine patches ordered, along with TLSO brace application  -Continue IV Cefepime per ID recommendations, last dose 25  -Neurosurgery saw patient on 25, no surgical intervention indicated; recommended compliance with TLSO with repeat MRI in 4-6 weeks      Home medications for chronic conditions will be restarted as appropriate when verified by pharmacy      History of Present Illness     History of Present Illness: Milka Espinoza is a 55 y.o. female with a previous medical history of discitis/osteomyelitis of T6-T7, bipolar disorder, seizures who presented to [facility] on 2025 with intractable back pain due to the discitis.  She states that she tried to control with oral pain medication at home but had no relief and that the pain is so bad that it make her vomit.  She states that she wears her TLSO brace when \"up doing things\".  She did bring it to the ED but is not wearing it on exam.     In the ED,  She was given Ketamine 8mg x3 doses per EMS report, Dilaudid 1 mg, Inapsine 1.25mg x2 and Benadryl 25mg IV.  She reports nothing has helped.  An xray of the thoracic spine showed unchanged discitis/osteomyelitis compared to the thoracic spine MRI from 25.  " She is afebrile, all vitals are stable.  Hospitalist was consulted for further management.    Chart Review: The patient was admitted for this same type of pain on 4/15/25.  Neurosurgery was consulted on 4/16 and she was seen by Dr. Harrison and Yara Piedra PA-C.  They noted that there is no kyphotic deformity, high grade stenosis or spinal cord compression with no surgical intervention indicated.  She was also noted to be noncompliant with her TLSO brace, she did not bring it to the hospital on that admission.  Recommendations were to continue with Cefepime per ID, compliance with the TLSO brace and a repeat MRI in 4-6 weeks unless there is a change in the neurostatus or pain.    12 point ROS reviewed and negative except as mentioned above    Objective      Vitals:   Temp:  [98.4 °F (36.9 °C)] 98.4 °F (36.9 °C)  Heart Rate:  [68-80] 77  Resp:  [20] 20  BP: (151-163)/(82-92) 163/84  Body mass index is 30.65 kg/m².    Physical Exam  Vitals and nursing note reviewed.   Constitutional:       Appearance: Normal appearance.   HENT:      Mouth/Throat:      Mouth: Mucous membranes are moist.   Cardiovascular:      Rate and Rhythm: Normal rate and regular rhythm.   Pulmonary:      Effort: Pulmonary effort is normal.      Breath sounds: Normal breath sounds.   Abdominal:      General: Bowel sounds are normal.      Palpations: Abdomen is soft.   Musculoskeletal:         General: Normal range of motion.   Skin:     General: Skin is warm and dry.   Neurological:      General: No focal deficit present.      Mental Status: She is alert and oriented to person, place, and time. Mental status is at baseline.   Psychiatric:         Mood and Affect: Mood normal. Affect is tearful.         Behavior: Behavior is agitated.        Personal History     This is a 55 y.o. female with:    Past Medical History:   Diagnosis Date    Depression     Disease of thyroid gland     Hyperlipidemia     Migraines     On home O2 12/2021    3 lmp     Seizures        Past Surgical History:   Procedure Laterality Date    ABDOMINAL SURGERY       SECTION      x3    ENDOSCOPY N/A 2022    Procedure: EGD WITH DILATATION (BOUGIE #56) AND INJECTION OF BOTOX;  Surgeon: Rito Le MD;  Location: [facility] ENDOSCOPY;  Service: Gastroenterology;  Laterality: N/A;  POST: LOWER ESOPHAGEAL STRICTURE AND CRICOPHARYNGEAL STRICUTRE    ENDOSCOPY W/ PEG TUBE PLACEMENT N/A 2020    Procedure: ESOPHAGOGASTRODUODENOSCOPY WITH PERCUTANEOUS ENDOSCOPIC GASTROSTOMY TUBE INSERTION;  Surgeon: Rito Le MD;  Location: [facility] ENDOSCOPY;  Service: Gastroenterology;  Laterality: N/A;    HERNIA REPAIR      HYSTERECTOMY      PEG TUBE REMOVAL         Active and Resolved Problems  Active Hospital Problems    Diagnosis  POA    **Intractable back pain [M54.9]  Yes    Patient noncompliance [Z91.199]  Not Applicable      Resolved Hospital Problems   No resolved problems to display.       Family History: family history includes Alcohol abuse in her daughter; No Known Problems in her brother, father, maternal aunt, maternal grandmother, mother, paternal aunt, paternal grandmother, sister, and son. Otherwise pertinent FHx was reviewed and not pertinent to current issue.    Social History:  reports that she quit smoking about 2 years ago. Her smoking use included cigarettes. She started smoking about 5 years ago. She has a 0.8 pack-year smoking history. She has been exposed to tobacco smoke. She has never used smokeless tobacco. She reports that she does not drink alcohol and does not use drugs.    Home Medications:  Prior to Admission Medications       Prescriptions Last Dose Informant Patient Reported? Taking?    ALPRAZolam (XANAX) 2 MG tablet   No Yes    TAKE 1 TABLET BY MOUTH THREE TIMES DAILY AS NEEDED FOR ANXIETY    amitriptyline (ELAVIL) 25 MG tablet 2025  No Yes    TAKE 1 TABLET BY MOUTH EVERY NIGHT AT BEDTIME    atorvastatin (LIPITOR) 20 MG tablet 2025  No Yes     Take 1 tablet by mouth Every Night.    busPIRone (BUSPAR) 30 MG tablet 4/17/2025  No Yes    TAKE 1 TABLET BY MOUTH TWICE DAILY    cefepime 2000 mg IVPB in 100 mL NS (MBP) 4/18/2025  No Yes    Infuse 2,000 mg into a venous catheter Every 8 (Eight) Hours for 113 doses. Indications: Bone and/or Joint Infection, discitis    hydrOXYzine (ATARAX) 25 MG tablet   No Yes    TAKE 1 TABLET BY MOUTH THREE TIMES DAILY AS NEEDED FOR ANXIETY    lamoTRIgine (LaMICtal) 150 MG tablet 4/17/2025  No Yes    Take 1 tablet by mouth 2 (Two) Times a Day.    levETIRAcetam (KEPPRA) 500 MG tablet 4/17/2025  No Yes    Take 3 tablets by mouth 2 (Two) Times a Day.    levothyroxine (SYNTHROID, LEVOTHROID) 25 MCG tablet 4/17/2025  No Yes    Take 1 tablet by mouth Daily.    Motegrity 2 MG tablet 4/18/2025  Yes Yes    Take 1 tablet by mouth Daily.    nystatin (MYCOSTATIN) 626521 UNIT/GM powder   Yes Yes    Apply 1 Application topically to the appropriate area as directed 2 (Two) Times a Day As Needed.    Octagam 30 GM/300ML solution infusion Past Month  Yes Yes    Infuse 30 g into a venous catheter 1 (One) Time Per Week.    ondansetron (ZOFRAN) 2 mg/mL injection   Yes Yes    Infuse 2 mL into a venous catheter Every 6 (Six) Hours As Needed.    pantoprazole (PROTONIX) 40 MG EC tablet 4/17/2025  No Yes    Take 1 tablet by mouth Daily.    promethazine (PHENERGAN) 25 MG tablet   Yes Yes    Take 1 tablet by mouth Every 6 (Six) Hours As Needed for Nausea or Vomiting.    QUEtiapine XR (SEROquel XR) 400 MG 24 hr tablet 4/17/2025  No Yes    Take 2 tablets by mouth every night at bedtime.    sertraline (ZOLOFT) 100 MG tablet 4/17/2025  No Yes    Take 1 tablet by mouth Daily.    SUMAtriptan (IMITREX) 100 MG tablet   No Yes    TAKE 1 TABLET BY MOUTH 1 TIME AT ONSET OF MIGRAINE AS NEEDED FOR MIGRAINE. MAY REPEAT DOSE 1 TIME AFTER 2 HOURS IF MIGRAINE NOT RESOLVED    Vraylar 1.5 MG capsule capsule 4/17/2025  No Yes    TAKE 1 CAPSULE BY MOUTH EVERY DAY       "        Allergies:  Allergies   Allergen Reactions    Ketorolac Other (See Comments)     pt states \"this medicine sends me into seizures\"    Sulfa Antibiotics Hives    Tramadol Hcl Mental Status Change    Topamax [Topiramate] Unknown - High Severity    Adhesive Tape Rash     Paper tape           VTE Prophylaxis:  Mechanical VTE prophylaxis orders are present.        CODE STATUS:    Code Status (Patient has no pulse and is not breathing): CPR (Attempt to Resuscitate)  Medical Interventions (Patient has pulse or is breathing): Full Support        Admission Status:  I believe this patient meets observation status.    I discussed the patient's findings and my recommendations with patient and nursing staff.    Signature:     This document has been electronically signed by Jovanna Rees DNP, APRN, AGACNP-BC on April 18, 2025 22:29 EDT   [facility] Hospitalist Team  "

## 2025-04-19 NOTE — PLAN OF CARE
Problem: Adult Inpatient Plan of Care  Goal: Plan of Care Review  4/18/2025 2350 by Remedios Blas RN  Outcome: Progressing  Flowsheets (Taken 4/18/2025 2350)  Progress: no change  Outcome Evaluation: patient alert and oriented, patient having gastroparesis issues, patient has intact chronic picc line, patient able to use call light to make needs known.  Plan of Care Reviewed With: patient  4/18/2025 2349 by Remedios Blas RN  Outcome: Progressing  Goal: Patient-Specific Goal (Individualized)  4/18/2025 2350 by Remedios Blas RN  Outcome: Progressing  4/18/2025 2349 by Remedios Blas RN  Outcome: Progressing  Goal: Absence of Hospital-Acquired Illness or Injury  4/18/2025 2350 by Remedios Blas RN  Outcome: Progressing  4/18/2025 2349 by Remedios Blas RN  Outcome: Progressing  Goal: Optimal Comfort and Wellbeing  4/18/2025 2350 by Remedios Blas RN  Outcome: Progressing  4/18/2025 2349 by Remedios Blas RN  Outcome: Progressing  Goal: Readiness for Transition of Care  4/18/2025 2350 by Remedios Blas RN  Outcome: Progressing  4/18/2025 2349 by Remedios Blas RN  Outcome: Progressing     Problem: Pain Acute  Goal: Optimal Pain Control and Function  Outcome: Progressing   Goal Outcome Evaluation:  Plan of Care Reviewed With: patient        Progress: no change  Outcome Evaluation: patient alert and oriented, patient having gastroparesis issues, patient has intact chronic picc line, patient able to use call light to make needs known.

## 2025-04-19 NOTE — OUTREACH NOTE
Medical Week 2 Survey      Flowsheet Row Responses   [facility], [place] operated by [facility] facility patient discharged from? [facility]   Does the patient have one of the following disease processes/diagnoses(primary or secondary)? Other   Week 2 attempt successful? No   Unsuccessful attempts Attempt 1   Revoke Readmitted            MEREDITH FLORES - Registered Nurse

## 2025-04-19 NOTE — PROGRESS NOTES
"    [facility] MEDICINE SERVICE  DAILY PROGRESS NOTE    NAME: Milka Espinoza  : 1970  MRN: [id number]      LOS: 0 days     PROVIDER OF SERVICE: Jayy Keita MD    Chief Complaint: Intractable back pain    Subjective:     Interval History:  History taken from: patient      History of Present Illness: Milka Espinoza is a 55 y.o. female with a previous medical history of discitis/osteomyelitis of T6-T7, bipolar disorder, seizures who presented to [facility] on 2025 with intractable back pain due to the discitis.  She states that she tried to control with oral pain medication at home but had no relief and that the pain is so bad that it make her vomit.  She states that she wears her TLSO brace when \"up doing things\".  She did bring it to the ED but is not wearing it on exam.      In the ED,  She was given Ketamine 8mg x3 doses per EMS report, Dilaudid 1 mg, Inapsine 1.25mg x2 and Benadryl 25mg IV.  She reports nothing has helped.  An xray of the thoracic spine showed unchanged discitis/osteomyelitis compared to the thoracic spine MRI from 25.  She is afebrile, all vitals are stable.  Hospitalist was consulted for further management.     Chart Review: The patient was admitted for this same type of pain on 4/15/25.  Neurosurgery was consulted on  and she was seen by Dr. Harrison and Yara Piedra PA-C.  They noted that there is no kyphotic deformity, high grade stenosis or spinal cord compression with no surgical intervention indicated.  She was also noted to be noncompliant with her TLSO brace, she did not bring it to the hospital on that admission.  Recommendations were to continue with Cefepime per ID, compliance with the TLSO brace and a repeat MRI in 4-6 weeks unless there is a change in the neurostatus or pain.       -seen in bed NAD, very sleepy. \"Im trying to sleep\". PERI RN pt requesting IV pain meds, will change to po      Review of Systems  12 point ROS " reviewed and negative except as mentioned above  Objective:     Vital Signs  Temp:  [97.8 °F (36.6 °C)-98.4 °F (36.9 °C)] 97.8 °F (36.6 °C)  Heart Rate:  [68-81] 81  Resp:  [15-20] 15  BP: (124-163)/(62-92) 148/73   Body mass index is 29.55 kg/m².      Physical Exam     Vitals and nursing note reviewed.   Constitutional:       Appearance: Normal appearance.   HENT:      Mouth/Throat:      Mouth: Mucous membranes are moist.   Cardiovascular:      Rate and Rhythm: Normal rate and regular rhythm.   Pulmonary:      Effort: Pulmonary effort is normal.      Breath sounds: Normal breath sounds.   Abdominal:      General: Bowel sounds are normal.      Palpations: Abdomen is soft.   Musculoskeletal:         General: Normal range of motion.   Skin:     General: Skin is warm and dry.   Neurological:      General: No focal deficit present.      Mental Status: She is alert and oriented to person, place, and time. Mental status is at baseline.   Psychiatric:         Mood and Affect: Mood normal. Affect is tearful.         Behavior: Behavior is agitated.        Diagnostic Data    Results from last 7 days   Lab Units 04/19/25  0540   WBC 10*3/mm3 6.76   HEMOGLOBIN g/dL 9.9*   HEMATOCRIT % 31.6*   PLATELETS 10*3/mm3 254   GLUCOSE mg/dL 107*   CREATININE mg/dL 0.84   BUN mg/dL 10   SODIUM mmol/L 141   POTASSIUM mmol/L 3.7   ANION GAP mmol/L 17.0*       XR Chest 1 View  Result Date: 4/19/2025  Impression: PICC line tip is at the cavoatrial junction. Electronically Signed: Werner Burgess MD  4/19/2025 1:40 AM EDT  Workstation ID: [id number]    XR Spine Thoracic 3 View  Result Date: 4/18/2025  Impression: Findings of discitis/osteomyelitis at T6-T7, overall unchanged compared to recent thoracic spine MRI within the confines of modality differences. No new/progressive vertebral body height loss or malalignment. Electronically Signed: Augustine Mercer MD  4/18/2025 9:27 PM EDT  Workstation ID: [id number]    Scheduled Meds:amitriptyline, 25 mg,  Oral, Nightly  atorvastatin, 20 mg, Oral, Nightly  busPIRone, 30 mg, Oral, BID  Cariprazine HCl, 1.5 mg, Oral, Daily  cefepime 2000 mg IVPB in 100 mL NS (MBP), 2,000 mg, Intravenous, Q8H  lamoTRIgine, 150 mg, Oral, BID  levETIRAcetam, 1,500 mg, Oral, BID  levothyroxine, 25 mcg, Oral, Q AM  Lidocaine, 2 patch, Transdermal, Nightly  methocarbamol, 750 mg, Oral, 4x Daily  pantoprazole, 40 mg, Oral, QAM AC  QUEtiapine XR, 800 mg, Oral, Nightly  sertraline, 100 mg, Oral, Daily  sodium chloride, 10 mL, Intravenous, Q12H  sodium chloride, 10 mL, Intravenous, Q12H      Continuous Infusions:   PRN Meds:.  acetaminophen **OR** acetaminophen **OR** acetaminophen    ALPRAZolam    senna-docusate sodium **AND** polyethylene glycol **AND** bisacodyl **AND** bisacodyl    calcium carbonate    Calcium Replacement - Follow Nurse / BPA Driven Protocol    HYDROmorphone    Magnesium Standard Dose Replacement - Follow Nurse / BPA Driven Protocol    melatonin    ondansetron    Phosphorus Replacement - Follow Nurse / BPA Driven Protocol    Potassium Replacement - Follow Nurse / BPA Driven Protocol    sodium chloride    sodium chloride    sodium chloride    sodium chloride     I reviewed the patient's new clinical results.    Assessment/Plan:     Active and Resolved Problems  Active Hospital Problems    Diagnosis  POA    **Intractable back pain [M54.9]  Yes    Patient noncompliance [Z91.199]  Not Applicable      Resolved Hospital Problems   No resolved problems to display.     Back Pain  Discitis Osteomyelitis of T6-T7  -Discharged from this facility on 4/17/25 after treatment for the same back pain  -Xray of Thoracic Spine showed unchanged discitis/osteomyelitis compared to recent thoracic spine MRI  from 4/16/25  -Ketamine given in EMS  -Dilaudid, Inapsine and Benadryl IV given in ED  -Patient reports no improvement in pain  -Patient continues to be noncompliant with her TLSO brace  -Lidocaine patches ordered, along with TLSO brace  application  -Continue IV Cefepime per ID recommendations, last dose 4/29/25  -Neurosurgery saw patient on 4/16/25, no surgical intervention indicated; recommended compliance with TLSO with repeat MRI in 4-6 weeks       Home medications for chronic conditions will be restarted as appropriate when verified by pharmacy     VTE Prophylaxis:  Mechanical VTE prophylaxis orders are present.    Disposition Planning:   Barriers to Discharge:back pain  Anticipated Date of Discharge: 4/21  Place of Discharge: [facility]      Time: 34 minutes     Code Status and Medical Interventions: CPR (Attempt to Resuscitate); Full Support   Ordered at: 04/18/25 2226     Code Status (Patient has no pulse and is not breathing):    CPR (Attempt to Resuscitate)     Medical Interventions (Patient has pulse or is breathing):    Full Support       Signature: Electronically signed by Jayy Keita MD, 04/19/25, 09:25 EDT.  [facility] Hospitalist Team

## 2025-04-19 NOTE — PLAN OF CARE
Problem: Adult Inpatient Plan of Care  Goal: Plan of Care Review  Outcome: Not Progressing  Flowsheets (Taken 4/19/2025 0975)  Progress: no change  Outcome Evaluation: Patient is stable. complaints of pain, everytime she wakes up. Patient slurring words and doesnt open eyes when speaking, this nurse used nursing judgement to not give patient pain rx. Safety measures in place. Call light within reach. Patient able to make needs known. Plan of care ongoing.  Plan of Care Reviewed With: patient  Goal: Patient-Specific Goal (Individualized)  Outcome: Not Progressing  Goal: Absence of Hospital-Acquired Illness or Injury  Outcome: Not Progressing  Goal: Optimal Comfort and Wellbeing  Outcome: Not Progressing  Goal: Readiness for Transition of Care  Outcome: Not Progressing   Goal Outcome Evaluation:  Plan of Care Reviewed With: patient        Progress: no change  Outcome Evaluation: Patient is stable. complaints of pain, everytime she wakes up. Patient slurring words and doesnt open eyes when speaking, this nurse used nursing judgement to not give patient pain rx. Safety measures in place. Call light within reach. Patient able to make needs known. Plan of care ongoing.

## 2025-04-19 NOTE — ED PROVIDER NOTES
"Subjective   History of Present Illness  55-year-old female presents with severe thoracic back pain.  She was just discharged yesterday after admission for intractable back pain.  She has history of osteomyelitis of thoracic spine.  She was seen by neurosurgery no further intervention indicated.  She currently is on IV cefepime at home.  She reports no fevers.  She had received ketamine and Zofran per EMS without improvement.  Review of Systems    Past Medical History:   Diagnosis Date    Depression     Disease of thyroid gland     Hyperlipidemia     Migraines     On home O2 2021    3 lmp    Seizures        Allergies   Allergen Reactions    Ketorolac Other (See Comments)     pt states \"this medicine sends me into seizures\"    Sulfa Antibiotics Hives    Tramadol Hcl Mental Status Change    Topamax [Topiramate] Unknown - High Severity    Adhesive Tape Rash     Paper tape       Past Surgical History:   Procedure Laterality Date    ABDOMINAL SURGERY       SECTION      x3    ENDOSCOPY N/A 2022    Procedure: EGD WITH DILATATION (BOUGIE #56) AND INJECTION OF BOTOX;  Surgeon: Rito Le MD;  Location: [facility] ENDOSCOPY;  Service: Gastroenterology;  Laterality: N/A;  POST: LOWER ESOPHAGEAL STRICTURE AND CRICOPHARYNGEAL STRICUTRE    ENDOSCOPY W/ PEG TUBE PLACEMENT N/A 2020    Procedure: ESOPHAGOGASTRODUODENOSCOPY WITH PERCUTANEOUS ENDOSCOPIC GASTROSTOMY TUBE INSERTION;  Surgeon: Rito Le MD;  Location: [facility] ENDOSCOPY;  Service: Gastroenterology;  Laterality: N/A;    HERNIA REPAIR      HYSTERECTOMY      PEG TUBE REMOVAL         Family History   Problem Relation Age of Onset    No Known Problems Mother     No Known Problems Father     No Known Problems Sister     No Known Problems Brother     Alcohol abuse Daughter     No Known Problems Son     No Known Problems Maternal Grandmother     No Known Problems Paternal Grandmother     No Known Problems Maternal Aunt     No Known Problems Paternal " Aunt        Social History     Socioeconomic History    Marital status:    Tobacco Use    Smoking status: Former     Current packs/day: 0.00     Average packs/day: 0.3 packs/day for 3.0 years (0.8 ttl pk-yrs)     Types: Cigarettes     Start date:      Quit date:      Years since quittin.2     Passive exposure: Past    Smokeless tobacco: Never   Vaping Use    Vaping status: Never Used   Substance and Sexual Activity    Alcohol use: No    Drug use: No    Sexual activity: Defer     Prior to Admission medications    Medication Sig Start Date End Date Taking? Authorizing Provider   ALPRAZolam (XANAX) 2 MG tablet TAKE 1 TABLET BY MOUTH THREE TIMES DAILY AS NEEDED FOR ANXIETY 3/27/25   Milka Pavon APRN   amitriptyline (ELAVIL) 25 MG tablet TAKE 1 TABLET BY MOUTH EVERY NIGHT AT BEDTIME 3/6/23   Safia Reyna APRN   atorvastatin (LIPITOR) 20 MG tablet Take 1 tablet by mouth Every Night. 25   Alvina Arzate APRN   busPIRone (BUSPAR) 30 MG tablet TAKE 1 TABLET BY MOUTH TWICE DAILY 24   Milka Pavon APRN   cefepime 2000 mg IVPB in 100 mL NS (MBP) Infuse 2,000 mg into a venous catheter Every 8 (Eight) Hours for 113 doses. Indications: Bone and/or Joint Infection, discitis 3/22/25 4/29/25  Eddie Conrad MD   hydrOXYzine (ATARAX) 25 MG tablet TAKE 1 TABLET BY MOUTH THREE TIMES DAILY AS NEEDED FOR ANXIETY 3/16/25   Milka Pavon APRN   lacosamide (VIMPAT) 200 MG tablet Take 1 tablet by mouth Every 12 (Twelve) Hours. 24   Ana Rosa Weber APRN   lamoTRIgine (LaMICtal) 150 MG tablet Take 1 tablet by mouth 2 (Two) Times a Day. 25   Ana Rosa Weber APRN   levETIRAcetam (KEPPRA) 500 MG tablet Take 3 tablets by mouth 2 (Two) Times a Day. 25   Ana Rosa Weber APRN   levothyroxine (SYNTHROID, LEVOTHROID) 25 MCG tablet Take 1 tablet by mouth Daily. 25   Alvina Arzate APRN   Motegrity 2 MG tablet Take 1 tablet by mouth Daily.  5/17/21   Ritesh Funes MD   naloxone (Narcan) 4 MG/0.1ML nasal spray = 1 spray(s), Nostril-Both, Once, # 2 EA, 0 Refill(s), Pharmacy: [id number], 1 spray(s) Nostril-Both Once, 162, cm, 04/04/25 19:23:00 EDT, Height, 89, kg, 04/04/25 19:25:00 EDT, Weight Dosing 4/4/25   Ritesh Fnues MD   nystatin 755957 UNIT/GM powder Apply  topically to the appropriate area as directed 2 (Two) Times a Day. 9/9/24   Alvina Arzate APRN   Octagam 30 GM/300ML solution infusion Infuse 30 g into a venous catheter 1 (One) Time Per Week. 12/26/24   Ritesh Funes MD   ondansetron (ZOFRAN) 2 mg/mL injection Infuse 2 mL into a venous catheter Every 6 (Six) Hours As Needed. 9/21/23   Ritesh Funes MD   oxyCODONE (ROXICODONE) 10 MG tablet Take 1 tablet by mouth Every 4 (Four) Hours As Needed for Moderate Pain. 3/22/25   Eddie Conrad MD   pantoprazole (PROTONIX) 40 MG EC tablet Take 1 tablet by mouth Daily. 3/22/25   Eddie Conrad MD   promethazine (PHENERGAN) 25 MG tablet Take 1 tablet by mouth Every 6 (Six) Hours As Needed for Nausea or Vomiting. 11/25/24   Ritesh Funes MD   promethazine (PHENERGAN) 6.25 MG/5ML solution oral solution Take 20 mL by mouth Every 6 (Six) Hours As Needed. 11/6/24   Ritesh Funes MD   QUEtiapine XR (SEROquel XR) 400 MG 24 hr tablet Take 2 tablets by mouth every night at bedtime. 12/31/24   Milka Pavon APRN   sertraline (ZOLOFT) 100 MG tablet Take 1 tablet by mouth Daily. 1/17/25 1/17/26  Alvina Arzate APRN   SUMAtriptan (IMITREX) 100 MG tablet TAKE 1 TABLET BY MOUTH 1 TIME AT ONSET OF MIGRAINE AS NEEDED FOR MIGRAINE. MAY REPEAT DOSE 1 TIME AFTER 2 HOURS IF MIGRAINE NOT RESOLVED 2/3/25   Ana Rosa Weber APRN   tiZANidine (ZANAFLEX) 4 MG tablet Take 1 tablet by mouth Every 8 (Eight) Hours As Needed for Muscle Spasms. 3/22/25   Eddie Conrad MD Vraylar 1.5 MG capsule capsule TAKE 1 CAPSULE BY MOUTH EVERY DAY 1/19/25    Milka Pavon APRN             Objective   Physical Exam  55-year-old female awake alert.  She is writhing with pain.  Chest clear equal breath sounds cardiovascular regular rhythm abdomen soft nontender she complains of pain in the midthoracic region of back.  Remedies are neurovasc intact without deficit.  Procedures           ED Course      Results for orders placed or performed during the hospital encounter of 04/18/25   Basic Metabolic Panel    Collection Time: 04/18/25  8:33 PM    Specimen: Blood   Result Value Ref Range    Glucose 145 (H) 65 - 99 mg/dL    BUN 13 6 - 20 mg/dL    Creatinine 0.80 0.57 - 1.00 mg/dL    Sodium 139 136 - 145 mmol/L    Potassium 3.3 (L) 3.5 - 5.2 mmol/L    Chloride 105 98 - 107 mmol/L    CO2 14.6 (L) 22.0 - 29.0 mmol/L    Calcium 8.8 8.6 - 10.5 mg/dL    BUN/Creatinine Ratio 16.3 7.0 - 25.0    Anion Gap 19.4 (H) 5.0 - 15.0 mmol/L    eGFR 86.1 >60.0 mL/min/1.73   C-reactive Protein    Collection Time: 04/18/25  8:33 PM    Specimen: Blood   Result Value Ref Range    C-Reactive Protein <0.30 0.00 - 0.50 mg/dL   Sedimentation Rate    Collection Time: 04/18/25  8:33 PM    Specimen: Blood   Result Value Ref Range    Sed Rate 45 (H) 0 - 30 mm/hr   CBC Auto Differential    Collection Time: 04/18/25  8:33 PM    Specimen: Blood   Result Value Ref Range    WBC 7.40 3.40 - 10.80 10*3/mm3    RBC 3.98 3.77 - 5.28 10*6/mm3    Hemoglobin 10.2 (L) 12.0 - 15.9 g/dL    Hematocrit 33.2 (L) 34.0 - 46.6 %    MCV 83.4 79.0 - 97.0 fL    MCH 25.6 (L) 26.6 - 33.0 pg    MCHC 30.7 (L) 31.5 - 35.7 g/dL    RDW 15.5 (H) 12.3 - 15.4 %    RDW-SD 47.3 37.0 - 54.0 fl    MPV 11.9 6.0 - 12.0 fL    Platelets 285 140 - 450 10*3/mm3    Neutrophil % 86.7 (H) 42.7 - 76.0 %    Lymphocyte % 10.8 (L) 19.6 - 45.3 %    Monocyte % 1.8 (L) 5.0 - 12.0 %    Eosinophil % 0.0 (L) 0.3 - 6.2 %    Basophil % 0.3 0.0 - 1.5 %    Immature Grans % 0.4 0.0 - 0.5 %    Neutrophils, Absolute 6.42 1.70 - 7.00 10*3/mm3    Lymphocytes,  "Absolute 0.80 0.70 - 3.10 10*3/mm3    Monocytes, Absolute 0.13 0.10 - 0.90 10*3/mm3    Eosinophils, Absolute 0.00 0.00 - 0.40 10*3/mm3    Basophils, Absolute 0.02 0.00 - 0.20 10*3/mm3    Immature Grans, Absolute 0.03 0.00 - 0.05 10*3/mm3    nRBC 0.0 0.0 - 0.2 /100 WBC     XR Spine Thoracic 3 View   Final Result   Impression:   Findings of discitis/osteomyelitis at T6-T7, overall unchanged compared to recent thoracic spine MRI within the confines of modality differences. No new/progressive vertebral body height loss or malalignment.         Electronically Signed: Augustine Mercer MD     4/18/2025 9:27 PM EDT     Workstation ID: [id number]        Medications   diphenhydrAMINE (BENADRYL) injection 25 mg (has no administration in time range)   droperidol (INAPSINE) injection 1.25 mg (has no administration in time range)   HYDROmorphone (DILAUDID) injection 1 mg (1 mg Intravenous Given 4/18/25 2029)   droperidol (INAPSINE) injection 1.25 mg (1.25 mg Intravenous Given 4/18/25 2029)     /84   Pulse 77   Temp 98.4 °F (36.9 °C) (Oral)   Resp 20   Ht 162.6 cm (64\")   Wt 81 kg (178 lb 9.2 oz)   SpO2 99%   BMI 30.65 kg/m²                                                    Medical Decision Making  Amount and/or Complexity of Data Reviewed  Labs: ordered.  Radiology: ordered.    Risk  Prescription drug management.    Chart review: Patient had been discharged yesterday after admission for intractable back pain.  Comorbidity: As per past history   Differential: Compression fracture osteomyelitis, discitis,  My EKG interpretation: Not indicated  Lab: Sed rate 4  My Radiology review and interpretation: X-ray thoracic spine reveals disc height loss and endplate sclerosis at the T6 to several disc base corresponding to findings of discitis osteomyelitis seen on recent MRI.  Unchanged mild height loss of T8 and T9 vertebral bodies noted new or progressive vertebral body height loss.  Discussion/treatment: Patient had IV placed.  " Was given Dilaudid and droperidol.  Repeat evaluation she is still rocking complaining of pain will give additional diphenhydramine and droperidol.  Patient was discussed with midlevel for the hospitalist.  Will be admitted to their service for pain control.  Patient was evaluated using appropriate PPE      Final diagnoses:   Intractable back pain   Discitis of thoracic region       ED Disposition  ED Disposition       ED Disposition   Intended Admit    Condition   --    Comment   --               No follow-up provider specified.       Medication List      No changes were made to your prescriptions during this visit.            Rito Calderón MD  04/18/25 [id number]

## 2025-04-20 LAB
ANION GAP SERPL CALCULATED.3IONS-SCNC: 14.1 MMOL/L (ref 5–15)
BUN SERPL-MCNC: 9 MG/DL (ref 6–20)
BUN/CREAT SERPL: 10.2 (ref 7–25)
CALCIUM SPEC-SCNC: 9.6 MG/DL (ref 8.6–10.5)
CHLORIDE SERPL-SCNC: 104 MMOL/L (ref 98–107)
CO2 SERPL-SCNC: 18.9 MMOL/L (ref 22–29)
CREAT SERPL-MCNC: 0.88 MG/DL (ref 0.57–1)
EGFRCR SERPLBLD CKD-EPI 2021: 76.8 ML/MIN/1.73
GLUCOSE SERPL-MCNC: 123 MG/DL (ref 65–99)
POTASSIUM SERPL-SCNC: 3.9 MMOL/L (ref 3.5–5.2)
SODIUM SERPL-SCNC: 137 MMOL/L (ref 136–145)

## 2025-04-20 PROCEDURE — G0378 HOSPITAL OBSERVATION PER HR: HCPCS

## 2025-04-20 PROCEDURE — 25010000002 CEFEPIME PER 500 MG

## 2025-04-20 RX ORDER — QUETIAPINE FUMARATE 50 MG/1
200 TABLET, EXTENDED RELEASE ORAL NIGHTLY
Status: DISCONTINUED | OUTPATIENT
Start: 2025-04-20 | End: 2025-04-21 | Stop reason: HOSPADM

## 2025-04-20 RX ADMIN — QUETIAPINE FUMARATE 200 MG: 50 TABLET, EXTENDED RELEASE ORAL at 21:46

## 2025-04-20 RX ADMIN — ATORVASTATIN CALCIUM 20 MG: 20 TABLET, FILM COATED ORAL at 21:46

## 2025-04-20 RX ADMIN — BUSPIRONE HYDROCHLORIDE 30 MG: 15 TABLET ORAL at 21:46

## 2025-04-20 RX ADMIN — ALPRAZOLAM 2 MG: 1 TABLET ORAL at 03:10

## 2025-04-20 RX ADMIN — LEVOTHYROXINE SODIUM 25 MCG: 0.03 TABLET ORAL at 05:31

## 2025-04-20 RX ADMIN — PANTOPRAZOLE SODIUM 40 MG: 40 TABLET, DELAYED RELEASE ORAL at 08:23

## 2025-04-20 RX ADMIN — CEFEPIME 2000 MG: 2 INJECTION, POWDER, FOR SOLUTION INTRAVENOUS at 02:56

## 2025-04-20 RX ADMIN — Medication 10 ML: at 08:23

## 2025-04-20 RX ADMIN — SENNOSIDES AND DOCUSATE SODIUM 2 TABLET: 50; 8.6 TABLET ORAL at 21:46

## 2025-04-20 RX ADMIN — CEFEPIME 2000 MG: 2 INJECTION, POWDER, FOR SOLUTION INTRAVENOUS at 17:56

## 2025-04-20 RX ADMIN — AMITRIPTYLINE HYDROCHLORIDE 25 MG: 25 TABLET, FILM COATED ORAL at 21:46

## 2025-04-20 RX ADMIN — LAMOTRIGINE 150 MG: 100 TABLET ORAL at 08:23

## 2025-04-20 RX ADMIN — LAMOTRIGINE 150 MG: 100 TABLET ORAL at 21:45

## 2025-04-20 RX ADMIN — METHOCARBAMOL TABLETS 750 MG: 750 TABLET, COATED ORAL at 08:23

## 2025-04-20 RX ADMIN — CEFEPIME 2000 MG: 2 INJECTION, POWDER, FOR SOLUTION INTRAVENOUS at 10:28

## 2025-04-20 RX ADMIN — CARIPRAZINE 1.5 MG: 1.5 CAPSULE, GELATIN COATED ORAL at 08:23

## 2025-04-20 RX ADMIN — Medication 10 ML: at 21:51

## 2025-04-20 RX ADMIN — LEVETIRACETAM 1500 MG: 500 TABLET, FILM COATED ORAL at 21:46

## 2025-04-20 RX ADMIN — SERTRALINE HYDROCHLORIDE 100 MG: 100 TABLET, FILM COATED ORAL at 08:23

## 2025-04-20 RX ADMIN — LIDOCAINE PAIN RELIEF 2 PATCH: 560 PATCH TOPICAL at 21:46

## 2025-04-20 RX ADMIN — LEVETIRACETAM 1500 MG: 500 TABLET, FILM COATED ORAL at 08:23

## 2025-04-20 RX ADMIN — Medication 10 ML: at 08:24

## 2025-04-20 RX ADMIN — Medication 10 ML: at 21:50

## 2025-04-20 RX ADMIN — BUSPIRONE HYDROCHLORIDE 30 MG: 15 TABLET ORAL at 08:23

## 2025-04-20 NOTE — PLAN OF CARE
Goal Outcome Evaluation:     Pt is able to make needs known. She is more alert this evening. No pain medication was given during shift d/t lethargy. Pt is able to ambulate with stand by assist. Education is complete, call light is in reach, and no other needs at this time. Continue to monitor.

## 2025-04-20 NOTE — NURSING NOTE
Pt pressing call light every 15 to 30 minutes requesting pain medication. When speaking to patient, she is lethargic and some speech is slurred and incomprehensible. She also dozes off to sleep during conversation. Pt given scheduled medication but not PRN oxycodone due to lethargy and drowsiness. Patient requesting nursing staff to call neurosurgeon and ask for more pain medication. Neurosurgery is not consulted this admission. Pt continues to be non-compliant with wearing TLSO brace. Plan of care ongoing.

## 2025-04-20 NOTE — CASE MANAGEMENT/SOCIAL WORK
Continued Stay Note  [facility]     Patient Name: Milka Espinoza  MRN: [id number]  Today's Date: 4/20/2025    Admit Date: 4/18/2025        Discharge Plan       Row Name 04/20/25 1434       Plan    Plan Comments DC barriers: IV antibiotics, pain management             Brooke Sanders RN BSN  Weekend   [facility]  Phone: [phone]  Fax: [phone]

## 2025-04-21 ENCOUNTER — READMISSION MANAGEMENT (OUTPATIENT)
Dept: CALL CENTER | Facility: HOSPITAL | Age: 55
End: 2025-04-21
Payer: MEDICARE

## 2025-04-21 VITALS
BODY MASS INDEX: 29.4 KG/M2 | TEMPERATURE: 98.3 F | HEART RATE: 96 BPM | HEIGHT: 64 IN | OXYGEN SATURATION: 95 % | SYSTOLIC BLOOD PRESSURE: 127 MMHG | RESPIRATION RATE: 16 BRPM | DIASTOLIC BLOOD PRESSURE: 80 MMHG | WEIGHT: 172.18 LBS

## 2025-04-21 LAB
ANION GAP SERPL CALCULATED.3IONS-SCNC: 13 MMOL/L (ref 5–15)
BACTERIA SPEC AEROBE CULT: NORMAL
BASOPHILS # BLD AUTO: 0.05 10*3/MM3 (ref 0–0.2)
BASOPHILS NFR BLD AUTO: 0.6 % (ref 0–1.5)
BUN SERPL-MCNC: 13 MG/DL (ref 6–20)
BUN/CREAT SERPL: 14.9 (ref 7–25)
CALCIUM SPEC-SCNC: 9.9 MG/DL (ref 8.6–10.5)
CHLORIDE SERPL-SCNC: 105 MMOL/L (ref 98–107)
CO2 SERPL-SCNC: 21 MMOL/L (ref 22–29)
CREAT SERPL-MCNC: 0.87 MG/DL (ref 0.57–1)
DEPRECATED RDW RBC AUTO: 48.8 FL (ref 37–54)
EGFRCR SERPLBLD CKD-EPI 2021: 77.8 ML/MIN/1.73
EOSINOPHIL # BLD AUTO: 0.08 10*3/MM3 (ref 0–0.4)
EOSINOPHIL NFR BLD AUTO: 0.9 % (ref 0.3–6.2)
ERYTHROCYTE [DISTWIDTH] IN BLOOD BY AUTOMATED COUNT: 16.2 % (ref 12.3–15.4)
GLUCOSE SERPL-MCNC: 97 MG/DL (ref 65–99)
HCT VFR BLD AUTO: 35.3 % (ref 34–46.6)
HGB BLD-MCNC: 10.9 G/DL (ref 12–15.9)
IMM GRANULOCYTES # BLD AUTO: 0.03 10*3/MM3 (ref 0–0.05)
IMM GRANULOCYTES NFR BLD AUTO: 0.3 % (ref 0–0.5)
LYMPHOCYTES # BLD AUTO: 3.59 10*3/MM3 (ref 0.7–3.1)
LYMPHOCYTES NFR BLD AUTO: 40.5 % (ref 19.6–45.3)
MCH RBC QN AUTO: 25.4 PG (ref 26.6–33)
MCHC RBC AUTO-ENTMCNC: 30.9 G/DL (ref 31.5–35.7)
MCV RBC AUTO: 82.3 FL (ref 79–97)
MONOCYTES # BLD AUTO: 0.56 10*3/MM3 (ref 0.1–0.9)
MONOCYTES NFR BLD AUTO: 6.3 % (ref 5–12)
NEUTROPHILS NFR BLD AUTO: 4.55 10*3/MM3 (ref 1.7–7)
NEUTROPHILS NFR BLD AUTO: 51.4 % (ref 42.7–76)
NRBC BLD AUTO-RTO: 0 /100 WBC (ref 0–0.2)
PLATELET # BLD AUTO: 282 10*3/MM3 (ref 140–450)
PMV BLD AUTO: 11.1 FL (ref 6–12)
POTASSIUM SERPL-SCNC: 3.5 MMOL/L (ref 3.5–5.2)
RBC # BLD AUTO: 4.29 10*6/MM3 (ref 3.77–5.28)
SODIUM SERPL-SCNC: 139 MMOL/L (ref 136–145)
WBC NRBC COR # BLD AUTO: 8.86 10*3/MM3 (ref 3.4–10.8)

## 2025-04-21 PROCEDURE — 80048 BASIC METABOLIC PNL TOTAL CA: CPT

## 2025-04-21 PROCEDURE — G0378 HOSPITAL OBSERVATION PER HR: HCPCS

## 2025-04-21 PROCEDURE — 85025 COMPLETE CBC W/AUTO DIFF WBC: CPT

## 2025-04-21 PROCEDURE — 25010000002 CEFEPIME PER 500 MG

## 2025-04-21 RX ORDER — POTASSIUM CHLORIDE 1500 MG/1
40 TABLET, EXTENDED RELEASE ORAL EVERY 4 HOURS
Status: DISCONTINUED | OUTPATIENT
Start: 2025-04-21 | End: 2025-04-21 | Stop reason: HOSPADM

## 2025-04-21 RX ORDER — VITAMIN K2 90 MCG
1 CAPSULE ORAL DAILY
Qty: 30 CAPSULE | Refills: 0 | Status: SHIPPED | OUTPATIENT
Start: 2025-04-21

## 2025-04-21 RX ORDER — OXYCODONE AND ACETAMINOPHEN 5; 325 MG/1; MG/1
1 TABLET ORAL EVERY 6 HOURS PRN
Qty: 16 TABLET | Refills: 0 | Status: SHIPPED | OUTPATIENT
Start: 2025-04-21

## 2025-04-21 RX ADMIN — POTASSIUM CHLORIDE 40 MEQ: 1500 TABLET, EXTENDED RELEASE ORAL at 08:40

## 2025-04-21 RX ADMIN — ALPRAZOLAM 2 MG: 1 TABLET ORAL at 02:21

## 2025-04-21 RX ADMIN — SERTRALINE HYDROCHLORIDE 100 MG: 100 TABLET, FILM COATED ORAL at 08:40

## 2025-04-21 RX ADMIN — CEFEPIME 2000 MG: 2 INJECTION, POWDER, FOR SOLUTION INTRAVENOUS at 09:39

## 2025-04-21 RX ADMIN — Medication 10 ML: at 08:41

## 2025-04-21 RX ADMIN — BUSPIRONE HYDROCHLORIDE 30 MG: 15 TABLET ORAL at 08:40

## 2025-04-21 RX ADMIN — CARIPRAZINE 1.5 MG: 1.5 CAPSULE, GELATIN COATED ORAL at 08:40

## 2025-04-21 RX ADMIN — PANTOPRAZOLE SODIUM 40 MG: 40 TABLET, DELAYED RELEASE ORAL at 08:40

## 2025-04-21 RX ADMIN — LEVETIRACETAM 1500 MG: 500 TABLET, FILM COATED ORAL at 08:40

## 2025-04-21 RX ADMIN — LEVOTHYROXINE SODIUM 25 MCG: 0.03 TABLET ORAL at 05:17

## 2025-04-21 RX ADMIN — CEFEPIME 2000 MG: 2 INJECTION, POWDER, FOR SOLUTION INTRAVENOUS at 02:21

## 2025-04-21 RX ADMIN — LAMOTRIGINE 150 MG: 100 TABLET ORAL at 08:40

## 2025-04-21 NOTE — PLAN OF CARE
Goal Outcome Evaluation:      Pt alert and oriented but still lethargic and drowsy at times. No PRN pain medication given because of this. CHG bath and linen change completed. VS stable. Plan of care ongoing.

## 2025-04-21 NOTE — PROGRESS NOTES
"    [facility] MEDICINE SERVICE  DAILY PROGRESS NOTE    NAME: Milka Espinoza  : 1970  MRN: [id number]      LOS: 0 days     PROVIDER OF SERVICE: Jayy Ketia MD    Chief Complaint: Intractable back pain    Subjective:     Interval History:  History taken from: patient    History of Present Illness: Milka Espinoza is a 55 y.o. female with a previous medical history of discitis/osteomyelitis of T6-T7, bipolar disorder, seizures who presented to [facility] on 2025 with intractable back pain due to the discitis.  She states that she tried to control with oral pain medication at home but had no relief and that the pain is so bad that it make her vomit.  She states that she wears her TLSO brace when \"up doing things\".  She did bring it to the ED but is not wearing it on exam.      In the ED,  She was given Ketamine 8mg x3 doses per EMS report, Dilaudid 1 mg, Inapsine 1.25mg x2 and Benadryl 25mg IV.  She reports nothing has helped.  An xray of the thoracic spine showed unchanged discitis/osteomyelitis compared to the thoracic spine MRI from 25.  She is afebrile, all vitals are stable.  Hospitalist was consulted for further management.     Chart Review: The patient was admitted for this same type of pain on 4/15/25.  Neurosurgery was consulted on  and she was seen by Dr. Harrison and Yara Piedra PA-C.  They noted that there is no kyphotic deformity, high grade stenosis or spinal cord compression with no surgical intervention indicated.  She was also noted to be noncompliant with her TLSO brace, she did not bring it to the hospital on that admission.  Recommendations were to continue with Cefepime per ID, compliance with the TLSO brace and a repeat MRI in 4-6 weeks unless there is a change in the neurostatus or pain.     -seen in bed NAD, very sleepy. \"Im trying to sleep\". PERI RN pt requesting IV pain meds, will change to po   seen in bed NAD, Doing better today, " will dc home. Will dc home. Condition on dc stable.    Review of Systems  12 point ROS reviewed and negative except as mentioned above  Objective:     Vital Signs  Temp:  [97.5 °F (36.4 °C)-98.8 °F (37.1 °C)] 98.3 °F (36.8 °C)  Heart Rate:  [] 96  Resp:  [14-18] 16  BP: (124-132)/(73-85) 127/80   Body mass index is 29.55 kg/m².    Physical Exam   Vitals and nursing note reviewed.   Constitutional:       Appearance: Normal appearance.   HENT:      Mouth/Throat:      Mouth: Mucous membranes are moist.   Cardiovascular:      Rate and Rhythm: Normal rate and regular rhythm.   Pulmonary:      Effort: Pulmonary effort is normal.      Breath sounds: Normal breath sounds.   Abdominal:      General: Bowel sounds are normal.      Palpations: Abdomen is soft.   Musculoskeletal:         General: Normal range of motion.   Skin:     General: Skin is warm and dry.   Neurological:      General: No focal deficit present.      Mental Status: She is alert and oriented to person, place, and time. Mental status is at baseline.   Psychiatric:         Mood and Affect: Mood normal. Affect is tearful.         Behavior: Behavior is agitated.        Diagnostic Data    Results from last 7 days   Lab Units 04/21/25  0216   WBC 10*3/mm3 8.86   HEMOGLOBIN g/dL 10.9*   HEMATOCRIT % 35.3   PLATELETS 10*3/mm3 282   GLUCOSE mg/dL 97   CREATININE mg/dL 0.87   BUN mg/dL 13   SODIUM mmol/L 139   POTASSIUM mmol/L 3.5   ANION GAP mmol/L 13.0       No radiology results for the last day    Scheduled Meds:amitriptyline, 25 mg, Oral, Nightly  atorvastatin, 20 mg, Oral, Nightly  busPIRone, 30 mg, Oral, BID  Cariprazine HCl, 1.5 mg, Oral, Daily  cefepime 2000 mg IVPB in 100 mL NS (MBP), 2,000 mg, Intravenous, Q8H  lamoTRIgine, 150 mg, Oral, BID  levETIRAcetam, 1,500 mg, Oral, BID  levothyroxine, 25 mcg, Oral, Q AM  Lidocaine, 2 patch, Transdermal, Nightly  pantoprazole, 40 mg, Oral, QAM AC  potassium chloride ER, 40 mEq, Oral, Q4H  QUEtiapine XR, 200 mg,  Oral, Nightly  sertraline, 100 mg, Oral, Daily  sodium chloride, 10 mL, Intravenous, Q12H  sodium chloride, 10 mL, Intravenous, Q12H      Continuous Infusions:   PRN Meds:.  acetaminophen **OR** acetaminophen **OR** acetaminophen    ALPRAZolam    senna-docusate sodium **AND** polyethylene glycol **AND** bisacodyl **AND** bisacodyl    calcium carbonate    Calcium Replacement - Follow Nurse / BPA Driven Protocol    Magnesium Standard Dose Replacement - Follow Nurse / BPA Driven Protocol    melatonin    ondansetron    oxyCODONE    Phosphorus Replacement - Follow Nurse / BPA Driven Protocol    Potassium Replacement - Follow Nurse / BPA Driven Protocol    sodium chloride    sodium chloride    sodium chloride    sodium chloride     I reviewed the patient's new clinical results.    Assessment/Plan:     Active and Resolved Problems  Active Hospital Problems    Diagnosis  POA    **Intractable back pain [M54.9]  Yes    Patient noncompliance [Z91.199]  Not Applicable      Resolved Hospital Problems   No resolved problems to display.     Back Pain  Discitis Osteomyelitis of T6-T7  -Discharged from this facility on 4/17/25 after treatment for the same back pain  -Xray of Thoracic Spine showed unchanged discitis/osteomyelitis compared to recent thoracic spine MRI  from 4/16/25  -Ketamine given in EMS  -Dilaudid, Inapsine and Benadryl IV given in ED  -Patient reports no improvement in pain  -Patient continues to be noncompliant with her TLSO brace  -Lidocaine patches ordered, along with TLSO brace application  -Continue IV Cefepime per ID recommendations, last dose 4/29/25  -Neurosurgery saw patient on 4/16/25, no surgical intervention indicated; recommended compliance with TLSO with repeat MRI in 4-6 weeks       Home medications for chronic conditions will be restarted as appropriate when verified by pharmacy     VTE Prophylaxis:  Mechanical VTE prophylaxis orders are present.    Disposition Planning:   Barriers to Discharge:back  pain  Anticipated Date of Discharge: 4/21  Place of Discharge: [facility]      Time: 34 minutes     Code Status and Medical Interventions: CPR (Attempt to Resuscitate); Full Support   Ordered at: 04/18/25 2226     Code Status (Patient has no pulse and is not breathing):    CPR (Attempt to Resuscitate)     Medical Interventions (Patient has pulse or is breathing):    Full Support       Signature: Electronically signed by Jayy Keita MD, 04/21/25, 09:06 EDT.  [facility] Hospitalist Team

## 2025-04-21 NOTE — OUTREACH NOTE
Prep Survey      Flowsheet Row Responses   [facility] patient discharged from? [facility]   Is LACE score < 7 ? No   Eligibility [facility]   Date of Admission 04/18/25   Date of Discharge 04/21/25   Discharge Disposition Home or Self Care   Discharge diagnosis Intractable back pain   Does the patient have one of the following disease processes/diagnoses(primary or secondary)? Other   Does the patient have Home health ordered? No   Is there a DME ordered? No   Prep survey completed? Yes            Tali RAMIREZ - Registered Nurse

## 2025-04-22 ENCOUNTER — TRANSITIONAL CARE MANAGEMENT TELEPHONE ENCOUNTER (OUTPATIENT)
Dept: CALL CENTER | Facility: HOSPITAL | Age: 55
End: 2025-04-22
Payer: MEDICARE

## 2025-04-22 NOTE — DISCHARGE SUMMARY
"[facility] Medicine Services  Discharge Summary    Date of Service: 2025  Patient Name: Milka Espinoza  : 1970  MRN: [id number]    Date of Admission: 2025  Discharge Diagnosis: Intractable back pain  Date of Discharge: 2025  Primary Care Physician: Alvina Arzate APRN      Presenting Problem:   Discitis of thoracic region [M46.44]  Intractable back pain [M54.9]    Active and Resolved Hospital Problems:  Active Hospital Problems    Diagnosis POA    **Intractable back pain [M54.9] Yes    Patient noncompliance [Z91.199] Not Applicable      Resolved Hospital Problems   No resolved problems to display.         Back Pain  Discitis Osteomyelitis of T6-T7  -Discharged from this facility on 25 after treatment for the same back pain  -Xray of Thoracic Spine showed unchanged discitis/osteomyelitis compared to recent thoracic spine MRI  from 25  -Ketamine given in EMS  -Dilaudid, Inapsine and Benadryl IV given in ED  -Patient reports no improvement in pain  -Patient continues to be noncompliant with her TLSO brace  -Lidocaine patches ordered, along with TLSO brace application  -Continue IV Cefepime per ID recommendations, last dose 25  -Neurosurgery saw patient on 25, no surgical intervention indicated; recommended compliance with TLSO with repeat MRI in 4-6 weeks     Hospital Course     History of Present Illness: Milka Espinoza is a 55 y.o. female with a previous medical history of discitis/osteomyelitis of T6-T7, bipolar disorder, seizures who presented to [facility] on 2025 with intractable back pain due to the discitis.  She states that she tried to control with oral pain medication at home but had no relief and that the pain is so bad that it make her vomit.  She states that she wears her TLSO brace when \"up doing things\".  She did bring it to the ED but is not wearing it on exam.      In the ED,  She was given Ketamine 8mg x3 doses " "per EMS report, Dilaudid 1 mg, Inapsine 1.25mg x2 and Benadryl 25mg IV.  She reports nothing has helped.  An xray of the thoracic spine showed unchanged discitis/osteomyelitis compared to the thoracic spine MRI from 4/16/25.  She is afebrile, all vitals are stable.  Hospitalist was consulted for further management.     Chart Review: The patient was admitted for this same type of pain on 4/15/25.  Neurosurgery was consulted on 4/16 and she was seen by Dr. Harrison and Yara Piedra PA-C.  They noted that there is no kyphotic deformity, high grade stenosis or spinal cord compression with no surgical intervention indicated.  She was also noted to be noncompliant with her TLSO brace, she did not bring it to the hospital on that admission.  Recommendations were to continue with Cefepime per ID, compliance with the TLSO brace and a repeat MRI in 4-6 weeks unless there is a change in the neurostatus or pain.     4/19-seen in bed NAD, very sleepy. \"Im trying to sleep\". PERI RN pt requesting IV pain meds, will change to po  4/21 seen in bed NAD, Doing better today, will dc home. Will dc home. Condition on dc stable.    DISCHARGE Follow Up Recommendations for labs and diagnostics: PCP in a week        Day of Discharge     Vital Signs:       Physical Exam   Vitals and nursing note reviewed.   Constitutional:       Appearance: Normal appearance.   HENT:      Mouth/Throat:      Mouth: Mucous membranes are moist.   Cardiovascular:      Rate and Rhythm: Normal rate and regular rhythm.   Pulmonary:      Effort: Pulmonary effort is normal.      Breath sounds: Normal breath sounds.   Abdominal:      General: Bowel sounds are normal.      Palpations: Abdomen is soft.   Musculoskeletal:         General: Normal range of motion.   Skin:     General: Skin is warm and dry.   Neurological:      General: No focal deficit present.      Mental Status: She is alert and oriented to person, place, and time. Mental status is at baseline. "   Psychiatric:         Mood and Affect: Mood normal. Affect is tearful.         Behavior: Behavior is agitated.             Pertinent  and/or Most Recent Results     LAB RESULTS:      Lab 04/21/25 0216 04/19/25 2340 04/19/25  0540 04/18/25 2033 04/17/25  0515   WBC 8.86 7.62 6.76 7.40 6.68   HEMOGLOBIN 10.9* 10.6* 9.9* 10.2* 10.1*   HEMATOCRIT 35.3 34.2 31.6* 33.2* 33.6*   PLATELETS 282 287 254 285 270   NEUTROS ABS 4.55 5.74 4.71 6.42 2.82   IMMATURE GRANS (ABS) 0.03 0.03 0.02 0.03 0.01   LYMPHS ABS 3.59* 1.47 1.65 0.80 3.12*   MONOS ABS 0.56 0.35 0.36 0.13 0.49   EOS ABS 0.08 0.01 0.00 0.00 0.19   MCV 82.3 81.6 81.9 83.4 83.8   SED RATE  --   --   --  45*  --    CRP  --   --   --  <0.30  --          Lab 04/21/25 0216 04/19/25 2340 04/19/25 0540 04/18/25 2033 04/17/25  0515   SODIUM 139 137 141 139 142   POTASSIUM 3.5 3.9 3.7 3.3* 3.9   CHLORIDE 105 104 106 105 108*   CO2 21.0* 18.9* 18.0* 14.6* 20.4*   ANION GAP 13.0 14.1 17.0* 19.4* 13.6   BUN 13 9 10 13 14   CREATININE 0.87 0.88 0.84 0.80 1.00   EGFR 77.8 76.8 81.2 86.1 65.8   GLUCOSE 97 123* 107* 145* 81   CALCIUM 9.9 9.6 8.9 8.8 9.1                         Brief Urine Lab Results  (Last result in the past 365 days)        Color   Clarity   Blood   Leuk Est   Nitrite   Protein   CREAT   Urine HCG        03/18/25 0419 Yellow   Cloudy   Negative   Negative   Negative   Negative                 Microbiology Results (last 10 days)       Procedure Component Value - Date/Time    Blood Culture - Blood, Arm, Left [[id number]]  (Normal) Collected: 04/16/25 0332    Lab Status: Final result Specimen: Blood from Arm, Left Updated: 04/21/25 0400     Blood Culture No growth at 5 days    Narrative:      Less than seven (7) mL's of blood was collected.  Insufficient quantity may yield false negative results.    COVID-19, FLU A/B, RSV PCR 1 HR TAT - Swab, Nasopharynx [[id number]]  (Normal) Collected: 04/16/25 0139    Lab Status: Final result Specimen: Swab from Nasopharynx  Updated: 04/16/25 0224     COVID19 Not Detected     Influenza A PCR Not Detected     Influenza B PCR Not Detected     RSV, PCR Not Detected    Narrative:      Fact sheet for providers: https://www.fda.gov/media/238815/download    Fact sheet for patients: https://www.fda.gov/media/311768/download    Test performed by PCR.            XR Chest 1 View  Result Date: 4/19/2025  Impression: Impression: PICC line tip is at the cavoatrial junction. Electronically Signed: Werner Burgess MD  4/19/2025 1:40 AM EDT  Workstation ID: [id number]    XR Spine Thoracic 3 View  Result Date: 4/18/2025  Impression: Impression: Findings of discitis/osteomyelitis at T6-T7, overall unchanged compared to recent thoracic spine MRI within the confines of modality differences. No new/progressive vertebral body height loss or malalignment. Electronically Signed: Augustine Mercer MD  4/18/2025 9:27 PM EDT  Workstation ID: [id number]    MRI Thoracic Spine Without Contrast  Result Date: 4/16/2025  Impression: Impression: Redemonstration of discitis/osteomyelitis at the T6/T7 level with interval progression of destructive changes as compared to the previous study. There is a new small amount of fluid present within the disc space with a small amount of posterior disc bulge present with effacement of the thecal sac anteriorly with no evidence of canal stenosis which is also new. The epidural fluid appears to have decreased as compared to the previous study. No new areas of suspicious osseous edema identified. Electronically Signed: Elise Steele MD  4/16/2025 2:44 AM EDT  Workstation ID: [id number]              Results for orders placed during the hospital encounter of 03/17/25    Adult Transthoracic Echo Complete W/ Cont if Necessary Per Protocol    Interpretation Summary    Left ventricular systolic function is normal. Calculated left ventricular EF = 60%    Left ventricular diastolic function was normal.    Normal RV systolic function.    No significant  valvular disease.    Estimated right ventricular systolic pressure from tricuspid regurgitation is normal (<35 mmHg).    No evidence of infective endocarditis noted.    Electronically signed by Mich Sotomayor MD, 03/19/25, 8:47 AM EDT.      Labs Pending at Discharge:  Pending Results       None            Procedures Performed           Consults:   Consults       Date and Time Order Name Status Description    4/16/2025 10:52 AM Inpatient Neurosurgery Consult Completed     3/18/2025  8:12 AM Inpatient Spine Surgery Consult Completed     3/17/2025 10:13 PM Inpatient Infectious Diseases Consult Completed               Discharge Details        Discharge Medications        New Medications        Instructions Start Date   Methyl-Folate 1000 MCG capsule  Generic drug: Levomefolate Glucosamine   1 tablet, Oral, Daily      oxyCODONE-acetaminophen 5-325 MG per tablet  Commonly known as: Percocet   1 tablet, Oral, Every 6 Hours PRN             Continue These Medications        Instructions Start Date   ALPRAZolam 2 MG tablet  Commonly known as: XANAX   2 mg, Oral, 3 Times Daily PRN, for anxiety      amitriptyline 25 MG tablet  Commonly known as: ELAVIL   25 mg, Oral, Every Night at Bedtime      atorvastatin 20 MG tablet  Commonly known as: LIPITOR   20 mg, Oral, Nightly      busPIRone 30 MG tablet  Commonly known as: BUSPAR   30 mg, Oral, 2 Times Daily      cefepime 2000 mg IVPB in 100 mL NS (MBP)   2,000 mg, Intravenous, Every 8 Hours      hydrOXYzine 25 MG tablet  Commonly known as: ATARAX   25 mg, Oral, 3 Times Daily PRN, for anxiety      lamoTRIgine 150 MG tablet  Commonly known as: LaMICtal   150 mg, Oral, 2 Times Daily      levETIRAcetam 500 MG tablet  Commonly known as: KEPPRA   1,500 mg, Oral, 2 Times Daily      levothyroxine 25 MCG tablet  Commonly known as: SYNTHROID, LEVOTHROID   25 mcg, Oral, Daily      Motegrity 2 MG tablet  Generic drug: Prucalopride Succinate   1 tablet, Daily     "  nystatin 190002 UNIT/GM powder  Commonly known as: MYCOSTATIN   1 Application, 2 Times Daily PRN      Octagam 30 GM/300ML solution infusion  Generic drug: Immune Globulin (Human)   30 g, Weekly      ondansetron 2 mg/mL injection  Commonly known as: ZOFRAN   4 mg, Every 6 Hours PRN      pantoprazole 40 MG EC tablet  Commonly known as: PROTONIX   40 mg, Oral, Daily      promethazine 25 MG tablet  Commonly known as: PHENERGAN   25 mg, Every 6 Hours PRN      QUEtiapine  MG 24 hr tablet  Commonly known as: SEROquel XR   800 mg, Oral, Every Night at Bedtime      sertraline 100 MG tablet  Commonly known as: ZOLOFT   100 mg, Oral, Daily      SUMAtriptan 100 MG tablet  Commonly known as: IMITREX   TAKE 1 TABLET BY MOUTH 1 TIME AT ONSET OF MIGRAINE AS NEEDED FOR MIGRAINE. MAY REPEAT DOSE 1 TIME AFTER 2 HOURS IF MIGRAINE NOT RESOLVED      Vraylar 1.5 MG capsule capsule  Generic drug: Cariprazine HCl   1.5 mg, Oral, Daily               Allergies   Allergen Reactions    Ketorolac Other (See Comments)     pt states \"this medicine sends me into seizures\"    Sulfa Antibiotics Hives    Tramadol Hcl Mental Status Change    Topamax [Topiramate] Unknown - High Severity    Adhesive Tape Rash     Paper tape         Discharge Disposition:   Home or Self Care    Diet:  Hospital:No active diet order        Discharge Activity:   Activity Instructions       Gradually Increase Activity Until at Pre-Hospitalization Level                CODE STATUS:  Code Status and Medical Interventions: CPR (Attempt to Resuscitate); Full Support   Ordered at: 04/18/25 2226     Code Status (Patient has no pulse and is not breathing):    CPR (Attempt to Resuscitate)     Medical Interventions (Patient has pulse or is breathing):    Full Support         Future Appointments   Date Time Provider Department Center   5/16/2025  9:00 AM Yara Piedra PA-C [facility]   7/17/2025  8:00 AM Alvina Arzate APRN [facility]   10/28/2025  1:30 PM " Ana Rosa Weber APRN [facility]       Additional Instructions for the Follow-ups that You Need to Schedule       Discharge Follow-up with PCP   As directed       Currently Documented PCP:    Alvina Arzate APRN    PCP Phone Number:    [phone]     Follow Up Details: 1 week        Discharge Follow-up with Specified Provider: spine surgery; 1 Week   As directed      To: spine surgery   Follow Up: 1 Week                Time spent on Discharge including face to face service:  34 minutes    Signature: Electronically signed by Jayy Keita MD, 04/22/25, 13:39 EDT.  [facility] Hospitalist Team

## 2025-04-22 NOTE — OUTREACH NOTE
Call Center TCM Note      Flowsheet Row Responses   [facility] patient discharged from? [facility]   Does the patient have one of the following disease processes/diagnoses(primary or secondary)? Other   TCM attempt successful? No   Unsuccessful attempts Attempt 2            ANA CRISTINA RODRIGUEZ - Medical Assistant    4/22/2025, 15:24 EDT

## 2025-04-22 NOTE — OUTREACH NOTE
Call Center TCM Note      Flowsheet Row Responses   [facility] patient discharged from? [facility]   Does the patient have one of the following disease processes/diagnoses(primary or secondary)? Other   TCM attempt successful? No   Unsuccessful attempts Attempt 1            ANA CRISTINA RODRIGUEZ - Medical Assistant    4/22/2025, 08:42 EDT

## 2025-04-22 NOTE — CASE MANAGEMENT/SOCIAL WORK
Case Management Discharge Note      Final Note: Home with [facility]         Selected Continued Care - Discharged on 4/21/2025 Admission date: 4/18/2025 - Discharge disposition: Home or Self Care             Discharged Prior to case management assessment.                   Discharged on 3/22/2025 Admission date: 3/17/2025 - Discharge disposition: Home-Health Care Svc      Dialysis/Infusion       Service Provider Services Address Phone Fax Patient Preferred    [facility] Infusion and IV Therapy [address] [address] [phone] --              Home Medical Care       Service Provider Services Address Phone Fax Patient Preferred    [facility] [address] [facility] [address] [phone] --                          Transportation Services  Private: Car    Final Discharge Disposition Code: 06 - home with home health care

## 2025-04-23 ENCOUNTER — TRANSITIONAL CARE MANAGEMENT TELEPHONE ENCOUNTER (OUTPATIENT)
Dept: CALL CENTER | Facility: HOSPITAL | Age: 55
End: 2025-04-23
Payer: MEDICARE

## 2025-04-23 NOTE — OUTREACH NOTE
Call Center TCM Note      Flowsheet Row Responses   [facility] patient discharged from? [facility]   Does the patient have one of the following disease processes/diagnoses(primary or secondary)? Other   TCM attempt successful? No   Unsuccessful attempts Attempt 3   Wrap up additional comments D/C DX: Intractable back pain - Unable to reach pt x 3 attempts for TCM call. Pt is not yet scheduled for TCM APPT with PCP SANJEEV Arzate. Discharged from [facility] 04/21/2025            ANA CRISTINA RODRIGUEZ - Medical Assistant    4/23/2025, 08:41 EDT

## 2025-04-25 ENCOUNTER — OFFICE VISIT (OUTPATIENT)
Dept: FAMILY MEDICINE CLINIC | Facility: CLINIC | Age: 55
End: 2025-04-25
Payer: MEDICARE

## 2025-04-25 VITALS
OXYGEN SATURATION: 99 % | HEIGHT: 64 IN | SYSTOLIC BLOOD PRESSURE: 129 MMHG | TEMPERATURE: 96.9 F | RESPIRATION RATE: 18 BRPM | DIASTOLIC BLOOD PRESSURE: 87 MMHG | BODY MASS INDEX: 28.79 KG/M2 | HEART RATE: 113 BPM | WEIGHT: 168.6 LBS

## 2025-04-25 DIAGNOSIS — Z09 HOSPITAL DISCHARGE FOLLOW-UP: Primary | ICD-10-CM

## 2025-04-25 DIAGNOSIS — G89.29 CHRONIC MIDLINE THORACIC BACK PAIN: ICD-10-CM

## 2025-04-25 DIAGNOSIS — M54.6 CHRONIC MIDLINE THORACIC BACK PAIN: ICD-10-CM

## 2025-04-25 DIAGNOSIS — Z91.199 PATIENT NONCOMPLIANCE: ICD-10-CM

## 2025-04-25 NOTE — PROGRESS NOTES
Office Note     Name: Milka Espinoza    : 1970     MRN: [id number]     Chief Complaint  Primary Care Follow-Up    Subjective     History of Present Illness:  Milka Espinoza is a 55 y.o. female who presents today for follow up with everything that she has going on with her.   Primary Care Follow-UpPertinent negatives include is not nervous/anxious and no depressed mood.       She was admitted on 2025 for intractable back pain and diagnosed with discitis of the thoracic region and osteomyelitis of T6-T7. She was discharged on 2025, readmitted, and finally discharged on 2025. Neurosurgery evaluated her on 2025 and found no deformity, high-grade stenosis, or spinal cord compression, thus ruling out the need for surgery. Imaging revealed osteomyelitis at T6-T7 with progression of destructive changes, a small amount of fluid in the disk space, and a slightly bulging disk, which are likely causing her pain. A thoracic spine x-ray showed unchanged discitis and osteomyelitis at T6-T7 with no new height loss or misalignment. A chest x-ray confirmed the correct placement of the PICC line. She is currently on IV cefepime and using lidocaine patches for pain management. She has been advised to wear a TLSO brace to support her rib cage and reduce pressure on the affected area. A repeat MRI is scheduled in 4 to 6 weeks.       Transitional Care Management Certification  I certify that the following are true:  Communication was made within 2 business days of discharge.  Complexity of Medical Decision Making is low.  Face to face visit occurred within 3 days.    *Note: 09580 is for high complexity patients with a face to face visit within 7 days of discharge.  69683 is for high complexity patients with a face to face on days 8-14 post discharge or medium complexity with face to face visit within 14 days post discharge.    History of Present Illness  The patient is a 55-year-old female  "who presents for evaluation of back pain.    She reports an improvement in her overall health status; however, significant back pain persists. The sensation is described as if her back is on fire, accompanied by a feeling of chest compression and swelling in her breasts. She has been advised to wear a TLSO brace but finds it uncomfortable and only wears it when the pain is severe. Lidocaine patches are being utilized for pain management. Intravenous medications have been administered by the patient herself for the past 16 weeks, with the PICC line being replaced 3 to 4 times due to issues with blood return. Hospitalization occurred last month due to back pain, and she was discharged on 04/22/2025. An upcoming appointment with spine surgery is scheduled for 05/16/2025. Referral to pain management has been made, and an appointment is scheduled.        Allergies   Allergen Reactions   • Ketorolac Other (See Comments)     pt states \"this medicine sends me into seizures\"   • Sulfa Antibiotics Hives   • Tramadol Hcl Mental Status Change   • Topamax [Topiramate] Unknown - High Severity   • Adhesive Tape Rash     Paper tape         Current Outpatient Medications:   •  ALPRAZolam (XANAX) 2 MG tablet, TAKE 1 TABLET BY MOUTH THREE TIMES DAILY AS NEEDED FOR ANXIETY, Disp: 90 tablet, Rfl: 0  •  amitriptyline (ELAVIL) 25 MG tablet, TAKE 1 TABLET BY MOUTH EVERY NIGHT AT BEDTIME, Disp: 90 tablet, Rfl: 1  •  atorvastatin (LIPITOR) 20 MG tablet, Take 1 tablet by mouth Every Night., Disp: 90 tablet, Rfl: 3  •  busPIRone (BUSPAR) 30 MG tablet, TAKE 1 TABLET BY MOUTH TWICE DAILY, Disp: 180 tablet, Rfl: 1  •  cefepime 2000 mg IVPB in 100 mL NS (MBP), Infuse 2,000 mg into a venous catheter Every 8 (Eight) Hours for 113 doses. Indications: Bone and/or Joint Infection, discitis, Disp: , Rfl:   •  hydrOXYzine (ATARAX) 25 MG tablet, TAKE 1 TABLET BY MOUTH THREE TIMES DAILY AS NEEDED FOR ANXIETY, Disp: 90 tablet, Rfl: 2  •  lamoTRIgine " (LaMICtal) 150 MG tablet, Take 1 tablet by mouth 2 (Two) Times a Day., Disp: 60 tablet, Rfl: 5  •  levETIRAcetam (KEPPRA) 500 MG tablet, Take 3 tablets by mouth 2 (Two) Times a Day., Disp: 540 tablet, Rfl: 3  •  Levomefolate Glucosamine (Methyl-Folate) 1700 MCG capsule, Take 1 tablet by mouth Daily., Disp: 30 capsule, Rfl: 0  •  levothyroxine (SYNTHROID, LEVOTHROID) 25 MCG tablet, Take 1 tablet by mouth Daily., Disp: 90 tablet, Rfl: 3  •  Motegrity 2 MG tablet, Take 1 tablet by mouth Daily., Disp: , Rfl:   •  nystatin (MYCOSTATIN) 996071 UNIT/GM powder, Apply 1 Application topically to the appropriate area as directed 2 (Two) Times a Day As Needed., Disp: , Rfl:   •  Octagam 30 GM/300ML solution infusion, Infuse 30 g into a venous catheter 1 (One) Time Per Week., Disp: , Rfl:   •  ondansetron (ZOFRAN) 2 mg/mL injection, Infuse 2 mL into a venous catheter Every 6 (Six) Hours As Needed., Disp: , Rfl:   •  oxyCODONE-acetaminophen (Percocet) 5-325 MG per tablet, Take 1 tablet by mouth Every 6 (Six) Hours As Needed for Moderate Pain., Disp: 16 tablet, Rfl: 0  •  pantoprazole (PROTONIX) 40 MG EC tablet, Take 1 tablet by mouth Daily., Disp: 90 tablet, Rfl: 3  •  promethazine (PHENERGAN) 25 MG tablet, Take 1 tablet by mouth Every 6 (Six) Hours As Needed for Nausea or Vomiting., Disp: , Rfl:   •  QUEtiapine XR (SEROquel XR) 400 MG 24 hr tablet, Take 2 tablets by mouth every night at bedtime., Disp: 180 tablet, Rfl: 1  •  sertraline (ZOLOFT) 100 MG tablet, Take 1 tablet by mouth Daily., Disp: 90 tablet, Rfl: 3  •  SUMAtriptan (IMITREX) 100 MG tablet, TAKE 1 TABLET BY MOUTH 1 TIME AT ONSET OF MIGRAINE AS NEEDED FOR MIGRAINE. MAY REPEAT DOSE 1 TIME AFTER 2 HOURS IF MIGRAINE NOT RESOLVED, Disp: 9 tablet, Rfl: 11  •  Vraylar 1.5 MG capsule capsule, TAKE 1 CAPSULE BY MOUTH EVERY DAY, Disp: 90 capsule, Rfl: 1    Review of Systems   Musculoskeletal:  Positive for arthralgias and back pain.   Psychiatric/Behavioral:  Negative for  "self-injury, sleep disturbance, suicidal ideas, depressed mood and stress. The patient is not nervous/anxious.    All other systems reviewed and are negative.      Social History     Socioeconomic History   • Marital status:    Tobacco Use   • Smoking status: Former     Current packs/day: 0.00     Average packs/day: 0.3 packs/day for 3.0 years (0.8 ttl pk-yrs)     Types: Cigarettes     Start date:      Quit date:      Years since quittin.3     Passive exposure: Past   • Smokeless tobacco: Never   Vaping Use   • Vaping status: Never Used   Substance and Sexual Activity   • Alcohol use: No   • Drug use: No   • Sexual activity: Defer       Family History   Problem Relation Age of Onset   • No Known Problems Mother    • No Known Problems Father    • No Known Problems Sister    • No Known Problems Brother    • Alcohol abuse Daughter    • No Known Problems Son    • No Known Problems Maternal Grandmother    • No Known Problems Paternal Grandmother    • No Known Problems Maternal Aunt    • No Known Problems Paternal Aunt            3/3/2025     3:48 PM   PHQ-2/PHQ-9 Depression Screening   Little interest or pleasure in doing things Not at all   Feeling down, depressed, or hopeless Not at all       Fall Risk Screen:  PERLA Fall Risk Assessment was completed, and patient is at HIGH risk for falls. Assessment completed on:2025      Objective     /87 (BP Location: Left arm, Patient Position: Sitting, Cuff Size: Large Adult)   Pulse 113   Temp 96.9 °F (36.1 °C) (Temporal)   Resp 18   Ht 162.6 cm (64.02\")   Wt 76.5 kg (168 lb 9.6 oz)   SpO2 99%   BMI 28.93 kg/m²     BP Readings from Last 2 Encounters:   25 129/87   25 127/80       Wt Readings from Last 2 Encounters:   25 76.5 kg (168 lb 9.6 oz)   25 78.1 kg (172 lb 2.9 oz)                Physical Exam  Vitals and nursing note reviewed.   Constitutional:       General: She is not in acute distress.     Appearance: " Normal appearance. She is well-groomed. She is not ill-appearing.   HENT:      Head: Normocephalic and atraumatic.   Eyes:      General: Lids are normal. Vision grossly intact.   Neck:      Vascular: No carotid bruit.   Cardiovascular:      Rate and Rhythm: Normal rate and regular rhythm.      Heart sounds: Normal heart sounds.   Pulmonary:      Effort: Pulmonary effort is normal.      Breath sounds: Normal breath sounds and air entry.   Musculoskeletal:      Right lower leg: No edema.      Left lower leg: No edema.   Skin:     General: Skin is warm and dry.   Neurological:      Mental Status: She is alert and oriented to person, place, and time. Mental status is at baseline.   Psychiatric:         Mood and Affect: Mood normal.         Behavior: Behavior normal. Behavior is cooperative.       Derm Physical Exam  Physical Exam  Respiratory: Clear to auscultation, no wheezing, rales or rhonchi  Cardiovascular: Regular rate and rhythm, no murmurs, rubs, or gallops  Gastrointestinal: Soft, no tenderness, no distention, no masses    Result Review :       Results  Labs   - Hemoglobin: 04/21/2025, A little anemic    Imaging   - MRI of the thoracic spine: Osteomyelitis at T6-T7 with progression of destructive changes, small amount of fluid in the disk space with a little bit of bulging disk   - Thoracic x-ray spine: Discitis, osteomyelitis at T6-7 unchanged, no new height loss or misalignment   - Chest x-ray: PICC line is where it needs to be    Labs:       Imaging:   No valid procedures specified.   XR Chest 1 View  Result Date: 4/19/2025  Impression: Impression: PICC line tip is at the cavoatrial junction. Electronically Signed: Werner Burgess MD  4/19/2025 1:40 AM EDT  Workstation ID: [id number]    XR Spine Thoracic 3 View  Result Date: 4/18/2025  Impression: Impression: Findings of discitis/osteomyelitis at T6-T7, overall unchanged compared to recent thoracic spine MRI within the confines of modality differences. No  new/progressive vertebral body height loss or malalignment. Electronically Signed: Augustine Mercer MD  4/18/2025 9:27 PM EDT  Workstation ID: [id number]    MRI Thoracic Spine Without Contrast  Result Date: 4/16/2025  Impression: Impression: Redemonstration of discitis/osteomyelitis at the T6/T7 level with interval progression of destructive changes as compared to the previous study. There is a new small amount of fluid present within the disc space with a small amount of posterior disc bulge present with effacement of the thecal sac anteriorly with no evidence of canal stenosis which is also new. The epidural fluid appears to have decreased as compared to the previous study. No new areas of suspicious osseous edema identified. Electronically Signed: Elise Steele MD  4/16/2025 2:44 AM EDT  Workstation ID: [id number]      Data reviewed:        Assessment and Plan     Plan      Assessment & Plan  Hospital discharge follow-up         Chronic midline thoracic back pain    Orders:  •  Ambulatory Referral to Pain Management    Patient noncompliance  She has been advised to wear a TLSO brace but is here today without the use of the brace.  She has been advised to resume using her brace once she arrives home.         Assessment & Plan  1. Back pain.  She was admitted on 04/18/2025 for intractable back pain and diagnosed with discitis of the thoracic region and osteomyelitis of T6-T7. She was discharged on 04/17/2025, readmitted, and finally discharged on 04/22/2025. Neurosurgery evaluated her on 04/16/2025 and found no deformity, high-grade stenosis, or spinal cord compression, thus ruling out the need for surgery. Imaging revealed osteomyelitis at T6-T7 with progression of destructive changes, a small amount of fluid in the disk space, and a slightly bulging disk, which are likely causing her pain. A thoracic spine x-ray showed unchanged discitis and osteomyelitis at T6-T7 with no new height loss or misalignment. A chest x-ray  confirmed the correct placement of the PICC line. She is currently on IV cefepime and using lidocaine patches for pain management. She has been advised to wear a TLSO brace to support her rib cage and reduce pressure on the affected area. A repeat MRI is scheduled in 4 to 6 weeks. She will continue to see Milka Pavon for medication management and has been referred to pain management.           Follow Up   Wrapup Tab  No follow-ups on file.     Patient was given instructions and counseling regarding her condition or for health maintenance advice. Please see specific information pulled into the AVS if appropriate.  Hand hygiene was performed during entrance to exam room and following assessment of patient. This document is intended for medical expert use only.       SANJEEV Domingo, FNP-C  AL[facility]   [facility]  [address] [place] [address] DR TINO NASH [address] [place] [address] [place] [id number] [phone]    Patient or patient representative verbalized consent for the use of Ambient Listening during the visit with  SANJEEV Domingo for chart documentation. 4/28/2025  07:35 EDT   0

## 2025-04-25 NOTE — ASSESSMENT & PLAN NOTE
She has been advised to wear a TLSO brace but is here today without the use of the brace.  She has been advised to resume using her brace once she arrives home.

## 2025-04-28 ENCOUNTER — TELEPHONE (OUTPATIENT)
Dept: FAMILY MEDICINE CLINIC | Facility: CLINIC | Age: 55
End: 2025-04-28
Payer: MEDICARE

## 2025-04-28 DIAGNOSIS — M46.44 DISCITIS OF THORACIC REGION: ICD-10-CM

## 2025-04-28 RX ORDER — OXYCODONE AND ACETAMINOPHEN 5; 325 MG/1; MG/1
1 TABLET ORAL EVERY 6 HOURS PRN
Qty: 16 TABLET | Refills: 0 | OUTPATIENT
Start: 2025-04-28

## 2025-04-28 NOTE — TELEPHONE ENCOUNTER
Caller: Milka Espinoza    Relationship: Self    Best call back number:   Telephone Information:   Mobile [phone]         Requested Prescriptions:   Requested Prescriptions     Pending Prescriptions Disp Refills    oxyCODONE-acetaminophen (Percocet) 5-325 MG per tablet 16 tablet 0     Sig: Take 1 tablet by mouth Every 6 (Six) Hours As Needed for Moderate Pain.        Pharmacy where request should be sent: [id number] - [address] [place] [phone] [place] [phone]      Last office visit with prescribing clinician: 4/25/2025   Last telemedicine visit with prescribing clinician: Visit date not found   Next office visit with prescribing clinician: 7/17/2025     Additional details provided by patient:     Does the patient have less than a 3 day supply:  [x] Yes  [] No    Would you like a call back once the refill request has been completed: [] Yes [] No    If the office needs to give you a call back, can they leave a voicemail: [] Yes [] No    Yanira Fernandez Rep   04/28/25 14:48 EDT

## 2025-04-28 NOTE — TELEPHONE ENCOUNTER
Gale from [facility] stated that patient IV antibiotics is about to be completed. The [facility] infectious disease doctor wants PCP to order MRI thoracic to see if infection has cleared up.   Her call back number is [phone]

## 2025-04-29 ENCOUNTER — TELEPHONE (OUTPATIENT)
Dept: FAMILY MEDICINE CLINIC | Facility: CLINIC | Age: 55
End: 2025-04-29
Payer: MEDICARE

## 2025-04-29 DIAGNOSIS — F41.1 GENERALIZED ANXIETY DISORDER: Chronic | ICD-10-CM

## 2025-04-29 DIAGNOSIS — F41.0 PANIC DISORDER: Chronic | ICD-10-CM

## 2025-04-29 NOTE — TELEPHONE ENCOUNTER
Caller: Milka Espinoza    Relationship: Self    Best call back number: [phone]     Which medication are you concerned about:  oxyCODONE-acetaminophen (Percocet) 5-325 MG per tablet     Who prescribed you this medication: Jayy Keita MD     When did you start taking this medication: NA    What are your concerns: TRANSFER TO  [id number] - [address] AT [facility] OF [facility] [address] [phone] [place] [phone] FX

## 2025-04-29 NOTE — TELEPHONE ENCOUNTER
I called and let patient know that we was unable to transfer a controlled substance to a different pharmacy because she was not the prescriber.

## 2025-04-30 RX ORDER — ALPRAZOLAM 2 MG/1
2 TABLET ORAL 3 TIMES DAILY PRN
Qty: 90 TABLET | OUTPATIENT
Start: 2025-04-30

## 2025-04-30 NOTE — TELEPHONE ENCOUNTER
Please contact the pt and ask if she followed Milka's recommendations to go to the rehab for detox.    Milka did not feel comfortable to continue prescribing high dose of xanax and recommended to detox safely in the rehab    Thank you

## 2025-04-30 NOTE — TELEPHONE ENCOUNTER
Spoke with Milka. She has no Xanax for over 2 weeks. She has been in the hospital with gastro issues and a spinal cord infection.  She does not want any Xanax.

## 2025-05-08 ENCOUNTER — HOSPITAL ENCOUNTER (EMERGENCY)
Facility: HOSPITAL | Age: 55
Discharge: HOME | End: 2025-05-08
Attending: EMERGENCY MEDICINE
Payer: MEDICARE

## 2025-05-08 ENCOUNTER — APPOINTMENT (OUTPATIENT)
Dept: GENERAL RADIOLOGY | Facility: HOSPITAL | Age: 55
End: 2025-05-08
Payer: MEDICARE

## 2025-05-08 VITALS
SYSTOLIC BLOOD PRESSURE: 143 MMHG | OXYGEN SATURATION: 95 % | DIASTOLIC BLOOD PRESSURE: 65 MMHG | TEMPERATURE: 97.5 F | BODY MASS INDEX: 29.02 KG/M2 | HEART RATE: 114 BPM | WEIGHT: 170 LBS | HEIGHT: 64 IN | RESPIRATION RATE: 20 BRPM

## 2025-05-08 DIAGNOSIS — M54.50 ACUTE LOW BACK PAIN, UNSPECIFIED BACK PAIN LATERALITY, UNSPECIFIED WHETHER SCIATICA PRESENT: Primary | ICD-10-CM

## 2025-05-08 PROCEDURE — 99283 EMERGENCY DEPT VISIT LOW MDM: CPT

## 2025-05-08 PROCEDURE — 72110 X-RAY EXAM L-2 SPINE 4/>VWS: CPT

## 2025-05-08 RX ORDER — PREDNISONE 20 MG/1
20 TABLET ORAL DAILY
Qty: 5 TABLET | Refills: 0 | Status: SHIPPED | OUTPATIENT
Start: 2025-05-08

## 2025-05-08 RX ORDER — HYDROCODONE BITARTRATE AND ACETAMINOPHEN 5; 325 MG/1; MG/1
1 TABLET ORAL EVERY 6 HOURS PRN
Qty: 12 TABLET | Refills: 0 | Status: SHIPPED | OUTPATIENT
Start: 2025-05-08

## 2025-05-08 RX ORDER — TRAMADOL HYDROCHLORIDE 50 MG/1
50 TABLET ORAL EVERY 6 HOURS PRN
Qty: 12 TABLET | Refills: 0 | Status: SHIPPED | OUTPATIENT
Start: 2025-05-08

## 2025-05-08 NOTE — ED PROVIDER NOTES
"Subjective   History of Present Illness  Patient is a 55-year-old female complaining of low back pain for the past several days.  She denies numbness ting weakness or recent trauma.  She denies bowel or bladder abnormality.      Review of Systems    Past Medical History:   Diagnosis Date    Depression     Disease of thyroid gland     Hyperlipidemia     Migraines     On home O2 2021    3 lmp    Seizures        Allergies   Allergen Reactions    Ketorolac Other (See Comments)     pt states \"this medicine sends me into seizures\"    Sulfa Antibiotics Hives    Tramadol Hcl Mental Status Change    Topamax [Topiramate] Unknown - High Severity    Adhesive Tape Rash     Paper tape       Past Surgical History:   Procedure Laterality Date    ABDOMINAL SURGERY       SECTION      x3    ENDOSCOPY N/A 2022    Procedure: EGD WITH DILATATION (BOUGIE #56) AND INJECTION OF BOTOX;  Surgeon: Rito Le MD;  Location: [facility] ENDOSCOPY;  Service: Gastroenterology;  Laterality: N/A;  POST: LOWER ESOPHAGEAL STRICTURE AND CRICOPHARYNGEAL STRICUTRE    ENDOSCOPY W/ PEG TUBE PLACEMENT N/A 2020    Procedure: ESOPHAGOGASTRODUODENOSCOPY WITH PERCUTANEOUS ENDOSCOPIC GASTROSTOMY TUBE INSERTION;  Surgeon: Rito Le MD;  Location: [facility] ENDOSCOPY;  Service: Gastroenterology;  Laterality: N/A;    HERNIA REPAIR      HYSTERECTOMY      PEG TUBE REMOVAL         Family History   Problem Relation Age of Onset    No Known Problems Mother     No Known Problems Father     No Known Problems Sister     No Known Problems Brother     Alcohol abuse Daughter     No Known Problems Son     No Known Problems Maternal Grandmother     No Known Problems Paternal Grandmother     No Known Problems Maternal Aunt     No Known Problems Paternal Aunt        Social History     Socioeconomic History    Marital status:    Tobacco Use    Smoking status: Former     Current packs/day: 0.00     Average packs/day: 0.3 packs/day for 3.0 years (0.8 " ttl pk-yrs)     Types: Cigarettes     Start date:      Quit date:      Years since quittin.3     Passive exposure: Past    Smokeless tobacco: Never   Vaping Use    Vaping status: Never Used   Substance and Sexual Activity    Alcohol use: No    Drug use: No    Sexual activity: Defer           Objective   Physical Exam  Back has diffuse low back pain to palpation.  Patient has range of motion with pain.  Patient has neck straight leg raises.  Neurologic exam is nonfocal symmetrical DTRs.  Procedures           ED Course      XR Spine Lumbar Complete 4+VW  Result Date: 2025  Impression: Mild degenerative endplate changes in the lumbar spine. No acute lumbar spine findings. Electronically Signed: Estrella Gan MD  2025 2:57 PM EDT  Workstation ID: [id number]                                                     Medical Decision Making  Interpretation patient lumbar spine x-ray shows degenerative change without fracture or dislocation.  Patient to be discharged.  Will place on Naprosyn and flexion.  She has a heating pad and see MD for recheck as needed.    Amount and/or Complexity of Data Reviewed  Radiology: ordered and independent interpretation performed.    Risk  Prescription drug management.        Final diagnoses:   Acute low back pain, unspecified back pain laterality, unspecified whether sciatica present       ED Disposition  ED Disposition       ED Disposition   Discharge    Condition   Stable    Comment   --               No follow-up provider specified.       Medication List        New Prescriptions      predniSONE 20 MG tablet  Commonly known as: DELTASONE  Take 1 tablet by mouth Daily.     traMADol 50 MG tablet  Commonly known as: ULTRAM  Take 1 tablet by mouth Every 6 (Six) Hours As Needed for Severe Pain.               Where to Get Your Medications        These medications were sent to [facility] [address] [place] [address]      Hours: Monday to Friday 7 AM  to 7 PM Phone: [phone]   predniSONE 20 MG tablet  traMADol 50 MG tablet            Werner Lujan MD  05/08/25 [id number]

## 2025-05-16 ENCOUNTER — TELEPHONE (OUTPATIENT)
Dept: NEUROSURGERY | Facility: CLINIC | Age: 55
End: 2025-05-16

## 2025-05-16 NOTE — TELEPHONE ENCOUNTER
Called to cancel her appointment as she didn't complete the Xray for the provider. Can reschedule once it is completed.  HUB OKAY TO RELAY

## 2025-05-17 DIAGNOSIS — M46.44 DISCITIS OF THORACIC REGION: Primary | ICD-10-CM

## 2025-05-20 ENCOUNTER — TELEPHONE (OUTPATIENT)
Dept: NEUROSURGERY | Facility: CLINIC | Age: 55
End: 2025-05-20

## 2025-05-20 DIAGNOSIS — M46.44 DISCITIS OF THORACIC REGION: Primary | ICD-10-CM

## 2025-05-20 NOTE — TELEPHONE ENCOUNTER
Patient called the office about her MRI that was initially ordered by ID. She is wanting the MRI to be done at [facility] as she does not have a ride that can bring her to [place]. I told her I will ask the provider and we will call her back.

## 2025-05-29 NOTE — PROGRESS NOTES
Subjective     Chief Complaint   Patient presents with    Back Pain         Previous Treatment: tramadol,hyrdocodone,oxycodone,    HPI: Milka Espinoza is a 55 y.o. female who presents to clinic after being seen in the hospital for osteomyelitis/discitis at T6-7 and a compression fracture at T7.  Patient continues to be in immense pain.  Patient finished her antibiotics approximately a month ago.  She states that her pain continues to be horrible and is affecting her daily activities.  She continues to deny any radicular symptoms in her lower extremities such as pain, numbness and tingling.  She does endorse weakness in her lower extremities.  She denies any bowel or bladder incontinence or saddle anesthesia.  She states that she has not been wearing her TLSO brace as much because it causes her more pain.        PMH:  Past Medical History:   Diagnosis Date    Depression     Disease of thyroid gland     Hyperlipidemia     Migraines     On home O2 12/2021    Seizures          Current Outpatient Medications:     ibuprofen (ADVIL,MOTRIN) 800 MG tablet, take 1 tablet by mouth every 6-8 hours as needed, Disp: , Rfl:     ALPRAZolam (XANAX) 2 MG tablet, TAKE 1 TABLET BY MOUTH THREE TIMES DAILY AS NEEDED FOR ANXIETY, Disp: 90 tablet, Rfl: 0    amitriptyline (ELAVIL) 25 MG tablet, TAKE 1 TABLET BY MOUTH EVERY NIGHT AT BEDTIME, Disp: 90 tablet, Rfl: 1    atorvastatin (LIPITOR) 20 MG tablet, Take 1 tablet by mouth Every Night., Disp: 90 tablet, Rfl: 3    busPIRone (BUSPAR) 30 MG tablet, TAKE 1 TABLET BY MOUTH TWICE DAILY, Disp: 180 tablet, Rfl: 1    HYDROcodone-acetaminophen (NORCO) 5-325 MG per tablet, Take 1 tablet by mouth Every 6 (Six) Hours As Needed for Moderate Pain. (Patient not taking: Reported on 5/30/2025), Disp: 12 tablet, Rfl: 0    hydrOXYzine (ATARAX) 25 MG tablet, TAKE 1 TABLET BY MOUTH THREE TIMES DAILY AS NEEDED FOR ANXIETY, Disp: 90 tablet, Rfl: 2    lamoTRIgine (LaMICtal) 150 MG tablet, Take 1 tablet by  mouth 2 (Two) Times a Day., Disp: 60 tablet, Rfl: 5    levETIRAcetam (KEPPRA) 500 MG tablet, Take 3 tablets by mouth 2 (Two) Times a Day., Disp: 540 tablet, Rfl: 3    Levomefolate Glucosamine (Methyl-Folate) 1700 MCG capsule, Take 1 tablet by mouth Daily., Disp: 30 capsule, Rfl: 0    levothyroxine (SYNTHROID, LEVOTHROID) 25 MCG tablet, Take 1 tablet by mouth Daily., Disp: 90 tablet, Rfl: 3    Motegrity 2 MG tablet, Take 1 tablet by mouth Daily., Disp: , Rfl:     nystatin (MYCOSTATIN) 114447 UNIT/GM powder, Apply 1 Application topically to the appropriate area as directed 2 (Two) Times a Day As Needed., Disp: , Rfl:     Octagam 30 GM/300ML solution infusion, Infuse 30 g into a venous catheter 1 (One) Time Per Week., Disp: , Rfl:     ondansetron (ZOFRAN) 2 mg/mL injection, Infuse 2 mL into a venous catheter Every 6 (Six) Hours As Needed., Disp: , Rfl:     oxyCODONE-acetaminophen (Percocet) 5-325 MG per tablet, Take 1 tablet by mouth Every 6 (Six) Hours As Needed for Moderate Pain. (Patient not taking: Reported on 5/30/2025), Disp: 16 tablet, Rfl: 0    pantoprazole (PROTONIX) 40 MG EC tablet, Take 1 tablet by mouth Daily., Disp: 90 tablet, Rfl: 3    predniSONE (DELTASONE) 20 MG tablet, Take 1 tablet by mouth Daily., Disp: 5 tablet, Rfl: 0    promethazine (PHENERGAN) 25 MG tablet, Take 1 tablet by mouth Every 6 (Six) Hours As Needed for Nausea or Vomiting., Disp: , Rfl:     QUEtiapine XR (SEROquel XR) 400 MG 24 hr tablet, Take 2 tablets by mouth every night at bedtime., Disp: 180 tablet, Rfl: 1    sertraline (ZOLOFT) 100 MG tablet, Take 1 tablet by mouth Daily., Disp: 90 tablet, Rfl: 3    SUMAtriptan (IMITREX) 100 MG tablet, TAKE 1 TABLET BY MOUTH 1 TIME AT ONSET OF MIGRAINE AS NEEDED FOR MIGRAINE. MAY REPEAT DOSE 1 TIME AFTER 2 HOURS IF MIGRAINE NOT RESOLVED, Disp: 9 tablet, Rfl: 11    tiZANidine (ZANAFLEX) 4 MG tablet, Take 1 tablet by mouth Every 8 (Eight) Hours As Needed for Muscle Spasms., Disp: , Rfl:     traMADol  "(ULTRAM) 50 MG tablet, Take 1 tablet by mouth Every 6 (Six) Hours As Needed for Severe Pain., Disp: 12 tablet, Rfl: 0    Vraylar 1.5 MG capsule capsule, TAKE 1 CAPSULE BY MOUTH EVERY DAY, Disp: 90 capsule, Rfl: 1     Allergies   Allergen Reactions    Ketorolac Other (See Comments)     pt states \"this medicine sends me into seizures\"    Sulfa Antibiotics Hives    Tramadol Hcl Mental Status Change    Topamax [Topiramate] Unknown - High Severity    Adhesive Tape Rash     Paper tape        Past Surgical History:   Procedure Laterality Date    ABDOMINAL SURGERY       SECTION      x3    ENDOSCOPY N/A 2022    Procedure: EGD WITH DILATATION (BOUGIE #56) AND INJECTION OF BOTOX;  Surgeon: Rito Le MD;  Location: [facility] ENDOSCOPY;  Service: Gastroenterology;  Laterality: N/A;  POST: LOWER ESOPHAGEAL STRICTURE AND CRICOPHARYNGEAL STRICUTRE    ENDOSCOPY W/ PEG TUBE PLACEMENT N/A 2020    Procedure: ESOPHAGOGASTRODUODENOSCOPY WITH PERCUTANEOUS ENDOSCOPIC GASTROSTOMY TUBE INSERTION;  Surgeon: Rito Le MD;  Location: [facility] ENDOSCOPY;  Service: Gastroenterology;  Laterality: N/A;    HERNIA REPAIR      HYSTERECTOMY      PEG TUBE REMOVAL          Family History   Problem Relation Age of Onset    No Known Problems Mother     No Known Problems Father     No Known Problems Sister     No Known Problems Brother     Alcohol abuse Daughter     No Known Problems Son     No Known Problems Maternal Grandmother     No Known Problems Paternal Grandmother     No Known Problems Maternal Aunt     No Known Problems Paternal Aunt          Social Hx:  Social History     Tobacco Use   Smoking Status Former    Current packs/day: 0.00    Average packs/day: 0.3 packs/day for 3.0 years (0.8 ttl pk-yrs)    Types: Cigarettes    Start date:     Quit date:     Years since quittin.4    Passive exposure: Past   Smokeless Tobacco Never      Alcohol Use: Not At Risk (2025)    AUDIT-C     Frequency of Alcohol " "Consumption: Never     Average Number of Drinks: Patient does not drink     Frequency of Binge Drinking: Never      Social History     Substance and Sexual Activity   Drug Use No          Review of Systems   Constitutional:  Positive for activity change.   HENT: Negative.     Eyes: Negative.    Respiratory: Negative.     Cardiovascular: Negative.    Gastrointestinal:  Positive for abdominal pain (ribs).   Endocrine: Negative.    Genitourinary: Negative.    Musculoskeletal:  Positive for back pain (burn,ache-mid back).   Skin: Negative.    Allergic/Immunologic: Negative.    Neurological: Negative.    Hematological: Negative.    Psychiatric/Behavioral:  Positive for sleep disturbance.          Objective     /70   Pulse 120   Resp 18   Ht 162.6 cm (64.02\")   Wt 74.8 kg (165 lb)   SpO2 98%   BMI 28.30 kg/m²    Body mass index is 28.3 kg/m².      Physical Exam  Vitals reviewed.   Musculoskeletal:         General: Normal range of motion.      Cervical back: Normal range of motion.   Skin:     General: Skin is warm and dry.   Neurological:      General: No focal deficit present.   Psychiatric:         Mood and Affect: Mood normal.         Speech: Speech normal.          Neurological Exam  Mental Status  Awake, alert and oriented to person, place and time. Oriented to person, place and time. Speech is normal. Language is fluent with no aphasia.    Motor    5/5 strength in lower extremities.    Sensory  Patient denies any sensory deficit.          Results Review  I personally reviewed and interpreted the images from the following studies:        Worsening T7 compression fracture           Assessment & Plan     MDM: Milka Espinoza is a 55 y.o. female who presents to clinic after being seen in the hospital for discitis/osteomyelitis at T6-7 and a compression fracture of T7.  Patient unfortunately continues to be in immense pain in her mid back area.  She continues to deny any radicular symptoms in her lower " extremities such as pain, numbness and tingling.    Patient had an updated MRI of her thoracic spine.  I did not get the report with the imaging, however, her updated imaging looks worse compared to her prior thoracic MRI.    On physical examination patient has good strength in her lower extremities and does not report any sensory deficit.    At this time I am going to reach out to one of our pain management providers, Dr. Garsia.  Going to see if it is possible for her to have a kyphoplasty given that her fracture has progressed.  Going to see that since she did have osteomyelitis and finished antibiotics a month ago if it is safe for her to have a kyphoplasty at this time.    Should be safe for her I will go ahead and put in referral for her to see pain management and get a kyphoplasty.  Patient is agreeable to this plan knows call any questions or concerns.       Diagnosis Plan   1. Discitis of thoracic region        2. Closed wedge compression fracture of T7 vertebra with delayed healing, subsequent encounter            Return if symptoms worsen or fail to improve.      Milka FLORES Olga  reports that she quit smoking about 2 years ago. Her smoking use included cigarettes. She started smoking about 5 years ago. She has a 0.8 pack-year smoking history. She has been exposed to tobacco smoke. She has never used smokeless tobacco.                    This patient was examined wearing appropriate personal protective equipment.            Yara Piedra PA-C    05/30/25  12:54 EDT      Part of this note may be an electronic transcription/translation of spoken language to printed text using the Dragon Dictation System.

## 2025-05-30 ENCOUNTER — OFFICE VISIT (OUTPATIENT)
Dept: NEUROSURGERY | Facility: CLINIC | Age: 55
End: 2025-05-30
Payer: MEDICARE

## 2025-05-30 VITALS
OXYGEN SATURATION: 98 % | WEIGHT: 165 LBS | RESPIRATION RATE: 18 BRPM | DIASTOLIC BLOOD PRESSURE: 70 MMHG | HEIGHT: 64 IN | SYSTOLIC BLOOD PRESSURE: 108 MMHG | HEART RATE: 120 BPM | BODY MASS INDEX: 28.17 KG/M2

## 2025-05-30 DIAGNOSIS — S22.060G CLOSED WEDGE COMPRESSION FRACTURE OF T7 VERTEBRA WITH DELAYED HEALING, SUBSEQUENT ENCOUNTER: ICD-10-CM

## 2025-05-30 DIAGNOSIS — M46.44 DISCITIS OF THORACIC REGION: Primary | ICD-10-CM

## 2025-05-30 RX ORDER — IBUPROFEN 800 MG/1
TABLET, FILM COATED ORAL
COMMUNITY
Start: 2025-05-13

## 2025-06-06 ENCOUNTER — TELEPHONE (OUTPATIENT)
Dept: FAMILY MEDICINE CLINIC | Facility: CLINIC | Age: 55
End: 2025-06-06

## 2025-06-06 DIAGNOSIS — M46.44 DISCITIS OF THORACIC REGION: Primary | ICD-10-CM

## 2025-06-06 RX ORDER — DEXAMETHASONE 4 MG/1
8 TABLET ORAL TAKE AS DIRECTED
Qty: 2 TABLET | Refills: 0 | Status: SHIPPED | OUTPATIENT
Start: 2025-06-06

## 2025-06-06 NOTE — TELEPHONE ENCOUNTER
Caller: Milka Espinoza    Relationship: Self    Best call back number: [phone]    What medication are you requesting: SOMETHING FOR MIGRAINES    What are your current symptoms: MIGRAINE    How long have you been experiencing symptoms: 2+ DAYS    Have you had these symptoms before:    [x] Yes  [] No    Have you been treated for these symptoms before:   [x] Yes  [] No    If a prescription is needed, what is your preferred pharmacy and phone number: [id number] - [address] [facility] [address] [phone] [facility] [phone]      Additional notes:PATIENT STATED THE SUMATRIPTAN IS NO LONGER HELPING AND PATIENT HAS TRIED OVER THE COUNTER MEDICATION AND THEY AREN'T HELPING THIS TIME EITHER.

## 2025-06-09 ENCOUNTER — TELEPHONE (OUTPATIENT)
Dept: NEUROLOGY | Facility: CLINIC | Age: 55
End: 2025-06-09

## 2025-06-09 ENCOUNTER — TELEPHONE (OUTPATIENT)
Dept: NEUROSURGERY | Facility: CLINIC | Age: 55
End: 2025-06-09
Payer: MEDICARE

## 2025-06-09 DIAGNOSIS — S22.060G COMPRESSION FRACTURE OF T7 VERTEBRA WITH DELAYED HEALING, SUBSEQUENT ENCOUNTER: ICD-10-CM

## 2025-06-09 DIAGNOSIS — M84.58XS PATHOLOGICAL FRACTURE IN NEOPLASTIC DISEASE, OTHER SPECIFIED SITE, SEQUELA: ICD-10-CM

## 2025-06-09 DIAGNOSIS — M46.44 DISCITIS OF THORACIC REGION: Primary | ICD-10-CM

## 2025-06-09 NOTE — TELEPHONE ENCOUNTER
Caller: Milka Espinoza    Relationship: Self    Best call back number:   Telephone Information:   Mobile [phone]     BEST TIME TO REACH: ANY TIME    Which medication are you concerned about: SUMAtriptan (IMITREX) 100 MG tablet     Who prescribed you this medication: SANJEEV DUBOIS    What are your concerns: PATIENT DOES NOT FEEL LIKE THE SUMAtriptan (IMITREX) 100 MG tablet  IS WORKING, AND THEY WANT TO KNOW IF THERE IS ANOTHER RX WE CAN PRESCRIBE    PLEASE REVIEW AND ADVISE

## 2025-06-09 NOTE — TELEPHONE ENCOUNTER
Patient called the office about her Kyphoplasty. She wants to know when it is getting scheduled? She said she has an appointment with [facility] on the 16th but it is just from her PCP for pain in general, not the Kyphoplasty.

## 2025-06-10 ENCOUNTER — TELEPHONE (OUTPATIENT)
Dept: NEUROLOGY | Facility: CLINIC | Age: 55
End: 2025-06-10
Payer: MEDICARE

## 2025-06-10 ENCOUNTER — SPECIALTY PHARMACY (OUTPATIENT)
Dept: INFUSION THERAPY | Facility: HOSPITAL | Age: 55
End: 2025-06-10
Payer: MEDICARE

## 2025-06-10 DIAGNOSIS — G43.009 MIGRAINE WITHOUT AURA AND WITHOUT STATUS MIGRAINOSUS, NOT INTRACTABLE: Primary | ICD-10-CM

## 2025-06-10 NOTE — TELEPHONE ENCOUNTER
It was also sent to the [facility] if you wanted it to go to our team- it went to [facility]

## 2025-06-10 NOTE — TELEPHONE ENCOUNTER
Caller: Milka Espinoza    Relationship: Self    Best call back number: [phone]    What was the call regarding: [facility] [place]    PLEASE REVIEW AND ADVISE

## 2025-06-13 ENCOUNTER — TELEPHONE (OUTPATIENT)
Dept: NEUROSURGERY | Facility: CLINIC | Age: 55
End: 2025-06-13
Payer: MEDICARE

## 2025-06-13 NOTE — TELEPHONE ENCOUNTER
Please call patient to schedule with Dr. Harrison. Please tell her she needs to wear TLSO brace at all times

## 2025-06-13 NOTE — TELEPHONE ENCOUNTER
Called patient and Scheduled a Follow up appointment with Dr Harrison on Wednesday 6/18/25. I did tell her to wear the TLSO Brace at all times.  She verbally understood.

## 2025-06-17 DIAGNOSIS — G40.804 OTHER INTRACTABLE EPILEPSY WITHOUT STATUS EPILEPTICUS: ICD-10-CM

## 2025-06-17 DIAGNOSIS — F40.01 PANIC DISORDER WITH AGORAPHOBIA: Chronic | ICD-10-CM

## 2025-06-17 RX ORDER — LAMOTRIGINE 150 MG/1
150 TABLET ORAL 2 TIMES DAILY
Qty: 60 TABLET | Refills: 5 | Status: SHIPPED | OUTPATIENT
Start: 2025-06-17

## 2025-06-17 RX ORDER — BUSPIRONE HYDROCHLORIDE 30 MG/1
30 TABLET ORAL 2 TIMES DAILY
Qty: 180 TABLET | Refills: 1 | OUTPATIENT
Start: 2025-06-17

## 2025-06-18 ENCOUNTER — OFFICE VISIT (OUTPATIENT)
Dept: NEUROSURGERY | Facility: CLINIC | Age: 55
End: 2025-06-18
Payer: MEDICARE

## 2025-06-18 VITALS
RESPIRATION RATE: 20 BRPM | HEART RATE: 110 BPM | BODY MASS INDEX: 27.31 KG/M2 | HEIGHT: 64 IN | WEIGHT: 160 LBS | OXYGEN SATURATION: 97 %

## 2025-06-18 DIAGNOSIS — M46.44 DISCITIS OF THORACIC REGION: Primary | ICD-10-CM

## 2025-06-18 NOTE — PROGRESS NOTES
"Subjective   History of Present Illness: Milka Espinoza is a 55 y.o. female is here today for follow-up. Today patient reports back pain that radiates around to her chest.  Patient was diagnosed with discitis osteomyelitis of the thoracic spine about 3 months ago.  She has completed antibiotics.  She continues to have significant pain in her back.  No lower extremity weakness or numbness.      Chief Complaint   Patient presents with    Back Pain          Previous treatment:   Narcotic's, Rest, and TLSO, IV antibiotics with ID    Previous neurosurgery:  None    Previous injections:     The following portions of the patient's history were reviewed and updated as appropriate: allergies, current medications, past family history, past medical history, past social history, past surgical history, and problem list.    Review of Systems   Constitutional:  Positive for activity change.   Respiratory:  Negative for chest tightness and shortness of breath.    Cardiovascular:  Negative for chest pain.   Musculoskeletal:  Positive for back pain (radiates around to her chest) and myalgias.   Neurological:  Negative for numbness.       Objective      Pulse 110   Resp 20   Ht 162.6 cm (64.02\")   Wt 72.6 kg (160 lb)   SpO2 97%   BMI 27.45 kg/m²    Body mass index is 27.45 kg/m².  Vitals:    06/18/25 1205   PainSc: 10-Worst pain ever   PainLoc: Back         Neurological Exam        Assessment & Plan   Independent Review of Radiographic Studies:      I personally reviewed and interpreted the images from the following studies.    MRI thoracic spine: Discitis osteomyelitis of the T6-7 level that appears very similar to previous MRI from April.  No obvious kyphotic deformity.  There is some central narrowing without spinal cord compression.    Medical Decision Making:      Milka Espinoza is a 55 y.o. female with T6-7 discitis osteomyelitis diagnosed over 3 months ago who has completed antibiotic therapy with similar " appearing MRI with no high-grade stenosis or spinal cord compression or obvious kyphotic deformity.  We will get a scoliosis x-ray to ensure that she has not developed kyphotic deformity when standing up.  Assuming this is not the case, she can continue using bracing and I believe the pain will improve over the course of time      Diagnoses and all orders for this visit:    1. Discitis of thoracic region (Primary)      No follow-ups on file.    This patient was examined wearing appropriate personal protective equipment.                      Dr. Wesley Harrison IV    06/18/25  13:08 EDT

## 2025-06-19 ENCOUNTER — PATIENT MESSAGE (OUTPATIENT)
Dept: NEUROSURGERY | Facility: CLINIC | Age: 55
End: 2025-06-19
Payer: MEDICARE

## 2025-06-19 DIAGNOSIS — M46.44 DISCITIS OF THORACIC REGION: ICD-10-CM

## 2025-06-19 DIAGNOSIS — S22.060G CLOSED WEDGE COMPRESSION FRACTURE OF T7 VERTEBRA WITH DELAYED HEALING, SUBSEQUENT ENCOUNTER: ICD-10-CM

## 2025-06-19 DIAGNOSIS — S22.060G COMPRESSION FRACTURE OF T7 VERTEBRA WITH DELAYED HEALING, SUBSEQUENT ENCOUNTER: Primary | ICD-10-CM

## 2025-06-22 DIAGNOSIS — F40.01 PANIC DISORDER WITH AGORAPHOBIA: Chronic | ICD-10-CM

## 2025-06-22 RX ORDER — HYDROXYZINE HYDROCHLORIDE 25 MG/1
25 TABLET, FILM COATED ORAL 3 TIMES DAILY PRN
Qty: 90 TABLET | Refills: 2 | OUTPATIENT
Start: 2025-06-22

## 2025-06-30 ENCOUNTER — TELEPHONE (OUTPATIENT)
Dept: FAMILY MEDICINE CLINIC | Facility: CLINIC | Age: 55
End: 2025-06-30
Payer: MEDICARE

## 2025-06-30 NOTE — TELEPHONE ENCOUNTER
Caller: Milka Espinoza    Relationship to patient: Self    Patient is needing: PATIENT STATES THAT THE NURSE WHO ADDRESSES HER PICC LINE ONCE PER WEEK TOOK HER BLOOD PRESSURE TODAY AND TOLD THE PATIENT TO REPORT THE READINGS TO [facility].  PATIENT STATES IT /104, THEN ON RECHECK /104.   PATIENT STATES THE NURSE SAID IT IS USUALLY 110-120s OVER 70-80s.

## 2025-07-11 DIAGNOSIS — F31.32 BIPOLAR AFFECTIVE DISORDER, CURRENTLY DEPRESSED, MODERATE: Chronic | ICD-10-CM

## 2025-07-11 RX ORDER — QUETIAPINE 400 MG/1
800 TABLET, FILM COATED, EXTENDED RELEASE ORAL
Qty: 180 TABLET | Refills: 1 | OUTPATIENT
Start: 2025-07-11

## 2025-07-17 ENCOUNTER — OFFICE VISIT (OUTPATIENT)
Dept: FAMILY MEDICINE CLINIC | Facility: CLINIC | Age: 55
End: 2025-07-17
Payer: MEDICARE

## 2025-07-17 VITALS
BODY MASS INDEX: 28.34 KG/M2 | RESPIRATION RATE: 18 BRPM | TEMPERATURE: 97.4 F | HEIGHT: 64 IN | OXYGEN SATURATION: 97 % | HEART RATE: 112 BPM | WEIGHT: 166 LBS

## 2025-07-17 DIAGNOSIS — E03.9 HYPOTHYROIDISM, UNSPECIFIED TYPE: ICD-10-CM

## 2025-07-17 DIAGNOSIS — E55.9 VITAMIN D DEFICIENCY: ICD-10-CM

## 2025-07-17 DIAGNOSIS — Z12.11 ENCOUNTER FOR SCREENING FOR MALIGNANT NEOPLASM OF COLON: ICD-10-CM

## 2025-07-17 DIAGNOSIS — T21.14XA SUPERFICIAL BURN OF BACK, INITIAL ENCOUNTER: ICD-10-CM

## 2025-07-17 DIAGNOSIS — R56.9 SEIZURES: ICD-10-CM

## 2025-07-17 DIAGNOSIS — Z12.31 ENCOUNTER FOR SCREENING MAMMOGRAM FOR MALIGNANT NEOPLASM OF BREAST: ICD-10-CM

## 2025-07-17 DIAGNOSIS — I10 UNCONTROLLED HYPERTENSION: Primary | ICD-10-CM
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
## 2025-07-17 DIAGNOSIS — E78.2 MIXED HYPERLIPIDEMIA: ICD-10-CM

## 2025-07-17 DIAGNOSIS — S50.811A CAT SCRATCH OF FOREARM, RIGHT, INITIAL ENCOUNTER: ICD-10-CM

## 2025-07-17 LAB
BILIRUB BLD-MCNC: NEGATIVE MG/DL
CLARITY, POC: CLEAR
COLOR UR: YELLOW
GLUCOSE UR STRIP-MCNC: NEGATIVE MG/DL
KETONES UR QL: NEGATIVE
LEUKOCYTE EST, POC: NEGATIVE
NITRITE UR-MCNC: NEGATIVE MG/ML
PH UR: 6 [PH] (ref 5–8)
PROT UR STRIP-MCNC: ABNORMAL MG/DL
RBC # UR STRIP: NEGATIVE /UL
SP GR UR: 1.03 (ref 1–1.03)
UROBILINOGEN UR QL: ABNORMAL

## 2025-07-17 RX ORDER — METOPROLOL SUCCINATE 25 MG/1
25 TABLET, EXTENDED RELEASE ORAL DAILY
Qty: 30 TABLET | Refills: 12 | Status: SHIPPED | OUTPATIENT
Start: 2025-07-17 | End: 2025-07-17

## 2025-07-17 RX ORDER — METOPROLOL SUCCINATE 25 MG/1
25 TABLET, EXTENDED RELEASE ORAL DAILY
Qty: 30 TABLET | Refills: 2 | Status: SHIPPED | OUTPATIENT
Start: 2025-07-17

## 2025-07-17 RX ORDER — CEPHALEXIN 500 MG/1
500 CAPSULE ORAL 2 TIMES DAILY
Qty: 20 CAPSULE | Refills: 0 | Status: SHIPPED | OUTPATIENT
Start: 2025-07-17

## 2025-07-17 RX ORDER — LISINOPRIL 20 MG/1
20 TABLET ORAL DAILY
Qty: 30 TABLET | Refills: 2 | Status: SHIPPED | OUTPATIENT
Start: 2025-07-17 | End: 2025-07-17

## 2025-07-17 RX ORDER — LISINOPRIL 20 MG/1
20 TABLET ORAL DAILY
Qty: 30 TABLET | Refills: 2 | Status: SHIPPED | OUTPATIENT
Start: 2025-07-17

## 2025-07-17 NOTE — ASSESSMENT & PLAN NOTE
Orders:    Comprehensive Metabolic Panel    TSH Rfx On Abnormal To Free T4    CBC & Differential

## 2025-07-17 NOTE — PROGRESS NOTES
Chief Complaint  Primary Care Follow-Up    Subjective      History of Present Illness:  Milka Espinoza is a 55 y.o. female who presents to [facility] for 6 months lab recheck    Hyperlipidemia  This is a recurrent problem. The current episode started more than 1 year ago. The problem is controlled. Recent lipid tests were reviewed and are variable. Exacerbating diseases include hypothyroidism. There are no known factors aggravating her hyperlipidemia. Associated symptoms include chest pain (ice pick sensation). The current treatment provides mild improvement of lipids. There are no compliance problems.  There are no known risk factors for coronary artery disease.   Heartburn  She complains of abdominal pain, belching, chest pain (ice pick sensation) and heartburn. This is a recurrent problem. The current episode started more than 1 year ago. The problem occurs occasionally. The problem has been unchanged.       History of Present Illness  The patient is a 55-year-old female who presents for a follow-up visit.  She has a current medical history of bipolar disorder, gastroparesis, gastroesophageal reflux disease, hyperlipidemia, thyroidism, intractable back pain, seizures, discitis and osteomyelitis.    She has been experiencing elevated blood pressure for several weeks, which is not currently being managed with medication. Her home health nurse advised her to seek emergency care and consult a cardiologist. She reports occasional chest pain, which she suspects may be related to her back condition. She also experiences dizziness and headaches. She has not consulted a cardiologist and does not experience palpitations. She underwent an echocardiogram in 03/2025.    She has been diagnosed with T6-7 discitis and osteomyelitis in 05/2025.  She is reluctant to undergo another spinal injection.  She is scheduled to see Dr. Gross at the local orthopedic clinic tomorrow. She has been  using a heating pad and ice packs for relief but has developed blisters on her back from the heating pad.      She has a history of gastroparesis and has she is right-handed and has a PICC line in her right upper arm for nutrition.    She has a history of seizures and is under the care of Dr. Ana Rosa Figueroa, who has prescribed Keppra 1500 mg twice daily and Nurtec. She reports no recent seizures.    She reports no thoughts of self-harm or harm to others. She reports high stress levels due to her back pain but does not endorse depression or mood disorders. She reports no mood changes or extreme highs or lows associated with bipolar disorder. She reports no use of alcohol, tobacco, or drugs.    She has always had migraines, but her current headaches are constant. She is not currently taking Xanax, which was prescribed by Milka Pavon in 03/2025.    She has not been sleeping well, waking up every 2 hours to use the bathroom. She reports frequent urination but does not consume excessive fluids during the day or night. She quit drinking coffee about a year ago and finds that food tastes unpleasant.    She is due for a colonoscopy and typically sees Dr. Martinez for this procedure.    She has been experiencing hot flashes.    Alcohol: The patient reports no use of alcohol.  Tobacco: The patient reports no use of tobacco.  Recreational Drugs: The patient reports no use of drugs.  Coffee/Tea/Caffeine-containing Drinks: The patient quit drinking coffee about a year ago.  Sleep: She has not been sleeping well, waking up every 2 hours to use the bathroom.  Living Condition: She lives with her autistic son.      Patient Care Team:  Alvina Arzate APRN as PCP - General (Nurse Practitioner)  Ana Rosa Figueroa, Neurology  Yara Piedra, Neurosurgery  Milka Pavon, Psychiatry  Dr. Zion Carroll, Chronic Pain  [facility]  Dr. Harrison, Neurosurgery   Picc line for gastroparesis.  [facility],          Patient Active Problem List   Diagnosis    Bipolar disorder, in full remission, most recent episode depressed    Depression    Mood disorder in conditions classified elsewhere    Seizures    Obesity    GERD (gastroesophageal reflux disease)    Nausea and vomiting    Borderline hypoglycemic episodes    Migraines    Pseudoseizure    Panic disorder with agoraphobia    Gastroparesis    HLD (hyperlipidemia)    Hypothyroid    Bipolar disorder    Dissociative convulsions    Anxiety    Back pain    Other diseases of stomach and duodenum    Esophageal dysmotility    Other specified disease of esophagus    Hiatal hernia    Special screening for malignant neoplasms, colon    Sleep apnea    Weight loss    Restless leg syndrome    Colon polyps    Pure hypercholesterolemia    Panic attacks    Noninfectious gastroenteritis    Irritable bowel syndrome    Intestinal autonomic neuropathy    Hemorrhoid    Hemangioma of liver    Helicobacter pylori gastritis    Gastro-esophageal reflux disease with esophagitis    Ex-cigarette smoker    Epileptic seizure, tonic    Duodenal ulcer, unspecified as acute or chronic, without hemorrhage or perforation    Dizziness    Disease of digestive system, unspecified    COPD (chronic obstructive pulmonary disease)    CKD (chronic kidney disease)    Benign neoplasm of transverse colon    Benign esophageal stricture    Autoantibody titer positive    Attention disturbance    Acute gastric ulcer without hemorrhage or perforation    Personal history of colonic polyps    H/O colonoscopy    Abnormal findings on diagnostic imaging of other parts of digestive tract    Foreign body in digestive system    Normal esophagogastroduodenoscopy (EGD)    Vitamin D deficiency    RUQ abdominal pain    Lesion of right lobe of liver    Osteopenia of neck of left femur    PNA (pneumonia)    Discitis    Intractable back pain    Patient noncompliance       Allergies   Allergen Reactions    Ketorolac  "Other (See Comments)     pt states \"this medicine sends me into seizures\"    Sulfa Antibiotics Hives    Tramadol Hcl Mental Status Change    Topamax [Topiramate] Unknown - High Severity    Adhesive Tape Rash     Paper tape       Social History     Socioeconomic History    Marital status:    Tobacco Use    Smoking status: Former     Current packs/day: 0.00     Average packs/day: 0.3 packs/day for 3.0 years (0.8 ttl pk-yrs)     Types: Cigarettes     Start date:      Quit date:      Years since quittin.5     Passive exposure: Past    Smokeless tobacco: Never   Vaping Use    Vaping status: Never Used   Substance and Sexual Activity    Alcohol use: No    Drug use: No    Sexual activity: Defer       Family History   Problem Relation Age of Onset    No Known Problems Mother     No Known Problems Father     No Known Problems Sister     No Known Problems Brother     Alcohol abuse Daughter     No Known Problems Son     No Known Problems Maternal Grandmother     No Known Problems Paternal Grandmother     No Known Problems Maternal Aunt     No Known Problems Paternal Aunt        Review of Systems   HENT:          Taste change   Cardiovascular:  Positive for chest pain (ice pick sensation). Negative for palpitations and leg swelling.   Gastrointestinal:  Positive for abdominal pain.   Musculoskeletal:  Positive for arthralgias and back pain.   Skin:  Positive for wound (sores on right arm).   Neurological:  Positive for dizziness and headache. Negative for seizures.   Psychiatric/Behavioral:  Positive for self-injury and sleep disturbance. Negative for dysphoric mood, suicidal ideas, depressed mood and stress. The patient is nervous/anxious.            3/3/2025     3:48 PM   PHQ-2/PHQ-9 Depression Screening   Little interest or pleasure in doing things Not at all   Feeling down, depressed, or hopeless Not at all       Fall Risk Screen:  STEADI Fall Risk Assessment was completed, and patient is at HIGH risk " "for falls. Assessment completed on:1/17/2025    Objective       Current Outpatient Medications:     hydrOXYzine (ATARAX) 25 MG tablet, TAKE 1 TABLET BY MOUTH THREE TIMES DAILY AS NEEDED FOR ANXIETY, Disp: 90 tablet, Rfl: 2    ibuprofen (ADVIL,MOTRIN) 800 MG tablet, take 1 tablet by mouth every 6-8 hours as needed, Disp: , Rfl:     levETIRAcetam (KEPPRA) 500 MG tablet, Take 3 tablets by mouth 2 (Two) Times a Day., Disp: 540 tablet, Rfl: 3    lisinopril (PRINIVIL,ZESTRIL) 20 MG tablet, Take 1 tablet by mouth Daily., Disp: 30 tablet, Rfl: 2    metoprolol succinate XL (TOPROL-XL) 25 MG 24 hr tablet, Take 1 tablet by mouth Daily., Disp: 30 tablet, Rfl: 2    Motegrity 2 MG tablet, Take 1 tablet by mouth Daily., Disp: , Rfl:     nystatin (MYCOSTATIN) 306746 UNIT/GM powder, Apply 1 Application topically to the appropriate area as directed 2 (Two) Times a Day As Needed., Disp: , Rfl:     Octagam 30 GM/300ML solution infusion, Infuse 30 g into a venous catheter 1 (One) Time Per Week., Disp: , Rfl:     ondansetron (ZOFRAN) 2 mg/mL injection, Infuse 2 mL into a venous catheter Every 6 (Six) Hours As Needed., Disp: , Rfl:     QUEtiapine XR (SEROquel XR) 400 MG 24 hr tablet, Take 2 tablets by mouth every night at bedtime., Disp: 180 tablet, Rfl: 1    rimegepant sulfate ODT (NURTEC-ODT) 75 MG disintegrating tablet, Take 1 tablet at onset of migraine, max dose 1 tab in 24 hours or 3 days a week, Disp: 9 tablet, Rfl: 6    tiZANidine (ZANAFLEX) 4 MG tablet, Take 1 tablet by mouth Every 8 (Eight) Hours As Needed for Muscle Spasms., Disp: , Rfl:     cephalexin (KEFLEX) 500 MG capsule, Take 1 capsule by mouth 2 (Two) Times a Day., Disp: 20 capsule, Rfl: 0    Vitals:    07/17/25 0802   Pulse: 112   Resp: 18   Temp: 97.4 °F (36.3 °C)   TempSrc: Temporal   SpO2: 97%   Weight: 75.3 kg (166 lb)   Height: 162.6 cm (64.02\")                  Physical Exam  Vitals and nursing note reviewed.   Constitutional:       General: She is not in acute " distress.     Appearance: Normal appearance. She is well-groomed. She is not ill-appearing.   HENT:      Head: Normocephalic and atraumatic.   Eyes:      General: Lids are normal. Vision grossly intact.   Neck:      Vascular: No carotid bruit.   Cardiovascular:      Rate and Rhythm: Normal rate and regular rhythm.      Heart sounds: Normal heart sounds.   Pulmonary:      Effort: Pulmonary effort is normal.      Breath sounds: Normal breath sounds and air entry.   Musculoskeletal:      Right lower leg: No edema.      Left lower leg: No edema.   Skin:     General: Skin is warm and dry.      Findings: Burn and lesion present.             Comments: Back - Scattered erythematous intact skin spots at areas marked. No open lesions/wounds.    Right forearm - multiple scabbed over abrasions to right forear with slight erythema surrounding sites.   Neurological:      Mental Status: She is alert and oriented to person, place, and time. Mental status is at baseline.   Psychiatric:         Mood and Affect: Mood normal.         Behavior: Behavior normal. Behavior is cooperative.       Derm Physical Exam  Physical Exam      Result Review :     The PHQ has not been completed during this encounter.       Results  Labs   - Blood Work: Anemia detected   - Vitamin D Test: Vitamin D was low    Labs:   CMP          4/18/2025    20:33 4/19/2025    05:40 4/19/2025    23:40 4/21/2025    02:16   CMP   Glucose 145  107  123  97    BUN 13  10  9  13    Creatinine 0.80  0.84  0.88  0.87    EGFR 86.1  81.2  76.8  77.8    Sodium 139  141  137  139    Potassium 3.3  3.7  3.9  3.5    Chloride 105  106  104  105    Calcium 8.8  8.9  9.6  9.9    BUN/Creatinine Ratio 16.3  11.9  10.2  14.9    Anion Gap 19.4  17.0  14.1  13.0      CBC w/diff          4/18/2025    20:33 4/19/2025    05:40 4/19/2025    23:40 4/21/2025    02:16   CBC w/Diff   WBC 7.40  6.76  7.62  8.86    RBC 3.98  3.86  4.19  4.29    Hemoglobin 10.2  9.9  10.6  10.9    Hematocrit 33.2   31.6  34.2  35.3    MCV 83.4  81.9  81.6  82.3    MCH 25.6  25.6  25.3  25.4    MCHC 30.7  31.3  31.0  30.9    RDW 15.5  15.5  15.9  16.2    Platelets 285  254  287  282    Neutrophil Rel % 86.7  69.7  75.3  51.4    Immature Granulocyte Rel % 0.4  0.3  0.4  0.3    Lymphocyte Rel % 10.8  24.4  19.3  40.5    Monocyte Rel % 1.8  5.3  4.6  6.3    Eosinophil Rel % 0.0  0.0  0.1  0.9    Basophil Rel % 0.3  0.3  0.3  0.6        TSH          3/18/2025    00:22   TSH   TSH 2.780        UA          3/2/2025    21:53 3/18/2025    04:17 7/17/2025    08:55   Urinalysis   Squamous Epithelial Cells, UA 21-30      Specific [place], UA 1.012  1.016     Ketones, UA Negative  Negative  Negative    Blood, UA Trace  Negative     Leukocytes, UA Trace  Negative  Negative    Nitrite, UA Negative  Negative     RBC, UA 0-2      WBC, UA 21-50      Bacteria, UA 1+        Urine Culture          9/24/2024    15:31 3/2/2025    21:53   Urine Culture   Urine Culture >100,000 CFU/mL Klebsiella oxytoca  No growth        Imaging:   No valid procedures specified.        Data reviewed:             Plan      Assessment & Plan  Uncontrolled hypertension      Orders:    POCT urinalysis dipstick, multipro    lisinopril (PRINIVIL,ZESTRIL) 20 MG tablet; Take 1 tablet by mouth Daily.    metoprolol succinate XL (TOPROL-XL) 25 MG 24 hr tablet; Take 1 tablet by mouth Daily.    Mixed hyperlipidemia       Orders:    Lipid Panel With LDL/HDL Ratio    Hypothyroidism, unspecified type    Orders:    Comprehensive Metabolic Panel    TSH Rfx On Abnormal To Free T4    CBC & Differential    Vitamin D deficiency    Orders:    Vitamin D 25 hydroxy    Seizures         Encounter for screening mammogram for malignant neoplasm of breast    Orders:    Mammo Screening Digital Tomosynthesis Bilateral With CAD; Future    Encounter for screening for malignant neoplasm of colon    Orders:    Ambulatory Referral For Screening Colonoscopy    Cat scratch of forearm, right, initial  encounter    Orders:    cephalexin (KEFLEX) 500 MG capsule; Take 1 capsule by mouth 2 (Two) Times a Day.    Superficial burn of back, initial encounter           Assessment & Plan  1. Hypertension.  - Blood pressure was elevated during today's visit.  - Prescription for lisinopril 20 mg daily and metoprolol 25 mg daily provided.  - Advised to adopt a heart-healthy Mediterranean diet, limit intake of fried foods, fast foods, fatty foods, and monitor salt intake.  - Repeat blood work including CBC, CMP, cholesterol, thyroid function tests, and vitamin D levels will be conducted.    2. T6-7 discitis and osteomyelitis.  - Diagnosed with T6-7 discitis and osteomyelitis in 05/2025.  - Underwent antibiotic therapy.  - Advised to avoid using a heating pad to prevent further skin damage.  - Keflex prescribed for minor wounds on the right arm caused by her cat and sores on her back from the heating pad.    3. Seizure disorder.  - History of seizures, currently on Keppra 1500 mg twice a day and Nurtec.  - No recent seizures reported.  - Advised to continue hydroxyzine 25 mg as needed for anxiety.    4. Health maintenance.  - Mammogram for breast cancer screening ordered.  - Colonoscopy for colon cancer screening ordered.  - Follow-up in 4 weeks for a blood pressure recheck.       Follow Up   Wrapup Tab  Return in about 4 weeks (around 8/14/2025) for Recheck b/p .     There are no Patient Instructions on file for this visit.  Patient was given instructions and counseling regarding her condition or for health maintenance advice. Please see specific information pulled into the AVS if appropriate.  Hand hygiene was performed during entrance to exam room and following assessment of patient. This document is intended for medical expert use only.     EMR Dragon/Transcription disclaimer:   Much of this encounter note is an electronic transcription/translation of spoken language to printed text. The electronic translation of spoken  language may permit erroneous, or at times, nonsensical words or phrases to be inadvertently transcribed.        SANJEEV Domingo, FNP-C  [facility]   [facility] [address] TINO NASH [address] [place] [id number] [phone]    Patient or patient representative verbalized consent for the use of Ambient Listening during the visit with  SANJEEV Domingo for chart documentation. 7/19/2025  07:50 EDT

## 2025-07-22 RX ORDER — CARIPRAZINE 1.5 MG/1
1.5 CAPSULE, GELATIN COATED ORAL DAILY
Qty: 90 CAPSULE | Refills: 1 | OUTPATIENT
Start: 2025-07-22

## 2025-07-28 ENCOUNTER — TELEPHONE (OUTPATIENT)
Dept: FAMILY MEDICINE CLINIC | Facility: CLINIC | Age: 55
End: 2025-07-28
Payer: MEDICARE

## 2025-08-07 ENCOUNTER — TELEPHONE (OUTPATIENT)
Dept: FAMILY MEDICINE CLINIC | Facility: CLINIC | Age: 55
End: 2025-08-07
Payer: MEDICARE

## 2025-08-25 DIAGNOSIS — G43.009 MIGRAINE WITHOUT AURA AND WITHOUT STATUS MIGRAINOSUS, NOT INTRACTABLE: ICD-10-CM

## 2025-08-26 DIAGNOSIS — E87.6 DECREASED POTASSIUM IN THE BLOOD: Primary | ICD-10-CM

## 2025-08-26 DIAGNOSIS — R11.2 NAUSEA AND VOMITING, UNSPECIFIED VOMITING TYPE: ICD-10-CM

## 2025-08-26 RX ORDER — PROMETHAZINE HYDROCHLORIDE 25 MG/1
25 TABLET ORAL EVERY 6 HOURS PRN
Qty: 30 TABLET | Refills: 0 | Status: SHIPPED | OUTPATIENT
Start: 2025-08-26

## 2025-08-26 RX ORDER — POTASSIUM CHLORIDE 750 MG/1
10 TABLET, EXTENDED RELEASE ORAL 3 TIMES DAILY
Qty: 9 TABLET | Refills: 0 | Status: SHIPPED | OUTPATIENT
Start: 2025-08-26

## (undated) DEVICE — PK ENDO GI 50

## (undated) DEVICE — BITEBLOCK ENDO W/STRAP 60F A/ LF DISP

## (undated) DEVICE — GLV SURG SENSICARE SLT PF LF 8 STRL

## (undated) DEVICE — PERCUTANEOUS ENDOSCOPIC GASTROSTOMY KIT: Brand: ENDOVIVE SAFETY PEG KIT

## (undated) DEVICE — THE CARR-LOCKE INJECTION NEEDLE IS A SINGLE USE, DISPOSABLE, FLEXIBLE SHEATH INJECTION NEEDLE USED FOR THE INJECTION OF VARIOUS TYPES OF MEDIA THROUGH FLEXIBLE ENDOSCOPES.